# Patient Record
Sex: FEMALE | Race: WHITE | Employment: OTHER | URBAN - METROPOLITAN AREA
[De-identification: names, ages, dates, MRNs, and addresses within clinical notes are randomized per-mention and may not be internally consistent; named-entity substitution may affect disease eponyms.]

---

## 2017-04-04 ENCOUNTER — HOSPITAL ENCOUNTER (OUTPATIENT)
Dept: PULMONOLOGY | Age: 72
Discharge: HOME | End: 2017-04-04
Payer: MEDICARE

## 2017-04-04 PROCEDURE — 94060 EVALUATION OF WHEEZING: CPT | Performed by: INTERNAL MEDICINE

## 2017-04-04 PROCEDURE — 94729 DIFFUSING CAPACITY: CPT | Performed by: INTERNAL MEDICINE

## 2017-04-04 PROCEDURE — 94060 EVALUATION OF WHEEZING: CPT

## 2017-04-04 PROCEDURE — 94726 PLETHYSMOGRAPHY LUNG VOLUMES: CPT

## 2017-04-04 PROCEDURE — 94729 DIFFUSING CAPACITY: CPT

## 2017-04-04 PROCEDURE — 6360000002 HC RX W HCPCS: Performed by: INTERNAL MEDICINE

## 2017-04-04 PROCEDURE — 94726 PLETHYSMOGRAPHY LUNG VOLUMES: CPT | Performed by: INTERNAL MEDICINE

## 2017-04-04 RX ORDER — ALBUTEROL SULFATE 2.5 MG/3ML
2.5 SOLUTION RESPIRATORY (INHALATION) ONCE
Status: COMPLETED | OUTPATIENT
Start: 2017-04-04 | End: 2017-04-04

## 2017-04-04 RX ADMIN — ALBUTEROL SULFATE 2.5 MG: 2.5 SOLUTION RESPIRATORY (INHALATION) at 09:25

## 2017-06-08 ENCOUNTER — OFFICE VISIT (OUTPATIENT)
Dept: FAMILY MEDICINE CLINIC | Age: 72
End: 2017-06-08

## 2017-06-08 VITALS
BODY MASS INDEX: 30.32 KG/M2 | TEMPERATURE: 97.8 F | HEIGHT: 55 IN | OXYGEN SATURATION: 96 % | WEIGHT: 131 LBS | SYSTOLIC BLOOD PRESSURE: 128 MMHG | DIASTOLIC BLOOD PRESSURE: 68 MMHG | HEART RATE: 63 BPM

## 2017-06-08 DIAGNOSIS — I10 ESSENTIAL HYPERTENSION: ICD-10-CM

## 2017-06-08 DIAGNOSIS — S00.33XA NASAL CONTUSION: ICD-10-CM

## 2017-06-08 DIAGNOSIS — S80.01XA CONTUSION OF RIGHT KNEE, INITIAL ENCOUNTER: ICD-10-CM

## 2017-06-08 DIAGNOSIS — E78.5 HYPERLIPIDEMIA, UNSPECIFIED HYPERLIPIDEMIA TYPE: ICD-10-CM

## 2017-06-08 DIAGNOSIS — R63.5 WEIGHT GAIN: ICD-10-CM

## 2017-06-08 DIAGNOSIS — Z12.11 COLON CANCER SCREENING: ICD-10-CM
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
## 2017-06-08 DIAGNOSIS — S80.02XA CONTUSION OF LEFT KNEE, INITIAL ENCOUNTER: ICD-10-CM

## 2017-06-08 DIAGNOSIS — Z91.81 AT HIGH RISK FOR FALLS: ICD-10-CM

## 2017-06-08 PROCEDURE — 99203 OFFICE O/P NEW LOW 30 MIN: CPT | Performed by: NURSE PRACTITIONER

## 2017-06-08 RX ORDER — OXYBUTYNIN CHLORIDE 10 MG/1
TABLET, EXTENDED RELEASE ORAL
Refills: 4 | COMMUNITY
Start: 2017-06-02 | End: 2017-07-25 | Stop reason: SDUPTHER

## 2017-06-08 RX ORDER — GABAPENTIN 600 MG/1
TABLET ORAL
Refills: 5 | COMMUNITY
Start: 2017-06-02 | End: 2017-10-10 | Stop reason: SDUPTHER

## 2017-06-08 RX ORDER — PRAVASTATIN SODIUM 40 MG
TABLET ORAL
Refills: 3 | COMMUNITY
Start: 2017-04-03 | End: 2017-07-25 | Stop reason: SDUPTHER

## 2017-06-08 RX ORDER — TIZANIDINE 4 MG/1
TABLET ORAL
Refills: 0 | COMMUNITY
Start: 2017-06-02 | End: 2017-07-25 | Stop reason: SDUPTHER

## 2017-06-08 RX ORDER — LISINOPRIL AND HYDROCHLOROTHIAZIDE 12.5; 1 MG/1; MG/1
TABLET ORAL
Refills: 3 | COMMUNITY
Start: 2017-06-02 | End: 2018-03-12 | Stop reason: SDUPTHER

## 2017-06-08 RX ORDER — COLCHICINE 0.6 MG/1
TABLET ORAL
COMMUNITY
Start: 2009-03-24 | End: 2017-06-20 | Stop reason: ALTCHOICE

## 2017-06-08 ASSESSMENT — ENCOUNTER SYMPTOMS
SHORTNESS OF BREATH: 0
COUGH: 0

## 2017-06-14 DIAGNOSIS — S02.2XXA CLOSED FRACTURE OF NASAL BONE, INITIAL ENCOUNTER: Primary | ICD-10-CM

## 2017-06-20 ENCOUNTER — OFFICE VISIT (OUTPATIENT)
Dept: GASTROENTEROLOGY | Age: 72
End: 2017-06-20

## 2017-06-20 VITALS
BODY MASS INDEX: 30.14 KG/M2 | DIASTOLIC BLOOD PRESSURE: 68 MMHG | WEIGHT: 125 LBS | SYSTOLIC BLOOD PRESSURE: 109 MMHG | HEART RATE: 54 BPM

## 2017-06-20 DIAGNOSIS — R13.19 OTHER DYSPHAGIA: Primary | ICD-10-CM

## 2017-06-20 DIAGNOSIS — Z12.11 SPECIAL SCREENING FOR MALIGNANT NEOPLASMS, COLON: ICD-10-CM

## 2017-06-20 PROCEDURE — 99203 OFFICE O/P NEW LOW 30 MIN: CPT | Performed by: INTERNAL MEDICINE

## 2017-06-22 ENCOUNTER — ANESTHESIA (OUTPATIENT)
Dept: ENDOSCOPY | Age: 72
End: 2017-06-22
Payer: MEDICARE

## 2017-06-22 ENCOUNTER — ANESTHESIA EVENT (OUTPATIENT)
Dept: ENDOSCOPY | Age: 72
End: 2017-06-22
Payer: MEDICARE

## 2017-06-22 ENCOUNTER — HOSPITAL ENCOUNTER (OUTPATIENT)
Age: 72
Setting detail: OUTPATIENT SURGERY
Discharge: HOME | End: 2017-06-22
Attending: INTERNAL MEDICINE | Admitting: INTERNAL MEDICINE
Payer: MEDICARE

## 2017-06-22 VITALS
DIASTOLIC BLOOD PRESSURE: 67 MMHG | RESPIRATION RATE: 16 BRPM | OXYGEN SATURATION: 99 % | HEIGHT: 55 IN | SYSTOLIC BLOOD PRESSURE: 119 MMHG | WEIGHT: 125 LBS | BODY MASS INDEX: 28.93 KG/M2 | HEART RATE: 64 BPM | TEMPERATURE: 99.1 F

## 2017-06-22 VITALS
DIASTOLIC BLOOD PRESSURE: 52 MMHG | SYSTOLIC BLOOD PRESSURE: 102 MMHG | RESPIRATION RATE: 18 BRPM | OXYGEN SATURATION: 100 %

## 2017-06-22 PROCEDURE — 3700000000 HC ANESTHESIA ATTENDED CARE: Performed by: INTERNAL MEDICINE

## 2017-06-22 PROCEDURE — 3609017100 HC EGD: Performed by: INTERNAL MEDICINE

## 2017-06-22 PROCEDURE — 88342 IMHCHEM/IMCYTCHM 1ST ANTB: CPT

## 2017-06-22 PROCEDURE — 2500000003 HC RX 250 WO HCPCS: Performed by: NURSE ANESTHETIST, CERTIFIED REGISTERED

## 2017-06-22 PROCEDURE — 2580000003 HC RX 258: Performed by: INTERNAL MEDICINE

## 2017-06-22 PROCEDURE — 3700000001 HC ADD 15 MINUTES (ANESTHESIA): Performed by: INTERNAL MEDICINE

## 2017-06-22 PROCEDURE — 6360000002 HC RX W HCPCS: Performed by: NURSE ANESTHETIST, CERTIFIED REGISTERED

## 2017-06-22 PROCEDURE — 7100000010 HC PHASE II RECOVERY - FIRST 15 MIN: Performed by: INTERNAL MEDICINE

## 2017-06-22 PROCEDURE — 88305 TISSUE EXAM BY PATHOLOGIST: CPT

## 2017-06-22 PROCEDURE — 3609027000 HC COLONOSCOPY: Performed by: INTERNAL MEDICINE

## 2017-06-22 RX ORDER — LIDOCAINE HYDROCHLORIDE 10 MG/ML
1 INJECTION, SOLUTION EPIDURAL; INFILTRATION; INTRACAUDAL; PERINEURAL
Status: DISCONTINUED | OUTPATIENT
Start: 2017-06-22 | End: 2017-06-22 | Stop reason: HOSPADM

## 2017-06-22 RX ORDER — SODIUM CHLORIDE 0.9 % (FLUSH) 0.9 %
10 SYRINGE (ML) INJECTION PRN
Status: DISCONTINUED | OUTPATIENT
Start: 2017-06-22 | End: 2017-06-22 | Stop reason: HOSPADM

## 2017-06-22 RX ORDER — SODIUM CHLORIDE 9 MG/ML
INJECTION, SOLUTION INTRAVENOUS CONTINUOUS
Status: DISCONTINUED | OUTPATIENT
Start: 2017-06-22 | End: 2017-06-22 | Stop reason: SDUPTHER

## 2017-06-22 RX ORDER — PROPOFOL 10 MG/ML
INJECTION, EMULSION INTRAVENOUS CONTINUOUS PRN
Status: DISCONTINUED | OUTPATIENT
Start: 2017-06-22 | End: 2017-06-22 | Stop reason: SDUPTHER

## 2017-06-22 RX ORDER — SODIUM CHLORIDE 0.9 % (FLUSH) 0.9 %
10 SYRINGE (ML) INJECTION PRN
Status: DISCONTINUED | OUTPATIENT
Start: 2017-06-22 | End: 2017-06-22 | Stop reason: SDUPTHER

## 2017-06-22 RX ORDER — SODIUM CHLORIDE 0.9 % (FLUSH) 0.9 %
10 SYRINGE (ML) INJECTION EVERY 12 HOURS SCHEDULED
Status: DISCONTINUED | OUTPATIENT
Start: 2017-06-22 | End: 2017-06-22 | Stop reason: SDUPTHER

## 2017-06-22 RX ORDER — LIDOCAINE HYDROCHLORIDE 10 MG/ML
1 INJECTION, SOLUTION EPIDURAL; INFILTRATION; INTRACAUDAL; PERINEURAL
Status: DISCONTINUED | OUTPATIENT
Start: 2017-06-22 | End: 2017-06-22 | Stop reason: SDUPTHER

## 2017-06-22 RX ORDER — LIDOCAINE HYDROCHLORIDE 20 MG/ML
INJECTION, SOLUTION INFILTRATION; PERINEURAL PRN
Status: DISCONTINUED | OUTPATIENT
Start: 2017-06-22 | End: 2017-06-22 | Stop reason: SDUPTHER

## 2017-06-22 RX ORDER — ONDANSETRON 2 MG/ML
4 INJECTION INTRAMUSCULAR; INTRAVENOUS
Status: DISCONTINUED | OUTPATIENT
Start: 2017-06-22 | End: 2017-06-22 | Stop reason: HOSPADM

## 2017-06-22 RX ORDER — SODIUM CHLORIDE 0.9 % (FLUSH) 0.9 %
10 SYRINGE (ML) INJECTION EVERY 12 HOURS SCHEDULED
Status: DISCONTINUED | OUTPATIENT
Start: 2017-06-22 | End: 2017-06-22 | Stop reason: HOSPADM

## 2017-06-22 RX ORDER — GLYCOPYRROLATE 0.2 MG/ML
INJECTION INTRAMUSCULAR; INTRAVENOUS PRN
Status: DISCONTINUED | OUTPATIENT
Start: 2017-06-22 | End: 2017-06-22 | Stop reason: SDUPTHER

## 2017-06-22 RX ORDER — SODIUM CHLORIDE 9 MG/ML
INJECTION, SOLUTION INTRAVENOUS CONTINUOUS
Status: DISCONTINUED | OUTPATIENT
Start: 2017-06-22 | End: 2017-06-22 | Stop reason: HOSPADM

## 2017-06-22 RX ORDER — PROPOFOL 10 MG/ML
INJECTION, EMULSION INTRAVENOUS PRN
Status: DISCONTINUED | OUTPATIENT
Start: 2017-06-22 | End: 2017-06-22 | Stop reason: SDUPTHER

## 2017-06-22 RX ADMIN — PROPOFOL 30 MG: 10 INJECTION, EMULSION INTRAVENOUS at 12:54

## 2017-06-22 RX ADMIN — GLYCOPYRROLATE 0.2 MG: 0.2 INJECTION INTRAMUSCULAR; INTRAVENOUS at 12:49

## 2017-06-22 RX ADMIN — PROPOFOL 40 MG: 10 INJECTION, EMULSION INTRAVENOUS at 12:53

## 2017-06-22 RX ADMIN — SODIUM CHLORIDE: 9 INJECTION, SOLUTION INTRAVENOUS at 11:53

## 2017-06-22 RX ADMIN — LIDOCAINE HYDROCHLORIDE 60 MG: 20 INJECTION, SOLUTION INFILTRATION; PERINEURAL at 12:53

## 2017-06-22 RX ADMIN — PROPOFOL 120 MCG/KG/MIN: 10 INJECTION, EMULSION INTRAVENOUS at 12:53

## 2017-07-10 ENCOUNTER — OFFICE VISIT (OUTPATIENT)
Dept: FAMILY MEDICINE CLINIC | Age: 72
End: 2017-07-10

## 2017-07-10 VITALS
DIASTOLIC BLOOD PRESSURE: 66 MMHG | HEART RATE: 55 BPM | BODY MASS INDEX: 30.55 KG/M2 | HEIGHT: 55 IN | TEMPERATURE: 96.8 F | WEIGHT: 132 LBS | OXYGEN SATURATION: 97 % | SYSTOLIC BLOOD PRESSURE: 118 MMHG

## 2017-07-10 DIAGNOSIS — F32.A DEPRESSION, UNSPECIFIED DEPRESSION TYPE: Primary | ICD-10-CM

## 2017-07-10 DIAGNOSIS — Z78.0 POST-MENOPAUSAL: ICD-10-CM

## 2017-07-10 DIAGNOSIS — E78.5 HYPERLIPIDEMIA, UNSPECIFIED HYPERLIPIDEMIA TYPE: ICD-10-CM

## 2017-07-10 DIAGNOSIS — S02.2XXD CLOSED FRACTURE OF NASAL BONE WITH ROUTINE HEALING, SUBSEQUENT ENCOUNTER: ICD-10-CM

## 2017-07-10 DIAGNOSIS — I10 ESSENTIAL HYPERTENSION: ICD-10-CM

## 2017-07-10 DIAGNOSIS — R63.5 WEIGHT GAIN: ICD-10-CM

## 2017-07-10 LAB
ALT SERPL-CCNC: 19 U/L (ref 0–33)
ANION GAP SERPL CALCULATED.3IONS-SCNC: 13 MEQ/L (ref 7–13)
AST SERPL-CCNC: 25 U/L (ref 0–35)
BUN BLDV-MCNC: 25 MG/DL (ref 8–23)
CALCIUM SERPL-MCNC: 9.9 MG/DL (ref 8.6–10.2)
CHLORIDE BLD-SCNC: 101 MEQ/L (ref 98–107)
CHOLESTEROL, TOTAL: 205 MG/DL (ref 0–199)
CO2: 24 MEQ/L (ref 22–29)
CREAT SERPL-MCNC: 0.86 MG/DL (ref 0.5–0.9)
GFR AFRICAN AMERICAN: >60
GFR NON-AFRICAN AMERICAN: >60
GLUCOSE BLD-MCNC: 80 MG/DL (ref 74–109)
HCT VFR BLD CALC: 40.9 % (ref 37–47)
HDLC SERPL-MCNC: 73 MG/DL (ref 40–59)
HEMOGLOBIN: 13.5 G/DL (ref 12–16)
LDL CHOLESTEROL CALCULATED: 114 MG/DL (ref 0–129)
MCH RBC QN AUTO: 32.7 PG (ref 27–31.3)
MCHC RBC AUTO-ENTMCNC: 32.9 % (ref 33–37)
MCV RBC AUTO: 99.3 FL (ref 82–100)
PDW BLD-RTO: 12.8 % (ref 11.5–14.5)
PLATELET # BLD: 243 K/UL (ref 130–400)
POTASSIUM SERPL-SCNC: 5 MEQ/L (ref 3.5–5.1)
RBC # BLD: 4.12 M/UL (ref 4.2–5.4)
SODIUM BLD-SCNC: 138 MEQ/L (ref 132–144)
TRIGL SERPL-MCNC: 90 MG/DL (ref 0–200)
TSH SERPL DL<=0.05 MIU/L-ACNC: 1.89 UIU/ML (ref 0.27–4.2)
WBC # BLD: 8.6 K/UL (ref 4.8–10.8)

## 2017-07-10 PROCEDURE — 99213 OFFICE O/P EST LOW 20 MIN: CPT | Performed by: NURSE PRACTITIONER

## 2017-07-10 RX ORDER — OMEPRAZOLE 20 MG/1
CAPSULE, DELAYED RELEASE ORAL
Refills: 2 | COMMUNITY
Start: 2017-06-22 | End: 2019-10-01 | Stop reason: ALTCHOICE

## 2017-07-10 RX ORDER — SERTRALINE HYDROCHLORIDE 100 MG/1
100 TABLET, FILM COATED ORAL DAILY
Qty: 30 TABLET | Refills: 3 | Status: SHIPPED | OUTPATIENT
Start: 2017-07-10 | End: 2018-05-06 | Stop reason: SDUPTHER

## 2017-07-10 ASSESSMENT — ENCOUNTER SYMPTOMS
SHORTNESS OF BREATH: 0
COUGH: 0

## 2017-07-17 ENCOUNTER — HOSPITAL ENCOUNTER (OUTPATIENT)
Dept: WOMENS IMAGING | Age: 72
Discharge: HOME | End: 2017-07-17
Payer: MEDICARE

## 2017-07-17 DIAGNOSIS — Z78.0 POST-MENOPAUSAL: ICD-10-CM

## 2017-07-17 PROCEDURE — 77080 DXA BONE DENSITY AXIAL: CPT

## 2017-07-26 RX ORDER — TIZANIDINE 4 MG/1
TABLET ORAL
Qty: 90 TABLET | Refills: 0 | Status: SHIPPED | OUTPATIENT
Start: 2017-07-26 | End: 2017-10-23 | Stop reason: SDUPTHER

## 2017-07-26 RX ORDER — PRAVASTATIN SODIUM 40 MG
40 TABLET ORAL DAILY
Qty: 30 TABLET | Refills: 3 | Status: SHIPPED | OUTPATIENT
Start: 2017-07-26 | End: 2017-10-23 | Stop reason: SDUPTHER

## 2017-07-26 RX ORDER — OXYBUTYNIN CHLORIDE 10 MG/1
10 TABLET, EXTENDED RELEASE ORAL DAILY
Qty: 30 TABLET | Refills: 4 | Status: SHIPPED | OUTPATIENT
Start: 2017-07-26 | End: 2018-01-08 | Stop reason: SDUPTHER

## 2017-08-08 ENCOUNTER — OFFICE VISIT (OUTPATIENT)
Dept: FAMILY MEDICINE CLINIC | Age: 72
End: 2017-08-08

## 2017-08-08 VITALS
BODY MASS INDEX: 29.85 KG/M2 | WEIGHT: 129 LBS | HEART RATE: 58 BPM | OXYGEN SATURATION: 97 % | SYSTOLIC BLOOD PRESSURE: 90 MMHG | TEMPERATURE: 97.3 F | HEIGHT: 55 IN | DIASTOLIC BLOOD PRESSURE: 60 MMHG

## 2017-08-08 DIAGNOSIS — F32.A DEPRESSION, UNSPECIFIED DEPRESSION TYPE: Primary | ICD-10-CM

## 2017-08-08 PROCEDURE — 99213 OFFICE O/P EST LOW 20 MIN: CPT | Performed by: NURSE PRACTITIONER

## 2017-08-08 ASSESSMENT — ENCOUNTER SYMPTOMS
COUGH: 0
SHORTNESS OF BREATH: 0

## 2017-10-10 RX ORDER — GABAPENTIN 600 MG/1
TABLET ORAL
Qty: 90 TABLET | Refills: 5 | Status: SHIPPED | OUTPATIENT
Start: 2017-10-10 | End: 2018-04-06 | Stop reason: SDUPTHER

## 2017-10-23 RX ORDER — PRAVASTATIN SODIUM 40 MG
40 TABLET ORAL DAILY
Qty: 30 TABLET | Refills: 3 | Status: SHIPPED | OUTPATIENT
Start: 2017-10-23 | End: 2018-02-05 | Stop reason: SDUPTHER

## 2017-10-23 RX ORDER — TIZANIDINE 4 MG/1
TABLET ORAL
Qty: 90 TABLET | Refills: 0 | Status: SHIPPED | OUTPATIENT
Start: 2017-10-23 | End: 2017-11-27 | Stop reason: SDUPTHER

## 2017-11-20 ENCOUNTER — OFFICE VISIT (OUTPATIENT)
Dept: PULMONOLOGY | Age: 72
End: 2017-11-20

## 2017-11-20 VITALS
WEIGHT: 131 LBS | DIASTOLIC BLOOD PRESSURE: 76 MMHG | SYSTOLIC BLOOD PRESSURE: 108 MMHG | HEART RATE: 52 BPM | HEIGHT: 55 IN | TEMPERATURE: 96.7 F | OXYGEN SATURATION: 96 % | BODY MASS INDEX: 30.32 KG/M2

## 2017-11-20 DIAGNOSIS — E66.9 OBESITY (BMI 30-39.9): ICD-10-CM

## 2017-11-20 DIAGNOSIS — J44.9 CHRONIC OBSTRUCTIVE PULMONARY DISEASE, UNSPECIFIED COPD TYPE (HCC): ICD-10-CM

## 2017-11-20 DIAGNOSIS — R09.02 HYPOXEMIA: Primary | ICD-10-CM

## 2017-11-20 PROCEDURE — 99213 OFFICE O/P EST LOW 20 MIN: CPT | Performed by: INTERNAL MEDICINE

## 2017-11-20 ASSESSMENT — ENCOUNTER SYMPTOMS
ABDOMINAL PAIN: 0
WHEEZING: 0
SINUS PRESSURE: 0
SORE THROAT: 0
DIARRHEA: 0
TROUBLE SWALLOWING: 0
EYE DISCHARGE: 0
RHINORRHEA: 0
VOICE CHANGE: 0
EYE ITCHING: 0
NAUSEA: 0
SHORTNESS OF BREATH: 0
COUGH: 0
VOMITING: 0
CHEST TIGHTNESS: 0

## 2017-11-20 NOTE — PROGRESS NOTES
Social History Narrative    No narrative on file         Review of Systems   Constitutional: Negative for chills, diaphoresis, fatigue and fever. HENT: Negative for congestion, mouth sores, nosebleeds, postnasal drip, rhinorrhea, sinus pressure, sneezing, sore throat, trouble swallowing and voice change. Eyes: Negative for discharge, itching and visual disturbance. Respiratory: Negative for cough, chest tightness, shortness of breath and wheezing. Cardiovascular: Negative for chest pain, palpitations and leg swelling. Gastrointestinal: Negative for abdominal pain, diarrhea, nausea and vomiting. Genitourinary: Negative for difficulty urinating and hematuria. Musculoskeletal: Negative for arthralgias, joint swelling and myalgias. Skin: Negative for rash. Allergic/Immunologic: Negative for environmental allergies. Neurological: Negative for dizziness, tremors, weakness and headaches. Psychiatric/Behavioral: Negative for behavioral problems and sleep disturbance. Objective:     Vitals:    11/20/17 1109   BP: 108/76   Site: Left Arm   Position: Sitting   Cuff Size: Medium Adult   Pulse: 52   Temp: 96.7 °F (35.9 °C)   TempSrc: Tympanic   SpO2: 96%   Weight: 131 lb (59.4 kg)   Height: 4' 6\" (1.372 m)         Physical Exam   Constitutional: She is oriented to person, place, and time. She appears well-developed and well-nourished. No distress. obese   HENT:   Head: Normocephalic and atraumatic. Nose: Nose normal.   Mouth/Throat: Oropharynx is clear and moist.   Eyes: EOM are normal. Pupils are equal, round, and reactive to light. Neck: No JVD present. No tracheal deviation present. No thyromegaly present. Cardiovascular: Normal rate and regular rhythm. Exam reveals no gallop and no friction rub. No murmur heard. Pulmonary/Chest: No respiratory distress. She has no wheezes. She has no rales. She exhibits no tenderness.    Diminished BS at base   Abdominal: She exhibits no distension. There is no tenderness. There is no rebound. Musculoskeletal: Normal range of motion. She exhibits no edema. Lymphadenopathy:     She has no cervical adenopathy. Neurological: She is alert and oriented to person, place, and time. Coordination normal.   Skin: Skin is warm and dry. She is not diaphoretic. Psychiatric: She has a normal mood and affect. Current Outpatient Prescriptions   Medication Sig Dispense Refill    Oxygen Concentrator       pravastatin (PRAVACHOL) 40 MG tablet Take 1 tablet by mouth daily 30 tablet 3    tiZANidine (ZANAFLEX) 4 MG tablet TAKE ONE TABLET BY MOUTH THREE TIMES A DAY 90 tablet 0    gabapentin (NEURONTIN) 600 MG tablet TAKE ONE TABLET BY MOUTH THREE TIMES A DAY 90 tablet 5    oxybutynin (DITROPAN-XL) 10 MG extended release tablet Take 1 tablet by mouth daily 30 tablet 4    omeprazole (PRILOSEC) 20 MG delayed release capsule TAKE ONE CAPSULE BY MOUTH DAILY  2    sertraline (ZOLOFT) 100 MG tablet Take 1 tablet by mouth daily 30 tablet 3    lisinopril-hydrochlorothiazide (PRINZIDE;ZESTORETIC) 10-12.5 MG per tablet TAKE ONE TABLET BY MOUTH EVERY DAY  3     No current facility-administered medications for this visit. Results for orders placed during the hospital encounter of 06/10/16   XR Chest Standard TWO VW    Narrative X-RAY A CHEST, 2 VIEWS       CLINICAL HISTORY Pain left lower ribs after trauma. COMPARISONS 1/2/15     FINDINGS  Normal cardiac silhouette. There are left lateral rib  fractures involving the sixth through eighth ribs. There is mild  adjacent pleural thickening. Can't exclude small underlying infiltrate  in this region. Right lung is clear. No pleural effusion or  pneumothorax. Bones are demineralized. There is a dextroscoliosis of  the dorsal spine and levoscoliosis of the thoracic/lumbar spine. Postoperative changes of both shoulders. IMPRESSION LEFT LATERAL RIB FRACTURES.  SMALL ADJACENT DENSITY WHICH  MAY BE SECONDARY

## 2017-11-27 RX ORDER — TIZANIDINE 4 MG/1
TABLET ORAL
Qty: 90 TABLET | Refills: 0 | Status: SHIPPED | OUTPATIENT
Start: 2017-11-27 | End: 2019-09-07 | Stop reason: SDUPTHER

## 2018-01-09 RX ORDER — OXYBUTYNIN CHLORIDE 10 MG/1
TABLET, EXTENDED RELEASE ORAL
Qty: 30 TABLET | Refills: 5 | Status: SHIPPED | OUTPATIENT
Start: 2018-01-09 | End: 2018-06-10 | Stop reason: SDUPTHER

## 2018-01-30 ENCOUNTER — OFFICE VISIT (OUTPATIENT)
Dept: FAMILY MEDICINE CLINIC | Age: 73
End: 2018-01-30
Payer: MEDICARE

## 2018-01-30 VITALS
TEMPERATURE: 96.2 F | BODY MASS INDEX: 31.7 KG/M2 | DIASTOLIC BLOOD PRESSURE: 68 MMHG | HEART RATE: 93 BPM | HEIGHT: 55 IN | WEIGHT: 137 LBS | OXYGEN SATURATION: 97 % | SYSTOLIC BLOOD PRESSURE: 118 MMHG

## 2018-01-30 DIAGNOSIS — E78.5 HYPERLIPIDEMIA, UNSPECIFIED HYPERLIPIDEMIA TYPE: ICD-10-CM

## 2018-01-30 DIAGNOSIS — I10 ESSENTIAL HYPERTENSION: Primary | ICD-10-CM

## 2018-01-30 DIAGNOSIS — I10 ESSENTIAL HYPERTENSION: ICD-10-CM

## 2018-01-30 DIAGNOSIS — J44.9 CHRONIC OBSTRUCTIVE PULMONARY DISEASE, UNSPECIFIED COPD TYPE (HCC): ICD-10-CM

## 2018-01-30 DIAGNOSIS — F32.1 MAJOR DEPRESSIVE DISORDER, SINGLE EPISODE, MODERATE (HCC): ICD-10-CM

## 2018-01-30 LAB
ALT SERPL-CCNC: 19 U/L (ref 0–33)
ANION GAP SERPL CALCULATED.3IONS-SCNC: 15 MEQ/L (ref 7–13)
AST SERPL-CCNC: 26 U/L (ref 0–35)
BUN BLDV-MCNC: 27 MG/DL (ref 8–23)
CALCIUM SERPL-MCNC: 9.6 MG/DL (ref 8.6–10.2)
CHLORIDE BLD-SCNC: 101 MEQ/L (ref 98–107)
CO2: 25 MEQ/L (ref 22–29)
CREAT SERPL-MCNC: 0.8 MG/DL (ref 0.5–0.9)
GFR AFRICAN AMERICAN: >60
GFR NON-AFRICAN AMERICAN: >60
GLUCOSE BLD-MCNC: 92 MG/DL (ref 74–109)
POTASSIUM SERPL-SCNC: 4.5 MEQ/L (ref 3.5–5.1)
SODIUM BLD-SCNC: 141 MEQ/L (ref 132–144)

## 2018-01-30 PROCEDURE — 99214 OFFICE O/P EST MOD 30 MIN: CPT | Performed by: NURSE PRACTITIONER

## 2018-01-30 ASSESSMENT — PATIENT HEALTH QUESTIONNAIRE - PHQ9
SUM OF ALL RESPONSES TO PHQ9 QUESTIONS 1 & 2: 0
SUM OF ALL RESPONSES TO PHQ QUESTIONS 1-9: 0
2. FEELING DOWN, DEPRESSED OR HOPELESS: 0
1. LITTLE INTEREST OR PLEASURE IN DOING THINGS: 0

## 2018-01-30 ASSESSMENT — ENCOUNTER SYMPTOMS
COUGH: 0
SHORTNESS OF BREATH: 0

## 2018-01-30 NOTE — PROGRESS NOTES
Codeine      Nausea, lightheadedness, dizzy       Review of Systems   Constitutional: Negative for chills, diaphoresis, fatigue, fever and malaise/fatigue. Respiratory: Negative for cough and shortness of breath. Cardiovascular: Negative for chest pain, palpitations and leg swelling. Objective  Vitals:    01/30/18 1123   BP: 118/68   Site: Left Arm   Position: Sitting   Cuff Size: Medium Adult   Pulse: 93   Temp: 96.2 °F (35.7 °C)   TempSrc: Tympanic   SpO2: 97%   Weight: 137 lb (62.1 kg)   Height: 4' 3\" (1.295 m)     Physical Exam   Constitutional: She is oriented to person, place, and time. She appears well-developed and well-nourished. No distress. Cardiovascular: Normal rate, regular rhythm and normal heart sounds. Pulmonary/Chest: Effort normal and breath sounds normal. No respiratory distress. Neurological: She is alert and oriented to person, place, and time. Skin: Skin is warm and dry. No rash noted. She is not diaphoretic. No erythema. No pallor. Psychiatric: She has a normal mood and affect. Her behavior is normal. Judgment and thought content normal.     Lab Results   Component Value Date    WBC 8.6 07/10/2017    HGB 13.5 07/10/2017    HCT 40.9 07/10/2017     07/10/2017    CHOL 205 (H) 07/10/2017    TRIG 90 07/10/2017    HDL 73 (H) 07/10/2017    ALT 19 07/10/2017    AST 25 07/10/2017     07/10/2017    K 5.0 07/10/2017     07/10/2017    CREATININE 0.86 07/10/2017    BUN 25 (H) 07/10/2017    CO2 24 07/10/2017    TSH 1.890 07/10/2017    INR 1.0 04/23/2012         Assessment & Plan    1. Essential hypertension  Basic Metabolic Panel    AST    ALT   2. Major depressive disorder, single episode, moderate ([address]     3. Hyperlipidemia, unspecified hyperlipidemia type     4. Chronic obstructive pulmonary disease, unspecified COPD type ([address]       Continue current regimen. Check labs as ordered today. F/u in 4 months or sooner PRN.   Side effects, adverse effects of the medication prescribed today, as well as treatment plan/ rationale and result expectations have been discussed with the patient who expresses understanding and desires to proceed. Close follow up to evaluate treatment results and for coordination of care. I have reviewed the patient's medical history in detail and updated the computerized patient record. As always, patient is advised that if symptoms worsen in any way they will proceed to the nearest emergency room. Orders Placed This Encounter   Procedures    Basic Metabolic Panel     Standing Status:   Future     Number of Occurrences:   1     Standing Expiration Date:   1/30/2019    AST     Standing Status:   Future     Number of Occurrences:   1     Standing Expiration Date:   1/30/2019    ALT     Standing Status:   Future     Number of Occurrences:   1     Standing Expiration Date:   1/30/2019       No orders of the defined types were placed in this encounter. Return in about 4 months (around 5/30/2018) for htn, lipid, depression.     Ling Rivera, CNP

## 2018-02-05 RX ORDER — PRAVASTATIN SODIUM 40 MG
TABLET ORAL
Qty: 30 TABLET | Refills: 2 | Status: SHIPPED | OUTPATIENT
Start: 2018-02-05 | End: 2018-05-06 | Stop reason: SDUPTHER

## 2018-03-12 RX ORDER — LISINOPRIL AND HYDROCHLOROTHIAZIDE 12.5; 1 MG/1; MG/1
TABLET ORAL
Qty: 30 TABLET | Refills: 3 | Status: SHIPPED | OUTPATIENT
Start: 2018-03-12 | End: 2018-06-10 | Stop reason: SDUPTHER

## 2018-04-06 RX ORDER — GABAPENTIN 600 MG/1
TABLET ORAL
Qty: 90 TABLET | Refills: 5 | Status: SHIPPED | OUTPATIENT
Start: 2018-04-06 | End: 2018-10-30 | Stop reason: SDUPTHER

## 2018-05-06 DIAGNOSIS — F32.A DEPRESSION, UNSPECIFIED DEPRESSION TYPE: ICD-10-CM

## 2018-05-07 RX ORDER — PRAVASTATIN SODIUM 40 MG
TABLET ORAL
Qty: 30 TABLET | Refills: 1 | Status: SHIPPED | OUTPATIENT
Start: 2018-05-07 | End: 2018-06-10 | Stop reason: SDUPTHER

## 2018-05-07 RX ORDER — SERTRALINE HYDROCHLORIDE 100 MG/1
TABLET, FILM COATED ORAL
Qty: 30 TABLET | Refills: 4 | Status: SHIPPED | OUTPATIENT
Start: 2018-05-07 | End: 2018-10-02 | Stop reason: SDUPTHER

## 2018-05-31 ENCOUNTER — OFFICE VISIT (OUTPATIENT)
Dept: FAMILY MEDICINE CLINIC | Age: 73
End: 2018-05-31
Payer: MEDICARE

## 2018-05-31 VITALS
TEMPERATURE: 98.1 F | BODY MASS INDEX: 32.26 KG/M2 | HEART RATE: 66 BPM | WEIGHT: 139.4 LBS | OXYGEN SATURATION: 97 % | SYSTOLIC BLOOD PRESSURE: 108 MMHG | HEIGHT: 55 IN | DIASTOLIC BLOOD PRESSURE: 68 MMHG

## 2018-05-31 DIAGNOSIS — F32.1 MAJOR DEPRESSIVE DISORDER, SINGLE EPISODE, MODERATE (HCC): ICD-10-CM

## 2018-05-31 DIAGNOSIS — E78.5 HYPERLIPIDEMIA, UNSPECIFIED HYPERLIPIDEMIA TYPE: ICD-10-CM

## 2018-05-31 DIAGNOSIS — J44.9 CHRONIC OBSTRUCTIVE PULMONARY DISEASE, UNSPECIFIED COPD TYPE (HCC): ICD-10-CM

## 2018-05-31 DIAGNOSIS — I10 ESSENTIAL HYPERTENSION: ICD-10-CM

## 2018-05-31 DIAGNOSIS — R07.9 CHEST PAIN, UNSPECIFIED TYPE: ICD-10-CM

## 2018-05-31 DIAGNOSIS — R07.9 CHEST PAIN, UNSPECIFIED TYPE: Primary | ICD-10-CM

## 2018-05-31 PROCEDURE — 99214 OFFICE O/P EST MOD 30 MIN: CPT | Performed by: NURSE PRACTITIONER

## 2018-05-31 PROCEDURE — 93000 ELECTROCARDIOGRAM COMPLETE: CPT | Performed by: NURSE PRACTITIONER

## 2018-05-31 ASSESSMENT — ENCOUNTER SYMPTOMS
SHORTNESS OF BREATH: 1
COUGH: 0
ABDOMINAL PAIN: 0

## 2018-06-11 RX ORDER — OXYBUTYNIN CHLORIDE 10 MG/1
TABLET, EXTENDED RELEASE ORAL
Qty: 30 TABLET | Refills: 4 | Status: SHIPPED | OUTPATIENT
Start: 2018-06-11 | End: 2018-11-27 | Stop reason: SDUPTHER

## 2018-06-11 RX ORDER — LISINOPRIL AND HYDROCHLOROTHIAZIDE 12.5; 1 MG/1; MG/1
TABLET ORAL
Qty: 30 TABLET | Refills: 2 | Status: SHIPPED | OUTPATIENT
Start: 2018-06-11 | End: 2018-10-02 | Stop reason: SDUPTHER

## 2018-06-11 RX ORDER — PRAVASTATIN SODIUM 40 MG
TABLET ORAL
Qty: 30 TABLET | Refills: 0 | Status: SHIPPED | OUTPATIENT
Start: 2018-06-11 | End: 2018-08-03 | Stop reason: SDUPTHER

## 2018-06-27 ENCOUNTER — OFFICE VISIT (OUTPATIENT)
Dept: FAMILY MEDICINE CLINIC | Age: 73
End: 2018-06-27
Payer: MEDICARE

## 2018-06-27 VITALS
BODY MASS INDEX: 31.75 KG/M2 | TEMPERATURE: 97.8 F | HEIGHT: 55 IN | WEIGHT: 137.2 LBS | OXYGEN SATURATION: 99 % | HEART RATE: 68 BPM | SYSTOLIC BLOOD PRESSURE: 98 MMHG | DIASTOLIC BLOOD PRESSURE: 62 MMHG

## 2018-06-27 DIAGNOSIS — J20.9 ACUTE BRONCHITIS, UNSPECIFIED ORGANISM: Primary | ICD-10-CM

## 2018-06-27 PROCEDURE — 99214 OFFICE O/P EST MOD 30 MIN: CPT | Performed by: FAMILY MEDICINE

## 2018-06-27 RX ORDER — AZITHROMYCIN 250 MG/1
TABLET, FILM COATED ORAL
Qty: 1 PACKET | Refills: 0 | Status: SHIPPED | OUTPATIENT
Start: 2018-06-27 | End: 2019-04-08 | Stop reason: ALTCHOICE

## 2018-06-27 RX ORDER — BENZONATATE 100 MG/1
100 CAPSULE ORAL 3 TIMES DAILY PRN
Qty: 20 CAPSULE | Refills: 0 | Status: SHIPPED | OUTPATIENT
Start: 2018-06-27 | End: 2018-07-04

## 2018-06-27 ASSESSMENT — ENCOUNTER SYMPTOMS
RHINORRHEA: 0
SORE THROAT: 0
COUGH: 1
HEARTBURN: 0

## 2018-10-02 DIAGNOSIS — F32.A DEPRESSION, UNSPECIFIED DEPRESSION TYPE: ICD-10-CM

## 2018-10-03 RX ORDER — LISINOPRIL AND HYDROCHLOROTHIAZIDE 12.5; 1 MG/1; MG/1
TABLET ORAL
Qty: 30 TABLET | Refills: 1 | Status: SHIPPED | OUTPATIENT
Start: 2018-10-03 | End: 2018-11-27 | Stop reason: SDUPTHER

## 2018-10-03 RX ORDER — SERTRALINE HYDROCHLORIDE 100 MG/1
TABLET, FILM COATED ORAL
Qty: 30 TABLET | Refills: 3 | Status: SHIPPED | OUTPATIENT
Start: 2018-10-03 | End: 2019-01-21 | Stop reason: SDUPTHER

## 2018-11-01 RX ORDER — GABAPENTIN 600 MG/1
TABLET ORAL
Qty: 90 TABLET | Refills: 2 | Status: SHIPPED | OUTPATIENT
Start: 2018-11-01 | End: 2019-01-21 | Stop reason: SDUPTHER

## 2018-11-28 RX ORDER — LISINOPRIL AND HYDROCHLOROTHIAZIDE 12.5; 1 MG/1; MG/1
TABLET ORAL
Qty: 30 TABLET | Refills: 0 | Status: SHIPPED | OUTPATIENT
Start: 2018-11-28 | End: 2019-01-21 | Stop reason: SDUPTHER

## 2018-11-28 RX ORDER — OXYBUTYNIN CHLORIDE 10 MG/1
TABLET, EXTENDED RELEASE ORAL
Qty: 30 TABLET | Refills: 3 | Status: SHIPPED | OUTPATIENT
Start: 2018-11-28 | End: 2019-03-28 | Stop reason: SDUPTHER

## 2019-01-21 DIAGNOSIS — F32.A DEPRESSION, UNSPECIFIED DEPRESSION TYPE: ICD-10-CM

## 2019-01-21 RX ORDER — SERTRALINE HYDROCHLORIDE 100 MG/1
TABLET, FILM COATED ORAL
Qty: 30 TABLET | Refills: 2 | Status: SHIPPED | OUTPATIENT
Start: 2019-01-21 | End: 2019-04-08 | Stop reason: ALTCHOICE

## 2019-01-21 RX ORDER — PRAVASTATIN SODIUM 40 MG
TABLET ORAL
Qty: 30 TABLET | Refills: 4 | Status: SHIPPED | OUTPATIENT
Start: 2019-01-21 | End: 2019-06-28 | Stop reason: SDUPTHER

## 2019-01-21 RX ORDER — GABAPENTIN 600 MG/1
TABLET ORAL
Qty: 90 TABLET | Refills: 1 | Status: SHIPPED | OUTPATIENT
Start: 2019-01-21 | End: 2019-03-28 | Stop reason: SDUPTHER

## 2019-01-21 RX ORDER — LISINOPRIL AND HYDROCHLOROTHIAZIDE 12.5; 1 MG/1; MG/1
TABLET ORAL
Qty: 30 TABLET | Refills: 0 | Status: SHIPPED | OUTPATIENT
Start: 2019-01-21 | End: 2019-05-13 | Stop reason: SDUPTHER

## 2019-03-29 RX ORDER — GABAPENTIN 600 MG/1
TABLET ORAL
Qty: 90 TABLET | Refills: 0 | Status: SHIPPED | OUTPATIENT
Start: 2019-03-29 | End: 2019-04-23 | Stop reason: SDUPTHER

## 2019-03-29 RX ORDER — OXYBUTYNIN CHLORIDE 10 MG/1
TABLET, EXTENDED RELEASE ORAL
Qty: 30 TABLET | Refills: 2 | Status: SHIPPED | OUTPATIENT
Start: 2019-03-29 | End: 2019-06-28 | Stop reason: SDUPTHER

## 2019-04-08 ENCOUNTER — OFFICE VISIT (OUTPATIENT)
Dept: FAMILY MEDICINE CLINIC | Age: 74
End: 2019-04-08
Payer: MEDICARE

## 2019-04-08 VITALS
HEIGHT: 55 IN | DIASTOLIC BLOOD PRESSURE: 64 MMHG | HEART RATE: 60 BPM | TEMPERATURE: 97.6 F | BODY MASS INDEX: 34.71 KG/M2 | WEIGHT: 150 LBS | SYSTOLIC BLOOD PRESSURE: 104 MMHG | OXYGEN SATURATION: 98 %

## 2019-04-08 DIAGNOSIS — F32.A DEPRESSION, UNSPECIFIED DEPRESSION TYPE: Primary | ICD-10-CM

## 2019-04-08 DIAGNOSIS — J44.9 CHRONIC OBSTRUCTIVE PULMONARY DISEASE, UNSPECIFIED COPD TYPE (HCC): ICD-10-CM

## 2019-04-08 DIAGNOSIS — E78.5 HYPERLIPIDEMIA, UNSPECIFIED HYPERLIPIDEMIA TYPE: ICD-10-CM

## 2019-04-08 DIAGNOSIS — I10 ESSENTIAL HYPERTENSION: ICD-10-CM

## 2019-04-08 PROCEDURE — 99213 OFFICE O/P EST LOW 20 MIN: CPT | Performed by: NURSE PRACTITIONER

## 2019-04-08 ASSESSMENT — ENCOUNTER SYMPTOMS
SHORTNESS OF BREATH: 0
COUGH: 0

## 2019-04-08 NOTE — PROGRESS NOTES
Subjective  Chief Complaint   Patient presents with    Check-Up     check up for med refills, states she already got her refills. Hypertension   This is a chronic problem. The current episode started more than 1 year ago. The problem is unchanged. The problem is controlled. Pertinent negatives include no chest pain, headaches, malaise/fatigue, palpitations, peripheral edema or shortness of breath. There are no associated agents to hypertension. Risk factors for coronary artery disease include obesity and post-menopausal state. Past treatments include ACE inhibitors and diuretics. The current treatment provides significant improvement. There are no compliance problems. There is no history of angina. Gets sore throat in the evening when she swallows. Denies any heartburn or reflux symptoms. Denies any cough or cold symptoms. Just feels like she wants to swallow all the time. Taking zoloft daily. Does still get some depression in the evening. Usually around 7 pm when it hits when she is sitting on the couch. Thinks she may have had this for a long time. Denies any confusion. Has not seen Dr. Rosaura Govea in over a year. Breathing has been well controlled. Has oxygen at home, but just uses it at night.     Past Medical History:   Diagnosis Date    Anxiety     Arthritis     COPD (chronic obstructive pulmonary disease) (HCC)     Generalized osteoarthrosis, unspecified site 12/27/2002    Hyperlipidemia     Hypertension     Major depressive disorder, single episode, moderate ([address] 12/27/2002    Morbid obesity ([address] 12/27/2002    OAB (overactive bladder)     Osteoporosis      Patient Active Problem List    Diagnosis Date Noted    Hypertension     Hyperlipidemia     COPD (chronic obstructive pulmonary disease) ([address]     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 ([address] 08/27/2010    Generalized osteoarthrosis, unspecified site 12/27/2002    Major depressive disorder, single episode, moderate ([address] 12/27/2002    Morbid obesity ([facility] 2002     Past Surgical History:   Procedure Laterality Date    CARDIAC CATHETERIZATION      CARPAL TUNNEL RELEASE      COLONOSCOPY      HYSTERECTOMY, TOTAL ABDOMINAL      KNEE ARTHROPLASTY Bilateral     RI COLON CA SCRN NOT  [address] IND N/A 2017    COLONOSCOPY performed by Arden Sinha MD at [address] ESOPHAGOGASTRODUODENOSCOPY TRANSORAL DIAGNOSTIC N/A 2017    EGD ESOPHAGOGASTRODUODENOSCOPY performed by Arden Sinha MD at [address] Bilateral      Family History   Problem Relation Age of Onset    Arthritis Father     Other Father         gout    Heart Failure Father     Cancer Sister      Social History     Socioeconomic History    Marital status:      Spouse name: Not on file    Number of children: Not on file    Years of education: Not on file    Highest education level: Not on file   Occupational History    Not on file   Social Needs    Financial resource strain: Not on file    Food insecurity:     Worry: Not on file     Inability: Not on file    Transportation needs:     Medical: Not on file     Non-medical: Not on file   Tobacco Use    Smoking status: Former Smoker     Packs/day: 1.00     Years: 20.00     Pack years: 20.00     Last attempt to quit: 1980     Years since quittin.2    Smokeless tobacco: Never Used   Substance and Sexual Activity    Alcohol use: No    Drug use: Not on file    Sexual activity: Not on file   Lifestyle    Physical activity:     Days per week: Not on file     Minutes per session: Not on file    Stress: Not on file   Relationships    Social connections:     Talks on phone: Not on file     Gets together: Not on file     Attends [employer] service: Not on file     Active member of club or organization: Not on file     Attends meetings of clubs or organizations: Not on file     Relationship status: Not on file    Intimate partner violence:     Fear of current or ex partner: Not on file     Emotionally abused: Not on file     Physically abused: Not on file     Forced sexual activity: Not on file   Other Topics Concern    Not on file   Social History Narrative    Not on file     Current Outpatient Medications on File Prior to Visit   Medication Sig Dispense Refill    oxybutynin (DITROPAN-XL) 10 MG extended release tablet TAKE ONE TABLET BY MOUTH EVERY DAY 30 tablet 2    gabapentin (NEURONTIN) 600 MG tablet TAKE ONE TABLET BY MOUTH THREE TIMES A DAY 90 tablet 0    pravastatin (PRAVACHOL) 40 MG tablet TAKE ONE TABLET BY MOUTH EVERY DAY 30 tablet 4    lisinopril-hydrochlorothiazide (PRINZIDE;ZESTORETIC) 10-12.5 MG per tablet TAKE ONE TABLET BY MOUTH EVERY DAY 30 tablet 0    Oxygen Concentrator       omeprazole (PRILOSEC) 20 MG delayed release capsule TAKE ONE CAPSULE BY MOUTH DAILY  2    tiZANidine (ZANAFLEX) 4 MG tablet TAKE ONE TABLET BY MOUTH THREE TIMES A DAY 90 tablet 0     No current facility-administered medications on file prior to visit. Allergies   Allergen Reactions    Codeine      Nausea, lightheadedness, dizzy       Review of Systems   Constitutional: Negative for chills, diaphoresis, fatigue, fever and malaise/fatigue. HENT: Negative for congestion. Respiratory: Negative for cough and shortness of breath. Cardiovascular: Negative for chest pain, palpitations and leg swelling. Neurological: Negative for dizziness and headaches. Psychiatric/Behavioral: Positive for dysphoric mood. Negative for decreased concentration, sleep disturbance and suicidal ideas. The patient is not nervous/anxious. Objective  Vitals:    04/08/19 1139   BP: 104/64   Site: Left Upper Arm   Position: Sitting   Cuff Size: Medium Adult   Pulse: 60   Temp: 97.6 °F (36.4 °C)   TempSrc: Tympanic   SpO2: 98%   Weight: 150 lb (68 kg)   Height: 4' 3\" (1.295 m)     Physical Exam   Constitutional: She appears well-developed and well-nourished. No distress. HENT:   Head: Normocephalic and atraumatic. Right Ear: Hearing, tympanic membrane, external ear and ear canal normal.   Left Ear: Hearing, tympanic membrane, external ear and ear canal normal.   Nose: Nose normal.   Mouth/Throat: Oropharynx is clear and moist. No oropharyngeal exudate or posterior oropharyngeal erythema. Eyes: Pupils are equal, round, and reactive to light. Conjunctivae and EOM are normal.   Neck: Normal range of motion. Neck supple. Cardiovascular: Normal rate, regular rhythm and normal heart sounds. Pulmonary/Chest: Effort normal and breath sounds normal. No respiratory distress. Musculoskeletal: Normal range of motion. Lymphadenopathy:     She has no cervical adenopathy. Neurological: She is alert. No cranial nerve deficit. Skin: Skin is warm and dry. Capillary refill takes less than 2 seconds. No rash noted. She is not diaphoretic. No erythema. No pallor. Psychiatric: She has a normal mood and affect. Her behavior is normal. Judgment and thought content normal.       Assessment& Plan    1. Depression, unspecified depression type  Discussed starting low dose anti-depressant for current symptoms. Discussed that the first 2 weeks are to monitor for side effects. At the 2 week follow up, the dose will be increased if no significant side effects are reported. Discussed possible side effects with most common side effects being GI related; also discussed BBW of suicidiality. The patient verbalizes understanding. Discussed that it can take up to 6 weeks on a therapeutic dose of medication to reach peak effect.       - VORTIoxetine (TRINTELLIX) 5 MG tablet; Take 1 tablet by mouth daily  Dispense: 30 tablet; Refill: 1    2. Chronic obstructive pulmonary disease, unspecified COPD type ([facility]  Pt will schedule f/u with Dr. Alana Rivas. 3. Essential hypertension  Patient advised to occasionally monitor blood pressure at home and call office if blood pressure consistently elevated >140/85. Continue with medications as ordered. Watch excess salt intake as it can contribute to elevations in blood pressure. Patient verbalized understanding.     - CBC With Auto Differential; Future  - Comprehensive Metabolic Panel; Future    4. Hyperlipidemia, unspecified hyperlipidemia type  Check labs as ordered. - Lipid Panel; Future    Side effects, adverse effects of the medication prescribed today, as well as treatment plan/ rationale and result expectations have been discussed with the patient who expresses understanding and desires to proceed. Close follow up to evaluate treatment results and for coordination of care. I have reviewed the patient's medical history in detail and updated the computerized patient record. As always, patient is advised that if symptoms worsen in any way they will proceed to the nearest emergency room. Orders Placed This Encounter   Procedures    CBC With Auto Differential     Standing Status:   Future     Standing Expiration Date:   4/8/2020    Comprehensive Metabolic Panel     Standing Status:   Future     Standing Expiration Date:   4/8/2020    Lipid Panel     Standing Status:   Future     Standing Expiration Date:   4/8/2020     Order Specific Question:   Is Patient Fasting?/# of Hours     Answer:   y/12       Orders Placed This Encounter   Medications    VORTIoxetine (TRINTELLIX) 5 MG tablet     Sig: Take 1 tablet by mouth daily     Dispense:  30 tablet     Refill:  1       Return in about 3 weeks (around 4/29/2019) for depression.     Arjun Phan, YULY - CNP

## 2019-04-10 ENCOUNTER — TELEPHONE (OUTPATIENT)
Dept: FAMILY MEDICINE CLINIC | Age: 74
End: 2019-04-10

## 2019-04-11 DIAGNOSIS — E78.5 HYPERLIPIDEMIA, UNSPECIFIED HYPERLIPIDEMIA TYPE: ICD-10-CM

## 2019-04-11 DIAGNOSIS — I10 ESSENTIAL HYPERTENSION: ICD-10-CM

## 2019-04-11 LAB
ALBUMIN SERPL-MCNC: 4.5 G/DL (ref 3.5–4.6)
ALP BLD-CCNC: 52 U/L (ref 40–130)
ALT SERPL-CCNC: 18 U/L (ref 0–33)
ANION GAP SERPL CALCULATED.3IONS-SCNC: 14 MEQ/L (ref 9–15)
AST SERPL-CCNC: 26 U/L (ref 0–35)
BASOPHILS ABSOLUTE: 0.1 K/UL (ref 0–0.2)
BASOPHILS RELATIVE PERCENT: 1.2 %
BILIRUB SERPL-MCNC: 0.5 MG/DL (ref 0.2–0.7)
BUN BLDV-MCNC: 21 MG/DL (ref 8–23)
CALCIUM SERPL-MCNC: 9.8 MG/DL (ref 8.5–9.9)
CHLORIDE BLD-SCNC: 105 MEQ/L (ref 95–107)
CHOLESTEROL, TOTAL: 193 MG/DL (ref 0–199)
CO2: 25 MEQ/L (ref 20–31)
CREAT SERPL-MCNC: 0.86 MG/DL (ref 0.5–0.9)
EOSINOPHILS ABSOLUTE: 0.4 K/UL (ref 0–0.7)
EOSINOPHILS RELATIVE PERCENT: 7.9 %
GFR AFRICAN AMERICAN: >60
GFR NON-AFRICAN AMERICAN: >60
GLOBULIN: 2.2 G/DL (ref 2.3–3.5)
GLUCOSE BLD-MCNC: 65 MG/DL (ref 70–99)
HCT VFR BLD CALC: 39.4 % (ref 37–47)
HDLC SERPL-MCNC: 79 MG/DL (ref 40–59)
HEMOGLOBIN: 13 G/DL (ref 12–16)
LDL CHOLESTEROL CALCULATED: 98 MG/DL (ref 0–129)
LYMPHOCYTES ABSOLUTE: 1.4 K/UL (ref 1–4.8)
LYMPHOCYTES RELATIVE PERCENT: 26.2 %
MCH RBC QN AUTO: 32.8 PG (ref 27–31.3)
MCHC RBC AUTO-ENTMCNC: 32.9 % (ref 33–37)
MCV RBC AUTO: 99.8 FL (ref 82–100)
MONOCYTES ABSOLUTE: 0.5 K/UL (ref 0.2–0.8)
MONOCYTES RELATIVE PERCENT: 9.6 %
NEUTROPHILS ABSOLUTE: 3 K/UL (ref 1.4–6.5)
NEUTROPHILS RELATIVE PERCENT: 55.1 %
PDW BLD-RTO: 13.7 % (ref 11.5–14.5)
PLATELET # BLD: 242 K/UL (ref 130–400)
POTASSIUM SERPL-SCNC: 4.8 MEQ/L (ref 3.4–4.9)
RBC # BLD: 3.95 M/UL (ref 4.2–5.4)
SODIUM BLD-SCNC: 144 MEQ/L (ref 135–144)
TOTAL PROTEIN: 6.7 G/DL (ref 6.3–8)
TRIGL SERPL-MCNC: 82 MG/DL (ref 0–150)
WBC # BLD: 5.5 K/UL (ref 4.8–10.8)

## 2019-04-15 ENCOUNTER — TELEPHONE (OUTPATIENT)
Dept: ADMINISTRATIVE | Age: 74
End: 2019-04-15

## 2019-04-15 ENCOUNTER — APPOINTMENT (OUTPATIENT)
Dept: CT IMAGING | Age: 74
End: 2019-04-15
Payer: MEDICARE

## 2019-04-15 ENCOUNTER — HOSPITAL ENCOUNTER (EMERGENCY)
Age: 74
Discharge: HOME | End: 2019-04-15
Payer: MEDICARE

## 2019-04-15 VITALS
HEART RATE: 55 BPM | HEIGHT: 55 IN | TEMPERATURE: 98 F | OXYGEN SATURATION: 100 % | DIASTOLIC BLOOD PRESSURE: 52 MMHG | SYSTOLIC BLOOD PRESSURE: 121 MMHG | RESPIRATION RATE: 16 BRPM | BODY MASS INDEX: 33.56 KG/M2 | WEIGHT: 145 LBS

## 2019-04-15 DIAGNOSIS — R10.84 GENERALIZED ABDOMINAL PAIN: ICD-10-CM

## 2019-04-15 DIAGNOSIS — N39.0 URINARY TRACT INFECTION WITHOUT HEMATURIA, SITE UNSPECIFIED: Primary | ICD-10-CM

## 2019-04-15 DIAGNOSIS — R11.0 NAUSEA: ICD-10-CM

## 2019-04-15 LAB
ALBUMIN SERPL-MCNC: 4.2 G/DL (ref 3.5–4.6)
ALP BLD-CCNC: 58 U/L (ref 40–130)
ALT SERPL-CCNC: 15 U/L (ref 0–33)
ANION GAP SERPL CALCULATED.3IONS-SCNC: 11 MEQ/L (ref 9–15)
AST SERPL-CCNC: 24 U/L (ref 0–35)
BACTERIA: NEGATIVE /HPF
BASOPHILS ABSOLUTE: 0.1 K/UL (ref 0–0.2)
BASOPHILS RELATIVE PERCENT: 1.5 %
BILIRUB SERPL-MCNC: 0.3 MG/DL (ref 0.2–0.7)
BILIRUBIN URINE: NEGATIVE
BLOOD, URINE: NEGATIVE
BUN BLDV-MCNC: 20 MG/DL (ref 8–23)
CALCIUM SERPL-MCNC: 9.2 MG/DL (ref 8.5–9.9)
CHLORIDE BLD-SCNC: 108 MEQ/L (ref 95–107)
CLARITY: CLEAR
CO2: 25 MEQ/L (ref 20–31)
COLOR: YELLOW
CREAT SERPL-MCNC: 0.81 MG/DL (ref 0.5–0.9)
EOSINOPHILS ABSOLUTE: 0.4 K/UL (ref 0–0.7)
EOSINOPHILS RELATIVE PERCENT: 6.2 %
EPITHELIAL CELLS, UA: ABNORMAL /HPF (ref 0–5)
GFR AFRICAN AMERICAN: >60
GFR NON-AFRICAN AMERICAN: >60
GLOBULIN: 2.6 G/DL (ref 2.3–3.5)
GLUCOSE BLD-MCNC: 84 MG/DL (ref 70–99)
GLUCOSE URINE: NEGATIVE MG/DL
HCT VFR BLD CALC: 38.8 % (ref 37–47)
HEMOGLOBIN: 12.9 G/DL (ref 12–16)
HYALINE CASTS: ABNORMAL /HPF (ref 0–5)
KETONES, URINE: NEGATIVE MG/DL
LACTIC ACID: 1.2 MMOL/L (ref 0.5–2.2)
LEUKOCYTE ESTERASE, URINE: ABNORMAL
LIPASE: 104 U/L (ref 12–95)
LYMPHOCYTES ABSOLUTE: 1.6 K/UL (ref 1–4.8)
LYMPHOCYTES RELATIVE PERCENT: 27.6 %
MCH RBC QN AUTO: 32.7 PG (ref 27–31.3)
MCHC RBC AUTO-ENTMCNC: 33.3 % (ref 33–37)
MCV RBC AUTO: 98.3 FL (ref 82–100)
MONOCYTES ABSOLUTE: 0.5 K/UL (ref 0.2–0.8)
MONOCYTES RELATIVE PERCENT: 8.4 %
NEUTROPHILS ABSOLUTE: 3.3 K/UL (ref 1.4–6.5)
NEUTROPHILS RELATIVE PERCENT: 56.3 %
NITRITE, URINE: NEGATIVE
PDW BLD-RTO: 13.8 % (ref 11.5–14.5)
PH UA: 6 (ref 5–9)
PLATELET # BLD: 224 K/UL (ref 130–400)
POC CREATININE WHOLE BLOOD: 0.9
POTASSIUM SERPL-SCNC: 4.4 MEQ/L (ref 3.4–4.9)
PROTEIN UA: NEGATIVE MG/DL
RBC # BLD: 3.95 M/UL (ref 4.2–5.4)
RBC UA: ABNORMAL /HPF (ref 0–5)
SODIUM BLD-SCNC: 144 MEQ/L (ref 135–144)
SPECIFIC GRAVITY UA: 1.03 (ref 1–1.03)
TOTAL PROTEIN: 6.8 G/DL (ref 6.3–8)
URINE REFLEX TO CULTURE: YES
UROBILINOGEN, URINE: 0.2 E.U./DL
WBC # BLD: 5.9 K/UL (ref 4.8–10.8)
WBC UA: ABNORMAL /HPF (ref 0–5)

## 2019-04-15 PROCEDURE — 80053 COMPREHEN METABOLIC PANEL: CPT

## 2019-04-15 PROCEDURE — 81001 URINALYSIS AUTO W/SCOPE: CPT

## 2019-04-15 PROCEDURE — 6370000000 HC RX 637 (ALT 250 FOR IP): Performed by: PHYSICIAN ASSISTANT

## 2019-04-15 PROCEDURE — 96374 THER/PROPH/DIAG INJ IV PUSH: CPT

## 2019-04-15 PROCEDURE — 87086 URINE CULTURE/COLONY COUNT: CPT

## 2019-04-15 PROCEDURE — 99284 EMERGENCY DEPT VISIT MOD MDM: CPT

## 2019-04-15 PROCEDURE — 83690 ASSAY OF LIPASE: CPT

## 2019-04-15 PROCEDURE — 74177 CT ABD & PELVIS W/CONTRAST: CPT

## 2019-04-15 PROCEDURE — 36415 COLL VENOUS BLD VENIPUNCTURE: CPT

## 2019-04-15 PROCEDURE — 2580000003 HC RX 258: Performed by: PHYSICIAN ASSISTANT

## 2019-04-15 PROCEDURE — 6360000004 HC RX CONTRAST MEDICATION: Performed by: PHYSICIAN ASSISTANT

## 2019-04-15 PROCEDURE — 85025 COMPLETE CBC W/AUTO DIFF WBC: CPT

## 2019-04-15 PROCEDURE — 6360000002 HC RX W HCPCS: Performed by: PHYSICIAN ASSISTANT

## 2019-04-15 PROCEDURE — 83605 ASSAY OF LACTIC ACID: CPT

## 2019-04-15 RX ORDER — PHENAZOPYRIDINE HYDROCHLORIDE 100 MG/1
100 TABLET, FILM COATED ORAL 3 TIMES DAILY PRN
Qty: 6 TABLET | Refills: 0 | Status: SHIPPED | OUTPATIENT
Start: 2019-04-15 | End: 2019-04-18

## 2019-04-15 RX ORDER — ONDANSETRON 2 MG/ML
4 INJECTION INTRAMUSCULAR; INTRAVENOUS ONCE
Status: COMPLETED | OUTPATIENT
Start: 2019-04-15 | End: 2019-04-15

## 2019-04-15 RX ORDER — ONDANSETRON 4 MG/1
4 TABLET, FILM COATED ORAL EVERY 8 HOURS PRN
Qty: 20 TABLET | Refills: 0 | Status: SHIPPED | OUTPATIENT
Start: 2019-04-15 | End: 2019-09-07 | Stop reason: SDUPTHER

## 2019-04-15 RX ORDER — NITROFURANTOIN 25; 75 MG/1; MG/1
100 CAPSULE ORAL 2 TIMES DAILY
Qty: 10 CAPSULE | Refills: 0 | Status: SHIPPED | OUTPATIENT
Start: 2019-04-15 | End: 2019-04-20

## 2019-04-15 RX ORDER — SODIUM CHLORIDE 9 MG/ML
INJECTION, SOLUTION INTRAVENOUS CONTINUOUS
Status: DISCONTINUED | OUTPATIENT
Start: 2019-04-15 | End: 2019-04-15 | Stop reason: HOSPADM

## 2019-04-15 RX ORDER — NITROFURANTOIN 25; 75 MG/1; MG/1
100 CAPSULE ORAL ONCE
Status: COMPLETED | OUTPATIENT
Start: 2019-04-15 | End: 2019-04-15

## 2019-04-15 RX ADMIN — NITROFURANTOIN MONOHYDRATE/MACROCRYSTALLINE 100 MG: 25; 75 CAPSULE ORAL at 17:32

## 2019-04-15 RX ADMIN — ONDANSETRON 4 MG: 2 INJECTION INTRAMUSCULAR; INTRAVENOUS at 15:37

## 2019-04-15 RX ADMIN — IOPAMIDOL 100 ML: 612 INJECTION, SOLUTION INTRAVENOUS at 16:19

## 2019-04-15 RX ADMIN — SODIUM CHLORIDE: 9 INJECTION, SOLUTION INTRAVENOUS at 15:36

## 2019-04-15 ASSESSMENT — ENCOUNTER SYMPTOMS
SHORTNESS OF BREATH: 0
BACK PAIN: 0
ABDOMINAL DISTENTION: 0
RHINORRHEA: 0
EYE DISCHARGE: 0
VOMITING: 0
ABDOMINAL PAIN: 1
SORE THROAT: 0
CONSTIPATION: 0
NAUSEA: 1
COLOR CHANGE: 0

## 2019-04-15 ASSESSMENT — PAIN DESCRIPTION - DESCRIPTORS: DESCRIPTORS: ACHING

## 2019-04-15 ASSESSMENT — PAIN DESCRIPTION - FREQUENCY: FREQUENCY: INTERMITTENT

## 2019-04-15 ASSESSMENT — PAIN DESCRIPTION - ORIENTATION: ORIENTATION: RIGHT;UPPER

## 2019-04-15 ASSESSMENT — PAIN SCALES - GENERAL: PAINLEVEL_OUTOF10: 2

## 2019-04-15 ASSESSMENT — PAIN DESCRIPTION - PAIN TYPE: TYPE: ACUTE PAIN

## 2019-04-15 ASSESSMENT — PAIN DESCRIPTION - LOCATION: LOCATION: ABDOMEN

## 2019-04-15 NOTE — ED NOTES
Pt states 0 nausea or pain at this time. Resting on cart watching tv. Given a second warm blanket.       Dee Chandler RN  04/15/19 3594

## 2019-04-15 NOTE — ED PROVIDER NOTES
[address] [facility] ED  eMERGENCY dEPARTMENT eNCOUnter      Pt Name: Faith Ramos  MRN: [id number]  Rukhsanagflisa 1945  Date of evaluation: 4/15/2019  Provider: Jeison Leary PA-C    CHIEF COMPLAINT       Chief Complaint   Patient presents with    Abdominal Pain     right upper abd pain denies any fevers or chills denies n/v/d woke up with today         HISTORY OF PRESENT ILLNESS   (Location/Symptom, Timing/Onset,Context/Setting, Quality, Duration, Modifying Factors, Severity)  Note limiting factors. Faith Ramos is a 74 y.o. female who presents to the emergency department with a complaint of abdominal pain and nausea which patient states started early this morning. She denies any fevers no urinary complaints no constipation or diarrhea she denies any chest pains, she states she's been able to eat and drink prior to this morning without any difficulty. She states she's been nauseated but no episodes of vomiting. She rates her current pain as a 2 out of 10. She states she went to see her regular family physician and when she went to check in, she was advised to come to the emergency department for further evaluation for abdominal pain. She denies any acute injury. HPI    NursingNotes were reviewed. REVIEW OF SYSTEMS    (2-9 systems for level 4, 10 or more for level 5)     Review of Systems   Constitutional: Negative for activity change and appetite change. HENT: Negative for congestion, ear discharge, ear pain, nosebleeds, rhinorrhea and sore throat. Eyes: Negative for discharge. Respiratory: Negative for shortness of breath. Cardiovascular: Negative for chest pain, palpitations and leg swelling. Gastrointestinal: Positive for abdominal pain and nausea. Negative for abdominal distention, constipation and vomiting. Genitourinary: Negative for difficulty urinating, dysuria, flank pain and urgency. Musculoskeletal: Negative for arthralgias and back pain.    Skin: Negative for ALLERGIES     Codeine    FAMILY HISTORY       Family History   Problem Relation Age of Onset    Arthritis Father     Other Father         gout    Heart Failure Father     Cancer Sister           SOCIAL HISTORY       Social History     Socioeconomic History    Marital status:      Spouse name: None    Number of children: None    Years of education: None    Highest education level: None   Occupational History    None   Social Needs    Financial resource strain: None    Food insecurity:     Worry: None     Inability: None    Transportation needs:     Medical: None     Non-medical: None   Tobacco Use    Smoking status: Former Smoker     Packs/day: 1.00     Years: 20.00     Pack years: 20.00     Last attempt to quit:      Years since quittin.3    Smokeless tobacco: Never Used   Substance and Sexual Activity    Alcohol use: No    Drug use: None    Sexual activity: None   Lifestyle    Physical activity:     Days per week: None     Minutes per session: None    Stress: None   Relationships    Social connections:     Talks on phone: None     Gets together: None     Attends Alev[employer] service: None     Active member of club or organization: None     Attends meetings of clubs or organizations: None     Relationship status: None    Intimate partner violence:     Fear of current or ex partner: None     Emotionally abused: None     Physically abused: None     Forced sexual activity: None   Other Topics Concern    None   Social History Narrative    None       SCREENINGS      @FLOW([id number])@      PHYSICAL EXAM    (up to 7 for level 4, 8 or more for level 5)     ED Triage Vitals [04/15/19 1438]   BP Temp Temp Source Pulse Resp SpO2 Height Weight   (!) 149/63 98 °F (36.7 °C) Oral 53 18 98 % 4' 6\" (1.372 m) 145 lb (65.8 kg)       Physical Exam   Constitutional: She is oriented to person, place, and time. She appears well-developed and well-nourished. HENT:   Head: Normocephalic.    Eyes: - Abnormal; Notable for the following components:    RBC 3.95 (*)     MCH 32.7 (*)     All other components within normal limits   LIPASE - Abnormal; Notable for the following components:    Lipase 104 (*)     All other components within normal limits   URINE RT REFLEX TO CULTURE - Abnormal; Notable for the following components:    Leukocyte Esterase, Urine MODERATE (*)     All other components within normal limits   MICROSCOPIC URINALYSIS - Abnormal; Notable for the following components:    WBC, UA 20-50 (*)     RBC, UA 3-5 (*)     All other components within normal limits   POCT CREATININE - Normal   URINE CULTURE   LACTIC ACID, PLASMA       All other labs were within normal range or not returned as of this dictation. EMERGENCY DEPARTMENT COURSE and DIFFERENTIAL DIAGNOSIS/MDM:   Vitals:    Vitals:    04/15/19 1438 04/15/19 1656   BP: (!) 149/63 (!) 119/56   Pulse: 53 58   Resp: 18 16   Temp: 98 °F (36.7 °C)    TempSrc: Oral    SpO2: 98% 98%   Weight: 145 lb (65.8 kg)    Height: 4' 6\" (1.372 m)             MDM    CRITICAL CARE TIME   Total Critical Care time was 0 minutes, excluding separately reportableprocedures. There was a high probability of clinicallysignificant/life threatening deterioration in the patient's condition which required my urgent intervention. CONSULTS:  None    PROCEDURES:  Unless otherwise noted below, none     Procedures    FINAL IMPRESSION      1. Urinary tract infection without hematuria, site unspecified    2. Generalized abdominal pain    3.  Nausea          DISPOSITION/PLAN   DISPOSITION Decision To Discharge 04/15/2019 05:16:36 PM      PATIENT REFERRED TO:  Kareem Fontenot, APRN - CNP  [address] [facility] [address]  [address]  [place] [id number] [phone]    In 2 days        DISCHARGE MEDICATIONS:  New Prescriptions    NITROFURANTOIN, MACROCRYSTAL-MONOHYDRATE, (MACROBID) 100 MG CAPSULE    Take 1 capsule by mouth 2 times daily for 5 days    ONDANSETRON (ZOFRAN) 4 MG TABLET    Take 1 tablet

## 2019-04-17 LAB
GFR AFRICAN AMERICAN: >60
GFR NON-AFRICAN AMERICAN: >60
PERFORMED ON: NORMAL
POC CREATININE: 0.9 MG/DL (ref 0.6–1.2)
POC SAMPLE TYPE: NORMAL
URINE CULTURE, ROUTINE: NORMAL

## 2019-04-19 ENCOUNTER — TELEPHONE (OUTPATIENT)
Dept: FAMILY MEDICINE CLINIC | Age: 74
End: 2019-04-19

## 2019-04-29 ENCOUNTER — OFFICE VISIT (OUTPATIENT)
Dept: FAMILY MEDICINE CLINIC | Age: 74
End: 2019-04-29
Payer: MEDICARE

## 2019-04-29 VITALS
DIASTOLIC BLOOD PRESSURE: 76 MMHG | OXYGEN SATURATION: 98 % | HEART RATE: 54 BPM | HEIGHT: 55 IN | SYSTOLIC BLOOD PRESSURE: 130 MMHG | BODY MASS INDEX: 34.71 KG/M2 | WEIGHT: 150 LBS

## 2019-04-29 DIAGNOSIS — F51.01 PRIMARY INSOMNIA: ICD-10-CM

## 2019-04-29 DIAGNOSIS — M51.36 DEGENERATIVE DISC DISEASE, LUMBAR: ICD-10-CM

## 2019-04-29 DIAGNOSIS — F32.A DEPRESSION, UNSPECIFIED DEPRESSION TYPE: Primary | ICD-10-CM

## 2019-04-29 DIAGNOSIS — M19.90 ARTHRITIS: ICD-10-CM

## 2019-04-29 DIAGNOSIS — M25.50 ARTHRALGIA, UNSPECIFIED JOINT: ICD-10-CM

## 2019-04-29 DIAGNOSIS — S32.010A CLOSED COMPRESSION FRACTURE OF FIRST LUMBAR VERTEBRA, INITIAL ENCOUNTER: ICD-10-CM

## 2019-04-29 DIAGNOSIS — G89.29 CHRONIC MIDLINE LOW BACK PAIN WITHOUT SCIATICA: ICD-10-CM

## 2019-04-29 DIAGNOSIS — S22.000A COMPRESSION FRACTURE OF BODY OF THORACIC VERTEBRA (HCC): Primary | ICD-10-CM

## 2019-04-29 DIAGNOSIS — M54.50 CHRONIC MIDLINE LOW BACK PAIN WITHOUT SCIATICA: ICD-10-CM

## 2019-04-29 DIAGNOSIS — S76.211A GROIN STRAIN, RIGHT, INITIAL ENCOUNTER: ICD-10-CM

## 2019-04-29 DIAGNOSIS — R10.31 RIGHT LOWER QUADRANT ABDOMINAL PAIN: ICD-10-CM

## 2019-04-29 LAB
BILIRUBIN, POC: NORMAL
BLOOD URINE, POC: NORMAL
CLARITY, POC: CLEAR
COLOR, POC: NORMAL
GLUCOSE URINE, POC: NORMAL
KETONES, POC: NORMAL
LEUKOCYTE EST, POC: NORMAL
NITRITE, POC: NORMAL
PH, POC: 6.5
PROTEIN, POC: NORMAL
SPECIFIC GRAVITY, POC: 1.02
UROBILINOGEN, POC: 0.2

## 2019-04-29 PROCEDURE — 81002 URINALYSIS NONAUTO W/O SCOPE: CPT | Performed by: NURSE PRACTITIONER

## 2019-04-29 PROCEDURE — 99214 OFFICE O/P EST MOD 30 MIN: CPT | Performed by: NURSE PRACTITIONER

## 2019-04-29 ASSESSMENT — ENCOUNTER SYMPTOMS
COUGH: 0
SHORTNESS OF BREATH: 0
ABDOMINAL PAIN: 0

## 2019-04-29 NOTE — PATIENT INSTRUCTIONS
If groin pain is still present on Friday, call office to notify me. We will need to pursue further work up at that time.

## 2019-04-29 NOTE — PROGRESS NOTES
Subjective  Chief Complaint   Patient presents with    Follow-Up from Hospital     ER follow up states that she is still having pain in her RT side. states that when she sits there is no pain but when she is up walking she is in pain.  Depression     check up for depression, states trintellix is helping but still gets depressed a little. HPI    Pt here to follow up on depression. Says it is doing \"okay\". Always hits at night in the evening when it starts to get dark. Trintellix is helping, but she is still having some issues. She is having a hard time sleeping as well. Has tried to take some type of PM pain OTC sleep aide, but doesn't think it made a big difference. Believes she tried melatonin before, but can't remember if it made a big difference for her. Has something else at home, can't remember the name of it, but she has been trying those as well. Was at ER for pain in her right side. Had CT abd/pelvis at ER which was negative. Was diagnosed with a UTI and discharged home. That pain improved; however now she is having pain in her right groin area. Pt is in low back as well and she does feel that it radiates around to her right groin from the back. Also having arthritis pains in her knees and hands.          Past Medical History:   Diagnosis Date    Anxiety     Arthritis     COPD (chronic obstructive pulmonary disease) (HCC)     Generalized osteoarthrosis, unspecified site 12/27/2002    Hyperlipidemia     Hypertension     Major depressive disorder, single episode, moderate ([address] 12/27/2002    Morbid obesity ([address] 12/27/2002    OAB (overactive bladder)     Osteoporosis      Patient Active Problem List    Diagnosis Date Noted    Hypertension     Hyperlipidemia     COPD (chronic obstructive pulmonary disease) ([address]     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 ([address] 08/27/2010    Generalized osteoarthrosis, unspecified site 12/27/2002    Major depressive disorder, single episode, moderate ([address] Left Ear: External ear normal.   Cardiovascular: Normal rate, regular rhythm and normal heart sounds. Pulmonary/Chest: Effort normal and breath sounds normal. No respiratory distress. Musculoskeletal:        Cervical back: She exhibits deformity. Thoracic back: She exhibits tenderness and deformity. Lumbar back: She exhibits decreased range of motion, tenderness, bony tenderness and pain. She exhibits no swelling, no edema, no deformity, no laceration, no spasm and normal pulse. Swelling and nodules on hands and fingers    Pain and tenderness to right groin that wraps around from the back   Neurological: She is alert and oriented to person, place, and time. No cranial nerve deficit. Skin: Skin is warm and dry. Capillary refill takes less than 2 seconds. No rash noted. She is not diaphoretic. No erythema. No pallor. Psychiatric: She has a normal mood and affect. Her behavior is normal. Judgment and thought content normal.       Assessment& Plan    1. Depression, unspecified depression type  Increase trintellix to 10 mg daily. Referral to counseling as ordered. - Ambulatory referral to Psychology  - VORTIoxetine (TRINTELLIX) 10 MG TABS tablet; Take 10 mg by mouth daily  Dispense: 30 tablet; Refill: 1    2. Primary insomnia  Referral to Dr. Shannon Romberg for assistance with insomnia. - Ranjana Velasco, PhD, Insomnia Counseling, [place]    3. Groin strain, right, initial encounter  Xray as ordered. Referral to pain management. - XR LUMBAR SPINE (MIN 4 VIEWS); Future    4. Right lower quadrant abdominal pain  Urine clear. Suspect groin strain vs. Lumbago with radiation.  - POCT Urinalysis no Micro    5. Arthritis  Referrals as ordered. - Amb External Referral To Rheumatology  - Ambulatory referral to Pain Clinic    6. Arthralgia, unspecified joint  Referrals as ordered. - Amb External Referral To Rheumatology  - Ambulatory referral to Pain Clinic    7.  Chronic midline low back pain Referred to Provider:   Avery Deal DO     Requested Specialty:   Physical Medicine and Rehab     Number of Visits Requested:   1    POCT Urinalysis no Micro       Orders Placed This Encounter   Medications    VORTIoxetine (TRINTELLIX) 10 MG TABS tablet     Sig: Take 10 mg by mouth daily     Dispense:  30 tablet     Refill:  1       Return in about 2 weeks (around 5/13/2019) for check up, 30 minutes please.     Fany Forrester, APRN - CNP

## 2019-05-03 ENCOUNTER — TELEPHONE (OUTPATIENT)
Dept: FAMILY MEDICINE CLINIC | Age: 74
End: 2019-05-03

## 2019-05-03 NOTE — TELEPHONE ENCOUNTER
Pt states that it is the same pain that she was having when she saw Blu Rios on 4/29/19 but has worsened. Is refusing to go to ER and states that they pain is better when she lays down and rests. I told her that she really should go to er as Blu Rios advised but if she was not going to that she should rest and go to er if she worsens.

## 2019-05-03 NOTE — TELEPHONE ENCOUNTER
I'm not sure what's going on with this, but if she is having severe pain that bad she should go to ER to be evaluated.

## 2019-05-06 ENCOUNTER — OFFICE VISIT (OUTPATIENT)
Dept: BEHAVIORAL/MENTAL HEALTH CLINIC | Age: 74
End: 2019-05-06
Payer: MEDICARE

## 2019-05-06 DIAGNOSIS — F32.89 OTHER DEPRESSION: Primary | ICD-10-CM

## 2019-05-06 PROCEDURE — 90791 PSYCH DIAGNOSTIC EVALUATION: CPT | Performed by: PSYCHOLOGIST

## 2019-05-06 ASSESSMENT — PATIENT HEALTH QUESTIONNAIRE - PHQ9
9. THOUGHTS THAT YOU WOULD BE BETTER OFF DEAD, OR OF HURTING YOURSELF: 1
8. MOVING OR SPEAKING SO SLOWLY THAT OTHER PEOPLE COULD HAVE NOTICED. OR THE OPPOSITE, BEING SO FIGETY OR RESTLESS THAT YOU HAVE BEEN MOVING AROUND A LOT MORE THAN USUAL: 3
SUM OF ALL RESPONSES TO PHQ QUESTIONS 1-9: 12
SUM OF ALL RESPONSES TO PHQ QUESTIONS 1-9: 12
4. FEELING TIRED OR HAVING LITTLE ENERGY: 1
6. FEELING BAD ABOUT YOURSELF - OR THAT YOU ARE A FAILURE OR HAVE LET YOURSELF OR YOUR FAMILY DOWN: 1
1. LITTLE INTEREST OR PLEASURE IN DOING THINGS: 1
SUM OF ALL RESPONSES TO PHQ9 QUESTIONS 1 & 2: 3
2. FEELING DOWN, DEPRESSED OR HOPELESS: 2
3. TROUBLE FALLING OR STAYING ASLEEP: 1
5. POOR APPETITE OR OVEREATING: 2
7. TROUBLE CONCENTRATING ON THINGS, SUCH AS READING THE NEWSPAPER OR WATCHING TELEVISION: 0
10. IF YOU CHECKED OFF ANY PROBLEMS, HOW DIFFICULT HAVE THESE PROBLEMS MADE IT FOR YOU TO DO YOUR WORK, TAKE CARE OF THINGS AT HOME, OR GET ALONG WITH OTHER PEOPLE: 1

## 2019-05-06 NOTE — LETTER
[address] [facility]  [address] [place] [address] [id number]  Dept: [phone]  Dept Fax: [phone]: [phone]      Steven Carbajal PSYD      5/6/2019    Dear Deidre Higgins ,    We are sorry that you missed your appointment with Dr. Steven Carbajal on 5/6/2019. Your health and follow-up medical care are important to us. Please call our office as soon as possible so that we may reschedule your appointment. If you have already rescheduled your appointment, please disregard this letter. Please note that if there are three missed appointments with this provider, the patient will not be able to be rescheduled.     Sincerely,    Steven Carbajal PSYD  Clinical Psychologist/Behavior Health Consultant  [facility]) Physicians

## 2019-05-06 NOTE — PROGRESS NOTES
Behavioral Health Consultation  Alonzo Lozano, [address] [place] [address]. Psychologist  5/6/19  11:48 AM      Time spent with Patient: 30 minutes  This [facility]. Reason for Consult:  depression  Referring Provider: Cristal Short, APRN - CNP  [address] [place]  [address], [address] [address]    Pt provided informed consent for the behavioral health program. Discussed with patient model of service to include the limits of confidentiality (i.e. abuse reporting, suicide intervention, etc.) and short-term intervention focused approach. Pt indicated understanding. Feedback given to PCP. S:  Presenting Concerns:  Pt reports that she has been depressed \"as long as I can remember. \"  Pt had significant difficulty describing symptoms. She does note feeling down or depressed and having limited interest in activities. Pt made comments indicative of morbid ideation (thoughts of death), but no suicidal plan or intent. Pt described lack of motivation and possible sleep disturbance. She noted that she often goes to sleep late and sleeps in late. Pt reports fluctuations in her appetite. She reports moving very slowly, but notes that this is related to her back pain. Pt described some conflict with her  noting that he is an early riser and can be critical of her sleep patterns. She noted some general conflict with him as well. However, pt also reported he \"has been the best \" for her despite having to \"put up with\" her mental health concerns. She has taken medication and been in counseling and has been hospitalized. She reports that she was hospitalized due to suicidal statements. She is unable to recall where or when she was last in counseling. Pt reports that she has been  for \"a long time. \"  She reports that she has 3 daughters, 2 of them live in [facility]. She has grandchildren as well. She reports that she has a good relationship with her grandchildren.     History: Diagnosis Date    Anxiety     Arthritis     [facility]ch[facility] obstructive pulmonary disease) ([facility])     Generalized osteoarthrosis, unspecified site 12/27/2002    Hyperlipidemia     Hypertension     Major depressive disorder, single episode, moderate ([facility] 12/27/2002    Morbid obesity ([facility] 12/27/2002    OAB (overactive bladder)     Osteoporosis            Medications:   Current Outpatient Medications   Medication Sig Dispense Refill    VORTIoxetine (TRINTELLIX) 10 MG TABS tablet Take 10 mg by mouth daily 30 tablet 1    gabapentin (NEURONTIN) 600 MG tablet TAKE ONE TABLET BY MOUTH THREE TIMES A DAY 90 tablet 0    ondansetron (ZOFRAN) 4 MG tablet Take 1 tablet by mouth every 8 hours as needed for Nausea 20 tablet 0    oxybutynin (DITROPAN-XL) 10 MG extended release tablet TAKE ONE TABLET BY MOUTH EVERY DAY 30 tablet 2    pravastatin (PRAVACHOL) 40 MG tablet TAKE ONE TABLET BY MOUTH EVERY DAY 30 tablet 4    lisinopril-hydrochlorothiazide (PRINZIDE;ZESTORETIC) 10-12.5 MG per tablet TAKE ONE TABLET BY MOUTH EVERY DAY 30 tablet 0    tiZANidine (ZANAFLEX) 4 MG tablet TAKE ONE TABLET BY MOUTH THREE TIMES A DAY 90 tablet 0    Oxygen Concentrator       omeprazole (PRILOSEC) 20 MG delayed release capsule TAKE ONE CAPSULE BY MOUTH DAILY  2     No current facility-administered medications for this visit.         Social History:   Social History     Socioeconomic History    Marital status:      Spouse name: Not on file    Number of children: Not on file    Years of education: Not on file    Highest education level: Not on file   Occupational History    Not on file   Social Needs    Financial resource strain: Not on file    Food insecurity:     Worry: Not on file     Inability: Not on file    Transportation needs:     Medical: Not on file     Non-medical: Not on file   Tobacco Use    Smoking status: Former Smoker     Packs/day: 1.00     Years: 20.00     Pack years: 20.00     Last attempt to quit: 1980     Years since quittin.3    Smokeless tobacco: Never Used   Substance and Sexual Activity    Alcohol use: No    Drug use: Not on file    Sexual activity: Not on file   Lifestyle    Physical activity:     Days per week: Not on file     Minutes per session: Not on file    Stress: Not on file   Relationships    Social connections:     Talks on phone: Not on file     Gets together: Not on file     Attends Denominational service: Not on file     Active member of club or organization: Not on file     Attends meetings of clubs or organizations: Not on file     Relationship status: Not on file    Intimate partner violence:     Fear of current or ex partner: Not on file     Emotionally abused: Not on file     Physically abused: Not on file     Forced sexual activity: Not on file   Other Topics Concern    Not on file   Social History Narrative    Not on file         Family History:   Family History   Problem Relation Age of Onset    Arthritis Father     Other Father         gout    Heart Failure Father     Cancer Sister        TOBACCO:   reports that she quit smoking about 39 years ago. She has a 20.00 pack-year smoking history. She has never used smokeless tobacco.  ETOH:   reports that she does not drink alcohol.        O:  MSE:    Appearance    cooperative   Personal Hygiene : appropriately dressed, appropriately groomed and healthy looking  Appetite abnormal: fluctuates  Sleep disturbance Yes, including: non-restful sleep and trouble falling asleep  Fatigue Yes  Loss of pleasure occasional  Impulsive behavior No  Speech    spontaneous, normal rate and normal volume  Mood   depressed   Affect    depressed affect  Thought Content    helplessness  Thought Process    linear, coherent and tangential  Associations    logical connections, tangential connections  Insight    poor  Judgment    fair  Orientation    oriented to person, place, time, and general circumstances  Memory    Possible impairment Attention/Concentration    impaired  Morbid ideation Yes  Suicide Assessment    no suicidal ideation        A:  Symptoms of depression include: depressed mood, insomnia, fatigue, feelings of worthlessness/guilt, difficulty concentrating, appetite disturbance, and recurrent thoughts of death    Symptoms of marlin include: none    Symptoms of generalized anxiety include: none reported     Symptoms of panic include: none      PHQ Scores 5/6/2019 1/30/2018   PHQ2 Score 3 0   PHQ9 Score 12 0     Interpretation of Total Score Depression Severity: 1-4 = Minimal depression, 5-9 = Mild depression, 10-14 = Moderate depression, 15-19 = Moderately severe depression, 20-27 = Severe depression    Administered the PHQ9 which indicates a self report of moderate symptom distress. 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 Pt reports frequent low mood, some anhedonia, appetite fluctuation, sleep disturbance and thoughts of death. Pt had difficulty responding to questions. Her responses were often vague and at times contradictory. She had significant difficulty recalling information. Further assessment is needed regarding her depressive symptoms and possible memory impairment. Pt would benefit from [facility] services to increase coping skills to provide symptom management/control/relief.        Diagnosis:    Other Specified Depressive Disorder, Depressive episode with insufficient symptoms        Plan:  Pt interventions:  Provided handout on  depression, Discussed various factors related to the development and maintenance of  depression (including biological, cognitive, behavioral, and environmental factors), Trained in strategies for increasing balanced thinking, Established rapport, Conducted functional assessment, Provided Psychoeducation re: depression and Reviewed options for identifying appropriate providers        Pt Behavioral Change Plan:  Review

## 2019-05-06 NOTE — PATIENT INSTRUCTIONS
Review the following information on depression  Return in 2 weeks      The Depression Spiral    The figure below depicts one helpful way to think about and understand depression. Our life experience (including depression) is influenced by a number of interrelated factors: our environment, biological factors, our thoughts and beliefs, our behaviors, and our emotions. Each factor can affect the others. For example, Dilma Landers recently began working in a fast-paced, high-pressure job (environmental factor). She began to have thoughts such as Theres no way I can get all this work done. Its impossible. If I dont get it done, I may lose my job.   As a result, she began to work longer hours, cut out all fun activities, and withdraw from family and friends (behaviors). With this decrease in many of the positive, rewarding aspects of her life, she began to feel down, depressed, and more irritable (emotions). As the depression cycle started to take hold, she had more difficulty sleeping and concentrating (biology), which led to even more irritability and depression (emotions) and further withdrawal from activities and people (behaviors). At some point in the cycle, the balance of chemicals in her brain also began to alter (biology), which further deepened the spiral of depression. BREAKING THE DEPRESSION SPIRAL      As you can see, a variety of factors, including thoughts, behaviors, emotions, and environmental and biological factors can cause, maintain, and worsen depression. Fortunately, there are effective ways of breaking the spiral of depression. Since all the factors are interrelated (with one aspect affecting the others), even making small changes in just one or two areas can gradually lead to significant improvements in other areas.     For example, Dilma Landers noticed her worsening moods and decided to take action to break the depression spiral.  She focused first on one area that she felt would be easy to change:  her behaviors. Specifically, she wanted to make changes in how she spent her time in the evenings after work. She made a goal of spending 30 minutes each evening relaxing and doing fun activities with her family (behaviors). After several weeks, she noticed that she was beginning to feel lower levels of stress and her sleep began to improve (biological factor). Feeling more rested and better able to concentrate (biological factors) increased her belief that she could effectively manage the demands of her new job (thoughts). She noticed that she had fewer days of feeling down and depressed (emotions). Other people working to improve their depression may choose to focus initially on other areas. Some people benefit from starting an antidepressant medication (biological factor) to begin to break the depression cycle. Others focus first on increasing regular physical exercise (a behavioral and biological factor that may help decrease depression), recognizing and changing thought patterns that contribute to depression or worry (thoughts), or learning and trying out new behaviors to improve work or family situations (behaviors, e.g., problem-solving, effective communication, time management, etc.). As you can see, there are a variety of coping methods and behavioral strategies that may be helpful in decreasing depression. It is probably not in your best interests to try all or too many strategies at any one time. Rather, keep it simple and do not overwhelm yourself. It is usually best to pick one or two strategies that sound most relevant to you, try those coping strategies for a few weeks or longer, and then move on to other coping strategies that you think may be important later on. And remember -- even if you are just working directly on one or two coping strategies, you will probably be having an indirect positive effect on other areas.       Your Behavioral Health Consultant [facility]) can work with you to develop skills in some of the most effective strategies for breaking the depression spiral and decreasing depression. Four effective strategies include:    _____  a. Increasing rewarding activities    _____  b. Taking antidepressant medication as directed by PCP     _____  c. Increasing physical exercise     _____  d. Increasing balanced thinking     Talk with your [facility] about which of the above you are interested in working on to better manage your depression.

## 2019-05-13 NOTE — TELEPHONE ENCOUNTER
Pt's spouse calling in for pt. Insurance company's doctor says that she needs to start taking this again.  lov 4/29/19

## 2019-05-16 RX ORDER — LISINOPRIL AND HYDROCHLOROTHIAZIDE 12.5; 1 MG/1; MG/1
1 TABLET ORAL DAILY
Qty: 30 TABLET | Refills: 3 | Status: SHIPPED | OUTPATIENT
Start: 2019-05-16 | End: 2019-09-07 | Stop reason: SDUPTHER

## 2019-05-20 ENCOUNTER — OFFICE VISIT (OUTPATIENT)
Dept: BEHAVIORAL/MENTAL HEALTH CLINIC | Age: 74
End: 2019-05-20
Payer: MEDICARE

## 2019-05-20 ENCOUNTER — TELEPHONE (OUTPATIENT)
Dept: FAMILY MEDICINE CLINIC | Age: 74
End: 2019-05-20

## 2019-05-20 DIAGNOSIS — F32.89 OTHER DEPRESSION: Primary | ICD-10-CM

## 2019-05-20 PROCEDURE — 90832 PSYTX W PT 30 MINUTES: CPT | Performed by: PSYCHOLOGIST

## 2019-05-20 ASSESSMENT — PATIENT HEALTH QUESTIONNAIRE - PHQ9
9. THOUGHTS THAT YOU WOULD BE BETTER OFF DEAD, OR OF HURTING YOURSELF: 1
3. TROUBLE FALLING OR STAYING ASLEEP: 3
SUM OF ALL RESPONSES TO PHQ9 QUESTIONS 1 & 2: 4
SUM OF ALL RESPONSES TO PHQ QUESTIONS 1-9: 12
5. POOR APPETITE OR OVEREATING: 2
1. LITTLE INTEREST OR PLEASURE IN DOING THINGS: 3
10. IF YOU CHECKED OFF ANY PROBLEMS, HOW DIFFICULT HAVE THESE PROBLEMS MADE IT FOR YOU TO DO YOUR WORK, TAKE CARE OF THINGS AT HOME, OR GET ALONG WITH OTHER PEOPLE: 1
2. FEELING DOWN, DEPRESSED OR HOPELESS: 1
SUM OF ALL RESPONSES TO PHQ QUESTIONS 1-9: 12
7. TROUBLE CONCENTRATING ON THINGS, SUCH AS READING THE NEWSPAPER OR WATCHING TELEVISION: 0
8. MOVING OR SPEAKING SO SLOWLY THAT OTHER PEOPLE COULD HAVE NOTICED. OR THE OPPOSITE, BEING SO FIGETY OR RESTLESS THAT YOU HAVE BEEN MOVING AROUND A LOT MORE THAN USUAL: 0
4. FEELING TIRED OR HAVING LITTLE ENERGY: 1
6. FEELING BAD ABOUT YOURSELF - OR THAT YOU ARE A FAILURE OR HAVE LET YOURSELF OR YOUR FAMILY DOWN: 1

## 2019-05-20 NOTE — PROGRESS NOTES
Behavioral Health Consultation  Ally Murphy Psy.D. Psychologist  5/20/19  11:07 AM      Time spent with Patient: 30 minutes  This is patient's second  [place] [facility] appointment. Reason for Consult:  depression  Referring Provider: YULY Pierre - CNP      Feedback given to PCP. S:  Pt reports that she is often lacking motivation. She feels that she seems to have very little interest in doing much. Pt reports that she does not get to see her daughters or grandchildren very often because they are very busy. However, she does get to see her two daughters who live locally usually once per week; she states she would like to see them more often, \"sometimes. \"  She sees her grandchildren about once every 1-2 weeks. Pt reports that she continues to have significant difficulty with sleep. She reports she will try to go to sleep at 11, but cannot usually fall asleep because she is too restless. Pt states she will read or watch television or work on her [employer] hook. Pt reports that sometimes, although it is rare, she will remain awake all night. Typically she will fall asleep around 1 or 2. Pt reports that she will sleep in until late in the morning or early afternoon (though this is rare). Discussed some strategies for improving sleep quality and provided pt with handout. Pt reports that she does not drive due to her arthritis in her shoulder and leg. When asked, pt does acknowledge some difficulty with her memory. Pt endorsed some morbid ideation (thoughts about death) but specifically denied SI.   Pt denied HI.    O:  MSE:    Appearance    alert, cooperative  Appetite abnormal: increased (especially at night)  Sleep disturbance Yes  Fatigue Yes  Loss of pleasure Yes  Impulsive behavior No  Speech    spontaneous, normal rate and normal volume  Mood    Depressed  Affect    depressed affect  Thought Content    helplessness  Thought Process    linear, goal directed and coherent  Associations logical connections  Insight    Fair  Judgment    Intact  Orientation    oriented to person, place, time, and general circumstances  Memory    global memory impairment noted  Attention/Concentration    impaired  Morbid ideation Yes  Suicide Assessment    no suicidal ideation      History:    Medications:   Current Outpatient Medications   Medication Sig Dispense Refill    lisinopril-hydrochlorothiazide (PRINZIDE;ZESTORETIC) 10-12.5 MG per tablet Take 1 tablet by mouth daily 30 tablet 3    VORTIoxetine (TRINTELLIX) 10 MG TABS tablet Take 10 mg by mouth daily 30 tablet 1    gabapentin (NEURONTIN) 600 MG tablet TAKE ONE TABLET BY MOUTH THREE TIMES A DAY 90 tablet 0    ondansetron (ZOFRAN) 4 MG tablet Take 1 tablet by mouth every 8 hours as needed for Nausea 20 tablet 0    oxybutynin (DITROPAN-XL) 10 MG extended release tablet TAKE ONE TABLET BY MOUTH EVERY DAY 30 tablet 2    pravastatin (PRAVACHOL) 40 MG tablet TAKE ONE TABLET BY MOUTH EVERY DAY 30 tablet 4    tiZANidine (ZANAFLEX) 4 MG tablet TAKE ONE TABLET BY MOUTH THREE TIMES A DAY 90 tablet 0    Oxygen Concentrator       omeprazole (PRILOSEC) 20 MG delayed release capsule TAKE ONE CAPSULE BY MOUTH DAILY  2     No current facility-administered medications for this visit. A:  Administered the PHQ9 which indicates a self report of moderate symptom distress. Pt would benefit from [facility] services to increase coping skills to provide symptom management/control/relief.        PHQ Scores 5/20/2019 5/6/2019 1/30/2018   PHQ2 Score 4 3 0   PHQ9 Score 12 12 0     Interpretation of Total Score Depression Severity: 1-4 = Minimal depression, 5-9 = Mild depression, 10-14 = Moderate depression, 15-19 = Moderately severe depression, 20-27 = Severe depression      Diagnosis:  Other Specified Depressive Disorder, Depressive episode with insufficient symptoms    Plan:  Pt interventions:  Provided handout on  insomnia, Discussed self-care (sleep, nutrition, rewarding activities, social support, exercise), Conducted functional assessment, [place]-setting to identify pt's primary goals for [facility] visit / overall health, Supportive techniques, Emphasized self-care as important for managing overall health and Reviewed Sleep Hygiene tips       Pt Behavioral Change Plan:    Review the information below on sleep  Follow up with Roma Benítez  Return in 2 weeks    Please note this report has been partially produced using speech recognition software  And may cause contain errors related to that system including grammar, punctuation and spelling as well as words and phrases that may seem inappropriate. If there are questions or concerns please feel free to contact me to clarify.

## 2019-05-20 NOTE — PATIENT INSTRUCTIONS
Review the information below on sleep  Follow up with Prasad Ni  Return in 2 weeks    Sleep Hygiene Guidelines    Good dental hygiene is important in determining the health of your teeth and gums. We all know we are supposed to brush and floss regularly. Those who do so are more likely to have strong, healthy gums and less cavities. Similarly good sleep hygiene is important in determining the quality and quantity of your sleep. Below are guidelines for good sleep hygiene practices. Review these guidelines and evaluate how well you practice good sleep hygiene. Caffeine:  Avoid Caffeine 6-8 Hours Before Bedtime  Caffeine disturbs sleep, even in people who do not think they experience a stimulation effect. Individuals with insomnia are often more sensitive to mild stimulants than are normal sleepers. Caffeine is found in items such as coffee, tea, soda, chocolate, and many over-the-counter medications (e.g., Excedrin). Thus, drinking caffeinated beverages should be avoided near bedtime and during the night. You might consider a trial period of no caffeine if you tend to be sensitive to its effects. Nicotine:  Avoid Nicotine Before Bedtime  Although some smokers claim that smoking helps them relax, but nicotine is a stimulant. The initial relaxing effects occur with the initial entry of the nicotine, but as the nicotine builds in the system it produces an effect similar to caffeine. Thus, smoking, dipping, or chewing tobacco should be avoided near bedtime and during the night. Dont smoke to get yourself back to sleep. Alcohol:  Avoid Alcohol After Dinner  Alcohol often promotes the onset of sleep, but as alcohol is metabolized sleep becomes disturbed and fragmented. Thus, a large amount of alcohol is a poor sleep aid and should not be used as such. Limit alcohol use to small quantities to moderate quantities.     Sleeping Pills:  Sleep Medications are Effective Only Temporarily  Scientists have shown that sleep medications lose their effectiveness in about 2 - 4 weeks when taken regularly. Despite advertisements to the contrary, over-the-counter sleeping aids have little impact on sleep beyond the placebo effect. Over time, sleeping pills actually can make sleep problems worse. When sleeping pills have been used for a long period, withdrawal from the medication can lead to an insomnia rebound. Thus, after long-term use, many individuals incorrectly conclude that they need sleeping pills in order to sleep normally. Keep use of sleep pills infrequent, but dont worry if you need t use one on an occasional basis. Regular Exercise  Get regular exercise, preferably 40 minutes each day of an activity that causes sweating. .  Exercise in the late afternoon or early evening seems to aid sleep, although the positive effect often takes several weeks to become noticeable. Exercising sporadically is not likely to improve sleep, and exercise within 2 hours of bedtime may elevate nervous system activity and interfere with sleep onset. Hot Baths  Spending 20 minutes in a tub of hot water an hour or two prior to bedtime may promote sleep and is strongly recommended. Bedroom Environment: Moderate Temperature, Quiet, and Dark  Extremes of heat or cold can disrupt sleep. A quiet environment is more sleep promoting than a noisy one. Noises can be masked with background white noise (such as the noise of a fan) or with earplugs. Bedrooms may be darkened with black-out shades or sleep masks can be worn. Position clocks out-of-sight since clock-watching can increase worry about the effects of lack of sleep. Be sure your mattress is not too soft or too firm and that your pillow is the right height and firmness. Eating  A light bedtime snack, such a glass of warm milk, cheese, or a bowl of cereal can promote sleep.   You should avoid the following foods at bedtime:  any caffeinated foods (e.g., chocolate), peanuts, beans, most raw fruits and vegetables (since they may cause gas), and high-fat foods such as potato chips or corn chips. Avoid snacks in the middle of the nights since awakening may become associated with hunger. If you have trouble with regurgitation, be especially careful to avid heavy meals and spices in the evening. Do not go to bed too hungry or too full. It may help to elevate you head with some pillows. Avoid Long Naps  Avoid naps, the sleep you obtain during the day takes away from you sleep need that night resulting in lighter, more restless sleep, difficulty falling asleep or early morning awakening. If you must nap, keep it brief, and take the nap about 8 hours after arising. It is best to set an alarm to ensure you dont sleep more than 10-15 minutes. Limit Your Time in Bed  Restrict your sleep period to the average number of hours you have actually slept per night during the preceding week. Quality of sleep is important. Too much time in bed can decrease the quality on subsequent night and contribute to the maintenance of existing sleep problems. Dont lay in bed for extended times not sleep. If you arent asleep in about 15-20 minutes go ahead and get up. Do something outside the bedroom that is relaxing. When you feel sleepy (i.e., yawning, head bobbing, eyes closing, concentration decreasing, then return to bed. Dont confuse tiredness with sleepiness, they are different. Tiredness doesnt lead to sleep, only sleepiness does. Regular Sleep Schedule  Keep a regular time each day, 7 days a week, to get out of bed. Keeping a regular awaking time helps set your circadian rhythm set so that your body learns to sleep at the desired time. Use the below form to develop a plan for improving you sleep hygiene. It will take time for you sleep to get back in line so once you begin your sleep hygiene plan, stick with if for at least 6-8 weeks.       Planned Improvements of My Sleep Hygiene    Check Those That Apply:    _____ Avoid Caffeine 6-8 Hours Before Bedtime. I will not have caffeine after   ________ PM.    _____ Avoid Nicotine Before Bedtime. I will not have a cigarette after _________ PM.    _____ Limit Alcohol Use. I will not have more than _______ drink(s) in the evening.    _____ Avoid Use of Sleeping Pills. (If you are currently using them regularly, all  changes should be medical supervised by your medical provider). _____ Do Exercise Regularly, But Not Within 2 Hours of Bedtime. I will   ________________ for ____  minutes, on the following days:   ____________________________________________________.    _____ Ensure your Bedroom is a Comfortable Temperature, Quiet, and Dark and   Your Mattress and Pillow are good. I will make the  following changes to my   bedroom:_______________________________________________________   _______________________________________________________________  _______________________________________________________________  _______________________________________________________________    _____ Fregeetha Cannonard Take a Hot Bath 1-2 Hours Prior to Bedtime. I will take a hot bath about   ______ PM.    _____ Eat a Light Snack at Bedtime but Avoid Large or Problematic Foods. I will   eat  __________________  or __________________ or __________ before bed.    _____ Avoid Naps. I try not to nap, if I must, I will limit it to _______ minutes, about 8   hours after I awoke and will use alarm to limit my nap time. _____ Limit Time In Bed. I have been sleeping on average ______ hours per night,   therefore I will limit my time in bed to _____ hours (the same number). If Im not   asleep in about 15 to 20 minutes I will get up and not return to bed until Im   sleepy.    _____ Stay on a Regular Sleep Schedule  I will get up at _______ AM, 7 days a  week, no matter how poorly I slept that night.

## 2019-05-23 PROBLEM — M41.46 NEUROMUSCULAR SCOLIOSIS OF LUMBAR REGION: Status: ACTIVE | Noted: 2019-05-23

## 2019-06-03 ENCOUNTER — OFFICE VISIT (OUTPATIENT)
Dept: BEHAVIORAL/MENTAL HEALTH CLINIC | Age: 74
End: 2019-06-03
Payer: MEDICARE

## 2019-06-03 DIAGNOSIS — F32.89 OTHER DEPRESSION: Primary | ICD-10-CM

## 2019-06-03 PROCEDURE — 90832 PSYTX W PT 30 MINUTES: CPT | Performed by: PSYCHOLOGIST

## 2019-06-03 ASSESSMENT — PATIENT HEALTH QUESTIONNAIRE - PHQ9
5. POOR APPETITE OR OVEREATING: 2
7. TROUBLE CONCENTRATING ON THINGS, SUCH AS READING THE NEWSPAPER OR WATCHING TELEVISION: 1
SUM OF ALL RESPONSES TO PHQ QUESTIONS 1-9: 14
10. IF YOU CHECKED OFF ANY PROBLEMS, HOW DIFFICULT HAVE THESE PROBLEMS MADE IT FOR YOU TO DO YOUR WORK, TAKE CARE OF THINGS AT HOME, OR GET ALONG WITH OTHER PEOPLE: 1
1. LITTLE INTEREST OR PLEASURE IN DOING THINGS: 2
6. FEELING BAD ABOUT YOURSELF - OR THAT YOU ARE A FAILURE OR HAVE LET YOURSELF OR YOUR FAMILY DOWN: 1
SUM OF ALL RESPONSES TO PHQ9 QUESTIONS 1 & 2: 5
SUM OF ALL RESPONSES TO PHQ QUESTIONS 1-9: 14
9. THOUGHTS THAT YOU WOULD BE BETTER OFF DEAD, OR OF HURTING YOURSELF: 1
2. FEELING DOWN, DEPRESSED OR HOPELESS: 3
3. TROUBLE FALLING OR STAYING ASLEEP: 3
4. FEELING TIRED OR HAVING LITTLE ENERGY: 1
8. MOVING OR SPEAKING SO SLOWLY THAT OTHER PEOPLE COULD HAVE NOTICED. OR THE OPPOSITE, BEING SO FIGETY OR RESTLESS THAT YOU HAVE BEEN MOVING AROUND A LOT MORE THAN USUAL: 0

## 2019-06-03 NOTE — PROGRESS NOTES
Behavioral Health Consultation  Ally Murphy Psy.D. Psychologist  6/3/19  11:01 AM      Time spent with Patient: 30 minutes  This is patient's third  [facility] appointment. Reason for Consult:  depression and anxiety  Referring Provider: YULY Pierre - CNP      Feedback given to PCP. S:  Pt reports feeling lonely frequently. She notes that she often feels that she is not \"needed\" by her daughters or granddaughters, etc.  She describes the past incident of suicidal ideation for which she was evaluated. Pt had difficulty recalling events and seemed to be combining two separate incidents. Pt did note that she is having trouble with her memory frequently, but states that she thinks she is able to concentrate without difficulty. Discussed role transitions throughout the lifespan. Discussed ways in which her role in the lives of her children and grandchildren has changed. Pt reports that she has noticed the evening is the more difficult time for her. She reports feeling more sad and depressed in the evening. She reports ongoing sleep disturbance as well. Pt reported some morbid ideation, with thoughts of death, but specifically denied plan or intent to self-harm stating she would never do that to her children.     O:  MSE:    Appearance    alert, cooperative  Appetite abnormal: decreased  Sleep disturbance Yes  Fatigue Yes  Loss of pleasure Yes  Impulsive behavior No  Speech    spontaneous, normal rate and normal volume  Mood    Depressed  Affect    depressed affect  Thought Content    helplessness  Thought Process    goal directed and tangential  Associations    logical connections, tangential connections  Insight    Fair  Judgment    fair  Orientation    oriented to person, place, time, and general circumstances  Memory    global memory impairment noted  Attention/Concentration    impaired  Morbid ideation Yes  Suicide Assessment    no suicidal ideation      History:    Medications:   Current Outpatient Medications   Medication Sig Dispense Refill    lisinopril-hydrochlorothiazide (PRINZIDE;ZESTORETIC) 10-12.5 MG per tablet Take 1 tablet by mouth daily 30 tablet 3    VORTIoxetine (TRINTELLIX) 10 MG TABS tablet Take 10 mg by mouth daily 30 tablet 1    gabapentin (NEURONTIN) 600 MG tablet TAKE ONE TABLET BY MOUTH THREE TIMES A DAY 90 tablet 0    ondansetron (ZOFRAN) 4 MG tablet Take 1 tablet by mouth every 8 hours as needed for Nausea 20 tablet 0    oxybutynin (DITROPAN-XL) 10 MG extended release tablet TAKE ONE TABLET BY MOUTH EVERY DAY 30 tablet 2    pravastatin (PRAVACHOL) 40 MG tablet TAKE ONE TABLET BY MOUTH EVERY DAY 30 tablet 4    tiZANidine (ZANAFLEX) 4 MG tablet TAKE ONE TABLET BY MOUTH THREE TIMES A DAY 90 tablet 0    Oxygen Concentrator       omeprazole (PRILOSEC) 20 MG delayed release capsule TAKE ONE CAPSULE BY MOUTH DAILY  2     No current facility-administered medications for this visit. A:  Administered the PHQ9 which indicates a self report of moderate symptom distress. [facility] to increase coping skills to provide symptom management/control/relief. Pt continuing to show signs of memory impairment, with some tangential/circumstantial speech likely related to memory impairment and difficulty attending to conversation. Pt continues to report emotional distress particularly feeling bad about self and loneliness but specifically denies intent or plan to self-harm.      PHQ Scores 6/3/2019 5/20/2019 5/6/2019 1/30/2018   PHQ2 Score 5 4 3 0   PHQ9 Score 14 12 12 0     Interpretation of Total Score Depression Severity: 1-4 = Minimal depression, 5-9 = Mild depression, 10-14 = Moderate depression, 15-19 = Moderately severe depression, 20-27 = Severe depression      Diagnosis:  Other Spec Depressive Disorder            Plan:  Pt interventions:  Discussed various factors related to the development and maintenance of  depression (including biological, cognitive, behavioral, and environmental factors), Discussed and set plan for behavioral activation, Discussed self-care (sleep, nutrition, rewarding activities, social support, exercise), [place]-setting to identify pt's primary goals for [facility] visit / overall health, Supportive techniques, Emphasized self-care as important for managing overall health, Identified maladaptive thoughts and Problem-solving re: increasing social support and engagement      Pt Behavioral Change Plan:  Pt to follow up with PCP this week to further evaluate memory/cognitive concerns    Call one of your daughters or grandchildren to schedule a get together with them 1-2 times each week  Return in 2-3  weeks    Please note this report has been partially produced using speech recognition software  And may cause contain errors related to that system including grammar, punctuation and spelling as well as words and phrases that may seem inappropriate. If there are questions or concerns please feel free to contact me to clarify.

## 2019-06-03 NOTE — PATIENT INSTRUCTIONS
Call one of your daughters or grandchildren to schedule a get together with them 1-2 times each week  Return in 2-3  weeks

## 2019-06-06 ENCOUNTER — OFFICE VISIT (OUTPATIENT)
Dept: FAMILY MEDICINE CLINIC | Age: 74
End: 2019-06-06
Payer: MEDICARE

## 2019-06-06 VITALS
SYSTOLIC BLOOD PRESSURE: 120 MMHG | OXYGEN SATURATION: 98 % | BODY MASS INDEX: 33.09 KG/M2 | TEMPERATURE: 97.7 F | WEIGHT: 143 LBS | HEART RATE: 61 BPM | DIASTOLIC BLOOD PRESSURE: 64 MMHG | HEIGHT: 55 IN

## 2019-06-06 DIAGNOSIS — G47.00 INSOMNIA, UNSPECIFIED TYPE: Primary | ICD-10-CM

## 2019-06-06 DIAGNOSIS — F32.A DEPRESSION, UNSPECIFIED DEPRESSION TYPE: ICD-10-CM

## 2019-06-06 DIAGNOSIS — G89.29 CHRONIC MIDLINE LOW BACK PAIN WITHOUT SCIATICA: ICD-10-CM

## 2019-06-06 DIAGNOSIS — M54.50 CHRONIC MIDLINE LOW BACK PAIN WITHOUT SCIATICA: ICD-10-CM

## 2019-06-06 DIAGNOSIS — G31.84 MILD COGNITIVE IMPAIRMENT: ICD-10-CM

## 2019-06-06 PROCEDURE — 99213 OFFICE O/P EST LOW 20 MIN: CPT | Performed by: NURSE PRACTITIONER

## 2019-06-06 RX ORDER — UREA 10 %
1 LOTION (ML) TOPICAL NIGHTLY PRN
Qty: 30 TABLET | Refills: 2 | Status: SHIPPED | OUTPATIENT
Start: 2019-06-06 | End: 2019-09-07 | Stop reason: SDUPTHER

## 2019-06-06 ASSESSMENT — ENCOUNTER SYMPTOMS
COUGH: 0
SHORTNESS OF BREATH: 0

## 2019-06-06 NOTE — PROGRESS NOTES
Subjective  Chief Complaint   Patient presents with    Check-Up     follow up for depression       HPI    Pt here to follow up on depression and back pain. She did see Dr. Richmond Barragan who recommended she do PT. She has not scheduled that yet. Isn't sure if she wants to do that or not. Says the back pain really does not bother her that much. She is following with Dr. Angelica Bueno for depression. Seems to be going okay. Has noted to have some ongoing issues with memory loss; however she denies any issues with this. Actually says she \"cannot remember if she has any issues with her memory\". Still with issues sleeping. Cannot fall asleep. Struggling with this. Has always had issues sleeping. Is not sure if she has tried anything for sleep or not.     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    Hypertension     Hyperlipidemia     COPD (chronic obstructive pulmonary disease) ([address]     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 ([address] 08/27/2010    Generalized osteoarthrosis, unspecified site 12/27/2002    Major depressive disorder, single episode, moderate ([address] 12/27/2002    Morbid obesity ([address] 12/27/2002     Past Surgical History:   Procedure Laterality Date    CARDIAC CATHETERIZATION      CARPAL TUNNEL RELEASE      COLONOSCOPY      HYSTERECTOMY, TOTAL ABDOMINAL      KNEE ARTHROPLASTY Bilateral     IL COLON CA SCRN NOT  [address] IND N/A 6/22/2017    COLONOSCOPY performed by Kennedy Stevens MD at . [address] ESOPHAGOGASTRODUODENOSCOPY TRANSORAL DIAGNOSTIC N/A 2017    EGD ESOPHAGOGASTRODUODENOSCOPY performed by Walt Harvey MD at [address] Bilateral      Family History   Problem Relation Age of Onset    Arthritis Father     Other Father         gout    Heart Failure Father     Cancer Sister      Social History     Socioeconomic History    Marital status:      Spouse name: None    Number of children: None    Years of education: None    Highest education level: None   Occupational History    None   Social Needs    Financial resource strain: None    Food insecurity:     Worry: None     Inability: None    Transportation needs:     Medical: None     Non-medical: None   Tobacco Use    Smoking status: Former Smoker     Packs/day: 1.00     Years: 20.00     Pack years: 20.00     Last attempt to quit:      Years since quittin.4    Smokeless tobacco: Never Used   Substance and Sexual Activity    Alcohol use: No    Drug use: None    Sexual activity: None   Lifestyle    Physical activity:     Days per week: None     Minutes per session: None    Stress: None   Relationships    Social connections:     Talks on phone: None     Gets together: None     Attends Ca[employer] service: None     Active member of club or organization: None     Attends meetings of clubs or organizations: None     Relationship status: None    Intimate partner violence:     Fear of current or ex partner: None     Emotionally abused: None     Physically abused: None     Forced sexual activity: None   Other Topics Concern    None   Social History Narrative    None     Current Outpatient Medications on File Prior to Visit   Medication Sig Dispense Refill    lisinopril-hydrochlorothiazide (PRINZIDE;ZESTORETIC) 10-12.5 MG per tablet Take 1 tablet by mouth daily 30 tablet 3    VORTIoxetine (TRINTELLIX) 10 MG TABS tablet Take 10 mg by mouth daily 30 tablet 1    gabapentin (NEURONTIN) 600 MG tablet TAKE ONE TABLET BY MOUTH THREE TIMES A DAY 90 tablet 0    oxybutynin (DITROPAN-XL) 10 MG extended release tablet TAKE ONE TABLET BY MOUTH EVERY DAY 30 tablet 2    pravastatin (PRAVACHOL) 40 MG tablet TAKE ONE TABLET BY MOUTH EVERY DAY 30 tablet 4    tiZANidine (ZANAFLEX) 4 MG tablet TAKE ONE TABLET BY MOUTH THREE TIMES A DAY 90 tablet 0    Oxygen Concentrator       omeprazole (PRILOSEC) 20 MG delayed release capsule TAKE ONE CAPSULE BY MOUTH DAILY  2    ondansetron (ZOFRAN) 4 MG tablet Take 1 tablet by mouth every 8 hours as needed for Nausea 20 tablet 0     No current facility-administered medications on file prior to visit. Allergies   Allergen Reactions    Codeine      Nausea, lightheadedness, dizzy       Review of Systems   Constitutional: Negative for chills, diaphoresis, fatigue and fever. Respiratory: Negative for cough and shortness of breath. Cardiovascular: Negative for chest pain, palpitations and leg swelling. Psychiatric/Behavioral: Positive for sleep disturbance. Negative for dysphoric mood. The patient is not nervous/anxious. Objective  Vitals:    06/06/19 1356   BP: 120/64   Site: Left Upper Arm   Position: Sitting   Cuff Size: Medium Adult   Pulse: 61   Temp: 97.7 °F (36.5 °C)   TempSrc: Tympanic   SpO2: 98%   Weight: 143 lb (64.9 kg)   Height: 4' 6\" (1.372 m)     Physical Exam   Constitutional: She is oriented to person, place, and time. She appears well-developed and well-nourished. No distress. HENT:   Head: Normocephalic and atraumatic. Right Ear: External ear normal.   Left Ear: External ear normal.   Cardiovascular: Normal rate, regular rhythm and normal heart sounds. Pulmonary/Chest: Effort normal and breath sounds normal. No respiratory distress. Musculoskeletal: Normal range of motion. She exhibits no edema. Neurological: She is alert and oriented to person, place, and time. No cranial nerve deficit. Skin: Skin is warm and dry. No rash noted. She is not diaphoretic. No erythema. No pallor. Psychiatric: She has a normal mood and affect. Her speech is normal and behavior is normal. Judgment normal. She exhibits abnormal recent memory and abnormal remote memory. She is attentive. MMSE 20.5    Assessment& Plan     Diagnosis Orders   1. Insomnia, unspecified type  melatonin 1 MG tablet   2. Mild cognitive impairment  ALMA Corrigan MD, Neurology, [place]   3. Chronic midline low back pain without sciatica     4. Depression, unspecified depression type       Trial of melatonin for sleep. Continue trintelix. Referral to neurology as ordered. Pt declined involvement of  in today's visit. Will do PT as previously recommended by Dr. Grace Peres. Side effects, adverse effects of the medication prescribed today, as well as treatment plan/ rationale and result expectations have been discussed with the patient who expresses understanding and desires to proceed. Close follow up to evaluate treatment results and for coordination of care. I have reviewed the patient's medical history in detail and updated the computerized patient record. As always, patient is advised that if symptoms worsen in any way they will proceed to the nearest emergency room. Orders Placed This Encounter   Procedures   Kenn Floyd MD, Neurology, [facility]     Referral Priority:   Routine     Referral Type:   Eval and Treat     Referral Reason:   Specialty Services Required     Referred to Provider:   kO David MD     Requested Specialty:   Neurology     Number of Visits Requested:   1       Orders Placed This Encounter   Medications    melatonin 1 MG tablet     Sig: Take 1 tablet by mouth nightly as needed for Sleep     Dispense:  30 tablet     Refill:  2       Return in about 3 months (around 9/6/2019) for check up.     Lynne Enriquezelor, APRN - CNP

## 2019-06-24 ENCOUNTER — OFFICE VISIT (OUTPATIENT)
Dept: BEHAVIORAL/MENTAL HEALTH CLINIC | Age: 74
End: 2019-06-24
Payer: MEDICARE

## 2019-06-24 DIAGNOSIS — F32.89 OTHER DEPRESSION: Primary | ICD-10-CM

## 2019-06-24 PROCEDURE — 90832 PSYTX W PT 30 MINUTES: CPT | Performed by: PSYCHOLOGIST

## 2019-06-24 ASSESSMENT — PATIENT HEALTH QUESTIONNAIRE - PHQ9
9. THOUGHTS THAT YOU WOULD BE BETTER OFF DEAD, OR OF HURTING YOURSELF: 1
5. POOR APPETITE OR OVEREATING: 0
SUM OF ALL RESPONSES TO PHQ QUESTIONS 1-9: 9
SUM OF ALL RESPONSES TO PHQ QUESTIONS 1-9: 9
7. TROUBLE CONCENTRATING ON THINGS, SUCH AS READING THE NEWSPAPER OR WATCHING TELEVISION: 0
4. FEELING TIRED OR HAVING LITTLE ENERGY: 1
8. MOVING OR SPEAKING SO SLOWLY THAT OTHER PEOPLE COULD HAVE NOTICED. OR THE OPPOSITE, BEING SO FIGETY OR RESTLESS THAT YOU HAVE BEEN MOVING AROUND A LOT MORE THAN USUAL: 1
3. TROUBLE FALLING OR STAYING ASLEEP: 3
2. FEELING DOWN, DEPRESSED OR HOPELESS: 1
6. FEELING BAD ABOUT YOURSELF - OR THAT YOU ARE A FAILURE OR HAVE LET YOURSELF OR YOUR FAMILY DOWN: 1
SUM OF ALL RESPONSES TO PHQ9 QUESTIONS 1 & 2: 2
1. LITTLE INTEREST OR PLEASURE IN DOING THINGS: 1
10. IF YOU CHECKED OFF ANY PROBLEMS, HOW DIFFICULT HAVE THESE PROBLEMS MADE IT FOR YOU TO DO YOUR WORK, TAKE CARE OF THINGS AT HOME, OR GET ALONG WITH OTHER PEOPLE: 1

## 2019-06-24 NOTE — PATIENT INSTRUCTIONS
Follow up with Dr. Claudette Cabrera (neurologist) as recommended by Prasad Salmeron. Their phone number: [phone] with making plans with daughter and granddaughter 1-2 times per week    Try to set and stick to a consistent wake time. This will help with regulating your sleep schedule.      Return in 3 weeks

## 2019-06-24 NOTE — PROGRESS NOTES
lisinopril-hydrochlorothiazide (PRINZIDE;ZESTORETIC) 10-12.5 MG per tablet Take 1 tablet by mouth daily 30 tablet 3    VORTIoxetine (TRINTELLIX) 10 MG TABS tablet Take 10 mg by mouth daily 30 tablet 1    gabapentin (NEURONTIN) 600 MG tablet TAKE ONE TABLET BY MOUTH THREE TIMES A DAY 90 tablet 0    ondansetron (ZOFRAN) 4 MG tablet Take 1 tablet by mouth every 8 hours as needed for Nausea 20 tablet 0    oxybutynin (DITROPAN-XL) 10 MG extended release tablet TAKE ONE TABLET BY MOUTH EVERY DAY 30 tablet 2    pravastatin (PRAVACHOL) 40 MG tablet TAKE ONE TABLET BY MOUTH EVERY DAY 30 tablet 4    tiZANidine (ZANAFLEX) 4 MG tablet TAKE ONE TABLET BY MOUTH THREE TIMES A DAY 90 tablet 0    Oxygen Concentrator       omeprazole (PRILOSEC) 20 MG delayed release capsule TAKE ONE CAPSULE BY MOUTH DAILY  2     No current facility-administered medications for this visit.      :       A:  Administered the PHQ9 which indicates a self report of mild symptom distress. Pt would benefit from [facility] services to increase coping skills to provide symptom management/control/relief.        PHQ Scores 6/24/2019 6/3/2019 5/20/2019 5/6/2019 1/30/2018   PHQ2 Score 2 5 4 3 0   PHQ9 Score 9 14 12 12 0     Interpretation of Total Score Depression Severity: 1-4 = Minimal depression, 5-9 = Mild depression, 10-14 = Moderate depression, 15-19 = Moderately severe depression, 20-27 = Severe depression      Diagnosis:  Other Spec Depressive Disorder       Plan:  Pt interventions:  Discussed potential treatments for  depression, Discussed self-care (sleep, nutrition, rewarding activities, social support, exercise), Discussed benefits of referral for specialty care, [place]-setting to identify pt's primary goals for [facility] visit / overall health, Supportive techniques, Emphasized self-care as important for managing overall health, Provided Psychoeducation re: neurology and Reviewed options for identifying appropriate providers      Pt Behavioral Change Plan:  Follow up with Dr. Alesha Fernandez (neurologist) as recommended by Lul Valencia. Their phone number: [phone] with making plans with daughter and granddaughter 1-2 times per week    Try to set and stick to a consistent wake time. This will help with regulating your sleep schedule. Return in 3 weeks      Please note this report has been partially produced using speech recognition software  And may cause contain errors related to that system including grammar, punctuation and spelling as well as words and phrases that may seem inappropriate. If there are questions or concerns please feel free to contact me to clarify.

## 2019-06-28 RX ORDER — OXYBUTYNIN CHLORIDE 10 MG/1
TABLET, EXTENDED RELEASE ORAL
Qty: 30 TABLET | Refills: 1 | Status: SHIPPED | OUTPATIENT
Start: 2019-06-28 | End: 2019-08-30 | Stop reason: SDUPTHER

## 2019-06-28 RX ORDER — PRAVASTATIN SODIUM 40 MG
TABLET ORAL
Qty: 30 TABLET | Refills: 3 | Status: SHIPPED | OUTPATIENT
Start: 2019-06-28 | End: 2019-09-07 | Stop reason: SDUPTHER

## 2019-07-08 DIAGNOSIS — F32.A DEPRESSION, UNSPECIFIED DEPRESSION TYPE: ICD-10-CM

## 2019-07-09 RX ORDER — VORTIOXETINE 10 MG/1
TABLET, FILM COATED ORAL
Qty: 30 TABLET | Refills: 5 | Status: SHIPPED | OUTPATIENT
Start: 2019-07-09 | End: 2019-09-07 | Stop reason: SDUPTHER

## 2019-08-24 ENCOUNTER — TELEPHONE (OUTPATIENT)
Dept: FAMILY MEDICINE CLINIC | Age: 74
End: 2019-08-24

## 2019-08-24 NOTE — TELEPHONE ENCOUNTER
Pt's  came in asking why Rx for gabapentin has not been filled. He said the pharmacy has been trying to reach the office and hasn't heard anything back.

## 2019-08-26 RX ORDER — GABAPENTIN 600 MG/1
TABLET ORAL
Qty: 90 TABLET | Refills: 0 | Status: SHIPPED | OUTPATIENT
Start: 2019-09-25 | End: 2019-09-07 | Stop reason: SDUPTHER

## 2019-08-27 NOTE — TELEPHONE ENCOUNTER
I spoke to Jonh Jimenez today and he stated that they picked up medication today. I let him know that we never received any RX requests for the gabapentin before he stopped in and that Jack De Luna and I are out of the office on Mondays.

## 2019-08-28 ENCOUNTER — HOSPITAL ENCOUNTER (EMERGENCY)
Age: 74
Discharge: HOME | End: 2019-08-28
Attending: EMERGENCY MEDICINE
Payer: MEDICARE

## 2019-08-28 ENCOUNTER — APPOINTMENT (OUTPATIENT)
Dept: CT IMAGING | Age: 74
End: 2019-08-28
Payer: MEDICARE

## 2019-08-28 VITALS
OXYGEN SATURATION: 100 % | BODY MASS INDEX: 25.44 KG/M2 | WEIGHT: 149 LBS | SYSTOLIC BLOOD PRESSURE: 103 MMHG | HEART RATE: 62 BPM | DIASTOLIC BLOOD PRESSURE: 59 MMHG | RESPIRATION RATE: 18 BRPM | TEMPERATURE: 98.3 F | HEIGHT: 64 IN

## 2019-08-28 DIAGNOSIS — N39.0 URINARY TRACT INFECTION WITHOUT HEMATURIA, SITE UNSPECIFIED: ICD-10-CM

## 2019-08-28 DIAGNOSIS — E86.0 DEHYDRATION: ICD-10-CM

## 2019-08-28 DIAGNOSIS — A08.4 VIRAL GASTROENTERITIS: Primary | ICD-10-CM

## 2019-08-28 LAB
ALBUMIN SERPL-MCNC: 3.5 G/DL (ref 3.5–4.6)
ALP BLD-CCNC: 52 U/L (ref 40–130)
ALT SERPL-CCNC: 13 U/L (ref 0–33)
ANION GAP SERPL CALCULATED.3IONS-SCNC: 20 MEQ/L (ref 9–15)
ANISOCYTOSIS: 0
AST SERPL-CCNC: 26 U/L (ref 0–35)
BACTERIA: NEGATIVE /HPF
BANDED NEUTROPHILS RELATIVE PERCENT: 1 % (ref 5–11)
BASOPHILS ABSOLUTE: 0.2 K/UL (ref 0–0.2)
BASOPHILS RELATIVE PERCENT: 1 %
BILIRUB SERPL-MCNC: 0.5 MG/DL (ref 0.2–0.7)
BILIRUBIN URINE: NEGATIVE
BLOOD, URINE: NEGATIVE
BUN BLDV-MCNC: 59 MG/DL (ref 8–23)
CALCIUM SERPL-MCNC: 8.6 MG/DL (ref 8.5–9.9)
CHLORIDE BLD-SCNC: 97 MEQ/L (ref 95–107)
CLARITY: ABNORMAL
CO2: 16 MEQ/L (ref 20–31)
COLOR: YELLOW
CREAT SERPL-MCNC: 2.51 MG/DL (ref 0.5–0.9)
EKG ATRIAL RATE: 63 BPM
EKG P AXIS: 40 DEGREES
EKG P-R INTERVAL: 108 MS
EKG Q-T INTERVAL: 402 MS
EKG QRS DURATION: 100 MS
EKG QTC CALCULATION (BAZETT): 411 MS
EKG R AXIS: 34 DEGREES
EKG T AXIS: 47 DEGREES
EKG VENTRICULAR RATE: 63 BPM
EOSINOPHILS ABSOLUTE: 0.2 K/UL (ref 0–0.7)
EOSINOPHILS RELATIVE PERCENT: 1 %
EPITHELIAL CELLS, UA: ABNORMAL /HPF (ref 0–5)
GFR AFRICAN AMERICAN: 22.5
GFR NON-AFRICAN AMERICAN: 18.6
GLOBULIN: 3.2 G/DL (ref 2.3–3.5)
GLUCOSE BLD-MCNC: 97 MG/DL (ref 70–99)
GLUCOSE URINE: NEGATIVE MG/DL
HCT VFR BLD CALC: 46.2 % (ref 37–47)
HEMOGLOBIN: 15.1 G/DL (ref 12–16)
HYPOCHROMIA: 0
KETONES, URINE: ABNORMAL MG/DL
LEUKOCYTE ESTERASE, URINE: ABNORMAL
LIPASE: 101 U/L (ref 12–95)
LYMPHOCYTES ABSOLUTE: 0.9 K/UL (ref 1–4.8)
LYMPHOCYTES RELATIVE PERCENT: 6 %
MACROCYTES: 0
MCH RBC QN AUTO: 31.2 PG (ref 27–31.3)
MCHC RBC AUTO-ENTMCNC: 32.8 % (ref 33–37)
MCV RBC AUTO: 95.2 FL (ref 82–100)
MICROCYTES: 0
MONOCYTES ABSOLUTE: 1.3 K/UL (ref 0.2–0.8)
MONOCYTES RELATIVE PERCENT: 7.6 %
NEUTROPHILS ABSOLUTE: 13.3 K/UL (ref 1.4–6.5)
NEUTROPHILS RELATIVE PERCENT: 84 %
NITRITE, URINE: NEGATIVE
PDW BLD-RTO: 15.5 % (ref 11.5–14.5)
PH UA: 5 (ref 5–9)
PLATELET # BLD: 224 K/UL (ref 130–400)
PLATELET SLIDE REVIEW: NORMAL
POIKILOCYTES: 0
POLYCHROMASIA: 0
POTASSIUM SERPL-SCNC: 4.9 MEQ/L (ref 3.4–4.9)
PROTEIN UA: ABNORMAL MG/DL
RBC # BLD: 4.85 M/UL (ref 4.2–5.4)
RBC UA: ABNORMAL /HPF (ref 0–5)
REASON FOR REJECTION: NORMAL
REJECTED TEST: NORMAL
SODIUM BLD-SCNC: 133 MEQ/L (ref 135–144)
SPECIFIC GRAVITY UA: 1.02 (ref 1–1.03)
TOTAL PROTEIN: 6.7 G/DL (ref 6.3–8)
URINE REFLEX TO CULTURE: YES
UROBILINOGEN, URINE: 1 E.U./DL
WBC # BLD: 15.7 K/UL (ref 4.8–10.8)
WBC UA: ABNORMAL /HPF (ref 0–5)

## 2019-08-28 PROCEDURE — 85025 COMPLETE CBC W/AUTO DIFF WBC: CPT

## 2019-08-28 PROCEDURE — 99284 EMERGENCY DEPT VISIT MOD MDM: CPT

## 2019-08-28 PROCEDURE — 74176 CT ABD & PELVIS W/O CONTRAST: CPT

## 2019-08-28 PROCEDURE — 87086 URINE CULTURE/COLONY COUNT: CPT

## 2019-08-28 PROCEDURE — 81001 URINALYSIS AUTO W/SCOPE: CPT

## 2019-08-28 PROCEDURE — 96374 THER/PROPH/DIAG INJ IV PUSH: CPT

## 2019-08-28 PROCEDURE — 2500000003 HC RX 250 WO HCPCS: Performed by: EMERGENCY MEDICINE

## 2019-08-28 PROCEDURE — 83690 ASSAY OF LIPASE: CPT

## 2019-08-28 PROCEDURE — 6370000000 HC RX 637 (ALT 250 FOR IP): Performed by: EMERGENCY MEDICINE

## 2019-08-28 PROCEDURE — 93005 ELECTROCARDIOGRAM TRACING: CPT

## 2019-08-28 PROCEDURE — 96361 HYDRATE IV INFUSION ADD-ON: CPT

## 2019-08-28 PROCEDURE — 36415 COLL VENOUS BLD VENIPUNCTURE: CPT

## 2019-08-28 PROCEDURE — 96375 TX/PRO/DX INJ NEW DRUG ADDON: CPT

## 2019-08-28 PROCEDURE — 2580000003 HC RX 258: Performed by: EMERGENCY MEDICINE

## 2019-08-28 PROCEDURE — 80053 COMPREHEN METABOLIC PANEL: CPT

## 2019-08-28 PROCEDURE — 6360000002 HC RX W HCPCS: Performed by: EMERGENCY MEDICINE

## 2019-08-28 RX ORDER — ONDANSETRON 4 MG/1
4 TABLET, ORALLY DISINTEGRATING ORAL EVERY 8 HOURS PRN
Qty: 9 TABLET | Refills: 0 | Status: SHIPPED | OUTPATIENT
Start: 2019-08-28 | End: 2019-08-31

## 2019-08-28 RX ORDER — CEPHALEXIN 500 MG/1
500 CAPSULE ORAL 3 TIMES DAILY
Qty: 30 CAPSULE | Refills: 0 | Status: SHIPPED | OUTPATIENT
Start: 2019-08-28 | End: 2019-09-07 | Stop reason: ALTCHOICE

## 2019-08-28 RX ORDER — ONDANSETRON 2 MG/ML
4 INJECTION INTRAMUSCULAR; INTRAVENOUS ONCE
Status: COMPLETED | OUTPATIENT
Start: 2019-08-28 | End: 2019-08-28

## 2019-08-28 RX ORDER — DICYCLOMINE HYDROCHLORIDE 10 MG/1
10 CAPSULE ORAL 3 TIMES DAILY PRN
Qty: 15 CAPSULE | Refills: 0 | Status: SHIPPED | OUTPATIENT
Start: 2019-08-28 | End: 2019-09-07 | Stop reason: SDUPTHER

## 2019-08-28 RX ORDER — 0.9 % SODIUM CHLORIDE 0.9 %
1000 INTRAVENOUS SOLUTION INTRAVENOUS ONCE
Status: COMPLETED | OUTPATIENT
Start: 2019-08-28 | End: 2019-08-28

## 2019-08-28 RX ORDER — CEPHALEXIN 500 MG/1
500 CAPSULE ORAL ONCE
Status: COMPLETED | OUTPATIENT
Start: 2019-08-28 | End: 2019-08-28

## 2019-08-28 RX ADMIN — CEPHALEXIN 500 MG: 500 CAPSULE ORAL at 21:16

## 2019-08-28 RX ADMIN — ONDANSETRON 4 MG: 2 INJECTION INTRAMUSCULAR; INTRAVENOUS at 17:57

## 2019-08-28 RX ADMIN — FAMOTIDINE 20 MG: 10 INJECTION, SOLUTION INTRAVENOUS at 17:57

## 2019-08-28 RX ADMIN — SODIUM CHLORIDE 1000 ML: 9 INJECTION, SOLUTION INTRAVENOUS at 17:57

## 2019-08-28 ASSESSMENT — ENCOUNTER SYMPTOMS
SHORTNESS OF BREATH: 0
ABDOMINAL PAIN: 1
DIARRHEA: 1
VOMITING: 1
BLOOD IN STOOL: 0
NAUSEA: 1

## 2019-08-28 NOTE — ED PROVIDER NOTES
Major depressive disorder, single episode, moderate ([facility] [address] 12/27/2002    Morbid obesity ([facility] [address] 12/27/2002    OAB (overactive bladder)     Osteoporosis          SURGICAL HISTORY       Past Surgical History:   Procedure Laterality Date    CARDIAC CATHETERIZATION      CARPAL TUNNEL RELEASE      COLONOSCOPY      HYSTERECTOMY, TOTAL ABDOMINAL      KNEE ARTHROPLASTY Bilateral     [place] COLON CA SCRN NOT  [address] IND N/A 6/22/2017    COLONOSCOPY performed by Danisha Anderson MD at . [address] ESOPHAGOGASTRODUODENOSCOPY TRANSORAL DIAGNOSTIC N/A 6/22/2017    EGD ESOPHAGOGASTRODUODENOSCOPY performed by Danisha Anderson MD at [address] Bilateral          CURRENT MEDICATIONS     Previous Medications    GABAPENTIN (NEURONTIN) 600 MG TABLET    TAKE ONE TABLET BY MOUTH THREE TIMES A DAY    LISINOPRIL-HYDROCHLOROTHIAZIDE (PRINZIDE;ZESTORETIC) 10-12.5 MG PER TABLET    Take 1 tablet by mouth daily    MELATONIN 1 MG TABLET    Take 1 tablet by mouth nightly as needed for Sleep    OMEPRAZOLE (PRILOSEC) 20 MG DELAYED RELEASE CAPSULE    TAKE ONE CAPSULE BY MOUTH DAILY    ONDANSETRON (ZOFRAN) 4 MG TABLET    Take 1 tablet by mouth every 8 hours as needed for Nausea    OXYBUTYNIN (DITROPAN-XL) 10 MG EXTENDED RELEASE TABLET    TAKE ONE TABLET BY MOUTH EVERY DAY    OXYGEN CONCENTRATOR        PRAVASTATIN (PRAVACHOL) 40 MG TABLET    TAKE ONE TABLET BY MOUTH EVERY DAY    TIZANIDINE (ZANAFLEX) 4 MG TABLET    TAKE ONE TABLET BY MOUTH THREE TIMES A DAY    TRINTELLIX 10 MG TABS TABLET    TAKE ONE TABLET BY MOUTH DAILY       ALLERGIES     Codeine    FAMILY HISTORY       Family History   Problem Relation Age of Onset    Arthritis Father     Other Father         gout    Heart Failure Father     Cancer Sister           SOCIAL HISTORY       Social History     Socioeconomic History    Marital status:      Spouse name: None    Number of children: None    Years of education: None    Highest education level: None   Occupational History    None   Social Needs    Financial resource strain: None    Food insecurity:     Worry: None     Inability: None    Transportation needs:     Medical: None     Non-medical: None   Tobacco Use    Smoking status: Former Smoker     Packs/day: 1.00     Years: 20.00     Pack years: 20.00     Last attempt to quit: 1980     Years since quittin.6    Smokeless tobacco: Never Used   Substance and Sexual Activity    Alcohol use: No    Drug use: None    Sexual activity: None   Lifestyle    Physical activity:     Days per week: None     Minutes per session: None    Stress: None   Relationships    Social connections:     Talks on phone: None     Gets together: None     Attends Latter-[employer] service: None     Active member of club or organization: None     Attends meetings of clubs or organizations: None     Relationship status: None    Intimate partner violence:     Fear of current or ex partner: None     Emotionally abused: None     Physically abused: None     Forced sexual activity: None   Other Topics Concern    None   Social History Narrative    None       SCREENINGS             PHYSICAL EXAM    (up to 7 for level 4, 8 or more for level 5)     ED Triage Vitals   BP Temp Temp Source Pulse Resp SpO2 Height Weight   19 1720 19 1720 19 1720 19 1720 19 1720 19 1720 19 1726 19 1726   101/64 98.3 °F (36.8 °C) Oral 66 16 94 % 5' 4\" (1.626 m) 149 lb (67.6 kg)       Physical Exam   Constitutional: She appears well-developed and well-nourished. No distress. HENT:   Head: Normocephalic and atraumatic. Dry mucous membranes   Eyes: Conjunctivae are normal.   Pupils are equally round and reacting normally. Extraoccular muscles are grossly intact. Neck: Normal range of motion. No tracheal deviation present. Cardiovascular: Normal rate, regular rhythm, normal heart sounds and intact distal pulses.  Exam reveals no Clarity, UA CLOUDY (*)     Ketones, Urine TRACE (*)     Protein, UA TRACE (*)     Leukocyte Esterase, Urine MODERATE (*)     All other components within normal limits   COMPREHENSIVE METABOLIC PANEL - Abnormal; Notable for the following components:    Sodium 133 (*)     CO2 16 (*)     Anion Gap 20 (*)     BUN 59 (*)     CREATININE 2.51 (*)     GFR Non- 18.6 (*)     GFR  22.5 (*)     All other components within normal limits   LIPASE - Abnormal; Notable for the following components:    Lipase 101 (*)     All other components within normal limits   MICROSCOPIC URINALYSIS - Abnormal; Notable for the following components:    WBC, UA  (*)     RBC, UA 3-5 (*)     All other components within normal limits   URINE CULTURE   SPECIMEN REJECTION       All other labs were within normal range or not returned as of this dictation. RADIOLOGY:  CT ABDOMEN PELVIS WO CONTRAST Additional Contrast? None   Final Result   NO ACUTE PROCESS. All CT scans at this facility use dose modulation, iterative reconstruction, and/or weight based dosing when appropriate to reduce radiation dose to as low as reasonably achievable. ED Course as of Aug 28 2107   Wed Aug 28, 2019   1845 Twelve lead EKG interpreted by myself:  A 12 lead EKG done at 1734, interpreted by myself, showed a regular rhythm at a rate of 63bpm.  The MN interval was normal.  The QRS complex was normal.  There was no ST segment elevation or depression, T wave inversion not present QRS progression through precordial leads was grossly normal.  Interpretation: Normal sinus rhythm, no ST segment changes and no pattern consistent with acute ischemia or infarct    [TS]   2059 Patient's laboratory results revealed dehydration with elevated BUN and creatinine but she received IV fluids in the ER. Her vomiting and diarrhea did resolve. She does also have a urinary tract infection which will be treated with antibiotics. mis-transcribed.)    Soham Dodge DO (electronically signed)  Board Certified Emergency Physician          Soham Dodge DO  08/28/19 8273

## 2019-08-28 NOTE — ED NOTES
Assumed care of pt. Pt currently in CT abd.  Report on pt given per Rn Candi Collado RN  08/28/19 1921

## 2019-08-29 NOTE — ED NOTES
Pt self amb to BR. Steady, no distress noted. Pt denies dizziness, denies lightheadedness upon ambulation.       Krystle Riddle RN  08/28/19 2010

## 2019-08-30 LAB — URINE CULTURE, ROUTINE: NORMAL

## 2019-09-07 ENCOUNTER — OFFICE VISIT (OUTPATIENT)
Dept: FAMILY MEDICINE CLINIC | Age: 74
End: 2019-09-07
Payer: MEDICARE

## 2019-09-07 VITALS
OXYGEN SATURATION: 94 % | BODY MASS INDEX: 32.63 KG/M2 | WEIGHT: 141 LBS | TEMPERATURE: 97.7 F | HEART RATE: 67 BPM | SYSTOLIC BLOOD PRESSURE: 100 MMHG | HEIGHT: 55 IN | DIASTOLIC BLOOD PRESSURE: 60 MMHG

## 2019-09-07 DIAGNOSIS — N32.81 OAB (OVERACTIVE BLADDER): ICD-10-CM

## 2019-09-07 DIAGNOSIS — A08.4 VIRAL GASTROENTERITIS: ICD-10-CM

## 2019-09-07 DIAGNOSIS — I10 ESSENTIAL HYPERTENSION: ICD-10-CM

## 2019-09-07 DIAGNOSIS — N30.00 ACUTE CYSTITIS WITHOUT HEMATURIA: ICD-10-CM

## 2019-09-07 DIAGNOSIS — E78.5 HYPERLIPIDEMIA, UNSPECIFIED HYPERLIPIDEMIA TYPE: ICD-10-CM

## 2019-09-07 DIAGNOSIS — E86.0 DEHYDRATION: ICD-10-CM

## 2019-09-07 DIAGNOSIS — D72.829 LEUKOCYTOSIS, UNSPECIFIED TYPE: ICD-10-CM

## 2019-09-07 DIAGNOSIS — G31.84 MILD COGNITIVE IMPAIRMENT: Primary | ICD-10-CM

## 2019-09-07 DIAGNOSIS — F32.A DEPRESSION, UNSPECIFIED DEPRESSION TYPE: ICD-10-CM

## 2019-09-07 DIAGNOSIS — M51.36 DEGENERATIVE DISC DISEASE, LUMBAR: ICD-10-CM

## 2019-09-07 DIAGNOSIS — R11.0 NAUSEA: ICD-10-CM

## 2019-09-07 DIAGNOSIS — S32.010A CLOSED COMPRESSION FRACTURE OF FIRST LUMBAR VERTEBRA, INITIAL ENCOUNTER: ICD-10-CM

## 2019-09-07 DIAGNOSIS — G47.00 INSOMNIA, UNSPECIFIED TYPE: ICD-10-CM

## 2019-09-07 PROCEDURE — 99214 OFFICE O/P EST MOD 30 MIN: CPT | Performed by: NURSE PRACTITIONER

## 2019-09-07 RX ORDER — LISINOPRIL AND HYDROCHLOROTHIAZIDE 12.5; 1 MG/1; MG/1
1 TABLET ORAL DAILY
Qty: 30 TABLET | Refills: 3 | Status: ON HOLD | OUTPATIENT
Start: 2019-09-07 | End: 2019-10-13 | Stop reason: HOSPADM

## 2019-09-07 RX ORDER — GABAPENTIN 600 MG/1
600 TABLET ORAL 3 TIMES DAILY
Qty: 90 TABLET | Refills: 2 | Status: SHIPPED | OUTPATIENT
Start: 2019-09-07 | End: 2019-10-01 | Stop reason: DRUGHIGH

## 2019-09-07 RX ORDER — ONDANSETRON 4 MG/1
4 TABLET, FILM COATED ORAL EVERY 8 HOURS PRN
Qty: 20 TABLET | Refills: 0 | Status: SHIPPED | OUTPATIENT
Start: 2019-09-07 | End: 2019-10-29 | Stop reason: ALTCHOICE

## 2019-09-07 RX ORDER — PRAVASTATIN SODIUM 40 MG
TABLET ORAL
Qty: 30 TABLET | Refills: 3 | Status: SHIPPED | OUTPATIENT
Start: 2019-09-07 | End: 2020-02-25

## 2019-09-07 RX ORDER — UREA 10 %
1 LOTION (ML) TOPICAL NIGHTLY PRN
Qty: 30 TABLET | Refills: 2 | Status: SHIPPED | OUTPATIENT
Start: 2019-09-07

## 2019-09-07 RX ORDER — DICYCLOMINE HYDROCHLORIDE 10 MG/1
10 CAPSULE ORAL 3 TIMES DAILY PRN
Qty: 15 CAPSULE | Refills: 0 | Status: SHIPPED | OUTPATIENT
Start: 2019-09-07 | End: 2019-10-01 | Stop reason: ALTCHOICE

## 2019-09-07 RX ORDER — TIZANIDINE 4 MG/1
TABLET ORAL
Qty: 90 TABLET | Refills: 0 | Status: SHIPPED | OUTPATIENT
Start: 2019-09-07 | End: 2019-10-29

## 2019-09-07 RX ORDER — OXYBUTYNIN CHLORIDE 10 MG/1
TABLET, EXTENDED RELEASE ORAL
Qty: 30 TABLET | Refills: 0 | Status: ON HOLD | OUTPATIENT
Start: 2019-09-07 | End: 2019-10-07 | Stop reason: HOSPADM

## 2019-09-07 ASSESSMENT — PATIENT HEALTH QUESTIONNAIRE - PHQ9
2. FEELING DOWN, DEPRESSED OR HOPELESS: 1
1. LITTLE INTEREST OR PLEASURE IN DOING THINGS: 1
SUM OF ALL RESPONSES TO PHQ QUESTIONS 1-9: 2
SUM OF ALL RESPONSES TO PHQ9 QUESTIONS 1 & 2: 2
SUM OF ALL RESPONSES TO PHQ QUESTIONS 1-9: 2

## 2019-09-07 ASSESSMENT — ENCOUNTER SYMPTOMS
SHORTNESS OF BREATH: 0
COUGH: 0

## 2019-09-07 NOTE — PROGRESS NOTES
tiZANidine (ZANAFLEX) 4 MG tablet     Sig: TAKE ONE TABLET BY MOUTH THREE TIMES A DAY     Dispense:  90 tablet     Refill:  0    pravastatin (PRAVACHOL) 40 MG tablet     Sig: TAKE ONE TABLET BY MOUTH EVERY DAY     Dispense:  30 tablet     Refill:  3    oxybutynin (DITROPAN-XL) 10 MG extended release tablet     Sig: TAKE ONE TABLET BY MOUTH EVERY DAY     Dispense:  30 tablet     Refill:  0    ondansetron (ZOFRAN) 4 MG tablet     Sig: Take 1 tablet by mouth every 8 hours as needed for Nausea     Dispense:  20 tablet     Refill:  0    melatonin 1 MG tablet     Sig: Take 1 tablet by mouth nightly as needed for Sleep     Dispense:  30 tablet     Refill:  2    lisinopril-hydrochlorothiazide (PRINZIDE;ZESTORETIC) 10-12.5 MG per tablet     Sig: Take 1 tablet by mouth daily     Dispense:  30 tablet     Refill:  3    gabapentin (NEURONTIN) 600 MG tablet     Sig: Take 1 tablet by mouth 3 times daily for 90 days. Dispense:  90 tablet     Refill:  2    dicyclomine (BENTYL) 10 MG capsule     Sig: Take 1 capsule by mouth 3 times daily as needed (stomach cramps)     Dispense:  15 capsule     Refill:  0       Return in about 3 months (around 12/7/2019) for check up.     YULY Perdomo - CNP

## 2019-09-09 LAB — URINE CULTURE, ROUTINE: NORMAL

## 2019-09-10 ENCOUNTER — APPOINTMENT (OUTPATIENT)
Dept: ULTRASOUND IMAGING | Age: 74
End: 2019-09-10
Payer: MEDICARE

## 2019-09-10 ENCOUNTER — APPOINTMENT (OUTPATIENT)
Dept: GENERAL RADIOLOGY | Age: 74
End: 2019-09-10
Payer: MEDICARE

## 2019-09-10 ENCOUNTER — HOSPITAL ENCOUNTER (EMERGENCY)
Age: 74
Discharge: HOME | End: 2019-09-10
Attending: EMERGENCY MEDICINE
Payer: MEDICARE

## 2019-09-10 VITALS
SYSTOLIC BLOOD PRESSURE: 113 MMHG | OXYGEN SATURATION: 96 % | DIASTOLIC BLOOD PRESSURE: 82 MMHG | HEIGHT: 59 IN | TEMPERATURE: 98.9 F | WEIGHT: 140 LBS | HEART RATE: 51 BPM | BODY MASS INDEX: 28.22 KG/M2 | RESPIRATION RATE: 20 BRPM

## 2019-09-10 DIAGNOSIS — E86.0 DEHYDRATION: ICD-10-CM

## 2019-09-10 DIAGNOSIS — D72.829 LEUKOCYTOSIS, UNSPECIFIED TYPE: ICD-10-CM

## 2019-09-10 DIAGNOSIS — S82.891A CLOSED FRACTURE OF RIGHT ANKLE, INITIAL ENCOUNTER: Primary | ICD-10-CM

## 2019-09-10 LAB
ANION GAP SERPL CALCULATED.3IONS-SCNC: 14 MEQ/L (ref 9–15)
BASOPHILS ABSOLUTE: 0.1 K/UL (ref 0–0.2)
BASOPHILS RELATIVE PERCENT: 0.9 %
BUN BLDV-MCNC: 44 MG/DL (ref 8–23)
CALCIUM SERPL-MCNC: 9.6 MG/DL (ref 8.5–9.9)
CHLORIDE BLD-SCNC: 104 MEQ/L (ref 95–107)
CO2: 23 MEQ/L (ref 20–31)
CREAT SERPL-MCNC: 2.02 MG/DL (ref 0.5–0.9)
EOSINOPHILS ABSOLUTE: 0.4 K/UL (ref 0–0.7)
EOSINOPHILS RELATIVE PERCENT: 5.2 %
GFR AFRICAN AMERICAN: 29
GFR NON-AFRICAN AMERICAN: 23.9
GLUCOSE BLD-MCNC: 96 MG/DL (ref 70–99)
HCT VFR BLD CALC: 37.9 % (ref 37–47)
HEMOGLOBIN: 12.3 G/DL (ref 12–16)
LYMPHOCYTES ABSOLUTE: 1.7 K/UL (ref 1–4.8)
LYMPHOCYTES RELATIVE PERCENT: 21 %
MCH RBC QN AUTO: 30.9 PG (ref 27–31.3)
MCHC RBC AUTO-ENTMCNC: 32.4 % (ref 33–37)
MCV RBC AUTO: 95.6 FL (ref 82–100)
MONOCYTES ABSOLUTE: 0.6 K/UL (ref 0.2–0.8)
MONOCYTES RELATIVE PERCENT: 6.7 %
NEUTROPHILS ABSOLUTE: 5.5 K/UL (ref 1.4–6.5)
NEUTROPHILS RELATIVE PERCENT: 66.2 %
PDW BLD-RTO: 14.7 % (ref 11.5–14.5)
PLATELET # BLD: 231 K/UL (ref 130–400)
POTASSIUM SERPL-SCNC: 5 MEQ/L (ref 3.4–4.9)
RBC # BLD: 3.97 M/UL (ref 4.2–5.4)
SODIUM BLD-SCNC: 141 MEQ/L (ref 135–144)
WBC # BLD: 8.3 K/UL (ref 4.8–10.8)

## 2019-09-10 PROCEDURE — 6360000002 HC RX W HCPCS: Performed by: EMERGENCY MEDICINE

## 2019-09-10 PROCEDURE — 6370000000 HC RX 637 (ALT 250 FOR IP): Performed by: EMERGENCY MEDICINE

## 2019-09-10 PROCEDURE — 96372 THER/PROPH/DIAG INJ SC/IM: CPT

## 2019-09-10 PROCEDURE — 73610 X-RAY EXAM OF ANKLE: CPT

## 2019-09-10 PROCEDURE — 99283 EMERGENCY DEPT VISIT LOW MDM: CPT

## 2019-09-10 PROCEDURE — 93971 EXTREMITY STUDY: CPT

## 2019-09-10 RX ORDER — OXYCODONE HYDROCHLORIDE AND ACETAMINOPHEN 5; 325 MG/1; MG/1
1 TABLET ORAL ONCE
Status: COMPLETED | OUTPATIENT
Start: 2019-09-10 | End: 2019-09-10

## 2019-09-10 RX ORDER — TRAMADOL HYDROCHLORIDE 50 MG/1
50 TABLET ORAL EVERY 4 HOURS PRN
Qty: 18 TABLET | Refills: 0 | Status: SHIPPED | OUTPATIENT
Start: 2019-09-10 | End: 2019-09-13

## 2019-09-10 RX ORDER — KETOROLAC TROMETHAMINE 30 MG/ML
30 INJECTION, SOLUTION INTRAMUSCULAR; INTRAVENOUS ONCE
Status: COMPLETED | OUTPATIENT
Start: 2019-09-10 | End: 2019-09-10

## 2019-09-10 RX ORDER — PREDNISONE 20 MG/1
40 TABLET ORAL DAILY
Qty: 10 TABLET | Refills: 0 | Status: SHIPPED | OUTPATIENT
Start: 2019-09-10 | End: 2019-09-15

## 2019-09-10 RX ADMIN — OXYCODONE HYDROCHLORIDE AND ACETAMINOPHEN 1 TABLET: 5; 325 TABLET ORAL at 17:50

## 2019-09-10 RX ADMIN — KETOROLAC TROMETHAMINE 30 MG: 30 INJECTION, SOLUTION INTRAMUSCULAR at 17:50

## 2019-09-10 ASSESSMENT — ENCOUNTER SYMPTOMS
COUGH: 0
ABDOMINAL PAIN: 0
NAUSEA: 0
BACK PAIN: 0
DIARRHEA: 0
SHORTNESS OF BREATH: 0
VOMITING: 0
SORE THROAT: 0

## 2019-09-10 ASSESSMENT — PAIN SCALES - GENERAL
PAINLEVEL_OUTOF10: 1
PAINLEVEL_OUTOF10: 0

## 2019-09-10 NOTE — ED PROVIDER NOTES
[address] [facility] ED  eMERGENCYdEPARTMENT eNCOUnter      Pt Name: Trav Silvestre  MRN: [id number]  [place] 1945  Date of evaluation: 9/10/2019  Maxi Cervantes MD    CHIEF COMPLAINT           HPI  Trav Silvestre is a 66 y.o. female per chart review has a h/o COPD, OA, HTN, Hpl, depression/anxiety presents to the ED with R ankle pain, swelling. Pt notes gradual onset, moderate, constant, aching, R ankle pain and swelling x 2 days. Pt denies fall, trauma, cp, sob, dysuria, diarrhea. ROS  Review of Systems   Constitutional: Negative for activity change, chills and fever. HENT: Negative for ear pain and sore throat. Eyes: Negative for visual disturbance. Respiratory: Negative for cough and shortness of breath. Cardiovascular: Negative for chest pain, palpitations and leg swelling. Gastrointestinal: Negative for abdominal pain, diarrhea, nausea and vomiting. Genitourinary: Negative for dysuria. Musculoskeletal: Negative for back pain. R ankle pain, swelling   Skin: Negative for rash. Neurological: Negative for dizziness and weakness. Except as noted above the remainder of the review of systems was reviewed and negative.        PAST MEDICAL HISTORY     Past Medical History:   Diagnosis Date    Anxiety     Arthritis     COPD (chronic obstructive pulmonary disease) ([address]     Generalized osteoarthrosis, unspecified site 12/27/2002    Hyperlipidemia     Hypertension     Major depressive disorder, single episode, moderate ([address] 12/27/2002    Morbid obesity ([address] 12/27/2002    OAB (overactive bladder)     Osteoporosis          SURGICAL HISTORY       Past Surgical History:   Procedure Laterality Date    CARDIAC CATHETERIZATION      CARPAL TUNNEL RELEASE      COLONOSCOPY      HYSTERECTOMY, TOTAL ABDOMINAL      KNEE ARTHROPLASTY Bilateral     [place] COLON CA SCRN NOT  [address]  IND N/A 6/22/2017    COLONOSCOPY performed by Caro Cardoza MD at [facility] warm and dry. Psychiatric: She has a normal mood and affect. Nursing note and vitals reviewed. MDM  65 yo female presents to the ED with R ankle pain, swelling. Pt is afebrile, hemodynamically stable. Pt given PO percocet, IM toradol with moderate relief. XR shows mild DJD. US negative. Suspect gout vs pseudogout. Pt educated about the results, given prescription for prednisone, tramadol. Pt given ankle pain/swelling warning signs and will f/u with pcp. FINAL IMPRESSION      1.  Closed fracture of right ankle, initial encounter          DISPOSITION/PLAN   DISPOSITION Decision To Discharge 09/10/2019 06:58:52 PM        DISCHARGE MEDICATIONS:  [unfilled]         Joon Watters MD(electronically signed)  Attending Emergency Physician            Joon Watters MD  09/10/19 7813

## 2019-09-12 ENCOUNTER — TELEPHONE (OUTPATIENT)
Dept: FAMILY MEDICINE CLINIC | Age: 74
End: 2019-09-12

## 2019-09-12 DIAGNOSIS — N17.9 AKI (ACUTE KIDNEY INJURY) (HCC): Primary | ICD-10-CM

## 2019-09-25 DIAGNOSIS — N17.9 AKI (ACUTE KIDNEY INJURY) (HCC): ICD-10-CM

## 2019-09-25 LAB
ANION GAP SERPL CALCULATED.3IONS-SCNC: 14 MEQ/L (ref 9–15)
BUN BLDV-MCNC: 38 MG/DL (ref 8–23)
CALCIUM SERPL-MCNC: 9.8 MG/DL (ref 8.5–9.9)
CHLORIDE BLD-SCNC: 103 MEQ/L (ref 95–107)
CO2: 23 MEQ/L (ref 20–31)
CREAT SERPL-MCNC: 2.1 MG/DL (ref 0.5–0.9)
GFR AFRICAN AMERICAN: 27.7
GFR NON-AFRICAN AMERICAN: 22.9
GLUCOSE BLD-MCNC: 80 MG/DL (ref 70–99)
POTASSIUM SERPL-SCNC: 5.3 MEQ/L (ref 3.4–4.9)
SODIUM BLD-SCNC: 140 MEQ/L (ref 135–144)

## 2019-09-26 DIAGNOSIS — N17.9 AKI (ACUTE KIDNEY INJURY) (HCC): Primary | ICD-10-CM

## 2019-10-01 ENCOUNTER — APPOINTMENT (OUTPATIENT)
Dept: CT IMAGING | Age: 74
DRG: 682 | End: 2019-10-01
Payer: MEDICARE

## 2019-10-01 ENCOUNTER — HOSPITAL ENCOUNTER (INPATIENT)
Age: 74
LOS: 7 days | Discharge: HOME HEALTH | DRG: 682 | End: 2019-10-08
Attending: STUDENT IN AN ORGANIZED HEALTH CARE EDUCATION/TRAINING PROGRAM | Admitting: INTERNAL MEDICINE
Payer: MEDICARE

## 2019-10-01 ENCOUNTER — APPOINTMENT (OUTPATIENT)
Dept: GENERAL RADIOLOGY | Age: 74
DRG: 682 | End: 2019-10-01
Payer: MEDICARE

## 2019-10-01 ENCOUNTER — OFFICE VISIT (OUTPATIENT)
Dept: FAMILY MEDICINE CLINIC | Age: 74
End: 2019-10-01
Payer: MEDICARE

## 2019-10-01 VITALS
SYSTOLIC BLOOD PRESSURE: 71 MMHG | HEART RATE: 59 BPM | WEIGHT: 138 LBS | DIASTOLIC BLOOD PRESSURE: 40 MMHG | BODY MASS INDEX: 31.94 KG/M2 | HEIGHT: 55 IN

## 2019-10-01 DIAGNOSIS — R42 DIZZINESS: ICD-10-CM

## 2019-10-01 DIAGNOSIS — N17.9 AKI (ACUTE KIDNEY INJURY) (HCC): ICD-10-CM

## 2019-10-01 DIAGNOSIS — I95.9 HYPOTENSION, UNSPECIFIED HYPOTENSION TYPE: ICD-10-CM

## 2019-10-01 DIAGNOSIS — R40.4 TRANSIENT ALTERATION OF AWARENESS: ICD-10-CM

## 2019-10-01 DIAGNOSIS — N17.9 ACUTE RENAL FAILURE SUPERIMPOSED ON CHRONIC KIDNEY DISEASE, UNSPECIFIED CKD STAGE, UNSPECIFIED ACUTE RENAL FAILURE TYPE (HCC): ICD-10-CM

## 2019-10-01 DIAGNOSIS — I95.9 HYPOTENSION, UNSPECIFIED HYPOTENSION TYPE: Primary | ICD-10-CM

## 2019-10-01 DIAGNOSIS — A41.9 SEPTICEMIA (HCC): Primary | ICD-10-CM

## 2019-10-01 DIAGNOSIS — R41.0 CONFUSION: ICD-10-CM

## 2019-10-01 DIAGNOSIS — N18.9 ACUTE RENAL FAILURE SUPERIMPOSED ON CHRONIC KIDNEY DISEASE, UNSPECIFIED CKD STAGE, UNSPECIFIED ACUTE RENAL FAILURE TYPE (HCC): ICD-10-CM

## 2019-10-01 DIAGNOSIS — R09.02 HYPOXIA: ICD-10-CM

## 2019-10-01 LAB
ALBUMIN SERPL-MCNC: 3.7 G/DL (ref 3.5–4.6)
ALP BLD-CCNC: 70 U/L (ref 40–130)
ALT SERPL-CCNC: 11 U/L (ref 0–33)
ANION GAP SERPL CALCULATED.3IONS-SCNC: 13 MEQ/L (ref 9–15)
APTT: 30.6 SEC (ref 24.4–36.8)
AST SERPL-CCNC: 20 U/L (ref 0–35)
BACTERIA: NEGATIVE /HPF
BASE EXCESS ARTERIAL: -11 (ref -3–3)
BASE EXCESS ARTERIAL: -12 (ref -3–3)
BASOPHILS ABSOLUTE: 0.1 K/UL (ref 0–0.2)
BASOPHILS RELATIVE PERCENT: 0.7 %
BILIRUB SERPL-MCNC: 0.4 MG/DL (ref 0.2–0.7)
BILIRUBIN URINE: NEGATIVE
BLOOD, URINE: NEGATIVE
BUN BLDV-MCNC: 60 MG/DL (ref 8–23)
C-REACTIVE PROTEIN, HIGH SENSITIVITY: 80.8 MG/L (ref 0–5)
CALCIUM IONIZED: 1.04 MMOL/L (ref 1.12–1.32)
CALCIUM IONIZED: 1.12 MMOL/L (ref 1.12–1.32)
CALCIUM SERPL-MCNC: 9.8 MG/DL (ref 8.5–9.9)
CHLORIDE BLD-SCNC: 99 MEQ/L (ref 95–107)
CLARITY: CLEAR
CO2: 26 MEQ/L (ref 20–31)
COLOR: YELLOW
CREAT SERPL-MCNC: 3.57 MG/DL (ref 0.5–0.9)
EOSINOPHILS ABSOLUTE: 0.6 K/UL (ref 0–0.7)
EOSINOPHILS RELATIVE PERCENT: 5.7 %
EPITHELIAL CELLS, UA: NORMAL /HPF (ref 0–5)
GFR AFRICAN AMERICAN: 15
GFR AFRICAN AMERICAN: 26
GFR AFRICAN AMERICAN: 28
GFR NON-AFRICAN AMERICAN: 12.4
GFR NON-AFRICAN AMERICAN: 22
GFR NON-AFRICAN AMERICAN: 23
GLOBULIN: 3 G/DL (ref 2.3–3.5)
GLUCOSE BLD-MCNC: 72 MG/DL (ref 60–115)
GLUCOSE BLD-MCNC: 86 MG/DL (ref 70–99)
GLUCOSE BLD-MCNC: 90 MG/DL (ref 60–115)
GLUCOSE URINE: NEGATIVE MG/DL
HCO3 ARTERIAL: 13.4 MMOL/L (ref 21–29)
HCO3 ARTERIAL: 15.7 MMOL/L (ref 21–29)
HCT VFR BLD CALC: 35.9 % (ref 37–47)
HEMOGLOBIN: 10.5 GM/DL (ref 12–16)
HEMOGLOBIN: 11.7 G/DL (ref 12–16)
HEMOGLOBIN: 8.8 GM/DL (ref 12–16)
HYALINE CASTS: NORMAL /HPF (ref 0–5)
INR BLD: 1
KETONES, URINE: NEGATIVE MG/DL
LACTATE: 0.39 MMOL/L (ref 0.4–2)
LACTATE: 0.72 MMOL/L (ref 0.4–2)
LACTIC ACID: 1.3 MMOL/L (ref 0.5–2.2)
LEUKOCYTE ESTERASE, URINE: NEGATIVE
LYMPHOCYTES ABSOLUTE: 1.1 K/UL (ref 1–4.8)
LYMPHOCYTES RELATIVE PERCENT: 11.2 %
MAGNESIUM: 1.9 MG/DL (ref 1.7–2.4)
MCH RBC QN AUTO: 32.3 PG (ref 27–31.3)
MCHC RBC AUTO-ENTMCNC: 32.7 % (ref 33–37)
MCV RBC AUTO: 98.9 FL (ref 82–100)
MONOCYTES ABSOLUTE: 0.6 K/UL (ref 0.2–0.8)
MONOCYTES RELATIVE PERCENT: 5.9 %
NEUTROPHILS ABSOLUTE: 7.6 K/UL (ref 1.4–6.5)
NEUTROPHILS RELATIVE PERCENT: 76.5 %
NITRITE, URINE: NEGATIVE
O2 SAT, ARTERIAL: 96 % (ref 93–100)
O2 SAT, ARTERIAL: 96 % (ref 93–100)
PCO2 ARTERIAL: 25 MM HG (ref 35–45)
PCO2 ARTERIAL: 31 MM HG (ref 35–45)
PDW BLD-RTO: 14 % (ref 11.5–14.5)
PERFORMED ON: ABNORMAL
PERFORMED ON: ABNORMAL
PH ARTERIAL: 7.32 (ref 7.35–7.45)
PH ARTERIAL: 7.34 (ref 7.35–7.45)
PH UA: 6 (ref 5–9)
PLATELET # BLD: 196 K/UL (ref 130–400)
PO2 ARTERIAL: 81 MM HG (ref 75–108)
PO2 ARTERIAL: 86 MM HG (ref 75–108)
POC CHLORIDE: 117 MEQ/L (ref 99–110)
POC CHLORIDE: 119 MEQ/L (ref 99–110)
POC CREATININE WHOLE BLOOD: 3.8
POC CREATININE: 2.1 MG/DL (ref 0.6–1.2)
POC CREATININE: 2.2 MG/DL (ref 0.6–1.2)
POC FIO2: 3
POC HEMATOCRIT: 26 % (ref 36–48)
POC HEMATOCRIT: 31 % (ref 36–48)
POC POTASSIUM: 4.3 MEQ/L (ref 3.5–5.1)
POC POTASSIUM: 4.5 MEQ/L (ref 3.5–5.1)
POC SAMPLE TYPE: ABNORMAL
POC SAMPLE TYPE: ABNORMAL
POC SODIUM: 144 MEQ/L (ref 136–145)
POC SODIUM: 148 MEQ/L (ref 136–145)
POTASSIUM SERPL-SCNC: 5.8 MEQ/L (ref 3.4–4.9)
PRO-BNP: 650 PG/ML
PROTEIN UA: 30 MG/DL
PROTHROMBIN TIME: 13.9 SEC (ref 12.3–14.9)
RBC # BLD: 3.63 M/UL (ref 4.2–5.4)
RBC UA: NORMAL /HPF (ref 0–5)
SODIUM BLD-SCNC: 138 MEQ/L (ref 135–144)
SPECIFIC GRAVITY UA: 1.02 (ref 1–1.03)
TCO2 ARTERIAL: 14 (ref 22–29)
TCO2 ARTERIAL: 17 (ref 22–29)
TOTAL CK: 40 U/L (ref 0–170)
TOTAL PROTEIN: 6.7 G/DL (ref 6.3–8)
TROPONIN: <0.01 NG/ML (ref 0–0.01)
TSH SERPL DL<=0.05 MIU/L-ACNC: 1.11 UIU/ML (ref 0.44–3.86)
URINE REFLEX TO CULTURE: YES
UROBILINOGEN, URINE: 1 E.U./DL
WBC # BLD: 9.9 K/UL (ref 4.8–10.8)
WBC UA: NORMAL /HPF (ref 0–5)

## 2019-10-01 PROCEDURE — 2500000003 HC RX 250 WO HCPCS: Performed by: STUDENT IN AN ORGANIZED HEALTH CARE EDUCATION/TRAINING PROGRAM

## 2019-10-01 PROCEDURE — 70450 CT HEAD/BRAIN W/O DYE: CPT

## 2019-10-01 PROCEDURE — 02HV33Z INSERTION OF INFUSION DEVICE INTO SUPERIOR VENA CAVA, PERCUTANEOUS APPROACH: ICD-10-PCS | Performed by: INTERNAL MEDICINE

## 2019-10-01 PROCEDURE — 6360000002 HC RX W HCPCS: Performed by: STUDENT IN AN ORGANIZED HEALTH CARE EDUCATION/TRAINING PROGRAM

## 2019-10-01 PROCEDURE — 2580000003 HC RX 258: Performed by: STUDENT IN AN ORGANIZED HEALTH CARE EDUCATION/TRAINING PROGRAM

## 2019-10-01 PROCEDURE — 87086 URINE CULTURE/COLONY COUNT: CPT

## 2019-10-01 PROCEDURE — 84132 ASSAY OF SERUM POTASSIUM: CPT

## 2019-10-01 PROCEDURE — 84484 ASSAY OF TROPONIN QUANT: CPT

## 2019-10-01 PROCEDURE — 83880 ASSAY OF NATRIURETIC PEPTIDE: CPT

## 2019-10-01 PROCEDURE — 6370000000 HC RX 637 (ALT 250 FOR IP): Performed by: INTERNAL MEDICINE

## 2019-10-01 PROCEDURE — 82435 ASSAY OF BLOOD CHLORIDE: CPT

## 2019-10-01 PROCEDURE — 82803 BLOOD GASES ANY COMBINATION: CPT

## 2019-10-01 PROCEDURE — 99285 EMERGENCY DEPT VISIT HI MDM: CPT

## 2019-10-01 PROCEDURE — 36415 COLL VENOUS BLD VENIPUNCTURE: CPT

## 2019-10-01 PROCEDURE — 82550 ASSAY OF CK (CPK): CPT

## 2019-10-01 PROCEDURE — 87040 BLOOD CULTURE FOR BACTERIA: CPT

## 2019-10-01 PROCEDURE — 86141 C-REACTIVE PROTEIN HS: CPT

## 2019-10-01 PROCEDURE — 84295 ASSAY OF SERUM SODIUM: CPT

## 2019-10-01 PROCEDURE — 82330 ASSAY OF CALCIUM: CPT

## 2019-10-01 PROCEDURE — 71045 X-RAY EXAM CHEST 1 VIEW: CPT

## 2019-10-01 PROCEDURE — 85730 THROMBOPLASTIN TIME PARTIAL: CPT

## 2019-10-01 PROCEDURE — 74150 CT ABDOMEN W/O CONTRAST: CPT

## 2019-10-01 PROCEDURE — 83735 ASSAY OF MAGNESIUM: CPT

## 2019-10-01 PROCEDURE — 36600 WITHDRAWAL OF ARTERIAL BLOOD: CPT

## 2019-10-01 PROCEDURE — 80053 COMPREHEN METABOLIC PANEL: CPT

## 2019-10-01 PROCEDURE — 36556 INSERT NON-TUNNEL CV CATH: CPT

## 2019-10-01 PROCEDURE — 81003 URINALYSIS AUTO W/O SCOPE: CPT

## 2019-10-01 PROCEDURE — 85610 PROTHROMBIN TIME: CPT

## 2019-10-01 PROCEDURE — 2000000000 HC ICU R&B

## 2019-10-01 PROCEDURE — 99213 OFFICE O/P EST LOW 20 MIN: CPT | Performed by: NURSE PRACTITIONER

## 2019-10-01 PROCEDURE — 85025 COMPLETE CBC W/AUTO DIFF WBC: CPT

## 2019-10-01 PROCEDURE — 96365 THER/PROPH/DIAG IV INF INIT: CPT

## 2019-10-01 PROCEDURE — 83605 ASSAY OF LACTIC ACID: CPT

## 2019-10-01 PROCEDURE — 51701 INSERT BLADDER CATHETER: CPT

## 2019-10-01 PROCEDURE — 2580000003 HC RX 258: Performed by: INTERNAL MEDICINE

## 2019-10-01 PROCEDURE — 84443 ASSAY THYROID STIM HORMONE: CPT

## 2019-10-01 PROCEDURE — 85014 HEMATOCRIT: CPT

## 2019-10-01 PROCEDURE — 82565 ASSAY OF CREATININE: CPT

## 2019-10-01 RX ORDER — 0.9 % SODIUM CHLORIDE 0.9 %
1000 INTRAVENOUS SOLUTION INTRAVENOUS ONCE
Status: COMPLETED | OUTPATIENT
Start: 2019-10-01 | End: 2019-10-01

## 2019-10-01 RX ORDER — SODIUM CHLORIDE 0.9 % (FLUSH) 0.9 %
10 SYRINGE (ML) INJECTION PRN
Status: DISCONTINUED | OUTPATIENT
Start: 2019-10-01 | End: 2019-10-08 | Stop reason: HOSPADM

## 2019-10-01 RX ORDER — SODIUM CHLORIDE 0.9 % (FLUSH) 0.9 %
10 SYRINGE (ML) INJECTION EVERY 12 HOURS SCHEDULED
Status: DISCONTINUED | OUTPATIENT
Start: 2019-10-01 | End: 2019-10-08 | Stop reason: HOSPADM

## 2019-10-01 RX ORDER — SERTRALINE HYDROCHLORIDE 100 MG/1
100 TABLET, FILM COATED ORAL DAILY
COMMUNITY
End: 2019-10-29 | Stop reason: ALTCHOICE

## 2019-10-01 RX ORDER — SODIUM CHLORIDE 9 MG/ML
INJECTION, SOLUTION INTRAVENOUS CONTINUOUS
Status: DISCONTINUED | OUTPATIENT
Start: 2019-10-01 | End: 2019-10-03

## 2019-10-01 RX ORDER — GABAPENTIN 400 MG/1
CAPSULE ORAL
Refills: 3 | COMMUNITY
Start: 2019-09-10 | End: 2020-04-21 | Stop reason: SDUPTHER

## 2019-10-01 RX ORDER — IPRATROPIUM BROMIDE AND ALBUTEROL SULFATE 2.5; .5 MG/3ML; MG/3ML
1 SOLUTION RESPIRATORY (INHALATION) EVERY 4 HOURS PRN
Status: DISCONTINUED | OUTPATIENT
Start: 2019-10-01 | End: 2019-10-08 | Stop reason: HOSPADM

## 2019-10-01 RX ORDER — SODIUM POLYSTYRENE SULFONATE 15 G/60ML
15 SUSPENSION ORAL; RECTAL ONCE
Status: COMPLETED | OUTPATIENT
Start: 2019-10-01 | End: 2019-10-01

## 2019-10-01 RX ORDER — OMEPRAZOLE 20 MG/1
20 CAPSULE, DELAYED RELEASE ORAL DAILY
COMMUNITY
End: 2019-10-29 | Stop reason: ALTCHOICE

## 2019-10-01 RX ORDER — SODIUM CHLORIDE 9 MG/ML
INJECTION, SOLUTION INTRAVENOUS
Status: DISPENSED
Start: 2019-10-01 | End: 2019-10-02

## 2019-10-01 RX ORDER — ACETAMINOPHEN 325 MG/1
650 TABLET ORAL EVERY 4 HOURS PRN
Status: DISCONTINUED | OUTPATIENT
Start: 2019-10-01 | End: 2019-10-08 | Stop reason: HOSPADM

## 2019-10-01 RX ORDER — ONDANSETRON 2 MG/ML
4 INJECTION INTRAMUSCULAR; INTRAVENOUS EVERY 6 HOURS PRN
Status: DISCONTINUED | OUTPATIENT
Start: 2019-10-01 | End: 2019-10-08 | Stop reason: HOSPADM

## 2019-10-01 RX ADMIN — CEFTRIAXONE SODIUM 1 G: 1 INJECTION, POWDER, FOR SOLUTION INTRAMUSCULAR; INTRAVENOUS at 18:47

## 2019-10-01 RX ADMIN — SODIUM CHLORIDE 1000 ML: 9 INJECTION, SOLUTION INTRAVENOUS at 18:52

## 2019-10-01 RX ADMIN — SODIUM CHLORIDE 1000 ML: 9 INJECTION, SOLUTION INTRAVENOUS at 18:35

## 2019-10-01 RX ADMIN — NOREPINEPHRINE BITARTRATE 2 MCG/MIN: 1 INJECTION INTRAVENOUS at 18:56

## 2019-10-01 RX ADMIN — Medication 10 ML: at 22:40

## 2019-10-01 RX ADMIN — SODIUM POLYSTYRENE SULFONATE 15 G: 15 SUSPENSION ORAL; RECTAL at 22:31

## 2019-10-01 RX ADMIN — SODIUM CHLORIDE 1000 ML: 9 INJECTION, SOLUTION INTRAVENOUS at 18:36

## 2019-10-01 RX ADMIN — SODIUM CHLORIDE 1000 ML: 9 INJECTION, SOLUTION INTRAVENOUS at 18:37

## 2019-10-01 RX ADMIN — SODIUM CHLORIDE 1000 ML: 9 INJECTION, SOLUTION INTRAVENOUS at 16:54

## 2019-10-01 RX ADMIN — SODIUM CHLORIDE: 9 INJECTION, SOLUTION INTRAVENOUS at 22:32

## 2019-10-01 RX ADMIN — SODIUM CHLORIDE 1000 ML: 9 INJECTION, SOLUTION INTRAVENOUS at 18:47

## 2019-10-01 ASSESSMENT — PAIN SCALES - GENERAL
PAINLEVEL_OUTOF10: 0

## 2019-10-01 ASSESSMENT — ENCOUNTER SYMPTOMS
DIARRHEA: 0
COUGH: 0
VOMITING: 1
NAUSEA: 1
TROUBLE SWALLOWING: 0
CHEST TIGHTNESS: 0
ABDOMINAL PAIN: 1
SHORTNESS OF BREATH: 0
VOMITING: 1
SHORTNESS OF BREATH: 0
COUGH: 0
SINUS PRESSURE: 0
BACK PAIN: 0

## 2019-10-02 ENCOUNTER — APPOINTMENT (OUTPATIENT)
Dept: ULTRASOUND IMAGING | Age: 74
DRG: 682 | End: 2019-10-02
Payer: MEDICARE

## 2019-10-02 LAB
ALBUMIN SERPL-MCNC: 2.8 G/DL (ref 3.5–4.6)
ALP BLD-CCNC: 99 U/L (ref 40–130)
ALT SERPL-CCNC: 24 U/L (ref 0–33)
ANION GAP SERPL CALCULATED.3IONS-SCNC: 13 MEQ/L (ref 9–15)
ANION GAP SERPL CALCULATED.3IONS-SCNC: 15 MEQ/L (ref 9–15)
AST SERPL-CCNC: 35 U/L (ref 0–35)
BILIRUB SERPL-MCNC: <0.2 MG/DL (ref 0.2–0.7)
BUN BLDV-MCNC: 30 MG/DL (ref 8–23)
BUN BLDV-MCNC: 36 MG/DL (ref 8–23)
C-REACTIVE PROTEIN, HIGH SENSITIVITY: 68.4 MG/L (ref 0–5)
CALCIUM SERPL-MCNC: 7.8 MG/DL (ref 8.5–9.9)
CALCIUM SERPL-MCNC: 8.4 MG/DL (ref 8.5–9.9)
CHLORIDE BLD-SCNC: 112 MEQ/L (ref 95–107)
CHLORIDE BLD-SCNC: 114 MEQ/L (ref 95–107)
CO2: 15 MEQ/L (ref 20–31)
CO2: 18 MEQ/L (ref 20–31)
CREAT SERPL-MCNC: 1.55 MG/DL (ref 0.5–0.9)
CREAT SERPL-MCNC: 2.01 MG/DL (ref 0.5–0.9)
FOLATE: 7.3 NG/ML (ref 7.3–26.1)
GFR AFRICAN AMERICAN: 29.1
GFR AFRICAN AMERICAN: 39.3
GFR NON-AFRICAN AMERICAN: 24.1
GFR NON-AFRICAN AMERICAN: 32.5
GLOBULIN: 2.6 G/DL (ref 2.3–3.5)
GLUCOSE BLD-MCNC: 62 MG/DL (ref 70–99)
GLUCOSE BLD-MCNC: 83 MG/DL (ref 70–99)
HCT VFR BLD CALC: 34.1 % (ref 37–47)
HEMOGLOBIN: 11 G/DL (ref 12–16)
MAGNESIUM: 1.6 MG/DL (ref 1.7–2.4)
MCH RBC QN AUTO: 32.3 PG (ref 27–31.3)
MCHC RBC AUTO-ENTMCNC: 32.3 % (ref 33–37)
MCV RBC AUTO: 100 FL (ref 82–100)
PDW BLD-RTO: 14 % (ref 11.5–14.5)
PHOSPHORUS: 3.1 MG/DL (ref 2.3–4.8)
PLATELET # BLD: 187 K/UL (ref 130–400)
POTASSIUM REFLEX MAGNESIUM: 4.7 MEQ/L (ref 3.4–4.9)
POTASSIUM SERPL-SCNC: 4.6 MEQ/L (ref 3.4–4.9)
PROCALCITONIN: 0.08 NG/ML (ref 0–0.15)
RBC # BLD: 3.41 M/UL (ref 4.2–5.4)
SODIUM BLD-SCNC: 143 MEQ/L (ref 135–144)
SODIUM BLD-SCNC: 144 MEQ/L (ref 135–144)
TOTAL PROTEIN: 5.4 G/DL (ref 6.3–8)
VITAMIN B-12: 1874 PG/ML (ref 232–1245)
VITAMIN D 25-HYDROXY: 15.1 NG/ML (ref 30–100)
WBC # BLD: 9.5 K/UL (ref 4.8–10.8)

## 2019-10-02 PROCEDURE — 84100 ASSAY OF PHOSPHORUS: CPT

## 2019-10-02 PROCEDURE — 76775 US EXAM ABDO BACK WALL LIM: CPT

## 2019-10-02 PROCEDURE — 82306 VITAMIN D 25 HYDROXY: CPT

## 2019-10-02 PROCEDURE — 2580000003 HC RX 258: Performed by: INTERNAL MEDICINE

## 2019-10-02 PROCEDURE — 6370000000 HC RX 637 (ALT 250 FOR IP): Performed by: INTERNAL MEDICINE

## 2019-10-02 PROCEDURE — 6360000002 HC RX W HCPCS: Performed by: INTERNAL MEDICINE

## 2019-10-02 PROCEDURE — 97162 PT EVAL MOD COMPLEX 30 MIN: CPT

## 2019-10-02 PROCEDURE — 1210000000 HC MED SURG R&B

## 2019-10-02 PROCEDURE — 6370000000 HC RX 637 (ALT 250 FOR IP): Performed by: SPECIALIST

## 2019-10-02 PROCEDURE — 6370000000 HC RX 637 (ALT 250 FOR IP): Performed by: NURSE PRACTITIONER

## 2019-10-02 PROCEDURE — 80053 COMPREHEN METABOLIC PANEL: CPT

## 2019-10-02 PROCEDURE — 97166 OT EVAL MOD COMPLEX 45 MIN: CPT

## 2019-10-02 PROCEDURE — 99223 1ST HOSP IP/OBS HIGH 75: CPT | Performed by: INTERNAL MEDICINE

## 2019-10-02 PROCEDURE — 36415 COLL VENOUS BLD VENIPUNCTURE: CPT

## 2019-10-02 PROCEDURE — 83735 ASSAY OF MAGNESIUM: CPT

## 2019-10-02 PROCEDURE — 2700000000 HC OXYGEN THERAPY PER DAY

## 2019-10-02 PROCEDURE — 86141 C-REACTIVE PROTEIN HS: CPT

## 2019-10-02 PROCEDURE — 94664 DEMO&/EVAL PT USE INHALER: CPT

## 2019-10-02 PROCEDURE — 94640 AIRWAY INHALATION TREATMENT: CPT

## 2019-10-02 PROCEDURE — 85027 COMPLETE CBC AUTOMATED: CPT

## 2019-10-02 PROCEDURE — 82746 ASSAY OF FOLIC ACID SERUM: CPT

## 2019-10-02 PROCEDURE — 82607 VITAMIN B-12: CPT

## 2019-10-02 PROCEDURE — 84145 PROCALCITONIN (PCT): CPT

## 2019-10-02 RX ORDER — SERTRALINE HYDROCHLORIDE 100 MG/1
100 TABLET, FILM COATED ORAL DAILY
Status: DISCONTINUED | OUTPATIENT
Start: 2019-10-02 | End: 2019-10-08 | Stop reason: HOSPADM

## 2019-10-02 RX ORDER — LEVOFLOXACIN 5 MG/ML
250 INJECTION, SOLUTION INTRAVENOUS EVERY 24 HOURS
Status: DISCONTINUED | OUTPATIENT
Start: 2019-10-04 | End: 2019-10-03

## 2019-10-02 RX ORDER — DONEPEZIL HYDROCHLORIDE 5 MG/1
5 TABLET, FILM COATED ORAL NIGHTLY
Status: DISCONTINUED | OUTPATIENT
Start: 2019-10-02 | End: 2019-10-08 | Stop reason: HOSPADM

## 2019-10-02 RX ORDER — MAGNESIUM SULFATE IN WATER 40 MG/ML
2 INJECTION, SOLUTION INTRAVENOUS ONCE
Status: COMPLETED | OUTPATIENT
Start: 2019-10-02 | End: 2019-10-02

## 2019-10-02 RX ORDER — BISACODYL 10 MG
10 SUPPOSITORY, RECTAL RECTAL DAILY PRN
Status: DISCONTINUED | OUTPATIENT
Start: 2019-10-02 | End: 2019-10-08 | Stop reason: HOSPADM

## 2019-10-02 RX ORDER — LEVOFLOXACIN 5 MG/ML
500 INJECTION, SOLUTION INTRAVENOUS ONCE
Status: COMPLETED | OUTPATIENT
Start: 2019-10-03 | End: 2019-10-03

## 2019-10-02 RX ORDER — PANTOPRAZOLE SODIUM 40 MG/1
40 TABLET, DELAYED RELEASE ORAL
Status: DISCONTINUED | OUTPATIENT
Start: 2019-10-03 | End: 2019-10-03

## 2019-10-02 RX ORDER — UREA 10 %
1 LOTION (ML) TOPICAL NIGHTLY PRN
Status: DISCONTINUED | OUTPATIENT
Start: 2019-10-02 | End: 2019-10-08 | Stop reason: HOSPADM

## 2019-10-02 RX ORDER — PRAVASTATIN SODIUM 40 MG
40 TABLET ORAL DAILY
Status: DISCONTINUED | OUTPATIENT
Start: 2019-10-02 | End: 2019-10-08 | Stop reason: HOSPADM

## 2019-10-02 RX ORDER — ALPRAZOLAM 0.5 MG/1
0.5 TABLET ORAL ONCE
Status: COMPLETED | OUTPATIENT
Start: 2019-10-02 | End: 2019-10-02

## 2019-10-02 RX ORDER — LEVOFLOXACIN 5 MG/ML
500 INJECTION, SOLUTION INTRAVENOUS EVERY 24 HOURS
Status: DISCONTINUED | OUTPATIENT
Start: 2019-10-03 | End: 2019-10-02

## 2019-10-02 RX ORDER — OXYBUTYNIN CHLORIDE 5 MG/1
10 TABLET, EXTENDED RELEASE ORAL DAILY
Status: DISCONTINUED | OUTPATIENT
Start: 2019-10-02 | End: 2019-10-07

## 2019-10-02 RX ADMIN — BISACODYL 10 MG: 10 SUPPOSITORY RECTAL at 10:12

## 2019-10-02 RX ADMIN — ENOXAPARIN SODIUM 40 MG: 40 INJECTION SUBCUTANEOUS at 10:12

## 2019-10-02 RX ADMIN — ACETAMINOPHEN 650 MG: 325 TABLET ORAL at 22:51

## 2019-10-02 RX ADMIN — Medication 10 ML: at 10:13

## 2019-10-02 RX ADMIN — SODIUM CHLORIDE: 9 INJECTION, SOLUTION INTRAVENOUS at 19:34

## 2019-10-02 RX ADMIN — DONEPEZIL HYDROCHLORIDE 5 MG: 5 TABLET, FILM COATED ORAL at 19:46

## 2019-10-02 RX ADMIN — ONDANSETRON 4 MG: 2 INJECTION INTRAMUSCULAR; INTRAVENOUS at 22:43

## 2019-10-02 RX ADMIN — IPRATROPIUM BROMIDE AND ALBUTEROL SULFATE 1 AMPULE: .5; 3 SOLUTION RESPIRATORY (INHALATION) at 22:44

## 2019-10-02 RX ADMIN — SODIUM CHLORIDE: 9 INJECTION, SOLUTION INTRAVENOUS at 06:42

## 2019-10-02 RX ADMIN — MAGNESIUM SULFATE HEPTAHYDRATE 2 G: 40 INJECTION, SOLUTION INTRAVENOUS at 10:12

## 2019-10-02 RX ADMIN — ALPRAZOLAM 0.5 MG: 0.5 TABLET ORAL at 19:46

## 2019-10-02 ASSESSMENT — PAIN SCALES - GENERAL
PAINLEVEL_OUTOF10: 8
PAINLEVEL_OUTOF10: 0

## 2019-10-03 LAB
ALBUMIN SERPL-MCNC: 2.8 G/DL (ref 3.5–4.6)
ALP BLD-CCNC: 81 U/L (ref 40–130)
ALT SERPL-CCNC: 18 U/L (ref 0–33)
ANION GAP SERPL CALCULATED.3IONS-SCNC: 11 MEQ/L (ref 9–15)
AST SERPL-CCNC: 26 U/L (ref 0–35)
BILIRUB SERPL-MCNC: 0.3 MG/DL (ref 0.2–0.7)
BUN BLDV-MCNC: 17 MG/DL (ref 8–23)
CALCIUM SERPL-MCNC: 8.6 MG/DL (ref 8.5–9.9)
CHLORIDE BLD-SCNC: 113 MEQ/L (ref 95–107)
CO2: 19 MEQ/L (ref 20–31)
CREAT SERPL-MCNC: 1.12 MG/DL (ref 0.5–0.9)
GFR AFRICAN AMERICAN: 14
GFR AFRICAN AMERICAN: 57.2
GFR NON-AFRICAN AMERICAN: 12
GFR NON-AFRICAN AMERICAN: 47.3
GLOBULIN: 2.4 G/DL (ref 2.3–3.5)
GLUCOSE BLD-MCNC: 78 MG/DL (ref 70–99)
HCT VFR BLD CALC: 30.7 % (ref 37–47)
HEMOGLOBIN: 10.2 G/DL (ref 12–16)
MAGNESIUM: 1.7 MG/DL (ref 1.7–2.4)
MCH RBC QN AUTO: 32.7 PG (ref 27–31.3)
MCHC RBC AUTO-ENTMCNC: 33.1 % (ref 33–37)
MCV RBC AUTO: 98.5 FL (ref 82–100)
PDW BLD-RTO: 13.7 % (ref 11.5–14.5)
PERFORMED ON: ABNORMAL
PHOSPHORUS: 2.1 MG/DL (ref 2.3–4.8)
PLATELET # BLD: 182 K/UL (ref 130–400)
POC CREATININE: 3.8 MG/DL (ref 0.6–1.2)
POC SAMPLE TYPE: ABNORMAL
POTASSIUM REFLEX MAGNESIUM: 4.8 MEQ/L (ref 3.4–4.9)
RBC # BLD: 3.12 M/UL (ref 4.2–5.4)
SODIUM BLD-SCNC: 143 MEQ/L (ref 135–144)
TOTAL PROTEIN: 5.2 G/DL (ref 6.3–8)
URINE CULTURE, ROUTINE: NORMAL
WBC # BLD: 8.2 K/UL (ref 4.8–10.8)

## 2019-10-03 PROCEDURE — 1210000000 HC MED SURG R&B

## 2019-10-03 PROCEDURE — 95816 EEG AWAKE AND DROWSY: CPT

## 2019-10-03 PROCEDURE — 85027 COMPLETE CBC AUTOMATED: CPT

## 2019-10-03 PROCEDURE — 6370000000 HC RX 637 (ALT 250 FOR IP): Performed by: INTERNAL MEDICINE

## 2019-10-03 PROCEDURE — 2580000003 HC RX 258: Performed by: INTERNAL MEDICINE

## 2019-10-03 PROCEDURE — 36415 COLL VENOUS BLD VENIPUNCTURE: CPT

## 2019-10-03 PROCEDURE — 6360000002 HC RX W HCPCS: Performed by: HOSPITALIST

## 2019-10-03 PROCEDURE — 2580000003 HC RX 258: Performed by: HOSPITALIST

## 2019-10-03 PROCEDURE — 6360000002 HC RX W HCPCS: Performed by: INTERNAL MEDICINE

## 2019-10-03 PROCEDURE — 83735 ASSAY OF MAGNESIUM: CPT

## 2019-10-03 PROCEDURE — 97535 SELF CARE MNGMENT TRAINING: CPT

## 2019-10-03 PROCEDURE — 6370000000 HC RX 637 (ALT 250 FOR IP): Performed by: SPECIALIST

## 2019-10-03 PROCEDURE — 84100 ASSAY OF PHOSPHORUS: CPT

## 2019-10-03 PROCEDURE — 97116 GAIT TRAINING THERAPY: CPT

## 2019-10-03 PROCEDURE — 80053 COMPREHEN METABOLIC PANEL: CPT

## 2019-10-03 PROCEDURE — 94640 AIRWAY INHALATION TREATMENT: CPT

## 2019-10-03 PROCEDURE — 2700000000 HC OXYGEN THERAPY PER DAY

## 2019-10-03 PROCEDURE — C9113 INJ PANTOPRAZOLE SODIUM, VIA: HCPCS | Performed by: INTERNAL MEDICINE

## 2019-10-03 RX ORDER — SODIUM CHLORIDE, SODIUM LACTATE, POTASSIUM CHLORIDE, CALCIUM CHLORIDE 600; 310; 30; 20 MG/100ML; MG/100ML; MG/100ML; MG/100ML
INJECTION, SOLUTION INTRAVENOUS CONTINUOUS
Status: DISCONTINUED | OUTPATIENT
Start: 2019-10-03 | End: 2019-10-04

## 2019-10-03 RX ORDER — 0.9 % SODIUM CHLORIDE 0.9 %
10 VIAL (ML) INJECTION DAILY
Status: DISCONTINUED | OUTPATIENT
Start: 2019-10-03 | End: 2019-10-08 | Stop reason: HOSPADM

## 2019-10-03 RX ORDER — ERGOCALCIFEROL 1.25 MG/1
50000 CAPSULE ORAL WEEKLY
Status: DISCONTINUED | OUTPATIENT
Start: 2019-10-03 | End: 2019-10-08 | Stop reason: HOSPADM

## 2019-10-03 RX ORDER — PANTOPRAZOLE SODIUM 40 MG/10ML
40 INJECTION, POWDER, LYOPHILIZED, FOR SOLUTION INTRAVENOUS DAILY
Status: DISCONTINUED | OUTPATIENT
Start: 2019-10-03 | End: 2019-10-08

## 2019-10-03 RX ADMIN — DONEPEZIL HYDROCHLORIDE 5 MG: 5 TABLET, FILM COATED ORAL at 20:58

## 2019-10-03 RX ADMIN — SODIUM CHLORIDE, POTASSIUM CHLORIDE, SODIUM LACTATE AND CALCIUM CHLORIDE: 600; 310; 30; 20 INJECTION, SOLUTION INTRAVENOUS at 23:29

## 2019-10-03 RX ADMIN — Medication 10 ML: at 10:08

## 2019-10-03 RX ADMIN — IPRATROPIUM BROMIDE AND ALBUTEROL SULFATE 1 AMPULE: .5; 3 SOLUTION RESPIRATORY (INHALATION) at 16:49

## 2019-10-03 RX ADMIN — Medication 10 ML: at 20:59

## 2019-10-03 RX ADMIN — SERTRALINE 100 MG: 100 TABLET, FILM COATED ORAL at 10:16

## 2019-10-03 RX ADMIN — PANTOPRAZOLE SODIUM 40 MG: 40 INJECTION, POWDER, LYOPHILIZED, FOR SOLUTION INTRAVENOUS at 10:08

## 2019-10-03 RX ADMIN — OXYBUTYNIN CHLORIDE 10 MG: 5 TABLET, EXTENDED RELEASE ORAL at 10:16

## 2019-10-03 RX ADMIN — Medication 1 MG: at 20:58

## 2019-10-03 RX ADMIN — SODIUM CHLORIDE: 9 INJECTION, SOLUTION INTRAVENOUS at 10:07

## 2019-10-03 RX ADMIN — LEVOFLOXACIN 500 MG: 5 INJECTION, SOLUTION INTRAVENOUS at 00:19

## 2019-10-03 RX ADMIN — ENOXAPARIN SODIUM 30 MG: 30 INJECTION SUBCUTANEOUS at 10:08

## 2019-10-03 RX ADMIN — ONDANSETRON 4 MG: 2 INJECTION INTRAMUSCULAR; INTRAVENOUS at 10:53

## 2019-10-03 RX ADMIN — PRAVASTATIN SODIUM 40 MG: 40 TABLET ORAL at 20:58

## 2019-10-03 ASSESSMENT — PAIN SCALES - WONG BAKER: WONGBAKER_NUMERICALRESPONSE: 2

## 2019-10-03 ASSESSMENT — PAIN SCALES - GENERAL
PAINLEVEL_OUTOF10: 8
PAINLEVEL_OUTOF10: 0

## 2019-10-04 ENCOUNTER — APPOINTMENT (OUTPATIENT)
Dept: GENERAL RADIOLOGY | Age: 74
DRG: 682 | End: 2019-10-04
Payer: MEDICARE

## 2019-10-04 LAB
ALBUMIN SERPL-MCNC: 3.1 G/DL (ref 3.5–4.6)
ALP BLD-CCNC: 76 U/L (ref 40–130)
ALT SERPL-CCNC: 19 U/L (ref 0–33)
ANION GAP SERPL CALCULATED.3IONS-SCNC: 14 MEQ/L (ref 9–15)
AST SERPL-CCNC: 38 U/L (ref 0–35)
BILIRUB SERPL-MCNC: 0.3 MG/DL (ref 0.2–0.7)
BUN BLDV-MCNC: 11 MG/DL (ref 8–23)
C-REACTIVE PROTEIN, HIGH SENSITIVITY: 127.7 MG/L (ref 0–5)
CALCIUM SERPL-MCNC: 9.1 MG/DL (ref 8.5–9.9)
CHLORIDE BLD-SCNC: 111 MEQ/L (ref 95–107)
CO2: 18 MEQ/L (ref 20–31)
CREAT SERPL-MCNC: 0.93 MG/DL (ref 0.5–0.9)
GFR AFRICAN AMERICAN: >60
GFR NON-AFRICAN AMERICAN: 58.6
GLOBULIN: 2.6 G/DL (ref 2.3–3.5)
GLUCOSE BLD-MCNC: 100 MG/DL (ref 70–99)
GLUCOSE BLD-MCNC: 96 MG/DL (ref 60–115)
HCT VFR BLD CALC: 33.3 % (ref 37–47)
HEMOGLOBIN: 10.8 G/DL (ref 12–16)
LV EF: 50 %
LVEF MODALITY: NORMAL
MAGNESIUM: 1.4 MG/DL (ref 1.7–2.4)
MCH RBC QN AUTO: 31.8 PG (ref 27–31.3)
MCHC RBC AUTO-ENTMCNC: 32.6 % (ref 33–37)
MCV RBC AUTO: 97.8 FL (ref 82–100)
PDW BLD-RTO: 13.6 % (ref 11.5–14.5)
PERFORMED ON: NORMAL
PHOSPHORUS: 2.6 MG/DL (ref 2.3–4.8)
PLATELET # BLD: 214 K/UL (ref 130–400)
POTASSIUM REFLEX MAGNESIUM: 4.5 MEQ/L (ref 3.4–4.9)
PRO-BNP: NORMAL PG/ML
PROCALCITONIN: 0.1 NG/ML (ref 0–0.15)
RBC # BLD: 3.41 M/UL (ref 4.2–5.4)
SODIUM BLD-SCNC: 143 MEQ/L (ref 135–144)
TOTAL PROTEIN: 5.7 G/DL (ref 6.3–8)
TROPONIN: 0.49 NG/ML (ref 0–0.01)
TROPONIN: 0.64 NG/ML (ref 0–0.01)
TROPONIN: 0.67 NG/ML (ref 0–0.01)
WBC # BLD: 9.5 K/UL (ref 4.8–10.8)

## 2019-10-04 PROCEDURE — 80053 COMPREHEN METABOLIC PANEL: CPT

## 2019-10-04 PROCEDURE — 93010 ELECTROCARDIOGRAM REPORT: CPT | Performed by: INTERNAL MEDICINE

## 2019-10-04 PROCEDURE — 71045 X-RAY EXAM CHEST 1 VIEW: CPT

## 2019-10-04 PROCEDURE — 6370000000 HC RX 637 (ALT 250 FOR IP): Performed by: INTERNAL MEDICINE

## 2019-10-04 PROCEDURE — 83880 ASSAY OF NATRIURETIC PEPTIDE: CPT

## 2019-10-04 PROCEDURE — 86141 C-REACTIVE PROTEIN HS: CPT

## 2019-10-04 PROCEDURE — 6360000002 HC RX W HCPCS: Performed by: INTERNAL MEDICINE

## 2019-10-04 PROCEDURE — 2580000003 HC RX 258: Performed by: INTERNAL MEDICINE

## 2019-10-04 PROCEDURE — 84484 ASSAY OF TROPONIN QUANT: CPT

## 2019-10-04 PROCEDURE — 83735 ASSAY OF MAGNESIUM: CPT

## 2019-10-04 PROCEDURE — 85027 COMPLETE CBC AUTOMATED: CPT

## 2019-10-04 PROCEDURE — C9113 INJ PANTOPRAZOLE SODIUM, VIA: HCPCS | Performed by: INTERNAL MEDICINE

## 2019-10-04 PROCEDURE — 94640 AIRWAY INHALATION TREATMENT: CPT

## 2019-10-04 PROCEDURE — 99222 1ST HOSP IP/OBS MODERATE 55: CPT | Performed by: INTERNAL MEDICINE

## 2019-10-04 PROCEDURE — 99222 1ST HOSP IP/OBS MODERATE 55: CPT | Performed by: SPECIALIST

## 2019-10-04 PROCEDURE — 84145 PROCALCITONIN (PCT): CPT

## 2019-10-04 PROCEDURE — 93005 ELECTROCARDIOGRAM TRACING: CPT | Performed by: INTERNAL MEDICINE

## 2019-10-04 PROCEDURE — 36415 COLL VENOUS BLD VENIPUNCTURE: CPT

## 2019-10-04 PROCEDURE — 2700000000 HC OXYGEN THERAPY PER DAY

## 2019-10-04 PROCEDURE — 2060000000 HC ICU INTERMEDIATE R&B

## 2019-10-04 PROCEDURE — 93306 TTE W/DOPPLER COMPLETE: CPT

## 2019-10-04 PROCEDURE — 6370000000 HC RX 637 (ALT 250 FOR IP): Performed by: SPECIALIST

## 2019-10-04 PROCEDURE — 84100 ASSAY OF PHOSPHORUS: CPT

## 2019-10-04 RX ORDER — MAGNESIUM SULFATE IN WATER 40 MG/ML
4 INJECTION, SOLUTION INTRAVENOUS ONCE
Status: COMPLETED | OUTPATIENT
Start: 2019-10-04 | End: 2019-10-04

## 2019-10-04 RX ORDER — FUROSEMIDE 10 MG/ML
40 INJECTION INTRAMUSCULAR; INTRAVENOUS 2 TIMES DAILY
Status: DISCONTINUED | OUTPATIENT
Start: 2019-10-04 | End: 2019-10-06

## 2019-10-04 RX ORDER — CLOPIDOGREL BISULFATE 75 MG/1
75 TABLET ORAL DAILY
Status: DISCONTINUED | OUTPATIENT
Start: 2019-10-05 | End: 2019-10-08 | Stop reason: HOSPADM

## 2019-10-04 RX ORDER — FUROSEMIDE 10 MG/ML
40 INJECTION INTRAMUSCULAR; INTRAVENOUS ONCE
Status: COMPLETED | OUTPATIENT
Start: 2019-10-04 | End: 2019-10-04

## 2019-10-04 RX ORDER — ISOSORBIDE MONONITRATE 30 MG/1
30 TABLET, EXTENDED RELEASE ORAL DAILY
Status: DISCONTINUED | OUTPATIENT
Start: 2019-10-05 | End: 2019-10-08 | Stop reason: HOSPADM

## 2019-10-04 RX ORDER — ASPIRIN 81 MG/1
324 TABLET, CHEWABLE ORAL ONCE
Status: COMPLETED | OUTPATIENT
Start: 2019-10-04 | End: 2019-10-04

## 2019-10-04 RX ORDER — ALPRAZOLAM 0.25 MG/1
0.25 TABLET ORAL ONCE
Status: COMPLETED | OUTPATIENT
Start: 2019-10-04 | End: 2019-10-04

## 2019-10-04 RX ORDER — METOPROLOL SUCCINATE 25 MG/1
25 TABLET, EXTENDED RELEASE ORAL DAILY
Status: DISCONTINUED | OUTPATIENT
Start: 2019-10-04 | End: 2019-10-08 | Stop reason: HOSPADM

## 2019-10-04 RX ORDER — KETOROLAC TROMETHAMINE 15 MG/ML
15 INJECTION, SOLUTION INTRAMUSCULAR; INTRAVENOUS ONCE
Status: COMPLETED | OUTPATIENT
Start: 2019-10-04 | End: 2019-10-04

## 2019-10-04 RX ADMIN — PRAVASTATIN SODIUM 40 MG: 40 TABLET ORAL at 20:04

## 2019-10-04 RX ADMIN — Medication 10 ML: at 08:01

## 2019-10-04 RX ADMIN — ALPRAZOLAM 0.25 MG: 0.25 TABLET ORAL at 08:00

## 2019-10-04 RX ADMIN — IPRATROPIUM BROMIDE AND ALBUTEROL SULFATE 1 AMPULE: .5; 3 SOLUTION RESPIRATORY (INHALATION) at 01:19

## 2019-10-04 RX ADMIN — ONDANSETRON 4 MG: 2 INJECTION INTRAMUSCULAR; INTRAVENOUS at 18:27

## 2019-10-04 RX ADMIN — ASPIRIN 81 MG 324 MG: 81 TABLET ORAL at 20:03

## 2019-10-04 RX ADMIN — SERTRALINE 100 MG: 100 TABLET, FILM COATED ORAL at 08:00

## 2019-10-04 RX ADMIN — METOPROLOL SUCCINATE 25 MG: 25 TABLET, EXTENDED RELEASE ORAL at 09:52

## 2019-10-04 RX ADMIN — Medication 10 ML: at 20:05

## 2019-10-04 RX ADMIN — Medication 10 ML: at 08:02

## 2019-10-04 RX ADMIN — DONEPEZIL HYDROCHLORIDE 5 MG: 5 TABLET, FILM COATED ORAL at 20:04

## 2019-10-04 RX ADMIN — ONDANSETRON 4 MG: 2 INJECTION INTRAMUSCULAR; INTRAVENOUS at 08:00

## 2019-10-04 RX ADMIN — OXYBUTYNIN CHLORIDE 10 MG: 5 TABLET, EXTENDED RELEASE ORAL at 08:16

## 2019-10-04 RX ADMIN — ENOXAPARIN SODIUM 30 MG: 30 INJECTION SUBCUTANEOUS at 08:00

## 2019-10-04 RX ADMIN — FUROSEMIDE 40 MG: 10 INJECTION, SOLUTION INTRAMUSCULAR; INTRAVENOUS at 20:02

## 2019-10-04 RX ADMIN — KETOROLAC TROMETHAMINE 15 MG: 15 INJECTION, SOLUTION INTRAMUSCULAR; INTRAVENOUS at 20:03

## 2019-10-04 RX ADMIN — VITAMIN D, TAB 1000IU (100/BT) 2000 UNITS: 25 TAB at 23:06

## 2019-10-04 RX ADMIN — FUROSEMIDE 40 MG: 10 INJECTION, SOLUTION INTRAMUSCULAR; INTRAVENOUS at 11:32

## 2019-10-04 RX ADMIN — MAGNESIUM SULFATE HEPTAHYDRATE 4 G: 40 INJECTION, SOLUTION INTRAVENOUS at 13:14

## 2019-10-04 RX ADMIN — PANTOPRAZOLE SODIUM 40 MG: 40 INJECTION, POWDER, LYOPHILIZED, FOR SOLUTION INTRAVENOUS at 07:59

## 2019-10-04 ASSESSMENT — PAIN SCALES - GENERAL
PAINLEVEL_OUTOF10: 0
PAINLEVEL_OUTOF10: 0

## 2019-10-05 PROBLEM — I50.31 ACUTE DIASTOLIC HEART FAILURE (HCC): Status: ACTIVE | Noted: 2019-10-05

## 2019-10-05 PROBLEM — I21.4 NSTEMI (NON-ST ELEVATED MYOCARDIAL INFARCTION) (HCC): Status: ACTIVE | Noted: 2019-10-05

## 2019-10-05 LAB
ALBUMIN SERPL-MCNC: 2.9 G/DL (ref 3.5–4.6)
ALP BLD-CCNC: 69 U/L (ref 40–130)
ALT SERPL-CCNC: 16 U/L (ref 0–33)
ANION GAP SERPL CALCULATED.3IONS-SCNC: 16 MEQ/L (ref 9–15)
AST SERPL-CCNC: 31 U/L (ref 0–35)
BILIRUB SERPL-MCNC: 0.4 MG/DL (ref 0.2–0.7)
BUN BLDV-MCNC: 14 MG/DL (ref 8–23)
CALCIUM SERPL-MCNC: 9.1 MG/DL (ref 8.5–9.9)
CHLORIDE BLD-SCNC: 102 MEQ/L (ref 95–107)
CO2: 26 MEQ/L (ref 20–31)
CREAT SERPL-MCNC: 1.05 MG/DL (ref 0.5–0.9)
EKG ATRIAL RATE: 107 BPM
EKG ATRIAL RATE: 89 BPM
EKG P AXIS: 50 DEGREES
EKG P AXIS: 51 DEGREES
EKG P-R INTERVAL: 100 MS
EKG P-R INTERVAL: 126 MS
EKG Q-T INTERVAL: 334 MS
EKG Q-T INTERVAL: 398 MS
EKG QRS DURATION: 84 MS
EKG QRS DURATION: 94 MS
EKG QTC CALCULATION (BAZETT): 445 MS
EKG QTC CALCULATION (BAZETT): 484 MS
EKG R AXIS: 40 DEGREES
EKG R AXIS: 43 DEGREES
EKG T AXIS: 29 DEGREES
EKG T AXIS: 76 DEGREES
EKG VENTRICULAR RATE: 107 BPM
EKG VENTRICULAR RATE: 89 BPM
GFR AFRICAN AMERICAN: >60
GFR NON-AFRICAN AMERICAN: 50.9
GLOBULIN: 2.7 G/DL (ref 2.3–3.5)
GLUCOSE BLD-MCNC: 82 MG/DL (ref 70–99)
HCT VFR BLD CALC: 33 % (ref 37–47)
HEMOGLOBIN: 11.1 G/DL (ref 12–16)
MAGNESIUM: 2 MG/DL (ref 1.7–2.4)
MCH RBC QN AUTO: 32.4 PG (ref 27–31.3)
MCHC RBC AUTO-ENTMCNC: 33.7 % (ref 33–37)
MCV RBC AUTO: 96.1 FL (ref 82–100)
PDW BLD-RTO: 13.6 % (ref 11.5–14.5)
PHOSPHORUS: 2.6 MG/DL (ref 2.3–4.8)
PLATELET # BLD: 229 K/UL (ref 130–400)
POTASSIUM REFLEX MAGNESIUM: 3.3 MEQ/L (ref 3.4–4.9)
RBC # BLD: 3.43 M/UL (ref 4.2–5.4)
SODIUM BLD-SCNC: 144 MEQ/L (ref 135–144)
TOTAL PROTEIN: 5.6 G/DL (ref 6.3–8)
WBC # BLD: 9 K/UL (ref 4.8–10.8)

## 2019-10-05 PROCEDURE — 2580000003 HC RX 258: Performed by: INTERNAL MEDICINE

## 2019-10-05 PROCEDURE — 85027 COMPLETE CBC AUTOMATED: CPT

## 2019-10-05 PROCEDURE — 51702 INSERT TEMP BLADDER CATH: CPT

## 2019-10-05 PROCEDURE — 36415 COLL VENOUS BLD VENIPUNCTURE: CPT

## 2019-10-05 PROCEDURE — 6360000002 HC RX W HCPCS: Performed by: INTERNAL MEDICINE

## 2019-10-05 PROCEDURE — 6370000000 HC RX 637 (ALT 250 FOR IP): Performed by: SPECIALIST

## 2019-10-05 PROCEDURE — 2060000000 HC ICU INTERMEDIATE R&B

## 2019-10-05 PROCEDURE — C9113 INJ PANTOPRAZOLE SODIUM, VIA: HCPCS | Performed by: INTERNAL MEDICINE

## 2019-10-05 PROCEDURE — 6370000000 HC RX 637 (ALT 250 FOR IP): Performed by: NURSE PRACTITIONER

## 2019-10-05 PROCEDURE — 6370000000 HC RX 637 (ALT 250 FOR IP): Performed by: INTERNAL MEDICINE

## 2019-10-05 PROCEDURE — 80053 COMPREHEN METABOLIC PANEL: CPT

## 2019-10-05 PROCEDURE — 97535 SELF CARE MNGMENT TRAINING: CPT

## 2019-10-05 PROCEDURE — 93005 ELECTROCARDIOGRAM TRACING: CPT | Performed by: NURSE PRACTITIONER

## 2019-10-05 PROCEDURE — 83735 ASSAY OF MAGNESIUM: CPT

## 2019-10-05 PROCEDURE — 2700000000 HC OXYGEN THERAPY PER DAY

## 2019-10-05 PROCEDURE — 84100 ASSAY OF PHOSPHORUS: CPT

## 2019-10-05 RX ORDER — POTASSIUM CHLORIDE 7.45 MG/ML
10 INJECTION INTRAVENOUS PRN
Status: DISCONTINUED | OUTPATIENT
Start: 2019-10-05 | End: 2019-10-08 | Stop reason: HOSPADM

## 2019-10-05 RX ORDER — POTASSIUM CHLORIDE 20 MEQ/1
40 TABLET, EXTENDED RELEASE ORAL PRN
Status: DISCONTINUED | OUTPATIENT
Start: 2019-10-05 | End: 2019-10-08 | Stop reason: HOSPADM

## 2019-10-05 RX ADMIN — DONEPEZIL HYDROCHLORIDE 5 MG: 5 TABLET, FILM COATED ORAL at 20:33

## 2019-10-05 RX ADMIN — Medication 10 ML: at 11:00

## 2019-10-05 RX ADMIN — Medication 10 ML: at 20:34

## 2019-10-05 RX ADMIN — Medication 10 ML: at 10:58

## 2019-10-05 RX ADMIN — Medication 10 ML: at 17:54

## 2019-10-05 RX ADMIN — POTASSIUM CHLORIDE 40 MEQ: 20 TABLET, EXTENDED RELEASE ORAL at 12:28

## 2019-10-05 RX ADMIN — FUROSEMIDE 40 MG: 10 INJECTION, SOLUTION INTRAMUSCULAR; INTRAVENOUS at 17:54

## 2019-10-05 RX ADMIN — ENOXAPARIN SODIUM 30 MG: 30 INJECTION SUBCUTANEOUS at 10:59

## 2019-10-05 RX ADMIN — PRAVASTATIN SODIUM 40 MG: 40 TABLET ORAL at 20:33

## 2019-10-05 RX ADMIN — CLOPIDOGREL BISULFATE 75 MG: 75 TABLET ORAL at 10:58

## 2019-10-05 RX ADMIN — FUROSEMIDE 40 MG: 10 INJECTION, SOLUTION INTRAMUSCULAR; INTRAVENOUS at 10:57

## 2019-10-05 RX ADMIN — VITAMIN D, TAB 1000IU (100/BT) 2000 UNITS: 25 TAB at 12:29

## 2019-10-05 RX ADMIN — SERTRALINE 100 MG: 100 TABLET, FILM COATED ORAL at 10:58

## 2019-10-05 RX ADMIN — PANTOPRAZOLE SODIUM 40 MG: 40 INJECTION, POWDER, LYOPHILIZED, FOR SOLUTION INTRAVENOUS at 10:57

## 2019-10-05 RX ADMIN — OXYBUTYNIN CHLORIDE 10 MG: 5 TABLET, EXTENDED RELEASE ORAL at 12:29

## 2019-10-05 RX ADMIN — ISOSORBIDE MONONITRATE 30 MG: 30 TABLET, EXTENDED RELEASE ORAL at 10:58

## 2019-10-05 RX ADMIN — METOPROLOL SUCCINATE 25 MG: 25 TABLET, EXTENDED RELEASE ORAL at 10:58

## 2019-10-05 ASSESSMENT — PAIN SCALES - GENERAL: PAINLEVEL_OUTOF10: 0

## 2019-10-06 LAB
ALBUMIN SERPL-MCNC: 3 G/DL (ref 3.5–4.6)
ALP BLD-CCNC: 71 U/L (ref 40–130)
ALT SERPL-CCNC: 16 U/L (ref 0–33)
ANION GAP SERPL CALCULATED.3IONS-SCNC: 11 MEQ/L (ref 9–15)
AST SERPL-CCNC: 25 U/L (ref 0–35)
BILIRUB SERPL-MCNC: 0.4 MG/DL (ref 0.2–0.7)
BLOOD CULTURE, ROUTINE: NORMAL
BUN BLDV-MCNC: 23 MG/DL (ref 8–23)
CALCIUM SERPL-MCNC: 9.3 MG/DL (ref 8.5–9.9)
CHLORIDE BLD-SCNC: 99 MEQ/L (ref 95–107)
CO2: 34 MEQ/L (ref 20–31)
CREAT SERPL-MCNC: 1.32 MG/DL (ref 0.5–0.9)
CULTURE, BLOOD 2: NORMAL
GFR AFRICAN AMERICAN: 47.3
GFR NON-AFRICAN AMERICAN: 39.1
GLOBULIN: 2.9 G/DL (ref 2.3–3.5)
GLUCOSE BLD-MCNC: 110 MG/DL (ref 70–99)
HCT VFR BLD CALC: 36.2 % (ref 37–47)
HEMOGLOBIN: 11.9 G/DL (ref 12–16)
MAGNESIUM: 1.8 MG/DL (ref 1.7–2.4)
MCH RBC QN AUTO: 31.6 PG (ref 27–31.3)
MCHC RBC AUTO-ENTMCNC: 32.7 % (ref 33–37)
MCV RBC AUTO: 96.6 FL (ref 82–100)
PDW BLD-RTO: 13.3 % (ref 11.5–14.5)
PHOSPHORUS: 2.3 MG/DL (ref 2.3–4.8)
PLATELET # BLD: 285 K/UL (ref 130–400)
POTASSIUM REFLEX MAGNESIUM: 3.7 MEQ/L (ref 3.4–4.9)
PRO-BNP: NORMAL PG/ML
RBC # BLD: 3.75 M/UL (ref 4.2–5.4)
SODIUM BLD-SCNC: 144 MEQ/L (ref 135–144)
TOTAL PROTEIN: 5.9 G/DL (ref 6.3–8)
WBC # BLD: 7.9 K/UL (ref 4.8–10.8)

## 2019-10-06 PROCEDURE — 2580000003 HC RX 258: Performed by: INTERNAL MEDICINE

## 2019-10-06 PROCEDURE — 6370000000 HC RX 637 (ALT 250 FOR IP): Performed by: SPECIALIST

## 2019-10-06 PROCEDURE — 99232 SBSQ HOSP IP/OBS MODERATE 35: CPT | Performed by: INTERNAL MEDICINE

## 2019-10-06 PROCEDURE — 2060000000 HC ICU INTERMEDIATE R&B

## 2019-10-06 PROCEDURE — 80053 COMPREHEN METABOLIC PANEL: CPT

## 2019-10-06 PROCEDURE — 83735 ASSAY OF MAGNESIUM: CPT

## 2019-10-06 PROCEDURE — 36415 COLL VENOUS BLD VENIPUNCTURE: CPT

## 2019-10-06 PROCEDURE — 51702 INSERT TEMP BLADDER CATH: CPT

## 2019-10-06 PROCEDURE — 85027 COMPLETE CBC AUTOMATED: CPT

## 2019-10-06 PROCEDURE — 6360000002 HC RX W HCPCS: Performed by: INTERNAL MEDICINE

## 2019-10-06 PROCEDURE — 2700000000 HC OXYGEN THERAPY PER DAY

## 2019-10-06 PROCEDURE — C9113 INJ PANTOPRAZOLE SODIUM, VIA: HCPCS | Performed by: INTERNAL MEDICINE

## 2019-10-06 PROCEDURE — 84100 ASSAY OF PHOSPHORUS: CPT

## 2019-10-06 PROCEDURE — 6370000000 HC RX 637 (ALT 250 FOR IP): Performed by: INTERNAL MEDICINE

## 2019-10-06 PROCEDURE — 83880 ASSAY OF NATRIURETIC PEPTIDE: CPT

## 2019-10-06 RX ORDER — FUROSEMIDE 10 MG/ML
20 INJECTION INTRAMUSCULAR; INTRAVENOUS 2 TIMES DAILY
Status: DISCONTINUED | OUTPATIENT
Start: 2019-10-06 | End: 2019-10-08 | Stop reason: HOSPADM

## 2019-10-06 RX ORDER — ASPIRIN 81 MG/1
81 TABLET ORAL DAILY
Status: DISCONTINUED | OUTPATIENT
Start: 2019-10-06 | End: 2019-10-08 | Stop reason: HOSPADM

## 2019-10-06 RX ADMIN — ASPIRIN 81 MG: 81 TABLET, COATED ORAL at 17:11

## 2019-10-06 RX ADMIN — METOPROLOL SUCCINATE 25 MG: 25 TABLET, EXTENDED RELEASE ORAL at 08:56

## 2019-10-06 RX ADMIN — VITAMIN D, TAB 1000IU (100/BT) 2000 UNITS: 25 TAB at 09:14

## 2019-10-06 RX ADMIN — ISOSORBIDE MONONITRATE 30 MG: 30 TABLET, EXTENDED RELEASE ORAL at 08:56

## 2019-10-06 RX ADMIN — OXYBUTYNIN CHLORIDE 10 MG: 5 TABLET, EXTENDED RELEASE ORAL at 08:56

## 2019-10-06 RX ADMIN — PANTOPRAZOLE SODIUM 40 MG: 40 INJECTION, POWDER, LYOPHILIZED, FOR SOLUTION INTRAVENOUS at 08:55

## 2019-10-06 RX ADMIN — PRAVASTATIN SODIUM 40 MG: 40 TABLET ORAL at 20:53

## 2019-10-06 RX ADMIN — ENOXAPARIN SODIUM 30 MG: 30 INJECTION SUBCUTANEOUS at 08:57

## 2019-10-06 RX ADMIN — FUROSEMIDE 20 MG: 10 INJECTION, SOLUTION INTRAVENOUS at 17:11

## 2019-10-06 RX ADMIN — DONEPEZIL HYDROCHLORIDE 5 MG: 5 TABLET, FILM COATED ORAL at 20:54

## 2019-10-06 RX ADMIN — CLOPIDOGREL BISULFATE 75 MG: 75 TABLET ORAL at 08:56

## 2019-10-06 RX ADMIN — FUROSEMIDE 40 MG: 10 INJECTION, SOLUTION INTRAMUSCULAR; INTRAVENOUS at 08:55

## 2019-10-06 RX ADMIN — SERTRALINE 100 MG: 100 TABLET, FILM COATED ORAL at 08:56

## 2019-10-06 RX ADMIN — Medication 10 ML: at 08:56

## 2019-10-06 RX ADMIN — Medication 10 ML: at 20:54

## 2019-10-06 ASSESSMENT — PAIN SCALES - GENERAL: PAINLEVEL_OUTOF10: 0

## 2019-10-07 PROBLEM — R57.1 HYPOVOLEMIC SHOCK (HCC): Status: ACTIVE | Noted: 2019-10-07

## 2019-10-07 LAB
ALBUMIN SERPL-MCNC: 3.3 G/DL (ref 3.5–4.6)
ALP BLD-CCNC: 70 U/L (ref 40–130)
ALT SERPL-CCNC: 15 U/L (ref 0–33)
ANION GAP SERPL CALCULATED.3IONS-SCNC: 13 MEQ/L (ref 9–15)
AST SERPL-CCNC: 21 U/L (ref 0–35)
BILIRUB SERPL-MCNC: 0.3 MG/DL (ref 0.2–0.7)
BUN BLDV-MCNC: 28 MG/DL (ref 8–23)
CALCIUM SERPL-MCNC: 9.4 MG/DL (ref 8.5–9.9)
CHLORIDE BLD-SCNC: 99 MEQ/L (ref 95–107)
CO2: 32 MEQ/L (ref 20–31)
CREAT SERPL-MCNC: 0.99 MG/DL (ref 0.5–0.9)
GFR AFRICAN AMERICAN: >60
GFR NON-AFRICAN AMERICAN: 54.5
GLOBULIN: 2.8 G/DL (ref 2.3–3.5)
GLUCOSE BLD-MCNC: 104 MG/DL (ref 70–99)
HCT VFR BLD CALC: 35.5 % (ref 37–47)
HEMOGLOBIN: 12.1 G/DL (ref 12–16)
MAGNESIUM: 1.8 MG/DL (ref 1.7–2.4)
MCH RBC QN AUTO: 32.7 PG (ref 27–31.3)
MCHC RBC AUTO-ENTMCNC: 33.9 % (ref 33–37)
MCV RBC AUTO: 96.4 FL (ref 82–100)
PDW BLD-RTO: 13.3 % (ref 11.5–14.5)
PHOSPHORUS: 2.9 MG/DL (ref 2.3–4.8)
PLATELET # BLD: 327 K/UL (ref 130–400)
POTASSIUM REFLEX MAGNESIUM: 3.3 MEQ/L (ref 3.4–4.9)
PRO-BNP: 4695 PG/ML
RBC # BLD: 3.68 M/UL (ref 4.2–5.4)
SODIUM BLD-SCNC: 144 MEQ/L (ref 135–144)
TOTAL PROTEIN: 6.1 G/DL (ref 6.3–8)
WBC # BLD: 8.3 K/UL (ref 4.8–10.8)

## 2019-10-07 PROCEDURE — 80053 COMPREHEN METABOLIC PANEL: CPT

## 2019-10-07 PROCEDURE — 6360000002 HC RX W HCPCS: Performed by: INTERNAL MEDICINE

## 2019-10-07 PROCEDURE — 36415 COLL VENOUS BLD VENIPUNCTURE: CPT

## 2019-10-07 PROCEDURE — 2700000000 HC OXYGEN THERAPY PER DAY

## 2019-10-07 PROCEDURE — 6370000000 HC RX 637 (ALT 250 FOR IP): Performed by: INTERNAL MEDICINE

## 2019-10-07 PROCEDURE — 97112 NEUROMUSCULAR REEDUCATION: CPT

## 2019-10-07 PROCEDURE — 2060000000 HC ICU INTERMEDIATE R&B

## 2019-10-07 PROCEDURE — 83735 ASSAY OF MAGNESIUM: CPT

## 2019-10-07 PROCEDURE — 99232 SBSQ HOSP IP/OBS MODERATE 35: CPT | Performed by: INTERNAL MEDICINE

## 2019-10-07 PROCEDURE — 84100 ASSAY OF PHOSPHORUS: CPT

## 2019-10-07 PROCEDURE — 93010 ELECTROCARDIOGRAM REPORT: CPT | Performed by: INTERNAL MEDICINE

## 2019-10-07 PROCEDURE — C9113 INJ PANTOPRAZOLE SODIUM, VIA: HCPCS | Performed by: INTERNAL MEDICINE

## 2019-10-07 PROCEDURE — 85027 COMPLETE CBC AUTOMATED: CPT

## 2019-10-07 PROCEDURE — 2580000003 HC RX 258: Performed by: INTERNAL MEDICINE

## 2019-10-07 PROCEDURE — 97535 SELF CARE MNGMENT TRAINING: CPT

## 2019-10-07 PROCEDURE — 83880 ASSAY OF NATRIURETIC PEPTIDE: CPT

## 2019-10-07 PROCEDURE — 6370000000 HC RX 637 (ALT 250 FOR IP): Performed by: SPECIALIST

## 2019-10-07 RX ORDER — POTASSIUM CHLORIDE 20 MEQ/1
40 TABLET, EXTENDED RELEASE ORAL EVERY 4 HOURS
Status: COMPLETED | OUTPATIENT
Start: 2019-10-07 | End: 2019-10-07

## 2019-10-07 RX ORDER — MAGNESIUM SULFATE IN WATER 40 MG/ML
2 INJECTION, SOLUTION INTRAVENOUS ONCE
Status: COMPLETED | OUTPATIENT
Start: 2019-10-07 | End: 2019-10-07

## 2019-10-07 RX ADMIN — PRAVASTATIN SODIUM 40 MG: 40 TABLET ORAL at 08:31

## 2019-10-07 RX ADMIN — VITAMIN D, TAB 1000IU (100/BT) 2000 UNITS: 25 TAB at 08:30

## 2019-10-07 RX ADMIN — ISOSORBIDE MONONITRATE 30 MG: 30 TABLET, EXTENDED RELEASE ORAL at 08:31

## 2019-10-07 RX ADMIN — METOPROLOL SUCCINATE 25 MG: 25 TABLET, EXTENDED RELEASE ORAL at 08:30

## 2019-10-07 RX ADMIN — OXYBUTYNIN CHLORIDE 10 MG: 5 TABLET, EXTENDED RELEASE ORAL at 08:30

## 2019-10-07 RX ADMIN — Medication 10 ML: at 21:29

## 2019-10-07 RX ADMIN — CLOPIDOGREL BISULFATE 75 MG: 75 TABLET ORAL at 08:31

## 2019-10-07 RX ADMIN — SERTRALINE 100 MG: 100 TABLET, FILM COATED ORAL at 08:31

## 2019-10-07 RX ADMIN — PANTOPRAZOLE SODIUM 40 MG: 40 INJECTION, POWDER, LYOPHILIZED, FOR SOLUTION INTRAVENOUS at 08:29

## 2019-10-07 RX ADMIN — ENOXAPARIN SODIUM 30 MG: 30 INJECTION SUBCUTANEOUS at 08:31

## 2019-10-07 RX ADMIN — DONEPEZIL HYDROCHLORIDE 5 MG: 5 TABLET, FILM COATED ORAL at 21:29

## 2019-10-07 RX ADMIN — Medication 10 ML: at 08:32

## 2019-10-07 RX ADMIN — POTASSIUM CHLORIDE 40 MEQ: 20 TABLET, EXTENDED RELEASE ORAL at 12:21

## 2019-10-07 RX ADMIN — FUROSEMIDE 20 MG: 10 INJECTION, SOLUTION INTRAVENOUS at 17:07

## 2019-10-07 RX ADMIN — POTASSIUM CHLORIDE 40 MEQ: 20 TABLET, EXTENDED RELEASE ORAL at 08:30

## 2019-10-07 RX ADMIN — Medication 10 ML: at 08:29

## 2019-10-07 RX ADMIN — MAGNESIUM SULFATE HEPTAHYDRATE 2 G: 40 INJECTION, SOLUTION INTRAVENOUS at 10:36

## 2019-10-07 RX ADMIN — FUROSEMIDE 20 MG: 10 INJECTION, SOLUTION INTRAVENOUS at 08:31

## 2019-10-07 RX ADMIN — ASPIRIN 81 MG: 81 TABLET, COATED ORAL at 08:31

## 2019-10-08 VITALS
HEART RATE: 72 BPM | BODY MASS INDEX: 28.54 KG/M2 | HEIGHT: 55 IN | OXYGEN SATURATION: 97 % | RESPIRATION RATE: 18 BRPM | DIASTOLIC BLOOD PRESSURE: 64 MMHG | WEIGHT: 123.3 LBS | TEMPERATURE: 97.9 F | SYSTOLIC BLOOD PRESSURE: 114 MMHG

## 2019-10-08 LAB
ALBUMIN SERPL-MCNC: 3.5 G/DL (ref 3.5–4.6)
ALP BLD-CCNC: 75 U/L (ref 40–130)
ALT SERPL-CCNC: 11 U/L (ref 0–33)
ANION GAP SERPL CALCULATED.3IONS-SCNC: 15 MEQ/L (ref 9–15)
AST SERPL-CCNC: 22 U/L (ref 0–35)
BILIRUB SERPL-MCNC: 0.4 MG/DL (ref 0.2–0.7)
BUN BLDV-MCNC: 30 MG/DL (ref 8–23)
CALCIUM SERPL-MCNC: 10 MG/DL (ref 8.5–9.9)
CHLORIDE BLD-SCNC: 99 MEQ/L (ref 95–107)
CO2: 29 MEQ/L (ref 20–31)
CREAT SERPL-MCNC: 0.96 MG/DL (ref 0.5–0.9)
GFR AFRICAN AMERICAN: >60
GFR NON-AFRICAN AMERICAN: 56.5
GLOBULIN: 3.4 G/DL (ref 2.3–3.5)
GLUCOSE BLD-MCNC: 94 MG/DL (ref 70–99)
HCT VFR BLD CALC: 38.9 % (ref 37–47)
HEMOGLOBIN: 13 G/DL (ref 12–16)
MAGNESIUM: 2.4 MG/DL (ref 1.7–2.4)
MCH RBC QN AUTO: 32.5 PG (ref 27–31.3)
MCHC RBC AUTO-ENTMCNC: 33.5 % (ref 33–37)
MCV RBC AUTO: 96.9 FL (ref 82–100)
PDW BLD-RTO: 13.3 % (ref 11.5–14.5)
PHOSPHORUS: 2.9 MG/DL (ref 2.3–4.8)
PLATELET # BLD: 360 K/UL (ref 130–400)
POTASSIUM REFLEX MAGNESIUM: 4.1 MEQ/L (ref 3.4–4.9)
RBC # BLD: 4.02 M/UL (ref 4.2–5.4)
SODIUM BLD-SCNC: 143 MEQ/L (ref 135–144)
TOTAL PROTEIN: 6.9 G/DL (ref 6.3–8)
WBC # BLD: 8.3 K/UL (ref 4.8–10.8)

## 2019-10-08 PROCEDURE — 6370000000 HC RX 637 (ALT 250 FOR IP): Performed by: INTERNAL MEDICINE

## 2019-10-08 PROCEDURE — 36415 COLL VENOUS BLD VENIPUNCTURE: CPT

## 2019-10-08 PROCEDURE — 2580000003 HC RX 258: Performed by: INTERNAL MEDICINE

## 2019-10-08 PROCEDURE — 97535 SELF CARE MNGMENT TRAINING: CPT

## 2019-10-08 PROCEDURE — 80053 COMPREHEN METABOLIC PANEL: CPT

## 2019-10-08 PROCEDURE — 97116 GAIT TRAINING THERAPY: CPT

## 2019-10-08 PROCEDURE — 6370000000 HC RX 637 (ALT 250 FOR IP): Performed by: SPECIALIST

## 2019-10-08 PROCEDURE — 83735 ASSAY OF MAGNESIUM: CPT

## 2019-10-08 PROCEDURE — 2700000000 HC OXYGEN THERAPY PER DAY

## 2019-10-08 PROCEDURE — 6360000002 HC RX W HCPCS: Performed by: INTERNAL MEDICINE

## 2019-10-08 PROCEDURE — 84100 ASSAY OF PHOSPHORUS: CPT

## 2019-10-08 PROCEDURE — 99232 SBSQ HOSP IP/OBS MODERATE 35: CPT | Performed by: INTERNAL MEDICINE

## 2019-10-08 PROCEDURE — 85027 COMPLETE CBC AUTOMATED: CPT

## 2019-10-08 RX ORDER — DONEPEZIL HYDROCHLORIDE 5 MG/1
5 TABLET, FILM COATED ORAL NIGHTLY
Qty: 30 TABLET | Refills: 3 | Status: SHIPPED | OUTPATIENT
Start: 2019-10-08 | End: 2019-11-14 | Stop reason: DRUGHIGH

## 2019-10-08 RX ORDER — ASPIRIN 81 MG/1
81 TABLET ORAL DAILY
Qty: 30 TABLET | Refills: 3 | Status: ON HOLD | OUTPATIENT
Start: 2019-10-09 | End: 2019-10-10

## 2019-10-08 RX ORDER — FUROSEMIDE 20 MG/1
20 TABLET ORAL DAILY
Qty: 30 TABLET | Refills: 3 | Status: ON HOLD | OUTPATIENT
Start: 2019-10-08 | End: 2019-10-23 | Stop reason: HOSPADM

## 2019-10-08 RX ORDER — METOPROLOL SUCCINATE 25 MG/1
25 TABLET, EXTENDED RELEASE ORAL DAILY
Qty: 30 TABLET | Refills: 3 | Status: SHIPPED | OUTPATIENT
Start: 2019-10-09 | End: 2020-02-14 | Stop reason: ALTCHOICE

## 2019-10-08 RX ORDER — CLOPIDOGREL BISULFATE 75 MG/1
75 TABLET ORAL DAILY
Qty: 30 TABLET | Refills: 3 | Status: ON HOLD | OUTPATIENT
Start: 2019-10-09 | End: 2019-10-13 | Stop reason: HOSPADM

## 2019-10-08 RX ORDER — PANTOPRAZOLE SODIUM 20 MG/1
20 TABLET, DELAYED RELEASE ORAL
Status: DISCONTINUED | OUTPATIENT
Start: 2019-10-08 | End: 2019-10-08 | Stop reason: HOSPADM

## 2019-10-08 RX ORDER — ISOSORBIDE MONONITRATE 30 MG/1
30 TABLET, EXTENDED RELEASE ORAL DAILY
Qty: 30 TABLET | Refills: 3 | Status: ON HOLD | OUTPATIENT
Start: 2019-10-09 | End: 2019-10-13 | Stop reason: HOSPADM

## 2019-10-08 RX ADMIN — PANTOPRAZOLE SODIUM 20 MG: 20 TABLET, DELAYED RELEASE ORAL at 10:27

## 2019-10-08 RX ADMIN — ASPIRIN 81 MG: 81 TABLET, COATED ORAL at 10:27

## 2019-10-08 RX ADMIN — CLOPIDOGREL BISULFATE 75 MG: 75 TABLET ORAL at 10:27

## 2019-10-08 RX ADMIN — VITAMIN D, TAB 1000IU (100/BT) 2000 UNITS: 25 TAB at 10:27

## 2019-10-08 RX ADMIN — METOPROLOL SUCCINATE 25 MG: 25 TABLET, EXTENDED RELEASE ORAL at 10:27

## 2019-10-08 RX ADMIN — FUROSEMIDE 20 MG: 10 INJECTION, SOLUTION INTRAVENOUS at 10:27

## 2019-10-08 RX ADMIN — ENOXAPARIN SODIUM 30 MG: 30 INJECTION SUBCUTANEOUS at 10:27

## 2019-10-08 RX ADMIN — SERTRALINE 100 MG: 100 TABLET, FILM COATED ORAL at 10:27

## 2019-10-08 RX ADMIN — PRAVASTATIN SODIUM 40 MG: 40 TABLET ORAL at 10:27

## 2019-10-08 RX ADMIN — Medication 10 ML: at 10:28

## 2019-10-08 RX ADMIN — ISOSORBIDE MONONITRATE 30 MG: 30 TABLET, EXTENDED RELEASE ORAL at 10:28

## 2019-10-09 ENCOUNTER — TELEPHONE (OUTPATIENT)
Dept: FAMILY MEDICINE CLINIC | Age: 74
End: 2019-10-09

## 2019-10-09 ENCOUNTER — CARE COORDINATION (OUTPATIENT)
Dept: CASE MANAGEMENT | Age: 74
End: 2019-10-09

## 2019-10-10 ENCOUNTER — APPOINTMENT (OUTPATIENT)
Dept: GENERAL RADIOLOGY | Age: 74
DRG: 392 | End: 2019-10-10
Payer: MEDICARE

## 2019-10-10 ENCOUNTER — CARE COORDINATION (OUTPATIENT)
Dept: CASE MANAGEMENT | Age: 74
End: 2019-10-10

## 2019-10-10 ENCOUNTER — APPOINTMENT (OUTPATIENT)
Dept: CT IMAGING | Age: 74
DRG: 392 | End: 2019-10-10
Payer: MEDICARE

## 2019-10-10 ENCOUNTER — HOSPITAL ENCOUNTER (INPATIENT)
Age: 74
LOS: 3 days | Discharge: HOME | DRG: 392 | End: 2019-10-13
Attending: STUDENT IN AN ORGANIZED HEALTH CARE EDUCATION/TRAINING PROGRAM | Admitting: INTERNAL MEDICINE
Payer: MEDICARE

## 2019-10-10 DIAGNOSIS — I95.89 HYPOTENSION DUE TO HYPOVOLEMIA: ICD-10-CM

## 2019-10-10 DIAGNOSIS — R63.8 POOR FLUID INTAKE: ICD-10-CM

## 2019-10-10 DIAGNOSIS — K56.41 FECAL IMPACTION OF COLON (HCC): ICD-10-CM

## 2019-10-10 DIAGNOSIS — N17.9 AKI (ACUTE KIDNEY INJURY) (HCC): Primary | ICD-10-CM

## 2019-10-10 DIAGNOSIS — E86.1 HYPOTENSION DUE TO HYPOVOLEMIA: ICD-10-CM

## 2019-10-10 DIAGNOSIS — K52.9 COLITIS: ICD-10-CM

## 2019-10-10 DIAGNOSIS — E86.0 DEHYDRATION: ICD-10-CM

## 2019-10-10 LAB
ALBUMIN SERPL-MCNC: 3.1 G/DL (ref 3.5–4.6)
ALP BLD-CCNC: 77 U/L (ref 40–130)
ALT SERPL-CCNC: 26 U/L (ref 0–33)
ANION GAP SERPL CALCULATED.3IONS-SCNC: 20 MEQ/L (ref 9–15)
APTT: 20 SEC (ref 24.4–36.8)
AST SERPL-CCNC: 57 U/L (ref 0–35)
BACTERIA: NEGATIVE /HPF
BILIRUB SERPL-MCNC: 0.5 MG/DL (ref 0.2–0.7)
BILIRUBIN URINE: NEGATIVE
BLOOD, URINE: ABNORMAL
BUN BLDV-MCNC: 36 MG/DL (ref 8–23)
C-REACTIVE PROTEIN: 8.5 MG/L (ref 0–5)
CALCIUM SERPL-MCNC: 8.9 MG/DL (ref 8.5–9.9)
CHLORIDE BLD-SCNC: 101 MEQ/L (ref 95–107)
CHOLESTEROL, TOTAL: 145 MG/DL (ref 0–199)
CLARITY: CLEAR
CO2: 18 MEQ/L (ref 20–31)
COLOR: YELLOW
CREAT SERPL-MCNC: 1.47 MG/DL (ref 0.5–0.9)
EKG ATRIAL RATE: 64 BPM
EKG P-R INTERVAL: 156 MS
EKG Q-T INTERVAL: 518 MS
EKG QRS DURATION: 132 MS
EKG QTC CALCULATION (BAZETT): 534 MS
EKG R AXIS: 60 DEGREES
EKG T AXIS: 218 DEGREES
EKG VENTRICULAR RATE: 64 BPM
EPITHELIAL CELLS, UA: ABNORMAL /HPF (ref 0–5)
GFR AFRICAN AMERICAN: 41.8
GFR NON-AFRICAN AMERICAN: 34.5
GLOBULIN: 3.1 G/DL (ref 2.3–3.5)
GLUCOSE BLD-MCNC: 103 MG/DL (ref 70–99)
GLUCOSE URINE: NEGATIVE MG/DL
HDLC SERPL-MCNC: 41 MG/DL (ref 40–59)
HYALINE CASTS: ABNORMAL /HPF (ref 0–5)
INR BLD: 1.1
KETONES, URINE: NEGATIVE MG/DL
LACTIC ACID: 2.4 MMOL/L (ref 0.5–2.2)
LDL CHOLESTEROL CALCULATED: 78 MG/DL (ref 0–129)
LEUKOCYTE ESTERASE, URINE: ABNORMAL
MAGNESIUM: 2.2 MG/DL (ref 1.7–2.4)
NITRITE, URINE: NEGATIVE
PH UA: 7 (ref 5–9)
POC CREATININE WHOLE BLOOD: 1.5
POTASSIUM SERPL-SCNC: 4.5 MEQ/L (ref 3.4–4.9)
PRO-BNP: 2663 PG/ML
PROCALCITONIN: 0.1 NG/ML (ref 0–0.15)
PROTEIN UA: NEGATIVE MG/DL
PROTHROMBIN TIME: 14.4 SEC (ref 12.3–14.9)
RBC UA: ABNORMAL /HPF (ref 0–5)
REASON FOR REJECTION: NORMAL
REJECTED TEST: NORMAL
SODIUM BLD-SCNC: 139 MEQ/L (ref 135–144)
SPECIFIC GRAVITY UA: 1.01 (ref 1–1.03)
TOTAL CK: 64 U/L (ref 0–170)
TOTAL PROTEIN: 6.2 G/DL (ref 6.3–8)
TRIGL SERPL-MCNC: 131 MG/DL (ref 0–150)
TROPONIN: 0.07 NG/ML (ref 0–0.01)
TROPONIN: 0.08 NG/ML (ref 0–0.01)
TSH SERPL DL<=0.05 MIU/L-ACNC: 4.23 UIU/ML (ref 0.44–3.86)
URINE REFLEX TO CULTURE: YES
UROBILINOGEN, URINE: 0.2 E.U./DL
WBC UA: ABNORMAL /HPF (ref 0–5)

## 2019-10-10 PROCEDURE — 85025 COMPLETE CBC W/AUTO DIFF WBC: CPT

## 2019-10-10 PROCEDURE — 86140 C-REACTIVE PROTEIN: CPT

## 2019-10-10 PROCEDURE — 93005 ELECTROCARDIOGRAM TRACING: CPT | Performed by: STUDENT IN AN ORGANIZED HEALTH CARE EDUCATION/TRAINING PROGRAM

## 2019-10-10 PROCEDURE — 83605 ASSAY OF LACTIC ACID: CPT

## 2019-10-10 PROCEDURE — 83735 ASSAY OF MAGNESIUM: CPT

## 2019-10-10 PROCEDURE — 99285 EMERGENCY DEPT VISIT HI MDM: CPT

## 2019-10-10 PROCEDURE — 82550 ASSAY OF CK (CPK): CPT

## 2019-10-10 PROCEDURE — 80061 LIPID PANEL: CPT

## 2019-10-10 PROCEDURE — 2580000003 HC RX 258: Performed by: STUDENT IN AN ORGANIZED HEALTH CARE EDUCATION/TRAINING PROGRAM

## 2019-10-10 PROCEDURE — 80053 COMPREHEN METABOLIC PANEL: CPT

## 2019-10-10 PROCEDURE — 87086 URINE CULTURE/COLONY COUNT: CPT

## 2019-10-10 PROCEDURE — 74177 CT ABD & PELVIS W/CONTRAST: CPT

## 2019-10-10 PROCEDURE — 71275 CT ANGIOGRAPHY CHEST: CPT

## 2019-10-10 PROCEDURE — 2580000003 HC RX 258: Performed by: PHYSICIAN ASSISTANT

## 2019-10-10 PROCEDURE — 2500000003 HC RX 250 WO HCPCS: Performed by: PHYSICIAN ASSISTANT

## 2019-10-10 PROCEDURE — 85730 THROMBOPLASTIN TIME PARTIAL: CPT

## 2019-10-10 PROCEDURE — 84484 ASSAY OF TROPONIN QUANT: CPT

## 2019-10-10 PROCEDURE — 83880 ASSAY OF NATRIURETIC PEPTIDE: CPT

## 2019-10-10 PROCEDURE — 1210000000 HC MED SURG R&B

## 2019-10-10 PROCEDURE — 6360000002 HC RX W HCPCS: Performed by: STUDENT IN AN ORGANIZED HEALTH CARE EDUCATION/TRAINING PROGRAM

## 2019-10-10 PROCEDURE — 71045 X-RAY EXAM CHEST 1 VIEW: CPT

## 2019-10-10 PROCEDURE — 85610 PROTHROMBIN TIME: CPT

## 2019-10-10 PROCEDURE — 87040 BLOOD CULTURE FOR BACTERIA: CPT

## 2019-10-10 PROCEDURE — 6360000004 HC RX CONTRAST MEDICATION: Performed by: STUDENT IN AN ORGANIZED HEALTH CARE EDUCATION/TRAINING PROGRAM

## 2019-10-10 PROCEDURE — 84145 PROCALCITONIN (PCT): CPT

## 2019-10-10 PROCEDURE — 84443 ASSAY THYROID STIM HORMONE: CPT

## 2019-10-10 PROCEDURE — 81001 URINALYSIS AUTO W/SCOPE: CPT

## 2019-10-10 PROCEDURE — P9612 CATHETERIZE FOR URINE SPEC: HCPCS

## 2019-10-10 PROCEDURE — 96365 THER/PROPH/DIAG IV INF INIT: CPT

## 2019-10-10 PROCEDURE — 36415 COLL VENOUS BLD VENIPUNCTURE: CPT

## 2019-10-10 RX ORDER — FAMOTIDINE 20 MG/1
20 TABLET, FILM COATED ORAL DAILY
Status: DISCONTINUED | OUTPATIENT
Start: 2019-10-11 | End: 2019-10-13 | Stop reason: HOSPADM

## 2019-10-10 RX ORDER — 0.9 % SODIUM CHLORIDE 0.9 %
1000 INTRAVENOUS SOLUTION INTRAVENOUS ONCE
Status: DISCONTINUED | OUTPATIENT
Start: 2019-10-10 | End: 2019-10-13 | Stop reason: HOSPADM

## 2019-10-10 RX ORDER — SODIUM CHLORIDE 0.9 % (FLUSH) 0.9 %
10 SYRINGE (ML) INJECTION EVERY 12 HOURS SCHEDULED
Status: DISCONTINUED | OUTPATIENT
Start: 2019-10-10 | End: 2019-10-13 | Stop reason: HOSPADM

## 2019-10-10 RX ORDER — 0.9 % SODIUM CHLORIDE 0.9 %
1000 INTRAVENOUS SOLUTION INTRAVENOUS ONCE
Status: COMPLETED | OUTPATIENT
Start: 2019-10-10 | End: 2019-10-10

## 2019-10-10 RX ORDER — ONDANSETRON 2 MG/ML
4 INJECTION INTRAMUSCULAR; INTRAVENOUS EVERY 6 HOURS PRN
Status: DISCONTINUED | OUTPATIENT
Start: 2019-10-10 | End: 2019-10-13 | Stop reason: HOSPADM

## 2019-10-10 RX ORDER — SODIUM CHLORIDE 0.9 % (FLUSH) 0.9 %
10 SYRINGE (ML) INJECTION PRN
Status: DISCONTINUED | OUTPATIENT
Start: 2019-10-10 | End: 2019-10-13 | Stop reason: HOSPADM

## 2019-10-10 RX ORDER — 0.9 % SODIUM CHLORIDE 0.9 %
500 INTRAVENOUS SOLUTION INTRAVENOUS ONCE
Status: COMPLETED | OUTPATIENT
Start: 2019-10-10 | End: 2019-10-10

## 2019-10-10 RX ORDER — SODIUM CHLORIDE 9 MG/ML
INJECTION, SOLUTION INTRAVENOUS CONTINUOUS
Status: DISCONTINUED | OUTPATIENT
Start: 2019-10-10 | End: 2019-10-11

## 2019-10-10 RX ORDER — CIPROFLOXACIN 2 MG/ML
400 INJECTION, SOLUTION INTRAVENOUS EVERY 24 HOURS
Status: DISCONTINUED | OUTPATIENT
Start: 2019-10-11 | End: 2019-10-13

## 2019-10-10 RX ADMIN — SODIUM CHLORIDE 1000 ML: 9 INJECTION, SOLUTION INTRAVENOUS at 16:55

## 2019-10-10 RX ADMIN — METRONIDAZOLE 500 MG: 500 INJECTION, SOLUTION INTRAVENOUS at 22:57

## 2019-10-10 RX ADMIN — Medication 10 ML: at 22:57

## 2019-10-10 RX ADMIN — SODIUM CHLORIDE 500 ML: 9 INJECTION, SOLUTION INTRAVENOUS at 16:04

## 2019-10-10 RX ADMIN — SODIUM CHLORIDE 1000 ML: 9 INJECTION, SOLUTION INTRAVENOUS at 16:04

## 2019-10-10 RX ADMIN — PIPERACILLIN AND TAZOBACTAM 3.38 G: 3; .375 INJECTION, POWDER, LYOPHILIZED, FOR SOLUTION INTRAVENOUS at 16:48

## 2019-10-10 RX ADMIN — IOPAMIDOL 100 ML: 612 INJECTION, SOLUTION INTRAVENOUS at 18:46

## 2019-10-10 ASSESSMENT — PAIN SCALES - GENERAL: PAINLEVEL_OUTOF10: 0

## 2019-10-10 ASSESSMENT — ENCOUNTER SYMPTOMS
BACK PAIN: 0
VOMITING: 0
ABDOMINAL PAIN: 0
DIARRHEA: 0
CHEST TIGHTNESS: 0
CONSTIPATION: 1
TROUBLE SWALLOWING: 0
SINUS PRESSURE: 0
COUGH: 0
SHORTNESS OF BREATH: 0

## 2019-10-11 LAB
ANION GAP SERPL CALCULATED.3IONS-SCNC: 13 MEQ/L (ref 9–15)
BANDED NEUTROPHILS RELATIVE PERCENT: 6 % (ref 5–11)
BASOPHILS ABSOLUTE: 0 K/UL (ref 0–0.2)
BASOPHILS RELATIVE PERCENT: 0.2 %
BUN BLDV-MCNC: 35 MG/DL (ref 8–23)
CALCIUM SERPL-MCNC: 8.5 MG/DL (ref 8.5–9.9)
CHLORIDE BLD-SCNC: 108 MEQ/L (ref 95–107)
CO2: 23 MEQ/L (ref 20–31)
CREAT SERPL-MCNC: 1.06 MG/DL (ref 0.5–0.9)
EOSINOPHILS ABSOLUTE: 0 K/UL (ref 0–0.7)
EOSINOPHILS RELATIVE PERCENT: 0.3 %
GFR AFRICAN AMERICAN: 41
GFR AFRICAN AMERICAN: >60
GFR NON-AFRICAN AMERICAN: 34
GFR NON-AFRICAN AMERICAN: 50.4
GLUCOSE BLD-MCNC: 84 MG/DL (ref 70–99)
HCT VFR BLD CALC: 36 % (ref 37–47)
HEMOGLOBIN: 11.5 G/DL (ref 12–16)
LACTIC ACID: 1.1 MMOL/L (ref 0.5–2.2)
LYMPHOCYTES ABSOLUTE: 0.7 K/UL (ref 1–4.8)
LYMPHOCYTES RELATIVE PERCENT: 4 %
MACROCYTES: ABNORMAL
MAGNESIUM: 1.7 MG/DL (ref 1.7–2.4)
MCH RBC QN AUTO: 31 PG (ref 27–31.3)
MCHC RBC AUTO-ENTMCNC: 32 % (ref 33–37)
MCV RBC AUTO: 96.9 FL (ref 82–100)
MONOCYTES ABSOLUTE: 0.3 K/UL (ref 0.2–0.8)
MONOCYTES RELATIVE PERCENT: 1.9 %
NEUTROPHILS ABSOLUTE: 16.4 K/UL (ref 1.4–6.5)
NEUTROPHILS RELATIVE PERCENT: 89 %
OVALOCYTES: ABNORMAL
PDW BLD-RTO: 13.3 % (ref 11.5–14.5)
PERFORMED ON: ABNORMAL
PLATELET # BLD: 333 K/UL (ref 130–400)
PLATELET SLIDE REVIEW: NORMAL
POC CREATININE: 1.5 MG/DL (ref 0.6–1.2)
POC SAMPLE TYPE: ABNORMAL
POIKILOCYTES: ABNORMAL
POTASSIUM REFLEX MAGNESIUM: 3.3 MEQ/L (ref 3.4–4.9)
RBC # BLD: 3.71 M/UL (ref 4.2–5.4)
SODIUM BLD-SCNC: 144 MEQ/L (ref 135–144)
WBC # BLD: 17.3 K/UL (ref 4.8–10.8)

## 2019-10-11 PROCEDURE — 85025 COMPLETE CBC W/AUTO DIFF WBC: CPT

## 2019-10-11 PROCEDURE — 80048 BASIC METABOLIC PNL TOTAL CA: CPT

## 2019-10-11 PROCEDURE — 6370000000 HC RX 637 (ALT 250 FOR IP): Performed by: PHYSICIAN ASSISTANT

## 2019-10-11 PROCEDURE — 83735 ASSAY OF MAGNESIUM: CPT

## 2019-10-11 PROCEDURE — 6360000002 HC RX W HCPCS: Performed by: PHYSICIAN ASSISTANT

## 2019-10-11 PROCEDURE — 6370000000 HC RX 637 (ALT 250 FOR IP): Performed by: INTERNAL MEDICINE

## 2019-10-11 PROCEDURE — 97166 OT EVAL MOD COMPLEX 45 MIN: CPT

## 2019-10-11 PROCEDURE — 36415 COLL VENOUS BLD VENIPUNCTURE: CPT

## 2019-10-11 PROCEDURE — 1210000000 HC MED SURG R&B

## 2019-10-11 PROCEDURE — 2580000003 HC RX 258: Performed by: INTERNAL MEDICINE

## 2019-10-11 PROCEDURE — 93010 ELECTROCARDIOGRAM REPORT: CPT | Performed by: INTERNAL MEDICINE

## 2019-10-11 PROCEDURE — 2500000003 HC RX 250 WO HCPCS: Performed by: PHYSICIAN ASSISTANT

## 2019-10-11 PROCEDURE — 99232 SBSQ HOSP IP/OBS MODERATE 35: CPT | Performed by: INTERNAL MEDICINE

## 2019-10-11 PROCEDURE — 2700000000 HC OXYGEN THERAPY PER DAY

## 2019-10-11 PROCEDURE — 2580000003 HC RX 258: Performed by: PHYSICIAN ASSISTANT

## 2019-10-11 PROCEDURE — 97162 PT EVAL MOD COMPLEX 30 MIN: CPT

## 2019-10-11 PROCEDURE — 83605 ASSAY OF LACTIC ACID: CPT

## 2019-10-11 RX ORDER — DOCUSATE SODIUM 100 MG/1
100 CAPSULE, LIQUID FILLED ORAL NIGHTLY
Status: DISCONTINUED | OUTPATIENT
Start: 2019-10-11 | End: 2019-10-13 | Stop reason: HOSPADM

## 2019-10-11 RX ORDER — SODIUM CHLORIDE 9 MG/ML
INJECTION, SOLUTION INTRAVENOUS CONTINUOUS
Status: ACTIVE | OUTPATIENT
Start: 2019-10-11 | End: 2019-10-11

## 2019-10-11 RX ORDER — POTASSIUM CHLORIDE 20 MEQ/1
40 TABLET, EXTENDED RELEASE ORAL ONCE
Status: COMPLETED | OUTPATIENT
Start: 2019-10-11 | End: 2019-10-11

## 2019-10-11 RX ORDER — POLYETHYLENE GLYCOL 3350 17 G/17G
17 POWDER, FOR SOLUTION ORAL DAILY
Status: DISCONTINUED | OUTPATIENT
Start: 2019-10-11 | End: 2019-10-13 | Stop reason: HOSPADM

## 2019-10-11 RX ADMIN — FAMOTIDINE 20 MG: 20 TABLET ORAL at 09:57

## 2019-10-11 RX ADMIN — Medication 10 ML: at 21:56

## 2019-10-11 RX ADMIN — POTASSIUM CHLORIDE 40 MEQ: 20 TABLET, EXTENDED RELEASE ORAL at 09:57

## 2019-10-11 RX ADMIN — METRONIDAZOLE 500 MG: 500 INJECTION, SOLUTION INTRAVENOUS at 07:07

## 2019-10-11 RX ADMIN — METRONIDAZOLE 500 MG: 500 INJECTION, SOLUTION INTRAVENOUS at 15:41

## 2019-10-11 RX ADMIN — DOCUSATE SODIUM 100 MG: 100 CAPSULE, LIQUID FILLED ORAL at 21:54

## 2019-10-11 RX ADMIN — METRONIDAZOLE 500 MG: 500 INJECTION, SOLUTION INTRAVENOUS at 21:58

## 2019-10-11 RX ADMIN — CIPROFLOXACIN 400 MG: 2 INJECTION, SOLUTION INTRAVENOUS at 00:03

## 2019-10-11 RX ADMIN — SODIUM CHLORIDE: 9 INJECTION, SOLUTION INTRAVENOUS at 09:59

## 2019-10-11 RX ADMIN — SODIUM CHLORIDE: 9 INJECTION, SOLUTION INTRAVENOUS at 00:03

## 2019-10-11 ASSESSMENT — PAIN SCALES - WONG BAKER: WONGBAKER_NUMERICALRESPONSE: 0

## 2019-10-11 ASSESSMENT — PAIN SCALES - GENERAL
PAINLEVEL_OUTOF10: 0

## 2019-10-12 LAB
ANION GAP SERPL CALCULATED.3IONS-SCNC: 9 MEQ/L (ref 9–15)
BASOPHILS ABSOLUTE: 0.1 K/UL (ref 0–0.2)
BASOPHILS RELATIVE PERCENT: 0.5 %
BUN BLDV-MCNC: 16 MG/DL (ref 8–23)
CALCIUM SERPL-MCNC: 8.5 MG/DL (ref 8.5–9.9)
CHLORIDE BLD-SCNC: 108 MEQ/L (ref 95–107)
CO2: 22 MEQ/L (ref 20–31)
CREAT SERPL-MCNC: 0.74 MG/DL (ref 0.5–0.9)
EOSINOPHILS ABSOLUTE: 0.2 K/UL (ref 0–0.7)
EOSINOPHILS RELATIVE PERCENT: 1.2 %
GFR AFRICAN AMERICAN: >60
GFR NON-AFRICAN AMERICAN: >60
GLUCOSE BLD-MCNC: 77 MG/DL (ref 70–99)
HCT VFR BLD CALC: 29.4 % (ref 37–47)
HEMOGLOBIN: 9.7 G/DL (ref 12–16)
LACTIC ACID: 0.7 MMOL/L (ref 0.5–2.2)
LYMPHOCYTES ABSOLUTE: 1.4 K/UL (ref 1–4.8)
LYMPHOCYTES RELATIVE PERCENT: 10 %
MAGNESIUM: 1.6 MG/DL (ref 1.7–2.4)
MCH RBC QN AUTO: 32.1 PG (ref 27–31.3)
MCHC RBC AUTO-ENTMCNC: 33.2 % (ref 33–37)
MCV RBC AUTO: 96.7 FL (ref 82–100)
MONOCYTES ABSOLUTE: 1.1 K/UL (ref 0.2–0.8)
MONOCYTES RELATIVE PERCENT: 7.4 %
NEUTROPHILS ABSOLUTE: 11.5 K/UL (ref 1.4–6.5)
NEUTROPHILS RELATIVE PERCENT: 80.9 %
PDW BLD-RTO: 13.6 % (ref 11.5–14.5)
PLATELET # BLD: 265 K/UL (ref 130–400)
POTASSIUM REFLEX MAGNESIUM: 3.4 MEQ/L (ref 3.4–4.9)
RBC # BLD: 3.04 M/UL (ref 4.2–5.4)
SODIUM BLD-SCNC: 139 MEQ/L (ref 135–144)
WBC # BLD: 14.2 K/UL (ref 4.8–10.8)

## 2019-10-12 PROCEDURE — 36415 COLL VENOUS BLD VENIPUNCTURE: CPT

## 2019-10-12 PROCEDURE — 2500000003 HC RX 250 WO HCPCS: Performed by: PHYSICIAN ASSISTANT

## 2019-10-12 PROCEDURE — 83735 ASSAY OF MAGNESIUM: CPT

## 2019-10-12 PROCEDURE — 85025 COMPLETE CBC W/AUTO DIFF WBC: CPT

## 2019-10-12 PROCEDURE — 2700000000 HC OXYGEN THERAPY PER DAY

## 2019-10-12 PROCEDURE — 80048 BASIC METABOLIC PNL TOTAL CA: CPT

## 2019-10-12 PROCEDURE — 6370000000 HC RX 637 (ALT 250 FOR IP): Performed by: INTERNAL MEDICINE

## 2019-10-12 PROCEDURE — 2580000003 HC RX 258: Performed by: PHYSICIAN ASSISTANT

## 2019-10-12 PROCEDURE — 1210000000 HC MED SURG R&B

## 2019-10-12 PROCEDURE — 6360000002 HC RX W HCPCS: Performed by: INTERNAL MEDICINE

## 2019-10-12 PROCEDURE — 6360000002 HC RX W HCPCS: Performed by: PHYSICIAN ASSISTANT

## 2019-10-12 PROCEDURE — 83605 ASSAY OF LACTIC ACID: CPT

## 2019-10-12 PROCEDURE — 6370000000 HC RX 637 (ALT 250 FOR IP): Performed by: PHYSICIAN ASSISTANT

## 2019-10-12 RX ORDER — MAGNESIUM SULFATE IN WATER 40 MG/ML
2 INJECTION, SOLUTION INTRAVENOUS ONCE
Status: COMPLETED | OUTPATIENT
Start: 2019-10-12 | End: 2019-10-12

## 2019-10-12 RX ORDER — POTASSIUM CHLORIDE 20 MEQ/1
20 TABLET, EXTENDED RELEASE ORAL 2 TIMES DAILY WITH MEALS
Status: COMPLETED | OUTPATIENT
Start: 2019-10-12 | End: 2019-10-12

## 2019-10-12 RX ADMIN — METRONIDAZOLE 500 MG: 500 INJECTION, SOLUTION INTRAVENOUS at 06:12

## 2019-10-12 RX ADMIN — METRONIDAZOLE 500 MG: 500 INJECTION, SOLUTION INTRAVENOUS at 23:32

## 2019-10-12 RX ADMIN — CIPROFLOXACIN 400 MG: 2 INJECTION, SOLUTION INTRAVENOUS at 00:41

## 2019-10-12 RX ADMIN — METRONIDAZOLE 500 MG: 500 INJECTION, SOLUTION INTRAVENOUS at 15:01

## 2019-10-12 RX ADMIN — FAMOTIDINE 20 MG: 20 TABLET ORAL at 09:04

## 2019-10-12 RX ADMIN — POTASSIUM CHLORIDE 20 MEQ: 20 TABLET, EXTENDED RELEASE ORAL at 17:10

## 2019-10-12 RX ADMIN — DOCUSATE SODIUM 100 MG: 100 CAPSULE, LIQUID FILLED ORAL at 21:14

## 2019-10-12 RX ADMIN — Medication 10 ML: at 21:15

## 2019-10-12 RX ADMIN — Medication 10 ML: at 10:35

## 2019-10-12 RX ADMIN — MAGNESIUM SULFATE HEPTAHYDRATE 2 G: 40 INJECTION, SOLUTION INTRAVENOUS at 10:36

## 2019-10-12 RX ADMIN — POTASSIUM CHLORIDE 20 MEQ: 20 TABLET, EXTENDED RELEASE ORAL at 09:00

## 2019-10-12 ASSESSMENT — PAIN SCALES - GENERAL
PAINLEVEL_OUTOF10: 0

## 2019-10-13 VITALS
HEIGHT: 55 IN | TEMPERATURE: 98.6 F | BODY MASS INDEX: 27.77 KG/M2 | WEIGHT: 120 LBS | HEART RATE: 71 BPM | OXYGEN SATURATION: 100 % | RESPIRATION RATE: 18 BRPM | SYSTOLIC BLOOD PRESSURE: 129 MMHG | DIASTOLIC BLOOD PRESSURE: 65 MMHG

## 2019-10-13 PROBLEM — K52.9 COLITIS: Status: ACTIVE | Noted: 2019-10-13

## 2019-10-13 PROBLEM — K59.01 SLOW TRANSIT CONSTIPATION: Status: ACTIVE | Noted: 2019-10-13

## 2019-10-13 PROBLEM — K64.4 EXTERNAL HEMORRHOID, BLEEDING: Status: ACTIVE | Noted: 2019-10-13

## 2019-10-13 LAB
ANION GAP SERPL CALCULATED.3IONS-SCNC: 9 MEQ/L (ref 9–15)
BASOPHILS ABSOLUTE: 0.1 K/UL (ref 0–0.2)
BASOPHILS RELATIVE PERCENT: 0.8 %
BUN BLDV-MCNC: 10 MG/DL (ref 8–23)
CALCIUM SERPL-MCNC: 8.5 MG/DL (ref 8.5–9.9)
CHLORIDE BLD-SCNC: 110 MEQ/L (ref 95–107)
CO2: 22 MEQ/L (ref 20–31)
CREAT SERPL-MCNC: 0.58 MG/DL (ref 0.5–0.9)
EOSINOPHILS ABSOLUTE: 0.3 K/UL (ref 0–0.7)
EOSINOPHILS RELATIVE PERCENT: 3 %
GFR AFRICAN AMERICAN: >60
GFR NON-AFRICAN AMERICAN: >60
GLUCOSE BLD-MCNC: 82 MG/DL (ref 70–99)
HCT VFR BLD CALC: 30.4 % (ref 37–47)
HEMOGLOBIN: 9.8 G/DL (ref 12–16)
LYMPHOCYTES ABSOLUTE: 1.2 K/UL (ref 1–4.8)
LYMPHOCYTES RELATIVE PERCENT: 12.7 %
MCH RBC QN AUTO: 31.1 PG (ref 27–31.3)
MCHC RBC AUTO-ENTMCNC: 32 % (ref 33–37)
MCV RBC AUTO: 97 FL (ref 82–100)
MONOCYTES ABSOLUTE: 0.8 K/UL (ref 0.2–0.8)
MONOCYTES RELATIVE PERCENT: 8.5 %
NEUTROPHILS ABSOLUTE: 6.8 K/UL (ref 1.4–6.5)
NEUTROPHILS RELATIVE PERCENT: 75 %
PDW BLD-RTO: 13.5 % (ref 11.5–14.5)
PLATELET # BLD: 280 K/UL (ref 130–400)
POTASSIUM REFLEX MAGNESIUM: 3.7 MEQ/L (ref 3.4–4.9)
RBC # BLD: 3.14 M/UL (ref 4.2–5.4)
SODIUM BLD-SCNC: 141 MEQ/L (ref 135–144)
URINE CULTURE, ROUTINE: NORMAL
WBC # BLD: 9.1 K/UL (ref 4.8–10.8)

## 2019-10-13 PROCEDURE — 6360000002 HC RX W HCPCS: Performed by: PHYSICIAN ASSISTANT

## 2019-10-13 PROCEDURE — 2500000003 HC RX 250 WO HCPCS: Performed by: PHYSICIAN ASSISTANT

## 2019-10-13 PROCEDURE — 6370000000 HC RX 637 (ALT 250 FOR IP): Performed by: PHYSICIAN ASSISTANT

## 2019-10-13 PROCEDURE — 2700000000 HC OXYGEN THERAPY PER DAY

## 2019-10-13 PROCEDURE — 85025 COMPLETE CBC W/AUTO DIFF WBC: CPT

## 2019-10-13 PROCEDURE — 36415 COLL VENOUS BLD VENIPUNCTURE: CPT

## 2019-10-13 PROCEDURE — 2580000003 HC RX 258: Performed by: PHYSICIAN ASSISTANT

## 2019-10-13 PROCEDURE — 80048 BASIC METABOLIC PNL TOTAL CA: CPT

## 2019-10-13 RX ORDER — CIPROFLOXACIN 500 MG/1
500 TABLET, FILM COATED ORAL DAILY
Qty: 14 TABLET | Refills: 0 | Status: SHIPPED | OUTPATIENT
Start: 2019-10-13 | End: 2019-10-17

## 2019-10-13 RX ORDER — PSEUDOEPHEDRINE HCL 30 MG
100 TABLET ORAL NIGHTLY
Qty: 30 CAPSULE | Refills: 3 | Status: SHIPPED | OUTPATIENT
Start: 2019-10-13 | End: 2019-10-29 | Stop reason: ALTCHOICE

## 2019-10-13 RX ORDER — LISINOPRIL 10 MG/1
10 TABLET ORAL DAILY
Qty: 30 TABLET | Refills: 3 | Status: ON HOLD | OUTPATIENT
Start: 2019-10-13 | End: 2019-10-23 | Stop reason: HOSPADM

## 2019-10-13 RX ORDER — CIPROFLOXACIN 2 MG/ML
400 INJECTION, SOLUTION INTRAVENOUS EVERY 12 HOURS
Status: DISCONTINUED | OUTPATIENT
Start: 2019-10-13 | End: 2019-10-13 | Stop reason: HOSPADM

## 2019-10-13 RX ORDER — ASPIRIN 81 MG/1
81 TABLET ORAL DAILY
Qty: 30 TABLET | Refills: 3 | Status: SHIPPED | OUTPATIENT
Start: 2019-10-13

## 2019-10-13 RX ORDER — POLYETHYLENE GLYCOL 3350 17 G/17G
17 POWDER, FOR SOLUTION ORAL DAILY PRN
Qty: 527 G | Refills: 1 | Status: SHIPPED | OUTPATIENT
Start: 2019-10-13 | End: 2019-11-12

## 2019-10-13 RX ORDER — METRONIDAZOLE 500 MG/1
500 TABLET ORAL 2 TIMES DAILY
Qty: 8 TABLET | Refills: 0 | Status: SHIPPED | OUTPATIENT
Start: 2019-10-13 | End: 2019-10-17

## 2019-10-13 RX ADMIN — Medication 10 ML: at 10:50

## 2019-10-13 RX ADMIN — METRONIDAZOLE 500 MG: 500 INJECTION, SOLUTION INTRAVENOUS at 06:47

## 2019-10-13 RX ADMIN — FAMOTIDINE 20 MG: 20 TABLET ORAL at 09:59

## 2019-10-13 RX ADMIN — CIPROFLOXACIN 400 MG: 2 INJECTION, SOLUTION INTRAVENOUS at 01:33

## 2019-10-14 ENCOUNTER — CARE COORDINATION (OUTPATIENT)
Dept: CASE MANAGEMENT | Age: 74
End: 2019-10-14

## 2019-10-14 DIAGNOSIS — K52.9 COLITIS: Primary | ICD-10-CM

## 2019-10-14 PROCEDURE — 1111F DSCHRG MED/CURRENT MED MERGE: CPT | Performed by: NURSE PRACTITIONER

## 2019-10-15 ENCOUNTER — OFFICE VISIT (OUTPATIENT)
Dept: FAMILY MEDICINE CLINIC | Age: 74
End: 2019-10-15
Payer: MEDICARE

## 2019-10-15 VITALS
OXYGEN SATURATION: 97 % | SYSTOLIC BLOOD PRESSURE: 86 MMHG | TEMPERATURE: 97.7 F | DIASTOLIC BLOOD PRESSURE: 60 MMHG | HEART RATE: 70 BPM | HEIGHT: 55 IN | WEIGHT: 136 LBS | BODY MASS INDEX: 31.47 KG/M2

## 2019-10-15 DIAGNOSIS — I50.31 ACUTE DIASTOLIC HEART FAILURE (HCC): ICD-10-CM

## 2019-10-15 DIAGNOSIS — I95.9 HYPOTENSION, UNSPECIFIED HYPOTENSION TYPE: ICD-10-CM

## 2019-10-15 DIAGNOSIS — R57.1 HYPOVOLEMIC SHOCK (HCC): ICD-10-CM

## 2019-10-15 DIAGNOSIS — J44.9 CHRONIC OBSTRUCTIVE PULMONARY DISEASE, UNSPECIFIED COPD TYPE (HCC): ICD-10-CM

## 2019-10-15 DIAGNOSIS — K59.01 SLOW TRANSIT CONSTIPATION: ICD-10-CM

## 2019-10-15 DIAGNOSIS — R41.0 CONFUSION: ICD-10-CM

## 2019-10-15 DIAGNOSIS — N17.9 AKI (ACUTE KIDNEY INJURY) (HCC): ICD-10-CM

## 2019-10-15 DIAGNOSIS — I95.9 HYPOTENSION, UNSPECIFIED HYPOTENSION TYPE: Primary | ICD-10-CM

## 2019-10-15 DIAGNOSIS — Z09 HOSPITAL DISCHARGE FOLLOW-UP: ICD-10-CM

## 2019-10-15 DIAGNOSIS — I21.4 NSTEMI (NON-ST ELEVATED MYOCARDIAL INFARCTION) (HCC): ICD-10-CM

## 2019-10-15 LAB
ALBUMIN SERPL-MCNC: 3.5 G/DL (ref 3.5–4.6)
ALP BLD-CCNC: 62 U/L (ref 40–130)
ALT SERPL-CCNC: 13 U/L (ref 0–33)
ANION GAP SERPL CALCULATED.3IONS-SCNC: 13 MEQ/L (ref 9–15)
AST SERPL-CCNC: 26 U/L (ref 0–35)
BANDED NEUTROPHILS RELATIVE PERCENT: 13 % (ref 5–11)
BASOPHILS ABSOLUTE: 0.1 K/UL (ref 0–0.2)
BASOPHILS RELATIVE PERCENT: 1 %
BILIRUB SERPL-MCNC: <0.2 MG/DL (ref 0.2–0.7)
BLOOD CULTURE, ROUTINE: NORMAL
BUN BLDV-MCNC: 9 MG/DL (ref 8–23)
CALCIUM SERPL-MCNC: 9.3 MG/DL (ref 8.5–9.9)
CHLORIDE BLD-SCNC: 105 MEQ/L (ref 95–107)
CO2: 24 MEQ/L (ref 20–31)
CREAT SERPL-MCNC: 0.88 MG/DL (ref 0.5–0.9)
CULTURE, BLOOD 2: NORMAL
EOSINOPHILS ABSOLUTE: 0.5 K/UL (ref 0–0.7)
EOSINOPHILS RELATIVE PERCENT: 4 %
GFR AFRICAN AMERICAN: >60
GFR NON-AFRICAN AMERICAN: >60
GLOBULIN: 2.8 G/DL (ref 2.3–3.5)
GLUCOSE BLD-MCNC: 96 MG/DL (ref 70–99)
HCT VFR BLD CALC: 36.1 % (ref 37–47)
HEMOGLOBIN: 11.6 G/DL (ref 12–16)
LYMPHOCYTES ABSOLUTE: 2 K/UL (ref 1–4.8)
LYMPHOCYTES RELATIVE PERCENT: 17 %
MCH RBC QN AUTO: 31.4 PG (ref 27–31.3)
MCHC RBC AUTO-ENTMCNC: 32.1 % (ref 33–37)
MCV RBC AUTO: 97.7 FL (ref 82–100)
METAMYELOCYTES RELATIVE PERCENT: 1 %
MONOCYTES ABSOLUTE: 0.7 K/UL (ref 0.2–0.8)
MONOCYTES RELATIVE PERCENT: 6.2 %
MYELOCYTE PERCENT: 1 %
NEUTROPHILS ABSOLUTE: 8.5 K/UL (ref 1.4–6.5)
NEUTROPHILS RELATIVE PERCENT: 58 %
NUCLEATED RED BLOOD CELLS: 1 /100 WBC
PDW BLD-RTO: 13.8 % (ref 11.5–14.5)
PLATELET # BLD: 339 K/UL (ref 130–400)
PLATELET SLIDE REVIEW: NORMAL
POIKILOCYTES: ABNORMAL
POTASSIUM SERPL-SCNC: 3.9 MEQ/L (ref 3.4–4.9)
RBC # BLD: 3.7 M/UL (ref 4.2–5.4)
SODIUM BLD-SCNC: 142 MEQ/L (ref 135–144)
TOTAL PROTEIN: 6.3 G/DL (ref 6.3–8)
WBC # BLD: 11.7 K/UL (ref 4.8–10.8)

## 2019-10-15 PROCEDURE — 1111F DSCHRG MED/CURRENT MED MERGE: CPT | Performed by: NURSE PRACTITIONER

## 2019-10-15 PROCEDURE — 99495 TRANSJ CARE MGMT MOD F2F 14D: CPT | Performed by: NURSE PRACTITIONER

## 2019-10-15 RX ORDER — FAMOTIDINE 20 MG/1
20 TABLET, FILM COATED ORAL
Status: ON HOLD | COMMUNITY
Start: 2019-10-11 | End: 2019-10-23 | Stop reason: HOSPADM

## 2019-10-15 ASSESSMENT — ENCOUNTER SYMPTOMS
SHORTNESS OF BREATH: 0
COUGH: 0

## 2019-10-21 ENCOUNTER — APPOINTMENT (OUTPATIENT)
Dept: GENERAL RADIOLOGY | Age: 74
DRG: 312 | End: 2019-10-21
Payer: MEDICARE

## 2019-10-21 ENCOUNTER — TELEPHONE (OUTPATIENT)
Dept: FAMILY MEDICINE CLINIC | Age: 74
End: 2019-10-21

## 2019-10-21 ENCOUNTER — TELEPHONE (OUTPATIENT)
Dept: OTHER | Facility: CLINIC | Age: 74
End: 2019-10-21

## 2019-10-21 ENCOUNTER — HOSPITAL ENCOUNTER (INPATIENT)
Age: 74
LOS: 2 days | Discharge: HOME HEALTH | DRG: 312 | End: 2019-10-23
Attending: INTERNAL MEDICINE | Admitting: INTERNAL MEDICINE
Payer: MEDICARE

## 2019-10-21 DIAGNOSIS — E87.5 HYPERKALEMIA: ICD-10-CM

## 2019-10-21 DIAGNOSIS — R77.8 ELEVATED TROPONIN: ICD-10-CM

## 2019-10-21 DIAGNOSIS — I95.9 HYPOTENSION, UNSPECIFIED HYPOTENSION TYPE: Primary | ICD-10-CM

## 2019-10-21 DIAGNOSIS — E83.42 HYPOMAGNESEMIA: ICD-10-CM

## 2019-10-21 DIAGNOSIS — R00.1 BRADYCARDIA: ICD-10-CM

## 2019-10-21 DIAGNOSIS — N17.9 AKI (ACUTE KIDNEY INJURY) (HCC): ICD-10-CM

## 2019-10-21 LAB
ALBUMIN SERPL-MCNC: 3.3 G/DL (ref 3.5–4.6)
ALP BLD-CCNC: 56 U/L (ref 40–130)
ALT SERPL-CCNC: 13 U/L (ref 0–33)
ANION GAP SERPL CALCULATED.3IONS-SCNC: 13 MEQ/L (ref 9–15)
APTT: 26.3 SEC (ref 24.4–36.8)
AST SERPL-CCNC: 26 U/L (ref 0–35)
BACTERIA: NEGATIVE /HPF
BASOPHILS ABSOLUTE: 0.1 K/UL (ref 0–0.2)
BASOPHILS RELATIVE PERCENT: 1 %
BILIRUB SERPL-MCNC: <0.2 MG/DL (ref 0.2–0.7)
BILIRUBIN URINE: NEGATIVE
BLOOD, URINE: NEGATIVE
BUN BLDV-MCNC: 17 MG/DL (ref 8–23)
CALCIUM SERPL-MCNC: 8.6 MG/DL (ref 8.5–9.9)
CHLORIDE BLD-SCNC: 99 MEQ/L (ref 95–107)
CLARITY: CLEAR
CO2: 24 MEQ/L (ref 20–31)
COLOR: YELLOW
CREAT SERPL-MCNC: 1.27 MG/DL (ref 0.5–0.9)
EKG ATRIAL RATE: 51 BPM
EKG P AXIS: 58 DEGREES
EKG P-R INTERVAL: 100 MS
EKG Q-T INTERVAL: 530 MS
EKG QRS DURATION: 90 MS
EKG QTC CALCULATION (BAZETT): 488 MS
EKG R AXIS: 51 DEGREES
EKG T AXIS: 67 DEGREES
EKG VENTRICULAR RATE: 51 BPM
EOSINOPHILS ABSOLUTE: 0.3 K/UL (ref 0–0.7)
EOSINOPHILS RELATIVE PERCENT: 2.8 %
EPITHELIAL CELLS, UA: NORMAL /HPF (ref 0–5)
GFR AFRICAN AMERICAN: 49.5
GFR NON-AFRICAN AMERICAN: 40.9
GLOBULIN: 2.8 G/DL (ref 2.3–3.5)
GLUCOSE BLD-MCNC: 96 MG/DL (ref 70–99)
GLUCOSE URINE: NEGATIVE MG/DL
HCT VFR BLD CALC: 31.2 % (ref 37–47)
HEMOGLOBIN: 10.3 G/DL (ref 12–16)
HYALINE CASTS: NORMAL /HPF (ref 0–5)
INR BLD: 1
KETONES, URINE: NEGATIVE MG/DL
LACTIC ACID: 1.9 MMOL/L (ref 0.5–2.2)
LEUKOCYTE ESTERASE, URINE: ABNORMAL
LYMPHOCYTES ABSOLUTE: 2.4 K/UL (ref 1–4.8)
LYMPHOCYTES RELATIVE PERCENT: 20.4 %
MAGNESIUM: 1.6 MG/DL (ref 1.7–2.4)
MCH RBC QN AUTO: 31.7 PG (ref 27–31.3)
MCHC RBC AUTO-ENTMCNC: 33 % (ref 33–37)
MCV RBC AUTO: 95.8 FL (ref 82–100)
MONOCYTES ABSOLUTE: 0.7 K/UL (ref 0.2–0.8)
MONOCYTES RELATIVE PERCENT: 6.3 %
NEUTROPHILS ABSOLUTE: 8.2 K/UL (ref 1.4–6.5)
NEUTROPHILS RELATIVE PERCENT: 69.5 %
NITRITE, URINE: NEGATIVE
PDW BLD-RTO: 14.4 % (ref 11.5–14.5)
PH UA: 6 (ref 5–9)
PLATELET # BLD: 275 K/UL (ref 130–400)
POTASSIUM SERPL-SCNC: 5.2 MEQ/L (ref 3.4–4.9)
PROTEIN UA: NEGATIVE MG/DL
PROTHROMBIN TIME: 13.1 SEC (ref 12.3–14.9)
RBC # BLD: 3.25 M/UL (ref 4.2–5.4)
RBC UA: NORMAL /HPF (ref 0–5)
SODIUM BLD-SCNC: 136 MEQ/L (ref 135–144)
SPECIFIC GRAVITY UA: 1 (ref 1–1.03)
TOTAL CK: 48 U/L (ref 0–170)
TOTAL PROTEIN: 6.1 G/DL (ref 6.3–8)
TROPONIN: 0.03 NG/ML (ref 0–0.01)
TSH REFLEX: 2.33 UIU/ML (ref 0.44–3.86)
URINE REFLEX TO CULTURE: YES
UROBILINOGEN, URINE: 0.2 E.U./DL
WBC # BLD: 11.8 K/UL (ref 4.8–10.8)
WBC UA: NORMAL /HPF (ref 0–5)

## 2019-10-21 PROCEDURE — 36415 COLL VENOUS BLD VENIPUNCTURE: CPT

## 2019-10-21 PROCEDURE — 2060000000 HC ICU INTERMEDIATE R&B

## 2019-10-21 PROCEDURE — 96360 HYDRATION IV INFUSION INIT: CPT

## 2019-10-21 PROCEDURE — 85730 THROMBOPLASTIN TIME PARTIAL: CPT

## 2019-10-21 PROCEDURE — 82550 ASSAY OF CK (CPK): CPT

## 2019-10-21 PROCEDURE — 2580000003 HC RX 258: Performed by: NURSE PRACTITIONER

## 2019-10-21 PROCEDURE — 80053 COMPREHEN METABOLIC PANEL: CPT

## 2019-10-21 PROCEDURE — 2580000003 HC RX 258: Performed by: INTERNAL MEDICINE

## 2019-10-21 PROCEDURE — 83605 ASSAY OF LACTIC ACID: CPT

## 2019-10-21 PROCEDURE — 81003 URINALYSIS AUTO W/O SCOPE: CPT

## 2019-10-21 PROCEDURE — 93005 ELECTROCARDIOGRAM TRACING: CPT | Performed by: NURSE PRACTITIONER

## 2019-10-21 PROCEDURE — 85610 PROTHROMBIN TIME: CPT

## 2019-10-21 PROCEDURE — 84484 ASSAY OF TROPONIN QUANT: CPT

## 2019-10-21 PROCEDURE — 85025 COMPLETE CBC W/AUTO DIFF WBC: CPT

## 2019-10-21 PROCEDURE — 71045 X-RAY EXAM CHEST 1 VIEW: CPT

## 2019-10-21 PROCEDURE — 84443 ASSAY THYROID STIM HORMONE: CPT

## 2019-10-21 PROCEDURE — 83735 ASSAY OF MAGNESIUM: CPT

## 2019-10-21 PROCEDURE — 6370000000 HC RX 637 (ALT 250 FOR IP): Performed by: INTERNAL MEDICINE

## 2019-10-21 PROCEDURE — 99285 EMERGENCY DEPT VISIT HI MDM: CPT

## 2019-10-21 PROCEDURE — 2580000003 HC RX 258: Performed by: PERSONAL EMERGENCY RESPONSE ATTENDANT

## 2019-10-21 PROCEDURE — 87086 URINE CULTURE/COLONY COUNT: CPT

## 2019-10-21 RX ORDER — SODIUM CHLORIDE 9 MG/ML
INJECTION, SOLUTION INTRAVENOUS CONTINUOUS
Status: DISCONTINUED | OUTPATIENT
Start: 2019-10-21 | End: 2019-10-22

## 2019-10-21 RX ORDER — 0.9 % SODIUM CHLORIDE 0.9 %
30 INTRAVENOUS SOLUTION INTRAVENOUS ONCE
Status: COMPLETED | OUTPATIENT
Start: 2019-10-21 | End: 2019-10-21

## 2019-10-21 RX ORDER — POLYETHYLENE GLYCOL 3350 17 G/17G
17 POWDER, FOR SOLUTION ORAL DAILY PRN
Status: DISCONTINUED | OUTPATIENT
Start: 2019-10-21 | End: 2019-10-23 | Stop reason: HOSPADM

## 2019-10-21 RX ORDER — DONEPEZIL HYDROCHLORIDE 5 MG/1
5 TABLET, FILM COATED ORAL NIGHTLY
Status: DISCONTINUED | OUTPATIENT
Start: 2019-10-21 | End: 2019-10-23 | Stop reason: HOSPADM

## 2019-10-21 RX ORDER — POTASSIUM CHLORIDE 20 MEQ/1
40 TABLET, EXTENDED RELEASE ORAL PRN
Status: DISCONTINUED | OUTPATIENT
Start: 2019-10-21 | End: 2019-10-23 | Stop reason: HOSPADM

## 2019-10-21 RX ORDER — ACETAMINOPHEN 325 MG/1
650 TABLET ORAL EVERY 4 HOURS PRN
Status: DISCONTINUED | OUTPATIENT
Start: 2019-10-21 | End: 2019-10-23 | Stop reason: HOSPADM

## 2019-10-21 RX ORDER — MAGNESIUM SULFATE 1 G/100ML
1 INJECTION INTRAVENOUS PRN
Status: DISCONTINUED | OUTPATIENT
Start: 2019-10-21 | End: 2019-10-23 | Stop reason: HOSPADM

## 2019-10-21 RX ORDER — ONDANSETRON 2 MG/ML
4 INJECTION INTRAMUSCULAR; INTRAVENOUS EVERY 6 HOURS PRN
Status: DISCONTINUED | OUTPATIENT
Start: 2019-10-21 | End: 2019-10-23 | Stop reason: HOSPADM

## 2019-10-21 RX ORDER — PANTOPRAZOLE SODIUM 40 MG/1
40 TABLET, DELAYED RELEASE ORAL
Status: DISCONTINUED | OUTPATIENT
Start: 2019-10-22 | End: 2019-10-23 | Stop reason: HOSPADM

## 2019-10-21 RX ORDER — POTASSIUM CHLORIDE 7.45 MG/ML
10 INJECTION INTRAVENOUS PRN
Status: DISCONTINUED | OUTPATIENT
Start: 2019-10-21 | End: 2019-10-23 | Stop reason: HOSPADM

## 2019-10-21 RX ORDER — DOCUSATE SODIUM 100 MG/1
100 CAPSULE, LIQUID FILLED ORAL 2 TIMES DAILY
Status: DISCONTINUED | OUTPATIENT
Start: 2019-10-21 | End: 2019-10-23 | Stop reason: HOSPADM

## 2019-10-21 RX ORDER — SERTRALINE HYDROCHLORIDE 100 MG/1
100 TABLET, FILM COATED ORAL DAILY
Status: DISCONTINUED | OUTPATIENT
Start: 2019-10-22 | End: 2019-10-23 | Stop reason: HOSPADM

## 2019-10-21 RX ORDER — GABAPENTIN 400 MG/1
400 CAPSULE ORAL 3 TIMES DAILY
Status: DISCONTINUED | OUTPATIENT
Start: 2019-10-21 | End: 2019-10-23 | Stop reason: HOSPADM

## 2019-10-21 RX ORDER — SODIUM CHLORIDE 0.9 % (FLUSH) 0.9 %
10 SYRINGE (ML) INJECTION PRN
Status: DISCONTINUED | OUTPATIENT
Start: 2019-10-21 | End: 2019-10-23 | Stop reason: HOSPADM

## 2019-10-21 RX ORDER — SODIUM CHLORIDE 0.9 % (FLUSH) 0.9 %
10 SYRINGE (ML) INJECTION EVERY 12 HOURS SCHEDULED
Status: DISCONTINUED | OUTPATIENT
Start: 2019-10-21 | End: 2019-10-23 | Stop reason: HOSPADM

## 2019-10-21 RX ADMIN — DOCUSATE SODIUM 100 MG: 100 CAPSULE, LIQUID FILLED ORAL at 22:39

## 2019-10-21 RX ADMIN — Medication 10 ML: at 22:43

## 2019-10-21 RX ADMIN — SODIUM CHLORIDE: 9 INJECTION, SOLUTION INTRAVENOUS at 20:01

## 2019-10-21 RX ADMIN — GABAPENTIN 400 MG: 400 CAPSULE ORAL at 22:39

## 2019-10-21 RX ADMIN — DONEPEZIL HYDROCHLORIDE 5 MG: 5 TABLET, FILM COATED ORAL at 22:40

## 2019-10-21 RX ADMIN — SODIUM CHLORIDE 1000 ML: 9 INJECTION, SOLUTION INTRAVENOUS at 18:09

## 2019-10-21 ASSESSMENT — PAIN SCALES - GENERAL: PAINLEVEL_OUTOF10: 0

## 2019-10-21 ASSESSMENT — ENCOUNTER SYMPTOMS
BACK PAIN: 0
COUGH: 0
NAUSEA: 0
SORE THROAT: 0
SHORTNESS OF BREATH: 0
ABDOMINAL PAIN: 0
DIARRHEA: 0
PHOTOPHOBIA: 0
EYE PAIN: 0
VOMITING: 0
RHINORRHEA: 0

## 2019-10-21 NOTE — ED PROVIDER NOTES
[address] [facility] ED  EMERGENCY DEPARTMENT ENCOUNTER      Pt Name: Merlin Ates  MRN: [id number]  [place] 1945  Date of evaluation: 10/21/2019  Provider: [address]       Chief Complaint   Patient presents with    Hypotension     home health nurse had pt come to ED d/t low BP         HISTORY OF PRESENT ILLNESS   (Location/Symptom, Timing/Onset,Context/Setting, Quality, Duration, Modifying Factors, Severity)  Note limiting factors. Merlin Ates is a 66 y.o. female who presents to the emergency department for evaluation of hypotension. Patient had home health nurse come to the home today and noted that the patient had low blood pressure. Patient reports that she seems to be in her normal health and is in fact improved from multiple recent hospital evaluations. She has been up and ambulatory throughout the course of the day. She denies any periods of dizziness or lightheadedness. She has been tolerating a normal diet. She denies associated headache dizziness lightheadedness fever sweats chills chest pain shortness of breath palpitations dyspnea dyspnea on exertion orthopnea PND nausea vomiting diarrhea constipation or abdominal pain. She denies any dysuria. Location/Symptom -hypotension  Onset -today, history of the same that needed admission to the hospital  Context/Setting - as above  Quality -hypotensive asymptomatic  Duration -today with history of same  Modifying Factors -none obvious  Severity -moderate to severe        Nursing Notes were reviewed. REVIEW OF SYSTEMS    (2-9 systems for level 4, 10 or more for level 5)     Review of Systems   Constitutional: Negative for chills, diaphoresis, fatigue and fever. HENT: Negative for congestion, rhinorrhea and sore throat. Eyes: Negative for photophobia and pain. Respiratory: Negative for cough and shortness of breath. Cardiovascular: Negative for chest pain and palpitations. Gastrointestinal: Negative for abdominal pain, diarrhea, nausea and vomiting. Genitourinary: Negative for dysuria and flank pain. Musculoskeletal: Negative for back pain. Skin: Negative for rash. Neurological: Negative for dizziness, light-headedness and headaches. Psychiatric/Behavioral: Negative. All other systems reviewed and are negative. Except as noted above the remainder of the review of systems was reviewed and negative.        PAST MEDICAL HISTORY     Past Medical History:   Diagnosis Date    Anxiety     Arthritis     COPD (chronic obstructive pulmonary disease) ([facility] [address]     Generalized osteoarthrosis, unspecified site 12/27/2002    Hyperlipidemia     Hypertension     Major depressive disorder, single episode, moderate ([address] 12/27/2002    Morbid obesity ([facility] [address] 12/27/2002    OAB (overactive bladder)     Osteoporosis      Past Surgical History:   Procedure Laterality Date    CARDIAC CATHETERIZATION      CARPAL TUNNEL RELEASE      COLONOSCOPY      HYSTERECTOMY, TOTAL ABDOMINAL      KNEE ARTHROPLASTY Bilateral     [place] COLON CA SCRN NOT  [address] IND N/A 6/22/2017    COLONOSCOPY performed by Nilay Hayes MD at . [facility] ESOPHAGOGASTRODUODENOSCOPY TRANSORAL DIAGNOSTIC N/A 6/22/2017    EGD ESOPHAGOGASTRODUODENOSCOPY performed by Nilay Hayes MD at [address] Bilateral      Social History     Socioeconomic History    Marital status:      Spouse name: Not on file    Number of children: Not on file    Years of education: Not on file    Highest education level: Not on file   Occupational History    Not on file   Social Needs    Financial resource strain: Not on file    Food insecurity:     Worry: Not on file     Inability: Not on file    Transportation needs:     Medical: Not on file     Non-medical: Not on file   Tobacco Use    Smoking status: Former Smoker     Packs/day: 1.00     Years: 20.00     Pack years: 20.00     Last attempt to quit: 1980     Years since quittin.8    Smokeless tobacco: Never Used   Substance and Sexual Activity    Alcohol use: No    Drug use: Not on file    Sexual activity: Not on file   Lifestyle    Physical activity:     Days per week: Not on file     Minutes per session: Not on file    Stress: Not on file   Relationships    Social connections:     Talks on phone: Not on file     Gets together: Not on file     Attends Mu-ism service: Not on file     Active member of club or organization: Not on file     Attends meetings of clubs or organizations: Not on file     Relationship status: Not on file    Intimate partner violence:     Fear of current or ex partner: Not on file     Emotionally abused: Not on file     Physically abused: Not on file     Forced sexual activity: Not on file   Other Topics Concern    Not on file   Social History Narrative    Not on file       SCREENINGS             PHYSICAL EXAM    (up to 7 for level 4, 8 or more for level 5)     ED Triage Vitals [10/21/19 1631]   BP Temp Temp Source Pulse Resp SpO2 Height Weight   (!) 83/32 97.5 °F (36.4 °C) Tympanic (!) 45 20 -- 4' 7\" (1.397 m) 127 lb (57.6 kg)       Physical Exam   Constitutional: She is oriented to person, place, and time. She appears well-developed and well-nourished. She is active. No distress. HENT:   Head: Normocephalic and atraumatic. Mouth/Throat: Mucous membranes are normal.   Eyes: Conjunctivae and lids are normal.   Neck: Normal range of motion. Neck supple. Cardiovascular: Regular rhythm, normal heart sounds, intact distal pulses and normal pulses. Bradycardia present. Pulmonary/Chest: Effort normal and breath sounds normal.   Abdominal: Soft. Normal appearance and bowel sounds are normal. There is no tenderness. Lymphadenopathy:     She has no cervical adenopathy. Neurological: She is alert and oriented to person, place, and time. She has normal strength.    Skin: Skin is warm, dry and intact. Capillary refill takes less than 2 seconds. No rash noted. She is not diaphoretic. Psychiatric: Judgment and thought content normal.   Nursing note and vitals reviewed.       RESULTS     EKG: All EKG's are interpreted by the Emergency Department Physician who either signs or Co-signsthis chart in the absence of a cardiologist.    Sinus bradycardia PACs 51 bpm.  No acute ST segment changes    RADIOLOGY:   Non-plain filmimages such as CT, Ultrasound and MRI are read by the radiologist. Plain radiographic images are visualized and preliminarily interpreted by the emergency physician with the below findings:    Chest x-ray shows no acute infiltrate effusion or pneumothorax    Interpretation per the Radiologist below, if available at the time ofthis note:    XR CHEST PORTABLE    (Results Pending)         ED BEDSIDE ULTRASOUND:   Performed by ED Physician - none    LABS:  Labs Reviewed   CBC WITH AUTO DIFFERENTIAL - Abnormal; Notable for the following components:       Result Value    WBC 11.8 (*)     RBC 3.25 (*)     Hemoglobin 10.3 (*)     Hematocrit 31.2 (*)     MCH 31.7 (*)     Neutrophils Absolute 8.2 (*)     All other components within normal limits   COMPREHENSIVE METABOLIC PANEL - Abnormal; Notable for the following components:    Potassium 5.2 (*)     CREATININE 1.27 (*)     GFR Non- 40.9 (*)     GFR  49.5 (*)     Total Protein 6.1 (*)     Alb 3.3 (*)     All other components within normal limits   URINE RT REFLEX TO CULTURE - Abnormal; Notable for the following components:    Leukocyte Esterase, Urine SMALL (*)     All other components within normal limits   TROPONIN - Abnormal; Notable for the following components:    Troponin 0.026 (*)     All other components within normal limits    Narrative:     CALL  [facility]  [facility] tel. [phone],  Troponin called to Hiral Tesfaye in E.R., 10/21/2019 17:57, by [address] [facility] - Abnormal; Notable for the following components: Magnesium 1.6 (*)     All other components within normal limits   URINE CULTURE   LACTIC ACID, PLASMA   CK   APTT   PROTIME-INR   TSH WITH REFLEX    Narrative:     CALL  [facility]  [facility] tel. [phone],  Troponin called to Jos Nguyen in E.R., 10/21/2019 17:57, by [address] [facility] [facility] [place] ACID, PLASMA       All other labs were within normal range or not returned as of this dictation. EMERGENCY DEPARTMENT COURSE and DIFFERENTIAL DIAGNOSIS/MDM:   Vitals:    Vitals:    10/21/19 1631 10/21/19 1745 10/21/19 1914   BP: (!) 83/32 (!) 75/42 (!) 79/48   Pulse: (!) 45 (!) 49 52   Resp: 20 16 16   Temp: 97.5 °F (36.4 °C)     TempSrc: Tympanic     SpO2:  97% 100%   Weight: 127 lb (57.6 kg)     Height: 4' 7\" (1.397 m)              MDM patient is hypotensive and bradycardic but asymptomatic. She will be provided IV fluids. Laboratory and radiology studies were ordered for evaluation of acute pathology. Her medication list is reviewed. She is noted to be on Lopressor as well as Lasix however the medication profile is complicated as the patient's  to manage her medication is unsure which medications she is taking in which she is not. He does report at the time of last discharge from the hospital they did tell him several medications that she should not be on any longer. It does not seem likely that the patient would have accidentally overdosed as her medications are managed by her . She will require reevaluation. Although she is asymptomatic should she continue to be hypotensive despite IV fluid therapy she would obviously need to be admitted for further evaluation and management. She will be reevaluated. Is reevaluated. She continues to be without any acute complaints. Her blood pressure does continue to be low however IV fluids are still infusing. She remains asymptomatic. Laboratory and radiology studies are reviewed.   She does have renal function that is trending upward with a creatinine of now 1.27. She does not have an elevated BUN. Her troponin is noted to be elevated however much less than her recent admission. This will need to be repeated. Patient does continue to express wish for home-going. Reasoning for admission to the hospital was reviewed and discussed with patient as well as family at the bedside, they are agreeable but still hoping for discharge home. Pt was given 30cc/kg IVF and is currently on her second bolus with BP 78/48 with MAP 55. She is asymptomatic.  at bedside states she has been walking in the house without any complaints without difficulty.  states on discharge previously for hypotension, he was told to not give patient 2 medications but is unsure what they were and is unsure if he set aside the correct medications. Patient does feel she has decreased urination.  states decreased appetite as well. Patient be started on pressors if 2nd bolus IV fluids are ineffective. Potassium 5.2. I feel hyperkalemia and bradycardia may be d/t dehydration and possible metoprolol. She denies bleeding hemorrhoids. CRITICAL CARE TIME       CONSULTS:  None    PROCEDURES:  Unless otherwise noted below, none     Procedures    FINAL IMPRESSION      1. Hypotension, unspecified hypotension type    2. Bradycardia    3. CARLOS (acute kidney injury) ([facility] [address]    4. Hyperkalemia    5. Elevated troponin    6. Hypomagnesemia          DISPOSITION/PLAN   DISPOSITION Decision To Admit 10/21/2019 07:11:54 PM      PATIENT REFERRED TO:  No follow-up provider specified. DISCHARGE MEDICATIONS:  New Prescriptions    No medications on file          (Please notethat portions of this note were completed with a voice recognition program.  Efforts were made to edit the dictations but occasionally words are mis-transcribed. )    KRISTEL Nam (electronically signed)  Attending Emergency Physician         KRISTEL Curtis  10/21/19 4212

## 2019-10-22 LAB
ANION GAP SERPL CALCULATED.3IONS-SCNC: 12 MEQ/L (ref 9–15)
BUN BLDV-MCNC: 12 MG/DL (ref 8–23)
CALCIUM SERPL-MCNC: 7.9 MG/DL (ref 8.5–9.9)
CHLORIDE BLD-SCNC: 109 MEQ/L (ref 95–107)
CO2: 21 MEQ/L (ref 20–31)
CREAT SERPL-MCNC: 0.96 MG/DL (ref 0.5–0.9)
GFR AFRICAN AMERICAN: >60
GFR NON-AFRICAN AMERICAN: 56.5
GLUCOSE BLD-MCNC: 78 MG/DL (ref 70–99)
HCT VFR BLD CALC: 29.8 % (ref 37–47)
HEMOGLOBIN: 9.6 G/DL (ref 12–16)
LACTIC ACID: 1.6 MMOL/L (ref 0.5–2.2)
MCH RBC QN AUTO: 31.5 PG (ref 27–31.3)
MCHC RBC AUTO-ENTMCNC: 32.1 % (ref 33–37)
MCV RBC AUTO: 98.2 FL (ref 82–100)
PDW BLD-RTO: 14.1 % (ref 11.5–14.5)
PLATELET # BLD: 247 K/UL (ref 130–400)
POTASSIUM REFLEX MAGNESIUM: 5.1 MEQ/L (ref 3.4–4.9)
RBC # BLD: 3.03 M/UL (ref 4.2–5.4)
SODIUM BLD-SCNC: 142 MEQ/L (ref 135–144)
TROPONIN: 0.02 NG/ML (ref 0–0.01)
WBC # BLD: 9.4 K/UL (ref 4.8–10.8)

## 2019-10-22 PROCEDURE — 80048 BASIC METABOLIC PNL TOTAL CA: CPT

## 2019-10-22 PROCEDURE — 2700000000 HC OXYGEN THERAPY PER DAY

## 2019-10-22 PROCEDURE — 2060000000 HC ICU INTERMEDIATE R&B

## 2019-10-22 PROCEDURE — 85027 COMPLETE CBC AUTOMATED: CPT

## 2019-10-22 PROCEDURE — 6360000002 HC RX W HCPCS: Performed by: INTERNAL MEDICINE

## 2019-10-22 PROCEDURE — 83605 ASSAY OF LACTIC ACID: CPT

## 2019-10-22 PROCEDURE — 2580000003 HC RX 258: Performed by: HOSPITALIST

## 2019-10-22 PROCEDURE — 36415 COLL VENOUS BLD VENIPUNCTURE: CPT

## 2019-10-22 PROCEDURE — 97162 PT EVAL MOD COMPLEX 30 MIN: CPT

## 2019-10-22 PROCEDURE — 93010 ELECTROCARDIOGRAM REPORT: CPT | Performed by: INTERNAL MEDICINE

## 2019-10-22 PROCEDURE — 84484 ASSAY OF TROPONIN QUANT: CPT

## 2019-10-22 PROCEDURE — 2580000003 HC RX 258: Performed by: INTERNAL MEDICINE

## 2019-10-22 PROCEDURE — 6370000000 HC RX 637 (ALT 250 FOR IP): Performed by: INTERNAL MEDICINE

## 2019-10-22 PROCEDURE — 97165 OT EVAL LOW COMPLEX 30 MIN: CPT

## 2019-10-22 RX ADMIN — Medication 10 ML: at 08:33

## 2019-10-22 RX ADMIN — ACETAMINOPHEN 650 MG: 325 TABLET ORAL at 11:18

## 2019-10-22 RX ADMIN — ENOXAPARIN SODIUM 30 MG: 30 INJECTION SUBCUTANEOUS at 14:22

## 2019-10-22 RX ADMIN — GABAPENTIN 400 MG: 400 CAPSULE ORAL at 08:32

## 2019-10-22 RX ADMIN — SERTRALINE 100 MG: 100 TABLET, FILM COATED ORAL at 08:32

## 2019-10-22 RX ADMIN — SODIUM CHLORIDE: 9 INJECTION, SOLUTION INTRAVENOUS at 08:38

## 2019-10-22 RX ADMIN — GABAPENTIN 400 MG: 400 CAPSULE ORAL at 20:09

## 2019-10-22 RX ADMIN — PANTOPRAZOLE SODIUM 40 MG: 40 TABLET, DELAYED RELEASE ORAL at 06:21

## 2019-10-22 RX ADMIN — DOCUSATE SODIUM 100 MG: 100 CAPSULE, LIQUID FILLED ORAL at 08:32

## 2019-10-22 RX ADMIN — Medication 10 ML: at 20:09

## 2019-10-22 RX ADMIN — GABAPENTIN 400 MG: 400 CAPSULE ORAL at 14:23

## 2019-10-22 RX ADMIN — DONEPEZIL HYDROCHLORIDE 5 MG: 5 TABLET, FILM COATED ORAL at 20:09

## 2019-10-22 RX ADMIN — ACETAMINOPHEN 650 MG: 325 TABLET ORAL at 18:19

## 2019-10-22 ASSESSMENT — PAIN SCALES - GENERAL
PAINLEVEL_OUTOF10: 2
PAINLEVEL_OUTOF10: 0
PAINLEVEL_OUTOF10: 0

## 2019-10-22 ASSESSMENT — PAIN DESCRIPTION - ORIENTATION: ORIENTATION: RIGHT;LEFT

## 2019-10-22 ASSESSMENT — PAIN DESCRIPTION - LOCATION: LOCATION: LEG

## 2019-10-22 NOTE — PROGRESS NOTES
[facility] OCCUPATIONAL THERAPY EVALUATION - ACUTE     NAME: Merlin Ates  : 1945 (66 y.o.)  MRN: [id number]  CODE STATUS: DNR-CCA  Room: [place] [address]    Date of Service: 10/22/2019    Patient Diagnosis(es): Hypotension [I95.9]   Chief Complaint   Patient presents with    Hypotension     home health nurse had pt come to ED d/t low BP     Patient Active Problem List    Diagnosis Date Noted    Slow transit constipation 10/13/2019    External hemorrhoid, bleeding 10/13/2019    Colitis 10/13/2019    Acute colitis 10/10/2019    Hypovolemic shock ([address] 10/07/2019    NSTEMI (non-ST elevated myocardial infarction) ([address] 10/05/2019    Acute diastolic heart failure ([address] 10/05/2019    Hypotension 10/01/2019    Neuromuscular scoliosis of lumbar region 2019    Hypertension     Hyperlipidemia     COPD (chronic obstructive pulmonary disease) ([address]     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 ([address] 2010    Generalized osteoarthrosis, unspecified site 2002    Major depressive disorder, single episode, moderate ([address] 2002    Morbid obesity ([address] 2002        Past Medical History:   Diagnosis Date    Anxiety     Arthritis     COPD (chronic obstructive pulmonary disease) ([address]     Generalized osteoarthrosis, unspecified site 2002    Hyperlipidemia     Hypertension     Major depressive disorder, single episode, moderate ([address] 2002    Morbid obesity ([address] 2002    OAB (overactive bladder)     Osteoporosis      Past Surgical History:   Procedure Laterality Date    CARDIAC CATHETERIZATION      CARPAL TUNNEL RELEASE      COLONOSCOPY      HYSTERECTOMY, TOTAL ABDOMINAL      KNEE ARTHROPLASTY Bilateral     FL COLON CA SCRN NOT  [address] IND N/A 2017    COLONOSCOPY performed by Neda Araiza MD at [facility] [address] ESOPHAGOGASTRODUODENOSCOPY TRANSORAL DIAGNOSTIC N/A 2017    EGD ESOPHAGOGASTRODUODENOSCOPY performed by Neda Araiza MD at [facility] [address] [facility] [place]    SHOULDER ARTHROPLASTY Bilateral         Restrictions  Restrictions/Precautions: Fall Risk     Safety Devices: Safety Devices  Safety Devices in place: Yes  Type of devices: All fall risk precautions in place    Subjective  Pre Treatment Pain Screening  Pain at present: 0  Scale Used: Numeric Score  Intervention List: Patient able to continue with treatment    Pain Reassessment:   Pain Assessment  Patient Currently in Pain: Denies  Pain Assessment: 0-10  Pain Level: 0       Prior Level of Function:  Social/Functional History  Lives With: Spouse  Type of Home: House  Home Layout: Bed/Bath upstairs, Multi-level  Entrance Stairs - Number of Steps: 3-4 to enter, then a flight up or down, bathroom on both level, approximately 10 steps between floors  Bathroom Shower/Tub: Tub/Shower unit, Walk-in shower  Home Equipment: Rolling walker, Cane  ADL Assistance: Independent  Homemaking Assistance: Independent  Ambulation Assistance: Independent(No AD)  Transfer Assistance: Independent  Active : No  Additional Comments: Spouse and family assist, has HHA who has been helping, pt. unsure 'what she does\"    OBJECTIVE:     Orientation Status:  Orientation  Overall Orientation Status: Within Functional Limits    Observation:  Observation/Palpation  Posture: Fair  Observation: Pt. alert and attentive    Cognition Status:  Cognition  Overall Cognitive Status: Exceptions  Arousal/Alertness: Appropriate responses to stimuli  Following Commands:  Follows all commands without difficulty  Attention Span: Appears intact  Memory: Decreased long term memory, Decreased short term memory  Safety Judgement: Decreased awareness of need for assistance, Decreased awareness of need for safety  Problem Solving: Assistance required to identify errors made, Assistance required to implement solutions, Assistance required to generate solutions, Assistance required to correct errors made  Insights: Decreased awareness of deficits  Initiation: Does not require cues  Sequencing: Does not require cues  Cognition Comment: Per chart review and pt this is pt's cognitive baseline    Perception Status:  Perception  Overall Perceptual Status: WFL    Sensation Status:  Sensation  Overall Sensation Status: WFL    Vision and Hearing Status:  Vision  Vision: Impaired  Vision Exceptions: Wears glasses at all times  Hearing  Hearing: Exceptions to [facility]  Hearing Exceptions: Hard of hearing/hearing concerns     ROM:   LUE AROM (degrees)  LUE AROM : WFL  Left Hand AROM (degrees)  Left Hand AROM: WFL  RUE AROM (degrees)  RUE AROM : WFL  Right Hand AROM (degrees)  Right Hand AROM: WFL    Strength:  LUE Strength  Gross LUE Strength: Exceptions to [facility]  L Hand General: 3+/5  LUE Strength Comment: 3+/5 all planes  RUE Strength  Gross RUE Strength: Exceptions to [facility]  R Hand General: 3+/5  RUE Strength Comment: 3+/5 all planes    Coordination, Tone, Quality of Movement: Tone RUE  RUE Tone: Normotonic  Tone LUE  LUE Tone: Normotonic  Coordination  Movements Are Fluid And Coordinated: Yes    Hand Dominance:  Hand Dominance  Hand Dominance: Right    ADL Status:  ADL  Feeding: Independent  Grooming: Independent  UE Bathing: Independent  LE Bathing: Independent  UE Dressing: Independent  LE Dressing: Independent  Toileting: Independent  Toilet Transfers  Toilet - Technique: Ambulating  Equipment Used: Grab bars  Toilet Transfer: Modified independent       Functional Mobility:  Functional Mobility  Functional - Mobility Device: No device  Activity: To/from bathroom  Assist Level: Independent  Functional Mobility Comments: One mild LOB which pt. self corrected  Transfers  Sit to stand: Independent  Stand to sit:  Independent    Bed Mobility  Bed mobility  Supine to Sit: Independent  Sit to Supine: Independent    Seated and Standing Balance:  Balance  Sitting Balance: Independent  Standing Balance: Independent    Functional Endurance:  Activity Tolerance  Activity Tolerance: Patient Tolerated treatment well    D/C Recommendations:  OT D/C RECOMMENDATIONS  REQUIRES OT FOLLOW UP: No    Equipment Recommendations:  OT Equipment Recommendations  Equipment Needed: No    OT Education:   OT Education  OT Education: OT Role, Plan of Care  Patient Education: Educated pt. on role of acute care OT  Barriers to Learning: Decreased memory    OT Follow Up:  OT D/C RECOMMENDATIONS  REQUIRES OT FOLLOW UP: No       Assessment/Discharge Disposition:  Assessment: Pt. is a 66year old woman from home with family who presents to [facility] with hypotension. Pt's mobility deficits have resolved and pt. was able to perform ADL tasks safely. No skilled OT indicated and pt's decreased cognition is at her baseline. No skilled OT indicated at this time  Performance deficits / Impairments: Decreased safe awareness, Decreased cognition  Prognosis: Good  No Skilled OT: At baseline function  Decision Making: Low Complexity  History: Pt's medical history is moderately complex  Exam: Pt. has 2 performance deficits  Assistance / Modification: Pt. requires no assist    Six Click Score   How much help for putting on and taking off regular lower body clothing?: None  How much help for Bathing?: None  How much help for Toileting?: None  How much help for putting on and taking off regular upper body clothing?: None  How much help for taking care of personal grooming?: None  How much help for eating meals?: None  AM-PAC Inpatient Daily Activity Raw Score: 24  AM-PAC Inpatient ADL T-Scale Score : 57.54  ADL Inpatient CMS 0-100% Score: 0    Plan:  Plan  Times per week: No skilled OT indicated    Goals:   Patient Goal: Patient goals : \"I want to go home\"        Discussed and agreed upon: Yes Comments:     Therapy Time:   OT Individual Minutes  Time In: 1411  Time Out: 1421  Minutes: 10    Electronically signed by:     CHRISTOPHER Hernandez  10/22/2019, 2:47 PM

## 2019-10-22 NOTE — PROGRESS NOTES
Physical Therapy Med Surg Initial Assessment  Facility/Department: [facility]  Room: 70/W170-       NAME: Cruz Moffett  : 1945 (66 y.o.)  MRN: [id number]  CODE STATUS: DNR-CCA    Date of Service: 10/22/2019    Patient Diagnosis(es): Hypotension [I95.9]   Chief Complaint   Patient presents with    Hypotension     home health nurse had pt come to ED d/t low BP     Patient Active Problem List    Diagnosis Date Noted    Slow transit constipation 10/13/2019    External hemorrhoid, bleeding 10/13/2019    Colitis 10/13/2019    Acute colitis 10/10/2019    Hypovolemic shock ([address] 10/07/2019    NSTEMI (non-ST elevated myocardial infarction) ([address] 10/05/2019    Acute diastolic heart failure ([address] 10/05/2019    Hypotension 10/01/2019    Neuromuscular scoliosis of lumbar region 2019    Hypertension     Hyperlipidemia     COPD (chronic obstructive pulmonary disease) ([address]     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 ([address] 2010    Generalized osteoarthrosis, unspecified site 2002    Major depressive disorder, single episode, moderate ([address] 2002    Morbid obesity ([address] 2002        Past Medical History:   Diagnosis Date    Anxiety     Arthritis     COPD (chronic obstructive pulmonary disease) ([address]     Generalized osteoarthrosis, unspecified site 2002    Hyperlipidemia     Hypertension     Major depressive disorder, single episode, moderate ([address] 2002    Morbid obesity ([address] 2002    OAB (overactive bladder)     Osteoporosis      Past Surgical History:   Procedure Laterality Date    CARDIAC CATHETERIZATION      CARPAL TUNNEL RELEASE      COLONOSCOPY      HYSTERECTOMY, TOTAL ABDOMINAL      KNEE ARTHROPLASTY Bilateral     MI COLON CA SCRN NOT  [address] IND N/A 2017    COLONOSCOPY performed by Willi Casillas MD at . [address] ESOPHAGOGASTRODUODENOSCOPY TRANSORAL DIAGNOSTIC N/A 2017    EGD ESOPHAGOGASTRODUODENOSCOPY performed [facility] [address] [facility] Bilateral        Chart Reviewed: Yes  Patient assessed for rehabilitation services?: Yes  Family / Caregiver Present: Yes(spouse and brothers present for partial session)  General Comment  Comments: Pt resting in bed - agreeable to PT evaluation    Restrictions:  Restrictions/Precautions: Fall Risk     SUBJECTIVE: Subjective: \"I don't wear my oxygen all the time at home. \"  Pre Treatment Pain Screening  Comments / Details: denies    Post Treatment Pain Screening:   Pain Assessment  Pain Assessment: (denies)    Prior Level of Function:  Social/Functional History  Lives With: Spouse  Type of Home: House  Home Layout: Bed/Bath upstairs, Multi-level  Entrance Stairs - Number of Steps: 3-4 to enter, then a flight up or down, bathroom on both level, approximately 10 steps between floors  Bathroom Shower/Tub: Tub/Shower unit, Walk-in shower  Home Equipment: Rolling walker, Cane  ADL Assistance: Independent  Homemaking Assistance: Independent  Ambulation Assistance: Independent(No AD)  Transfer Assistance: Independent  Active : No  Additional Comments: Spouse and family assist, has HHA who has been helping, pt. unsure 'what she does\"    OBJECTIVE:   Vision/Hearing:  Vision Exceptions: Wears glasses at all times  Hearing Exceptions: Hard of hearing/hearing concerns    Cognition:  Overall Orientation Status: Within Functional Limits  Follows Commands: Within Functional Limits    Observation/Palpation  Observation: No acute distress noted.  2L O2 via NC.     ROM:  RLE PROM: WFL  LLE PROM: WFL    Strength:  Strength RLE  Strength RLE: WFL  Strength LLE  Strength LLE: WFL    Neuro:  Balance  Sitting - Static: Good  Sitting - Dynamic: Good  Standing - Static: Good  Standing - Dynamic: Good     Motor Control  Gross Motor?: WNL  Sensation  Overall Sensation Status: WFL    Bed mobility  Supine to Sit: Independent  Sit to Supine: Independent    Transfers  Sit to Stand: Supervision  Stand to sit: Supervision  Bed to Chair: Supervision    Ambulation 1  Device: No Device  Assistance: Supervision;Stand by assistance  Quality of Gait: Decreased safety awareness noted with O2 line management. Increased SOB with desat to 86% following short ambulatory activities. Distance: 15ft X 2  Comments: standing X 2min  Stairs/Curb  Stairs?: No    Activity Tolerance  Activity Tolerance: Patient limited by endurance  Activity Tolerance: Coached in deep breathing to recover O2 sats following walking          ASSESSMENT:   Body structures, Functions, Activity limitations: Decreased functional mobility ; Decreased safe awareness;Decreased endurance  Decision Making: Medium Complexity  History: Med  Exam: Med  Clinical Presentation: Med    Prognosis: Good  PT Education: Goals;PT Role;Plan of Care  Barriers to Learning: dementia baseline    DISCHARGE RECOMMENDATIONS:  Discharge Recommendations: Continue to assess pending progress, Patient would benefit from continued therapy after discharge    Assessment: Continued PT indicated to instruct in safe mobility with O2 lines and improve activity tolerance   REQUIRES PT FOLLOW UP: Yes      PLAN OF CARE:  Plan  Times per week: 2-3 visits  Current Treatment Recommendations: Strengthening, ROM, Balance Training, Transfer Training, Functional Mobility Training, Endurance Training, Safety Education & Training, Patient/Caregiver Education & Training, Equipment Evaluation, Education, & procurement, Neuromuscular Re-education, Gait Training, Stair training  Safety Devices  Type of devices: All fall risk precautions in place    Goals:  Short term goals  Short term goal 1: Pt to complete following activities maintaining SaO2 >90%  Long term goals  Long term goal 1: Pt to ambulate 50ft with verbal cues for O2 line managment safety.    Long term goal 2: Pt to manage flight of steps with HR and supervision    [facility] (6 CLICK) BASIC MOBILITY  AM-PAC Inpatient Mobility Raw Score : 22     Therapy Time:   Individual   Time In 1411   Time Out 1421   Minutes Vel Velazquez , [place], 10/22/19 at 4:08 PM

## 2019-10-22 NOTE — ED NOTES
Report called to 1W. Tele pack applied. Transportation notified. Patient updated.  Family went home, will return in AM.     Ajith Pastor RN  10/21/19 2024

## 2019-10-22 NOTE — CONSULTS
Consult received. Will contact patient/family post discharge to discuss Palliative Care and schedule an initial visit as appropriate.

## 2019-10-22 NOTE — CARE COORDINATION
[facility] Case Management Initial Discharge Assessment    LSW met with the patient and her  to discuss the discharge plan. PCP: YULY Herrera Arm - [facility]                                Date of Last Visit:     If no PCP, list provided? N/A    Discharge Planning    Living Arrangements: Patient states she lives at home with her . Who do you live with?     Who helps you with your care:  self    If lives at home:     Do you have any barriers navigating in your home? no    Patient can perform ADL? Yes    Current Services (outpatient and in home) :  Patient states she is current with [facility] - RN. Dialysis: No    Is transportation available to get to your appointments? Yes - Pt's     DME Equipment:  yes - Patient states she has a walker, but doesn't use it. Respiratory equipment: Yes - O2 at night and PRN    Respiratory provider:  yes - Patient's  is not sure. He will check the machine at home and let the LSW or RN know. Pharmacy:  yes - [phone] with Medication Assistance Program?  No      Patient agreeable to [facility]? Yes - patient and her  would like [facility] to continue (RN). ?Add PT/OT? ? Patient agreeable to SNF/Rehab? No    Other discharge needs identified? Palliative Care    Was [place] of Choice Provided? NA          Does Patient Have a High-Risk for Readmission Diagnosis (CHF, PN, MI, COPD)? None per Raisa Gusman RN, C3. Initial Discharge Plan:  Patient and her  would like the patient to return home with the same services. [facility])  Patient had [facility], but had not seen the pt yet per the C3.     Electronically signed by ANTHONY Perdomo on 10/22/2019 at 2:36 PM

## 2019-10-22 NOTE — PROGRESS NOTES
Physician Progress Note    10/22/2019   12:43 PM    Name:  Riana Ordoñez  MRN:    [id number]      Day: 1     Admit Date: 10/21/2019  4:33 PM  PCP: YULY Degroot CNP    Code Status:  DNR-CCA      Subjective:     She tells me she feels fine. Denies lightheadedness, blurry vision, nausea, chest pain, dyspnea, abdominal pain, change in bowels or urination.     Current Facility-Administered Medications   Medication Dose Route Frequency Provider Last Rate Last Dose    pantoprazole (PROTONIX) tablet 40 mg  40 mg Oral QAM AC Denise Rubén, DO   40 mg at 10/22/19 4817    donepezil (ARICEPT) tablet 5 mg  5 mg Oral Nightly Denise Rubén, DO   5 mg at 10/21/19 2240    gabapentin (NEURONTIN) capsule 400 mg  400 mg Oral TID Denise Rubén, DO   400 mg at 10/22/19 1615    sertraline (ZOLOFT) tablet 100 mg  100 mg Oral Daily Denise Rubén, DO   100 mg at 10/22/19 6005    sodium chloride flush 0.9 % injection 10 mL  10 mL Intravenous 2 times per day Denise Rubén, DO   10 mL at 10/22/19 5789    sodium chloride flush 0.9 % injection 10 mL  10 mL Intravenous PRN Denise Rubén, DO        [facility]ron [facility]) injection 4 mg  4 mg Intravenous Q6H PRN Denise Rubén, DO        potassium chloride (KLOR-CON M) extended release tablet 40 mEq  40 mEq Oral PRN Denise Rubén, DO        Or    potassium bicarb-citric acid (EFFER-K) effervescent tablet 40 mEq  40 mEq Oral PRN Denise Rubén, DO        Or    potassium chloride 10 mEq/100 mL IVPB (Peripheral Line)  10 mEq Intravenous PRN Denise Rubén, DO        magnesium sulfate 1 g in dextrose 5% 100 mL IVPB  1 g Intravenous PRN Denise Rubén, DO        docusate sodium (COLACE) capsule 100 mg  100 mg Oral BID Denise Rubén, DO   100 mg at 10/22/19 2782    polyethylene glycol (GLYCOLAX) packet 17 g  17 g Oral Daily PRN Denise Rubén, DO        acetaminophen (TYLENOL) tablet 650 mg  650 mg Oral Q4H PRN Denise Rubén, DO   650 mg at 10/22/19 1118    0.9 % sodium chloride infusion   Intravenous Continuous Cris Montiel, APRN -  mL/hr at 10/22/19 5720         Physical Examination:      Vitals:  BP (!) 117/35   Pulse 69   Temp 97.6 °F (36.4 °C) (Oral)   Resp 18   Ht 4' 7\" (1.397 m)   Wt 127 lb (57.6 kg)   LMP  (LMP Unknown)   SpO2 98%   BMI 29.52 kg/m²   Temp (24hrs), Av.6 °F (36.4 °C), Min:97.5 °F (36.4 °C), Max:97.6 °F (36.4 °C)      General appearance: alert, cooperative and no distress. Chronically ill-appearing. Thin, fatigued  Mental Status: oriented to person, place and time and normal affect  Lungs: clear to auscultation bilaterally, normal effort  Heart: regular rate and rhythm, no murmur  Abdomen: soft, nontender, nondistended, bowel sounds present, no masses  Extremities: no edema, redness, tenderness in the calves  Skin: no gross lesions, rashes    Data:     Labs:  Recent Labs     10/21/19  1700 10/22/19  0631   WBC 11.8* 9.4   HGB 10.3* 9.6*    247     Recent Labs     10/21/19  1700 10/22/19  0631    142   K 5.2* 5.1*   CL 99 109*   CO2 24 21   BUN 17 12   CREATININE 1.27* 0.96*   GLUCOSE 96 78     Recent Labs     10/21/19  170   AST 26   ALT 13   BILITOT <0.2   ALKPHOS 56       Assessment and Plan:        53-year-old female with history of hypertension, hyperlipidemia, depression, COPD with chronic hypoxic respiratory failure (5L home O2), diastolic heart failure, recent admission for hypovolemic shock complicated by NSTEMI and diastolic heart failure following resuscitation presented from home to the ED after home nurse reported hypotension as low as 66/48. Patient was asymptomatic. 1.  Hypotension suspect secondary to overtreatment with antihypertensives in the setting of some decreased p.o.  Intake  -Discontinue lisinopril and Lasix at discharge  -Continue Toprol 25 mg    CKD III: monitor  Psych: zoloft/donepezil    Diet: DIET GENERAL;  Prn tylenol  Ppx: lovenox  DNR-CCA    Dispo: home tomorrow    Electronically signed by Enrique Viera DO on 10/22/2019 at 12:43 PM

## 2019-10-22 NOTE — PLAN OF CARE
See OT evaluation for all goals and OT POC.  Electronically signed by PAPI Peralta/L on 10/22/2019 at 2:48 PM

## 2019-10-22 NOTE — ED NOTES
Patient placed on bed pan. Call light is within reach of patient. Fallon RN was ntfd.       Mahesh Salvage  10/21/19 2010

## 2019-10-22 NOTE — ED NOTES
Tele monitor applied to patient. Monitor room was notified and verified proper placement.      Terri Rubin  10/21/19 2006

## 2019-10-22 NOTE — H&P
Department of Internal Medicine  General Internal Medicine  Attending History and Physical      CHIEF COMPLAINT:  Hypotension    Reason for Admission:  Hypotension     History Obtained From:  patient    HISTORY OF PRESENT ILLNESS:      The patient is a 66 y.o. female with significant past medical history of HTN, HLD, HFpEF, depression who presents after home health aid noticed her BP to be low. Patient states she has not had any symptoms. Does admit to poor appetite and poor PO intake and decreased urination. States that her health aid and  give her medications and she is unsure of which ones. Had BP 70s/40s in ED which was responsive to IVF. BP upon examination was 110/60 after 30cc/kg bolus. Upon examination, patient confused but denies fevers, chills, cough, congestion, sob, cp, palpitations, abd pain, n/v/d or changes in urination. Past Medical History:        Diagnosis Date    Anxiety     Arthritis     COPD (chronic obstructive pulmonary disease) (HCC)     Generalized osteoarthrosis, unspecified site 12/27/2002    Hyperlipidemia     Hypertension     Major depressive disorder, single episode, moderate ([address] 12/27/2002    Morbid obesity ([address] 12/27/2002    OAB (overactive bladder)     Osteoporosis      Past Surgical History:        Procedure Laterality Date    CARDIAC CATHETERIZATION      CARPAL TUNNEL RELEASE      COLONOSCOPY      HYSTERECTOMY, TOTAL ABDOMINAL      KNEE ARTHROPLASTY Bilateral     [place] COLON CA SCRN NOT  [address] IND N/A 6/22/2017    COLONOSCOPY performed by Faizan Murphy MD at . [facility] ESOPHAGOGASTRODUODENOSCOPY TRANSORAL DIAGNOSTIC N/A 6/22/2017    EGD ESOPHAGOGASTRODUODENOSCOPY performed by Faizan Murphy MD at [facility] Bilateral      Medications Prior to Admission:    Not in a hospital admission. Allergies:  Codeine    Social History:    Former smoker, no EtOH, no drugs    Family History:       Problem Relation Age of Onset    Arthritis Father     Other Father         gout    Heart Failure Father     Cancer Sister      REVIEW OF SYSTEMS:  12 point ROS was negative unless otherwise noted in the HPI   PHYSICAL EXAM:    Vitals:  BP 92/62   Pulse 65   Temp 97.5 °F (36.4 °C) (Tympanic)   Resp 16   Ht 4' 7\" (1.397 m)   Wt 127 lb (57.6 kg)   LMP  (LMP Unknown)   SpO2 100%   BMI 29.52 kg/m²     Constitutional: Awake and alert in no acute distress.  Lying in bed comfortably  Head: Normocephalic, atraumatic  Eyes: EOMI, PERRLA  ENT: moist mucous membranes  Neck: neck supple, trachea midline  Lungs: Good inspiratory effort, CTABL, no wheeze, no rhonchi, no rales  Heart: RRR, normal S1 and S2, no murmurs  GI: Soft, non-distended, non tender, no guarding, no rebound, +BS  MSK: Full ROM bilaterally, 5/5 strength bilaterally, no edema noted  Pulses: 2+ pulses bilaterally  Skin: warm, dry, good turgor, without lesions or rashes  Neuro: Intact motor and sensory, no focal deficits  Psych: appropriate affect       DATA:  CBC:   Lab Results   Component Value Date    WBC 11.8 10/21/2019    RBC 3.25 10/21/2019    RBC 4.22 04/23/2012    HGB 10.3 10/21/2019    HCT 31.2 10/21/2019    MCV 95.8 10/21/2019    MCH 31.7 10/21/2019    MCHC 33.0 10/21/2019    RDW 14.4 10/21/2019     10/21/2019    MPV 9.9 10/12/2012     CMP:    Lab Results   Component Value Date     10/21/2019    K 5.2 10/21/2019    K 3.7 10/13/2019    CL 99 10/21/2019    CO2 24 10/21/2019    BUN 17 10/21/2019    CREATININE 1.27 10/21/2019    GFRAA 49.5 10/21/2019    LABGLOM 40.9 10/21/2019    GLUCOSE 96 10/21/2019    GLUCOSE 75 04/23/2012    PROT 6.1 10/21/2019    LABALBU 3.3 10/21/2019    LABALBU 4.6 04/23/2012    CALCIUM 8.6 10/21/2019    BILITOT <0.2 10/21/2019    ALKPHOS 56 10/21/2019    AST 26 10/21/2019    ALT 13 10/21/2019     Last 3 Troponin:    Lab Results   Component Value Date    TROPONINI 0.026 10/21/2019    TROPONINI 0.068 10/10/2019 TROPONINI 0.077 10/10/2019     ASSESSMENT AND PLAN:      # Hypotension in the setting of poor PO intake  - afebrile, no signs of infection. CXR and UA negative  - likely 2/2 medications and dehydration  - given 30cc/kg bolus with appropriate response - BP 70s/40s -> 110/60 after bolus  - continuing IVF  - holding home BP medications - will need to be reviewed when BP stable    # CARLOS  - baseline normal Cr. Cr today 1.27  - likely 2/2 dehydration in the setting of poor PO intake  - continuing IVF  - renally dose medications    Disposition: Patient admitted for asymptomatic hypotension. Poor po intake.  IVF and will recheck labs in the [address] [place] [address]  Internal Medicine

## 2019-10-22 NOTE — PROGRESS NOTES
1120- Admission assessment complete. Home medication reconciled per pts , Marylin Leahy via phone. Pt is A&OX3 but forgetful. Pts  states that the forgetfulness is pts baseline. Pt resting quietly with no complaints at this time. Will continue to monitor.   Electronically signed by Delona Brittle, RN on 10/22/2019 at 1:28 AM

## 2019-10-23 VITALS
OXYGEN SATURATION: 100 % | DIASTOLIC BLOOD PRESSURE: 61 MMHG | HEART RATE: 68 BPM | HEIGHT: 55 IN | WEIGHT: 127 LBS | BODY MASS INDEX: 29.39 KG/M2 | TEMPERATURE: 97.9 F | SYSTOLIC BLOOD PRESSURE: 155 MMHG | RESPIRATION RATE: 18 BRPM

## 2019-10-23 LAB
ANION GAP SERPL CALCULATED.3IONS-SCNC: 13 MEQ/L (ref 9–15)
BUN BLDV-MCNC: 7 MG/DL (ref 8–23)
CALCIUM SERPL-MCNC: 8 MG/DL (ref 8.5–9.9)
CHLORIDE BLD-SCNC: 109 MEQ/L (ref 95–107)
CO2: 22 MEQ/L (ref 20–31)
CREAT SERPL-MCNC: 0.79 MG/DL (ref 0.5–0.9)
GFR AFRICAN AMERICAN: >60
GFR NON-AFRICAN AMERICAN: >60
GLUCOSE BLD-MCNC: 73 MG/DL (ref 70–99)
POTASSIUM REFLEX MAGNESIUM: 4 MEQ/L (ref 3.4–4.9)
SODIUM BLD-SCNC: 144 MEQ/L (ref 135–144)
URINE CULTURE, ROUTINE: NORMAL

## 2019-10-23 PROCEDURE — 36415 COLL VENOUS BLD VENIPUNCTURE: CPT

## 2019-10-23 PROCEDURE — 2700000000 HC OXYGEN THERAPY PER DAY

## 2019-10-23 PROCEDURE — 6370000000 HC RX 637 (ALT 250 FOR IP): Performed by: INTERNAL MEDICINE

## 2019-10-23 PROCEDURE — 6360000002 HC RX W HCPCS: Performed by: INTERNAL MEDICINE

## 2019-10-23 PROCEDURE — 80048 BASIC METABOLIC PNL TOTAL CA: CPT

## 2019-10-23 RX ADMIN — SERTRALINE 100 MG: 100 TABLET, FILM COATED ORAL at 09:20

## 2019-10-23 RX ADMIN — ENOXAPARIN SODIUM 30 MG: 30 INJECTION SUBCUTANEOUS at 09:20

## 2019-10-23 RX ADMIN — PANTOPRAZOLE SODIUM 40 MG: 40 TABLET, DELAYED RELEASE ORAL at 05:48

## 2019-10-23 RX ADMIN — DOCUSATE SODIUM 100 MG: 100 CAPSULE, LIQUID FILLED ORAL at 09:20

## 2019-10-23 RX ADMIN — GABAPENTIN 400 MG: 400 CAPSULE ORAL at 09:20

## 2019-10-23 NOTE — FLOWSHEET NOTE
0920  Morning medications given. Patients family to bedside.  asking when patient will be released. RN explained that it will be this morning after she completes medication pass. Patient is awake, alert and oriented to self and place. Patient denies any chest pain, shortness of breath, dizziness, abdominal pain, nausea, or vomiting at this time. Patient is comfortable and lying in the bed. Denies further needs. Will discharge this morning. 1100  IV removed and instructions went over with the . He verbalized understanding.

## 2019-10-23 NOTE — DISCHARGE SUMMARY
[facility] Medicine Discharge Summary    Jose Fragoso  :  1945  MRN:  [id number]    Admit date:  10/21/2019  Discharge date:  10/23/2019    Admitting Physician:  Richmond Morelos DO  Primary Care Physician:  YULY Rios CNP    Discharge Diagnoses:    Principal Problem:    Hypotension  Resolved Problems:    * No resolved hospital problems. *    Chief Complaint   Patient presents with    Hypotension     home health nurse had pt come to ED d/t low BP       Condition: unchanged  Activity: no strenuous activity   Diet: regular  Disposition: home with home care  Functional Status: ambulatory with assistance    Significant Findings:     Vitals:    10/22/19 0231 10/22/19 1428 10/22/19 1910 10/23/19 0741   BP: (!) 117/35 (!) 144/60 134/61 (!) 155/61   Pulse: 69 73 70 68   Resp:   18 18   Temp:  97.9 °F (36.6 °C) 98.4 °F (36.9 °C) 97.9 °F (36.6 °C)   TempSrc:   Oral Oral   SpO2:  99% 95% 100%   Weight:       Height:           Hospital Course:   68-year-old female with history of hypertension, hyperlipidemia, depression, COPD with chronic hypoxic respiratory failure (5L home O2), diastolic heart failure, recent admission for hypovolemic shock complicated by NSTEMI and diastolic heart failure following resuscitation presented from home to the ED after home nurse reported hypotension as low as 66/48. Patient was asymptomatic for the entire hospitalization. Her systolic blood pressure was mostly between 020 and 657 systolic without any antihypertensive medications. Because of her age and recent events demonstrating she is prone to hypotension, she should have goal blood pressure of less than 150/90. Lisinopril and Lasix were discontinued.       To Do:  [ ] record blood pressure over next 1-2 weeks and discuss with PCP if blood pressure medication is necessary    Exam on Discharge:   BP (!) 155/61   Pulse 68   Temp 97.9 °F (36.6 °C) (Oral)   Resp 18   Ht 4' 7\" (1.397 m) Wt 127 lb (57.6 kg)   LMP  (LMP Unknown)   SpO2 100%   BMI 29.52 kg/m²   General appearance: alert, cooperative and no distress. Soft spoken.  Chronically ill appearing  Mental Status: oriented to person, place and time and normal affect  Lungs: clear to auscultation bilaterally, normal effort  Heart: regular rate and rhythm, no murmur  Abdomen: soft, nontender, nondistended, bowel sounds present, no masses  Extremities: no edema, redness, tenderness in the calves  Skin: no gross lesions, rashes    Discharge Medication List:   Stephan Goldman   [place] Medication Instructions QAP:[id number]    Printed on:10/23/19 3286   Medication Information                      aspirin 81 MG EC tablet  Take 1 tablet by mouth daily             docusate sodium (COLACE, DULCOLAX) 100 MG CAPS  Take 100 mg by mouth nightly             donepezil (ARICEPT) 5 MG tablet  Take 1 tablet by mouth nightly             gabapentin (NEURONTIN) 400 MG capsule  TAKE ONE CAPSULE BY MOUTH THREE TIMES A DAY             melatonin 1 MG tablet  Take 1 tablet by mouth nightly as needed for Sleep             metoprolol succinate (TOPROL XL) 25 MG extended release tablet  Take 1 tablet by mouth daily             omeprazole (PRILOSEC) 20 MG delayed release capsule  Take 20 mg by mouth daily             ondansetron (ZOFRAN) 4 MG tablet  Take 1 tablet by mouth every 8 hours as needed for Nausea             Oxygen Concentrator               polyethylene glycol (GLYCOLAX) packet  Take 17 g by mouth daily as needed for Constipation             pravastatin (PRAVACHOL) 40 MG tablet  TAKE ONE TABLET BY MOUTH EVERY DAY             sertraline (ZOLOFT) 100 MG tablet  Take 100 mg by mouth daily             tiZANidine (ZANAFLEX) 4 MG tablet  TAKE ONE TABLET BY MOUTH THREE TIMES A DAY             vitamin D (CHOLECALCIFEROL) 1000 UNIT TABS tablet  Take 2 tablets by mouth daily                 DC time 37 minutes    Signed:  Indiana Burgess  10/23/2019, 9:17 AM

## 2019-10-24 ENCOUNTER — CARE COORDINATION (OUTPATIENT)
Dept: CASE MANAGEMENT | Age: 74
End: 2019-10-24

## 2019-10-24 NOTE — PROGRESS NOTES
Physical Therapy  Facility/Department: [facility] MED SURG E615/L635-12  Physical Therapy Discharge      NAME: Juanita Cardenas    : 1945 (66 y.o.)  MRN: [id number]    Account: [de-identified]  Gender: female      Patient has been discharged from acute care hospital. DC patient from current PT program.      Electronically signed by Josey Harmon PT on 10/24/19 at 3:37 PM

## 2019-10-27 ENCOUNTER — CARE COORDINATION (OUTPATIENT)
Dept: CASE MANAGEMENT | Age: 74
End: 2019-10-27

## 2019-10-28 ENCOUNTER — CARE COORDINATION (OUTPATIENT)
Dept: CASE MANAGEMENT | Age: 74
End: 2019-10-28

## 2019-10-28 ENCOUNTER — CARE COORDINATION (OUTPATIENT)
Dept: CARE COORDINATION | Age: 74
End: 2019-10-28

## 2019-10-28 DIAGNOSIS — I10 ESSENTIAL HYPERTENSION: ICD-10-CM

## 2019-10-28 DIAGNOSIS — I50.31 ACUTE DIASTOLIC HEART FAILURE (HCC): Primary | ICD-10-CM

## 2019-10-28 DIAGNOSIS — I21.4 NSTEMI (NON-ST ELEVATED MYOCARDIAL INFARCTION) (HCC): ICD-10-CM

## 2019-10-28 DIAGNOSIS — I95.89 OTHER SPECIFIED HYPOTENSION: ICD-10-CM

## 2019-10-29 ENCOUNTER — CARE COORDINATION (OUTPATIENT)
Dept: CARE COORDINATION | Age: 74
End: 2019-10-29

## 2019-10-29 ENCOUNTER — OFFICE VISIT (OUTPATIENT)
Dept: FAMILY MEDICINE CLINIC | Age: 74
End: 2019-10-29
Payer: MEDICARE

## 2019-10-29 ENCOUNTER — TELEPHONE (OUTPATIENT)
Dept: PHARMACY | Facility: CLINIC | Age: 74
End: 2019-10-29

## 2019-10-29 VITALS
DIASTOLIC BLOOD PRESSURE: 72 MMHG | HEIGHT: 55 IN | BODY MASS INDEX: 31.61 KG/M2 | TEMPERATURE: 98.4 F | OXYGEN SATURATION: 95 % | SYSTOLIC BLOOD PRESSURE: 144 MMHG | HEART RATE: 65 BPM | WEIGHT: 136.6 LBS

## 2019-10-29 DIAGNOSIS — R40.0 DROWSINESS: ICD-10-CM

## 2019-10-29 DIAGNOSIS — Z79.899 HIGH RISK MEDICATION USE: ICD-10-CM

## 2019-10-29 DIAGNOSIS — I50.31 ACUTE DIASTOLIC HEART FAILURE (HCC): ICD-10-CM

## 2019-10-29 DIAGNOSIS — M51.36 DEGENERATIVE DISC DISEASE, LUMBAR: ICD-10-CM

## 2019-10-29 DIAGNOSIS — S32.010A CLOSED COMPRESSION FRACTURE OF FIRST LUMBAR VERTEBRA, INITIAL ENCOUNTER: ICD-10-CM

## 2019-10-29 DIAGNOSIS — R57.1 HYPOVOLEMIC SHOCK (HCC): ICD-10-CM

## 2019-10-29 DIAGNOSIS — N17.9 AKI (ACUTE KIDNEY INJURY) (HCC): ICD-10-CM

## 2019-10-29 DIAGNOSIS — R41.0 CONFUSION: ICD-10-CM

## 2019-10-29 DIAGNOSIS — Z09 HOSPITAL DISCHARGE FOLLOW-UP: Primary | ICD-10-CM

## 2019-10-29 DIAGNOSIS — J44.9 CHRONIC OBSTRUCTIVE PULMONARY DISEASE, UNSPECIFIED COPD TYPE (HCC): ICD-10-CM

## 2019-10-29 DIAGNOSIS — I21.4 NSTEMI (NON-ST ELEVATED MYOCARDIAL INFARCTION) (HCC): ICD-10-CM

## 2019-10-29 PROCEDURE — 99215 OFFICE O/P EST HI 40 MIN: CPT | Performed by: NURSE PRACTITIONER

## 2019-10-29 PROCEDURE — 1111F DSCHRG MED/CURRENT MED MERGE: CPT | Performed by: NURSE PRACTITIONER

## 2019-10-29 RX ORDER — ALBUTEROL SULFATE 90 UG/1
2 AEROSOL, METERED RESPIRATORY (INHALATION) 4 TIMES DAILY PRN
Qty: 3 INHALER | Refills: 1 | Status: SHIPPED | OUTPATIENT
Start: 2019-10-29 | End: 2022-09-23 | Stop reason: SDUPTHER

## 2019-10-29 RX ORDER — TIZANIDINE 4 MG/1
2 TABLET ORAL 2 TIMES DAILY PRN
Qty: 90 TABLET | Refills: 0
Start: 2019-10-29 | End: 2019-12-06 | Stop reason: SDUPTHER

## 2019-10-29 RX ORDER — VORTIOXETINE 10 MG/1
10 TABLET, FILM COATED ORAL DAILY
COMMUNITY
End: 2020-03-26 | Stop reason: SDUPTHER

## 2019-10-29 ASSESSMENT — ENCOUNTER SYMPTOMS
COUGH: 0
SHORTNESS OF BREATH: 1

## 2019-10-30 ENCOUNTER — TELEPHONE (OUTPATIENT)
Dept: FAMILY MEDICINE CLINIC | Age: 74
End: 2019-10-30

## 2019-10-30 ENCOUNTER — CARE COORDINATION (OUTPATIENT)
Dept: CARE COORDINATION | Age: 74
End: 2019-10-30

## 2019-11-04 ENCOUNTER — CARE COORDINATION (OUTPATIENT)
Dept: CASE MANAGEMENT | Age: 74
End: 2019-11-04

## 2019-11-06 ENCOUNTER — CARE COORDINATION (OUTPATIENT)
Dept: CARE COORDINATION | Age: 74
End: 2019-11-06

## 2019-11-06 SDOH — SOCIAL STABILITY: SOCIAL NETWORK: HOW OFTEN DO YOU ATTEND CHURCH OR RELIGIOUS SERVICES?: MORE THAN 4 TIMES PER YEAR

## 2019-11-06 SDOH — ECONOMIC STABILITY: INCOME INSECURITY: HOW HARD IS IT FOR YOU TO PAY FOR THE VERY BASICS LIKE FOOD, HOUSING, MEDICAL CARE, AND HEATING?: SOMEWHAT HARD

## 2019-11-06 SDOH — ECONOMIC STABILITY: TRANSPORTATION INSECURITY
IN THE PAST 12 MONTHS, HAS THE LACK OF TRANSPORTATION KEPT YOU FROM MEDICAL APPOINTMENTS OR FROM GETTING MEDICATIONS?: NO

## 2019-11-06 SDOH — HEALTH STABILITY: MENTAL HEALTH
STRESS IS WHEN SOMEONE FEELS TENSE, NERVOUS, ANXIOUS, OR CAN'T SLEEP AT NIGHT BECAUSE THEIR MIND IS TROUBLED. HOW STRESSED ARE YOU?: TO SOME EXTENT

## 2019-11-06 SDOH — SOCIAL STABILITY: SOCIAL NETWORK: ARE YOU MARRIED, WIDOWED, DIVORCED, SEPARATED, NEVER MARRIED, OR LIVING WITH A PARTNER?: MARRIED

## 2019-11-06 SDOH — ECONOMIC STABILITY: FOOD INSECURITY: WITHIN THE PAST 12 MONTHS, THE FOOD YOU BOUGHT JUST DIDN'T LAST AND YOU DIDN'T HAVE MONEY TO GET MORE.: NEVER TRUE

## 2019-11-06 SDOH — HEALTH STABILITY: PHYSICAL HEALTH: ON AVERAGE, HOW MANY MINUTES DO YOU ENGAGE IN EXERCISE AT THIS LEVEL?: 0 MIN

## 2019-11-06 SDOH — SOCIAL STABILITY: SOCIAL NETWORK
DO YOU BELONG TO ANY CLUBS OR ORGANIZATIONS SUCH AS CHURCH GROUPS UNIONS, FRATERNAL OR ATHLETIC GROUPS, OR SCHOOL GROUPS?: NO

## 2019-11-06 SDOH — ECONOMIC STABILITY: FOOD INSECURITY: WITHIN THE PAST 12 MONTHS, YOU WORRIED THAT YOUR FOOD WOULD RUN OUT BEFORE YOU GOT MONEY TO BUY MORE.: NEVER TRUE

## 2019-11-06 SDOH — SOCIAL STABILITY: SOCIAL NETWORK: IN A TYPICAL WEEK, HOW MANY TIMES DO YOU TALK ON THE PHONE WITH FAMILY, FRIENDS, OR NEIGHBORS?: TWICE A WEEK

## 2019-11-06 SDOH — SOCIAL STABILITY: SOCIAL NETWORK: HOW OFTEN DO YOU GET TOGETHER WITH FRIENDS OR RELATIVES?: TWICE A WEEK

## 2019-11-06 SDOH — HEALTH STABILITY: PHYSICAL HEALTH: ON AVERAGE, HOW MANY DAYS PER WEEK DO YOU ENGAGE IN MODERATE TO STRENUOUS EXERCISE (LIKE A BRISK WALK)?: 0 DAYS

## 2019-11-06 SDOH — SOCIAL STABILITY: SOCIAL NETWORK: HOW OFTEN DO YOU ATTENT MEETINGS OF THE CLUB OR ORGANIZATION YOU BELONG TO?: NEVER

## 2019-11-06 SDOH — ECONOMIC STABILITY: TRANSPORTATION INSECURITY
IN THE PAST 12 MONTHS, HAS LACK OF TRANSPORTATION KEPT YOU FROM MEETINGS, WORK, OR FROM GETTING THINGS NEEDED FOR DAILY LIVING?: NO

## 2019-11-06 ASSESSMENT — ENCOUNTER SYMPTOMS: DYSPNEA ASSOCIATED WITH: EXERTION

## 2019-11-06 ASSESSMENT — PATIENT HEALTH QUESTIONNAIRE - PHQ9: SUM OF ALL RESPONSES TO PHQ QUESTIONS 1-9: 11

## 2019-11-08 ENCOUNTER — CARE COORDINATION (OUTPATIENT)
Dept: CASE MANAGEMENT | Age: 74
End: 2019-11-08

## 2019-11-13 ENCOUNTER — CARE COORDINATION (OUTPATIENT)
Dept: CARE COORDINATION | Age: 74
End: 2019-11-13

## 2019-11-14 ENCOUNTER — OFFICE VISIT (OUTPATIENT)
Dept: FAMILY MEDICINE CLINIC | Age: 74
End: 2019-11-14
Payer: MEDICARE

## 2019-11-14 VITALS
DIASTOLIC BLOOD PRESSURE: 74 MMHG | TEMPERATURE: 97.7 F | HEART RATE: 67 BPM | SYSTOLIC BLOOD PRESSURE: 112 MMHG | HEIGHT: 55 IN | BODY MASS INDEX: 30.04 KG/M2 | OXYGEN SATURATION: 95 % | WEIGHT: 129.8 LBS

## 2019-11-14 DIAGNOSIS — I10 ESSENTIAL HYPERTENSION: Primary | ICD-10-CM

## 2019-11-14 DIAGNOSIS — G31.84 MILD COGNITIVE IMPAIRMENT: ICD-10-CM

## 2019-11-14 DIAGNOSIS — J44.9 CHRONIC OBSTRUCTIVE PULMONARY DISEASE, UNSPECIFIED COPD TYPE (HCC): ICD-10-CM

## 2019-11-14 DIAGNOSIS — I21.4 NSTEMI (NON-ST ELEVATED MYOCARDIAL INFARCTION) (HCC): ICD-10-CM

## 2019-11-14 PROCEDURE — 99213 OFFICE O/P EST LOW 20 MIN: CPT | Performed by: NURSE PRACTITIONER

## 2019-11-14 RX ORDER — AMLODIPINE BESYLATE 5 MG/1
TABLET ORAL
Refills: 3 | COMMUNITY
Start: 2019-11-06

## 2019-11-14 RX ORDER — DONEPEZIL HYDROCHLORIDE 10 MG/1
TABLET, FILM COATED ORAL
Refills: 3 | COMMUNITY
Start: 2019-11-05

## 2019-11-14 ASSESSMENT — ENCOUNTER SYMPTOMS
COUGH: 0
SHORTNESS OF BREATH: 0

## 2019-12-02 ENCOUNTER — CARE COORDINATION (OUTPATIENT)
Dept: CARE COORDINATION | Age: 74
End: 2019-12-02

## 2019-12-02 ASSESSMENT — ENCOUNTER SYMPTOMS: DYSPNEA ASSOCIATED WITH: EXERTION

## 2019-12-06 DIAGNOSIS — M51.36 DEGENERATIVE DISC DISEASE, LUMBAR: ICD-10-CM

## 2019-12-06 DIAGNOSIS — S32.010A CLOSED COMPRESSION FRACTURE OF FIRST LUMBAR VERTEBRA, INITIAL ENCOUNTER: ICD-10-CM

## 2019-12-06 RX ORDER — TIZANIDINE 4 MG/1
2 TABLET ORAL 2 TIMES DAILY PRN
Qty: 90 TABLET | Refills: 0 | Status: SHIPPED | OUTPATIENT
Start: 2019-12-06 | End: 2019-12-09

## 2019-12-09 DIAGNOSIS — S32.010A CLOSED COMPRESSION FRACTURE OF FIRST LUMBAR VERTEBRA, INITIAL ENCOUNTER: ICD-10-CM

## 2019-12-09 DIAGNOSIS — M51.36 DEGENERATIVE DISC DISEASE, LUMBAR: ICD-10-CM

## 2019-12-09 RX ORDER — TIZANIDINE 4 MG/1
TABLET ORAL
Qty: 90 TABLET | Refills: 0
Start: 2019-12-09 | End: 2020-03-02 | Stop reason: SDUPTHER

## 2019-12-16 ENCOUNTER — TELEPHONE (OUTPATIENT)
Dept: PHARMACY | Facility: CLINIC | Age: 74
End: 2019-12-16

## 2019-12-16 DIAGNOSIS — M85.80 OSTEOPENIA, UNSPECIFIED LOCATION: Primary | ICD-10-CM

## 2019-12-16 DIAGNOSIS — Z13.820 OSTEOPOROSIS SCREENING: ICD-10-CM

## 2019-12-16 DIAGNOSIS — Z78.0 POST-MENOPAUSAL: ICD-10-CM

## 2020-01-03 ENCOUNTER — CARE COORDINATION (OUTPATIENT)
Dept: CARE COORDINATION | Age: 75
End: 2020-01-03

## 2020-01-03 ASSESSMENT — ENCOUNTER SYMPTOMS: DYSPNEA ASSOCIATED WITH: EXERTION

## 2020-01-03 NOTE — CARE COORDINATION
Ambulatory Care Coordination Note  1/3/2020  CM Risk Score: 10  Charlson 10 Year Mortality Risk Score: 98%     ACC: Nazario Minor, RN    Summary Note:     I spoke with Sarita Aguilera regarding her health. She is not sure that she remembers me  She tells me that her breathing is good unless she goes up and down the steps more than a couple of times  She denies any fever, chills, change in mucus  She does have oxygen but usually wears this only at night at 5 LPM/NC as she states she is not SOB and she is afraid of tripping on the lines  She says that she does have a SaO2 Monitor but she does not use this device either stating she is not sure where it is at the moment. She tells me that she does have scales at the house but she does not weigh herself on a regular basis and prefers to be weighed in the office  She denies any issues with edema, chest pain, or dizziness  She tells me that she is taking her medicines as her  makes sure that she is taking them    PLAN:  Gigi Crooks and Sarita Aguilera will continue to monitor Fide's symptoms for any changes or movement into the yellow zone. If Sarita Aguilera drops into the yellow zone they will call me or the office for direction. Sarita Aguilera will weigh herself at least every other day. She will check her SaO2 everyday and if it is below 92% she will call me or the office for direction  Sarita Aguilera will wear her oxygen at night and prn with SOB. Care Coordination Interventions    Program Enrollment:  Rising Risk  Referral from Primary Care Provider:  No  Suggested Interventions and Community Resources  Disease Association: In Process  Pharmacist:  Completed  Zone Management Tools: In Process  Other Services or Interventions:  Pharmacy referral completed  CHF and COPD education and zone tools initiated. Advance Directive packet sent to spouse.           Goals Addressed                 This Visit's Progress     Conditions and Symptoms   No change     I will schedule office visits, as directed

## 2020-02-03 ENCOUNTER — CARE COORDINATION (OUTPATIENT)
Dept: CARE COORDINATION | Age: 75
End: 2020-02-03

## 2020-02-03 NOTE — CARE COORDINATION
Ambulatory Care Coordination Note  2/3/2020  CM Risk Score: 10  Charlson 10 Year Mortality Risk Score: 98%     ACC: Robert Flores, RN    Summary Note:     I called to discuss CHF and COPD with Prosper Hylton. She tells me that she is doing okay except that she has a \"thickness around her midriff area. \"  I tried to get some detail around her concern but she was not able to explain other than she has swelling around her waistline. She denies any redness, rash, pain, or difficulty in wearing her pants at the waistline area. She states that she feels that she \" may be getting fat. \"    She says that she is not sleeping very well at times and this is causing her to \"rummage through the cupboards to look for something to eat. \"  She is not able to tell me the frequency of insomnia. She states that she will always go to be at 11:00 to midnight but on sleepless nights will wake at 3-4:00 am.   She denies any issues with edema in her feet, ankles, or hands. She denies any issues with chest pain, dizziness, or lightheadedness. She states that her breathing is good. She has not used the oxygen in months. I asked about this as she had been on 5 LPM/NC continuously. She states that she can go up and down the steps several times per day without having any SOB. She does not check her SaO2 because she cannot find the oximeter,  I also spoke with her about missing her initial appointment with Dr Brian Lorenz. She again feels that this is not necessary. She states that she is taking her medications everyday and Evan Abel gives them to her each day. PLAN:  I have asked Prosper Hylton to weigh herself at least once per week to monitor for weight gain or loss. Prosper Hylton will take all medications as prescribed. Prosper Hylton will follow up with all scheduled appointments.        Care Coordination Interventions    Program Enrollment:  Rising Risk  Referral from Primary Care Provider:  No  Suggested Interventions and Community Resources  Disease Association: In Process  Pharmacist:  Completed  Zone Management Tools: In Process  Other Services or Interventions:  Pharmacy referral completed  CHF and COPD education and zone tools initiated. Advance Directive packet sent to spouse. Goals Addressed                 This Visit's Progress     Conditions and Symptoms   No change     I will schedule office visits, as directed by my provider. I will keep my appointment or reschedule if I have to cancel. I will notify my provider of any barriers to my plan of care. I will follow my Zone Management tool to seek urgent or emergent care. I will notify my provider of any symptoms that indicate a worsening of my condition. Barriers: impairment:  cognitive, lack of support, overwhelmed by complexity of regimen and lack of education  Plan for overcoming my barriers: I will work with the ACM and my providers to help with improved overall self care management of my health  Confidence: 8/10  Anticipated Goal Completion Date: 3/15/19              Prior to Admission medications    Medication Sig Start Date End Date Taking?  Authorizing Provider   tiZANidine (ZANAFLEX) 4 MG tablet TAKE ONE TABLET BY MOUTH THREE TIMES A DAY AS NEEDED FOR MUSCLE SPASMS/PAIN 12/9/19   YULY Nichols CNP   amLODIPine (NORVASC) 5 MG tablet TAKE ONE TABLET BY MOUTH DAILY AT BEDTIME 11/6/19   Historical Provider, MD   donepezil (ARICEPT) 10 MG tablet take one-half tablet by mouth at bedtime for 2 weeks then increase to 1 tablet at bedtime 11/5/19   Historical Provider, MD   VORTIoxetine (TRINTELLIX) 10 MG TABS tablet Take 10 mg by mouth daily     Historical Provider, MD   albuterol sulfate  (90 Base) MCG/ACT inhaler Inhale 2 puffs into the lungs 4 times daily as needed for Wheezing 10/29/19   YULY Nichols CNP   aspirin 81 MG EC tablet Take 1 tablet by mouth daily 10/13/19   Chata Jaeger DO   metoprolol succinate (TOPROL XL) 25 MG extended release

## 2020-02-14 ENCOUNTER — OFFICE VISIT (OUTPATIENT)
Dept: FAMILY MEDICINE CLINIC | Age: 75
End: 2020-02-14
Payer: MEDICARE

## 2020-02-14 ENCOUNTER — CARE COORDINATION (OUTPATIENT)
Dept: CARE COORDINATION | Age: 75
End: 2020-02-14

## 2020-02-14 VITALS
OXYGEN SATURATION: 98 % | HEIGHT: 55 IN | BODY MASS INDEX: 30.09 KG/M2 | HEART RATE: 80 BPM | WEIGHT: 130 LBS | DIASTOLIC BLOOD PRESSURE: 86 MMHG | TEMPERATURE: 96.4 F | SYSTOLIC BLOOD PRESSURE: 130 MMHG

## 2020-02-14 DIAGNOSIS — I10 ESSENTIAL HYPERTENSION: ICD-10-CM

## 2020-02-14 LAB
ALBUMIN SERPL-MCNC: 4.5 G/DL (ref 3.5–4.6)
ALP BLD-CCNC: 63 U/L (ref 40–130)
ALT SERPL-CCNC: 12 U/L (ref 0–33)
ANION GAP SERPL CALCULATED.3IONS-SCNC: 12 MEQ/L (ref 9–15)
AST SERPL-CCNC: 23 U/L (ref 0–35)
BILIRUB SERPL-MCNC: 0.4 MG/DL (ref 0.2–0.7)
BUN BLDV-MCNC: 22 MG/DL (ref 8–23)
CALCIUM SERPL-MCNC: 10.5 MG/DL (ref 8.5–9.9)
CHLORIDE BLD-SCNC: 102 MEQ/L (ref 95–107)
CO2: 28 MEQ/L (ref 20–31)
CREAT SERPL-MCNC: 0.81 MG/DL (ref 0.5–0.9)
GFR AFRICAN AMERICAN: >60
GFR NON-AFRICAN AMERICAN: >60
GLOBULIN: 2.7 G/DL (ref 2.3–3.5)
GLUCOSE BLD-MCNC: 67 MG/DL (ref 70–99)
POTASSIUM SERPL-SCNC: 5.1 MEQ/L (ref 3.4–4.9)
SODIUM BLD-SCNC: 142 MEQ/L (ref 135–144)
TOTAL PROTEIN: 7.2 G/DL (ref 6.3–8)

## 2020-02-14 PROCEDURE — 99214 OFFICE O/P EST MOD 30 MIN: CPT | Performed by: NURSE PRACTITIONER

## 2020-02-14 ASSESSMENT — PATIENT HEALTH QUESTIONNAIRE - PHQ9
SUM OF ALL RESPONSES TO PHQ QUESTIONS 1-9: 1
SUM OF ALL RESPONSES TO PHQ QUESTIONS 1-9: 1

## 2020-02-14 ASSESSMENT — ENCOUNTER SYMPTOMS: SHORTNESS OF BREATH: 0

## 2020-02-14 NOTE — PROGRESS NOTES
Subjective  Chief Complaint   Patient presents with    3 Month Follow-Up     HTN       Hypertension   This is a chronic problem. The current episode started more than 1 year ago. The problem is unchanged. The problem is controlled. Pertinent negatives include no chest pain, headaches, malaise/fatigue, palpitations, peripheral edema or shortness of breath. There are no associated agents to hypertension. Risk factors for coronary artery disease include obesity, sedentary lifestyle and post-menopausal state. Past treatments include beta blockers and calcium channel blockers. The current treatment provides significant improvement. There are no compliance problems. Hypertensive end-organ damage includes heart failure. There is no history of CAD/MI or PVD. Mentions she has lumps in her abdomen. Very vague description of this. Not really bothersome, not sure of size, not sure if they have been changing. She is following with cardiology (Dr. Matthew Mcwilliams) for HTN and CHF. Has been doing well overall with that. Following with neurology (Dr. Katalina Bansal) for memory loss issues. Seems to be stable in that regard. Was previously referred to pulmonology for her COPD; however she no showed her appt in December. Reports her breathing has been doing good. She does use oxygen at home PRN.     Past Medical History:   Diagnosis Date    Anxiety     Arthritis     COPD (chronic obstructive pulmonary disease) (HCC)     Generalized osteoarthrosis, unspecified site 12/27/2002    Hyperlipidemia     Hypertension     Major depressive disorder, single episode, moderate ([address] 12/27/2002    Morbid obesity ([address] 12/27/2002    OAB (overactive bladder)     Osteoporosis      Patient Active Problem List    Diagnosis Date Noted    Slow transit constipation 10/13/2019    External hemorrhoid, bleeding 10/13/2019    Colitis 10/13/2019    Acute colitis 10/10/2019    Hypovolemic shock ([address] 10/07/2019    NSTEMI (non-ST elevated myocardial infarction) ([facility] 10/05/2019    [facility] failure [facility] 10/05/2019    Hypotension 10/01/2019    Neuromuscular scoliosis of lumbar region 2019    Hypertension     Hyperlipidemia     COPD (chronic obstructive pulmonary disease) (HCC)     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 ([facility] 2010    Generalized osteoarthrosis, unspecified site 2002    Major depressive disorder, single episode, moderate ([facility] 2002    Morbid obesity ([facility] 2002     Past Surgical History:   Procedure Laterality Date    CARDIAC CATHETERIZATION      CARPAL TUNNEL RELEASE      COLONOSCOPY      HYSTERECTOMY, TOTAL ABDOMINAL      KNEE ARTHROPLASTY Bilateral     [place] COLON [place] SCRN NOT  [address] IND N/A 2017    COLONOSCOPY performed by Silvia Galvan MD at . [facility] 61 ESOPHAGOGASTRODUODENOSCOPY TRANSORAL DIAGNOSTIC N/A 2017    EGD ESOPHAGOGASTRODUODENOSCOPY performed by Silvia Galvan MD at [address] Bilateral      Family History   Problem Relation Age of Onset    Arthritis Father     Other Father         gout    Heart Failure Father     Cancer Sister      Social History     Socioeconomic History    Marital status:      Spouse name: Devonte Burkett Number of children: 3    Years of education: 15    Highest education level: 12th grade   Occupational History    Occupation: [employer] store    Social Needs    Financial resource strain: Somewhat hard    Food insecurity:     Worry: Never true     Inability: Never true    Transportation needs:     Medical: No     Non-medical: No   Tobacco Use    Smoking status: Former Smoker     Packs/day: 1.00     Years: 20.00     Pack years: 20.00     Last attempt to quit: 1980     Years since quittin.1    Smokeless tobacco: Never Used   Substance and Sexual Activity    Alcohol use: No    Drug use: Never    Sexual activity: Not Currently     Partners: Male   Lifestyle    Physical Procedures    Comprehensive Metabolic Panel     Standing Status:   Future     Number of Occurrences:   1     Standing Expiration Date:   2/14/2021   Luisa Carrion MD, Pulmonology, [place]     Referral Priority:   Routine     Referral Type:   Eval and Treat     Referral Reason:   Specialty Services Required     Referred to Provider:   Mario Luis MD     Requested Specialty:   Pulmonology     Number of Visits Requested:   1       No orders of the defined types were placed in this encounter.       Return in about 3 months (around 5/14/2020) for check up, htn.    Jarrett Arriola, YULY - CNP

## 2020-02-14 NOTE — CARE COORDINATION
Ambulatory Care Coordination Note  2/14/2020  CM Risk Score: 10  Charlson 10 Year Mortality Risk Score: 98%     ACC: Juan Carlos Spann, RN    Summary Note:     I met with Renetta Jung following her AWV with Norberto Sosaramona   She is pleasant, color pale, skin warm and dry to touch. She tells me that she is feeling very well. She denies any pain, SOB, lightheadedness, or dizziness. She is not weighing herself at home stating \"you just weighed me here. \"  I explained that it is important to keep an eye the weight as it can be indicator of decreasing function of her heart. I then asked her about her medications and she tells me that her  Laxmi Mitchell sets everything up in her pill box. She does have an empty bottle of metoprolol that is filled with water. When I asked her about the bottle she states \"I don't know why I did that. \"  She did ask about a refill. I explained that cardiology filled this. In reviewing the chart she did not have a follow up S/P hospitalization with cardiology. I explained that she needs to follow up with her specialist in order to have optimal health. The problem for Renetta Jung is that she is very forgetful and really cannot remember what was said to her in the previous sentence. Her  is not in the exam room and she does not want him to be with her during her office visits. Care Coordination Interventions    Program Enrollment:  Rising Risk  Referral from Primary Care Provider:  No  Suggested Interventions and Community Resources  Disease Association: In Process  Pharmacist:  Completed  Zone Management Tools: In Process  Other Services or Interventions:  Pharmacy referral completed  CHF and COPD education and zone tools initiated. Advance Directive packet sent to spouse. Goals Addressed                 This Visit's Progress     Conditions and Symptoms   No change     I will schedule office visits, as directed by my provider.   I will keep my appointment or reschedule if I have to cancel. I will notify my provider of any barriers to my plan of care. I will follow my Zone Management tool to seek urgent or emergent care. I will notify my provider of any symptoms that indicate a worsening of my condition. Barriers: impairment:  cognitive, lack of support, overwhelmed by complexity of regimen and lack of education  Plan for overcoming my barriers: I will work with the [facility] and my providers to help with improved overall self care management of my health  Confidence: 8/10  Anticipated Goal Completion Date: 3/15/19              Prior to Admission medications    Medication Sig Start Date End Date Taking?  Authorizing Provider   tiZANidine (ZANAFLEX) 4 MG tablet TAKE ONE TABLET BY MOUTH THREE TIMES A DAY AS NEEDED FOR MUSCLE SPASMS/PAIN 12/9/19   YULY Rey CNP   amLODIPine (NORVASC) 5 MG tablet TAKE ONE TABLET BY MOUTH DAILY AT BEDTIME 11/6/19   Historical Provider, MD   donepezil (ARICEPT) 10 MG tablet take one-half tablet by mouth at bedtime for 2 weeks then increase to 1 tablet at bedtime 11/5/19   Historical Provider, MD   VORTIoxetine (TRINTELLIX) 10 MG TABS tablet Take 10 mg by mouth daily     Historical Provider, MD   albuterol sulfate  (90 Base) MCG/ACT inhaler Inhale 2 puffs into the lungs 4 times daily as needed for Wheezing 10/29/19   YULY Rey CNP   aspirin 81 MG EC tablet Take 1 tablet by mouth daily 10/13/19   Katey Cooney,    vitamin D (CHOLECALCIFEROL) 1000 UNIT TABS tablet Take 2 tablets by mouth daily 10/9/19   Katey Cooney DO   gabapentin (NEURONTIN) 400 MG capsule TAKE ONE CAPSULE BY MOUTH THREE TIMES A DAY 9/10/19   Historical Provider, MD   pravastatin (PRAVACHOL) 40 MG tablet TAKE ONE TABLET BY MOUTH EVERY DAY 9/7/19   YULY Rey CNP   melatonin 1 MG tablet Take 1 tablet by mouth nightly as needed for Sleep 9/7/19   YULY Rey 31     Historical Provider, MD       Future

## 2020-02-15 ASSESSMENT — ENCOUNTER SYMPTOMS
COUGH: 0
ABDOMINAL PAIN: 0
BACK PAIN: 0

## 2020-03-02 RX ORDER — TIZANIDINE 4 MG/1
TABLET ORAL
Qty: 90 TABLET | Refills: 0 | Status: SHIPPED | OUTPATIENT
Start: 2020-03-02 | End: 2020-04-23

## 2020-03-13 ENCOUNTER — CARE COORDINATION (OUTPATIENT)
Dept: CARE COORDINATION | Age: 75
End: 2020-03-13

## 2020-03-13 ASSESSMENT — ENCOUNTER SYMPTOMS: DYSPNEA ASSOCIATED WITH: EXERTION

## 2020-03-18 RX ORDER — METOPROLOL SUCCINATE 25 MG/1
TABLET, EXTENDED RELEASE ORAL
Qty: 30 TABLET | Refills: 0 | OUTPATIENT
Start: 2020-03-18

## 2020-03-24 ENCOUNTER — VIRTUAL VISIT (OUTPATIENT)
Dept: PULMONOLOGY | Age: 75
End: 2020-03-24
Payer: MEDICARE

## 2020-03-24 PROCEDURE — 99442 PR PHYS/QHP TELEPHONE EVALUATION 11-20 MIN: CPT | Performed by: INTERNAL MEDICINE

## 2020-03-24 ASSESSMENT — ENCOUNTER SYMPTOMS
RHINORRHEA: 0
ABDOMINAL PAIN: 0
DIARRHEA: 0
VOMITING: 0
CHEST TIGHTNESS: 0
SORE THROAT: 0
NAUSEA: 0
COUGH: 0
SINUS PRESSURE: 0
WHEEZING: 0
SHORTNESS OF BREATH: 0

## 2020-03-24 NOTE — PROGRESS NOTES
Subjective:     Artem Castaneda is a 66 y.o. female who complains today of:     Chief Complaint   Patient presents with    New Patient     COPD per Bella Donahue CNP       HPI  This is a telehealth phone, patient understands the nature of this visit and wishes to proceed. Patient was referred to me for \"COPD\" she has a remote smoking history she quit in 1980, apparently she was started on nocturnal oxygen therapy with suspected diagnosis of COPD, she does not remember ever having PFTs, could not find one, she had an echo done last year in October which showed moderate mitral regurgitation with moderate pulmonary hypertension but adequate LV systolic function. She reports no significant cough, wheezing, shortness of breath on exertion. States that she was not sure why she was referred here.     Allergies:  Codeine  Past Medical History:   Diagnosis Date    Anxiety     Arthritis     COPD (chronic obstructive pulmonary disease) (HCC)     Generalized osteoarthrosis, unspecified site 12/27/2002    Hyperlipidemia     Hypertension     Major depressive disorder, single episode, moderate ([address] 12/27/2002    Morbid obesity ([address] 12/27/2002    OAB (overactive bladder)     Osteoporosis      Past Surgical History:   Procedure Laterality Date    CARDIAC CATHETERIZATION      CARPAL TUNNEL RELEASE      COLONOSCOPY      HYSTERECTOMY, TOTAL ABDOMINAL      KNEE ARTHROPLASTY Bilateral     NM COLON CA SCRN NOT  [address] IND N/A 6/22/2017    COLONOSCOPY performed by Kash Kaba MD at 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    Social Needs    Financial resource strain: Somewhat hard    Food insecurity     Worry: Never true     Inability: Never true    Transportation needs     Medical: No     Non-medical: No   Tobacco Use    Smoking status: Former Smoker     Packs/day: 1.00     Years: 20.00     Pack years: 20.00     Last attempt to quit: 1980     Years since quittin.2    Smokeless tobacco: Never Used   Substance and Sexual Activity    Alcohol use: No    Drug use: Never    Sexual activity: Not Currently     Partners: Male   Lifestyle    Physical activity     Days per week: 0 days     Minutes per session: 0 min    Stress: To some extent   Relationships    Social connections     Talks on phone: Twice a week     Gets together: Twice a week     Attends Buddhist service: More than 4 times per year     Active member of club or organization: No     Attends meetings of clubs or organizations: Never     Relationship status:     Intimate partner violence     Fear of current or ex partner: Not on file     Emotionally abused: Not on file     Physically abused: Not on file     Forced sexual activity: Not on file   Other Topics Concern    Not on file   Social History Narrative    Not on file         Review of Systems   Constitutional: Negative for appetite change, chills, diaphoresis, fatigue and fever. HENT: Negative for congestion, nosebleeds, postnasal drip, rhinorrhea, sinus pressure, sneezing and sore throat. Eyes: Negative for visual disturbance. Respiratory: Negative for cough, chest tightness, shortness of breath and wheezing. Cardiovascular: Negative for chest pain, palpitations and leg swelling. Gastrointestinal: Negative for abdominal pain, diarrhea, nausea and vomiting. Genitourinary: Negative for dysuria and urgency.

## 2020-04-08 ENCOUNTER — CARE COORDINATION (OUTPATIENT)
Dept: CARE COORDINATION | Age: 75
End: 2020-04-08

## 2020-04-10 ENCOUNTER — HOSPITAL ENCOUNTER (EMERGENCY)
Age: 75
Discharge: HOME | End: 2020-04-10
Attending: EMERGENCY MEDICINE
Payer: MEDICARE

## 2020-04-10 VITALS
WEIGHT: 135 LBS | HEART RATE: 59 BPM | SYSTOLIC BLOOD PRESSURE: 139 MMHG | BODY MASS INDEX: 31.24 KG/M2 | DIASTOLIC BLOOD PRESSURE: 78 MMHG | OXYGEN SATURATION: 94 % | HEIGHT: 55 IN | RESPIRATION RATE: 16 BRPM | TEMPERATURE: 97.5 F

## 2020-04-10 LAB
ALBUMIN SERPL-MCNC: 4.3 G/DL (ref 3.5–4.6)
ALP BLD-CCNC: 59 U/L (ref 40–130)
ALT SERPL-CCNC: 16 U/L (ref 0–33)
ANION GAP SERPL CALCULATED.3IONS-SCNC: 13 MEQ/L (ref 9–15)
AST SERPL-CCNC: 24 U/L (ref 0–35)
BASOPHILS ABSOLUTE: 0.1 K/UL (ref 0–0.2)
BASOPHILS RELATIVE PERCENT: 0.9 %
BILIRUB SERPL-MCNC: 0.6 MG/DL (ref 0.2–0.7)
BUN BLDV-MCNC: 21 MG/DL (ref 8–23)
CALCIUM SERPL-MCNC: 10 MG/DL (ref 8.5–9.9)
CHLORIDE BLD-SCNC: 101 MEQ/L (ref 95–107)
CO2: 25 MEQ/L (ref 20–31)
CREAT SERPL-MCNC: 0.76 MG/DL (ref 0.5–0.9)
EOSINOPHILS ABSOLUTE: 0.3 K/UL (ref 0–0.7)
EOSINOPHILS RELATIVE PERCENT: 4.2 %
GFR AFRICAN AMERICAN: >60
GFR NON-AFRICAN AMERICAN: >60
GLOBULIN: 2.9 G/DL (ref 2.3–3.5)
GLUCOSE BLD-MCNC: 84 MG/DL (ref 70–99)
HCT VFR BLD CALC: 43.4 % (ref 37–47)
HEMOGLOBIN: 13.9 G/DL (ref 12–16)
LYMPHOCYTES ABSOLUTE: 2 K/UL (ref 1–4.8)
LYMPHOCYTES RELATIVE PERCENT: 25.4 %
MAGNESIUM: 1.9 MG/DL (ref 1.7–2.4)
MCH RBC QN AUTO: 29.5 PG (ref 27–31.3)
MCHC RBC AUTO-ENTMCNC: 32 % (ref 33–37)
MCV RBC AUTO: 92.3 FL (ref 82–100)
MONOCYTES ABSOLUTE: 0.6 K/UL (ref 0.2–0.8)
MONOCYTES RELATIVE PERCENT: 7.8 %
NEUTROPHILS ABSOLUTE: 4.9 K/UL (ref 1.4–6.5)
NEUTROPHILS RELATIVE PERCENT: 61.7 %
PDW BLD-RTO: 15.6 % (ref 11.5–14.5)
PLATELET # BLD: 242 K/UL (ref 130–400)
POTASSIUM SERPL-SCNC: 4.2 MEQ/L (ref 3.4–4.9)
PRO-BNP: 158 PG/ML
RBC # BLD: 4.7 M/UL (ref 4.2–5.4)
SODIUM BLD-SCNC: 139 MEQ/L (ref 135–144)
TOTAL PROTEIN: 7.2 G/DL (ref 6.3–8)
TROPONIN: <0.01 NG/ML (ref 0–0.01)
WBC # BLD: 8 K/UL (ref 4.8–10.8)

## 2020-04-10 PROCEDURE — 84484 ASSAY OF TROPONIN QUANT: CPT

## 2020-04-10 PROCEDURE — 96374 THER/PROPH/DIAG INJ IV PUSH: CPT

## 2020-04-10 PROCEDURE — 80053 COMPREHEN METABOLIC PANEL: CPT

## 2020-04-10 PROCEDURE — 85025 COMPLETE CBC W/AUTO DIFF WBC: CPT

## 2020-04-10 PROCEDURE — 99283 EMERGENCY DEPT VISIT LOW MDM: CPT

## 2020-04-10 PROCEDURE — 6360000002 HC RX W HCPCS: Performed by: EMERGENCY MEDICINE

## 2020-04-10 PROCEDURE — 36415 COLL VENOUS BLD VENIPUNCTURE: CPT

## 2020-04-10 PROCEDURE — 83735 ASSAY OF MAGNESIUM: CPT

## 2020-04-10 PROCEDURE — 83880 ASSAY OF NATRIURETIC PEPTIDE: CPT

## 2020-04-10 RX ORDER — FUROSEMIDE 20 MG/1
20 TABLET ORAL DAILY
Qty: 5 TABLET | Refills: 0 | Status: SHIPPED | OUTPATIENT
Start: 2020-04-10 | End: 2020-04-14 | Stop reason: ALTCHOICE

## 2020-04-10 RX ORDER — FUROSEMIDE 10 MG/ML
20 INJECTION INTRAMUSCULAR; INTRAVENOUS ONCE
Status: COMPLETED | OUTPATIENT
Start: 2020-04-10 | End: 2020-04-10

## 2020-04-10 RX ADMIN — FUROSEMIDE 20 MG: 10 INJECTION, SOLUTION INTRAMUSCULAR; INTRAVENOUS at 11:55

## 2020-04-10 ASSESSMENT — ENCOUNTER SYMPTOMS
BACK PAIN: 0
NAUSEA: 0
DIARRHEA: 0
SHORTNESS OF BREATH: 0
COUGH: 0
SORE THROAT: 0
ABDOMINAL PAIN: 0
VOMITING: 0

## 2020-04-10 ASSESSMENT — PAIN DESCRIPTION - PAIN TYPE: TYPE: ACUTE PAIN

## 2020-04-10 ASSESSMENT — PAIN DESCRIPTION - ORIENTATION: ORIENTATION: RIGHT;LOWER

## 2020-04-10 ASSESSMENT — PAIN SCALES - GENERAL: PAINLEVEL_OUTOF10: 3

## 2020-04-10 ASSESSMENT — PAIN DESCRIPTION - LOCATION: LOCATION: LEG

## 2020-04-13 ENCOUNTER — TELEPHONE (OUTPATIENT)
Dept: FAMILY MEDICINE CLINIC | Age: 75
End: 2020-04-13

## 2020-04-14 ENCOUNTER — OFFICE VISIT (OUTPATIENT)
Dept: FAMILY MEDICINE CLINIC | Age: 75
End: 2020-04-14
Payer: MEDICARE

## 2020-04-14 VITALS
WEIGHT: 139 LBS | SYSTOLIC BLOOD PRESSURE: 124 MMHG | OXYGEN SATURATION: 97 % | BODY MASS INDEX: 32.17 KG/M2 | HEIGHT: 55 IN | HEART RATE: 64 BPM | DIASTOLIC BLOOD PRESSURE: 80 MMHG | TEMPERATURE: 97.6 F

## 2020-04-14 PROCEDURE — 1111F DSCHRG MED/CURRENT MED MERGE: CPT | Performed by: FAMILY MEDICINE

## 2020-04-14 PROCEDURE — 99214 OFFICE O/P EST MOD 30 MIN: CPT | Performed by: FAMILY MEDICINE

## 2020-04-14 ASSESSMENT — ENCOUNTER SYMPTOMS
CHEST TIGHTNESS: 0
ABDOMINAL PAIN: 0
COLOR CHANGE: 1
SHORTNESS OF BREATH: 0
ABDOMINAL DISTENTION: 0

## 2020-04-14 NOTE — TELEPHONE ENCOUNTER
Yes, video visit please. Unfortunately telephone visit will not work. If she can't do video visit, maybe she can see someone in the office for this today if possible. Thanks!

## 2020-04-14 NOTE — PROGRESS NOTES
Height: 4' 3\" (1.295 m)     Body mass index is 37.57 kg/m². Wt Readings from Last 3 Encounters:   04/14/20 139 lb (63 kg)   04/10/20 135 lb (61.2 kg)   02/14/20 130 lb (59 kg)     BP Readings from Last 3 Encounters:   04/14/20 124/80   04/10/20 139/78   02/14/20 130/86       Physical Exam  Constitutional:       General: She is not in acute distress. Appearance: She is well-developed. She is not diaphoretic. HENT:      Head: Normocephalic and atraumatic. Right Ear: External ear normal.      Left Ear: External ear normal.      Nose: Nose normal.   Eyes:      General:         Right eye: No discharge. Left eye: No discharge. Conjunctiva/sclera: Conjunctivae normal.      Pupils: Pupils are equal, round, and reactive to light. Neck:      Musculoskeletal: Neck supple. Thyroid: No thyromegaly. Trachea: No tracheal deviation. Cardiovascular:      Rate and Rhythm: Normal rate and regular rhythm. Pulmonary:      Effort: Pulmonary effort is normal. No respiratory distress. Abdominal:      General: There is no distension. Musculoskeletal:         General: No swelling. Right lower leg: No edema. Left lower leg: No edema. Skin:     General: Skin is warm and dry. Findings: No bruising or rash. Comments: Erythematous somewhat firm area on the medial aspect just above the medial malleolus of the right leg    See picture below   Neurological:      Mental Status: She is alert. Coordination: Coordination normal.   Psychiatric:         Thought Content: Thought content normal.         Judgment: Judgment normal.             Em Ballesteros was seen today for follow-up from hospital.    Diagnoses and all orders for this visit:    1. Thrombophlebitis of superficial veins of right lower extremity new onset. See recommendations and discharge patient instructions    2. Lymphedema resolved. No further treatment needed. No labs due at this time.     3. Essential hypertension

## 2020-04-21 ENCOUNTER — OFFICE VISIT (OUTPATIENT)
Dept: FAMILY MEDICINE CLINIC | Age: 75
End: 2020-04-21
Payer: MEDICARE

## 2020-04-21 ENCOUNTER — HOSPITAL ENCOUNTER (OUTPATIENT)
Dept: ULTRASOUND IMAGING | Age: 75
Discharge: HOME | End: 2020-04-23
Payer: MEDICARE

## 2020-04-21 VITALS
OXYGEN SATURATION: 98 % | WEIGHT: 132 LBS | HEIGHT: 55 IN | TEMPERATURE: 97.3 F | DIASTOLIC BLOOD PRESSURE: 78 MMHG | HEART RATE: 62 BPM | SYSTOLIC BLOOD PRESSURE: 124 MMHG | BODY MASS INDEX: 30.55 KG/M2

## 2020-04-21 PROCEDURE — 99213 OFFICE O/P EST LOW 20 MIN: CPT | Performed by: NURSE PRACTITIONER

## 2020-04-21 PROCEDURE — 93971 EXTREMITY STUDY: CPT

## 2020-04-21 RX ORDER — GABAPENTIN 400 MG/1
CAPSULE ORAL
Qty: 90 CAPSULE | Refills: 3 | Status: SHIPPED | OUTPATIENT
Start: 2020-04-21 | End: 2020-08-18

## 2020-04-21 ASSESSMENT — ENCOUNTER SYMPTOMS
SHORTNESS OF BREATH: 0
COUGH: 0
BACK PAIN: 0
COLOR CHANGE: 1
CHEST TIGHTNESS: 0

## 2020-04-21 NOTE — PATIENT INSTRUCTIONS
Patient Education        Deep Vein Thrombosis: Care Instructions  Your Care Instructions    A deep vein thrombosis (DVT) is a blood clot in certain veins of the legs, pelvis, or arms. Blood clots in these veins need to be treated because they can get bigger, break loose, and travel through the bloodstream to the lungs. A blood clot in a lung can be life-threatening. The doctor may have given you a blood thinner (anticoagulant). A blood thinner can stop the blood clot from growing larger and prevent new clots from forming. You will need to take a blood thinner for 3 to 6 months or longer. The doctor has checked you carefully, but problems can develop later. If you notice any problems or new symptoms, get medical treatment right away. Follow-up care is a key part of your treatment and safety. Be sure to make and go to all appointments, and call your doctor if you are having problems. It's also a good idea to know your test results and keep a list of the medicines you take. How can you care for yourself at home? · Take your medicines exactly as prescribed. Call your doctor if you think you are having a problem with your medicine. · If you are taking a blood thinner, be sure you get instructions about how to take your medicine safely. Blood thinners can cause serious bleeding problems. · Wear compression stockings if your doctor recommends them. These stockings are tighter at the feet than on the legs. They may reduce pain and swelling in your legs. But there are different types of stockings, and they need to fit right. So your doctor will recommend what you need. · When you sit, use a pillow to raise the arm or leg that has the blood clot. Try to keep it above the level of your heart. When should you call for help? Call 911 anytime you think you may need emergency care.  For example, call if:    · You passed out (lost consciousness).     · You have symptoms of a blood clot in your lung (called a pulmonary embolism). These include:  ? Sudden chest pain. ? Trouble breathing. ? Coughing up blood.    Call your doctor now or seek immediate medical care if:    · You have new or worse trouble breathing.     · You are dizzy or lightheaded, or you feel like you may faint.     · You have symptoms of a blood clot in your arm or leg. These may include:  ? Pain in the arm, calf, back of the knee, thigh, or groin. ? Redness and swelling in the arm, leg, or groin.    Watch closely for changes in your health, and be sure to contact your doctor if:    · You do not get better as expected. Where can you learn more? Go to [email or web address]://[email or web address] org and sign in to your [employer] account. Enter A623 in the [employer] box to learn more about \"Deep Vein Thrombosis: Care Instructions. \"     If you do not have an account, please click on the \"Sign Up Now\" link. Current as of: July 14, 2019Content Version: 12.4  © 6533-5371 Healthwise, Incorporated. Care instructions adapted under license by [facility] [employer] [facility] ([facility]). If you have questions about a medical condition or this instruction, always ask your healthcare professional. [place] [address] any warranty or liability for your use of this information.

## 2020-04-21 NOTE — PROGRESS NOTES
normal.   Neck:      Musculoskeletal: Normal range of motion and neck supple. Pulmonary:      Effort: Pulmonary effort is normal. No respiratory distress. Skin:     General: Skin is warm and dry. Comments: Multiple dilated superficial veins present. Unilateral edema or swelling with a difference in calf or thigh diameters with right>left, as well as unilateral warmth, tenderness, erythema   Pain and tenderness with palpation present along the course of the involved major veins   Skin intact. Neurological:      Mental Status: She is alert. Psychiatric:         Behavior: Behavior normal.         Cognition and Memory: Memory is impaired. She exhibits impaired recent memory. POC Testing Today:No results found for this visit on 04/21/20. Assessment & Plan    Diagnosis Orders   1. Suspected deep vein thrombosis (DVT)  rivaroxaban 15 & 20 MG Starter Pack    US DUP LOWER EXTREMITY RIGHT RODOLFO   2. Right leg swelling  US DUP LOWER EXTREMITY RIGHT RODOLFO   3. Acute leg pain, right  US DUP LOWER EXTREMITY RIGHT RODOLFO       Orders Placed This Encounter   Procedures    US DUP LOWER EXTREMITY RIGHT RODOLFO     Standing Status:   Future     Standing Expiration Date:   4/21/2021     Order Specific Question:   Reason for exam:     Answer:   acute right ankle swelling, pain and \"knot\"     1. STAT ultrasound ordered to r/o DVT. Based on previous image in 4/14 chart, symptoms appear to be worsening. 2. Patient to start Xeralto starter pack ONLY if ultrasound positive for dvt. 3. If negative, will order referral to Interventional Radiology (Dr Ahmet Rivero) for further evaluation, as this is an ongoing issue. Return in about 1 week (around 4/28/2020), or if symptoms worsen or fail to improve, for follow up with your PCP. Side effects and adverse effects of any medication prescribed today, as well as treatment plan/rationale, follow-up care, and result expectations have been discussed with the patient. Expresses understanding and desires to proceed with treatment plan. Discussed signs and symptoms which require immediate follow-up in ED/call to 911. Understanding verbalized. I have reviewed and updated the electronic medical record.     Chantel Zambrano, APRN - CNP

## 2020-04-23 ENCOUNTER — OFFICE VISIT (OUTPATIENT)
Dept: FAMILY MEDICINE CLINIC | Age: 75
End: 2020-04-23
Payer: MEDICARE

## 2020-04-23 VITALS
TEMPERATURE: 97.6 F | WEIGHT: 133.6 LBS | HEIGHT: 55 IN | HEART RATE: 67 BPM | SYSTOLIC BLOOD PRESSURE: 158 MMHG | DIASTOLIC BLOOD PRESSURE: 82 MMHG | BODY MASS INDEX: 30.92 KG/M2 | OXYGEN SATURATION: 95 %

## 2020-04-23 PROCEDURE — 99213 OFFICE O/P EST LOW 20 MIN: CPT | Performed by: NURSE PRACTITIONER

## 2020-04-23 RX ORDER — TIZANIDINE 4 MG/1
TABLET ORAL
Qty: 90 TABLET | Refills: 0 | Status: SHIPPED | OUTPATIENT
Start: 2020-04-23 | End: 2020-05-19

## 2020-04-23 ASSESSMENT — ENCOUNTER SYMPTOMS
BACK PAIN: 0
SHORTNESS OF BREATH: 0
COUGH: 0

## 2020-04-23 NOTE — PROGRESS NOTES
Subjective  Chief Complaint   Patient presents with    Check-Up     rt leg        HPI    Pt here for follow up on right leg edema and swelling. Was seen at ER, dx with lymphedema and treated with lasix. Had not improved so had f/u with Dr. Georgiana Narayanan at which time she was dx with superficial thrombophlebitis. Advised to take asa 325 mg daily for 1 week and warm compresses to area. F/u with Chantel on Tuesday d/t worsening swelling and redness, US was done which was negative for DVT so Chantel ordered a referral to interventional radiology at that time. She reports the area is showing some improvement at this time, but is still very tender and red. Has not yet received a call to schedule for IR.     Past Medical History:   Diagnosis Date    Anxiety     Arthritis     [facility]ch[facility] disease) ([facility])     Generalized osteoarthrosis, unspecified site 12/27/2002    Hyperlipidemia     Hypertension     Major depressive disorder, single episode, moderate ([address] 12/27/2002    Morbid obesity ([address] 12/27/2002    OAB (overactive bladder)     Osteoporosis      Patient Active Problem List    Diagnosis Date Noted    Slow transit constipation 10/13/2019    External hemorrhoid, bleeding 10/13/2019    Colitis 10/13/2019    Acute colitis 10/10/2019    Hypovolemic shock ([address] 10/07/2019    NSTEMI (non-ST elevated myocardial infarction) ([address] 10/05/2019    Acute diastolic heart failure ([address] 10/05/2019    Hypotension 10/01/2019    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    Hypertension     Hyperlipidemia     COPD (chronic obstructive pulmonary disease) ([address]     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 ([address] 08/27/2010    Generalized osteoarthrosis, unspecified site 12/27/2002    Major depressive disorder, single episode, moderate ([address] 12/27/2002    Morbid obesity ([address] 12/27/2002     Past Surgical History:   Procedure Laterality Date    CARDIAC CATHETERIZATION      CARPAL TUNNEL RELEASE      COLONOSCOPY      HYSTERECTOMY, TOTAL ABDOMINAL      KNEE ARTHROPLASTY Bilateral     [place] COLON CA SCRN NOT HI [facility] IND N/A 2017    COLONOSCOPY performed by Marcel Dent MD at 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    Social Needs    Financial resource strain: Somewhat hard    Food insecurity     Worry: Never true     Inability: Never true    Transportation needs     Medical: No     Non-medical: No   Tobacco Use    Smoking status: Former Smoker     Packs/day: 1.00     Years: 20.00     Pack years: 20.00     Last attempt to quit: 1980     Years since quittin.3    Smokeless tobacco: Never Used   Substance and Sexual Activity    Alcohol use: No    Drug use: Never    Sexual activity: Not Currently     Partners: Male   Lifestyle    Physical activity     Days per week: 0 days     Minutes per session: 0 min    Stress:  To some extent   Relationships    Social connections     Talks on phone: Twice a week     Gets together: Twice a week     Attends Yazdanism service: More than 4 times per year     Active member of club or organization: No     Attends meetings of clubs or organizations: Never     Relationship status:     Intimate partner violence     Fear of current or ex partner: None     Emotionally abused: None     Physically abused: None     Forced sexual activity: None   Other Topics Concern    None   Social History Narrative  None     Current Outpatient Medications on File Prior to Visit   Medication Sig Dispense Refill    tiZANidine (ZANAFLEX) 4 MG tablet TAKE ONE TABLET BY MOUTH THREE TIMES A DAY AS NEEDED for muscle spasms/ pain 90 tablet 0    gabapentin (NEURONTIN) 400 MG capsule TAKE ONE CAPSULE BY MOUTH THREE TIMES A DAY 90 capsule 3    rivaroxaban 15 & 20 MG Starter Pack Take as directed on package. 1 Package 0    VORTIoxetine (TRINTELLIX) 10 MG TABS tablet Take 1 tablet by mouth daily 30 tablet 3    pravastatin (PRAVACHOL) 40 MG tablet TAKE ONE TABLET BY MOUTH EVERY DAY 30 tablet 5    amLODIPine (NORVASC) 5 MG tablet TAKE ONE TABLET BY MOUTH DAILY AT BEDTIME  3    donepezil (ARICEPT) 10 MG tablet take one-half tablet by mouth at bedtime for 2 weeks then increase to 1 tablet at bedtime  3    albuterol sulfate  (90 Base) MCG/ACT inhaler Inhale 2 puffs into the lungs 4 times daily as needed for Wheezing 3 Inhaler 1    aspirin 81 MG EC tablet Take 1 tablet by mouth daily 30 tablet 3    vitamin D (CHOLECALCIFEROL) 1000 UNIT TABS tablet Take 2 tablets by mouth daily 60 tablet 3    melatonin 1 MG tablet Take 1 tablet by mouth nightly as needed for Sleep 30 tablet 2    Oxygen Concentrator        No current facility-administered medications on file prior to visit. Allergies   Allergen Reactions    Codeine      Nausea, lightheadedness, dizzy       Review of Systems   Constitutional: Negative for chills, diaphoresis, fatigue and fever. HENT: Negative for congestion and ear pain. Respiratory: Negative for cough and shortness of breath. Cardiovascular: Positive for leg swelling. Negative for chest pain and palpitations. Musculoskeletal: Negative for arthralgias and back pain. Neurological: Negative for dizziness and headaches. Psychiatric/Behavioral: Negative for dysphoric mood. The patient is not nervous/anxious.         Objective  Vitals:    04/23/20 1454 04/23/20 1500   BP: (!) 154/80 (!) 158/82

## 2020-04-27 ENCOUNTER — VIRTUAL VISIT (OUTPATIENT)
Dept: INTERVENTIONAL RADIOLOGY/VASCULAR | Age: 75
End: 2020-04-27
Payer: MEDICARE

## 2020-04-27 PROBLEM — I83.813 PAIN DUE TO VARICOSE VEINS OF BOTH LOWER EXTREMITIES: Status: ACTIVE | Noted: 2020-04-27

## 2020-04-27 PROCEDURE — 99443 PR PHYS/QHP TELEPHONE EVALUATION 21-30 MIN: CPT | Performed by: NURSE PRACTITIONER

## 2020-04-27 ASSESSMENT — ENCOUNTER SYMPTOMS
COUGH: 0
NAUSEA: 0
SORE THROAT: 0
WHEEZING: 0
ABDOMINAL DISTENTION: 0
SHORTNESS OF BREATH: 0
ABDOMINAL PAIN: 0
TROUBLE SWALLOWING: 0
VOMITING: 0
DIARRHEA: 0
EYES NEGATIVE: 1

## 2020-04-27 NOTE — PROGRESS NOTES
2020    TELEHEALTH EVALUATION -- Audio/Visual (During VQYCX-92 public health emergency)    Due to [place] 19 outbreak, patient's office visit was converted to a virtual visit. Patient was contacted and agreed to proceed with a virtual visit via Telephone Visit. Unable to connect for video visit. The risks and benefits of converting to a virtual visit were discussed in light of the current infectious disease epidemic. Patient also understood that insurance coverage and co-pays are up to their individual insurance plans. HPI:    Nicolas Parsons (:  1945) has requested an audio/video evaluation for the following concern(s):    Referred by her PCP for:  acute right ankle pain, swelling, mild erythema. Symptoms started x3 weeks ago. She presented to [facility] ED for evaluation on 4/10/2020 for bilateral LE edema. Given IV Lasix with positive results, diagnosed with LE Lymphedema and sent home. Follow up appt with PCP on 20 reports edema had gone down but had new symptoms of localized pain and erythema to medial right ankle and diagnosed with superficial thrombophlebitis and instructed to elevate LE,  Warm compresses, and ASA 325mg PO QD. States the erythema and pain became worse and swelling returned to right lower leg and felt it was getting worse prompting her to have another OV follow with her PCP 20 and 20 in which RLE US duplex was negative for DVT and noted to have multiple LE varicosities and was therefore referred to Vascular Clinic for venous evaluation. US did not report superficial thrombophlebitis in area of pain. States pain is off and on throughout day and is like a throbbing pain. Being up on her feet more exacerbates symptoms. States the pain and swelling remain same but feels the erythema to right medial ankle is slightly worse today but still localized. States she has multiple VV's to right leg. Some removed by Dr. Martha Gibson about three years ago.    Has varicose veins Vitals/Constitutional/EENT/Resp/CV/GI//MS/Neuro/Skin/Heme-Lymph-Imm. ASSESSMENT:   1. Right medial ankle area symptoms consistent with superficial thrombophlebitis. Symptoms also consistent with LE CVI. Has worn compression stockings in past without relief. Had had some type of venous procedures done to right leg by Dr. Nia Siegel however without report, unable to verify what exactly was done. 2. Bilateral LE varicose veins with pain, swelling, and bleeding     PLAN:  1. US Venous of LE's to evaluate for CVI if approved this week to be able to resume non-emergent scans. Even if US scan is not approved by middle of this week, would still like to see patient in office to LE evaluation. 2. Continue to Elevate when able. 3. LOUISE diet. 4. If she still has at home compression stockings for LE's, wear during day and off evenings. Do not wear if they cause increased discomfort. 5. If symptoms become worse from now until she comes in for OV consult, report to PCP or Vascular Clinic. An  electronic signature was used to authenticate this note. --YULY Camilo - CNP on 4/27/2020 at 10:48 AM        Pursuant to the emergency declaration under the [address] [place] [address], [facility] waiver authority and the [email or web address] and [employer] Act, this Virtual  Visit was conducted, with patient's consent, to reduce the patient's risk of exposure to COVID-19 and provide continuity of care for an established patient. Services were provided through a video synchronous discussion virtually to substitute for in-person clinic visit.     Pursuant to the emergency declaration under the [facility], [facility] waiver authority and the [email or web address] and [employer] Act, this Virtual Visit was conducted, with patient's consent, to reduce the patient's risk of exposure to COVID-19 and provide continuity of

## 2020-04-29 ENCOUNTER — OFFICE VISIT (OUTPATIENT)
Dept: INTERVENTIONAL RADIOLOGY/VASCULAR | Age: 75
End: 2020-04-29
Payer: MEDICARE

## 2020-04-29 VITALS
BODY MASS INDEX: 36.38 KG/M2 | SYSTOLIC BLOOD PRESSURE: 137 MMHG | OXYGEN SATURATION: 94 % | WEIGHT: 134.6 LBS | RESPIRATION RATE: 16 BRPM | DIASTOLIC BLOOD PRESSURE: 82 MMHG | HEART RATE: 71 BPM

## 2020-04-29 PROCEDURE — 99215 OFFICE O/P EST HI 40 MIN: CPT | Performed by: NURSE PRACTITIONER

## 2020-04-29 RX ORDER — CEPHALEXIN 500 MG/1
500 CAPSULE ORAL 4 TIMES DAILY
Qty: 28 CAPSULE | Refills: 0 | Status: SHIPPED | OUTPATIENT
Start: 2020-04-29 | End: 2020-07-24 | Stop reason: ALTCHOICE

## 2020-04-29 NOTE — PROGRESS NOTES
with throbbing pain. ). Neurological: Negative. Psychiatric/Behavioral:        Poor short term memory       OBJECTIVE:  /82 (Site: Right Upper Arm, Position: Sitting, Cuff Size: Small Adult)   Pulse 71   Resp 16   Wt 134 lb 9.6 oz (61.1 kg)   LMP  (LMP Unknown)   SpO2 94%   BMI 36.38 kg/m²     Physical Exam  Constitutional:       General: She is not in acute distress. Appearance: Normal appearance. HENT:      Head: Normocephalic and atraumatic. Nose: Nose normal.      Mouth/Throat:      Mouth: Mucous membranes are moist.      Pharynx: Oropharynx is clear. Neck:      Musculoskeletal: Normal range of motion and neck supple. Cardiovascular:      Rate and Rhythm: Normal rate and regular rhythm. Pulses: Normal pulses. Popliteal pulses are 2+ on the right side. Dorsalis pedis pulses are 2+ on the right side. Posterior tibial pulses are 2+ on the right side. Heart sounds: Normal heart sounds. Comments: Edema distal legs. Pulmonary:      Effort: Pulmonary effort is normal.      Breath sounds: Normal breath sounds. Abdominal:      General: Bowel sounds are normal. There is no distension. Tenderness: There is no abdominal tenderness. Musculoskeletal:         General: Tenderness (worse with palpation to right medial distal calf just above medial malleolus ) present. No deformity or signs of injury. Right lower le+ Pitting Edema present. Left lower le+ Edema present. Skin:     Capillary Refill: Capillary refill takes 2 to 3 seconds. Findings: Erythema (moderate to right distal medial calf at area of localized pain) present. Neurological:      Mental Status: She is alert and oriented to person, place, and time. Psychiatric:         Mood and Affect: Mood normal.         Behavior: Behavior normal.         Cognition and Memory: Memory is impaired. ASSESSMENT AND PLAN:    Assessment:   1.  Cellulitis of right lower medial leg: Localized area with edema, pain, erythema with worsening symptoms in past two weeks. Cannot have anything touch area as this causes increased pain. US duplex 4/14/2020 negative for RLE DVT and no noted thrombophlebitis to area of pain. 2. Multiple scattered varicosities to both legs. LLE asymptomatic. H/O previous RLE venous procedures done at another outside facility. Unsure what was done. 3. Possible bilateral LE CVI consistent with patient symptoms. Plan:   1. Keflex 500 mg PO Q 6 hours x 7 days. 2. Follow up in office 2 weeks. 3. Will evaluate at next follow up for possible bilateral CVI with plan for LE US venous workup if elective procedures are approved to be done. 4. Warm compresses to area of pain. 5. OTC NSAID 600 mg PO Q 8 hours PRN pain. 6. If symptoms become worse, notify Vascular Clinic. Thank You YULY Higgins-CNP for referral of your patient to our practice for care.      YULY Michael - CNP

## 2020-04-30 ASSESSMENT — ENCOUNTER SYMPTOMS
COLOR CHANGE: 1
EYES NEGATIVE: 1
RESPIRATORY NEGATIVE: 1
GASTROINTESTINAL NEGATIVE: 1

## 2020-05-04 ENCOUNTER — TELEPHONE (OUTPATIENT)
Dept: FAMILY MEDICINE CLINIC | Age: 75
End: 2020-05-04

## 2020-05-04 NOTE — TELEPHONE ENCOUNTER
Pt came in wanting to be seen for the walk in for leg. States her leg hurts and is red. Wants something for the pain. Pt was seen 4/21/2020 at the walk in for her leg and a referall was given for Interventional Radiology. Pt has seen Talib Sheridan and IR recently. After looking at IR notes, they advised pt to call vascular clinic if any more issues arise, before her 5/14/2020 appt. Advised pt of the notes, and IR number was given. Pt has appt scheduled with Talib Sheridan as well on the 14th.      Margot Kenny, talked to Ty Alcazar from IR and she will be contacting pt

## 2020-05-07 ENCOUNTER — TELEPHONE (OUTPATIENT)
Dept: FAMILY MEDICINE CLINIC | Age: 75
End: 2020-05-07

## 2020-05-08 ENCOUNTER — HOSPITAL ENCOUNTER (OUTPATIENT)
Dept: ULTRASOUND IMAGING | Age: 75
Discharge: HOME | End: 2020-05-10
Payer: MEDICARE

## 2020-05-08 ENCOUNTER — OFFICE VISIT (OUTPATIENT)
Dept: INTERVENTIONAL RADIOLOGY/VASCULAR | Age: 75
End: 2020-05-08
Payer: MEDICARE

## 2020-05-08 VITALS
BODY MASS INDEX: 35.95 KG/M2 | HEART RATE: 70 BPM | RESPIRATION RATE: 16 BRPM | OXYGEN SATURATION: 98 % | DIASTOLIC BLOOD PRESSURE: 75 MMHG | WEIGHT: 133 LBS | SYSTOLIC BLOOD PRESSURE: 154 MMHG

## 2020-05-08 PROBLEM — I87.2 VENOUS INSUFFICIENCY OF LEFT LOWER EXTREMITY: Status: ACTIVE | Noted: 2020-05-08

## 2020-05-08 PROBLEM — I83.93 ASYMPTOMATIC VARICOSE VEINS OF BILATERAL LOWER EXTREMITIES: Status: ACTIVE | Noted: 2020-05-08

## 2020-05-08 PROCEDURE — 93923 UPR/LXTR ART STDY 3+ LVLS: CPT | Performed by: RADIOLOGY

## 2020-05-08 PROCEDURE — 99215 OFFICE O/P EST HI 40 MIN: CPT | Performed by: NURSE PRACTITIONER

## 2020-05-08 PROCEDURE — 93971 EXTREMITY STUDY: CPT

## 2020-05-08 PROCEDURE — 93971 EXTREMITY STUDY: CPT | Performed by: RADIOLOGY

## 2020-05-08 PROCEDURE — 93923 UPR/LXTR ART STDY 3+ LVLS: CPT

## 2020-05-08 RX ORDER — CEPHALEXIN 500 MG/1
500 CAPSULE ORAL 2 TIMES DAILY
Qty: 14 CAPSULE | Refills: 0 | Status: SHIPPED | OUTPATIENT
Start: 2020-05-08 | End: 2020-05-15

## 2020-05-08 ASSESSMENT — ENCOUNTER SYMPTOMS
RESPIRATORY NEGATIVE: 1
COLOR CHANGE: 1
GASTROINTESTINAL NEGATIVE: 1
EYES NEGATIVE: 1

## 2020-05-08 NOTE — PROGRESS NOTES
memory.        Review of Systems   Constitutional: Negative. HENT: Negative. Eyes: Negative. Respiratory: Negative. Cardiovascular: Positive for leg swelling (mild to right medial lower leg). Gastrointestinal: Negative. Endocrine: Negative. Genitourinary: Negative. Musculoskeletal:        Pain to right medial distal calf just above ankle area. Skin: Positive for color change (states right medial lower leg still red). Neurological: Negative. Psychiatric/Behavioral:        Poor short term memory       OBJECTIVE:  BP (!) 154/75 (Site: Right Upper Arm, Position: Sitting, Cuff Size: Small Adult)   Pulse 70   Resp 16   Wt 133 lb (60.3 kg)   LMP  (LMP Unknown)   SpO2 98%   BMI 35.95 kg/m²     Physical Exam  Constitutional:       General: She is not in acute distress. Appearance: Normal appearance. HENT:      Head: Normocephalic and atraumatic. Nose: Nose normal.      Mouth/Throat:      Mouth: Mucous membranes are moist.      Pharynx: Oropharynx is clear. Neck:      Musculoskeletal: Normal range of motion and neck supple. Cardiovascular:      Rate and Rhythm: Normal rate and regular rhythm. Pulses: Normal pulses. Popliteal pulses are 2+ on the right side. Dorsalis pedis pulses are 2+ on the right side. Posterior tibial pulses are 2+ on the right side. Heart sounds: Normal heart sounds. Comments: focal area or swelling to medial distal calf improved since last OV. Pulmonary:      Effort: Pulmonary effort is normal.      Breath sounds: Normal breath sounds. Abdominal:      General: Bowel sounds are normal. There is no distension. Tenderness: There is no abdominal tenderness. Musculoskeletal:         General: Tenderness with palpation to distal medial calf present improved since last OV. No deformity or signs of injury. Skin:     Capillary Refill: Capillary refill takes 2 to 3 seconds.       Findings: Mild Erythema to distal medial calf improved since last OV present. Neurological:      Mental Status: She is alert and oriented to person, place, and time. Psychiatric:         Mood and Affect: Mood normal.         Behavior: Behavior normal.         Cognition and Memory: Memory is impaired. ASSESSMENT AND PLAN:    Assessment:   1. Cellulitis of right lower medial leg: PO ATB completed today. Localized area to distal medial calf has improved since last OV. There is minimal edema, erythema, and tenderness is not as severe as last OV. Patient states she thinks it has gotten worse but cannot remember what symptoms were like at last OV. I am able to palpate area today whereas last OV I was unable to barely touch without patient being in much discomfort. US duplex 4/14/2020 ordered by PCP negative for RLE DVT and no noted thrombophlebitis to area of pain. Mild       edema noted. 2. Multiple scattered varicosities to both legs. Bilateral LE varicosities asymptomatic. H/O previous RLE venous procedures done at another outside facility. Unsure what was done. 3. 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 Patient sent for RLE duplex and bilateral LE CVI workup and then returned to office for results. Negative for RLE DVT. Right GSV is only noted in proximal thigh close to SFJ and not visible from there and distally consistent with previous GSV ablation/sclerotherapy treated for H/O CVI. There is no noted thrombophlebitis to area. There are varicosities to area however they are patent and without reflex. Left GSV CVI to mid calf of 5.1 seconds however LLE asymptomatic. There is no evidence for concern of PAD as there are +2 bilateral DP and PT pulses, denies claudication, LE's warm to touch, denies any pain other than area of focal pain, denies paresthesia, no cyanosis, no CAD. Plan:   1.  Keflex

## 2020-05-14 ENCOUNTER — VIRTUAL VISIT (OUTPATIENT)
Dept: FAMILY MEDICINE CLINIC | Age: 75
End: 2020-05-14
Payer: MEDICARE

## 2020-05-14 VITALS — WEIGHT: 133 LBS | HEIGHT: 55 IN | BODY MASS INDEX: 30.78 KG/M2

## 2020-05-14 PROCEDURE — 99441 PR PHYS/QHP TELEPHONE EVALUATION 5-10 MIN: CPT | Performed by: NURSE PRACTITIONER

## 2020-05-14 RX ORDER — SULFAMETHOXAZOLE AND TRIMETHOPRIM 800; 160 MG/1; MG/1
1 TABLET ORAL 2 TIMES DAILY
Qty: 10 TABLET | Refills: 0 | Status: SHIPPED | OUTPATIENT
Start: 2020-05-14 | End: 2020-05-19

## 2020-05-14 ASSESSMENT — ENCOUNTER SYMPTOMS
SHORTNESS OF BREATH: 0
COUGH: 0
CHEST TIGHTNESS: 0
COLOR CHANGE: 1

## 2020-05-14 NOTE — PROGRESS NOTES
2020    TELEHEALTH EVALUATION -- Audio/Visual (During PROEU-68 public health emergency)    Due to [place] 19 outbreak, patient's office visit was converted to a virtual visit. Patient was contacted and agreed to proceed with a virtual visit via telephone  The risks and benefits of converting to a virtual visit were discussed in light of the current infectious disease epidemic. Patient also understood that insurance coverage and co-pays are up to their individual insurance plans. HPI:    Damián Liz (:  1945) has requested an audio/video evaluation for the following concern(s):    Chief Complaint   Patient presents with    3 Month Follow-Up       HPI    F/u    Still having leg pain, Saw Jorge Hall who diagnosed this as cellulitis, upon f/u leg had improved; however yesterday she says the redness seems to spreading and worsening again. Feels it is going further up her leg, pain is worsening. Has tried to keep it iced and propped up. She finished a 14 day course of keflex.  is not with her and she is having a hard time remembering everything. Review of Systems   Constitutional: Negative for chills, diaphoresis, fatigue and fever. HENT: Negative for congestion and ear pain. Respiratory: Negative for cough, chest tightness and shortness of breath. Cardiovascular: Positive for leg swelling. Negative for chest pain and palpitations. Musculoskeletal: Positive for arthralgias and myalgias. Skin: Positive for color change. Negative for rash and wound. Neurological: Negative for dizziness and headaches. Psychiatric/Behavioral: Negative for dysphoric mood. The patient is not nervous/anxious. Prior to Visit Medications    Medication Sig Taking?  Authorizing Provider   sulfamethoxazole-trimethoprim (BACTRIM DS;SEPTRA DS) 800-160 MG per tablet Take 1 tablet by mouth 2 times daily for 5 days Yes Chiki Gill, APRN - CNP   cephALEXin (KEFLEX) 500 MG capsule Take 1

## 2020-05-19 RX ORDER — TIZANIDINE 4 MG/1
TABLET ORAL
Qty: 90 TABLET | Refills: 0 | Status: SHIPPED | OUTPATIENT
Start: 2020-05-19 | End: 2020-09-25 | Stop reason: SDUPTHER

## 2020-05-21 ENCOUNTER — OFFICE VISIT (OUTPATIENT)
Dept: FAMILY MEDICINE CLINIC | Age: 75
End: 2020-05-21
Payer: MEDICARE

## 2020-05-21 VITALS
BODY MASS INDEX: 31.24 KG/M2 | DIASTOLIC BLOOD PRESSURE: 78 MMHG | WEIGHT: 135 LBS | SYSTOLIC BLOOD PRESSURE: 132 MMHG | OXYGEN SATURATION: 98 % | HEART RATE: 68 BPM | HEIGHT: 55 IN | TEMPERATURE: 97.4 F

## 2020-05-21 PROCEDURE — 99214 OFFICE O/P EST MOD 30 MIN: CPT | Performed by: NURSE PRACTITIONER

## 2020-05-21 RX ORDER — SULFAMETHOXAZOLE AND TRIMETHOPRIM 800; 160 MG/1; MG/1
1 TABLET ORAL 2 TIMES DAILY
Qty: 14 TABLET | Refills: 0 | Status: SHIPPED | OUTPATIENT
Start: 2020-05-21 | End: 2020-05-28

## 2020-05-21 ASSESSMENT — ENCOUNTER SYMPTOMS
COUGH: 0
PHOTOPHOBIA: 0
SHORTNESS OF BREATH: 0
COLOR CHANGE: 1

## 2020-05-21 NOTE — PROGRESS NOTES
Subjective  Chief Complaint   Patient presents with    Check-Up     5 day follow up - Leg swelling        HPI    Pt here for f/u on leg swelling. Has had ongoing issues with leg pain since April 2020. Has been seen at ER for this and was diagnosed with lymphedema. Then had f/u with Dr. Abel Carney and was diagnosed with superficial thrombophlebitis and was treated conservatively for that; however when symptoms did not improve, she had US to r/o DVT which was negative and then was referred to IR. There was had vascular workup which was essentially negative other than some vascular insufficiency and was diagnosed with cellulitis and treated with keflex. Symptoms per patient have not improved since that time. Had virtual telephone visit last week where she c/o worsening symptoms so bactrim was ordered in addition to keflex; however she did not ever fill prescription. Now for the last week she has also developed erythematous macular rash about bilateral eyes without pain, vision loss or swelling. Leg is no better and no worse.     Past Medical History:   Diagnosis Date    Anxiety     Arthritis     [facility]ch[facility] disease[facility]     Generalized osteoarthrosis, unspecified site 12/27/2002    Hyperlipidemia     Hypertension     Major depressive disorder, single episode, moderate ([address] 12/27/2002    Morbid obesity ([address] 12/27/2002    OAB (overactive bladder)     Osteoporosis      Patient Active Problem List    Diagnosis Date Noted    Venous insufficiency of left lower extremity 05/08/2020    Asymptomatic varicose veins of bilateral lower extremities 05/08/2020    Pain and swelling due to varicose veins of both lower extremities 04/27/2020    Slow transit constipation 10/13/2019    External hemorrhoid, bleeding 10/13/2019    Colitis 10/13/2019    Acute colitis 10/10/2019    Hypovolemic shock ([address] 10/07/2019    NSTEMI (non-ST elevated myocardial infarction) ([address] 10/05/2019    Acute of Systems   Constitutional: Negative for chills, diaphoresis, fatigue and fever. HENT: Negative for congestion. Eyes: Negative for photophobia and visual disturbance. Respiratory: Negative for cough and shortness of breath. Cardiovascular: Positive for leg swelling. Negative for chest pain and palpitations. Genitourinary: Negative for difficulty urinating and dysuria. Musculoskeletal: Positive for arthralgias and myalgias. Skin: Positive for color change and rash. Negative for pallor and wound. Neurological: Negative for dizziness and headaches. Psychiatric/Behavioral: Negative for dysphoric mood. The patient is not nervous/anxious. Objective  Vitals:    05/21/20 1110   BP: 132/78   Pulse: 68   Temp: 97.4 °F (36.3 °C)   SpO2: 98%   Weight: 135 lb (61.2 kg)   Height: 4' 3\" (1.295 m)     Physical Exam  Constitutional:       General: She is not in acute distress. Appearance: Normal appearance. She is normal weight. She is not ill-appearing, toxic-appearing or diaphoretic. HENT:      Head: Normocephalic. Right Ear: External ear normal.      Left Ear: External ear normal.   Cardiovascular:      Rate and Rhythm: Normal rate and regular rhythm. Pulses: Normal pulses. Heart sounds: Normal heart sounds. No murmur. Pulmonary:      Effort: Pulmonary effort is normal. No respiratory distress. Breath sounds: Normal breath sounds. No stridor. No wheezing, rhonchi or rales. Chest:      Chest wall: No tenderness. Musculoskeletal: Normal range of motion. General: Swelling and tenderness present. Right lower leg: Edema present. Left lower leg: No edema. Skin:     General: Skin is warm and dry. Capillary Refill: Capillary refill takes less than 2 seconds. Coloration: Skin is not jaundiced or pale. Findings: Erythema and rash present. No bruising or lesion.              Comments: Erythematous macular rash located around bilateral eyes Neurological:      General: No focal deficit present. Mental Status: She is alert and oriented to person, place, and time. Mental status is at baseline. Psychiatric:         Mood and Affect: Mood normal.         Behavior: Behavior normal.         Thought Content: Thought content normal.         Judgment: Judgment normal.         Assessment& Plan    1. Localized swelling, mass and lump, right lower limb  Discussed extensively with Dr. Jose De Jesus Argueta who recommends with pursuing MRI for further work up given failure to respond to conservative therapy along with already having ruled out alternative diagnoses. Pt will take bactrim as prescribed. F/u in 2 weeks. - MRI TIBIA FIBULA RIGHT WO CONTRAST; Future  - sulfamethoxazole-trimethoprim (BACTRIM DS;SEPTRA DS) 800-160 MG per tablet; Take 1 tablet by mouth 2 times daily for 7 days  Dispense: 14 tablet; Refill: 0    2. Dermatitis  Suspect allergic reaction. Will monitor at this time and let me know if symptoms worsen in any way or do not improve. Side effects, adverse effects of the medication prescribed today, as well as treatment plan/ rationale and result expectations have been discussed with the patient who expresses understanding and desires to proceed. Close follow up to evaluate treatment results and for coordination of care. I have reviewed the patient's medical history in detail and updated the computerized patient record. As always, patient is advised that if symptoms worsen in any way they will proceed to the nearest emergency room.          Orders Placed This Encounter   Procedures    MRI TIBIA FIBULA RIGHT WO CONTRAST     Standing Status:   Future     Standing Expiration Date:   5/21/2021     Order Specific Question:   Reason for exam:     Answer:   soft tissue mass right lower ext       Orders Placed This Encounter   Medications    sulfamethoxazole-trimethoprim (BACTRIM DS;SEPTRA DS) 800-160 MG per tablet     Sig: Take 1 tablet by mouth 2 times daily for 7 days     Dispense:  14 tablet     Refill:  0       Return in about 2 weeks (around 6/4/2020), or if symptoms worsen or fail to improve, for recheck leg swelling.     YULY Tee - CNP

## 2020-06-01 ENCOUNTER — HOSPITAL ENCOUNTER (OUTPATIENT)
Dept: MRI IMAGING | Age: 75
Discharge: HOME | End: 2020-06-03
Payer: MEDICARE

## 2020-06-01 PROCEDURE — 73718 MRI LOWER EXTREMITY W/O DYE: CPT

## 2020-06-04 ENCOUNTER — OFFICE VISIT (OUTPATIENT)
Dept: FAMILY MEDICINE CLINIC | Age: 75
End: 2020-06-04
Payer: MEDICARE

## 2020-06-04 VITALS
HEIGHT: 55 IN | BODY MASS INDEX: 30.78 KG/M2 | DIASTOLIC BLOOD PRESSURE: 80 MMHG | SYSTOLIC BLOOD PRESSURE: 138 MMHG | TEMPERATURE: 98.2 F | WEIGHT: 133 LBS | HEART RATE: 69 BPM | OXYGEN SATURATION: 96 %

## 2020-06-04 PROCEDURE — 99213 OFFICE O/P EST LOW 20 MIN: CPT | Performed by: NURSE PRACTITIONER

## 2020-06-04 RX ORDER — LORATADINE 10 MG/1
10 TABLET ORAL DAILY
Qty: 30 TABLET | Refills: 0 | Status: SHIPPED | OUTPATIENT
Start: 2020-06-04

## 2020-06-04 ASSESSMENT — ENCOUNTER SYMPTOMS
CHEST TIGHTNESS: 0
COUGH: 0
COLOR CHANGE: 1
BACK PAIN: 0
SHORTNESS OF BREATH: 0

## 2020-06-04 NOTE — PROGRESS NOTES
at baseline. Cranial Nerves: No cranial nerve deficit. Coordination: Coordination normal.      Gait: Gait normal.   Psychiatric:         Mood and Affect: Mood normal.         Behavior: Behavior normal.         Thought Content: Thought content normal.         Judgment: Judgment normal.         Assessment& Plan     Diagnosis Orders   1. Localized swelling, mass and lump, right lower limb  Loretta Bosch MD, General Surgery, [place]   2. Allergic rhinitis, unspecified seasonality, unspecified trigger  loratadine (CLARITIN) 10 MG tablet     Referral to gen surg regarding persistent leg mass. Claritin daily. F/u in 3 months or sooner PRN. Side effects, adverse effects of the medication prescribed today, as well as treatment plan/ rationale and result expectations have been discussed with the patient who expresses understanding and desires to proceed. Close follow up to evaluate treatment results and for coordination of care. I have reviewed the patient's medical history in detail and updated the computerized patient record. As always, patient is advised that if symptoms worsen in any way they will proceed to the nearest emergency room. Orders Placed This Encounter   Procedures   Willi Dubon MD, General Surgery, [facility]     Referral Priority:   Routine     Referral Type:   Eval and Treat     Referral Reason:   Specialty Services Required     Referred to Provider:   Lexii Randall MD     Requested Specialty:   General Surgery     Number of Visits Requested:   1       Orders Placed This Encounter   Medications    loratadine (CLARITIN) 10 MG tablet     Sig: Take 1 tablet by mouth daily     Dispense:  30 tablet     Refill:  0       Return in about 3 months (around 9/4/2020) for check up.     YULY Casillas - CNP

## 2020-07-10 ENCOUNTER — OFFICE VISIT (OUTPATIENT)
Dept: SURGERY | Age: 75
End: 2020-07-10
Payer: MEDICARE

## 2020-07-10 VITALS
WEIGHT: 136 LBS | TEMPERATURE: 97.8 F | HEIGHT: 55 IN | HEART RATE: 72 BPM | OXYGEN SATURATION: 96 % | BODY MASS INDEX: 31.47 KG/M2

## 2020-07-10 DIAGNOSIS — R22.41 MASS OF LEG, RIGHT: ICD-10-CM

## 2020-07-10 PROCEDURE — 99203 OFFICE O/P NEW LOW 30 MIN: CPT | Performed by: COLON & RECTAL SURGERY

## 2020-07-10 PROCEDURE — 11402 EXC TR-EXT B9+MARG 1.1-2 CM: CPT | Performed by: COLON & RECTAL SURGERY

## 2020-07-10 ASSESSMENT — ENCOUNTER SYMPTOMS
SHORTNESS OF BREATH: 0
ABDOMINAL PAIN: 0
DIARRHEA: 0
CHEST TIGHTNESS: 0
CONSTIPATION: 0
COLOR CHANGE: 0

## 2020-07-10 NOTE — PROGRESS NOTES
Subjective:      Patient ID: Jake Reddy is a 66 y.o. female who presents for:  Chief Complaint   Patient presents with    New Patient       This is a 13-year-old female whose had an issue with a mass on her right lower leg. She had an MRI which showed a subcutaneous mass which in the differential was a lymph node or a phlegmon. I reviewed these MRI findings. Patient does not take any blood thinning medication. She has tenderness over this area. Denies any recent trauma. Denies fever.       Past Medical History:   Diagnosis Date    Anxiety     Arthritis     COPD (chronic obstructive pulmonary disease) (HCC)     Generalized osteoarthrosis, unspecified site 12/27/2002    Hyperlipidemia     Hypertension     Major depressive disorder, single episode, moderate ([address] 12/27/2002    Morbid obesity ([facility] [address] 12/27/2002    OAB (overactive bladder)     Osteoporosis      Past Surgical History:   Procedure Laterality Date    CARDIAC CATHETERIZATION      CARPAL TUNNEL RELEASE      COLONOSCOPY      HYSTERECTOMY, TOTAL ABDOMINAL      KNEE ARTHROPLASTY Bilateral     [place] COLON CA SCRN NOT  [address] IND N/A 6/22/2017    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    Social Needs    Financial resource strain: Somewhat hard    Food insecurity     Worry: Never true     Inability: Never true    Transportation needs     Medical: No     Non-medical: No   Tobacco Use    Smoking status: Former Smoker     Packs/day: 1.00     Years: 20.00     Pack years: 20.00     Last attempt to quit: 1980     Years since quittin.5    Smokeless tobacco: Never Used   Substance and Sexual Activity    Alcohol use: No    Drug use: Never    Sexual activity: Not Currently     Partners: Male   Lifestyle    Physical activity     Days per week: 0 days     Minutes per session: 0 min    Stress: To some extent   Relationships    Social connections     Talks on phone: Twice a week     Gets together: Twice a week     Attends Yarsanism service: More than 4 times per year     Active member of club or organization: No     Attends meetings of clubs or organizations: Never     Relationship status:     Intimate partner violence     Fear of current or ex partner: Not on file     Emotionally abused: Not on file     Physically abused: Not on file     Forced sexual activity: Not on file   Other Topics Concern    Not on file   Social History Narrative    Not on file     Family History   Problem Relation Age of Onset    Arthritis Father     Other Father         gout    Heart Failure Father     Cancer Sister      Allergies:  Codeine    Review of Systems   Constitutional: Negative for activity change and appetite change. HENT: Negative for congestion. Respiratory: Negative for chest tightness and shortness of breath. Cardiovascular: Negative for chest pain. Gastrointestinal: Negative for abdominal pain, constipation and diarrhea. Genitourinary: Negative for difficulty urinating and flank pain. Musculoskeletal: Negative for arthralgias. Skin: Negative for color change. Neurological: Negative for dizziness and headaches. Hematological: Does not bruise/bleed easily. Psychiatric/Behavioral: Negative for agitation. Objective:    Pulse 72   Temp 97.8 °F (36.6 °C) (Temporal)   Ht 4' 6\" (1.372 m)   Wt 136 lb (61.7 kg)   LMP  (LMP Unknown)   SpO2 96%   BMI 32.79 kg/m²     Physical Exam  Constitutional:       Appearance: She is well-developed.    HENT: Head: Normocephalic and atraumatic. Eyes:      Pupils: Pupils are equal, round, and reactive to light. Neck:      Musculoskeletal: Normal range of motion and neck supple. Cardiovascular:      Rate and Rhythm: Normal rate and regular rhythm. Heart sounds: Normal heart sounds. Pulmonary:      Effort: Pulmonary effort is normal. No respiratory distress. Breath sounds: Normal breath sounds. No wheezing. Abdominal:      General: There is no distension. Palpations: There is no mass. Tenderness: There is no abdominal tenderness. There is no guarding or rebound. Musculoskeletal: Normal range of motion. Comments: On the right lower extremity in the medial position distal leg is a 4 x 4 centimeter subcutaneous mass. It is mobile but large given its location. No skin changes present. Skin:     General: Skin is warm and dry. Coloration: Skin is not pale. Findings: No erythema or rash. Neurological:      Mental Status: She is alert and oriented to person, place, and time. Psychiatric:         Behavior: Behavior normal.         Judgment: Judgment normal.       Procedure note: I discussed the risks and benefits of incisional versus excisional biopsy for histologic evaluation. Risks of the procedure including infection, bleeding, and postoperative pain addressed along with wound complications. Despite these risks, she wished to proceed. Consent obtained. The area was prepped and draped with a Betadine containing solution. 1% lidocaine was injected for local analgesia. An incision was made down to the subcutaneous mass. An incisional biopsy approximately 2 x 1 cm was performed sharply. Hyfrecator was used for hemostasis. 4 4-0 nylon sutures in a vertical mattress fashion were used to close the skin. Excellent hemostasis was achieved and assured. Estimated blood loss 20 cc. Assessment/Plan:          Diagnosis Orders   1.  Mass of leg, right          She will

## 2020-07-21 ENCOUNTER — OFFICE VISIT (OUTPATIENT)
Dept: SURGERY | Age: 75
End: 2020-07-21

## 2020-07-21 VITALS
OXYGEN SATURATION: 95 % | HEIGHT: 55 IN | WEIGHT: 139 LBS | HEART RATE: 59 BPM | BODY MASS INDEX: 32.17 KG/M2 | TEMPERATURE: 98.1 F

## 2020-07-21 PROCEDURE — 99024 POSTOP FOLLOW-UP VISIT: CPT | Performed by: COLON & RECTAL SURGERY

## 2020-07-21 NOTE — PROGRESS NOTES
Subjective:      Patient ID: Calin Yee is a 66 y.o. female who presents for:  Chief Complaint   Patient presents with    Post-Op Check       She returns to the office from an incisional biopsy on her right leg mass. Final histology showed dystrophic calcifications of the subcutaneous fat. She complains of soreness around the incision. Histology reviewed.           Past Medical History:   Diagnosis Date    Anxiety     Arthritis     COPD (chronic obstructive pulmonary disease) (HCC)     Generalized osteoarthrosis, unspecified site 2002    Hyperlipidemia     Hypertension     Major depressive disorder, single episode, moderate ([address] 2002    Morbid obesity ([address] 2002    OAB (overactive bladder)     Osteoporosis      Past Surgical History:   Procedure Laterality Date    CARDIAC CATHETERIZATION      CARPAL TUNNEL RELEASE      COLONOSCOPY      HYSTERECTOMY, TOTAL ABDOMINAL      KNEE ARTHROPLASTY Bilateral     [place] COLON [place] SCRN NOT  [address] IND N/A 2017    COLONOSCOPY performed by Zurdo Miller MD at . 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    Social Needs    Financial resource strain: Somewhat hard    Food insecurity     Worry: Never true     Inability: Never true    Transportation needs     Medical: No     Non-medical: No   Tobacco Use    Smoking status: Former Smoker     Packs/day: 1.00     Years: 20.00     Pack years: 20.00     Last attempt to quit: 1980     Years since quittin.5    Smokeless tobacco: Never Used   Substance and Sexual Activity  Alcohol use: No    Drug use: Never    Sexual activity: Not Currently     Partners: Male   Lifestyle    Physical activity     Days per week: 0 days     Minutes per session: 0 min    Stress: To some extent   Relationships    Social connections     Talks on phone: Twice a week     Gets together: Twice a week     Attends Hinduism service: More than 4 times per year     Active member of club or organization: No     Attends meetings of clubs or organizations: Never     Relationship status:     Intimate partner violence     Fear of current or ex partner: Not on file     Emotionally abused: Not on file     Physically abused: Not on file     Forced sexual activity: Not on file   Other Topics Concern    Not on file   Social History Narrative    Not on file     Family History   Problem Relation Age of Onset    Arthritis Father     Other Father         gout    Heart Failure Father     Cancer Sister      Allergies:  Codeine    Review of Systems    Objective:    Pulse 59   Temp 98.1 °F (36.7 °C) (Temporal)   Ht 4' 6\" (1.372 m)   Wt 139 lb (63 kg)   LMP  (LMP Unknown)   SpO2 95%   BMI 33.51 kg/m²     Physical Exam  On exam the incisions healed without any skin changes or signs of infection. The 4 sutures were removed. Steri-Strips were placed for epithelial support. No evidence of hematoma or infection. Assessment/Plan:          Diagnosis Orders   1. Mass of leg, right       I would not consider excising the remainder of the lesion secondary to its size. I believe there would be significant wound healing problems if performed. Given the fact that the incisional biopsy has shown this to be a benign lesion, I would proceed with no further operative intervention. She will return back to see me in the office in the future if any problems arise or any questions develop.             Please note this report has beenpartially produced using speech recognition software and may cause contain errors related to that system including grammar, punctuation and spelling as well as words and phrases that may seem inappropriate.  If there arequestions or concerns please feel free to contact me to clarify

## 2020-07-24 ENCOUNTER — TELEPHONE (OUTPATIENT)
Dept: FAMILY MEDICINE CLINIC | Age: 75
End: 2020-07-24

## 2020-07-24 ENCOUNTER — TELEPHONE (OUTPATIENT)
Dept: ADMINISTRATIVE | Age: 75
End: 2020-07-24

## 2020-07-24 ENCOUNTER — NURSE TRIAGE (OUTPATIENT)
Dept: OTHER | Facility: CLINIC | Age: 75
End: 2020-07-24

## 2020-07-24 ENCOUNTER — OFFICE VISIT (OUTPATIENT)
Dept: FAMILY MEDICINE CLINIC | Age: 75
End: 2020-07-24
Payer: MEDICARE

## 2020-07-24 VITALS
DIASTOLIC BLOOD PRESSURE: 92 MMHG | TEMPERATURE: 98 F | WEIGHT: 139 LBS | HEIGHT: 55 IN | OXYGEN SATURATION: 96 % | HEART RATE: 83 BPM | BODY MASS INDEX: 32.17 KG/M2 | SYSTOLIC BLOOD PRESSURE: 142 MMHG

## 2020-07-24 PROCEDURE — 99213 OFFICE O/P EST LOW 20 MIN: CPT | Performed by: NURSE PRACTITIONER

## 2020-07-24 RX ORDER — CEPHALEXIN 500 MG/1
500 CAPSULE ORAL 4 TIMES DAILY
Qty: 28 CAPSULE | Refills: 0 | Status: SHIPPED | OUTPATIENT
Start: 2020-07-24 | End: 2020-07-31 | Stop reason: ALTCHOICE

## 2020-07-24 NOTE — TELEPHONE ENCOUNTER
Dr. Arthur Gifford put in stitches on her leg on 7/ 21    Patient states she does not know when her stitches come out, and would like to know. Asked if Dr. Arthur Gifford gave a date. Patient states she does not remember. Wanted us to ask gerson when stitches should come out. Informed patient I will ask, but she might have to reach back out to Dr. Eugenia Johnson office.

## 2020-07-24 NOTE — TELEPHONE ENCOUNTER
LMTCB according to the chart per Dr. Lenny Guevara note on 7/21/20 the stitches were removed that day. She only has steri strips on the wound. Sabine Dee states that she will need to contact his office to speak with them.

## 2020-07-24 NOTE — PROGRESS NOTES
Subjective:      Patient ID: Bruce Cespedes is a 66 y.o. female who presents today for:  Chief Complaint   Patient presents with    Leg Swelling     mass removed 7/10, worried her leg is now infected     Patient presents to the office with her . History obtained by the patient and her . Wound Check   She was originally treated 10 to 14 days ago (mass of lower right leg removed). Prior ED Treatment: suture removal 3 days ago, symptoms began 2 days ago. The maximum temperature noted was less than 100.4 F. There has been no drainage from the wound. The redness has worsened. The swelling has worsened. The pain has worsened. She has no difficulty moving the affected extremity or digit. Past Medical History:   Diagnosis Date    Anxiety     Arthritis     [facility]ch[facility]) ([facility])     Generalized osteoarthrosis, unspecified site 12/27/2002    Hyperlipidemia     Hypertension     Major depressive disorder, single episode, moderate ([address] 12/27/2002    Morbid obesity ([facility] [address] 12/27/2002    OAB (overactive bladder)     Osteoporosis      Past Surgical History:   Procedure Laterality Date    CARDIAC CATHETERIZATION      CARPAL TUNNEL RELEASE      COLONOSCOPY      HYSTERECTOMY, TOTAL ABDOMINAL      KNEE ARTHROPLASTY Bilateral     [place] COLON CA SCRN NOT  [address] IND N/A 6/22/2017    COLONOSCOPY performed by Danelle Deluca MD at . [address] ESOPHAGOGASTRODUODENOSCOPY TRANSORAL DIAGNOSTIC N/A 6/22/2017    EGD ESOPHAGOGASTRODUODENOSCOPY performed by Danelle Deluca MD at [address] Bilateral      Family History   Problem Relation Age of Onset    Arthritis Father     Other Father         gout    Heart Failure Father     Cancer Sister      Allergies   Allergen Reactions    Codeine      Nausea, lightheadedness, dizzy         Review of Systems   Constitutional: Negative for chills, fatigue and fever.    Respiratory: Negative for cough, shortness of breath and wheezing. Cardiovascular: Negative for chest pain. Musculoskeletal: Negative for arthralgias and myalgias. Skin: Positive for color change. Negative for rash. Objective:   BP (!) 142/92 (Site: Right Upper Arm, Position: Sitting, Cuff Size: Medium Adult)   Pulse 83   Temp 98 °F (36.7 °C)   Ht 4' 6\" (1.372 m)   Wt 139 lb (63 kg)   LMP  (LMP Unknown)   SpO2 96%   BMI 33.51 kg/m²     Physical Exam  Vitals signs reviewed. Constitutional:       General: She is not in acute distress. Appearance: She is well-developed. She is not ill-appearing. HENT:      Head: Normocephalic. Neck:      Musculoskeletal: Normal range of motion. Cardiovascular:      Rate and Rhythm: Normal rate and regular rhythm. Heart sounds: Normal heart sounds. Pulmonary:      Effort: Pulmonary effort is normal.      Breath sounds: Normal breath sounds. Musculoskeletal: Normal range of motion. Right lower leg: She exhibits tenderness and swelling. Legs:    Skin:     General: Skin is warm and dry. Capillary Refill: Capillary refill takes less than 2 seconds. Findings: Erythema and wound present. Neurological:      Mental Status: She is alert and oriented to person, place, and time. Assessment:       Diagnosis Orders   1. Cellulitis of right lower extremity  cephALEXin (KEFLEX) 500 MG capsule         Plan:      No orders of the defined types were placed in this encounter. Orders Placed This Encounter   Medications    cephALEXin (KEFLEX) 500 MG capsule     Sig: Take 1 capsule by mouth 4 times daily for 7 days     Dispense:  28 capsule     Refill:  0     Reviewed usual course of illness and supportive measures for symptom management. Administration, side effects/adverse effects of all medications prescribed today, as well as treatment plan/rationale, follow-up care, and result expectations have been discussed with the patient.   Expresses understanding and desires to proceed with the treatment plan. Discussed signs and symptoms which require immediate follow-up in ED/call to 911. Understanding verbalized. I have reviewed and updated the electronic medical record. Return in about 5 days (around 7/29/2020), or if symptoms worsen or fail to improve, for follow up with PCP.       Belgica Dubon, YULY - NP

## 2020-07-24 NOTE — TELEPHONE ENCOUNTER
Nurse triage referred pt to be seen today for infected biopsy site on leg. Unfortunately it is 4:10pm and there is nothing available in the office except the [facility]. She says she will come in this evening to [facility].

## 2020-07-24 NOTE — PATIENT INSTRUCTIONS
Patient Education        Cellulitis: Care Instructions  Your Care Instructions     Cellulitis is a skin infection caused by bacteria, most often strep or staph. It often occurs after a break in the skin from a scrape, cut, bite, or puncture, or after a rash. Cellulitis may be treated without doing tests to find out what caused it. But your doctor may do tests, if needed, to look for a specific bacteria, like methicillin-resistant Staphylococcus aureus (MRSA). The doctor has checked you carefully, but problems can develop later. If you notice any problems or new symptoms, get medical treatment right away. Follow-up care is a key part of your treatment and safety. Be sure to make and go to all appointments, and call your doctor if you are having problems. It's also a good idea to know your test results and keep a list of the medicines you take. How can you care for yourself at home? · Take your antibiotics as directed. Do not stop taking them just because you feel better. You need to take the full course of antibiotics. · Prop up the infected area on pillows to reduce pain and swelling. Try to keep the area above the level of your heart as often as you can. · If your doctor told you how to care for your wound, follow your doctor's instructions. If you did not get instructions, follow this general advice:  ? Wash the wound with clean water 2 times a day. Don't use hydrogen peroxide or alcohol, which can slow healing. ? You may cover the wound with a thin layer of petroleum jelly, such as Vaseline, and a nonstick bandage. ? Apply more petroleum jelly and replace the bandage as needed. · Be safe with medicines. Take pain medicines exactly as directed. ? If the doctor gave you a prescription medicine for pain, take it as prescribed. ? If you are not taking a prescription pain medicine, ask your doctor if you can take an over-the-counter medicine.   To prevent cellulitis in the future  · Try to prevent cuts,

## 2020-07-24 NOTE — TELEPHONE ENCOUNTER
Reason for Disposition   Wound looks infected   Skin redness around the wound larger than 2 inches (5 cm)    Answer Assessment - Initial Assessment Questions  1. MECHANISM: \"How did the injury happen? \" (e.g., twisting injury, direct blow)       Biopsy on right ankle  2. ONSET: \"When did the injury happen? \" (Minutes or hours ago)       At hospital 7/10/2020  3. LOCATION: \"Where is the injury located? \"       Right ankle   4. APPEARANCE of INJURY: \"What does the injury look like? \"       Red, swollen and infected looking  5. WEIGHT-BEARING: \"Can you put weight on that foot? \" \"Can you walk (four steps or more)? \"        yes  6. SIZE: For cuts, bruises, or swelling, ask: \"How large is it? \" (e.g., inches or centimeters;  entire joint)       3\" square  7. PAIN: \"Is there pain? \" If so, ask: \"How bad is the pain? \"    (e.g., Scale 1-10; or mild, moderate, severe)      5/10  8. TETANUS: For any breaks in the skin, ask: \"When was the last tetanus booster? \"      unsure  9. OTHER SYMPTOMS: \"Do you have any other symptoms? \"       no  10. PREGNANCY: \"Is there any chance you are pregnant? \" \"When was your last menstrual period? \"        n/a    Protocols used: ANKLE AND FOOT INJURY-ADULT-[place], WOUND INFECTION-ADULT-[place]    Caller states she had a biopsy on her right ankle on 7/10/2020. Caller is having pain of 5/10. Caller states the area is swollen, red and looks infected. The area is about 3\" squared. No fever, no SOB and caller can walk on it. Recommendation See in office today. Transferred to [facility] for an appointment.

## 2020-07-26 ASSESSMENT — ENCOUNTER SYMPTOMS
COLOR CHANGE: 1
SHORTNESS OF BREATH: 0
WHEEZING: 0
COUGH: 0

## 2020-07-29 ENCOUNTER — NURSE TRIAGE (OUTPATIENT)
Dept: OTHER | Facility: CLINIC | Age: 75
End: 2020-07-29

## 2020-07-29 ENCOUNTER — TELEPHONE (OUTPATIENT)
Dept: ADMINISTRATIVE | Age: 75
End: 2020-07-29

## 2020-07-29 ENCOUNTER — OFFICE VISIT (OUTPATIENT)
Dept: FAMILY MEDICINE CLINIC | Age: 75
End: 2020-07-29
Payer: MEDICARE

## 2020-07-29 VITALS
DIASTOLIC BLOOD PRESSURE: 78 MMHG | BODY MASS INDEX: 32.17 KG/M2 | HEART RATE: 78 BPM | HEIGHT: 55 IN | OXYGEN SATURATION: 95 % | SYSTOLIC BLOOD PRESSURE: 122 MMHG | TEMPERATURE: 97.2 F | WEIGHT: 139 LBS

## 2020-07-29 PROCEDURE — 99213 OFFICE O/P EST LOW 20 MIN: CPT | Performed by: NURSE PRACTITIONER

## 2020-07-29 RX ORDER — CLINDAMYCIN HYDROCHLORIDE 300 MG/1
300 CAPSULE ORAL 2 TIMES DAILY
Qty: 14 CAPSULE | Refills: 0 | Status: SHIPPED | OUTPATIENT
Start: 2020-07-29 | End: 2020-08-05

## 2020-07-29 ASSESSMENT — ENCOUNTER SYMPTOMS
COLOR CHANGE: 0
TROUBLE SWALLOWING: 0
ABDOMINAL PAIN: 0
WHEEZING: 0
VOMITING: 0
CHEST TIGHTNESS: 0
SORE THROAT: 0
SHORTNESS OF BREATH: 0
COUGH: 0
RHINORRHEA: 0
DIARRHEA: 0
NAUSEA: 0

## 2020-07-29 NOTE — PROGRESS NOTES
 OAB (overactive bladder)     Osteoporosis      Past Surgical History:   Procedure Laterality Date    CARDIAC CATHETERIZATION      CARPAL TUNNEL RELEASE      COLONOSCOPY      HYSTERECTOMY, TOTAL ABDOMINAL      KNEE ARTHROPLASTY Bilateral     [place] COLON CA SCRN NOT  [address] IND N/A 2017    COLONOSCOPY performed by Yris Garnett MD at 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    Social Needs    Financial resource strain: Somewhat hard    Food insecurity     Worry: Never true     Inability: Never true    Transportation needs     Medical: No     Non-medical: No   Tobacco Use    Smoking status: Former Smoker     Packs/day: 1.00     Years: 20.00     Pack years: 20.00     Last attempt to quit: 1980     Years since quittin.6    Smokeless tobacco: Never Used   Substance and Sexual Activity    Alcohol use: No    Drug use: Never    Sexual activity: Not Currently     Partners: Male   Lifestyle    Physical activity     Days per week: 0 days     Minutes per session: 0 min    Stress:  To some extent   Relationships    Social connections     Talks on phone: Twice a week     Gets together: Twice a week     Attends Hinduism service: More than 4 times per year     Active member of club or organization: No     Attends meetings of clubs or organizations: Never     Relationship status:     Intimate partner violence     Fear of current or ex partner: Not on file     Emotionally abused: Not on file     Physically abused: Not on file     Forced sexual activity: Not on file   Other Topics Concern    Not on file   Social History Narrative    Not on file     Family History   Problem Relation Age of Onset    Arthritis Father     Other Father         gout    Heart Failure Father     Cancer Sister      Allergies   Allergen Reactions    Codeine      Nausea, lightheadedness, dizzy     Current Outpatient Medications   Medication Sig Dispense Refill    clindamycin (CLEOCIN) 300 MG capsule Take 1 capsule by mouth 2 times daily for 7 days 14 capsule 0    mupirocin (BACTROBAN) 2 % ointment Apply 3 times daily. 15 g 0    cephALEXin (KEFLEX) 500 MG capsule Take 1 capsule by mouth 4 times daily for 7 days 28 capsule 0    TRINTELLIX 10 MG TABS tablet TAKE ONE TABLET BY MOUTH EVERY DAY 30 tablet 5    loratadine (CLARITIN) 10 MG tablet Take 1 tablet by mouth daily 30 tablet 0    tiZANidine (ZANAFLEX) 4 MG tablet TAKE ONE TABLET BY MOUTH THREE TIMES A DAY AS NEEDED FOR MUSCLE SPASMS / PAIN 90 tablet 0    gabapentin (NEURONTIN) 400 MG capsule TAKE ONE CAPSULE BY MOUTH THREE TIMES A DAY 90 capsule 3    pravastatin (PRAVACHOL) 40 MG tablet TAKE ONE TABLET BY MOUTH EVERY DAY 30 tablet 5    amLODIPine (NORVASC) 5 MG tablet TAKE ONE TABLET BY MOUTH DAILY AT BEDTIME  3    donepezil (ARICEPT) 10 MG tablet take one-half tablet by mouth at bedtime for 2 weeks then increase to 1 tablet at bedtime  3    albuterol sulfate  (90 Base) MCG/ACT inhaler Inhale 2 puffs into the lungs 4 times daily as needed for Wheezing 3 Inhaler 1    aspirin 81 MG EC tablet Take 1 tablet by mouth daily 30 tablet 3    vitamin D (CHOLECALCIFEROL) 1000 UNIT TABS tablet Take 2 tablets by mouth daily 60 tablet 3    melatonin 1 MG tablet Take 1 tablet by mouth nightly as needed for Sleep 30 tablet 2    Oxygen Concentrator        No current facility-administered medications for this visit. PMH, Surgical Hx, Family Hx, and Social Hx reviewed and updated. Health Maintenance reviewed.     Objective    Vitals:    07/29/20 1612 BP: 122/78   Pulse: 78   Temp: 97.2 °F (36.2 °C)   SpO2: 95%   Weight: 139 lb (63 kg)   Height: 4' 6\" (1.372 m)       Physical Exam  Constitutional:       General: She is not in acute distress. Appearance: Normal appearance. She is normal weight. She is not ill-appearing, toxic-appearing or diaphoretic. HENT:      Head: Normocephalic and atraumatic. Right Ear: External ear normal.      Left Ear: External ear normal.      Nose: Nose normal.   Eyes:      General:         Right eye: No discharge. Left eye: No discharge. Conjunctiva/sclera: Conjunctivae normal.      Pupils: Pupils are equal, round, and reactive to light. Neck:      Musculoskeletal: Normal range of motion and neck supple. Cardiovascular:      Rate and Rhythm: Normal rate and regular rhythm. Pulses: Normal pulses. Pulmonary:      Effort: Pulmonary effort is normal.      Breath sounds: Normal breath sounds. Abdominal:      General: There is no distension. Palpations: Abdomen is soft. Tenderness: There is no abdominal tenderness. Musculoskeletal: Normal range of motion. General: Swelling, tenderness and signs of injury present. No deformity. Right lower leg: She exhibits tenderness, swelling and laceration. She exhibits no bony tenderness and no deformity. Edema present. Legs:    Skin:     General: Skin is warm and dry. Capillary Refill: Capillary refill takes less than 2 seconds. Findings: Erythema present. Neurological:      General: No focal deficit present. Mental Status: She is alert and oriented to person, place, and time. Mental status is at baseline. Cranial Nerves: No cranial nerve deficit. Sensory: No sensory deficit. Motor: No weakness. Coordination: Coordination normal.         Assessment & Plan    Diagnosis Orders   1.  Cellulitis of left lower leg  XR TIBIA FIBULA RIGHT (2 VIEWS)    CBC Auto Differential    C-Reactive Protein effects, adverse effects of themedication prescribed today, as well as treatment plan/ rationale and result expectations have been discussed with the patient who expresses understanding and desires to proceed. Close follow up to evaluate treatment results and for coordination of care. I have reviewed the patient's medical history in detail and updated the computerized patient record.      Hollis Lynn, APRN - CNP

## 2020-07-29 NOTE — TELEPHONE ENCOUNTER
PER SARAH - NURSE TRIAGE  Pt needs seen now for red and painful biopsy site. Send to [facility]. Nurse did not suggest ER because pt has no fever. I spoke to Asaelia in [place] and she states pt can just walk in to [facility] now. Pt is on her way.

## 2020-07-29 NOTE — PATIENT INSTRUCTIONS
Patient Education        Cellulitis: Care Instructions  Your Care Instructions     Cellulitis is a skin infection caused by bacteria, most often strep or staph. It often occurs after a break in the skin from a scrape, cut, bite, or puncture, or after a rash. Cellulitis may be treated without doing tests to find out what caused it. But your doctor may do tests, if needed, to look for a specific bacteria, like methicillin-resistant Staphylococcus aureus (MRSA). The doctor has checked you carefully, but problems can develop later. If you notice any problems or new symptoms, get medical treatment right away. Follow-up care is a key part of your treatment and safety. Be sure to make and go to all appointments, and call your doctor if you are having problems. It's also a good idea to know your test results and keep a list of the medicines you take. How can you care for yourself at home? · Take your antibiotics as directed. Do not stop taking them just because you feel better. You need to take the full course of antibiotics. · Prop up the infected area on pillows to reduce pain and swelling. Try to keep the area above the level of your heart as often as you can. · If your doctor told you how to care for your wound, follow your doctor's instructions. If you did not get instructions, follow this general advice:  ? Wash the wound with clean water 2 times a day. Don't use hydrogen peroxide or alcohol, which can slow healing. ? You may cover the wound with a thin layer of petroleum jelly, such as Vaseline, and a nonstick bandage. ? Apply more petroleum jelly and replace the bandage as needed. · Be safe with medicines. Take pain medicines exactly as directed. ? If the doctor gave you a prescription medicine for pain, take it as prescribed. ? If you are not taking a prescription pain medicine, ask your doctor if you can take an over-the-counter medicine.   To prevent cellulitis in the future  · Try to prevent cuts, blood vessels, or nerves. A cut may be left open instead of being closed with stitches, staples, or adhesive. A cut may be left open when it is likely to become infected, because closing it can make infection even more likely. You will probably have a bandage. The doctor may want the cut to stay open the whole time it heals. This happens with some cuts when too much time has gone by since the cut happened. Or the doctor may tell you to come back to have the cut closed in 4 to 5 days, when there is less chance of infection. If the cut stays open while healing, your scar may be larger than if the cut was closed. But you can get treatment later to make the scar smaller. The doctor has checked you carefully, but problems can develop later. If you notice any problems or new symptoms, get medical treatment right away. Follow-up care is a key part of your treatment and safety. Be sure to make and go to all appointments, and call your doctor if you are having problems. It's also a good idea to know your test results and keep a list of the medicines you take. How can you care for yourself at home? · Keep the cut dry for the first 24 to 48 hours. After this, you can shower if your doctor okays it. Pat the cut dry. · Don't soak the cut, such as in a bathtub. Your doctor will tell you when it's safe to get the cut wet. · If your doctor told you how to care for your cut, follow your doctor's instructions. If you did not get instructions, follow this general advice:  ? After the first 24 to 48 hours, wash the cut with clean water 2 times a day. Don't use hydrogen peroxide or alcohol, which can slow healing. ? You may cover the cut with a thin layer of petroleum jelly, such as Vaseline, and a nonstick bandage. ? Apply more petroleum jelly and replace the bandage as needed. · Prop up the injured area on a pillow anytime you sit or lie down during the next 3 days. Try to keep it above the level of your heart.  This will help reduce swelling. · Avoid any activity that could cause your cut to get worse. · Take pain medicines exactly as directed. ? If the doctor gave you a prescription medicine for pain, take it as prescribed. ? If you are not taking a prescription pain medicine, ask your doctor if you can take an over-the-counter medicine. When should you call for help? Call your doctor now or seek immediate medical care if:  · You have new pain, or your pain gets worse. · The cut starts to bleed, and blood soaks through the bandage. Oozing small amounts of blood is normal.  · The skin near the cut is cold or pale or changes color. · You have tingling, weakness, or numbness near the cut. · You have trouble moving the area near the cut. · You have symptoms of infection, such as:  ? Increased pain, swelling, warmth, or redness around the cut.  ? Red streaks leading from the cut.  ? Pus draining from the cut.  ? A fever. Watch closely for changes in your health, and be sure to contact your doctor if:  · The cut is not closing (getting smaller). · You do not get better as expected. Where can you learn more? Go to [email or web address].PlayPhone. org and sign in to your [employer] account. Enter [id number] in the [facility] box to learn more about \"Cuts Left Open: Care Instructions. \"     If you do not have an account, please click on the \"Sign Up Now\" link. Current as of: June 26, 2019               Content Version: 12.5  © 7708-5897 Healthwise, Incorporated. Care instructions adapted under license by [facility] ([facility]). If you have questions about a medical condition or this instruction, always ask your healthcare professional. [place] [address] any warranty or liability for your use of this information.

## 2020-07-30 DIAGNOSIS — L03.116 CELLULITIS OF LEFT LOWER LEG: ICD-10-CM

## 2020-07-30 DIAGNOSIS — S81.802D LEG WOUND, LEFT, SUBSEQUENT ENCOUNTER: ICD-10-CM

## 2020-07-30 LAB
ALBUMIN SERPL-MCNC: 4.1 G/DL (ref 3.5–4.6)
ALP BLD-CCNC: 60 U/L (ref 40–130)
ALT SERPL-CCNC: 18 U/L (ref 0–33)
ANION GAP SERPL CALCULATED.3IONS-SCNC: 15 MEQ/L (ref 9–15)
AST SERPL-CCNC: 24 U/L (ref 0–35)
BASOPHILS ABSOLUTE: 0.1 K/UL (ref 0–0.2)
BASOPHILS RELATIVE PERCENT: 1.4 %
BILIRUB SERPL-MCNC: 0.3 MG/DL (ref 0.2–0.7)
BUN BLDV-MCNC: 20 MG/DL (ref 8–23)
C-REACTIVE PROTEIN: 4.2 MG/L (ref 0–5)
CALCIUM SERPL-MCNC: 9.8 MG/DL (ref 8.5–9.9)
CHLORIDE BLD-SCNC: 104 MEQ/L (ref 95–107)
CO2: 23 MEQ/L (ref 20–31)
CREAT SERPL-MCNC: 0.85 MG/DL (ref 0.5–0.9)
EOSINOPHILS ABSOLUTE: 0.4 K/UL (ref 0–0.7)
EOSINOPHILS RELATIVE PERCENT: 5.8 %
GFR AFRICAN AMERICAN: >60
GFR NON-AFRICAN AMERICAN: >60
GLOBULIN: 2.9 G/DL (ref 2.3–3.5)
GLUCOSE BLD-MCNC: 77 MG/DL (ref 70–99)
HCT VFR BLD CALC: 40.9 % (ref 37–47)
HEMOGLOBIN: 13.2 G/DL (ref 12–16)
LYMPHOCYTES ABSOLUTE: 1.6 K/UL (ref 1–4.8)
LYMPHOCYTES RELATIVE PERCENT: 26.3 %
MCH RBC QN AUTO: 30.3 PG (ref 27–31.3)
MCHC RBC AUTO-ENTMCNC: 32.2 % (ref 33–37)
MCV RBC AUTO: 94.1 FL (ref 82–100)
MONOCYTES ABSOLUTE: 0.6 K/UL (ref 0.2–0.8)
MONOCYTES RELATIVE PERCENT: 9.2 %
NEUTROPHILS ABSOLUTE: 3.5 K/UL (ref 1.4–6.5)
NEUTROPHILS RELATIVE PERCENT: 57.3 %
PDW BLD-RTO: 14.9 % (ref 11.5–14.5)
PLATELET # BLD: 280 K/UL (ref 130–400)
POTASSIUM SERPL-SCNC: 4.4 MEQ/L (ref 3.4–4.9)
RBC # BLD: 4.35 M/UL (ref 4.2–5.4)
SEDIMENTATION RATE, ERYTHROCYTE: 10 MM (ref 0–30)
SODIUM BLD-SCNC: 142 MEQ/L (ref 135–144)
TOTAL PROTEIN: 7 G/DL (ref 6.3–8)
WBC # BLD: 6.1 K/UL (ref 4.8–10.8)

## 2020-07-31 ENCOUNTER — OFFICE VISIT (OUTPATIENT)
Dept: FAMILY MEDICINE CLINIC | Age: 75
End: 2020-07-31
Payer: MEDICARE

## 2020-07-31 VITALS
TEMPERATURE: 97.4 F | HEIGHT: 55 IN | WEIGHT: 140 LBS | SYSTOLIC BLOOD PRESSURE: 142 MMHG | HEART RATE: 73 BPM | OXYGEN SATURATION: 98 % | BODY MASS INDEX: 32.4 KG/M2 | DIASTOLIC BLOOD PRESSURE: 80 MMHG

## 2020-07-31 PROCEDURE — 99214 OFFICE O/P EST MOD 30 MIN: CPT | Performed by: NURSE PRACTITIONER

## 2020-07-31 RX ORDER — HYDROCODONE BITARTRATE AND ACETAMINOPHEN 5; 325 MG/1; MG/1
1 TABLET ORAL EVERY 6 HOURS PRN
Qty: 12 TABLET | Refills: 0 | Status: SHIPPED | OUTPATIENT
Start: 2020-07-31 | End: 2020-08-03

## 2020-07-31 ASSESSMENT — ENCOUNTER SYMPTOMS
CHEST TIGHTNESS: 0
COLOR CHANGE: 1
COUGH: 0
SHORTNESS OF BREATH: 0
PHOTOPHOBIA: 0

## 2020-07-31 NOTE — PROGRESS NOTES
Subjective  Chief Complaint   Patient presents with    Check-Up     [facility]. states that it is still painful. getting shooting pains. HPI    Pt here for follow up on right lower leg infection. Had soft tissue biopsy done on 7/10/20 by Dr. Ada Higuera, then came to [facility] for concerns regarding possible infection on 7/24/20 and was started on keflex. Then f/u in [facility] on 7/29/20 where she was switched to clindamycin and mupirocin and had xray and labs. Xray and labs all came back okay. Pt here in f/u today. Per  and patient, area on leg is not any better, but also not any worse. She is having worsening pain in the leg now.      Past Medical History:   Diagnosis Date    Anxiety     Arthritis     COPD (chronic obstructive pulmonary disease) (HCC)     Generalized osteoarthrosis, unspecified site 12/27/2002    Hyperlipidemia     Hypertension     Major depressive disorder, single episode, moderate ([address] 12/27/2002    Morbid obesity ([address] 12/27/2002    OAB (overactive bladder)     Osteoporosis      Patient Active Problem List    Diagnosis Date Noted    Venous insufficiency of left lower extremity 05/08/2020    Asymptomatic varicose veins of bilateral lower extremities 05/08/2020    Pain and swelling due to varicose veins of both lower extremities 04/27/2020    Slow transit constipation 10/13/2019    External hemorrhoid, bleeding 10/13/2019    Colitis 10/13/2019    Acute colitis 10/10/2019    Hypovolemic shock ([address] 10/07/2019    NSTEMI (non-ST elevated myocardial infarction) ([address] 10/05/2019    Acute diastolic heart failure ([address] 10/05/2019    Hypotension 10/01/2019    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 2002    Morbid obesity ([facility] 2002     Past Surgical History:   Procedure Laterality Date    CARDIAC CATHETERIZATION      CARPAL TUNNEL RELEASE      COLONOSCOPY      HYSTERECTOMY, TOTAL ABDOMINAL      KNEE ARTHROPLASTY Bilateral     IN COLON CA SCRN NOT  [address] IND N/A 2017    COLONOSCOPY performed by Wali Johnson MD at 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    Social Needs    Financial resource strain: Somewhat hard    Food insecurity     Worry: Never true     Inability: Never true    Transportation needs     Medical: No     Non-medical: No   Tobacco Use    Smoking status: Former Smoker     Packs/day: 1.00     Years: 20.00     Pack years: 20.00     Last attempt to quit: 1980     Years since quittin.6    Smokeless tobacco: Never Used   Substance and Sexual Activity    Alcohol use: No    Drug use: Never    Sexual activity: Not Currently     Partners: Male   Lifestyle    Physical activity     Days per week: 0 days     Minutes per session: 0 min    Stress:  To some extent   Relationships    Social connections     Talks on phone: Twice a week     Gets together: Twice a week     Attends Lutheran service: More than 4 times per year     Active member of club or organization: No     Attends meetings of clubs or organizations: Never     Relationship status:     Intimate partner violence Fear of current or ex partner: None     Emotionally abused: None     Physically abused: None     Forced sexual activity: None   Other Topics Concern    None   Social History Narrative    None     Current Outpatient Medications on File Prior to Visit   Medication Sig Dispense Refill    clindamycin (CLEOCIN) 300 MG capsule Take 1 capsule by mouth 2 times daily for 7 days 14 capsule 0    mupirocin (BACTROBAN) 2 % ointment Apply 3 times daily. 15 g 0    TRINTELLIX 10 MG TABS tablet TAKE ONE TABLET BY MOUTH EVERY DAY 30 tablet 5    loratadine (CLARITIN) 10 MG tablet Take 1 tablet by mouth daily 30 tablet 0    tiZANidine (ZANAFLEX) 4 MG tablet TAKE ONE TABLET BY MOUTH THREE TIMES A DAY AS NEEDED FOR MUSCLE SPASMS / PAIN 90 tablet 0    gabapentin (NEURONTIN) 400 MG capsule TAKE ONE CAPSULE BY MOUTH THREE TIMES A DAY 90 capsule 3    pravastatin (PRAVACHOL) 40 MG tablet TAKE ONE TABLET BY MOUTH EVERY DAY 30 tablet 5    amLODIPine (NORVASC) 5 MG tablet TAKE ONE TABLET BY MOUTH DAILY AT BEDTIME  3    donepezil (ARICEPT) 10 MG tablet take one-half tablet by mouth at bedtime for 2 weeks then increase to 1 tablet at bedtime  3    albuterol sulfate  (90 Base) MCG/ACT inhaler Inhale 2 puffs into the lungs 4 times daily as needed for Wheezing 3 Inhaler 1    aspirin 81 MG EC tablet Take 1 tablet by mouth daily 30 tablet 3    vitamin D (CHOLECALCIFEROL) 1000 UNIT TABS tablet Take 2 tablets by mouth daily 60 tablet 3    melatonin 1 MG tablet Take 1 tablet by mouth nightly as needed for Sleep 30 tablet 2    Oxygen Concentrator        No current facility-administered medications on file prior to visit. Allergies   Allergen Reactions    Codeine      Nausea, lightheadedness, dizzy       Review of Systems   Constitutional: Negative for chills, diaphoresis, fatigue and fever. HENT: Negative for congestion and ear pain. Eyes: Negative for photophobia and visual disturbance.    Respiratory: Negative for cough, chest tightness and shortness of breath. Cardiovascular: Positive for leg swelling. Negative for chest pain and palpitations. Musculoskeletal: Positive for arthralgias. Skin: Positive for color change and wound. Neurological: Negative for dizziness and headaches. Psychiatric/Behavioral: Negative for dysphoric mood. The patient is not nervous/anxious. Objective  Vitals:    07/31/20 1523 07/31/20 1527   BP: (!) 142/80 (!) 142/80   Site: Left Upper Arm    Position: Sitting    Cuff Size: Medium Adult    Pulse: 73    Temp: 97.4 °F (36.3 °C)    TempSrc: Infrared    SpO2: 98%    Weight: 140 lb (63.5 kg)    Height: 4' 6\" (1.372 m)      Physical Exam  Constitutional:       General: She is not in acute distress. Appearance: Normal appearance. She is obese. She is not ill-appearing, toxic-appearing or diaphoretic. HENT:      Head: Normocephalic and atraumatic. Right Ear: External ear normal.      Left Ear: External ear normal.   Neck:      Musculoskeletal: Normal range of motion and neck supple. No muscular tenderness. Cardiovascular:      Rate and Rhythm: Normal rate and regular rhythm. Pulses: Normal pulses. Heart sounds: Normal heart sounds. No murmur. Pulmonary:      Effort: Pulmonary effort is normal. No respiratory distress. Breath sounds: Normal breath sounds. No stridor. No wheezing, rhonchi or rales. Chest:      Chest wall: No tenderness. Musculoskeletal: Normal range of motion. Right lower leg: Edema present. Left lower leg: No edema. Lymphadenopathy:      Cervical: No cervical adenopathy. Skin:     General: Skin is warm and dry. Capillary Refill: Capillary refill takes less than 2 seconds. Coloration: Skin is not jaundiced or pale. Findings: Erythema present. No bruising, lesion or rash. Neurological:      General: No focal deficit present. Mental Status: She is alert and oriented to person, place, and time.  Mental status is at baseline. Cranial Nerves: No cranial nerve deficit. Coordination: Coordination normal.      Gait: Gait normal.   Psychiatric:         Mood and Affect: Mood normal.         Behavior: Behavior normal.         Thought Content: Thought content normal.         Judgment: Judgment normal.             Assessment& Plan     Diagnosis Orders   1. Right leg pain  HYDROcodone-acetaminophen (NORCO) 5-325 MG per tablet   2. Cellulitis of right lower extremity       Attempted to call Dr. Serenity Nicole to discuss patient; however he is out of the office today. Discussed with patient my recommendation to go to ER given the fact that this could be worsening infection. Both pt and  were reluctant to go to ER. Agreed to provide patient with pain medication with the agreement that if symptoms worsen at all they will go to ER. Both agree with plan. Pt scheduled for f/u on Tuesday with Dr. Serenity Nicole. Side effects, adverse effects of the medication prescribed today, as well as treatment plan/ rationale and result expectations have been discussed with the patient who expresses understanding and desires to proceed. Close follow up to evaluate treatment results and for coordination of care. I have reviewed the patient's medical history in detail and updated the computerized patient record. As always, patient is advised that if symptoms worsen in any way they will proceed to the nearest emergency room. No orders of the defined types were placed in this encounter. Orders Placed This Encounter   Medications    HYDROcodone-acetaminophen (NORCO) 5-325 MG per tablet     Sig: Take 1 tablet by mouth every 6 hours as needed for Pain for up to 3 days. Intended supply: 3 days. Take lowest dose possible to manage pain     Dispense:  12 tablet     Refill:  0     Reduce doses taken as pain becomes manageable       Return if symptoms worsen or fail to improve.     Adrian Baxter, APRN - CNP

## 2020-08-04 ENCOUNTER — OFFICE VISIT (OUTPATIENT)
Dept: SURGERY | Age: 75
End: 2020-08-04

## 2020-08-04 VITALS
HEIGHT: 55 IN | HEART RATE: 63 BPM | WEIGHT: 140 LBS | OXYGEN SATURATION: 92 % | BODY MASS INDEX: 32.4 KG/M2 | TEMPERATURE: 96.9 F

## 2020-08-04 PROCEDURE — 99024 POSTOP FOLLOW-UP VISIT: CPT | Performed by: COLON & RECTAL SURGERY

## 2020-08-04 RX ORDER — TRAMADOL HYDROCHLORIDE 50 MG/1
50 TABLET ORAL EVERY 8 HOURS PRN
Qty: 18 TABLET | Refills: 0 | Status: SHIPPED | OUTPATIENT
Start: 2020-08-04 | End: 2020-08-07 | Stop reason: ALTCHOICE

## 2020-08-04 NOTE — PROGRESS NOTES
Subjective:      Patient ID: Rogelio Fernandez is a 66 y.o. female who presents for:  Chief Complaint   Patient presents with    Post-Op Check       She returns to the office in follow-up again. She had an incisional biopsy of a mass on her right leg which turned out to be calcified tissue. Because of the size as well as concern regarding vascular insufficiency, I did not believe it was prudent to proceed with complete excision. I told her how high believe that would lead to a large open wound that would be trouble healing standpoint. Since that time she has gone to a few med [facility] and has been given multiple prescriptions for some erythema around her incision. She is here for evaluation. She complains of some dependent edema in her lower extremity. She has no claudication symptoms on ambulation.       Past Medical History:   Diagnosis Date    Anxiety     Arthritis     COPD (chronic obstructive pulmonary disease) (HCC)     Generalized osteoarthrosis, unspecified site 12/27/2002    Hyperlipidemia     Hypertension     Major depressive disorder, single episode, moderate ([address] 12/27/2002    Morbid obesity ([address] 12/27/2002    OAB (overactive bladder)     Osteoporosis      Past Surgical History:   Procedure Laterality Date    CARDIAC CATHETERIZATION      CARPAL TUNNEL RELEASE      COLONOSCOPY      HYSTERECTOMY, TOTAL ABDOMINAL      KNEE ARTHROPLASTY Bilateral     [place] COLON CA SCRN NOT  [address] IND N/A 6/22/2017    COLONOSCOPY performed by Laila Stephens MD at . [address] ESOPHAGOGASTRODUODENOSCOPY TRANSORAL DIAGNOSTIC N/A 6/22/2017    EGD ESOPHAGOGASTRODUODENOSCOPY performed by Laila Stephens MD at [address] Bilateral      Social History     Socioeconomic History    Marital status:      Spouse name: Lawanda Manley Number of children: 3    Years of education: 12    Highest education level: 12th grade   Occupational History    varicosities on her lower extremities. She will speak with her primary care doctor regarding treatment of venous insufficiency. I do not believe she requires any additional antibiotics. I stand by my decision to not proceed with excision of this benign calcified mass for fears of nonhealing wound. If there are continued concerns, I would be happy with a second opinion. I have given a prescription for Ultram but will not give any additional narcotic prescriptions for this biopsy site. Please note this report has beenpartially produced using speech recognition software and may cause contain errors related to that system including grammar, punctuation and spelling as well as words and phrases that may seem inappropriate.  If there arequestions or concerns please feel free to contact me to clarify

## 2020-08-07 ENCOUNTER — HOSPITAL ENCOUNTER (OUTPATIENT)
Dept: WOUND CARE | Age: 75
Discharge: HOME | End: 2020-08-07
Payer: MEDICARE

## 2020-08-07 VITALS
RESPIRATION RATE: 20 BRPM | HEIGHT: 55 IN | DIASTOLIC BLOOD PRESSURE: 81 MMHG | TEMPERATURE: 98.6 F | WEIGHT: 139 LBS | SYSTOLIC BLOOD PRESSURE: 192 MMHG | HEART RATE: 68 BPM | BODY MASS INDEX: 32.17 KG/M2

## 2020-08-07 PROBLEM — T81.89XA NON-HEALING SURGICAL WOUND: Status: ACTIVE | Noted: 2020-08-07

## 2020-08-07 PROCEDURE — 99213 OFFICE O/P EST LOW 20 MIN: CPT

## 2020-08-07 PROCEDURE — 11042 DBRDMT SUBQ TIS 1ST 20SQCM/<: CPT

## 2020-08-07 ASSESSMENT — PAIN DESCRIPTION - LOCATION: LOCATION: LEG

## 2020-08-07 ASSESSMENT — PAIN DESCRIPTION - PAIN TYPE: TYPE: ACUTE PAIN

## 2020-08-07 ASSESSMENT — PAIN - FUNCTIONAL ASSESSMENT: PAIN_FUNCTIONAL_ASSESSMENT: ACTIVITIES ARE NOT PREVENTED

## 2020-08-07 ASSESSMENT — PAIN DESCRIPTION - DESCRIPTORS: DESCRIPTORS: SHARP

## 2020-08-07 ASSESSMENT — PAIN SCALES - GENERAL: PAINLEVEL_OUTOF10: 5

## 2020-08-07 ASSESSMENT — PAIN DESCRIPTION - FREQUENCY: FREQUENCY: INTERMITTENT

## 2020-08-07 ASSESSMENT — PAIN DESCRIPTION - PROGRESSION: CLINICAL_PROGRESSION: NOT CHANGED

## 2020-08-07 ASSESSMENT — PAIN DESCRIPTION - ORIENTATION: ORIENTATION: RIGHT;LOWER;INNER

## 2020-08-07 NOTE — CODE DOCUMENTATION
[id number] Physician Billing Sheet. Roshni Mock  AGE: 66 y.o.    GENDER: female  : 1945  TODAY'S DATE:  2020    ICD-10 CODES  Active Hospital Problems    Diagnosis Date Noted    Non-healing surgical wound [T81.89XA] 2020    Venous insufficiency of left lower extremity [I87.2] 2020    Pain and swelling due to varicose veins of both lower extremities [I83.813] 2020       PHYSICIAN PROCEDURES    CPT CODE  44501-39, 69749      Electronically signed by Marycarmen Carreon DPM on 2020 at 10:45 AM

## 2020-08-07 NOTE — PROGRESS NOTES
[facility]                                                   Progress Note and Procedure Note      Conchis Ndiaye  MEDICAL RECORD NUMBER:  [id number]  AGE: 66 y.o. GENDER: female  : 1945  EPISODE DATE:  2020    Subjective:     Chief Complaint   Patient presents with    Wound Check     New- right medal lower leg wound         HISTORY of PRESENT ILLNESS HPI     Conchis Ndiaye is a 66 y.o. female who presents today for wound/ulcer evaluation. History of Wound Context: Patient has a non healing incisional biopsy of, which was calcified tissue after pathology report. The original biopsy occoured on 7/10/2020. Patient has had trouble with healing the incision since then. Patient did not have entire calcified section of tissue removed.  Patient denies N/V/F/C  Wound/Ulcer Pain Timing/Severity: intermittent  Quality of pain: throbbing, tender  Severity:  5 / 10   Modifying Factors: None  Associated Signs/Symptoms: erythema and pain    Ulcer Identification:  Ulcer Type: non-healing surgical  Contributing Factors: edema, venous stasis and arterial insufficiency    Wound: Surgical incision        PAST MEDICAL HISTORY        Diagnosis Date    Anxiety     Arthritis     COPD (chronic obstructive pulmonary disease) (HCC)     Generalized osteoarthrosis, unspecified site 2002    Hyperlipidemia     Hypertension     Major depressive disorder, single episode, moderate ([address] 2002    Morbid obesity ([address] 2002    OAB (overactive bladder)     Osteoporosis        PAST SURGICAL HISTORY    Past Surgical History:   Procedure Laterality Date    CARDIAC CATHETERIZATION      CARPAL TUNNEL RELEASE      COLONOSCOPY      HYSTERECTOMY, TOTAL ABDOMINAL      KNEE ARTHROPLASTY Bilateral     [place] COLON CA SCRN NOT  [address] IND N/A 2017    [facility] ESOPHAGOGASTRODUODENOSCOPY TRANSORAL DIAGNOSTIC N/A 2017    EGD medications on file prior to encounter. REVIEW OF SYSTEMS    Pertinent items are noted in HPI. Objective:      BP (!) 192/81   Pulse 68   Temp 98.6 °F (37 °C) (Temporal)   Resp 20   Ht 4' 6\" (1.372 m)   Wt 139 lb (63 kg)   LMP  (LMP Unknown)   BMI 33.51 kg/m²     Wt Readings from Last 3 Encounters:   08/07/20 139 lb (63 kg)   08/04/20 140 lb (63.5 kg)   07/31/20 140 lb (63.5 kg)       PHYSICAL EXAM    Constitutional:   Well nourished and well developed. Appears neat and clean. Patient is alert, oriented x3, and in no apparent distress. Respiratory:  Respiratory effort is easy and symmetric bilaterally. Rate is normal at rest and on room air. Vascular:  Pedal Pulses is  Faintly palpable and audible with doppler. Capillary refill is <3 sec to digits bilateral.  Extremities positive for Non pitting edema R>L. Neurological:   Epicritic sensation is intact to lower extremities. MSK: Patient is able to move lower extremities on command. Dermatological:  Wound description noted in wound assessment. The wound(s) are right medial leg surgical incision dehiscence. The wound had non viable eschar base and hyperkeratotic tissue on the rim. There is minimal treva-wound erythema without signs of ascending infection. The soft tissue treva-wound is firm, hard, and slightly mobile. Psychiatric:  Judgement and insight intact. Short and long term memory intact. No evidence of depression, anxiety, or agitation. Patient is calm, cooperative, and communicative. Appropriate interactions and affect. Assessment:      Problem List Items Addressed This Visit     * (Principal) Non-healing surgical wound           Procedure Note  Indications:  Based on my examination of this patient's wound(s)/ulcer(s) today, debridement is required to promote healing and evaluate the wound base.     Performed by: Arian Arechiga DPM    Consent obtained:  Yes    Time out taken:  Yes    Pain Control: Debridement:Excisional Debridement    Using curette the wound(s)/ulcer(s) was/were sharply debrided down through and including the removal of subcutaneous tissue. Devitalized Tissue Debrided:  necrotic/eschar and callus    Pre Debridement Measurements:  Are located in the [place]  Documentation Flow Sheet    Wound/Ulcer #: 1    Post Debridement Measurements:  Wound/Ulcer Descriptions are Pre Debridement except measurements:    Wound 08/07/20 Leg Right;Medial #1 (Active)   Wound Image   08/07/20 1001   Wound Non-Healing Surgical 08/07/20 1001   Dressing/Treatment Antibacterial ointment;Dry dressing 08/07/20 1029   Wound Length (cm) 1.5 cm 08/07/20 1001   Wound Width (cm) 0.5 cm 08/07/20 1001   Wound Depth (cm) 0.1 cm 08/07/20 1001   Wound Surface Area (cm^2) 0.75 cm^2 08/07/20 1001   Wound Volume (cm^3) 0.08 cm^3 08/07/20 1001   Post-Procedure Length (cm) 1.5 cm 08/07/20 1022   Post-Procedure Width (cm) 0.5 cm 08/07/20 1022   Post-Procedure Depth (cm) 0.1 cm 08/07/20 1022   Post-Procedure Surface Area (cm^2) 0.75 cm^2 08/07/20 1022   Post-Procedure Volume (cm^3) 0.08 cm^3 08/07/20 1022   Drainage Amount None 08/07/20 1001   Odor None 08/07/20 1001   Tabby-wound Assessment Induration; Red 08/07/20 1001   Black%Wound Bed 100 08/07/20 1001   Number of days: 0            Percent of Wound/Ulcer Debrided: 100%    Total Surface Area Debrided:  0.75 sq cm     Diabetic/Pressure/Non Pressure Ulcers:  Ulcer: N/A      Bleeding:  None    Hemostasis Achieved:  not needed    Procedural Pain:  3  / 10     Post Procedural Pain:  3 / 10     Response to treatment:  Well tolerated by patient. Surgical Pathology   7/10/2020  FINAL DIAGNOSIS:   RIGHT LEG SUBCUTANEOUS MASS-   FAT NECROSIS WITH DYSTROPHIC CALCIFICATIONS, MILD CHRONIC INFLAMMATION   WITH GRANULATION TISSUE AND FIBROSIS. Plan:     Wound care done today Apply Mupirocin to wound. Cover with dry dressing.   Art duplex ordered for healing purposes  Patient to maintain DSD at home  Patient to monitor for signs of infection, warmth, erythema, purulent drainage. Patient to return to clinic in 3 weeks    Treatment Note please see attached Discharge Instructions    Written patient dismissal instructions given to patient and signed by patient or POA. Discharge [address] and [facility]   Physician Orders and Discharge Instructions  [address]  Telephone: [phone]      NAME:  Calin Yee  YOB: 1945  MEDICAL RECORD NUMBER:  [id number]    Home Care/Facility:  NONE    Wound Location:   RIGHT LOWER LEG  Dressing orders:  1. Cleanse with soap and water. 2. Apply Mupirocin to wound. 3. Cover with dry dressing. 4. Change dressing daily. Compression:   none    Offloading Device:   none    Other Instructions: Dr. Abdiel Major to order an arterial ultrasound of  RIGHT leg - please schedule. Keep all dressings clean, dry and intact. Keep pressure off the wound(s) at all times. Follow up visit   3 Weeks  AUGUST 28, 2020 AT     Please give 24 hour notice if unable to keep appointment. [phone]    If you experience any of the following, please call the Wound Care Service at  [phone] or go to the nearest emergency room. *Increase in pain *Temperature over 101 *Increase in drainage from your wound or a foul odor  *Uncontrolled swelling *Need for compression bandage changes due to slippage, breakthrough drainage       PLEASE NOTE: IF YOU ARE UNABLE TO OBTAIN WOUND SUPPLIES, CONTINUE TO USE THE SUPPLIES YOU HAVE AVAILABLE UNTIL YOU ARE ABLE TO REACH US.  IT IS MOST IMPORTANT TO KEEP THE WOUND COVERED AT ALL TIMES    Electronically signed by Jane Degroot DPM on 8/7/2020 at 10:28 AM                Electronically signed by Jane Degroot DPM on 8/7/2020 at 10:31 AM

## 2020-08-11 PROBLEM — I73.9 CLAUDICATION IN PERIPHERAL VASCULAR DISEASE (HCC): Chronic | Status: ACTIVE | Noted: 2020-08-11

## 2020-08-13 ENCOUNTER — OFFICE VISIT (OUTPATIENT)
Dept: FAMILY MEDICINE CLINIC | Age: 75
End: 2020-08-13
Payer: MEDICARE

## 2020-08-13 VITALS
HEART RATE: 68 BPM | TEMPERATURE: 97.4 F | HEIGHT: 55 IN | WEIGHT: 140 LBS | SYSTOLIC BLOOD PRESSURE: 130 MMHG | DIASTOLIC BLOOD PRESSURE: 80 MMHG | OXYGEN SATURATION: 98 % | BODY MASS INDEX: 32.4 KG/M2

## 2020-08-13 PROCEDURE — 99213 OFFICE O/P EST LOW 20 MIN: CPT | Performed by: NURSE PRACTITIONER

## 2020-08-13 RX ORDER — HYDROCODONE BITARTRATE AND ACETAMINOPHEN 5; 325 MG/1; MG/1
1 TABLET ORAL EVERY 6 HOURS PRN
Qty: 12 TABLET | Refills: 0 | Status: SHIPPED | OUTPATIENT
Start: 2020-08-13 | End: 2020-08-16

## 2020-08-13 ASSESSMENT — ENCOUNTER SYMPTOMS
SHORTNESS OF BREATH: 0
COLOR CHANGE: 0
SORE THROAT: 0
CHEST TIGHTNESS: 0
VOMITING: 0
ABDOMINAL DISTENTION: 0
COUGH: 0
WHEEZING: 0
ABDOMINAL PAIN: 0
TROUBLE SWALLOWING: 0
RHINORRHEA: 0
DIARRHEA: 0
NAUSEA: 0
CONSTIPATION: 0
BACK PAIN: 0

## 2020-08-13 NOTE — PROGRESS NOTES
Subjective  Rohan Peters, 66 y.o. female presents today with:  Chief Complaint   Patient presents with    Ankle Pain     X 2 days, on 8/4/20 procedure done, on 8/7/20 wound center and a follow up is nel on 8/28/20. they did not call them reg pain? Wound Check   She was originally treated 10 to 14 days ago. Previous treatment included wound cleansing or irrigation. Her temperature was unmeasured prior to arrival. The temperature was taken using an oral thermometer. There has been no drainage from the wound. The redness has improved. The swelling has improved. The pain has not changed. She has no difficulty moving the affected extremity or digit. Review of Systems   Constitutional: Negative for activity change, appetite change, chills, diaphoresis, fatigue and fever. HENT: Negative for congestion, ear pain, rhinorrhea, sore throat and trouble swallowing. Eyes: Negative for visual disturbance. Respiratory: Negative for cough, chest tightness, shortness of breath and wheezing. Cardiovascular: Negative for chest pain and palpitations. Gastrointestinal: Negative for abdominal distention, abdominal pain, constipation, diarrhea, nausea and vomiting. Genitourinary: Negative for difficulty urinating, flank pain, frequency and urgency. Musculoskeletal: Positive for myalgias. Negative for arthralgias, back pain, neck pain and neck stiffness. Skin: Positive for wound. Negative for color change and rash. Neurological: Negative for dizziness, tremors, seizures, syncope, speech difficulty, weakness, light-headedness, numbness and headaches.        Past Medical History:   Diagnosis Date    Anxiety     Arthritis     COPD (chronic obstructive pulmonary disease) (HCC)     Generalized osteoarthrosis, unspecified site 12/27/2002    Hyperlipidemia     Hypertension     Major depressive disorder, single episode, moderate ([address] 12/27/2002    Morbid obesity ([address] 12/27/2002    OAB (overactive file   Social History Narrative    Not on file     Family History   Problem Relation Age of Onset    Arthritis Father     Other Father         gout    Heart Failure Father     Cancer Sister      Allergies   Allergen Reactions    Codeine      Nausea, lightheadedness, dizzy     Current Outpatient Medications   Medication Sig Dispense Refill    HYDROcodone-acetaminophen (NORCO) 5-325 MG per tablet Take 1 tablet by mouth every 6 hours as needed for Pain for up to 3 days. Intended supply: 3 days. Take lowest dose possible to manage pain 12 tablet 0    TRINTELLIX 10 MG TABS tablet TAKE ONE TABLET BY MOUTH EVERY DAY 30 tablet 5    loratadine (CLARITIN) 10 MG tablet Take 1 tablet by mouth daily 30 tablet 0    tiZANidine (ZANAFLEX) 4 MG tablet TAKE ONE TABLET BY MOUTH THREE TIMES A DAY AS NEEDED FOR MUSCLE SPASMS / PAIN 90 tablet 0    gabapentin (NEURONTIN) 400 MG capsule TAKE ONE CAPSULE BY MOUTH THREE TIMES A DAY 90 capsule 3    pravastatin (PRAVACHOL) 40 MG tablet TAKE ONE TABLET BY MOUTH EVERY DAY 30 tablet 5    amLODIPine (NORVASC) 5 MG tablet TAKE ONE TABLET BY MOUTH DAILY AT BEDTIME  3    donepezil (ARICEPT) 10 MG tablet take one-half tablet by mouth at bedtime for 2 weeks then increase to 1 tablet at bedtime  3    albuterol sulfate  (90 Base) MCG/ACT inhaler Inhale 2 puffs into the lungs 4 times daily as needed for Wheezing 3 Inhaler 1    aspirin 81 MG EC tablet Take 1 tablet by mouth daily 30 tablet 3    vitamin D (CHOLECALCIFEROL) 1000 UNIT TABS tablet Take 2 tablets by mouth daily 60 tablet 3    melatonin 1 MG tablet Take 1 tablet by mouth nightly as needed for Sleep 30 tablet 2    Oxygen Concentrator        No current facility-administered medications for this visit. PMH, Surgical Hx, Family Hx, and Social Hx reviewed and updated. Health Maintenance reviewed.     Objective    Vitals:    08/13/20 1700   BP: 130/80   Pulse: 68   Temp: 97.4 °F (36.3 °C)   SpO2: 98%   Weight: 140 lb (63.5 kg)   Height: 4' 6\" (1.372 m)       Physical Exam  Constitutional:       General: She is not in acute distress. Appearance: Normal appearance. She is obese. She is not ill-appearing, toxic-appearing or diaphoretic. HENT:      Head: Normocephalic and atraumatic. Right Ear: External ear normal.      Left Ear: External ear normal.   Eyes:      General:         Right eye: No discharge. Left eye: No discharge. Conjunctiva/sclera: Conjunctivae normal.   Neck:      Musculoskeletal: Normal range of motion and neck supple. No neck rigidity or muscular tenderness. Cardiovascular:      Rate and Rhythm: Normal rate and regular rhythm. Pulses: Normal pulses. Pulmonary:      Effort: Pulmonary effort is normal.      Breath sounds: Normal breath sounds. Abdominal:      General: There is no distension. Tenderness: There is no abdominal tenderness. Musculoskeletal: Normal range of motion. General: Tenderness present. No swelling, deformity or signs of injury. Right lower leg: She exhibits tenderness. She exhibits no bony tenderness, no swelling, no deformity and no laceration. No edema. Legs:    Skin:     General: Skin is warm and dry. Capillary Refill: Capillary refill takes less than 2 seconds. Neurological:      General: No focal deficit present. Mental Status: She is alert and oriented to person, place, and time. Mental status is at baseline. Cranial Nerves: No cranial nerve deficit. Sensory: No sensory deficit. Motor: No weakness. Coordination: Coordination normal.         Assessment & Plan    Diagnosis Orders   1. Right leg pain  HYDROcodone-acetaminophen (NORCO) 5-325 MG per tablet     No orders of the defined types were placed in this encounter.     Orders Placed This Encounter   Medications    HYDROcodone-acetaminophen (NORCO) 5-325 MG per tablet     Sig: Take 1 tablet by mouth every 6 hours as needed for Pain for up to 3 days. Intended supply: 3 days. Take lowest dose possible to manage pain     Dispense:  12 tablet     Refill:  0     Reduce doses taken as pain becomes manageable     There are no discontinued medications. Return in about 1 week (around 8/20/2020), or if symptoms worsen or fail to improve, for follow up with PCP. Reviewed with the patient: current clinical status,medications, activities and diet. Side effects, adverse effects of themedication prescribed today, as well as treatment plan/ rationale and result expectations have been discussed with the patient who expresses understanding and desires to proceed. Close follow up to evaluate treatment results and for coordination of care. I have reviewed the patient's medical history in detail and updated the computerized patient record.      Thea Mcneill, YULY - CNP

## 2020-08-18 RX ORDER — PRAVASTATIN SODIUM 40 MG
TABLET ORAL
Qty: 30 TABLET | Refills: 0 | Status: SHIPPED | OUTPATIENT
Start: 2020-08-18 | End: 2020-10-06

## 2020-08-18 RX ORDER — GABAPENTIN 400 MG/1
CAPSULE ORAL
Qty: 90 CAPSULE | Refills: 0 | Status: SHIPPED | OUTPATIENT
Start: 2020-08-18 | End: 2020-11-02

## 2020-08-25 ENCOUNTER — HOSPITAL ENCOUNTER (OUTPATIENT)
Dept: ULTRASOUND IMAGING | Age: 75
Discharge: HOME | End: 2020-08-27
Payer: MEDICARE

## 2020-08-25 PROCEDURE — 93926 LOWER EXTREMITY STUDY: CPT

## 2020-08-28 ENCOUNTER — HOSPITAL ENCOUNTER (OUTPATIENT)
Dept: WOUND CARE | Age: 75
Discharge: HOME | End: 2020-08-28
Payer: MEDICARE

## 2020-08-28 VITALS
HEART RATE: 50 BPM | TEMPERATURE: 98.2 F | SYSTOLIC BLOOD PRESSURE: 146 MMHG | DIASTOLIC BLOOD PRESSURE: 72 MMHG | RESPIRATION RATE: 20 BRPM

## 2020-08-28 PROCEDURE — 11042 DBRDMT SUBQ TIS 1ST 20SQCM/<: CPT

## 2020-08-28 NOTE — PROGRESS NOTES
[address]                                                   Progress Note and Procedure Note      Selena Atkins  MEDICAL RECORD NUMBER:  [id number]  AGE: 66 y.o. GENDER: female  : 1945  EPISODE DATE:  2020    Subjective:     Chief Complaint   Patient presents with    Wound Check     right leg wound recheck         HISTORY of PRESENT ILLNESS HPI     Selena Atkins is a 66 y.o. female who presents today for wound/ulcer evaluation. History of Wound Context: Patient present for her second visit s/p surgical wound from an I7D by Dr. Roni Torres. She states that it is still draining and is not closing. She did have her Arterial US which shows no stenosis. However a Dyrrhythmia was noted during the test. Clinical follow up advised.   Wound/Ulcer Pain Timing/Severity: intermittent  Quality of pain: aching  Severity:  3 / 10   Modifying Factors: None  Associated Signs/Symptoms: edema    Ulcer Identification:  Ulcer Type: venous  Contributing Factors: edema and venous stasis    Wound: N/A        PAST MEDICAL HISTORY        Diagnosis Date    Anxiety     Arthritis     COPD (chronic obstructive pulmonary disease) (HCC)     Generalized osteoarthrosis, unspecified site 2002    Hyperlipidemia     Hypertension     Major depressive disorder, single episode, moderate ([address] 2002    Morbid obesity ([address] 2002    OAB (overactive bladder)     Osteoporosis        PAST SURGICAL HISTORY    Past Surgical History:   Procedure Laterality Date    CARDIAC CATHETERIZATION      CARPAL TUNNEL RELEASE      COLONOSCOPY      HYSTERECTOMY, TOTAL ABDOMINAL      KNEE ARTHROPLASTY Bilateral     [place] COLON CA SCRN NOT  [address]  IND N/A 2017    COLONOSCOPY performed by Avinash Huang MD at . [address] ESOPHAGOGASTRODUODENOSCOPY TRANSORAL DIAGNOSTIC N/A 2017    EGD ESOPHAGOGASTRODUODENOSCOPY performed [facility]ash [facility] MD at [facility] ARTHROPLASTY Bilateral        FAMILY HISTORY    Family History   Problem Relation Age of Onset    Arthritis Father     Other Father         gout    Heart Failure Father     Cancer Sister        SOCIAL HISTORY    Social History     Tobacco Use    Smoking status: Former Smoker     Packs/day: 1.00     Years: 20.00     Pack years: 20.00     Last attempt to quit:      Years since quittin.6    Smokeless tobacco: Never Used   Substance Use Topics    Alcohol use: No    Drug use: Never       ALLERGIES    Allergies   Allergen Reactions    Codeine      Nausea, lightheadedness, dizzy       MEDICATIONS    Current Outpatient Medications on File Prior to Encounter   Medication Sig Dispense Refill    gabapentin (NEURONTIN) 400 MG capsule TAKE ONE CAPSULE BY MOUTH THREE TIMES A DAY 90 capsule 0    pravastatin (PRAVACHOL) 40 MG tablet TAKE ONE TABLET BY MOUTH EVERY DAY 30 tablet 0    TRINTELLIX 10 MG TABS tablet TAKE ONE TABLET BY MOUTH EVERY DAY 30 tablet 5    loratadine (CLARITIN) 10 MG tablet Take 1 tablet by mouth daily 30 tablet 0    tiZANidine (ZANAFLEX) 4 MG tablet TAKE ONE TABLET BY MOUTH THREE TIMES A DAY AS NEEDED FOR MUSCLE SPASMS / PAIN 90 tablet 0    amLODIPine (NORVASC) 5 MG tablet TAKE ONE TABLET BY MOUTH DAILY AT BEDTIME  3    donepezil (ARICEPT) 10 MG tablet take one-half tablet by mouth at bedtime for 2 weeks then increase to 1 tablet at bedtime  3    albuterol sulfate  (90 Base) MCG/ACT inhaler Inhale 2 puffs into the lungs 4 times daily as needed for Wheezing 3 Inhaler 1    aspirin 81 MG EC tablet Take 1 tablet by mouth daily 30 tablet 3    vitamin D (CHOLECALCIFEROL) 1000 UNIT TABS tablet Take 2 tablets by mouth daily 60 tablet 3    melatonin 1 MG tablet Take 1 tablet by mouth nightly as needed for Sleep 30 tablet 2    Oxygen Concentrator        No current facility-administered medications on file prior to encounter.         REVIEW OF SYSTEMS    Pertinent items are noted in HPI.    Objective:      BP (!) 146/72   Pulse 50   Temp 98.2 °F (36.8 °C) (Temporal)   Resp 20   LMP  (LMP Unknown)     Wt Readings from Last 3 Encounters:   08/13/20 140 lb (63.5 kg)   08/07/20 139 lb (63 kg)   08/04/20 140 lb (63.5 kg)       PHYSICAL EXAM    Constitutional:   Well nourished and well developed. Appears neat and clean. Patient is alert, oriented x3, and in no apparent distress. Respiratory:  Respiratory effort is easy and symmetric bilaterally. Rate is normal at rest and on room air. Vascular:  Pedal Pulses is not palpable and audible with doppler. Capillary refill is <3 sec to digits bilateral.  Extremities negative for pitting edema. Neurological:   Sensation is intact to lower extremities. Dermatological:  Wound description noted in wound assessment. The wound today has a small amount of slough noted and upon removed a deep deficit was noted with a hard yellow orange sclerotic type tissue noted. Possible scarification. There is hard tissue noted all around area beneath good tissue. Psychiatric:  Judgement and insight intact. Short and long term memory intact. No evidence of depression, anxiety, or agitation. Patient is calm, cooperative, and communicative. Appropriate interactions and affect. Assessment:      Problem List Items Addressed This Visit     Claudication in peripheral vascular disease ([address] (Chronic)    Relevant Orders    External Referral to Cardiology    Hypertension - Primary    Relevant Orders    External Referral to Cardiology           Procedure Note  Indications:  Based on my examination of this patient's wound(s)/ulcer(s) today, debridement is required to promote healing and evaluate the wound base.     Performed by: Mayito Mchugh DPM    Consent obtained:  Yes    Time out taken:  Yes    Pain Control:         Debridement:Excisional Debridement    Using curette the wound(s)/ulcer(s) was/were sharply debrided down through and including the removal of epidermis, dermis and subcutaneous tissue. Devitalized Tissue Debrided:  slough    Pre Debridement Measurements:  Are located in the [place]  Documentation Flow Sheet    Wound/Ulcer #: 1    Post Debridement Measurements:  Wound/Ulcer Descriptions are Pre Debridement except measurements:    Wound 08/07/20 Leg Right;Medial #1 (Active)   Wound Image   08/28/20 1122   Wound Non-Healing Surgical 08/28/20 1122   Dressing/Treatment Antibacterial ointment;Dry dressing 08/07/20 1029   Wound Cleansed Rinsed/Irrigated with saline 08/28/20 1122   Wound Length (cm) 1.2 cm 08/28/20 1122   Wound Width (cm) 0.4 cm 08/28/20 1122   Wound Depth (cm) 0.1 cm 08/28/20 1122   Wound Surface Area (cm^2) 0.48 cm^2 08/28/20 1122   Change in Wound Size % (l*w) 36 08/28/20 1122   Wound Volume (cm^3) 0.05 cm^3 08/28/20 1122   Wound Healing % 38 08/28/20 1122   Post-Procedure Length (cm) 1.2 cm 08/28/20 1139   Post-Procedure Width (cm) 0.4 cm 08/28/20 1139   Post-Procedure Depth (cm) 0.4 cm 08/28/20 1139   Post-Procedure Surface Area (cm^2) 0.48 cm^2 08/28/20 1139   Post-Procedure Volume (cm^3) 0.19 cm^3 08/28/20 1139   Drainage Amount Moderate 08/28/20 1122   Drainage Description Serous 08/28/20 1122   Odor None 08/28/20 1122   Margins Defined edges 08/28/20 1122   Tabby-wound Assessment Other (Comment) 08/28/20 1122   Non-staged Wound Description Full thickness 08/28/20 1122   Yellow%Wound Bed 100 08/28/20 1122   Black%Wound Bed 100 08/07/20 1001   Number of days: 21            Percent of Wound/Ulcer Debrided: 100%    Total Surface Area Debrided:  0.48 sq cm     Diabetic/Pressure/Non Pressure Ulcers:  Ulcer: Non-Pressure ulcer, fat layer exposed      Bleeding:  Minimal    Hemostasis Achieved:  by pressure    Procedural Pain:  2  / 10     Post Procedural Pain:  1 / 10     Response to treatment:  Well tolerated by patient.        Plan:   Patient examined and Debrided  Perfect Serve with photo sent to Dr. Gissell Jones to Dr. Lopes Mom per patient request for heart arrhthmia  Bactroban and alginate    Follow up in 2 weeks      Treatment Note please see attached Discharge Instructions    Written patient dismissal instructions given to patient and signed by patient or POA. Discharge [address] and Hyperbaric Medicine   Physician Orders and Discharge Instructions  [address]  Telephone: [phone]      [phone]        NAME:  Crissy Pope  YOB: 1945  MEDICAL RECORD NUMBER:  [id number]     Home Care/Facility:  NONE     Wound Location:   RIGHT LOWER LEG  Dressing orders:  1. Cleanse with soap and water. 2. Apply Mupirocin to wound. 3. Cover with calcium alginate and dry dressing. 4. Change dressing daily.     Compression:   none     Offloading Device:   none     Other Instructions:  Dr. Zoanne Councilman to refer to Dr. Abdirashid Joyce. Please schedule as soon as possible.      Keep all dressings clean, dry and intact. Keep pressure off the wound(s) at all times.      Follow up visit  2 weeks September        Please give 24 hour notice if unable to keep appointment. [phone]     If you experience any of the following, please call the Wound Care Service at  [phone] or go to the nearest emergency room. *Increase in pain         *Temperature over 101           *Increase in drainage from your wound or a foul odor  *Uncontrolled swelling            *Need for compression bandage changes due to slippage, breakthrough drainage       PLEASE NOTE: IF YOU ARE UNABLE TO OBTAIN WOUND SUPPLIES, CONTINUE TO USE THE SUPPLIES YOU HAVE AVAILABLE UNTIL YOU ARE ABLE TO REACH US.  IT IS MOST IMPORTANT TO KEEP THE WOUND COVERED AT ALL TIMES  Electronically signed by Mirela Beltran DPM on 8/28/2020 at 11:53 AM             Electronically signed by Mirela Beltran DPM on 8/28/2020 at 11:57 AM

## 2020-08-28 NOTE — CODE DOCUMENTATION
[address] Phys[facility] Billing Sheet. Luis Mock  AGE: 66 y.o.    GENDER: female  : 1945  TODAY'S DATE:  2020    ICD-10 CODES  Active Hospital Problems    Diagnosis Date Noted    Non-healing surgical wound [T81.89XA] 2020    Venous insufficiency of left lower extremity [I87.2] 2020    Pain and swelling due to varicose veins of both lower extremities [I83.813] 2020       PHYSICIAN PROCEDURES    CPT CODE  98481 LT      Electronically signed by Katerin Vences DPM on 2020 at 11:56 AM

## 2020-09-04 ENCOUNTER — OFFICE VISIT (OUTPATIENT)
Dept: FAMILY MEDICINE CLINIC | Age: 75
End: 2020-09-04
Payer: MEDICARE

## 2020-09-04 VITALS
HEART RATE: 68 BPM | SYSTOLIC BLOOD PRESSURE: 132 MMHG | HEIGHT: 58 IN | WEIGHT: 142 LBS | OXYGEN SATURATION: 98 % | BODY MASS INDEX: 29.81 KG/M2 | DIASTOLIC BLOOD PRESSURE: 80 MMHG | TEMPERATURE: 98.9 F

## 2020-09-04 PROCEDURE — 99214 OFFICE O/P EST MOD 30 MIN: CPT | Performed by: NURSE PRACTITIONER

## 2020-09-04 ASSESSMENT — ENCOUNTER SYMPTOMS
COUGH: 0
BACK PAIN: 0
SHORTNESS OF BREATH: 0
COLOR CHANGE: 0
CHEST TIGHTNESS: 0

## 2020-09-04 NOTE — PROGRESS NOTES
mouth daily 30 tablet 3    vitamin D (CHOLECALCIFEROL) 1000 UNIT TABS tablet Take 2 tablets by mouth daily 60 tablet 3    melatonin 1 MG tablet Take 1 tablet by mouth nightly as needed for Sleep 30 tablet 2    Oxygen Concentrator       loratadine (CLARITIN) 10 MG tablet Take 1 tablet by mouth daily (Patient not taking: Reported on 9/4/2020) 30 tablet 0     No current facility-administered medications on file prior to visit. Allergies   Allergen Reactions    Codeine      Nausea, lightheadedness, dizzy       Review of Systems   Constitutional: Negative for chills, diaphoresis, fatigue, fever and malaise/fatigue. HENT: Negative for congestion and ear pain. Respiratory: Negative for cough, chest tightness and shortness of breath. Cardiovascular: Negative for chest pain, palpitations and leg swelling. Endocrine: Negative for polydipsia, polyphagia and polyuria. Genitourinary: Negative for difficulty urinating and dysuria. Musculoskeletal: Negative for arthralgias and back pain. Skin: Positive for wound. Negative for color change and rash. Neurological: Negative for dizziness and headaches. Psychiatric/Behavioral: Positive for sleep disturbance (insomnia). Negative for dysphoric mood. The patient is not nervous/anxious. Objective  Vitals:    09/04/20 1349   BP: 132/80   Site: Left Upper Arm   Position: Sitting   Cuff Size: Medium Adult   Pulse: 68   Temp: 98.9 °F (37.2 °C)   TempSrc: Tympanic   SpO2: 98%   Weight: 142 lb (64.4 kg)   Height: 4' 10\" (1.473 m)     Physical Exam  Constitutional:       General: She is not in acute distress. Appearance: Normal appearance. She is normal weight. She is not ill-appearing, toxic-appearing or diaphoretic. HENT:      Head: Normocephalic and atraumatic. Right Ear: External ear normal.      Left Ear: External ear normal.   Neck:      Musculoskeletal: Normal range of motion and neck supple. No muscular tenderness.    Cardiovascular: neurology. F/u with cardiology as scheduled. F/u in 3 months or sooner PRN. Side effects, adverse effects of the medication prescribed today, as well as treatment plan/ rationale and result expectations have been discussed with the patient who expresses understanding and desires to proceed. Close follow up to evaluate treatment results and for coordination of care. I have reviewed the patient's medical history in detail and updated the computerized patient record. As always, patient is advised that if symptoms worsen in any way they will proceed to the nearest emergency room. No orders of the defined types were placed in this encounter. No orders of the defined types were placed in this encounter. Return in about 3 months (around 12/4/2020) for check up.     Jeannine Chapa, APRN - CNP

## 2020-09-11 ENCOUNTER — HOSPITAL ENCOUNTER (OUTPATIENT)
Dept: WOUND CARE | Age: 75
Discharge: HOME | End: 2020-09-11
Payer: MEDICARE

## 2020-09-11 VITALS — SYSTOLIC BLOOD PRESSURE: 146 MMHG | DIASTOLIC BLOOD PRESSURE: 72 MMHG | HEART RATE: 61 BPM | TEMPERATURE: 97.9 F

## 2020-09-11 PROCEDURE — 99213 OFFICE O/P EST LOW 20 MIN: CPT

## 2020-09-11 NOTE — PROGRESS NOTES
[address]                                                   Progress Note and Procedure Note      Cara Paul  MEDICAL RECORD NUMBER:  [id number]  AGE: 66 y.o. GENDER: female  : 1945  EPISODE DATE:  2020    Subjective:     Chief Complaint   Patient presents with    Wound Check     right medial leg         HISTORY of PRESENT ILLNESS HPI     Cara Paul is a 66 y.o. female who presents today for wound/ulcer evaluation. History of Wound Context: Patient present for her second visit s/p surgical wound from an I7D by Dr. Fady Lemon. She states that it is still draining and is not closing. She did have her Arterial US which shows no stenosis. However a Dyrrhythmia was noted during the test. Clinical follow up advised. Her daughter spoke to Dr. Kevin Citizen and said that she should get a second opinion.   Wound/Ulcer Pain Timing/Severity: intermittent  Quality of pain: aching  Severity:  3 / 10   Modifying Factors: None  Associated Signs/Symptoms: edema    Ulcer Identification:  Ulcer Type: venous  Contributing Factors: edema and venous stasis    Wound: N/A        PAST MEDICAL HISTORY        Diagnosis Date    Anxiety     Arthritis     COPD (chronic obstructive pulmonary disease) (HCC)     Generalized osteoarthrosis, unspecified site 2002    Hyperlipidemia     Hypertension     Major depressive disorder, single episode, moderate ([address] 2002    Morbid obesity ([address] 2002    OAB (overactive bladder)     Osteoporosis        PAST SURGICAL HISTORY    Past Surgical History:   Procedure Laterality Date    CARDIAC CATHETERIZATION      CARPAL TUNNEL RELEASE      COLONOSCOPY      HYSTERECTOMY, TOTAL ABDOMINAL      KNEE ARTHROPLASTY Bilateral     [place] COLON CA SCRN NOT  [address] IND N/A 2017    COLONOSCOPY performed by Priyanka Morelos MD at . [address] ESOPHAGOGASTRODUODENOSCOPY TRANSORAL DIAGNOSTIC N/A 2017    EGD ESOPHAGOGASTRODUODENOSCOPY facility-administered medications on file prior to encounter. REVIEW OF SYSTEMS    Pertinent items are noted in HPI. Objective:      BP (!) 146/72   Pulse 61   Temp 97.9 °F (36.6 °C) (Temporal)   LMP  (LMP Unknown)     Wt Readings from Last 3 Encounters:   09/04/20 142 lb (64.4 kg)   08/13/20 140 lb (63.5 kg)   08/07/20 139 lb (63 kg)       PHYSICAL EXAM    Constitutional:   Well nourished and well developed. Appears neat and clean. Patient is alert, oriented x3, and in no apparent distress. Respiratory:  Respiratory effort is easy and symmetric bilaterally. Rate is normal at rest and on room air. Vascular:  Pedal Pulses is not palpable and audible with doppler. Capillary refill is <3 sec to digits bilateral.  Extremities negative for pitting edema. Neurological:   Sensation is intact to lower extremities. Dermatological:  Wound description noted in wound assessment. The wound today has a small amount of slough noted and still  a deep deficit was noted with a hard yellow orange sclerotic type tissue noted. Possible scarification/calcification. There is hard tissue noted all around area beneath good tissue. Psychiatric:  Judgement and insight intact. Short and long term memory intact. No evidence of depression, anxiety, or agitation. Patient is calm, cooperative, and communicative. Appropriate interactions and affect. Assessment:      Problem List Items Addressed This Visit     None           Procedure Note  Indications:  Based on my examination of this patient's wound(s)/ulcer(s) today, debridement is not required to promote healing and evaluate the wound base.         Plan:   Patient examined and Debrided  Recommend a second opinion to a general surgeon at [address] [place] [address] or [facility]  Bactroban and alginate    Follow up in 2 weeks or call to cancel if follow up with another physician      Treatment Note please see attached Discharge Instructions    Written patient dismissal instructions given to patient and signed by patient or POA. Discharge [address] and Hyperbaric Medicine   Physician Orders and Discharge Instructions  [facility]  [address] [facility], [address] [place] [address]  Telephone: [phone]      [phone]        NAME: Hayden Mock  DATE OF BIRTH:  1945  MEDICAL RECORD NUMBER:  [id number]     Home Care/Facility:  NONE     Wound Location:   RIGHT LOWER LEG  Dressing orders:  1. Cleanse with soap and water. 2. Apply Mupirocin to wound. 3. Cover with calcium alginate and dry dressing. 4. Change dressing daily.     Compression:   none     Offloading Device:   none     Other Instructions:  CALL DR. ROBBINS REGARDING OPINION FOR SURGERY [phone] OR YOU MAY CALL DR. Josselin Farris OR THE [facility] OR [facility] FOR SECOND OPINION.     Keep all dressings clean, dry and intact.  Keep pressure off the wound(s) at all times.      Follow up visit  2 weeks    September 25, 2020 AT      Please give 24 hour notice if unable to keep appointment. [phone]     If you experience any of the following, please call the Wound Care Service at  [phone] or go to the nearest emergency room.        *Increase in pain         *Temperature over 101           *Increase in drainage from your wound or a foul odor  *Uncontrolled swelling            *Need for compression bandage changes due to slippage, breakthrough drainage       PLEASE NOTE: IF YOU ARE UNABLE TO OBTAIN WOUND SUPPLIES, CONTINUE TO USE THE SUPPLIES YOU HAVE AVAILABLE UNTIL YOU ARE ABLE TO [facility].  IT IS MOST IMPORTANT TO KEEP THE WOUND COVERED AT ALL TIMES  Electronically signed by Katerin Vences DPM on 9/11/2020 at 11:53 AM          Electronically signed by Katerin Vences DPM on 9/11/2020 at 11:54 AM

## 2020-09-11 NOTE — CODE DOCUMENTATION
[address] [facility] Billing Sheet. Mame Mock  AGE: 66 y.o.    GENDER: female  : 1945  TODAY'S DATE:  2020    ICD-10 CODES  Active Hospital Problems    Diagnosis Date Noted    Claudication in peripheral vascular disease ([facility] [I73.9] 2020    Non-healing surgical wound [T81.89XA] 2020    Venous insufficiency of left lower extremity [I87.2] 2020    Pain and swelling due to varicose veins of both lower extremities [I83.813] 2020       PHYSICIAN PROCEDURES    CPT CODE  85362      Electronically signed by Mony Nair DPM on 2020 at 12:20 PM

## 2020-09-17 ENCOUNTER — ANESTHESIA (OUTPATIENT)
Dept: OPERATING ROOM | Age: 75
End: 2020-09-17
Payer: MEDICARE

## 2020-09-17 ENCOUNTER — HOSPITAL ENCOUNTER (OUTPATIENT)
Age: 75
Setting detail: OUTPATIENT SURGERY
Discharge: HOME | End: 2020-09-17
Attending: COLON & RECTAL SURGERY | Admitting: COLON & RECTAL SURGERY
Payer: MEDICARE

## 2020-09-17 ENCOUNTER — ANESTHESIA EVENT (OUTPATIENT)
Dept: OPERATING ROOM | Age: 75
End: 2020-09-17
Payer: MEDICARE

## 2020-09-17 VITALS
BODY MASS INDEX: 31.49 KG/M2 | SYSTOLIC BLOOD PRESSURE: 184 MMHG | OXYGEN SATURATION: 97 % | WEIGHT: 140 LBS | TEMPERATURE: 97.5 F | HEART RATE: 69 BPM | HEIGHT: 56 IN | DIASTOLIC BLOOD PRESSURE: 81 MMHG | RESPIRATION RATE: 14 BRPM

## 2020-09-17 VITALS — DIASTOLIC BLOOD PRESSURE: 58 MMHG | OXYGEN SATURATION: 100 % | SYSTOLIC BLOOD PRESSURE: 101 MMHG

## 2020-09-17 LAB
ANION GAP SERPL CALCULATED.3IONS-SCNC: 14 MEQ/L (ref 9–15)
BASOPHILS ABSOLUTE: 0.1 K/UL (ref 0–0.2)
BASOPHILS RELATIVE PERCENT: 1.5 %
BUN BLDV-MCNC: 25 MG/DL (ref 8–23)
CALCIUM SERPL-MCNC: 9.6 MG/DL (ref 8.5–9.9)
CHLORIDE BLD-SCNC: 104 MEQ/L (ref 95–107)
CO2: 23 MEQ/L (ref 20–31)
CREAT SERPL-MCNC: 0.92 MG/DL (ref 0.5–0.9)
EKG ATRIAL RATE: 71 BPM
EKG P-R INTERVAL: 110 MS
EKG Q-T INTERVAL: 404 MS
EKG QRS DURATION: 84 MS
EKG QTC CALCULATION (BAZETT): 439 MS
EKG R AXIS: 29 DEGREES
EKG T AXIS: 40 DEGREES
EKG VENTRICULAR RATE: 71 BPM
EOSINOPHILS ABSOLUTE: 0.3 K/UL (ref 0–0.7)
EOSINOPHILS RELATIVE PERCENT: 4.3 %
GFR AFRICAN AMERICAN: >60
GFR NON-AFRICAN AMERICAN: 59.2
GLUCOSE BLD-MCNC: 114 MG/DL (ref 70–99)
HCT VFR BLD CALC: 42.8 % (ref 37–47)
HEMOGLOBIN: 14 G/DL (ref 12–16)
LYMPHOCYTES ABSOLUTE: 2.5 K/UL (ref 1–4.8)
LYMPHOCYTES RELATIVE PERCENT: 34.2 %
MCH RBC QN AUTO: 30.7 PG (ref 27–31.3)
MCHC RBC AUTO-ENTMCNC: 32.8 % (ref 33–37)
MCV RBC AUTO: 93.7 FL (ref 82–100)
MONOCYTES ABSOLUTE: 0.7 K/UL (ref 0.2–0.8)
MONOCYTES RELATIVE PERCENT: 9.7 %
NEUTROPHILS ABSOLUTE: 3.7 K/UL (ref 1.4–6.5)
NEUTROPHILS RELATIVE PERCENT: 50.3 %
PDW BLD-RTO: 14.4 % (ref 11.5–14.5)
PLATELET # BLD: 257 K/UL (ref 130–400)
POTASSIUM SERPL-SCNC: 4 MEQ/L (ref 3.4–4.9)
RBC # BLD: 4.57 M/UL (ref 4.2–5.4)
SARS-COV-2, NAAT: NOT DETECTED
SODIUM BLD-SCNC: 141 MEQ/L (ref 135–144)
WBC # BLD: 7.4 K/UL (ref 4.8–10.8)

## 2020-09-17 PROCEDURE — 7100000011 HC PHASE II RECOVERY - ADDTL 15 MIN: Performed by: COLON & RECTAL SURGERY

## 2020-09-17 PROCEDURE — 11403 EXC TR-EXT B9+MARG 2.1-3CM: CPT | Performed by: COLON & RECTAL SURGERY

## 2020-09-17 PROCEDURE — 7100000000 HC PACU RECOVERY - FIRST 15 MIN: Performed by: COLON & RECTAL SURGERY

## 2020-09-17 PROCEDURE — 3700000001 HC ADD 15 MINUTES (ANESTHESIA): Performed by: COLON & RECTAL SURGERY

## 2020-09-17 PROCEDURE — 6360000002 HC RX W HCPCS: Performed by: COLON & RECTAL SURGERY

## 2020-09-17 PROCEDURE — 2709999900 HC NON-CHARGEABLE SUPPLY: Performed by: COLON & RECTAL SURGERY

## 2020-09-17 PROCEDURE — 6360000002 HC RX W HCPCS: Performed by: NURSE ANESTHETIST, CERTIFIED REGISTERED

## 2020-09-17 PROCEDURE — 80048 BASIC METABOLIC PNL TOTAL CA: CPT

## 2020-09-17 PROCEDURE — 7100000010 HC PHASE II RECOVERY - FIRST 15 MIN: Performed by: COLON & RECTAL SURGERY

## 2020-09-17 PROCEDURE — U0002 COVID-19 LAB TEST NON-CDC: HCPCS

## 2020-09-17 PROCEDURE — 3700000000 HC ANESTHESIA ATTENDED CARE: Performed by: COLON & RECTAL SURGERY

## 2020-09-17 PROCEDURE — 88305 TISSUE EXAM BY PATHOLOGIST: CPT

## 2020-09-17 PROCEDURE — 3600000014 HC SURGERY LEVEL 4 ADDTL 15MIN: Performed by: COLON & RECTAL SURGERY

## 2020-09-17 PROCEDURE — 2580000003 HC RX 258: Performed by: COLON & RECTAL SURGERY

## 2020-09-17 PROCEDURE — 7100000001 HC PACU RECOVERY - ADDTL 15 MIN: Performed by: COLON & RECTAL SURGERY

## 2020-09-17 PROCEDURE — 85025 COMPLETE CBC W/AUTO DIFF WBC: CPT

## 2020-09-17 PROCEDURE — 3600000004 HC SURGERY LEVEL 4 BASE: Performed by: COLON & RECTAL SURGERY

## 2020-09-17 PROCEDURE — 2580000003 HC RX 258: Performed by: NURSE ANESTHETIST, CERTIFIED REGISTERED

## 2020-09-17 PROCEDURE — 93010 ELECTROCARDIOGRAM REPORT: CPT | Performed by: INTERNAL MEDICINE

## 2020-09-17 PROCEDURE — 2500000003 HC RX 250 WO HCPCS: Performed by: COLON & RECTAL SURGERY

## 2020-09-17 PROCEDURE — 93005 ELECTROCARDIOGRAM TRACING: CPT | Performed by: COLON & RECTAL SURGERY

## 2020-09-17 RX ORDER — SUCCINYLCHOLINE/SOD CL,ISO/PF 100 MG/5ML
SYRINGE (ML) INTRAVENOUS PRN
Status: DISCONTINUED | OUTPATIENT
Start: 2020-09-17 | End: 2020-09-17 | Stop reason: SDUPTHER

## 2020-09-17 RX ORDER — SODIUM CHLORIDE, SODIUM LACTATE, POTASSIUM CHLORIDE, CALCIUM CHLORIDE 600; 310; 30; 20 MG/100ML; MG/100ML; MG/100ML; MG/100ML
INJECTION, SOLUTION INTRAVENOUS CONTINUOUS PRN
Status: DISCONTINUED | OUTPATIENT
Start: 2020-09-17 | End: 2020-09-17 | Stop reason: SDUPTHER

## 2020-09-17 RX ORDER — FENTANYL CITRATE 50 UG/ML
INJECTION, SOLUTION INTRAMUSCULAR; INTRAVENOUS PRN
Status: DISCONTINUED | OUTPATIENT
Start: 2020-09-17 | End: 2020-09-17 | Stop reason: SDUPTHER

## 2020-09-17 RX ORDER — SODIUM CHLORIDE, SODIUM LACTATE, POTASSIUM CHLORIDE, CALCIUM CHLORIDE 600; 310; 30; 20 MG/100ML; MG/100ML; MG/100ML; MG/100ML
INJECTION, SOLUTION INTRAVENOUS CONTINUOUS
Status: DISCONTINUED | OUTPATIENT
Start: 2020-09-17 | End: 2020-09-17 | Stop reason: HOSPADM

## 2020-09-17 RX ORDER — DIPHENHYDRAMINE HYDROCHLORIDE 50 MG/ML
12.5 INJECTION INTRAMUSCULAR; INTRAVENOUS
Status: DISCONTINUED | OUTPATIENT
Start: 2020-09-17 | End: 2020-09-17 | Stop reason: HOSPADM

## 2020-09-17 RX ORDER — PROPOFOL 10 MG/ML
INJECTION, EMULSION INTRAVENOUS PRN
Status: DISCONTINUED | OUTPATIENT
Start: 2020-09-17 | End: 2020-09-17 | Stop reason: SDUPTHER

## 2020-09-17 RX ORDER — HYDROCODONE BITARTRATE AND ACETAMINOPHEN 5; 325 MG/1; MG/1
1 TABLET ORAL PRN
Status: DISCONTINUED | OUTPATIENT
Start: 2020-09-17 | End: 2020-09-17 | Stop reason: HOSPADM

## 2020-09-17 RX ORDER — CEFAZOLIN SODIUM 2 G/50ML
2 SOLUTION INTRAVENOUS
Status: COMPLETED | OUTPATIENT
Start: 2020-09-17 | End: 2020-09-17

## 2020-09-17 RX ORDER — LIDOCAINE HYDROCHLORIDE 20 MG/ML
INJECTION, SOLUTION INTRAVENOUS PRN
Status: DISCONTINUED | OUTPATIENT
Start: 2020-09-17 | End: 2020-09-17 | Stop reason: SDUPTHER

## 2020-09-17 RX ORDER — SODIUM CHLORIDE 0.9 % (FLUSH) 0.9 %
10 SYRINGE (ML) INJECTION EVERY 12 HOURS SCHEDULED
Status: DISCONTINUED | OUTPATIENT
Start: 2020-09-17 | End: 2020-09-17 | Stop reason: HOSPADM

## 2020-09-17 RX ORDER — FENTANYL CITRATE 50 UG/ML
50 INJECTION, SOLUTION INTRAMUSCULAR; INTRAVENOUS EVERY 10 MIN PRN
Status: DISCONTINUED | OUTPATIENT
Start: 2020-09-17 | End: 2020-09-17 | Stop reason: HOSPADM

## 2020-09-17 RX ORDER — SODIUM CHLORIDE 0.9 % (FLUSH) 0.9 %
10 SYRINGE (ML) INJECTION PRN
Status: DISCONTINUED | OUTPATIENT
Start: 2020-09-17 | End: 2020-09-17 | Stop reason: HOSPADM

## 2020-09-17 RX ORDER — ONDANSETRON 2 MG/ML
4 INJECTION INTRAMUSCULAR; INTRAVENOUS
Status: DISCONTINUED | OUTPATIENT
Start: 2020-09-17 | End: 2020-09-17 | Stop reason: HOSPADM

## 2020-09-17 RX ORDER — LIDOCAINE HYDROCHLORIDE 10 MG/ML
1 INJECTION, SOLUTION EPIDURAL; INFILTRATION; INTRACAUDAL; PERINEURAL
Status: DISCONTINUED | OUTPATIENT
Start: 2020-09-17 | End: 2020-09-17 | Stop reason: HOSPADM

## 2020-09-17 RX ORDER — HYDROCODONE BITARTRATE AND ACETAMINOPHEN 5; 325 MG/1; MG/1
1 TABLET ORAL EVERY 6 HOURS PRN
Qty: 12 TABLET | Refills: 0 | Status: SHIPPED | OUTPATIENT
Start: 2020-09-17 | End: 2020-09-25 | Stop reason: SDUPTHER

## 2020-09-17 RX ORDER — MAGNESIUM HYDROXIDE 1200 MG/15ML
LIQUID ORAL CONTINUOUS PRN
Status: COMPLETED | OUTPATIENT
Start: 2020-09-17 | End: 2020-09-17

## 2020-09-17 RX ORDER — ONDANSETRON 2 MG/ML
INJECTION INTRAMUSCULAR; INTRAVENOUS PRN
Status: DISCONTINUED | OUTPATIENT
Start: 2020-09-17 | End: 2020-09-17 | Stop reason: SDUPTHER

## 2020-09-17 RX ORDER — METOCLOPRAMIDE HYDROCHLORIDE 5 MG/ML
10 INJECTION INTRAMUSCULAR; INTRAVENOUS
Status: DISCONTINUED | OUTPATIENT
Start: 2020-09-17 | End: 2020-09-17 | Stop reason: HOSPADM

## 2020-09-17 RX ORDER — BUPIVACAINE HYDROCHLORIDE AND EPINEPHRINE 2.5; 5 MG/ML; UG/ML
INJECTION, SOLUTION EPIDURAL; INFILTRATION; INTRACAUDAL; PERINEURAL PRN
Status: DISCONTINUED | OUTPATIENT
Start: 2020-09-17 | End: 2020-09-17 | Stop reason: ALTCHOICE

## 2020-09-17 RX ORDER — HYDROCODONE BITARTRATE AND ACETAMINOPHEN 5; 325 MG/1; MG/1
2 TABLET ORAL PRN
Status: DISCONTINUED | OUTPATIENT
Start: 2020-09-17 | End: 2020-09-17 | Stop reason: HOSPADM

## 2020-09-17 RX ORDER — MEPERIDINE HYDROCHLORIDE 25 MG/ML
12.5 INJECTION INTRAMUSCULAR; INTRAVENOUS; SUBCUTANEOUS EVERY 5 MIN PRN
Status: DISCONTINUED | OUTPATIENT
Start: 2020-09-17 | End: 2020-09-17 | Stop reason: HOSPADM

## 2020-09-17 RX ADMIN — CEFAZOLIN SODIUM 2 G: 2 SOLUTION INTRAVENOUS at 07:29

## 2020-09-17 RX ADMIN — PROPOFOL 120 MG: 10 INJECTION, EMULSION INTRAVENOUS at 07:34

## 2020-09-17 RX ADMIN — FENTANYL CITRATE 25 MCG: 50 INJECTION, SOLUTION INTRAMUSCULAR; INTRAVENOUS at 07:59

## 2020-09-17 RX ADMIN — FENTANYL CITRATE 50 MCG: 50 INJECTION, SOLUTION INTRAMUSCULAR; INTRAVENOUS at 07:53

## 2020-09-17 RX ADMIN — FENTANYL CITRATE 25 MCG: 50 INJECTION, SOLUTION INTRAMUSCULAR; INTRAVENOUS at 07:34

## 2020-09-17 RX ADMIN — LIDOCAINE HYDROCHLORIDE 50 MG: 20 INJECTION, SOLUTION INTRAVENOUS at 07:34

## 2020-09-17 RX ADMIN — SODIUM CHLORIDE, POTASSIUM CHLORIDE, SODIUM LACTATE AND CALCIUM CHLORIDE: 600; 310; 30; 20 INJECTION, SOLUTION INTRAVENOUS at 07:13

## 2020-09-17 RX ADMIN — Medication 60 MG: at 07:34

## 2020-09-17 RX ADMIN — ONDANSETRON 4 MG: 2 INJECTION INTRAMUSCULAR; INTRAVENOUS at 07:53

## 2020-09-17 ASSESSMENT — PULMONARY FUNCTION TESTS
PIF_VALUE: 17
PIF_VALUE: 0
PIF_VALUE: 2
PIF_VALUE: 22
PIF_VALUE: 17
PIF_VALUE: 16
PIF_VALUE: 17
PIF_VALUE: 0
PIF_VALUE: 23
PIF_VALUE: 17
PIF_VALUE: 1
PIF_VALUE: 19
PIF_VALUE: 0
PIF_VALUE: 22
PIF_VALUE: 3
PIF_VALUE: 16
PIF_VALUE: 21
PIF_VALUE: 17
PIF_VALUE: 17
PIF_VALUE: 18
PIF_VALUE: 17
PIF_VALUE: 17
PIF_VALUE: 16
PIF_VALUE: 17
PIF_VALUE: 7
PIF_VALUE: 17
PIF_VALUE: 17
PIF_VALUE: 19
PIF_VALUE: 2
PIF_VALUE: 17
PIF_VALUE: 18
PIF_VALUE: 30
PIF_VALUE: 2
PIF_VALUE: 17
PIF_VALUE: 20
PIF_VALUE: 17
PIF_VALUE: 17
PIF_VALUE: 18
PIF_VALUE: 18
PIF_VALUE: 0
PIF_VALUE: 17

## 2020-09-17 ASSESSMENT — PAIN - FUNCTIONAL ASSESSMENT: PAIN_FUNCTIONAL_ASSESSMENT: 0-10

## 2020-09-17 ASSESSMENT — PAIN DESCRIPTION - DESCRIPTORS: DESCRIPTORS: ACHING

## 2020-09-17 ASSESSMENT — LIFESTYLE VARIABLES: SMOKING_STATUS: 1

## 2020-09-17 NOTE — BRIEF OP NOTE
Department of General Surgery - Adult  Surgical Service general surgery  Brief Operative Report      Pre-operative Diagnosis: Right leg calcified mass    Post-operative Diagnosis: Same    Procedure: Excision right leg calcified mass    Surgeon: Ada Higuera     Assistant(s):  Irineo    Anesthesia:  General endotrachial anesthesia and Local anesthesia    Estimated blood loss: 50    Total Urine Output: Not recorded     Drains: None    Specimens: Calcified right leg mass    Implants: None    Findings: Calcified right leg mass    Complications: None    Condition:  stable    See dictated operative report for full details.

## 2020-09-17 NOTE — PROGRESS NOTES
0630   Jennie Vergara called and verified patients NPO status this morning. Patient seems confused about some questions this morning expecially taking her medications and she states she had cake this AM. Jennie Ursula states she did not eat this morning. Nothing to eat or drink since midnight. And she did not take her pills this AM. Prakash informed.

## 2020-09-17 NOTE — OP NOTE
[address]                      [address], [address] [facility]                                OPERATIVE REPORT    PATIENT NAME: Shan Gates                   :        1945  MED REC NO:   [id number]                            ROOM:  ACCOUNT NO:   [de-identified]                           ADMIT DATE: 2020  PROVIDER:     Geni Chang MD    DATE OF PROCEDURE:  2020    PREOPERATIVE DIAGNOSIS:  Right lower leg calcified nonhealing mass. POSTOPERATIVE DIAGNOSIS:  Right lower leg calcified nonhealing mass. PROCEDURE:  Excision of nonhealing calcified mass right lower leg. SURGEON:  Geni Chang MD    ASSISTANT:  Ms. Zari Foss. ANESTHESIA:  1. General endotracheal anesthesia. 2.  Local.    ESTIMATED BLOOD LOSS:  50 mL. SPECIMEN:  Right lower leg calcified mass. COMPLICATIONS:  None. INDICATIONS:  A 52-year-old female who has had a calcified mass which  was biopsied in the office and benign. She has had persistent issues  with pain and nonhealing wound. I elected to proceed with conservative  management for this benign lesion. She has seen Dr. Elvin Win in the office  and she believes that she can get the wound to heal with a combination  of wound VAC as well as possible skin graft. I have plan to proceed  with excision and referral for wound care services. Risks and benefits  of the procedure including infection, bleeding, and nonhealing outlined. Despite these risks, she wished to proceed. Consent obtained. OPERATIVE PROCEDURE:  She was taken to the operating room, placed in the  supine position. General endotracheal anesthesia was administered. Her  right leg was prepped and draped with a Betadine containing solution. She received Ancef preoperatively. A time-out was taken for appropriate  verification and verification of laterality. The calcified area was mapped with a marking pen to assure complete  excision. Using a combination of sharp and cautery dissection, the  entire calcified mass was removed. It was superficial to the fascia. There was one bridging vein which was ligated using 3-0 Vicryl suture. The muscle belly was exposed in a small area of the mass after complete  excision. Excellent hemostasis was achieved and assured. There was no injury to  any venous or nervous structures. A piece of Vaseline gauze was placed in the wound and it was covered in  fluff dressing and an Ace wrap. At the end of procedure, all laps, needles, instrument counts were  correct. The patient was extubated and taken to the recovery room for postop  monitoring.         Chanell Prince MD    D: 09/17/2020 8:31:57       T: 09/17/2020 8:40:51     [email or web address]  Job#: [id number]     Doc#: [id number]    CC:

## 2020-09-17 NOTE — PROGRESS NOTES
Post-Op Check        She returns to the office in follow-up again.     She had an incisional biopsy of a mass on her right leg which turned out to be calcified tissue. Because of the size as well as concern regarding vascular insufficiency, I did not believe it was prudent to proceed with complete excision. I told her how high believe that would lead to a large open wound that would be trouble healing standpoint. She has seen  in the office at wound clinic who believes that she can proceed with a wound VAC and possible skin graft for wound healing. I am willing to proceed with excision of this lesion if she believes she can get the wound healed.       She complains of some dependent edema in her lower extremity.   She has no claudication symptoms on ambulation.        Past Medical History        Past Medical History:   Diagnosis Date    Anxiety      Arthritis      COPD (chronic obstructive pulmonary disease) (HCC)      Generalized osteoarthrosis, unspecified site 12/27/2002    Hyperlipidemia      Hypertension      Major depressive disorder, single episode, moderate ([address] 12/27/2002    Morbid obesity ([address] 12/27/2002    OAB (overactive bladder)      Osteoporosis          Past Surgical History         Past Surgical History:   Procedure Laterality Date    CARDIAC CATHETERIZATION        CARPAL TUNNEL RELEASE        COLONOSCOPY        HYSTERECTOMY, TOTAL ABDOMINAL        KNEE ARTHROPLASTY Bilateral      [place] COLON [place] SCRN NOT  [address] IND N/A 6/22/2017     COLONOSCOPY performed by Autumn Woodall MD at . [address] ESOPHAGOGASTRODUODENOSCOPY TRANSORAL DIAGNOSTIC N/A 6/22/2017     EGD ESOPHAGOGASTRODUODENOSCOPY performed by Autumn Woodall MD at [address] Bilateral          Social History               Socioeconomic History    Marital status:        Spouse name: Flavia Watkins Number of children: 3    Years of education: 15    Highest education level: 12th grade   Occupational History    Occupation: [employer] store    Social Needs    Financial resource strain: Somewhat hard    Food insecurity       Worry: Never true       Inability: Never true    Transportation needs       Medical: No       Non-medical: No   Tobacco Use    Smoking status: Former Smoker       Packs/day: 1.00       Years: 20.00       Pack years: 20.00       Last attempt to quit: 1980       Years since quittin.6    Smokeless tobacco: Never Used   Substance and Sexual Activity    Alcohol use: No    Drug use: Never    Sexual activity: Not Currently       Partners: Male   Lifestyle    Physical activity       Days per week: 0 days       Minutes per session: 0 min    Stress: To some extent   Relationships    Social connections       Talks on phone: Twice a week       Gets together: Twice a week       Attends [employer] service: More than 4 times per year       Active member of club or organization: No       Attends meetings of clubs or organizations: Never       Relationship status:     Intimate partner violence       Fear of current or ex partner: Not on file       Emotionally abused: Not on file       Physically abused: Not on file       Forced sexual activity: Not on file   Other Topics Concern    Not on file   Social History Narrative    Not on file        Family History         Family History   Problem Relation Age of Onset    Arthritis Father      Other Father           gout    Heart Failure Father      Cancer Sister          Allergies:  Codeine     Review of Systems: She denies shortness of breath. She denies chest pain. She denies claudication on walking. She denies any recent issues with neurologic problems such as weakness bilaterally.   She denies any cough or recent fever.         Objective:    Pulse 63   Temp 96.9 °F (36.1 °C) (Temporal)   Ht 4' 6\" (1.372 m)   Wt 140 lb (63.5 kg)   LMP  (LMP Unknown)   SpO2 92%   BMI 33.76 kg/m²    Physical Exam  On exam her abdomen is soft nontender. She is awake alert and understands the procedure. Her heart regular rate and rhythm. Lungs are clear bilaterally. Neurologically symmetric bilaterally. She has a lesion on her right anterior leg which is nonhealing. She has some dependent edema and some erythema bilaterally. The remainder of her physical exam is unremarkable        Assessment/Plan:          Diagnosis Orders   1. Mass of leg, right        I discussed the benefits of excising this lesion not only for histologic diagnosis but also symptom relief. She understands from all my current and past discussions that this could lead to a nonhealing wound but will take time with the addition of conservative management including wound VAC and possible skin graft by . She wishes to proceed. She understands she will return to see Dr. Shankar Regan in the office tomorrow for wound care. The patient was counseled at length about the risks of barrett Covid-19 during their perioperative period and any recovery window from their procedure. The patient was made aware that barrett Covid-19  may worsen their prognosis for recovering from their procedure  and lend to a higher morbidity and/or mortality risk. All material risks, benefits, and reasonable alternatives including postponing the procedure were discussed. The patient does wish to proceed with the procedure at this time.         Please note this report has beenpartially produced using speech recognition software and may cause contain errors related to that system including grammar, punctuation and spelling as well as words and phrases that may seem inappropriate.  If there arequestions or concerns please feel free to contact me to clarify

## 2020-09-17 NOTE — ANESTHESIA POSTPROCEDURE EVALUATION
Department of Anesthesiology  Postprocedure Note    Patient: Francisco Javier Guaman  MRN: [id number]  YOB: 1945  Date of evaluation: 9/17/2020  Time:  8:28 AM     Procedure Summary     Date:  09/17/20 Room / Location:  [address]    Anesthesia Start:  3589 Anesthesia Stop:      Procedure:  EXCISION OF LEG MASS RIGHT (PAT ON ADMIT) (Right ) Diagnosis:  (LEG MASS)    Surgeon:  Dianna Florentino MD Responsible Provider:  YULY Ferguson CRNA    Anesthesia Type:  general ASA Status:  3          Anesthesia Type: general    Stanley Phase I:      Stanley Phase II:      Last vitals: Reviewed and per EMR flowsheets.        Anesthesia Post Evaluation    Patient location during evaluation: PACU  Patient participation: complete - patient participated  Level of consciousness: awake and alert  Pain score: 1  Airway patency: patent  Nausea & Vomiting: no nausea and no vomiting  Complications: no  Cardiovascular status: hemodynamically stable  Respiratory status: acceptable and face mask  Hydration status: euvolemic  Comments: Report to RN, normal sinus rhythm

## 2020-09-17 NOTE — ANESTHESIA PRE PROCEDURE
Department of Anesthesiology  Preprocedure Note       Name:  Charla Gilbert   Age:  66 y.o.  :  1945                                          MRN:  [id number]         Date:  2020      Surgeon: Ricardo Lawrence):  Tari Rivero MD    Procedure: Procedure(s):  EXCISION OF LEG MASS RIGHT (PAT ON ADMIT)    Medications prior to admission:   Prior to Admission medications    Medication Sig Start Date End Date Taking?  Authorizing Provider   gabapentin (NEURONTIN) 400 MG capsule TAKE ONE CAPSULE BY MOUTH THREE TIMES A DAY 20  YULY Thomas CNP   pravastatin (PRAVACHOL) 40 MG tablet TAKE ONE TABLET BY MOUTH EVERY DAY 20   YULY Thomas CNP   TRINTELLIX 10 MG TABS tablet TAKE ONE TABLET BY MOUTH EVERY DAY 20   YULY Thomas CNP   loratadine (CLARITIN) 10 MG tablet Take 1 tablet by mouth daily  Patient not taking: Reported on 2020   YULY Thomas CNP   tiZANidine (ZANAFLEX) 4 MG tablet TAKE ONE TABLET BY MOUTH THREE TIMES A DAY AS NEEDED FOR MUSCLE SPASMS / PAIN 20   YULY Thomas CNP   amLODIPine (NORVASC) 5 MG tablet TAKE ONE TABLET BY MOUTH DAILY AT BEDTIME 19   Historical Provider, MD   donepezil (ARICEPT) 10 MG tablet take one-half tablet by mouth at bedtime for 2 weeks then increase to 1 tablet at bedtime 19   Historical Provider, MD   albuterol sulfate  (90 Base) MCG/ACT inhaler Inhale 2 puffs into the lungs 4 times daily as needed for Wheezing 10/29/19   YULY Thomas CNP   aspirin 81 MG EC tablet Take 1 tablet by mouth daily 10/13/19   Tavojuan Whittington DO   vitamin D (CHOLECALCIFEROL) 1000 UNIT TABS tablet Take 2 tablets by mouth daily 10/9/19   Bodejuan Whittington DO   melatonin 1 MG tablet Take 1 tablet by mouth nightly as needed for Sleep 19   YULY Thomas 31     Historical Provider, MD       Current medications:    Current Facility-Administered Medications   Medication Dose Route Frequency Provider Last Rate Last Dose    ceFAZolin (ANCEF) 2 g in dextrose 3 % 50 mL IVPB (duplex)  2 g Intravenous On Call to Angela Frausto MD           Allergies:     Allergies   Allergen Reactions    Codeine      Nausea, lightheadedness, dizzy       Problem List:    Patient Active Problem List   Diagnosis Code    Hypertension I10    Hyperlipidemia E78.5    COPD (chronic obstructive pulmonary disease) ([address] J44.9    Generalized osteoarthrosis, unspecified site M15.9    Major depressive disorder, single episode, moderate ([address] F32.1    Morbid obesity ([address] E66.01    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 ([address] M87.00    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    Hypotension I95.9    NSTEMI (non-ST elevated myocardial infarction) ([facility]) I21.4    Acute diastolic heart failure ([facility]) I50.31    Hypovolemic shock ([facility]) R57.1    Acute colitis K52.9    Slow transit constipation K59.01    External hemorrhoid, bleeding K64.4    Colitis K52.9    Pain and swelling due to varicose veins of both lower extremities I83.813    Venous insufficiency of left lower extremity I87.2    Asymptomatic varicose veins of bilateral lower extremities I83.93    Non-healing surgical wound T81.89XA    Claudication in peripheral vascular disease ([facility]) I73.9       Past Medical History:        Diagnosis Date    Anxiety     Arthritis     COPD (chronic obstructive pulmonary disease) ([facility])     Generalized osteoarthrosis, unspecified site 12/27/2002    Hyperlipidemia     Hypertension     Major depressive disorder, single episode, moderate ([address] 12/27/2002    Morbid obesity ([address] 12/27/2002    OAB (overactive bladder)     Osteoporosis        Past Surgical History:        Procedure Laterality Date    CARDIAC CATHETERIZATION      CARPAL TUNNEL RELEASE      COLONOSCOPY      HYSTERECTOMY, TOTAL ABDOMINAL      KNEE ARTHROPLASTY Bilateral     [place] COLON CA SCRN NOT HI [facility] IND N/A 2017    COLONOSCOPY performed by Britney Solitario MD at . [address] ESOPHAGOGASTRODUODENOSCOPY TRANSORAL DIAGNOSTIC N/A 2017    EGD ESOPHAGOGASTRODUODENOSCOPY performed by Britney Solitario MD at [address] Bilateral        Social History:    Social History     Tobacco Use    Smoking status: Former Smoker     Packs/day: 1.00     Years: 20.00     Pack years: 20.00     Last attempt to quit: 1980     Years since quittin.7    Smokeless tobacco: Never Used   Substance Use Topics    Alcohol use: No                                Counseling given: Not Answered      Vital Signs (Current): There were no vitals filed for this visit.                                            BP Readings from Last 3 Encounters:   20 (!) 146/72   20 132/80   20 (!) 146/72       NPO Status:  after MN                                                                               BMI:   Wt Readings from Last 3 Encounters:   20 142 lb (64.4 kg)   20 140 lb (63.5 kg)   20 139 lb (63 kg)     There is no height or weight on file to calculate BMI.    CBC:   Lab Results   Component Value Date    WBC 6.1 2020    RBC 4.35 2020    RBC 4.22 2012    HGB 13.2 2020    HCT 40.9 2020    MCV 94.1 2020    RDW 14.9 2020     2020       CMP:   Lab Results   Component Value Date     2020    K 4.4 2020    K 4.0 10/23/2019     2020    CO2 23 2020    BUN 20 2020    CREATININE 0.85 2020    GFRAA >60.0 2020    LABGLOM >60.0 2020    GLUCOSE 77 2020    GLUCOSE 75 2012    PROT 7.0 2020    CALCIUM 9.8 2020    BILITOT 0.3 2020    ALKPHOS 60 2020    AST 24 2020    ALT 18 2020       POC Tests: No results for input(s): POCGLU, POCNA, POCK, POCCL, POCBUN, POCHEMO, POCHCT in the last 72 hours.    Coags:   Lab Results   Component Value Date    PROTIME 13.1 10/21/2019    PROTIME 10.7 04/23/2012    INR 1.0 10/21/2019    APTT 26.3 10/21/2019       HCG (If Applicable): No results found for: PREGTESTUR, PREGSERUM, HCG, HCGQUANT     ABGs:   Lab Results   Component Value Date    PHART 7.318 10/01/2019    PO2ART 86 10/01/2019    FEC1ZKL 31 10/01/2019    FHB4ECU 15.7 10/01/2019    BEART -11 10/01/2019    G0QCFJED 96 10/01/2019        Type & Screen (If Applicable):  No results found for: LABABO, LABRH    Drug/Infectious Status (If Applicable):  No results found for: HIV, HEPCAB    COVID-19 Screening (If Applicable): No results found for: COVID19      Anesthesia Evaluation  Patient summary reviewed and Nursing notes reviewed no history of anesthetic complications:   Airway: Mallampati: II  TM distance: >3 FB   Neck ROM: full  Mouth opening: > = 3 FB Dental: normal exam         Pulmonary:   (+) COPD:  current smoker (reformed 1980, 20 pack yr hx)                           Cardiovascular:    (+) hypertension:, past MI:, hyperlipidemia         Beta Blocker:  Not on Beta Blocker         Neuro/Psych:   (+) psychiatric history:             ROS comment: Dementia. Pt confused GI/Hepatic/Renal:             Endo/Other: Negative Endo/Other ROS                    Abdominal:           Vascular: negative vascular ROS. Anesthesia Plan      general     ASA 3       Induction: intravenous. MIPS: Postoperative opioids intended and Prophylactic antiemetics administered. Anesthetic plan and risks discussed with patient. Plan discussed with CRNA.     Attending anesthesiologist reviewed and agrees with Norbert Nomran MD   9/17/2020

## 2020-09-18 ENCOUNTER — HOSPITAL ENCOUNTER (OUTPATIENT)
Dept: WOUND CARE | Age: 75
Discharge: HOME | End: 2020-09-18
Payer: MEDICARE

## 2020-09-18 VITALS
DIASTOLIC BLOOD PRESSURE: 70 MMHG | RESPIRATION RATE: 16 BRPM | TEMPERATURE: 98.2 F | HEART RATE: 69 BPM | SYSTOLIC BLOOD PRESSURE: 173 MMHG

## 2020-09-18 PROCEDURE — 99213 OFFICE O/P EST LOW 20 MIN: CPT

## 2020-09-18 PROCEDURE — 87075 CULTR BACTERIA EXCEPT BLOOD: CPT

## 2020-09-18 PROCEDURE — 87205 SMEAR GRAM STAIN: CPT

## 2020-09-18 PROCEDURE — 87070 CULTURE OTHR SPECIMN AEROBIC: CPT

## 2020-09-18 ASSESSMENT — PAIN SCALES - GENERAL: PAINLEVEL_OUTOF10: 6

## 2020-09-18 ASSESSMENT — PAIN DESCRIPTION - DESCRIPTORS: DESCRIPTORS: THROBBING

## 2020-09-18 ASSESSMENT — PAIN DESCRIPTION - ORIENTATION: ORIENTATION: RIGHT

## 2020-09-18 ASSESSMENT — PAIN DESCRIPTION - LOCATION: LOCATION: LEG

## 2020-09-18 ASSESSMENT — PAIN DESCRIPTION - PAIN TYPE: TYPE: ACUTE PAIN

## 2020-09-18 NOTE — CODE DOCUMENTATION
[address] [facility] Billing Sheet. Johnson Metcalfpayam Mock  AGE: 66 y.o. GENDER: female  : 1945  TODAY'S DATE:  2020    ICD-10 CODES  Active Hospital Problems    Diagnosis Date Noted    Non-healing surgical wound [T81.89XA] 2020    Asymptomatic varicose veins of bilateral lower extremities [I83.93] 2020    Venous insufficiency of left lower extremity [I87.2] 2020       PHYSICIAN PROCEDURES    CPT CODE  71570 [address].  54      Electronically signed by Giovanni Chester DPM on 2020 at 12:43 PM

## 2020-09-18 NOTE — PROGRESS NOTES
[facility]                                                   Progress Note and Procedure Note      Maria L Conway  MEDICAL RECORD NUMBER:  [id number]  AGE: 66 y.o. GENDER: female  : 1945  EPISODE DATE:  2020    Subjective:     Chief Complaint   Patient presents with    Wound Check         HISTORY of PRESENT ILLNESS HPI     Maria L Conway is a 66 y.o. female who presents today for wound/ulcer evaluation. History of Wound Context: Patient present for POD 1 s/p surgical wound from by Dr. Hu Dears yesterday for wide excision of mass. She states that it is still draining and is not closing.   Wound/Ulcer Pain Timing/Severity: intermittent  Quality of pain: aching  Severity:  3 / 10   Modifying Factors: None  Associated Signs/Symptoms: edema    Ulcer Identification:  Ulcer Type: venous  Contributing Factors: edema and venous stasis    Wound: N/A        PAST MEDICAL HISTORY        Diagnosis Date    Anxiety     Arthritis     COPD (chronic obstructive pulmonary disease) (HCC)     Generalized osteoarthrosis, unspecified site 2002    Hyperlipidemia     Hypertension     Major depressive disorder, single episode, moderate ([address] 2002    Morbid obesity ([address] 2002    OAB (overactive bladder)     Osteoporosis        PAST SURGICAL HISTORY    Past Surgical History:   Procedure Laterality Date    CARDIAC CATHETERIZATION      CARPAL TUNNEL RELEASE      COLONOSCOPY      HYSTERECTOMY, TOTAL ABDOMINAL      KNEE ARTHROPLASTY Bilateral     [place] COLON CA SCRN NOT  [address] IND N/A 2017    COLONOSCOPY performed by Philipp Lundborg, MD at . [facility] ESOPHAGOGASTRODUODENOSCOPY TRANSORAL DIAGNOSTIC N/A 2017    EGD ESOPHAGOGASTRODUODENOSCOPY performed by Philipp Lundborg, MD at [address] Bilateral    [address] Right 2020    EXCISION OF LEG MASS RIGHT (PAT ON ADMIT) performed by Paris Girard MD at [place] [facility]    Family History   Problem Relation Age of Onset    Arthritis Father     Other Father         gout    Heart Failure Father     Cancer Sister        SOCIAL HISTORY    Social History     Tobacco Use    Smoking status: Former Smoker     Packs/day: 1.00     Years: 20.00     Pack years: 20.00     Last attempt to quit:      Years since quittin.7    Smokeless tobacco: Never Used   Substance Use Topics    Alcohol use: No    Drug use: Never       ALLERGIES    Allergies   Allergen Reactions    Codeine      Nausea, lightheadedness, dizzy       MEDICATIONS    Current Outpatient Medications on File Prior to Encounter   Medication Sig Dispense Refill    HYDROcodone-acetaminophen (NORCO) 5-325 MG per tablet Take 1 tablet by mouth every 6 hours as needed for Pain for up to 3 days. Intended supply: 3 days.  Take lowest dose possible to manage pain 12 tablet 0    gabapentin (NEURONTIN) 400 MG capsule TAKE ONE CAPSULE BY MOUTH THREE TIMES A DAY 90 capsule 0    pravastatin (PRAVACHOL) 40 MG tablet TAKE ONE TABLET BY MOUTH EVERY DAY 30 tablet 0    TRINTELLIX 10 MG TABS tablet TAKE ONE TABLET BY MOUTH EVERY DAY 30 tablet 5    loratadine (CLARITIN) 10 MG tablet Take 1 tablet by mouth daily 30 tablet 0    tiZANidine (ZANAFLEX) 4 MG tablet TAKE ONE TABLET BY MOUTH THREE TIMES A DAY AS NEEDED FOR MUSCLE SPASMS / PAIN 90 tablet 0    amLODIPine (NORVASC) 5 MG tablet TAKE ONE TABLET BY MOUTH DAILY AT BEDTIME  3    donepezil (ARICEPT) 10 MG tablet take one-half tablet by mouth at bedtime for 2 weeks then increase to 1 tablet at bedtime  3    albuterol sulfate  (90 Base) MCG/ACT inhaler Inhale 2 puffs into the lungs 4 times daily as needed for Wheezing 3 Inhaler 1    aspirin 81 MG EC tablet Take 1 tablet by mouth daily 30 tablet 3    vitamin D (CHOLECALCIFEROL) 1000 UNIT TABS tablet Take 2 tablets by mouth daily 60 tablet 3    melatonin 1 MG tablet Take 1 tablet by mouth nightly as needed for Sleep 30 tablet 2    Oxygen Concentrator        No current facility-administered medications on file prior to encounter. REVIEW OF SYSTEMS    Pertinent items are noted in HPI. Objective:      BP (!) 173/70   Pulse 69   Temp 98.2 °F (36.8 °C) (Temporal)   Resp 16   LMP  (LMP Unknown)     Wt Readings from Last 3 Encounters:   09/17/20 140 lb (63.5 kg)   09/04/20 142 lb (64.4 kg)   08/13/20 140 lb (63.5 kg)       PHYSICAL EXAM    Constitutional:   Well nourished and well developed. Appears neat and clean. Patient is alert, oriented x3, and in no apparent distress. Respiratory:  Respiratory effort is easy and symmetric bilaterally. Rate is normal at rest and on room air. Vascular:  Pedal Pulses is not palpable and audible with doppler. Capillary refill is <3 sec to digits bilateral.  Extremities negative for pitting edema. Neurological:   Sensation is intact to lower extremities. Dermatological:  Wound description noted in wound assessment. The wound today now has a soft pink  base with some bleeding and dark areas from surgical cautery. No pain is noted. Psychiatric:  Judgement and insight intact. Short and long term memory intact. No evidence of depression, anxiety, or agitation. Patient is calm, cooperative, and communicative. Appropriate interactions and affect. Assessment:      Problem List Items Addressed This Visit     Venous insufficiency of left lower extremity    Asymptomatic varicose veins of bilateral lower extremities - Primary    Non-healing surgical wound           Procedure Note  Indications:  Based on my examination of this patient's wound(s)/ulcer(s) today, debridement is not required to promote healing and evaluate the wound base. Plan:   Patient examined   Satinder Wray ordered and Wound Vac  Culture taken for possible Theraskin  Dr. Candida Newman in Path called for specimen to be sent for Dermatopathology eval  Follow up in 2 weeks .     Treatment Note please see attached Discharge Instructions    Written patient dismissal instructions given to patient and signed by patient or POA. Discharge [address] and Hyperbaric Medicine   Physician Orders and Discharge [address], [address] [place] [address]  Telephone: [phone]      [phone]        NAME: Graciela Mock  DATE OF BIRTH:  1945  MEDICAL RECORD NUMBER:  [id number]     Home Care/Facility:  NEW REFERRAL TO [facility]       Wound Location:   RIGHT LOWER LEG  Dressing orders: WHEN WOUND VAC IS AVAILABLE APPLY AS BELOW:  1. Cleanse wound(s) with Normal Saline. 2. Picture frame wound with vac drape  3. Apply mepitel or adaptic to base of wound. 4. Apply Black sponge per wound vac technique. 5. Bridge to a non-pressure area if necessary. 6. Settings: 125 mm Hg CONTINUOUS,  If patient cannot tolerate this pressure may decrease to 100mmHg continuous pressure. 7. Change Wound Vac 3 times a week.     TODAY ONLY IN WOUND CENTER APPLY SALINE MOIST ARTURO, OPTILOCK AND DRY DRESSING. Compression:  Apply 5-10mmHg LOW compression hose to RIGHT lower leg(s), may remove at bedtime, reapply first thing in the morning, avoid prolonged standing, elevate legs when sitting.     Offloading Device:   none     Other Instructions:  Keep all dressings clean, dry and intact.  Keep pressure off the wound(s) at all times.      Follow up visit  2 weeks   October 2, 2020 AT      Please give 24 hour notice if unable to keep appointment.  [phone]     If you experience any of the following, please call the Wound Care Service at  [phone] or go to the nearest emergency room.        *Increase in pain         *Temperature over 101           *Increase in drainage from your wound or a foul odor  *Uncontrolled swelling            *Need for compression bandage changes due to slippage, breakthrough drainage       PLEASE NOTE: IF YOU ARE UNABLE TO OBTAIN WOUND SUPPLIES, CONTINUE TO USE THE SUPPLIES YOU HAVE AVAILABLE UNTIL YOU ARE ABLE TO REACH US.  IT IS MOST IMPORTANT TO KEEP THE WOUND COVERED AT ALL TIMES  Electronically signed by eB Horton DPM on 9/18/2020 at 12:23 PM          Electronically signed by Be Horton DPM on 9/18/2020 at 12:40 PM

## 2020-09-21 LAB
ANAEROBIC CULTURE: NORMAL
GRAM STAIN RESULT: NORMAL
WOUND/ABSCESS: NORMAL

## 2020-09-25 RX ORDER — HYDROCODONE BITARTRATE AND ACETAMINOPHEN 5; 325 MG/1; MG/1
1 TABLET ORAL EVERY 6 HOURS PRN
Qty: 12 TABLET | Refills: 0 | Status: SHIPPED | OUTPATIENT
Start: 2020-09-25 | End: 2020-10-02 | Stop reason: SDUPTHER

## 2020-09-25 RX ORDER — TIZANIDINE 4 MG/1
TABLET ORAL
Qty: 90 TABLET | Refills: 0 | Status: SHIPPED | OUTPATIENT
Start: 2020-09-25 | End: 2021-04-29 | Stop reason: SDUPTHER

## 2020-10-01 NOTE — TELEPHONE ENCOUNTER
This really should be coming from the surgeon if the pain is related to the surgical pain. Have they called him regarding the pain?

## 2020-10-02 ENCOUNTER — HOSPITAL ENCOUNTER (OUTPATIENT)
Dept: WOUND CARE | Age: 75
Discharge: HOME | End: 2020-10-02
Payer: MEDICARE

## 2020-10-02 VITALS
TEMPERATURE: 98.1 F | SYSTOLIC BLOOD PRESSURE: 125 MMHG | RESPIRATION RATE: 16 BRPM | HEART RATE: 70 BPM | DIASTOLIC BLOOD PRESSURE: 66 MMHG

## 2020-10-02 PROBLEM — L97.212 NON-PRESSURE CHRONIC ULCER OF CALF, RIGHT, WITH FAT LAYER EXPOSED (HCC): Status: ACTIVE | Noted: 2020-10-02

## 2020-10-02 PROCEDURE — 11042 DBRDMT SUBQ TIS 1ST 20SQCM/<: CPT

## 2020-10-02 PROCEDURE — 97606 NEG PRS WND THER DME>50 SQCM: CPT

## 2020-10-02 PROCEDURE — 6370000000 HC RX 637 (ALT 250 FOR IP): Performed by: PODIATRIST

## 2020-10-02 RX ORDER — HYDROCODONE BITARTRATE AND ACETAMINOPHEN 5; 325 MG/1; MG/1
1 TABLET ORAL EVERY 6 HOURS PRN
Qty: 12 TABLET | Refills: 0 | Status: SHIPPED | OUTPATIENT
Start: 2020-10-02 | End: 2020-10-05

## 2020-10-02 RX ORDER — LIDOCAINE 50 MG/G
OINTMENT TOPICAL PRN
Status: DISCONTINUED | OUTPATIENT
Start: 2020-10-02 | End: 2020-10-03 | Stop reason: HOSPADM

## 2020-10-02 RX ADMIN — LIDOCAINE: 50 OINTMENT TOPICAL at 11:20

## 2020-10-02 ASSESSMENT — PAIN DESCRIPTION - ORIENTATION: ORIENTATION: RIGHT

## 2020-10-02 ASSESSMENT — PAIN DESCRIPTION - PAIN TYPE: TYPE: ACUTE PAIN

## 2020-10-02 ASSESSMENT — PAIN SCALES - GENERAL: PAINLEVEL_OUTOF10: 5

## 2020-10-02 ASSESSMENT — PAIN DESCRIPTION - LOCATION: LOCATION: LEG

## 2020-10-02 ASSESSMENT — PAIN DESCRIPTION - DESCRIPTORS: DESCRIPTORS: ACHING;THROBBING

## 2020-10-02 NOTE — TELEPHONE ENCOUNTER
It looks like eFrn Chamberlain DPM prescribed the pt ointments for her wound.  What would you like to do about this current refill request?

## 2020-10-02 NOTE — PROGRESS NOTES
Spoke with patient's  about co pay for Theraskin graft. I was told by Lynn Young from [employer] that the patient's out of pocket amount wound be from $300 to $350. The  was agreeable.

## 2020-10-02 NOTE — PROGRESS NOTES
 Heart Failure Father     Cancer Sister        SOCIAL HISTORY    Social History     Tobacco Use    Smoking status: Former Smoker     Packs/day: 1.00     Years: 20.00     Pack years: 20.00     Last attempt to quit: 1980     Years since quittin.7    Smokeless tobacco: Never Used   Substance Use Topics    Alcohol use: No    Drug use: Never       ALLERGIES    Allergies   Allergen Reactions    Codeine      Nausea, lightheadedness, dizzy       MEDICATIONS    Current Outpatient Medications on File Prior to Encounter   Medication Sig Dispense Refill    tiZANidine (ZANAFLEX) 4 MG tablet TAKE ONE TABLET BY MOUTH THREE TIMES A DAY AS NEEDED FOR MUSCLE SPASMS / PAIN 90 tablet 0    gabapentin (NEURONTIN) 400 MG capsule TAKE ONE CAPSULE BY MOUTH THREE TIMES A DAY 90 capsule 0    pravastatin (PRAVACHOL) 40 MG tablet TAKE ONE TABLET BY MOUTH EVERY DAY 30 tablet 0    TRINTELLIX 10 MG TABS tablet TAKE ONE TABLET BY MOUTH EVERY DAY 30 tablet 5    loratadine (CLARITIN) 10 MG tablet Take 1 tablet by mouth daily 30 tablet 0    amLODIPine (NORVASC) 5 MG tablet TAKE ONE TABLET BY MOUTH DAILY AT BEDTIME  3    donepezil (ARICEPT) 10 MG tablet take one-half tablet by mouth at bedtime for 2 weeks then increase to 1 tablet at bedtime  3    albuterol sulfate  (90 Base) MCG/ACT inhaler Inhale 2 puffs into the lungs 4 times daily as needed for Wheezing 3 Inhaler 1    aspirin 81 MG EC tablet Take 1 tablet by mouth daily 30 tablet 3    vitamin D (CHOLECALCIFEROL) 1000 UNIT TABS tablet Take 2 tablets by mouth daily 60 tablet 3    melatonin 1 MG tablet Take 1 tablet by mouth nightly as needed for Sleep 30 tablet 2    Oxygen Concentrator        No current facility-administered medications on file prior to encounter. REVIEW OF SYSTEMS    Pertinent items are noted in HPI.     Objective:      /66   Pulse 70   Temp 98.1 °F (36.7 °C) (Temporal)   Resp 16   LMP  (LMP Unknown)     Wt Readings from Last 3 sq cm     Diabetic/Pressure/Non Pressure Ulcers:  Ulcer: Non-Pressure ulcer, fat layer exposed      Bleeding:  Minimal    Hemostasis Achieved:  by pressure    Procedural Pain:  5  / 10     Post Procedural Pain:  1 / 10     Response to treatment:  Well tolerated by patient. Plan:   Patient examined and Debrided  Santyl and vac    Follow up in 1 week for theraskin appliction      Treatment Note please see attached Discharge Instructions    Written patient dismissal instructions given to patient and signed by patient or POA. Discharge Instructions         Discharge Instructions        [facility]   Physician Orders and Discharge [address], [address] [place] [address]  Telephone: [phone]      [phone]        NAME: Patrick Ramey  DATE OF BIRTH:  1945  MEDICAL RECORD NUMBER:  [id number]     Home Care/Facility:   [facility]       Wound Location:   RIGHT LOWER LEG  Dressing orders:    1. Cleanse wound(s) with Normal Saline. Nickel depth of santyl. Let sit in wound for 30 minutes to an hour  before applying the vac. Today in [address] [place] [address] , use plurogel  2. Picture frame wound with vac drape  3. Apply mepitel or adaptic to base of wound. 4. Apply Black sponge per wound vac technique. 5. Bridge to a non-pressure area if necessary. 6. Settings: 125 mm Hg CONTINUOUS,  If patient cannot tolerate this pressure may decrease to 100mmHg continuous pressure.   7. Change Wound Vac 3 times a week.      Compression:  Apply 5-10mmHg LOW compression hose to RIGHT lower leg(s), may remove at bedtime, reapply first thing in the morning, avoid prolonged standing, elevate legs when sitting.     Offloading Device:   none     Other Instructions:  Keep all dressings clean, dry and intact.  Keep pressure off the wound(s) at all times.      Follow up visit  1 weeks   October 9, 2020 AT      Please give 24 hour notice if unable to keep appointment. [phone]     If you experience any of the following, please call the Wound Care Service at  [phone] or go to the nearest emergency room.        *Increase in pain         *Temperature over 101           *Increase in drainage from your wound or a foul odor  *Uncontrolled swelling            *Need for compression bandage changes due to slippage, breakthrough drainage       PLEASE NOTE: IF YOU ARE UNABLE TO OBTAIN WOUND SUPPLIES, CONTINUE TO USE THE SUPPLIES YOU HAVE AVAILABLE UNTIL YOU ARE ABLE TO [address]  IT IS MOST IMPORTANT TO KEEP THE WOUND COVERED AT ALL TIMES  Electronically signed by Master Dawson DPM on 10/2/2020 at 11:30 AM            Electronically signed by Master Dawson DPM on 10/2/2020 at 12:59 PM

## 2020-10-06 RX ORDER — PRAVASTATIN SODIUM 40 MG
TABLET ORAL
Qty: 30 TABLET | Refills: 0 | Status: SHIPPED | OUTPATIENT
Start: 2020-10-06 | End: 2020-11-21

## 2020-10-09 ENCOUNTER — HOSPITAL ENCOUNTER (OUTPATIENT)
Dept: WOUND CARE | Age: 75
Discharge: HOME | End: 2020-10-09
Payer: MEDICARE

## 2020-10-09 VITALS
SYSTOLIC BLOOD PRESSURE: 156 MMHG | DIASTOLIC BLOOD PRESSURE: 69 MMHG | HEART RATE: 68 BPM | TEMPERATURE: 98.8 F | RESPIRATION RATE: 18 BRPM

## 2020-10-09 PROBLEM — L97.202 NON-PRESSURE CHRONIC ULCER OF CALF WITH FAT LAYER EXPOSED (HCC): Chronic | Status: ACTIVE | Noted: 2020-10-09

## 2020-10-09 PROCEDURE — 97605 NEG PRS WND THER DME<=50SQCM: CPT

## 2020-10-09 PROCEDURE — 15271 SKIN SUB GRAFT TRNK/ARM/LEG: CPT

## 2020-10-09 ASSESSMENT — PAIN SCALES - GENERAL: PAINLEVEL_OUTOF10: 5

## 2020-10-09 ASSESSMENT — PAIN DESCRIPTION - LOCATION: LOCATION: LEG

## 2020-10-09 ASSESSMENT — PAIN DESCRIPTION - DESCRIPTORS: DESCRIPTORS: ACHING;BURNING;CONSTANT

## 2020-10-09 ASSESSMENT — PAIN DESCRIPTION - FREQUENCY: FREQUENCY: CONTINUOUS

## 2020-10-09 ASSESSMENT — PAIN DESCRIPTION - ORIENTATION: ORIENTATION: RIGHT

## 2020-10-09 ASSESSMENT — PAIN DESCRIPTION - PAIN TYPE: TYPE: ACUTE PAIN

## 2020-10-09 NOTE — CODE DOCUMENTATION
[address] [facility] Billing Sheet. Ginny Mock  AGE: 66 y.o.    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

## 2020-10-09 NOTE — PROGRESS NOTES
[facility] [address]                                                   Progress Note and Procedure Note      Betsey Gilman  MEDICAL RECORD NUMBER:  [id number]  AGE: 66 y.o. GENDER: female  : 1945  EPISODE DATE:  10/9/2020    Subjective:     Chief Complaint   Patient presents with    Wound Check     right leg         HISTORY of PRESENT ILLNESS HPI     Betsey Gilman is a 66 y.o. female who presents today for wound/ulcer evaluation. History of Wound Context: Patient presents for non healing surgical wound of the right leg.  She is here today for application of Theraskin #1  Wound/Ulcer Pain Timing/Severity: intermittent  Quality of pain: aching, burning  Severity:  4 / 10   Modifying Factors: Pain worsens with walking  Associated Signs/Symptoms: edema, drainage and pain    Ulcer Identification:  Ulcer Type: venous and non-healing surgical  Contributing Factors: edema and venous stasis    Wound: External surgical dehiscence        PAST MEDICAL HISTORY        Diagnosis Date    Anxiety     Arthritis     COPD (chronic obstructive pulmonary disease) (HCC)     Generalized osteoarthrosis, unspecified site 2002    Hyperlipidemia     Hypertension     Major depressive disorder, single episode, moderate ([address] 2002    Morbid obesity ([address] 2002    OAB (overactive bladder)     Osteoporosis        PAST SURGICAL HISTORY    Past Surgical History:   Procedure Laterality Date    CARDIAC CATHETERIZATION      CARPAL TUNNEL RELEASE      COLONOSCOPY      HYSTERECTOMY, TOTAL ABDOMINAL      KNEE ARTHROPLASTY Bilateral     KS COLON CA SCRN NOT  [address] [facility] N/A 2017    COLONOSCOPY performed by Aysha Edmond MD at . [address] ESOPHAGOGASTRODUODENOSCOPY TRANSORAL DIAGNOSTIC N/A 2017    EGD ESOPHAGOGASTRODUODENOSCOPY performed by Aysha Edmond MD at [address] Bilateral     SKIN BIOPSY Right 2020    EXCISION OF LEG MASS RIGHT (PAT ON ADMIT) performed by Heidi Bosch MD at [address] History   Problem Relation Age of Onset    Arthritis Father     Other Father         gout    Heart Failure Father     Cancer Sister        SOCIAL HISTORY    Social History     Tobacco Use    Smoking status: Former Smoker     Packs/day: 1.00     Years: 20.00     Pack years: 20.00     Last attempt to quit:      Years since quittin.8    Smokeless tobacco: Never Used   Substance Use Topics    Alcohol use: No    Drug use: Never       ALLERGIES    Allergies   Allergen Reactions    Codeine      Nausea, lightheadedness, dizzy       MEDICATIONS    Current Outpatient Medications on File Prior to Encounter   Medication Sig Dispense Refill    pravastatin (PRAVACHOL) 40 MG tablet TAKE ONE TABLET BY MOUTH EVERY DAY 30 tablet 0    collagenase (SANTYL) 250 UNIT/GM ointment Apply one hour with moistened saline gauze and DSD ONE HOUR prior to wound vac change.  60 g 2    tiZANidine (ZANAFLEX) 4 MG tablet TAKE ONE TABLET BY MOUTH THREE TIMES A DAY AS NEEDED FOR MUSCLE SPASMS / PAIN 90 tablet 0    gabapentin (NEURONTIN) 400 MG capsule TAKE ONE CAPSULE BY MOUTH THREE TIMES A DAY 90 capsule 0    TRINTELLIX 10 MG TABS tablet TAKE ONE TABLET BY MOUTH EVERY DAY 30 tablet 5    loratadine (CLARITIN) 10 MG tablet Take 1 tablet by mouth daily 30 tablet 0    amLODIPine (NORVASC) 5 MG tablet TAKE ONE TABLET BY MOUTH DAILY AT BEDTIME  3    donepezil (ARICEPT) 10 MG tablet take one-half tablet by mouth at bedtime for 2 weeks then increase to 1 tablet at bedtime  3    albuterol sulfate  (90 Base) MCG/ACT inhaler Inhale 2 puffs into the lungs 4 times daily as needed for Wheezing 3 Inhaler 1    aspirin 81 MG EC tablet Take 1 tablet by mouth daily 30 tablet 3    vitamin D (CHOLECALCIFEROL) 1000 UNIT TABS tablet Take 2 tablets by mouth daily 60 tablet 3    melatonin 1 MG tablet Take 1 tablet by mouth nightly as needed for Sleep 30 tablet 2    Oxygen Concentrator        No current facility-administered medications on file prior to encounter. REVIEW OF SYSTEMS    Pertinent items are noted in HPI. Objective:      BP (!) 156/69   Pulse 68   Temp 98.8 °F (37.1 °C) (Temporal)   Resp 18   LMP  (LMP Unknown)     Wt Readings from Last 3 Encounters:   09/17/20 140 lb (63.5 kg)   09/04/20 142 lb (64.4 kg)   08/13/20 140 lb (63.5 kg)       PHYSICAL EXAM    Constitutional:   Well nourished and well developed. Appears neat and clean. Patient is alert, oriented x3, and in no apparent distress. Respiratory:  Respiratory effort is easy and symmetric bilaterally. Rate is normal at rest and on room air. Vascular:  Pedal Pulses is palpable and audible with doppler. Capillary refill is <3 sec to digits bilateral.  Extremities negative for pitting edema. Neurological:   Sensation is intact to lower extremities. Dermatological:  Wound description noted in wound assessment. The wound(s) are The wound is healthy and grnaular with decreased depth and minimal yellow slough. Psychiatric:  Judgement and insight intact. Short and long term memory intact. No evidence of depression, anxiety, or agitation. Patient is calm, cooperative, and communicative. Appropriate interactions and affect.         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    Hypotension I95.9    NSTEMI (non-ST elevated myocardial infarction) ([address] I21.4    Acute diastolic heart failure (HCC) I50.31    Hypovolemic shock (HCC) R57.1    Acute colitis K52.9    Slow transit constipation K59.01    External hemorrhoid, bleeding K64.4    Colitis K52.9    Pain and swelling due to varicose veins of both lower extremities I83.813    Venous insufficiency of left lower extremity I87.2    Asymptomatic varicose veins of bilateral lower extremities I83.93    Non-healing surgical wound T81.89XA    Claudication in peripheral vascular disease (HCC) I73.9    Non-pressure chronic ulcer of calf, right, with fat layer exposed ([address] G24.948        Procedure Note  Indications:  Based on my examination of this patient's wound(s)/ulcer(s) today, debridement is required to prepare the wound bed today for application of a skin substitute. Performed by: Guero Nath DPM    Consent obtained:  Yes    Time out taken:  Yes    Pain Control:         Debridement:Excisional Debridement    Using forceps the wound(s)/ulcer(s) was/were sharply debrided down through and including the removal of epidermis, dermis and subcutaneous tissue.         Devitalized Tissue Debrided:  slough    Pre Debridement Measurements:  Are located in the Means  Documentation Flow Sheet    Wound/Ulcer #: 1    Post Debridement Measurements:  Wound/Ulcer Descriptions are Pre Debridement except measurements:    Wound 08/07/20 Leg Right;Medial #1 (Active)   Wound Image   10/09/20 1054   Wound Etiology Non-Healing Surgical 10/09/20 1054   Wound Cleansed Cleansed with saline 10/09/20 1054   Dressing/Treatment Antibacterial ointment;Dry dressing 08/07/20 1029   Wound Length (cm) 5.2 cm 10/09/20 1054   Wound Width (cm) 3.5 cm 10/09/20 1054   Wound Depth (cm) 1 cm 10/09/20 1054   Wound Surface Area (cm^2) 18.2 cm^2 10/09/20 1054   Change in Wound Size % (l*w) -2326.67 10/09/20 1054   Wound Volume (cm^3) 18.2 cm^3 10/09/20 1054   Wound Healing % -76541 10/09/20 1054   Post-Procedure Length (cm) 5 cm 10/02/20 1121   Post-Procedure Width (cm) 3.7 cm 10/02/20 1121   Post-Procedure Depth (cm) 0.5 cm 10/02/20 1121   Post-Procedure Surface Area (cm^2) 18.5 cm^2 10/02/20 1121   Post-Procedure Volume (cm^3) 9.25 cm^3 10/02/20 1121   Undermining Starts ___ O'Clock 9 10/09/20 1054   Undermining Ends___ O'Clock 12 10/09/20 1054   Undermining Maxium Distance (cm) 0.5 10/09/20 1054   Wound Assessment Granulation tissue 10/09/20 1054   Drainage Amount Moderate 10/09/20 1054   Drainage Description Serosanguinous 10/09/20 1054   Odor None 10/09/20 1054   Tabby-wound Assessment Edematous; Hyperpigmented 10/09/20 1054   Margins Epibole (rolled edges) 10/09/20 1054   Wound Thickness Description not for Pressure Injury Full thickness 10/02/20 1048   Number of days: 63       Percent of Wound/Ulcer Debrided: 10%    Total Surface Area Debrided:  1.8 sq cm     Diabetic/Pressure/Non Pressure Ulcers:  Ulcer: Other: surgical    Bleeding:  Minimal    Hemostasis Achieved:  by pressure    Procedural Pain:  4 / 10     Post Procedural Pain:  1 / 10     Response to treatment:  Well tolerated by patient.      Skin Substitue Procedure Note  Procedure:  Skin Substitute Application    Performed by: Ezio Caldera DPM    Ulcer Type:non-healing surgical    Consent obtained: Yes    Time out taken: Yes    Product Utilized:   Application # 1  TheraSkin 13 sq/cm     Product Lot #:  [id number]   Product Expiration Date: 1-4-2023    0.9% Normal Saline: Lot # [id number]  0.9% Normal Saline: Expiration Date: 6/22          Fenestrated: Yes    Mesher Utilized:  No    Skin Substitute was Applied to Ulcer Number(s):    Ulcer #: 1      Wound 08/07/20 Leg Right;Medial #1 (Active)   Wound Image   10/09/20 1054   Wound Etiology Non-Healing Surgical 10/09/20 1054   Wound Cleansed Cleansed with saline 10/09/20 1054   Dressing/Treatment Antibacterial ointment;Dry dressing 08/07/20 1029   Wound Length (cm) 5.2 cm 10/09/20 1054   Wound Width (cm) 3.5 cm 10/09/20 1054   Wound Depth (cm) 1 cm 10/09/20 1054   Wound Surface Area (cm^2) 18.2 cm^2 10/09/20 1054   Change in Wound Size % (l*w) -2326.67 10/09/20 1054   Wound Volume (cm^3) 18.2 cm^3 10/09/20 1054   Wound Healing % -63294 10/09/20 1054   Post-Procedure Length (cm) 5 cm 10/02/20 1121   Post-Procedure Width (cm) 3.7 cm 10/02/20 1121   Post-Procedure Depth (cm) 0.5 cm 10/02/20 1121   Post-Procedure Surface Area (cm^2) 18.5 cm^2 10/02/20 1121   Post-Procedure Volume (cm^3) 9.25 cm^3 10/02/20 1121   Undermining Starts ___ O'Clock 9 10/09/20 1054   Undermining Ends___ O'Clock 12 10/09/20 1054   Undermining Maxium Distance (cm) 0.5 10/09/20 1054   Wound Assessment Granulation tissue 10/09/20 1054   Drainage Amount Moderate 10/09/20 1054   Drainage Description Serosanguinous 10/09/20 1054   Odor None 10/09/20 1054   Tabby-wound Assessment Edematous; Hyperpigmented 10/09/20 1054   Margins Epibole (rolled edges) 10/09/20 1054   Wound Thickness Description not for Pressure Injury Full thickness 10/02/20 1048   Number of days: 63          Total Surface Area of Ulcer(s) Covered 18.2 sq/cm    Was the Product Layered  No     Amount of Product Applied 13 sq/cm     Amount of Product Wasted 13 sq/cm     Reason for Waste N/A      Surgically Fixated: Yes    Secured With: Steri Strips and Mepitel and wound vac at 75 mmHG    Procedural Pain: 0/10     Post Procedural Pain: 0 / 10    Response to Treatment:  Well tolerated by patient. Problem List Items Addressed This Visit     None      Patient examined  Theraskin #1 applied  Wound vac 75 mg to stay in place 5 days [facility]  FOllow up in one week                  Plan:             Treatment Note please see attached Discharge Instructions    Written patient dismissal instructions given to patient and signed by patient or POA.          Discharge [address] and [facility]   Physician Orders and Discharge Instructions  [address], [address] [place] [address]  Telephone: [phone]      [phone]        NAME: Parmjit Blake

## 2020-10-09 NOTE — FLOWSHEET NOTE
TheraSkin Treatment Note    NAME:  Kamila Cabrera  YOB: 1945  MEDICAL RECORD NUMBER:  [id number]  DATE:  10/9/2020    Goal:  Patient will receive safe and proper application of skin substitute. Patient will comply with caring for dressing, offloading and reporting complications. Expiration date of TheraSkin checked immediately prior to use. Package intact prior to use and no damage noted. Transport temperature controlled and acceptable. TheraSkin was prepared for application by removing from package. TheraSkin was rinsed 2 times in room temperature normal saline for 2 minutes each time. A 2nd saline rinse was left on the TheraSkin until the physician was ready to apply it within 120 minutes of thawing. White side goes against ulcer bed. TheraSkin was applied to RIGHT MEDIAL LEG and affixed with steri-strips by the physician. NEGATIVE PRESSURE WOUND VAC USING BLACK FOAM was applied on top of non-adherent dressing. Patient/caregiver was instructed not to remove dressing and to keep it clean and dry. Pt/family/caregiver was instructed on signs and symptoms of complications to report such as draining through, falling down/slipping, getting wet, or severe pain or tingling in toes. Pt/family/caregiver was instructed on need for offloading and elevation of affected extremity (if applicable) and on use of prescribed offloading device.  Thera Skin may be applied a total of 10 times per wound over a 12 week period.  Date of first application of Thera Skin for this current wound is October 9, 2020.                   Guidelines followed      Electronically signed by Kady Roberson RN on 10/9/2020 at 11:44 AM

## 2020-10-16 ENCOUNTER — HOSPITAL ENCOUNTER (OUTPATIENT)
Dept: WOUND CARE | Age: 75
Discharge: HOME | End: 2020-10-16
Payer: MEDICARE

## 2020-10-16 VITALS
RESPIRATION RATE: 18 BRPM | TEMPERATURE: 98.3 F | SYSTOLIC BLOOD PRESSURE: 132 MMHG | HEART RATE: 57 BPM | DIASTOLIC BLOOD PRESSURE: 75 MMHG

## 2020-10-16 PROCEDURE — 97605 NEG PRS WND THER DME<=50SQCM: CPT

## 2020-10-16 ASSESSMENT — PAIN DESCRIPTION - FREQUENCY: FREQUENCY: CONTINUOUS

## 2020-10-16 ASSESSMENT — PAIN SCALES - GENERAL: PAINLEVEL_OUTOF10: 5

## 2020-10-16 ASSESSMENT — PAIN DESCRIPTION - LOCATION: LOCATION: LEG

## 2020-10-16 ASSESSMENT — PAIN DESCRIPTION - DESCRIPTORS: DESCRIPTORS: SORE

## 2020-10-16 ASSESSMENT — PAIN DESCRIPTION - PAIN TYPE: TYPE: ACUTE PAIN

## 2020-10-16 NOTE — CODE DOCUMENTATION
[address] Phys[facility] Billing Sheet. Brianne Feliciano Nish  AGE: 66 y.o.    GENDER: female  : 1945  TODAY'S DATE:  10/16/2020    ICD-10  [facility] Problems    Diagnosis Date Noted    Non-pressure chronic ulcer of calf with fat layer exposed ([address] [L97.202] 10/09/2020    Non-pressure chronic ulcer of calf, right, with fat layer exposed ([address] Jailene Chao 10/02/2020    Non-healing surgical wound [T81.89XA] 2020    Venous insufficiency of left lower extremity [I87.2] 2020       PHYSICIAN PROCEDURES    CPT CODE  65664      Electronically signed by Thea Betts DPM on 10/16/2020 at 11:52 AM

## 2020-10-16 NOTE — PROGRESS NOTES
[address]                                                   Progress Note and Procedure Note      Prosper Ballesteros  MEDICAL RECORD NUMBER:  [id number]  AGE: 66 y.o. GENDER: female  : 1945  EPISODE DATE:  10/16/2020    Subjective:     Chief Complaint   Patient presents with    Wound Check         HISTORY of PRESENT ILLNESS HPI     Prosper Ballesteros is a 66 y.o. female who presents today for wound/ulcer evaluation. History of Wound Context: Patient present for  s/p surgical wound. Patient is s/p theraskin #1.     Wound/Ulcer Pain Timing/Severity: intermittent  Quality of pain: aching  Severity:  3 / 10   Modifying Factors: None  Associated Signs/Symptoms: edema    Ulcer Identification:  Ulcer Type: venous  Contributing Factors: edema and venous stasis    Wound: N/A        PAST MEDICAL HISTORY        Diagnosis Date    Anxiety     Arthritis     COPD (chronic obstructive pulmonary disease) (HCC)     Generalized osteoarthrosis, unspecified site 2002    Hyperlipidemia     Hypertension     Major depressive disorder, single episode, moderate ([address] 2002    Morbid obesity ([facility] 2002    OAB (overactive bladder)     Osteoporosis        PAST SURGICAL HISTORY    Past Surgical History:   Procedure Laterality Date    CARDIAC CATHETERIZATION      CARPAL TUNNEL RELEASE      COLONOSCOPY      HYSTERECTOMY, TOTAL ABDOMINAL      KNEE ARTHROPLASTY Bilateral     [place] COLON CA SCRN NOT  [address] IND N/A 2017    COLONOSCOPY performed by Micha Hu MD at . [address] ESOPHAGOGASTRODUODENOSCOPY TRANSORAL DIAGNOSTIC N/A 2017    EGD ESOPHAGOGASTRODUODENOSCOPY performed by Micha Hu MD at [address] Bilateral     SKIN BIOPSY Right 2020    EXCISION OF LEG MASS RIGHT (PAT ON ADMIT) performed by Anuradha Oreilly MD at [address]    Family History   Problem Relation Age of Onset    Arthritis Father     Other Father         gout    Heart Failure Father     Cancer Sister        SOCIAL HISTORY    Social History     Tobacco Use    Smoking status: Former Smoker     Packs/day: 1.00     Years: 20.00     Pack years: 20.00     Last attempt to quit: 1980     Years since quittin.8    Smokeless tobacco: Never Used   Substance Use Topics    Alcohol use: No    Drug use: Never       ALLERGIES    Allergies   Allergen Reactions    Codeine      Nausea, lightheadedness, dizzy       MEDICATIONS    Current Outpatient Medications on File Prior to Encounter   Medication Sig Dispense Refill    pravastatin (PRAVACHOL) 40 MG tablet TAKE ONE TABLET BY MOUTH EVERY DAY 30 tablet 0    tiZANidine (ZANAFLEX) 4 MG tablet TAKE ONE TABLET BY MOUTH THREE TIMES A DAY AS NEEDED FOR MUSCLE SPASMS / PAIN 90 tablet 0    gabapentin (NEURONTIN) 400 MG capsule TAKE ONE CAPSULE BY MOUTH THREE TIMES A DAY 90 capsule 0    TRINTELLIX 10 MG TABS tablet TAKE ONE TABLET BY MOUTH EVERY DAY 30 tablet 5    loratadine (CLARITIN) 10 MG tablet Take 1 tablet by mouth daily 30 tablet 0    amLODIPine (NORVASC) 5 MG tablet TAKE ONE TABLET BY MOUTH DAILY AT BEDTIME  3    donepezil (ARICEPT) 10 MG tablet take one-half tablet by mouth at bedtime for 2 weeks then increase to 1 tablet at bedtime  3    albuterol sulfate  (90 Base) MCG/ACT inhaler Inhale 2 puffs into the lungs 4 times daily as needed for Wheezing 3 Inhaler 1    aspirin 81 MG EC tablet Take 1 tablet by mouth daily 30 tablet 3    vitamin D (CHOLECALCIFEROL) 1000 UNIT TABS tablet Take 2 tablets by mouth daily 60 tablet 3    melatonin 1 MG tablet Take 1 tablet by mouth nightly as needed for Sleep 30 tablet 2    Oxygen Concentrator        No current facility-administered medications on file prior to encounter. REVIEW OF SYSTEMS    Pertinent items are noted in HPI.     Objective:      /75   Pulse 57   Temp 98.3 °F (36.8 °C) (Temporal)   Resp 18   LMP  (LMP Wound Cleansed Cleansed with saline 10/09/20 1054   Dressing/Treatment Antibacterial ointment;Dry dressing 08/07/20 1029   Wound Length (cm) 5.2 cm 10/16/20 1120   Wound Width (cm) 3.6 cm 10/16/20 1120   Wound Depth (cm) 1 cm 10/09/20 1054   Wound Surface Area (cm^2) 18.72 cm^2 10/16/20 1120   Change in Wound Size % (l*w) -2396 10/16/20 1120   Wound Volume (cm^3) 18.2 cm^3 10/09/20 1054   Wound Healing % -12659 10/09/20 1054   Post-Procedure Length (cm) 5 cm 10/02/20 1121   Post-Procedure Width (cm) 3.7 cm 10/02/20 1121   Post-Procedure Depth (cm) 0.5 cm 10/02/20 1121   Post-Procedure Surface Area (cm^2) 18.5 cm^2 10/02/20 1121   Post-Procedure Volume (cm^3) 9.25 cm^3 10/02/20 1121   Undermining Starts ___ O'Clock 9 10/09/20 1054   Undermining Ends___ O'Clock 12 10/09/20 1054   Undermining Maxium Distance (cm) 0.5 10/09/20 1054   Wound Assessment Granulation tissue;Slough 10/16/20 1120   Drainage Amount Scant 10/16/20 1120   Drainage Description Serosanguinous 10/09/20 1054   Odor Mild 10/16/20 1120   Tabby-wound Assessment Edematous; Hyperpigmented 10/09/20 1054   Margins Epibole (rolled edges) 10/16/20 1120   Wound Thickness Description not for Pressure Injury Full thickness 10/16/20 1120   Number of days: 70       Plan:   Patient examined   continue vac   Low compression. S/P  1 week for theraskin       Treatment Note please see attached Discharge Instructions    Written patient dismissal instructions given to patient and signed by patient or POA.          Discharge [address] and Hyperbaric Medicine   Physician Orders and Discharge [address]  Telephone: [phone]      [phone]        NAME: Elisabeth Preston  DATE OF BIRTH:  1945  MEDICAL RECORD NUMBER:  [id number]     Home Care/Facility:   [facility]       Wound Location:   RIGHT LOWER LEG  Dressing orders:       MEPITEL APPLIED IN WOUND CENTER Friday 10-. ON Wednesday 10- HOME CARE TO CHANGE VAC DOWN TO [address] [facility]. LEAVE MEPITEL IN PLACE. REAPPLY WOUND VAC AS BELOW   1. Cleanse wound(s) with Normal Saline. APPLY SKIN PREP TO PERIWOUND. 2. Picture frame wound with vac drape  3. Apply Black sponge per wound vac technique. 4. Bridge to a non-pressure area if necessary. 5. Settings: 75 mm Hg CONTINUOUS. PATIENT WILL BE SEEN IN WOUND CENTER ON Friday October 23, 2020.      Compression:  Apply 5-10mmHg LOW compression hose to RIGHT lower leg(s), may remove at bedtime, reapply first thing in the morning, avoid prolonged standing, elevate legs when sitting.     Offloading Device:   none     Other Instructions:  DO NOT TURN VAC OFF. Keep all dressings clean, dry and intact.  Keep pressure off the wound(s) at all times.      Follow up visit  1 WEEK  October 23, 2020      Please give 24 hour notice if unable to keep appointment. [phone]     If you experience any of the following, please call the Wound Care Service at  [phone] or go to the nearest emergency room.        *Increase in pain         *Temperature over 101           *Increase in drainage from your wound or a foul odor  *Uncontrolled swelling            *Need for compression bandage changes due to slippage, breakthrough drainage       PLEASE NOTE: IF YOU ARE UNABLE TO OBTAIN WOUND SUPPLIES, CONTINUE TO USE THE SUPPLIES YOU HAVE AVAILABLE UNTIL YOU ARE ABLE TO [facility].  IT IS MOST IMPORTANT TO KEEP THE WOUND COVERED AT ALL TIMES  Electronically signed by Criss Escalante DPM on 10/16/2020 at 11:48 AM          Electronically signed by Criss Escalante DPM on 10/16/2020 at 11:48 AM

## 2020-10-19 ENCOUNTER — TELEPHONE (OUTPATIENT)
Dept: WOUND CARE | Age: 75
End: 2020-10-19

## 2020-10-23 ENCOUNTER — HOSPITAL ENCOUNTER (OUTPATIENT)
Dept: WOUND CARE | Age: 75
Discharge: HOME | End: 2020-10-23
Payer: MEDICARE

## 2020-10-23 VITALS
TEMPERATURE: 98.3 F | HEART RATE: 71 BPM | SYSTOLIC BLOOD PRESSURE: 119 MMHG | RESPIRATION RATE: 18 BRPM | DIASTOLIC BLOOD PRESSURE: 68 MMHG

## 2020-10-23 PROCEDURE — 97605 NEG PRS WND THER DME<=50SQCM: CPT

## 2020-10-23 NOTE — CODE DOCUMENTATION
[address] Physician Billing Sheet. Rose Mock  AGE: 66 y.o.    GENDER: female  : 1945  TODAY'S DATE:  10/23/2020    ICD-10 [facility] Problems    Diagnosis Date Noted    Non-pressure chronic ulcer of calf with fat layer exposed ([facility] [L97.202] 10/09/2020    Non-pressure chronic ulcer of calf, right, with fat layer exposed ([facility] Cathren Sor 10/02/2020    Non-healing surgical wound [T81.89XA] 2020    Venous insufficiency of left lower extremity [I87.2] 2020       PHYSICIAN PROCEDURES    CPT CODE  43778      Electronically signed by Moises Aguila DPM on 10/23/2020 at 12:10 PM

## 2020-10-23 NOTE — PROGRESS NOTES
(LMP Unknown)     Wt Readings from Last 3 Encounters:   09/17/20 140 lb (63.5 kg)   09/04/20 142 lb (64.4 kg)   08/13/20 140 lb (63.5 kg)       PHYSICAL EXAM    Constitutional:   Well nourished and well developed. Appears neat and clean. Patient is alert, oriented x3, and in no apparent distress. Respiratory:  Respiratory effort is easy and symmetric bilaterally. Rate is normal at rest and on room air. Vascular:  Pedal Pulses is not palpable and audible with doppler. Capillary refill is <3 sec to digits bilateral.  Extremities negative for pitting edema. Neurological:   Sensation is intact to lower extremities. Dermatological:  Wound description noted in wound assessment. The wound today is healthy with less pain. Theraskin is adhered and noted in base of wound with increased granulation buds and decreased measurements and rolled edges. No signs of infection noted. Psychiatric:  Judgement and insight intact. Short and long term memory intact. No evidence of depression, anxiety, or agitation. Patient is calm, cooperative, and communicative. Appropriate interactions and affect. Assessment:      Problem List Items Addressed This Visit     None           Procedure Note  Indications:  Based on my examination of this patient's wound(s)/ulcer(s) today, debridement is not required to promote healing and evaluate the wound base.       Wound/Ulcer #: 1    Post Debridement Measurements:  Wound/Ulcer Descriptions are Pre Debridement except measurements:    Wound 08/07/20 Leg Right;Medial #1 (Active)   Wound Image   10/16/20 1120   Wound Etiology Non-Healing Surgical 10/09/20 1054   Wound Cleansed Cleansed with saline 10/09/20 1054   Dressing/Treatment Antibacterial ointment;Dry dressing 08/07/20 1029   Wound Length (cm) 5.2 cm 10/16/20 1120   Wound Width (cm) 3.6 cm 10/16/20 1120   Wound Depth (cm) 1 cm 10/09/20 1054   Wound Surface Area (cm^2) 18.72 cm^2 10/16/20 1120   Change in Wound Size % (l*w) -2396 10/16/20 1120   Wound Volume (cm^3) 18.2 cm^3 10/09/20 1054   Wound Healing % -28419 10/09/20 1054   Post-Procedure Length (cm) 5 cm 10/02/20 1121   Post-Procedure Width (cm) 3.7 cm 10/02/20 1121   Post-Procedure Depth (cm) 0.5 cm 10/02/20 1121   Post-Procedure Surface Area (cm^2) 18.5 cm^2 10/02/20 1121   Post-Procedure Volume (cm^3) 9.25 cm^3 10/02/20 1121   Undermining Starts ___ O'Clock 9 10/09/20 1054   Undermining Ends___ O'Clock 12 10/09/20 1054   Undermining Maxium Distance (cm) 0.5 10/09/20 1054   Wound Assessment Granulation tissue;Slough 10/16/20 1120   Drainage Amount Scant 10/16/20 1120   Drainage Description Serosanguinous 10/09/20 1054   Odor Mild 10/16/20 1120   Tabby-wound Assessment Edematous; Hyperpigmented 10/09/20 1054   Margins Epibole (rolled edges) 10/16/20 1120   Wound Thickness Description not for Pressure Injury Full thickness 10/16/20 1120   Number of days: 70       Plan:   Patient examined   continue vac   Promogran applied under wound vac today  Low compression. S/P  1 week for theraskin       Treatment Note please see attached Discharge Instructions    Written patient dismissal instructions given to patient and signed by patient or POA. Discharge [address] and Hyperbaric Medicine   Physician Orders and Discharge [address] [place] [address]  Telephone: [phone]      [phone]        NAME: Marah Mock  DATE OF BIRTH:  1945  MEDICAL RECORD NUMBER:  [id number]     Home Care/Facility:   [facility]       Wound Location:   RIGHT LOWER LEG  Dressing orders:     1. Cleanse wound(s) with Normal Saline. APPLY SKIN PREP TO PERIWOUND. 2. Picture frame wound with vac drape  3. Apply Black sponge per wound vac technique. TRY TO GENTLY PLACE SPONGE UNDER ROLLED EDGES. 4. Bridge to a non-pressure area if necessary. 5. Settings: 100 mm Hg CONTINUOUS. MAY DECREASE TO 75 IF PATIENT CANNOT TOLERATE 100MMhG. 6. CHANGE WOUND VAC DRESSING THREE TIMES A WEEK OR Monday, Wednesday AND Friday. TODAY ONLY APPLY PROMOGRAN TO WOUND THEN APPLY WOUND VAC AS ABOVE.      Compression:  Apply 5-10mmHg LOW compression hose to RIGHT lower leg(s), may remove at bedtime, reapply first thing in the morning, avoid prolonged standing, elevate legs when sitting.     Offloading Device:   none     Other Instructions:  DO NOT TURN VAC OFF. Keep all dressings clean, dry and intact.  Keep pressure off the wound(s) at all times.      Follow up visit  2 WEEKS  November 6, 2020  AT               [address] [facility]     Please give 24 hour notice if unable to keep appointment. [phone]     If you experience any of the following, please call the Wound Care Service at  [phone] or go to the nearest emergency room.        *Increase in pain         *Temperature over 101           *Increase in drainage from your wound or a foul odor  *Uncontrolled swelling            *Need for compression bandage changes due to slippage, breakthrough drainage       PLEASE NOTE: IF YOU ARE UNABLE TO OBTAIN WOUND SUPPLIES, CONTINUE TO USE THE SUPPLIES YOU HAVE AVAILABLE UNTIL YOU ARE ABLE TO [facility].  IT IS MOST IMPORTANT TO KEEP THE WOUND COVERED AT ALL TIMES  Electronically signed by Fadumo Tam DPM on 10/23/2020 at 12:04 PM          Electronically signed by Fadumo Tam DPM on 10/23/2020 at 12:08 PM

## 2020-11-03 RX ORDER — GABAPENTIN 400 MG/1
CAPSULE ORAL
Qty: 90 CAPSULE | Refills: 0 | Status: SHIPPED | OUTPATIENT
Start: 2020-11-03 | End: 2020-12-22

## 2020-11-13 ENCOUNTER — HOSPITAL ENCOUNTER (OUTPATIENT)
Dept: WOUND CARE | Age: 75
Discharge: HOME | End: 2020-11-13
Payer: MEDICARE

## 2020-11-13 VITALS
DIASTOLIC BLOOD PRESSURE: 74 MMHG | TEMPERATURE: 98.1 F | SYSTOLIC BLOOD PRESSURE: 128 MMHG | HEART RATE: 74 BPM | RESPIRATION RATE: 18 BRPM

## 2020-11-13 PROCEDURE — 11042 DBRDMT SUBQ TIS 1ST 20SQCM/<: CPT

## 2020-11-13 NOTE — PROGRESS NOTES
[address]                                                   Progress Note and Procedure Note      Yuridia Go  MEDICAL RECORD NUMBER:  [id number]  AGE: 66 y.o. GENDER: female  : 1945  EPISODE DATE:  2020    Subjective:     Chief Complaint   Patient presents with    Wound Check     right leg wound recheck         HISTORY of PRESENT ILLNESS HPI     Yuridia Go is a 66 y.o. female who presents today for wound/ulcer evaluation. History of Wound Context: Patient present for her second visit s/p surgical wound from an I&D by Dr. Kim Lawson. She states that it is still draining and is not closing. The vac was discontinued because the patient with her dementia kept turning the vac off and leaving the drape on without suction per [facility].    Wound/Ulcer Pain Timing/Severity: intermittent  Quality of pain: aching  Severity:  3 / 10   Modifying Factors: None  Associated Signs/Symptoms: edema    Ulcer Identification:  Ulcer Type: venous  Contributing Factors: edema and venous stasis    Wound: N/A        PAST MEDICAL HISTORY        Diagnosis Date    Anxiety     Arthritis     COPD (chronic obstructive pulmonary disease) ([facility])     Generalized osteoarthrosis, unspecified site 2002    Hyperlipidemia     Hypertension     Major depressive disorder, single episode, moderate ([address] 2002    Morbid obesity ([address] 2002    OAB (overactive bladder)     Osteoporosis        PAST SURGICAL HISTORY    Past Surgical History:   Procedure Laterality Date    CARDIAC CATHETERIZATION      CARPAL TUNNEL RELEASE      COLONOSCOPY      HYSTERECTOMY, TOTAL ABDOMINAL      KNEE ARTHROPLASTY Bilateral     [place] COLON CA SCRN NOT  [address] IND N/A 2017    COLONOSCOPY performed by Thierry Ferrari MD at [facility] [address] ESOPHAGOGASTRODUODENOSCOPY TRANSORAL DIAGNOSTIC N/A 2017    EGD ESOPHAGOGASTRODUODENOSCOPY performed by Thierry Ferrari MD at [facility] ARTHROPLASTY Bilateral     SKIN BIOPSY Right 2020    EXCISION OF LEG MASS RIGHT (PAT ON ADMIT) performed by Anuradha Oreilly MD at [address] [place] [address] Rd    Family History   Problem Relation Age of Onset    Arthritis Father     Other Father         gout    Heart Failure Father     Cancer Sister        SOCIAL HISTORY    Social History     Tobacco Use    Smoking status: Former Smoker     Packs/day: 1.00     Years: 20.00     Pack years: 20.00     Last attempt to quit: 1980     Years since quittin.8    Smokeless tobacco: Never Used   Substance Use Topics    Alcohol use: No    Drug use: Never       ALLERGIES    Allergies   Allergen Reactions    Codeine      Nausea, lightheadedness, dizzy       MEDICATIONS    Current Outpatient Medications on File Prior to Encounter   Medication Sig Dispense Refill    gabapentin (NEURONTIN) 400 MG capsule TAKE ONE CAPSULE BY MOUTH THREE TIMES A DAY 90 capsule 0    pravastatin (PRAVACHOL) 40 MG tablet TAKE ONE TABLET BY MOUTH EVERY DAY 30 tablet 0    tiZANidine (ZANAFLEX) 4 MG tablet TAKE ONE TABLET BY MOUTH THREE TIMES A DAY AS NEEDED FOR MUSCLE SPASMS / PAIN 90 tablet 0    TRINTELLIX 10 MG TABS tablet TAKE ONE TABLET BY MOUTH EVERY DAY 30 tablet 5    loratadine (CLARITIN) 10 MG tablet Take 1 tablet by mouth daily 30 tablet 0    amLODIPine (NORVASC) 5 MG tablet TAKE ONE TABLET BY MOUTH DAILY AT BEDTIME  3    donepezil (ARICEPT) 10 MG tablet take one-half tablet by mouth at bedtime for 2 weeks then increase to 1 tablet at bedtime  3    albuterol sulfate  (90 Base) MCG/ACT inhaler Inhale 2 puffs into the lungs 4 times daily as needed for Wheezing 3 Inhaler 1    aspirin 81 MG EC tablet Take 1 tablet by mouth daily 30 tablet 3    vitamin D (CHOLECALCIFEROL) 1000 UNIT TABS tablet Take 2 tablets by mouth daily 60 tablet 3    melatonin 1 MG tablet Take 1 tablet by mouth nightly as needed for Sleep 30 tablet 2    Oxygen Concentrator        No in the [facility]  Documentation Flow Sheet    Wound/Ulcer #: 1    Post Debridement Measurements:  Wound/Ulcer Descriptions are Pre Debridement except measurements:    Wound 08/07/20 Leg Right;Medial #1 (Active)   Wound Image   11/13/20 1126   Wound Etiology Non-Healing Surgical 11/13/20 1126   Wound Cleansed Not Cleansed 10/23/20 1131   Dressing/Treatment Antibacterial ointment;Dry dressing 08/07/20 1029   Wound Length (cm) 4.9 cm 11/13/20 1126   Wound Width (cm) 2.8 cm 11/13/20 1126   Wound Depth (cm) 0.1 cm 11/13/20 1126   Wound Surface Area (cm^2) 13.72 cm^2 11/13/20 1126   Change in Wound Size % (l*w) -1729.33 11/13/20 1126   Wound Volume (cm^3) 1.37 cm^3 11/13/20 1126   Wound Healing % -1612 11/13/20 1126   Post-Procedure Length (cm) 4.9 cm 11/13/20 1148   Post-Procedure Width (cm) 2.8 cm 11/13/20 1148   Post-Procedure Depth (cm) 0.2 cm 11/13/20 1148   Post-Procedure Surface Area (cm^2) 13.72 cm^2 11/13/20 1148   Post-Procedure Volume (cm^3) 2.74 cm^3 11/13/20 1148   Undermining Starts ___ O'Clock 9 10/09/20 1054   Undermining Ends___ O'Clock 12 10/09/20 1054   Undermining Maxium Distance (cm) 0.5 10/09/20 1054   Wound Assessment Hyper granulation tissue 11/13/20 1126   Drainage Amount Moderate 11/13/20 1126   Drainage Description Serosanguinous 11/13/20 1126   Odor None 11/13/20 1126   Tabby-wound Assessment Warm;Other (Comment) 11/13/20 1126   Margins Defined edges 11/13/20 1126   Wound Thickness Description not for Pressure Injury Full thickness 11/13/20 1126   Number of days: 98         Percent of Wound/Ulcer Debrided: 100%    Total Surface Area Debrided:  13.72sq cm     Diabetic/Pressure/Non Pressure Ulcers:  Ulcer: Non-Pressure ulcer, fat layer exposed      Bleeding:  Minimal    Hemostasis Achieved:  by pressure and by silver nitrate stick    Procedural Pain:  2  / 10     Post Procedural Pain:  1 / 10     Response to treatment:  Well tolerated by patient.        Plan:   Patient examined and Debrided  Discontinue wound vac  Hydrofera blue    Follow up in 10 days      Treatment Note please see attached Discharge Instructions    Written patient dismissal instructions given to patient and signed by patient or POA. Discharge [address] and Hyperbaric Medicine   Physician Orders and Discharge [address]  612 [facility], [address] [place] [address]  Telephone: [phone]      [phone]        NAME: Jessica Mock  DATE OF BIRTH:  1945  MEDICAL RECORD NUMBER:  [id number]     Home Care/Facility:   [facility]       Wound Location:   RIGHT LOWER LEG  Dressing orders:     1. Cleanse wound(s) with normal saline. 2. Apply dry HYDROFERA BLUE READY FOAM or equivalent to wound bed. 3. Cover with 4x4's and wrap with gauze (hazel or kerlix)  4. Change  Every other day or Monday, Wednesday, and Friday     Compression:  Apply 5-10mmHg LOW compression hose to RIGHT lower leg(s), may remove at bedtime, reapply first thing in the morning, avoid prolonged standing, elevate legs when sitting.     Offloading Device:   none     Other Instructions:  Keep all dressings clean, dry and intact.  Keep pressure off the wound(s) at all times.      Follow up visit  2 WEEKS  November 24, 2020 AT  2:30PM     Please give 24 hour notice if unable to keep appointment. [phone]     If you experience any of the following, please call the Wound Care Service at  [phone] or go to the nearest emergency room.        *Increase in pain         *Temperature over 101           *Increase in drainage from your wound or a foul odor  *Uncontrolled swelling            *Need for compression bandage changes due to slippage, breakthrough drainage       PLEASE NOTE: IF YOU ARE UNABLE TO OBTAIN WOUND SUPPLIES, CONTINUE TO USE THE SUPPLIES YOU HAVE AVAILABLE UNTIL YOU ARE ABLE TO [facility].  IT IS MOST IMPORTANT TO KEEP THE WOUND COVERED AT ALL TIMES  Electronically signed by Master Dawson DPM on 11/13/2020 at 11:52 AM          Electronically signed by Master Dawson DPM on 11/13/2020 at 11:56 AM

## 2020-11-13 NOTE — CODE DOCUMENTATION
[address] Physician Billing Sheet. Lucas Mock  AGE: 66 y.o.    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

## 2020-11-21 RX ORDER — PRAVASTATIN SODIUM 40 MG
TABLET ORAL
Qty: 30 TABLET | Refills: 0 | Status: SHIPPED | OUTPATIENT
Start: 2020-11-21 | End: 2020-12-20

## 2020-11-24 ENCOUNTER — HOSPITAL ENCOUNTER (OUTPATIENT)
Dept: WOUND CARE | Age: 75
Discharge: HOME | End: 2020-11-24
Payer: MEDICARE

## 2020-11-24 VITALS
RESPIRATION RATE: 18 BRPM | DIASTOLIC BLOOD PRESSURE: 95 MMHG | SYSTOLIC BLOOD PRESSURE: 134 MMHG | TEMPERATURE: 98.6 F | HEART RATE: 64 BPM

## 2020-11-24 PROCEDURE — 11042 DBRDMT SUBQ TIS 1ST 20SQCM/<: CPT

## 2020-11-24 NOTE — PROGRESS NOTES
Mel Herrera [address]                                                   Progress Note and Procedure Note      Prosper Ballesteros  MEDICAL RECORD NUMBER:  [id number]  AGE: 66 y.o. GENDER: female  : 1945  EPISODE DATE:  2020    Subjective:     Chief Complaint   Patient presents with    Wound Check     right medial leg         HISTORY of PRESENT ILLNESS HPI     Prosper Ballesteros is a 66 y.o. female who presents today for wound/ulcer evaluation. History of Wound Context: Patient present for her second visit s/p surgical wound from an I&D by Dr. Mono Avelar. She states that it is still draining and is not closing. The vac was discontinued because the patient with her dementia kept turning the vac off and leaving the drape on without suction per [facility].    Wound/Ulcer Pain Timing/Severity: intermittent  Quality of pain: aching  Severity:  3 / 10   Modifying Factors: None  Associated Signs/Symptoms: edema    Ulcer Identification:  Ulcer Type: venous  Contributing Factors: edema and venous stasis    Wound: N/A        PAST MEDICAL HISTORY        Diagnosis Date    Anxiety     Arthritis     COPD (chronic obstructive pulmonary disease) (HCC)     Generalized osteoarthrosis, unspecified site 2002    Hyperlipidemia     Hypertension     Major depressive disorder, single episode, moderate ([address] 2002    Morbid obesity ([address] 2002    OAB (overactive bladder)     Osteoporosis        PAST SURGICAL HISTORY    Past Surgical History:   Procedure Laterality Date    CARDIAC CATHETERIZATION      CARPAL TUNNEL RELEASE      COLONOSCOPY      HYSTERECTOMY, TOTAL ABDOMINAL      KNEE ARTHROPLASTY Bilateral     AK COLON CA SCRN NOT  [address]  IND N/A 2017    COLONOSCOPY performed by Micha Hu MD at [facility] [address] ESOPHAGOGASTRODUODENOSCOPY TRANSORAL DIAGNOSTIC N/A 2017    EGD ESOPHAGOGASTRODUODENOSCOPY performed by [facility]a Hu MD at [facility] ARTHROPLASTY Bilateral     SKIN BIOPSY Right 2020    EXCISION OF LEG MASS RIGHT (PAT ON ADMIT) performed by Carmella Shahid MD at [address]    Family History   Problem Relation Age of Onset    Arthritis Father     Other Father         gout    Heart Failure Father     Cancer Sister        SOCIAL HISTORY    Social History     Tobacco Use    Smoking status: Former Smoker     Packs/day: 1.00     Years: 20.00     Pack years: 20.00     Last attempt to quit: 1980     Years since quittin.9    Smokeless tobacco: Never Used   Substance Use Topics    Alcohol use: No    Drug use: Never       ALLERGIES    Allergies   Allergen Reactions    Codeine      Nausea, lightheadedness, dizzy       MEDICATIONS    Current Outpatient Medications on File Prior to Encounter   Medication Sig Dispense Refill    pravastatin (PRAVACHOL) 40 MG tablet TAKE ONE TABLET BY MOUTH DAILY 30 tablet 0    gabapentin (NEURONTIN) 400 MG capsule TAKE ONE CAPSULE BY MOUTH THREE TIMES A DAY 90 capsule 0    tiZANidine (ZANAFLEX) 4 MG tablet TAKE ONE TABLET BY MOUTH THREE TIMES A DAY AS NEEDED FOR MUSCLE SPASMS / PAIN 90 tablet 0    TRINTELLIX 10 MG TABS tablet TAKE ONE TABLET BY MOUTH EVERY DAY 30 tablet 5    loratadine (CLARITIN) 10 MG tablet Take 1 tablet by mouth daily 30 tablet 0    amLODIPine (NORVASC) 5 MG tablet TAKE ONE TABLET BY MOUTH DAILY AT BEDTIME  3    donepezil (ARICEPT) 10 MG tablet take one-half tablet by mouth at bedtime for 2 weeks then increase to 1 tablet at bedtime  3    albuterol sulfate  (90 Base) MCG/ACT inhaler Inhale 2 puffs into the lungs 4 times daily as needed for Wheezing 3 Inhaler 1    aspirin 81 MG EC tablet Take 1 tablet by mouth daily 30 tablet 3    vitamin D (CHOLECALCIFEROL) 1000 UNIT TABS tablet Take 2 tablets by mouth daily 60 tablet 3    melatonin 1 MG tablet Take 1 tablet by mouth nightly as needed for Sleep 30 tablet 2    Oxygen Concentrator        No current facility-administered medications on file prior to encounter. REVIEW OF SYSTEMS    Pertinent items are noted in HPI. Objective:      BP (!) 134/95   Pulse 64   Temp 98.6 °F (37 °C) (Temporal)   Resp 18   LMP  (LMP Unknown)     Wt Readings from Last 3 Encounters:   09/17/20 140 lb (63.5 kg)   09/04/20 142 lb (64.4 kg)   08/13/20 140 lb (63.5 kg)       PHYSICAL EXAM    Constitutional:   Well nourished and well developed. Appears neat and clean. Patient is alert, oriented x3, and in no apparent distress. Respiratory:  Respiratory effort is easy and symmetric bilaterally. Rate is normal at rest and on room air. Vascular:  Pedal Pulses is not palpable and audible with doppler. Capillary refill is <3 sec to digits bilateral.  Extremities negative for pitting edema. Neurological:   Sensation is intact to lower extremities. Dermatological:  Wound description noted in wound assessment. The wound today has improved again in depth and has no erythema noted. The base of the wound is all granular with minimal  hypergranulation tissue noted. Psychiatric:  Judgement and insight intact. Short and long term memory intact. No evidence of depression, anxiety, or agitation. Patient is calm, cooperative, and communicative. Appropriate interactions and affect. Assessment:      Problem List Items Addressed This Visit     None           Procedure Note  Indications:  Based on my examination of this patient's wound(s)/ulcer(s) today, debridement is required to promote healing and evaluate the wound base. Performed by: Rafa Schuster DPM    Consent obtained:  Yes    Time out taken:  Yes    Pain Control:         Debridement:Excisional Debridement    Using curette the wound(s)/ulcer(s) was/were sharply debrided down through and including the removal of epidermis, dermis and subcutaneous tissue.         Devitalized Tissue Debrided:  slough    Pre Debridement Measurements:  Are located in the Wound/Ulcer Documentation Flow Sheet    Wound/Ulcer #: 1    Post Debridement Measurements:  Wound/Ulcer Descriptions are Pre Debridement except measurements:    Wound 08/07/20 Leg Right;Medial #1 (Active)   Wound Image   11/24/20 1426   Wound Etiology Non-Healing Surgical 11/24/20 1426   Wound Cleansed Not Cleansed 10/23/20 1131   Dressing/Treatment Hydrofera blue;Dry dressing 11/13/20 1201   Wound Length (cm) 4.9 cm 11/24/20 1426   Wound Width (cm) 2.3 cm 11/24/20 1426   Wound Depth (cm) 0.3 cm 11/24/20 1426   Wound Surface Area (cm^2) 11.27 cm^2 11/24/20 1426   Change in Wound Size % (l*w) -1402.67 11/24/20 1426   Wound Volume (cm^3) 3.38 cm^3 11/24/20 1426   Wound Healing % -4125 11/24/20 1426   Post-Procedure Length (cm) 4.9 cm 11/24/20 1437   Post-Procedure Width (cm) 2.3 cm 11/24/20 1437   Post-Procedure Depth (cm) 0.3 cm 11/24/20 1437   Post-Procedure Surface Area (cm^2) 11.27 cm^2 11/24/20 1437   Post-Procedure Volume (cm^3) 3.38 cm^3 11/24/20 1437   Undermining Starts ___ O'Clock 9 10/09/20 1054   Undermining Ends___ O'Clock 12 10/09/20 1054   Undermining Maxium Distance (cm) 0.5 10/09/20 1054   Wound Assessment Granulation tissue; Hyper granulation tissue 11/24/20 1426   Drainage Amount Small 11/24/20 1426   Drainage Description Serous 11/24/20 1426   Odor None 11/24/20 1426   Tabby-wound Assessment Warm;Hyperpigmented 11/24/20 1426   Margins Defined edges 11/24/20 1426   Wound Thickness Description not for Pressure Injury Full thickness 11/24/20 1426   Number of days: 109         Percent of Wound/Ulcer Debrided: 100%    Total Surface Area Debrided:  11.27 sq cm     Diabetic/Pressure/Non Pressure Ulcers:  Ulcer: Non-Pressure ulcer, fat layer exposed      Bleeding:  Minimal    Hemostasis Achieved:  by pressure and by silver nitrate stick    Procedural Pain:  2  / 10     Post Procedural Pain:  1 / 10     Response to treatment:  Well tolerated by patient.        Plan:   Patient examined and Debrided  Hydrofera blue  Compression low at all times  Follow up in 2 weeks      Treatment Note please see attached Discharge Instructions    Written patient dismissal instructions given to patient and signed by patient or POA. Discharge [address] and Hyperbaric Medicine   Physician Orders and Discharge [address]  [place]ana Walters, [address] [facility]  Telephone: [phone]      [phone]        NAME: Nikki Mock  DATE OF BIRTH:  1945  MEDICAL RECORD NUMBER:  [id number]     Home Care/Facility:   [facility]       Wound Location:   RIGHT LOWER LEG  Dressing orders:     1. Cleanse wound(s) with normal saline. 2. Apply dry HYDROFERA BLUE READY FOAM or equivalent to wound bed. 3. Cover with 4x4's and wrap with gauze (hazel or kerlix)  4. Change  Every other day or Monday, Wednesday, and Friday      Compression:  Apply 5-10mmHg LOW compression hose to RIGHT lower leg(s), may remove at bedtime, reapply first thing in the morning, avoid prolonged standing, elevate legs when sitting.     Offloading Device:   none     Other Instructions:  Keep all dressings clean, dry and intact.  Keep pressure off the wound(s) at all times.      Follow up visit  2 WEEKS    December 8, 2020 AT      Please give 24 hour notice if unable to keep appointment. [phone]     If you experience any of the following, please call the Wound Care Service at  [phone] or go to the nearest emergency room.        *Increase in pain         *Temperature over 101           *Increase in drainage from your wound or a foul odor  *Uncontrolled swelling            *Need for compression bandage changes due to slippage, breakthrough drainage       PLEASE NOTE: IF YOU ARE UNABLE TO OBTAIN WOUND SUPPLIES, CONTINUE TO USE THE SUPPLIES YOU HAVE AVAILABLE UNTIL YOU ARE ABLE TO [facility].  IT IS MOST IMPORTANT TO KEEP THE WOUND COVERED AT ALL TIMES  Electronically signed by Juan Tam DPM on 11/24/2020 at 2:45 PM          Electronically signed by Juan Tam DPM on 11/24/2020 at 2:46 PM

## 2020-11-24 NOTE — CODE DOCUMENTATION
[address] Physician Billing Sheet. Jaclyn Mock  AGE: 66 y.o.    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

## 2020-12-07 ENCOUNTER — HOSPITAL ENCOUNTER (OUTPATIENT)
Dept: WOUND CARE | Age: 75
Discharge: HOME | End: 2020-12-07
Payer: MEDICARE

## 2020-12-07 VITALS
SYSTOLIC BLOOD PRESSURE: 178 MMHG | DIASTOLIC BLOOD PRESSURE: 93 MMHG | RESPIRATION RATE: 18 BRPM | HEART RATE: 60 BPM | TEMPERATURE: 98.3 F

## 2020-12-07 PROCEDURE — 99213 OFFICE O/P EST LOW 20 MIN: CPT

## 2020-12-07 PROCEDURE — 99202 OFFICE O/P NEW SF 15 MIN: CPT | Performed by: NURSE PRACTITIONER

## 2020-12-07 ASSESSMENT — PAIN DESCRIPTION - ORIENTATION: ORIENTATION: RIGHT

## 2020-12-07 ASSESSMENT — PAIN SCALES - GENERAL: PAINLEVEL_OUTOF10: 7

## 2020-12-07 ASSESSMENT — PAIN DESCRIPTION - LOCATION: LOCATION: LEG

## 2020-12-07 ASSESSMENT — PAIN DESCRIPTION - PAIN TYPE: TYPE: ACUTE PAIN

## 2020-12-07 NOTE — PROGRESS NOTES
bleeding    Colitis    Pain and swelling due to varicose veins of both lower extremities    Venous insufficiency of left lower extremity    Asymptomatic varicose veins of bilateral lower extremities    Non-healing surgical wound    Claudication in peripheral vascular disease ([address]    Non-pressure chronic ulcer of calf, right, with fat layer exposed ([address]    Non-pressure chronic ulcer of calf with fat layer exposed ([address]        PAST SURGICAL HISTORY    Past Surgical History:   Procedure Laterality Date    CARDIAC CATHETERIZATION      CARPAL TUNNEL RELEASE      COLONOSCOPY      HYSTERECTOMY, TOTAL ABDOMINAL      KNEE ARTHROPLASTY Bilateral     SD COLON CA SCRN NOT  [address] IND N/A 2017    COLONOSCOPY performed by Alva Hidalgo MD at [address] ESOPHAGOGASTRODUODENOSCOPY TRANSORAL DIAGNOSTIC N/A 2017    EGD ESOPHAGOGASTRODUODENOSCOPY performed by [facility]go [facility] [address] [facility] Bilateral     SKIN BIOPSY Right 2020    EXCISION OF LEG MASS RIGHT (PAT ON ADMIT) performed by Mendel Guallpa MD at [address]    Family History   Problem Relation Age of Onset    Arthritis Father     Other Father         gout    Heart Failure Father     Cancer Sister        SOCIAL HISTORY    Social History     Tobacco Use    Smoking status: Former Smoker     Packs/day: 1.00     Years: 20.00     Pack years: 20.00     Last attempt to quit: 1980     Years since quittin.9    Smokeless tobacco: Never Used   Substance Use Topics    Alcohol use: No    Drug use: Never       ALLERGIES    Allergies   Allergen Reactions    Codeine      Nausea, lightheadedness, dizzy       MEDICATIONS    Current Outpatient Medications on File Prior to Encounter   Medication Sig Dispense Refill    pravastatin (PRAVACHOL) 40 MG tablet TAKE ONE TABLET BY MOUTH DAILY 30 tablet 0    gabapentin (NEURONTIN) 400 MG capsule TAKE ONE CAPSULE BY MOUTH THREE TIMES A DAY 90 capsule 0    tiZANidine (ZANAFLEX) 4 MG tablet TAKE ONE TABLET BY MOUTH THREE TIMES A DAY AS NEEDED FOR MUSCLE SPASMS / PAIN 90 tablet 0    TRINTELLIX 10 MG TABS tablet TAKE ONE TABLET BY MOUTH EVERY DAY 30 tablet 5    loratadine (CLARITIN) 10 MG tablet Take 1 tablet by mouth daily 30 tablet 0    amLODIPine (NORVASC) 5 MG tablet TAKE ONE TABLET BY MOUTH DAILY AT BEDTIME  3    donepezil (ARICEPT) 10 MG tablet take one-half tablet by mouth at bedtime for 2 weeks then increase to 1 tablet at bedtime  3    albuterol sulfate  (90 Base) MCG/ACT inhaler Inhale 2 puffs into the lungs 4 times daily as needed for Wheezing 3 Inhaler 1    aspirin 81 MG EC tablet Take 1 tablet by mouth daily 30 tablet 3    vitamin D (CHOLECALCIFEROL) 1000 UNIT TABS tablet Take 2 tablets by mouth daily 60 tablet 3    melatonin 1 MG tablet Take 1 tablet by mouth nightly as needed for Sleep 30 tablet 2    Oxygen Concentrator        No current facility-administered medications on file prior to encounter. REVIEW OF SYSTEMS    Pertinent items are noted in HPI. Objective:      BP (!) 178/93   Pulse 60   Temp 98.3 °F (36.8 °C) (Temporal)   Resp 18   LMP  (LMP Unknown)     Wt Readings from Last 3 Encounters:   09/17/20 140 lb (63.5 kg)   09/04/20 142 lb (64.4 kg)   08/13/20 140 lb (63.5 kg)       PHYSICAL EXAM    Constitutional:   Well nourished and well developed. Appears neat and clean. Patient is alert, oriented x3, and in no apparent distress. Respiratory:  Respiratory effort is easy and symmetric bilaterally. Rate is normal at rest and on room air. Vascular:  Pedal Pulses is not palpable and audible signal noted with doppler. Capillary refill is <5 sec to digits bilateral.  Extremities negative for pitting edema. Neurological:  Gross and Light touch intact. Protective sensation intact to bilateral lower extremities. Dermatological:  Wound description noted in wound assessment.   Wound bed is clean with red granular tissue over base with good wound contraction evident. Small amount clear drainage, no odor, no unusual trvea wound erythema. Psychiatric:  Judgement and insight intact. Short and long term memory intact. No evidence of depression, anxiety, or agitation. Patient is calm, cooperative, and communicative. Appropriate interactions and affect. Assessment:      Problem List Items Addressed This Visit     None           Procedure Note  Indications:  Based on my examination of this patient's wound(s)/ulcer(s) today, debridement is not required to promote healing and evaluate the wound base.     Wound 08/07/20 Leg Right;Medial #1 (Active)   Wound Image   12/07/20 1348   Wound Etiology Non-Healing Surgical 12/07/20 1348   Wound Cleansed Not Cleansed 10/23/20 1131   Dressing/Treatment Hydrofera blue;Dry dressing 11/24/20 1450   Wound Length (cm) 4.5 cm 12/07/20 1348   Wound Width (cm) 1.5 cm 12/07/20 1348   Wound Depth (cm) 0.1 cm 12/07/20 1348   Wound Surface Area (cm^2) 6.75 cm^2 12/07/20 1348   Change in Wound Size % (l*w) -800 12/07/20 1348   Wound Volume (cm^3) 0.68 cm^3 12/07/20 1348   Wound Healing % -750 12/07/20 1348   Post-Procedure Length (cm) 4.9 cm 11/24/20 1437   Post-Procedure Width (cm) 2.3 cm 11/24/20 1437   Post-Procedure Depth (cm) 0.3 cm 11/24/20 1437   Post-Procedure Surface Area (cm^2) 11.27 cm^2 11/24/20 1437   Post-Procedure Volume (cm^3) 3.38 cm^3 11/24/20 1437   Undermining Starts ___ O'Clock 9 10/09/20 1054   Undermining Ends___ O'Clock 12 10/09/20 1054   Undermining Maxium Distance (cm) 0.5 10/09/20 1054   Wound Assessment Granulation tissue;Slough 12/07/20 1348   Drainage Amount Moderate 12/07/20 1348   Drainage Description Serosanguinous;Brown 12/07/20 1348   Odor None 12/07/20 1348   Treva-wound Assessment Warm;Blanchable erythema;Edematous 12/07/20 1348   Margins Defined edges 12/07/20 1348   Wound Thickness Description not for Pressure Injury Full thickness 12/07/20 1348   Number of days: 122                  Plan:     Continue same plan of care (see below). Recheck in 3 weeks. Treatment Note please see attached Discharge Instructions    Written patient dismissal instructions given to patient and signed by patient or POA. Discharge Instructions         Discharge Instructions        [facility] and [facility] Medicine   Physician Orders and Discharge [address]  [address] [facility]  [place], [facility]  Telephone: [phone]      [phone]        NAME: Geraldo Contreras  DATE OF BIRTH:  1945  MEDICAL RECORD NUMBER:  [id number]     Home Care/Facility:   Counts include 234 beds at the [facility]       Wound Location:   RIGHT LOWER LEG  Dressing orders:     1. Cleanse wound(s) with normal saline. 2. Apply dry HYDROFERA BLUE READY FOAM or equivalent to wound bed. 3. Cover with 4x4's and wrap with gauze (hazel or kerlix)  4. Change  Every other day or Monday, Wednesday, and Friday      Compression:  Apply 5-10mmHg LOW compression hose to RIGHT lower leg(s), may remove at bedtime, reapply first thing in the morning, avoid prolonged standing, elevate legs when sitting.     Offloading Device:   none     Other Instructions:  Keep all dressings clean, dry and intact.  Keep pressure off the wound(s) at all times.      Follow up visit  3 WEEKS    December 28, 2020 AT      Please give 24 hour notice if unable to keep appointment.  [phone]     If you experience any of the following, please call the Wound Care Service at  [phone] or go to the nearest emergency room.        *Increase in pain         *Temperature over 101           *Increase in drainage from your wound or a foul odor  *Uncontrolled swelling            *Need for compression bandage changes due to slippage, breakthrough drainage       PLEASE NOTE: IF YOU ARE UNABLE TO OBTAIN WOUND SUPPLIES, CONTINUE TO USE THE SUPPLIES YOU HAVE AVAILABLE UNTIL YOU ARE ABLE TO REACH US. IT IS MOST IMPORTANT TO KEEP THE WOUND COVERED AT ALL TIMESDue to the COVID-19 surge and the mandate to reduce the Wound Center hours to only 2 days per week until further notice, the patient was told that if there is any worsening of the wound(s), he/she should call us immediately to prevent having to go to the Emergency Room.    Electronically signed by YULY Miguel NP on 12/7/2020 at 2:29 PM          Electronically signed by YULY Miguel NP on 12/7/2020 at 2:30 PM

## 2020-12-17 ENCOUNTER — OFFICE VISIT (OUTPATIENT)
Dept: FAMILY MEDICINE CLINIC | Age: 75
End: 2020-12-17
Payer: MEDICARE

## 2020-12-17 VITALS
BODY MASS INDEX: 30.44 KG/M2 | OXYGEN SATURATION: 98 % | WEIGHT: 145 LBS | HEIGHT: 58 IN | TEMPERATURE: 97.6 F | SYSTOLIC BLOOD PRESSURE: 142 MMHG | DIASTOLIC BLOOD PRESSURE: 90 MMHG | HEART RATE: 91 BPM

## 2020-12-17 PROCEDURE — G0438 PPPS, INITIAL VISIT: HCPCS | Performed by: NURSE PRACTITIONER

## 2020-12-17 ASSESSMENT — ENCOUNTER SYMPTOMS
COUGH: 0
PHOTOPHOBIA: 0
ABDOMINAL DISTENTION: 0
BACK PAIN: 0
ABDOMINAL PAIN: 0
CHEST TIGHTNESS: 0
COLOR CHANGE: 0
SHORTNESS OF BREATH: 0

## 2020-12-17 ASSESSMENT — PATIENT HEALTH QUESTIONNAIRE - PHQ9
2. FEELING DOWN, DEPRESSED OR HOPELESS: 1
SUM OF ALL RESPONSES TO PHQ QUESTIONS 1-9: 2
SUM OF ALL RESPONSES TO PHQ QUESTIONS 1-9: 2
SUM OF ALL RESPONSES TO PHQ9 QUESTIONS 1 & 2: 2
1. LITTLE INTEREST OR PLEASURE IN DOING THINGS: 1
SUM OF ALL RESPONSES TO PHQ QUESTIONS 1-9: 2

## 2020-12-17 ASSESSMENT — LIFESTYLE VARIABLES: HOW OFTEN DO YOU HAVE A DRINK CONTAINING ALCOHOL: 0

## 2020-12-17 NOTE — PATIENT INSTRUCTIONS
Schedule appointment for dental check up      Personalized Preventive Plan for Marilia Padilla - 12/17/2020  Medicare offers a range of preventive health benefits. Some of the tests and screenings are paid in full while other may be subject to a deductible, co-insurance, and/or copay. Some of these benefits include a comprehensive review of your medical history including lifestyle, illnesses that may run in your family, and various assessments and screenings as appropriate. After reviewing your medical record and screening and assessments performed today your provider may have ordered immunizations, labs, imaging, and/or referrals for you. A list of these orders (if applicable) as well as your Preventive Care list are included within your After Visit Summary for your review. Other Preventive Recommendations:    · A preventive eye exam performed by an eye specialist is recommended every 1-2 years to screen for glaucoma; cataracts, macular degeneration, and other eye disorders. · A preventive dental visit is recommended every 6 months. · Try to get at least 150 minutes of exercise per week or 10,000 steps per day on a pedometer . · Order or download the FREE \"Exercise & Physical Activity: Your Everyday Guide\" from [employer]. Call 2-623.507.2750 or search [employer] online. · You need 0139-3376 mg of calcium and 4107-7423 IU of vitamin D per day. It is possible to meet your calcium requirement with diet alone, but a vitamin D supplement is usually necessary to meet this goal.  · When exposed to the sun, use a sunscreen that protects against both UVA and UVB radiation with an SPF of 30 or greater. Reapply every 2 to 3 hours or after sweating, drying off with a towel, or swimming. · Always wear a seat belt when traveling in a car. Always wear a helmet when riding a bicycle or motorcycle.

## 2020-12-17 NOTE — PROGRESS NOTES
Wheezing  Patient not taking: Reported on 12/17/2020  YULY Hernandez CNP         Past Medical History:   Diagnosis Date    Anxiety     Arthritis     COPD [facility] obstructive [facility] disease) [facility]     Generalized osteoarthrosis, unspecified site 12/27/2002    Hyperlipidemia     Hypertension     Major depressive disorder, single episode, moderate ([facility] 12/27/2002    Morbid obesity ([facility] 12/27/2002    OAB (overactive bladder)     Osteoporosis        Past Surgical History:   Procedure Laterality Date    CARDIAC CATHETERIZATION      CARPAL TUNNEL RELEASE      COLONOSCOPY      HYSTERECTOMY, TOTAL ABDOMINAL      KNEE ARTHROPLASTY Bilateral     NM COLON CA SCRN NOT  [address] IND N/A 6/22/2017    COLONOSCOPY performed by Zohreh Urbina MD at . [address] ESOPHAGOGASTRODUODENOSCOPY TRANSORAL DIAGNOSTIC N/A 6/22/2017    EGD ESOPHAGOGASTRODUODENOSCOPY performed by Zohreh Urbina MD at [address] Right 9/17/2020    EXCISION OF LEG MASS RIGHT (PAT ON ADMIT) performed by Steffen Leahy MD at [facility]         Family History   Problem Relation Age of Onset    Arthritis Father     Other Father         gout    Heart Failure Father     Cancer Sister        CareTeam (Including outside providers/suppliers regularly involved in providing care):   Patient Care Team:  YULY Hernandez CNP as PCP - General (Family Medicine)  YULY Hernandez CNP as PCP - [facility] Empaneled Provider    Wt Readings from Last 3 Encounters:   12/17/20 145 lb (65.8 kg)   09/17/20 140 lb (63.5 kg)   09/04/20 142 lb (64.4 kg)     Vitals:    12/17/20 1530 12/17/20 1546   BP: (!) 142/90 (!) 142/90   Site: Left Upper Arm    Position: Sitting    Cuff Size: Medium Adult    Pulse: 91    Temp: 97.6 °F (36.4 °C)    TempSrc: Infrared    SpO2: 98%    Weight: 145 lb (65.8 kg)    Height: 4' 10\" (1.473 m)      Body mass index is 30.31 kg/m². Based upon direct observation of the patient, evaluation of cognition reveals recent and remote memory intact. Somewhat forgetful during exam though. Patient's complete Health Risk Assessment and screening values have been reviewed and are found in Flowsheets. The following problems were reviewed today and where indicated follow up appointments were made and/or referrals ordered. Positive Risk Factor Screenings with Interventions:            General Health and ACP:  General  In general, how would you say your health is?: Fair  In the past 7 days, have you experienced any of the following?  New or Increased Pain, New or Increased Fatigue, Loneliness, Social Isolation, Stress or Anger?: (!) Stress  Do you get the social and emotional support that you need?: (!) No  Do you have a Living Will?: (!) No  Advance Directives     Power of  Living Will ACP-Advance Directive ACP-Power of     Not on File Not on File Not on File Not on File      General Health Risk Interventions:  · No Living Will: provided with information on living will  · feels she gets all the support she needs    Health Habits/Nutrition:  Health Habits/Nutrition  Do you exercise for at least 20 minutes 2-3 times per week?: (!) No  Have you lost any weight without trying in the past 3 months?: No  Do you eat fewer than 2 meals per day?: No  Have you seen a dentist within the past year?: (!) No  Body mass index: (!) 30.3  Health Habits/Nutrition Interventions:  · Inadequate physical activity:  does exercise some, walking as well   · Will schedule with dentist    Hearing/Vision:  No exam data present  Hearing/Vision  Do you or your family notice any trouble with your hearing?: No  Do you have difficulty driving, watching TV, or doing any of your daily activities because of your eyesight?: No  Have you had an eye exam within the past year?: (!) No  Hearing/Vision Interventions:  · Vision concerns:  patient encouraged to make appointment with his/her eye specialist      Personalized Preventive Plan   Current Health Maintenance Status  Immunization History   Administered Date(s) Administered    Influenza, High Dose (Fluzone 65 yrs and older) 10/03/2017, 09/17/2018    Influenza, Quadv, adjuvanted, 65 yrs +, IM, PF (Fluad) 09/03/2020    Pneumococcal Conjugate 13-valent ([id number]) 10/03/2017    Pneumococcal Polysaccharide ([id number]) 10/03/2018    Zoster Recombinant (Shingrix) 06/24/2019        Health Maintenance   Topic Date Due    DTaP/Tdap/Td vaccine (1 - Tdap) 07/29/1962    Annual Wellness Visit (AWV)  05/29/2019    Lipid screen  10/10/2020    Colon cancer screen colonoscopy  06/22/2027    DEXA (modify frequency per FRAX score)  Completed    Flu vaccine  Completed    Shingles Vaccine  Completed    Pneumococcal 65+ years Vaccine  Completed    Hepatitis A vaccine  Aged Out    Hepatitis B vaccine  Aged Out    Hib vaccine  Aged Out    Meningococcal (ACWY) vaccine  Aged Out     Recommendations for [place] Due: see orders and patient instructions/AVS.  . Recommended screening schedule for the next 5-10 years is provided to the patient in written form: see Patient Instructions/AVS.    Burnadette Vikki was seen today for medicare awv. Diagnoses and all orders for this visit:    Routine general medical examination at a health care facility    Essential hypertension  -     Comprehensive Metabolic Panel; Future    Hyperlipidemia, unspecified hyperlipidemia type  -     Lipid Panel; Future    Leg wound, left, subsequent encounter    Major depressive disorder, single episode, moderate ([address]           Subjective  Chief Complaint   Patient presents with    Medicare AWV       HPI    6 month checkup. Overall doing well. Continuing to follow with wound care for wound on right lower ext. Also having Satinder Wray coming to house for dressing changes. BP is up some today. Not clear if she is taking her amlodipine at home or not. Moods well controlled on trintellix. Current with neuro and cardiology.     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 ([address] 10/09/2020    Non-pressure chronic ulcer of calf, right, with fat layer exposed ([address] 10/02/2020    Claudication in peripheral vascular disease ([address] 08/11/2020    Non-healing surgical wound 08/07/2020    Venous insufficiency of left lower extremity 05/08/2020    Asymptomatic varicose veins of bilateral lower extremities 05/08/2020    Pain and swelling due to varicose veins of both lower extremities 04/27/2020    Slow transit constipation 10/13/2019    External hemorrhoid, bleeding 10/13/2019    Colitis 10/13/2019    Acute colitis 10/10/2019    Hypovolemic shock ([address] 10/07/2019    NSTEMI (non-ST elevated myocardial infarction) ([address] 10/05/2019    Acute diastolic heart failure ([address] 10/05/2019    Hypotension 10/01/2019    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    Hypertension     Hyperlipidemia     COPD (chronic obstructive pulmonary disease) (HCC)     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 ([address] 08/27/2010    Generalized osteoarthrosis, unspecified site 12/27/2002    Major depressive disorder, single episode, moderate ([address] 12/27/2002    Morbid obesity ([address] 12/27/2002     Past Surgical History:   Procedure Laterality Date    CARDIAC CATHETERIZATION      CARPAL TUNNEL RELEASE      COLONOSCOPY      HYSTERECTOMY, TOTAL ABDOMINAL      KNEE ARTHROPLASTY Bilateral     VA COLON CA SCRN NOT  [address] IND N/A 6/22/2017 Outpatient Medications on File Prior to Visit   Medication Sig Dispense Refill    pravastatin (PRAVACHOL) 40 MG tablet TAKE ONE TABLET BY MOUTH DAILY 30 tablet 0    gabapentin (NEURONTIN) 400 MG capsule TAKE ONE CAPSULE BY MOUTH THREE TIMES A DAY 90 capsule 0    TRINTELLIX 10 MG TABS tablet TAKE ONE TABLET BY MOUTH EVERY DAY 30 tablet 5    loratadine (CLARITIN) 10 MG tablet Take 1 tablet by mouth daily 30 tablet 0    aspirin 81 MG EC tablet Take 1 tablet by mouth daily 30 tablet 3    vitamin D (CHOLECALCIFEROL) 1000 UNIT TABS tablet Take 2 tablets by mouth daily 60 tablet 3    melatonin 1 MG tablet Take 1 tablet by mouth nightly as needed for Sleep 30 tablet 2    Oxygen Concentrator       tiZANidine (ZANAFLEX) 4 MG tablet TAKE ONE TABLET BY MOUTH THREE TIMES A DAY AS NEEDED FOR MUSCLE SPASMS / PAIN 90 tablet 0    amLODIPine (NORVASC) 5 MG tablet TAKE ONE TABLET BY MOUTH DAILY AT BEDTIME  3    donepezil (ARICEPT) 10 MG tablet take one-half tablet by mouth at bedtime for 2 weeks then increase to 1 tablet at bedtime  3    albuterol sulfate  (90 Base) MCG/ACT inhaler Inhale 2 puffs into the lungs 4 times daily as needed for Wheezing (Patient not taking: Reported on 12/17/2020) 3 Inhaler 1     No current facility-administered medications on file prior to visit. Allergies   Allergen Reactions    Codeine      Nausea, lightheadedness, dizzy       Review of Systems   Constitutional: Negative for chills, diaphoresis, fatigue and fever. HENT: Negative for congestion and ear pain. Eyes: Negative for photophobia and visual disturbance. Respiratory: Negative for cough, chest tightness and shortness of breath. Cardiovascular: Negative for chest pain, palpitations and leg swelling. Gastrointestinal: Negative for abdominal distention and abdominal pain. Endocrine: Negative for polydipsia, polyphagia and polyuria. Genitourinary: Negative for difficulty urinating, dyspareunia and dysuria. Coordination: Coordination normal.      Gait: Gait normal.   Psychiatric:         Mood and Affect: Mood normal.         Behavior: Behavior normal.         Thought Content: Thought content normal.         Judgment: Judgment normal.         Assessment& Plan     Diagnosis Orders   1. Routine general medical examination at a health care facility     2. Essential hypertension  Comprehensive Metabolic Panel   3. Hyperlipidemia, unspecified hyperlipidemia type  Lipid Panel   4. Leg wound, left, subsequent encounter     5. Major depressive disorder, single episode, moderate ([facility]       Check labs as ordered. Continue to follow with all specialists. Will call from home with update on current home med list.  F/u in 6 months or sooner PRN. Side effects, adverse effects of the medication prescribed today, as well as treatment plan/ rationale and result expectations have been discussed with the patient who expresses understanding and desires to proceed. Close follow up to evaluate treatment results and for coordination of care. I have reviewed the patient's medical history in detail and updated the computerized patient record. As always, patient is advised that if symptoms worsen in any way they will proceed to the nearest emergency room. Orders Placed This Encounter   Procedures    Lipid Panel     Standing Status:   Future     Standing Expiration Date:   12/17/2021     Order Specific Question:   Is Patient Fasting?/# of Hours     Answer:   y/12    Comprehensive Metabolic Panel     Standing Status:   Future     Standing Expiration Date:   12/17/2021       No orders of the defined types were placed in this encounter. Return in 6 months (on 6/17/2021) for Medicare Annual Wellness Visit in 1 year.     Petra Schwab, APRN - CNP

## 2020-12-20 RX ORDER — PRAVASTATIN SODIUM 40 MG
TABLET ORAL
Qty: 30 TABLET | Refills: 0 | Status: SHIPPED | OUTPATIENT
Start: 2020-12-20 | End: 2021-01-25

## 2020-12-22 RX ORDER — GABAPENTIN 400 MG/1
CAPSULE ORAL
Qty: 90 CAPSULE | Refills: 0 | Status: SHIPPED | OUTPATIENT
Start: 2020-12-22 | End: 2021-02-12

## 2020-12-28 ENCOUNTER — HOSPITAL ENCOUNTER (OUTPATIENT)
Dept: WOUND CARE | Age: 75
Discharge: HOME | End: 2020-12-28
Payer: MEDICARE

## 2020-12-28 VITALS
HEART RATE: 63 BPM | TEMPERATURE: 98.3 F | RESPIRATION RATE: 18 BRPM | SYSTOLIC BLOOD PRESSURE: 137 MMHG | DIASTOLIC BLOOD PRESSURE: 62 MMHG

## 2020-12-28 PROCEDURE — 99213 OFFICE O/P EST LOW 20 MIN: CPT

## 2020-12-28 NOTE — CODE DOCUMENTATION
[address] Physician Billing Sheet. Jaianila Mock  AGE: 66 y.o.    GENDER: female  : 1945  TODAY'S DATE:  2020    ICD-10 2000 [facility] Problems    Diagnosis Date Noted    Non-pressure chronic ulcer of calf with fat layer exposed ([address] [L97.202] 10/09/2020    Non-pressure chronic ulcer of calf, right, with fat layer exposed ([address] Emmett Rosa 10/02/2020    Non-healing surgical wound [T81.89XA] 2020    Venous insufficiency of left lower extremity [I87.2] 2020       PHYSICIAN PROCEDURES    CPT CODE  15515      Electronically signed by Lata Elder DPM on 2020 at 3:28 PM

## 2020-12-28 NOTE — PROGRESS NOTES
[address]                                                   Progress Note and Procedure Note      Sandy Velasquez  MEDICAL RECORD NUMBER:  [id number]  AGE: 66 y.o. GENDER: female  : 1945  EPISODE DATE:  2020    Subjective:     Chief Complaint   Patient presents with    Wound Check     right [id number] leg         HISTORY of PRESENT ILLNESS HPI     Sandy Velasquez is a 66 y.o. female who presents today for wound/ulcer evaluation. History of Wound Context: Patient present for her second visit s/p surgical wound from an I&D by Dr. Sarah Patricio.    Quality of pain: aching  Severity:  3  10   Modifying Factors: None  Associated Signs/Symptoms: edema    Ulcer Identification:  Ulcer Type: venous  Contributing Factors: edema and venous stasis    Wound: N/A        PAST MEDICAL HISTORY        Diagnosis Date    Anxiety     Arthritis     COPD (chronic obstructive pulmonary disease) (HCC)     Generalized osteoarthrosis, unspecified site 2002    Hyperlipidemia     Hypertension     Major depressive disorder, single episode, moderate ([address] 2002    Morbid obesity ([address] 2002    OAB (overactive bladder)     Osteoporosis        PAST SURGICAL HISTORY    Past Surgical History:   Procedure Laterality Date    CARDIAC CATHETERIZATION      CARPAL TUNNEL RELEASE      COLONOSCOPY      HYSTERECTOMY, TOTAL ABDOMINAL      KNEE ARTHROPLASTY Bilateral     [place] COLON CA SCRN NOT  [address] IND N/A 2017    COLONOSCOPY performed by Mayelin Rojas MD at . [facility] ESOPHAGOGASTRODUODENOSCOPY TRANSORAL DIAGNOSTIC N/A 2017    EGD ESOPHAGOGASTRODUODENOSCOPY performed by Mayelin Rojas MD at [address] Right 2020    EXCISION OF LEG MASS RIGHT (PAT ON ADMIT) performed by Teena Mercado MD at [facility]    Family History   Problem Relation Age of Onset    Arthritis Father     Other Father         gout    Heart Failure Father     Cancer Sister        SOCIAL HISTORY    Social History     Tobacco Use    Smoking status: Former Smoker     Packs/day: 1.00     Years: 20.00     Pack years: 20.00     Quit date:      Years since quittin.0    Smokeless tobacco: Never Used   Substance Use Topics    Alcohol use: No    Drug use: Never       ALLERGIES    Allergies   Allergen Reactions    Codeine      Nausea, lightheadedness, dizzy       MEDICATIONS    Current Outpatient Medications on File Prior to Encounter   Medication Sig Dispense Refill    gabapentin (NEURONTIN) 400 MG capsule TAKE ONE CAPSULE BY MOUTH THREE TIMES A DAY 90 capsule 0    pravastatin (PRAVACHOL) 40 MG tablet TAKE ONE TABLET BY MOUTH EVERY DAY 30 tablet 0    tiZANidine (ZANAFLEX) 4 MG tablet TAKE ONE TABLET BY MOUTH THREE TIMES A DAY AS NEEDED FOR MUSCLE SPASMS / PAIN 90 tablet 0    TRINTELLIX 10 MG TABS tablet TAKE ONE TABLET BY MOUTH EVERY DAY 30 tablet 5    loratadine (CLARITIN) 10 MG tablet Take 1 tablet by mouth daily 30 tablet 0    amLODIPine (NORVASC) 5 MG tablet TAKE ONE TABLET BY MOUTH DAILY AT BEDTIME  3    donepezil (ARICEPT) 10 MG tablet take one-half tablet by mouth at bedtime for 2 weeks then increase to 1 tablet at bedtime  3    albuterol sulfate  (90 Base) MCG/ACT inhaler Inhale 2 puffs into the lungs 4 times daily as needed for Wheezing (Patient not taking: Reported on 2020) 3 Inhaler 1    aspirin 81 MG EC tablet Take 1 tablet by mouth daily 30 tablet 3    vitamin D (CHOLECALCIFEROL) 1000 UNIT TABS tablet Take 2 tablets by mouth daily 60 tablet 3    melatonin 1 MG tablet Take 1 tablet by mouth nightly as needed for Sleep 30 tablet 2    Oxygen Concentrator        No current facility-administered medications on file prior to encounter. REVIEW OF SYSTEMS    Pertinent items are noted in HPI.     Objective:      /62   Pulse 63   Temp 98.3 °F (36.8 °C) (Temporal)   Resp 18 LMP  (LMP Unknown)     Wt Readings from Last 3 Encounters:   12/17/20 145 lb (65.8 kg)   09/17/20 140 lb (63.5 kg)   09/04/20 142 lb (64.4 kg)       PHYSICAL EXAM    Constitutional:   Well nourished and well developed. Appears neat and clean. Patient is alert, oriented x3, and in no apparent distress. Respiratory:  Respiratory effort is easy and symmetric bilaterally. Rate is normal at rest and on room air. Vascular:  Pedal Pulses is not palpable and audible with doppler. Capillary refill is <3 sec to digits bilateral.  Extremities negative for pitting edema. Neurological:   Sensation is intact to lower extremities. Dermatological:  Wound description noted in wound assessment. The wound is much improved again in depth and has no erythema noted. The base of the wound is all granular with minimal  hypergranulation tissue noted. Psychiatric:  Judgement and insight intact. Short and long term memory intact. No evidence of depression, anxiety, or agitation. Patient is calm, cooperative, and communicative. Appropriate interactions and affect. Assessment:      Problem List Items Addressed This Visit     None           Procedure Note  Indications:  Based on my examination of this patient's wound(s)/ulcer(s) today, debridement is not required to promote healing and evaluate the wound base.       Wound 08/07/20 Leg Right;Medial #1 (Active)   Wound Image   12/28/20 1421   Wound Etiology Non-Healing Surgical 12/28/20 1421   Wound Cleansed Cleansed with saline 12/28/20 1421   Dressing/Treatment Hydrofera blue;Dry dressing 11/24/20 1450   Wound Length (cm) 4.3 cm 12/28/20 1421   Wound Width (cm) 1 cm 12/28/20 1421   Wound Depth (cm) 0.55 cm 12/28/20 1421   Wound Surface Area (cm^2) 4.3 cm^2 12/28/20 1421   Change in Wound Size % (l*w) -473.33 12/28/20 1421   Wound Volume (cm^3) 2.37 cm^3 12/28/20 1421   Wound Healing % -2862 12/28/20 1421   Post-Procedure Length (cm) 4.9 cm 11/24/20 1437 Post-Procedure Width (cm) 2.3 cm 11/24/20 1437   Post-Procedure Depth (cm) 0.3 cm 11/24/20 1437   Post-Procedure Surface Area (cm^2) 11.27 cm^2 11/24/20 1437   Post-Procedure Volume (cm^3) 3.38 cm^3 11/24/20 1437   Undermining Starts ___ O'Clock 5 12/28/20 1421   Undermining Ends___ O'Clock 6 12/28/20 1421   Undermining Maxium Distance (cm) 0.3 12/28/20 1421   Wound Assessment Granulation tissue 12/28/20 1421   Drainage Amount Small 12/28/20 1421   Drainage Description Serosanguinous 12/28/20 1421   Odor None 12/28/20 1421   Tabby-wound Assessment Intact 12/28/20 1421   Margins Undefined edges 12/28/20 1421   Wound Thickness Description not for Pressure Injury Full thickness 12/28/20 1421   Number of days: 143         Plan:   Patient examined. Hydrofera blue  Compression low at all times  Follow up in 3 weeks      Treatment Note please see attached Discharge Instructions    Written patient dismissal instructions given to patient and signed by patient or POA. Discharge [address] and [facility]   Physician Orders and Discharge [address]  Telephone: [phone]      [phone]        NAME: Dougie Mock  DATE OF BIRTH:  1945  MEDICAL RECORD NUMBER:  [id number]     Home Care/Facility:   [facility]       Wound Location:   RIGHT LOWER LEG  Dressing orders:     1. Cleanse wound(s) with normal saline. 2. Apply dry HYDROFERA BLUE READY FOAM or equivalent to wound bed. WORDS ON DRESSING GO UP - PLAIN SIDE GOES ON WOUND. 3. Cover with 4x4's and wrap with gauze (hazel or kerlix)  4.  Change  Every other day or Monday, Wednesday, and Friday    TODAY ONLY APPLY ARTURO AND DRY DRESSING      Compression:  Apply 5-10mmHg LOW compression hose to RIGHT lower leg(s), may remove at bedtime, reapply first thing in the morning, avoid prolonged standing, elevate legs when

## 2020-12-28 NOTE — PLAN OF CARE
Problem: Pain:  Goal: Pain level will decrease  Description: Pain level will decrease  Outcome: Ongoing     Problem: Pain:  Goal: Control of chronic pain  Description: Control of chronic pain  Outcome: Ongoing     Problem: Wound:  Goal: Will show signs of wound healing; wound closure and no evidence of infection  Description: Will show signs of wound healing; wound closure and no evidence of infection  Outcome: Ongoing

## 2021-01-15 RX ORDER — VORTIOXETINE 10 MG/1
TABLET, FILM COATED ORAL
Qty: 30 TABLET | Refills: 5 | Status: SHIPPED | OUTPATIENT
Start: 2021-01-15 | End: 2021-04-29 | Stop reason: SDUPTHER

## 2021-01-18 DIAGNOSIS — I10 ESSENTIAL HYPERTENSION: ICD-10-CM

## 2021-01-18 DIAGNOSIS — E78.5 HYPERLIPIDEMIA, UNSPECIFIED HYPERLIPIDEMIA TYPE: ICD-10-CM

## 2021-01-18 LAB
ALBUMIN SERPL-MCNC: 4.2 G/DL (ref 3.5–4.6)
ALP BLD-CCNC: 61 U/L (ref 40–130)
ALT SERPL-CCNC: 17 U/L (ref 0–33)
ANION GAP SERPL CALCULATED.3IONS-SCNC: 14 MEQ/L (ref 9–15)
AST SERPL-CCNC: 24 U/L (ref 0–35)
BILIRUB SERPL-MCNC: 0.5 MG/DL (ref 0.2–0.7)
BUN BLDV-MCNC: 21 MG/DL (ref 8–23)
CALCIUM SERPL-MCNC: 9.7 MG/DL (ref 8.5–9.9)
CHLORIDE BLD-SCNC: 105 MEQ/L (ref 95–107)
CHOLESTEROL, TOTAL: 196 MG/DL (ref 0–199)
CO2: 24 MEQ/L (ref 20–31)
CREAT SERPL-MCNC: 0.87 MG/DL (ref 0.5–0.9)
GFR AFRICAN AMERICAN: >60
GFR NON-AFRICAN AMERICAN: >60
GLOBULIN: 2.6 G/DL (ref 2.3–3.5)
GLUCOSE BLD-MCNC: 69 MG/DL (ref 70–99)
HDLC SERPL-MCNC: 96 MG/DL (ref 40–59)
LDL CHOLESTEROL CALCULATED: 87 MG/DL (ref 0–129)
POTASSIUM SERPL-SCNC: 4.4 MEQ/L (ref 3.4–4.9)
SODIUM BLD-SCNC: 143 MEQ/L (ref 135–144)
TOTAL PROTEIN: 6.8 G/DL (ref 6.3–8)
TRIGL SERPL-MCNC: 65 MG/DL (ref 0–150)

## 2021-01-25 DIAGNOSIS — E78.5 HYPERLIPIDEMIA, UNSPECIFIED HYPERLIPIDEMIA TYPE: ICD-10-CM

## 2021-01-25 RX ORDER — PRAVASTATIN SODIUM 40 MG
TABLET ORAL
Qty: 30 TABLET | Refills: 0 | Status: SHIPPED | OUTPATIENT
Start: 2021-01-25 | End: 2021-02-12

## 2021-01-26 ENCOUNTER — HOSPITAL ENCOUNTER (OUTPATIENT)
Dept: WOUND CARE | Age: 76
Discharge: HOME | End: 2021-01-26
Payer: MEDICARE

## 2021-01-26 VITALS
SYSTOLIC BLOOD PRESSURE: 145 MMHG | DIASTOLIC BLOOD PRESSURE: 97 MMHG | HEART RATE: 74 BPM | RESPIRATION RATE: 16 BRPM | TEMPERATURE: 96.6 F

## 2021-01-26 PROCEDURE — 99213 OFFICE O/P EST LOW 20 MIN: CPT

## 2021-01-26 RX ORDER — GENTAMICIN SULFATE 1 MG/G
OINTMENT TOPICAL
Qty: 15 G | Refills: 2 | Status: SHIPPED | OUTPATIENT
Start: 2021-01-26 | End: 2021-02-02

## 2021-01-26 NOTE — PROGRESS NOTES
[address]                                                   Progress Note and Procedure Note      Abril Carrasquillo  MEDICAL RECORD NUMBER:  [id number]  AGE: 66 y.o. GENDER: female  : 1945  EPISODE DATE:  2021    Subjective:     Chief Complaint   Patient presents with    Wound Check     right leg wound recheck         HISTORY of PRESENT ILLNESS HPI     Abril Carrasquillo is a 66 y.o. female who presents today for wound/ulcer evaluation. History of Wound Context: Patient present for her non healing surgical wound from an I&D by Dr. Byron Ballesteros.    Quality of pain: aching  Severity:  3 / 10   Modifying Factors: None  Associated Signs/Symptoms: edema    Ulcer Identification:  Ulcer Type: venous  Contributing Factors: edema and venous stasis    Wound: N/A        PAST MEDICAL HISTORY        Diagnosis Date    Anxiety     Arthritis     COPD [facility]ch[facility] obstructive [facility] disease) ([facility])     Generalized osteoarthrosis, unspecified site 2002    Hyperlipidemia     Hypertension     Major depressive disorder, single episode, moderate ([address] 2002    Morbid obesity ([address] 2002    OAB (overactive bladder)     Osteoporosis        PAST SURGICAL HISTORY    Past Surgical History:   Procedure Laterality Date    CARDIAC CATHETERIZATION      CARPAL TUNNEL RELEASE      COLONOSCOPY      HYSTERECTOMY, TOTAL ABDOMINAL      KNEE ARTHROPLASTY Bilateral     [place] COLON CA SCRN NOT  [address] IND N/A 2017    COLONOSCOPY performed by Rosio Aguilar MD at . [address] ESOPHAGOGASTRODUODENOSCOPY TRANSORAL DIAGNOSTIC N/A 2017    EGD ESOPHAGOGASTRODUODENOSCOPY performed by Rosio Aguilar MD at [address] Right 2020    EXCISION OF LEG MASS RIGHT (PAT ON ADMIT) performed by Larisa Umana MD at [address]    Family History   Problem Relation Age of Onset    Arthritis Father     Other Father         gout    Heart Failure Father     Cancer Sister        SOCIAL HISTORY    Social History     Tobacco Use    Smoking status: Former Smoker     Packs/day: 1.00     Years: 20.00     Pack years: 20.00     Quit date:      Years since quittin.0    Smokeless tobacco: Never Used   Substance Use Topics    Alcohol use: No    Drug use: Never       ALLERGIES    Allergies   Allergen Reactions    Codeine      Nausea, lightheadedness, dizzy       MEDICATIONS    Current Outpatient Medications on File Prior to Encounter   Medication Sig Dispense Refill    pravastatin (PRAVACHOL) 40 MG tablet TAKE ONE TABLET BY MOUTH EVERY DAY 30 tablet 0    TRINTELLIX 10 MG TABS tablet TAKE ONE TABLET BY MOUTH DAILY 30 tablet 5    tiZANidine (ZANAFLEX) 4 MG tablet TAKE ONE TABLET BY MOUTH THREE TIMES A DAY AS NEEDED FOR MUSCLE SPASMS / PAIN 90 tablet 0    loratadine (CLARITIN) 10 MG tablet Take 1 tablet by mouth daily 30 tablet 0    amLODIPine (NORVASC) 5 MG tablet TAKE ONE TABLET BY MOUTH DAILY AT BEDTIME  3    donepezil (ARICEPT) 10 MG tablet take one-half tablet by mouth at bedtime for 2 weeks then increase to 1 tablet at bedtime  3    albuterol sulfate  (90 Base) MCG/ACT inhaler Inhale 2 puffs into the lungs 4 times daily as needed for Wheezing 3 Inhaler 1    aspirin 81 MG EC tablet Take 1 tablet by mouth daily 30 tablet 3    vitamin D (CHOLECALCIFEROL) 1000 UNIT TABS tablet Take 2 tablets by mouth daily 60 tablet 3    melatonin 1 MG tablet Take 1 tablet by mouth nightly as needed for Sleep 30 tablet 2    Oxygen Concentrator       gabapentin (NEURONTIN) 400 MG capsule TAKE ONE CAPSULE BY MOUTH THREE TIMES A DAY 90 capsule 0     No current facility-administered medications on file prior to encounter. REVIEW OF SYSTEMS    Pertinent items are noted in HPI.     Objective:      BP (!) 145/97   Pulse 74   Temp 96.6 °F (35.9 °C) (Temporal)   Resp 16   LMP  (LMP Unknown)     Wt Readings from Last 3 Encounters:   12/17/20 145 lb (65.8 kg)   09/17/20 140 lb (63.5 kg)   09/04/20 142 lb (64.4 kg)       PHYSICAL EXAM    Constitutional:   Well nourished and well developed. Appears neat and clean. Patient is alert, oriented x3, and in no apparent distress. Respiratory:  Respiratory effort is easy and symmetric bilaterally. Rate is normal at rest and on room air. Vascular:  Pedal Pulses is not palpable and audible with doppler. Capillary refill is <3 sec to digits bilateral.  Extremities negative for pitting edema. Neurological:   Sensation is intact to lower extremities. Dermatological:  Wound description noted in wound assessment. The wound is much improved again in depth and has no erythema noted. The base of the wound is all granular withoutl  hypergranulation tissue noted today. Psychiatric:  Judgement and insight intact. Short and long term memory intact. No evidence of depression, anxiety, or agitation. Patient is calm, cooperative, and communicative. Appropriate interactions and affect. Assessment:      Problem List Items Addressed This Visit     None           Procedure Note  Indications:  Based on my examination of this patient's wound(s)/ulcer(s) today, debridement is not required to promote healing and evaluate the wound base.       Wound 08/07/20 Leg Right;Medial #1 (Active)   Wound Image   01/26/21 1359   Wound Etiology Non-Healing Surgical 01/26/21 1359   Wound Cleansed Cleansed with saline 01/26/21 1359   Dressing/Treatment Hydrofera blue;Dry dressing 11/24/20 1450   Wound Length (cm) 3.9 cm 01/26/21 1359   Wound Width (cm) 0.6 cm 01/26/21 1359   Wound Depth (cm) 0.2 cm 01/26/21 1359   Wound Surface Area (cm^2) 2.34 cm^2 01/26/21 1359   Change in Wound Size % (l*w) -212 01/26/21 1359   Wound Volume (cm^3) 0.47 cm^3 01/26/21 1359   Wound Healing % -488 01/26/21 1359   Post-Procedure Length (cm) 4.9 cm 11/24/20 1437   Post-Procedure Width (cm) 2.3 cm 11/24/20 1437 Post-Procedure Depth (cm) 0.3 cm 11/24/20 1437   Post-Procedure Surface Area (cm^2) 11.27 cm^2 11/24/20 1437   Post-Procedure Volume (cm^3) 3.38 cm^3 11/24/20 1437   Undermining Starts ___ O'Clock 5 12/28/20 1421   Undermining Ends___ O'Clock 6 12/28/20 1421   Undermining Maxium Distance (cm) 0.3 12/28/20 1421   Wound Assessment Granulation tissue;Slough 01/26/21 1359   Drainage Amount Small 01/26/21 1359   Drainage Description Serosanguinous 01/26/21 1359   Odor None 01/26/21 1359   Tabby-wound Assessment Intact 01/26/21 1359   Margins Defined edges 01/26/21 1359   Wound Thickness Description not for Pressure Injury Full thickness 01/26/21 1359   Number of days: 172         Plan:   Patient examined. RX for Gentamicin and dsd daily  Compression low at all times  Follow up in 3 weeks      Treatment Note please see attached Discharge Instructions    Written patient dismissal instructions given to patient and signed by patient or POA. Discharge [address] and Hyperbaric Medicine   Physician Orders and Discharge [address], [address] [place] [address]  Telephone: [phone]      [phone]        NAME: Katie Mock  DATE OF BIRTH:  1945  MEDICAL RECORD NUMBER:  [id number]     Home Care/Facility:   [facility]       Wound Location:   RIGHT LOWER LEG  Dressing orders:     1. Cleanse wound(s) with normal saline. 2. Apply A THIN LAYER OF GENTAMICIN OINTMENT TO WOUND. 3. COVER WITH A DRY DRESSING. 4. CHANGE DRESSING EVERY DAY.       Compression:  Apply 5-10mmHg LOW compression hose to RIGHT lower leg(s), may remove at bedtime, reapply first thing in the morning, avoid prolonged standing, elevate legs when sitting.     Offloading Device:   none     Other Instructions:    GENTAMICIN OINTMENT AT PHARMACY,  Keep all dressings clean, dry and intact.  Keep pressure off the wound(s) at all times.      Follow up visit  3 WEEKS    February 16, 2021 AT       Please give 24 hour notice if unable to keep appointment. [phone]     If you experience any of the following, please call the Wound Care Service at  [phone] or go to the nearest emergency room.        *Increase in pain         *Temperature over 101           *Increase in drainage from your wound or a foul odor  *Uncontrolled swelling            *Need for compression bandage changes due to slippage, breakthrough drainage       PLEASE NOTE: IF YOU ARE UNABLE TO OBTAIN WOUND SUPPLIES, CONTINUE TO USE THE SUPPLIES YOU HAVE AVAILABLE UNTIL YOU ARE ABLE TO [address]  IT IS MOST IMPORTANT TO KEEP THE WOUND COVERED AT ALL TIMES  Electronically signed by Jameson Maravilla DPM on 1/26/2021 at 2:23 PM          Electronically signed by Jameson Maravilla DPM on 1/26/2021 at 2:27 PM

## 2021-01-26 NOTE — CODE DOCUMENTATION
[address] Phys[address] Billing Sheet. Paulette Mock  AGE: 66 y.o.    GENDER: female  : 1945  TODAY'S DATE:  2021    ICD-10  [facility] Problems    Diagnosis Date Noted    Non-pressure chronic ulcer of calf, right, with fat layer exposed ([facility] [L97.212] 10/02/2020    Non-healing surgical wound [T81.89XA] 2020    Asymptomatic varicose veins of bilateral lower extremities [I83.93] 2020       PHYSICIAN PROCEDURES    CPT CODE  29401      Electronically signed by oJsh Lutz DPM on 2021 at 2:29 PM

## 2021-01-28 ENCOUNTER — NURSE ONLY (OUTPATIENT)
Dept: FAMILY MEDICINE CLINIC | Age: 76
End: 2021-01-28

## 2021-01-28 VITALS — SYSTOLIC BLOOD PRESSURE: 161 MMHG | DIASTOLIC BLOOD PRESSURE: 87 MMHG

## 2021-01-28 DIAGNOSIS — Z01.30 BP CHECK: Primary | ICD-10-CM

## 2021-01-28 NOTE — PROGRESS NOTES
Yes needs to be on amlodipine, possibly prescribed by cardiology. Would recommend calling cardiology for refill since I have never filled.

## 2021-02-12 DIAGNOSIS — E78.5 HYPERLIPIDEMIA, UNSPECIFIED HYPERLIPIDEMIA TYPE: ICD-10-CM

## 2021-02-12 RX ORDER — PRAVASTATIN SODIUM 40 MG
TABLET ORAL
Qty: 30 TABLET | Refills: 0 | Status: SHIPPED | OUTPATIENT
Start: 2021-02-12 | End: 2021-02-26 | Stop reason: SDUPTHER

## 2021-02-12 RX ORDER — GABAPENTIN 400 MG/1
CAPSULE ORAL
Qty: 90 CAPSULE | Refills: 3 | Status: SHIPPED | OUTPATIENT
Start: 2021-02-12 | End: 2021-08-31

## 2021-02-23 ENCOUNTER — HOSPITAL ENCOUNTER (OUTPATIENT)
Dept: WOUND CARE | Age: 76
Discharge: HOME | End: 2021-02-23
Payer: MEDICARE

## 2021-02-23 VITALS
SYSTOLIC BLOOD PRESSURE: 103 MMHG | TEMPERATURE: 98.4 F | DIASTOLIC BLOOD PRESSURE: 68 MMHG | RESPIRATION RATE: 18 BRPM | HEART RATE: 58 BPM

## 2021-02-23 PROCEDURE — 87070 CULTURE OTHR SPECIMN AEROBIC: CPT

## 2021-02-23 PROCEDURE — 87075 CULTR BACTERIA EXCEPT BLOOD: CPT

## 2021-02-23 PROCEDURE — 87205 SMEAR GRAM STAIN: CPT

## 2021-02-23 PROCEDURE — 11042 DBRDMT SUBQ TIS 1ST 20SQCM/<: CPT

## 2021-02-23 ASSESSMENT — PAIN DESCRIPTION - LOCATION: LOCATION: LEG

## 2021-02-23 ASSESSMENT — PAIN DESCRIPTION - FREQUENCY: FREQUENCY: INTERMITTENT

## 2021-02-23 ASSESSMENT — PAIN DESCRIPTION - DESCRIPTORS: DESCRIPTORS: ACHING

## 2021-02-23 ASSESSMENT — PAIN DESCRIPTION - ORIENTATION: ORIENTATION: RIGHT

## 2021-02-23 NOTE — PROGRESS NOTES
Mel Herrera [address]                                                   Progress Note and Procedure Note      Joya Cates  MEDICAL RECORD NUMBER:  [id number]  AGE: 66 y.o. GENDER: female  : 1945  EPISODE DATE:  2021    Subjective:     Chief Complaint   Patient presents with    Wound Check     right leg         HISTORY of PRESENT ILLNESS HPI     Joya Cates is a 66 y.o. female who presents today for wound/ulcer evaluation. History of Wound Context: Patient presents for non- healing surgical wound of the right leg.    Wound/Ulcer Pain Timing/Severity: intermittent  Quality of pain: aching, burning  Severity:  4 / 10   Modifying Factors: Pain worsens with walking  Associated Signs/Symptoms: edema, drainage and pain    Ulcer Identification:  Ulcer Type: venous and non-healing surgical  Contributing Factors: edema and venous stasis    Wound: External surgical dehiscence        PAST MEDICAL HISTORY        Diagnosis Date    Anxiety     Arthritis     COPD (chronic obstructive pulmonary disease) ([facility])     Generalized osteoarthrosis, unspecified site 2002    Hyperlipidemia     Hypertension     Major depressive disorder, single episode, moderate ([address] 2002    Morbid obesity ([address] 2002    OAB (overactive bladder)     Osteoporosis        PAST SURGICAL HISTORY    Past Surgical History:   Procedure Laterality Date    CARDIAC CATHETERIZATION      CARPAL TUNNEL RELEASE      COLONOSCOPY      HYSTERECTOMY, TOTAL ABDOMINAL      KNEE ARTHROPLASTY Bilateral     [place] COLON CA SCRN NOT  [address] IND N/A 2017    COLONOSCOPY performed by Sherry Franklin MD at . [facility] ESOPHAGOGASTRODUODENOSCOPY TRANSORAL DIAGNOSTIC N/A 2017    EGD ESOPHAGOGASTRODUODENOSCOPY performed by Sherry Franklin MD at [address] Bilateral    [address] [facility] Right 2020    EXCISION OF LEG MASS RIGHT (PAT ON ADMIT) performed by León Christian Rachel Alcazar MD at [address] [place] [address] Rd    Family History   Problem Relation Age of Onset    Arthritis Father     Other Father         gout    Heart Failure Father     Cancer Sister        SOCIAL HISTORY    Social History     Tobacco Use    Smoking status: Former Smoker     Packs/day: 1.00     Years: 20.00     Pack years: 20.00     Quit date:      Years since quittin.1    Smokeless tobacco: Never Used   Substance Use Topics    Alcohol use: No    Drug use: Never       ALLERGIES    Allergies   Allergen Reactions    Codeine      Nausea, lightheadedness, dizzy       MEDICATIONS    Current Outpatient Medications on File Prior to Encounter   Medication Sig Dispense Refill    gabapentin (NEURONTIN) 400 MG capsule TAKE ONE CAPSULE BY MOUTH THREE TIMES A DAY 90 capsule 3    pravastatin (PRAVACHOL) 40 MG tablet TAKE ONE TABLET BY MOUTH EVERY DAY 30 tablet 0    TRINTELLIX 10 MG TABS tablet TAKE ONE TABLET BY MOUTH DAILY 30 tablet 5    tiZANidine (ZANAFLEX) 4 MG tablet TAKE ONE TABLET BY MOUTH THREE TIMES A DAY AS NEEDED FOR MUSCLE SPASMS / PAIN 90 tablet 0    loratadine (CLARITIN) 10 MG tablet Take 1 tablet by mouth daily 30 tablet 0    amLODIPine (NORVASC) 5 MG tablet TAKE ONE TABLET BY MOUTH DAILY AT BEDTIME  3    donepezil (ARICEPT) 10 MG tablet take one-half tablet by mouth at bedtime for 2 weeks then increase to 1 tablet at bedtime  3    albuterol sulfate  (90 Base) MCG/ACT inhaler Inhale 2 puffs into the lungs 4 times daily as needed for Wheezing 3 Inhaler 1    aspirin 81 MG EC tablet Take 1 tablet by mouth daily 30 tablet 3    vitamin D (CHOLECALCIFEROL) 1000 UNIT TABS tablet Take 2 tablets by mouth daily 60 tablet 3    melatonin 1 MG tablet Take 1 tablet by mouth nightly as needed for Sleep 30 tablet 2    Oxygen Concentrator        No current facility-administered medications on file prior to encounter.         REVIEW OF SYSTEMS    Pertinent items are noted in (cm^3) 0.05 cm^3 02/23/21 1522   Undermining Starts ___ O'Clock 7 02/23/21 1443   Undermining Ends___ O'Clock 11 02/23/21 1443   Undermining Maxium Distance (cm) 0.3 02/23/21 1443   Wound Assessment Granulation tissue;Slough 02/23/21 1443   Drainage Amount Small 02/23/21 1443   Drainage Description Serosanguinous 02/23/21 1443   Odor None 02/23/21 1443   Tabby-wound Assessment Intact; Hyperpigmented 02/23/21 1443   Margins Defined edges 02/23/21 1443   Wound Thickness Description not for Pressure Injury Full thickness 02/23/21 1443   Number of days: 200       Wound 02/23/21 Leg Right;Medial;Distal #2 (Active)   Wound Image   02/23/21 1455   Wound Etiology Non-Healing Surgical 02/23/21 1455   Wound Cleansed Cleansed with saline 02/23/21 1455   Wound Length (cm) 0.5 cm 02/23/21 1455   Wound Width (cm) 0.5 cm 02/23/21 1455   Wound Depth (cm) 0.3 cm 02/23/21 1455   Wound Surface Area (cm^2) 0.25 cm^2 02/23/21 1455   Wound Volume (cm^3) 0.08 cm^3 02/23/21 1455   Post-Procedure Length (cm) 0.5 cm 02/23/21 1522   Post-Procedure Width (cm) 0.5 cm 02/23/21 1522   Post-Procedure Depth (cm) 1 cm 02/23/21 1522   Post-Procedure Surface Area (cm^2) 0.25 cm^2 02/23/21 1522   Post-Procedure Volume (cm^3) 0.25 cm^3 02/23/21 1522   Undermining Starts ___ O'Clock 3 02/23/21 1455   Undermining Ends___ O'Clock 9 02/23/21 1455   Undermining Maxium Distance (cm) 0.4 02/23/21 1455   Wound Assessment Granulation tissue;Slough 02/23/21 1455   Drainage Amount Small 02/23/21 1455   Drainage Description Serosanguinous 02/23/21 1455   Odor None 02/23/21 1455   Tabby-wound Assessment Intact 02/23/21 1455   Margins Defined edges 02/23/21 1455   Wound Thickness Description not for Pressure Injury Full thickness 02/23/21 1455   Number of days: 0       Percent of Wound/Ulcer Debrided: 100%    Total Surface Area Debrided:  0.5 sq cm     Diabetic/Pressure/Non Pressure Ulcers:  Ulcer:      Other: surgical    Bleeding:  Minimal    Hemostasis Achieved:  by pressure    Procedural Pain:  4  / 10     Post Procedural Pain:  1 / 10     Response to treatment:  Well tolerated by patient. Plan:     Patient examined and debrided  Culture taken  Follow up in one week    Treatment Note please see attached Discharge Instructions    Written patient dismissal instructions given to patient and signed by patient or POA. Discharge [address] and Hyperbaric Medicine   Physician Orders and Discharge [address], [address] [place] [address]  Telephone: [phone]      [phone]        NAME: Iesha Mock  DATE OF BIRTH:  1945  MEDICAL RECORD NUMBER:  [id number]     Home Care/Facility:   [facility]       Wound Location:   RIGHT LOWER LEG  Dressing orders:     1. Cleanse wound(s) with normal saline. 2. Apply A THIN LAYER OF GENTAMICIN OINTMENT TO WOUND. 3. COVER WITH HYDROFERA BLUE READY AND A DRY DRESSING. 4. CHANGE DRESSING EVERY DAY.     Compression:  Apply 5-10mmHg LOW compression hose to RIGHT lower leg(s), may remove at bedtime, reapply first thing in the morning, avoid prolonged standing, elevate legs when sitting.     Offloading Device:   none     Other Instructions:    Keep all dressings clean, dry and intact.  Keep pressure off the wound(s) at all times.      Follow up visit  1 WEEK   MARCH 2, 2021 AT          Please give 24 hour notice if unable to keep appointment.  [phone]     If you experience any of the following, please call the Wound Care Service at  [phone] or go to the nearest emergency room.        *Increase in pain         *Temperature over 101           *Increase in drainage from your wound or a foul odor  *Uncontrolled swelling            *Need for compression bandage changes due to slippage, breakthrough drainage       PLEASE NOTE: IF YOU ARE UNABLE TO OBTAIN WOUND SUPPLIES, CONTINUE TO USE THE SUPPLIES YOU HAVE AVAILABLE UNTIL YOU ARE ABLE TO REACH US.  IT IS MOST IMPORTANT TO KEEP THE WOUND COVERED AT ALL TIMES  Electronically signed by Jone Nix DPM on 2/23/2021 at 3:31 PM          Electronically signed by Jone Nix DPM on 2/23/2021 at 3:33 PM

## 2021-02-23 NOTE — CODE DOCUMENTATION
[address] Phys[address] Billing Sheet. Perla Mock  AGE: 66 y.o.    GENDER: female  : 1945  TODAY'S DATE:  2021    ICD-10  [facility] Problems    Diagnosis Date Noted    Non-pressure chronic ulcer of calf, right, with fat layer exposed ([facility] [L97.212] 10/02/2020    Non-healing surgical wound [T81.89XA] 2020    Venous insufficiency of left lower extremity [I87.2] 2020       PHYSICIAN PROCEDURES    CPT CODE  52975 RT      Electronically signed by Be De Leon DPM on 2021 at 3:37 PM

## 2021-02-25 ENCOUNTER — TELEPHONE (OUTPATIENT)
Dept: WOUND CARE | Age: 76
End: 2021-02-25

## 2021-02-25 LAB
ANAEROBIC CULTURE: ABNORMAL
GRAM STAIN RESULT: ABNORMAL
ORGANISM: ABNORMAL
WOUND/ABSCESS: ABNORMAL

## 2021-02-25 RX ORDER — AMOXICILLIN AND CLAVULANATE POTASSIUM 875; 125 MG/1; MG/1
1 TABLET, FILM COATED ORAL 2 TIMES DAILY
Qty: 20 TABLET | Refills: 0 | Status: SHIPPED | OUTPATIENT
Start: 2021-02-25 | End: 2021-03-07

## 2021-02-25 NOTE — TELEPHONE ENCOUNTER
This RN spoke with patient's  to inform him Dr. Mayur Garcia escribed a prescription for Augmentin, to please  and have patient take two times a day until finished, spouse verbalized understanding.

## 2021-02-26 DIAGNOSIS — E78.5 HYPERLIPIDEMIA, UNSPECIFIED HYPERLIPIDEMIA TYPE: ICD-10-CM

## 2021-02-26 RX ORDER — PRAVASTATIN SODIUM 40 MG
TABLET ORAL
Qty: 30 TABLET | Refills: 5 | Status: SHIPPED | OUTPATIENT
Start: 2021-02-26 | End: 2021-04-29 | Stop reason: SDUPTHER

## 2021-03-02 ENCOUNTER — HOSPITAL ENCOUNTER (OUTPATIENT)
Dept: WOUND CARE | Age: 76
Discharge: HOME | End: 2021-03-02
Payer: MEDICARE

## 2021-03-02 VITALS
TEMPERATURE: 98.4 F | RESPIRATION RATE: 20 BRPM | SYSTOLIC BLOOD PRESSURE: 159 MMHG | DIASTOLIC BLOOD PRESSURE: 74 MMHG | HEART RATE: 68 BPM

## 2021-03-02 PROCEDURE — 11042 DBRDMT SUBQ TIS 1ST 20SQCM/<: CPT

## 2021-03-02 RX ORDER — GENTAMICIN SULFATE 1 MG/G
OINTMENT TOPICAL
Qty: 15 G | Refills: 1 | Status: SHIPPED | OUTPATIENT
Start: 2021-03-02 | End: 2021-03-09

## 2021-03-02 NOTE — PROGRESS NOTES
Mel Her[address]                                                   Progress Note and Procedure Note      Georges Nation  MEDICAL RECORD NUMBER:  [id number]  AGE: 66 y.o. GENDER: female  : 1945  EPISODE DATE:  3/2/2021    Subjective:     Chief Complaint   Patient presents with    Wound Check     right medial distal and proximal leg          HISTORY of PRESENT ILLNESS HPI     Georges Nation is a 66 y.o. female who presents today for wound/ulcer evaluation. History of Wound Context: Patient presents for non- healing surgical wound of the right leg.    Wound/Ulcer Pain Timing/Severity: intermittent  Quality of pain: aching, burning  Severity:  4 / 10   Modifying Factors: Pain worsens with walking  Associated Signs/Symptoms: edema, drainage and pain    Ulcer Identification:  Ulcer Type: venous and non-healing surgical  Contributing Factors: edema and venous stasis    Wound: External surgical dehiscence        PAST MEDICAL HISTORY        Diagnosis Date    Anxiety     Arthritis     COPD (chronic obstructive pulmonary disease) (HCC)     Generalized osteoarthrosis, unspecified site 2002    Hyperlipidemia     Hypertension     Major depressive disorder, single episode, moderate ([address] 2002    Morbid obesity ([address] 2002    OAB (overactive bladder)     Osteoporosis        PAST SURGICAL HISTORY    Past Surgical History:   Procedure Laterality Date    CARDIAC CATHETERIZATION      CARPAL TUNNEL RELEASE      COLONOSCOPY      HYSTERECTOMY, TOTAL ABDOMINAL      KNEE ARTHROPLASTY Bilateral     AL COLON CA SCRN NOT  [address] IND N/A 2017    [facility]ON[facility] ESOPHAGOGASTRODUODENOSCOPY TRANSORAL DIAGNOSTIC N/A 2017    EGD ESOPHAGOGASTRODUODENOSCOPY performed by Anatoly Lowry MD at [address] [facility] Right 2020    EXCISION OF LEG MASS RIGHT (PAT ON ADMIT) performed by Lillian Head MD at [address] [place] [address]    Family History   Problem Relation Age of Onset    Arthritis Father     Other Father         gout    Heart Failure Father     Cancer Sister        SOCIAL HISTORY    Social History     Tobacco Use    Smoking status: Former Smoker     Packs/day: 1.00     Years: 20.00     Pack years: 20.00     Quit date:      Years since quittin.1    Smokeless tobacco: Never Used   Substance Use Topics    Alcohol use: No    Drug use: Never       ALLERGIES    Allergies   Allergen Reactions    Codeine      Nausea, lightheadedness, dizzy       MEDICATIONS    Current Outpatient Medications on File Prior to Encounter   Medication Sig Dispense Refill    pravastatin (PRAVACHOL) 40 MG tablet TAKE ONE TABLET BY MOUTH EVERY DAY 30 tablet 5    amoxicillin-clavulanate (AUGMENTIN) 875-125 MG per tablet Take 1 tablet by mouth 2 times daily for 10 days 20 tablet 0    gabapentin (NEURONTIN) 400 MG capsule TAKE ONE CAPSULE BY MOUTH THREE TIMES A DAY 90 capsule 3    TRINTELLIX 10 MG TABS tablet TAKE ONE TABLET BY MOUTH DAILY 30 tablet 5    tiZANidine (ZANAFLEX) 4 MG tablet TAKE ONE TABLET BY MOUTH THREE TIMES A DAY AS NEEDED FOR MUSCLE SPASMS / PAIN 90 tablet 0    loratadine (CLARITIN) 10 MG tablet Take 1 tablet by mouth daily 30 tablet 0    amLODIPine (NORVASC) 5 MG tablet TAKE ONE TABLET BY MOUTH DAILY AT BEDTIME  3    donepezil (ARICEPT) 10 MG tablet take one-half tablet by mouth at bedtime for 2 weeks then increase to 1 tablet at bedtime  3    albuterol sulfate  (90 Base) MCG/ACT inhaler Inhale 2 puffs into the lungs 4 times daily as needed for Wheezing 3 Inhaler 1    aspirin 81 MG EC tablet Take 1 tablet by mouth daily 30 tablet 3    vitamin D (CHOLECALCIFEROL) 1000 UNIT TABS tablet Take 2 tablets by mouth daily 60 tablet 3    melatonin 1 MG tablet Take 1 tablet by mouth nightly as needed for Sleep 30 tablet 2    Oxygen Concentrator No current facility-administered medications on file prior to encounter. REVIEW OF SYSTEMS    Pertinent items are noted in HPI. Objective:      BP (!) 159/74   Pulse 68   Temp 98.4 °F (36.9 °C) (Temporal)   Resp 20   LMP  (LMP Unknown)     Wt Readings from Last 3 Encounters:   12/17/20 145 lb (65.8 kg)   09/17/20 140 lb (63.5 kg)   09/04/20 142 lb (64.4 kg)       PHYSICAL EXAM    Constitutional:   Well nourished and well developed. Appears neat and clean. Patient is alert, oriented x3, and in no apparent distress. Respiratory:  Respiratory effort is easy and symmetric bilaterally. Rate is normal at rest and on room air. Vascular:  Pedal Pulses is palpable and audible with doppler. Capillary refill is <3 sec to digits bilateral.  Extremities negative for pitting edema. Neurological:   Sensation is intact to lower extremities. Dermatological:  Wound description noted in wound assessment. The wound(s) are The wound is healthy and grnaular with decreased depth and minimal yellow slough. .  Improved pain  Erythema noted. Psychiatric:  Judgement and insight intact. Short and long term memory intact. No evidence of depression, anxiety, or agitation. Patient is calm, cooperative, and communicative. Appropriate interactions and affect.         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    Hypotension I95.9    NSTEMI (non-ST elevated myocardial infarction) (HCC) I21.4    Acute diastolic heart failure (HCC) I50.31    Hypovolemic shock (HCC) R57.1    Acute colitis K52.9    Slow transit constipation K59.01    External hemorrhoid, bleeding K64.4    Colitis K52.9    Pain and swelling due to varicose veins of both lower extremities I83.813    Venous insufficiency of left lower extremity I87.2    Asymptomatic varicose veins of bilateral lower extremities I83.93    Non-healing surgical wound T81.89XA    Claudication in peripheral vascular disease (HCC) I73.9    Non-pressure chronic ulcer of calf, right, with fat layer exposed ([address] L97.212    Non-pressure chronic ulcer of calf with fat layer exposed ([facility] L97.202        Procedure Note  Indications:  Based on my examination of this patient's wound(s)/ulcer(s) today, debridement is required to prepare the wound bed today for application of a skin substitute. Performed by: Zhane Peralta DPM    Consent obtained:  Yes    Time out taken:  Yes    Pain Control:         Debridement:Excisional Debridement    Using forceps the wound(s)/ulcer(s) was/were sharply debrided down through and including the removal of epidermis, dermis and subcutaneous tissue.         Devitalized Tissue Debrided:  slough    Pre Debridement Measurements:  Are located in the [place]  Documentation Flow Sheet    Wound/Ulcer #: 2    Post Debridement Measurements:  Wound/Ulcer Descriptions are Pre Debridement except measurements:    Wound 08/07/20 Leg Right;Medial;Proximal #1 (Active)   Wound Image   03/02/21 1430   Wound Etiology Non-Healing Surgical 03/02/21 1430   Wound Cleansed Cleansed with saline 03/02/21 1430   Dressing/Treatment Hydrofera blue;Dry dressing 11/24/20 1450   Wound Length (cm) 0.3 cm 03/02/21 1430   Wound Width (cm) 0.2 cm 03/02/21 1430   Wound Depth (cm) 0.2 cm 03/02/21 1430   Wound Surface Area (cm^2) 0.06 cm^2 03/02/21 1430   Change in Wound Size % (l*w) 92 03/02/21 1430   Wound Volume (cm^3) 0.01 cm^3 03/02/21 1430   Wound Healing % 88 03/02/21 1430   Post-Procedure Length (cm) 0.5 cm 02/23/21 1522   Post-Procedure Width (cm) 0.5 cm 02/23/21 1522   Post-Procedure Depth (cm) 0.2 cm 02/23/21 1522   Post-Procedure Surface Area (cm^2) 0.25 cm^2 02/23/21 1522   Post-Procedure Volume (cm^3) 0.05 cm^3 02/23/21 1522   Undermining Starts ___ O'Clock 7 02/23/21 1443   Undermining Ends___ O'Clock 11 02/23/21 1443   Undermining Maxium Distance (cm) 0.3 02/23/21 1443   Wound Assessment Granulation tissue;Slough 03/02/21 1430   Drainage Amount Scant 03/02/21 1430   Drainage Description Serosanguinous 03/02/21 1430   Odor None 03/02/21 1430   Tabby-wound Assessment Intact 03/02/21 1430   Margins Attached edges 03/02/21 1430   Wound Thickness Description not for Pressure Injury Full thickness 03/02/21 1430   Number of days: 207       Wound 02/23/21 Leg Right;Medial;Distal #2 (Active)   Wound Image   03/02/21 1430   Wound Etiology Non-Healing Surgical 03/02/21 1430   Wound Cleansed Cleansed with saline 03/02/21 1430   Wound Length (cm) 0.4 cm 03/02/21 1430   Wound Width (cm) 0.3 cm 03/02/21 1430   Wound Depth (cm) 0.5 cm 03/02/21 1430   Wound Surface Area (cm^2) 0.12 cm^2 03/02/21 1430   Change in Wound Size % (l*w) 52 03/02/21 1430   Wound Volume (cm^3) 0.06 cm^3 03/02/21 1430   Wound Healing % 25 03/02/21 1430   Post-Procedure Length (cm) 0.5 cm 02/23/21 1522   Post-Procedure Width (cm) 0.5 cm 02/23/21 1522   Post-Procedure Depth (cm) 1 cm 02/23/21 1522   Post-Procedure Surface Area (cm^2) 0.25 cm^2 02/23/21 1522   Post-Procedure Volume (cm^3) 0.25 cm^3 02/23/21 1522   Undermining Starts ___ O'Clock 3 03/02/21 1430   Undermining Ends___ O'Clock 9 03/02/21 1430   Undermining Maxium Distance (cm) 0.6 03/02/21 1430   Wound Assessment Granulation tissue;Slough 03/02/21 1430   Drainage Amount Small 03/02/21 1430   Drainage Description Serosanguinous 03/02/21 1430   Odor None 03/02/21 1430   Tabby-wound Assessment Intact 03/02/21 1430   Margins Defined edges 03/02/21 1430   Wound Thickness Description not for Pressure Injury Full thickness 03/02/21 1430   Number of days: 7         Percent of Wound/Ulcer Debrided: 100%    Total Surface Area Debrided:  0.25 sq cm     Diabetic/Pressure/Non Pressure Ulcers:  Ulcer: Other: surgical    Bleeding:  Minimal    Hemostasis Achieved:  by pressure    Procedural Pain:  4  / 10     Post Procedural Pain:  1 / 10     Response to treatment:  Well tolerated by patient. Plan:     Patient examined and debrided  Culture reviewed  Finish antibiotics  Gentamicin and arturo to both areas  Follow up in two  weeks    Treatment Note please see attached Discharge Instructions    Written patient dismissal instructions given to patient and signed by patient or POA. Discharge [address] and Hyperbaric Medicine   Physician Orders and Discharge [address], [address] [place] [address]  Telephone: [phone]      [phone]        NAME: Castro Mock  DATE OF BIRTH:  1945  MEDICAL RECORD NUMBER:  [id number]     Home Care/Facility:   [facility]       Wound Location:   RIGHT LOWER LEG  Dressing orders:     1. Cleanse wound(s) with normal saline. 2. Apply A THIN LAYER OF GENTAMICIN OINTMENT TO WOUND LOWER WOUND. 3. APPLY SALINE MOIST ARTURO TO WOUNDS GENTLY PACK LOWER WOUND. 4. CHANGE DRESSING THREE TIMES A WEEK.     Compression:  Apply 5-10mmHg LOW compression hose to RIGHT lower leg(s), may remove at bedtime, reapply first thing in the morning, avoid prolonged standing, elevate legs when sitting.     Offloading Device:   none     Other Instructions:    Keep all dressings clean, dry and intact.  Keep pressure off the wound(s) at all times.      Follow up visit  2 WEEKS   MARCH 16, 2021 AT   2:00PM     Please give 24 hour notice if unable to keep appointment.  [phone]     If you experience any of the following, please call the Wound Care Service at  [phone] or go to the nearest emergency room.        *Increase in pain         *Temperature over 101           *Increase in drainage from your wound or a foul odor  *Uncontrolled swelling            *Need for compression bandage changes due to slippage, breakthrough drainage       PLEASE NOTE: IF YOU ARE UNABLE TO OBTAIN WOUND SUPPLIES, CONTINUE TO USE THE SUPPLIES YOU HAVE AVAILABLE UNTIL YOU ARE ABLE TO REACH US.  IT IS MOST IMPORTANT TO KEEP THE WOUND COVERED AT ALL TIMES  Electronically signed by Gerald Bean DPM on 3/2/2021 at 3:09 PM          Electronically signed by Gerald Bean DPM on 3/2/2021 at 3:12 PM

## 2021-03-16 ENCOUNTER — HOSPITAL ENCOUNTER (OUTPATIENT)
Dept: WOUND CARE | Age: 76
Discharge: HOME | End: 2021-03-16
Payer: MEDICARE

## 2021-03-16 VITALS
SYSTOLIC BLOOD PRESSURE: 172 MMHG | TEMPERATURE: 98.7 F | RESPIRATION RATE: 20 BRPM | HEART RATE: 70 BPM | DIASTOLIC BLOOD PRESSURE: 91 MMHG

## 2021-03-16 PROCEDURE — 99213 OFFICE O/P EST LOW 20 MIN: CPT

## 2021-03-16 NOTE — PROGRESS NOTES
from Last 3 Encounters:   12/17/20 145 lb (65.8 kg)   09/17/20 140 lb (63.5 kg)   09/04/20 142 lb (64.4 kg)       PHYSICAL EXAM    Constitutional:   Well nourished and well developed. Appears neat and clean. Patient is alert, oriented x3, and in no apparent distress. Respiratory:  Respiratory effort is easy and symmetric bilaterally. Rate is normal at rest and on room air. Vascular:  Pedal Pulses is not palpable and audible with doppler. Capillary refill is <3 sec to digits bilateral.  Extremities negative for pitting edema. Neurological:   Sensation is intact to lower extremities. Dermatological:  Wound description noted in wound assessment. The wound is much improved again in depth and has no erythema noted. The base of the wound is all granular with minimal  hypergranulation tissue noted. Psychiatric:  Judgement and insight intact. Short and long term memory intact. No evidence of depression, anxiety, or agitation. Patient is calm, cooperative, and communicative. Appropriate interactions and affect. Assessment:      Active Hospital Problems    Diagnosis Date Noted    Non-pressure chronic ulcer of calf, right, with fat layer exposed ([facility] [L97.212] 10/02/2020    Non-healing surgical wound [T81.89XA] 08/07/2020    Venous insufficiency of left lower extremity [I87.2] 05/08/2020    Asymptomatic varicose veins of bilateral lower extremities [I83.93] 05/08/2020     Procedure Note  Indications:  Based on my examination of this patient's wound(s)/ulcer(s) today, debridement is not required to promote healing and evaluate the wound base.     Wound 08/07/20 Leg Right;Medial;Proximal #1 (Active)   Wound Image   03/16/21 1434   Wound Etiology Non-Healing Surgical 03/16/21 1434   Wound Cleansed Cleansed with saline 03/16/21 1434   Dressing/Treatment Collagen with Ag;Dry dressing 03/02/21 1520   Wound Length (cm) 0.1 cm 03/16/21 1434   Wound Width (cm) 0.1 cm 03/16/21 1434   Wound Depth (cm) 0.1 cm 03/16/21 1434   Wound Surface Area (cm^2) 0.01 cm^2 03/16/21 1434   Change in Wound Size % (l*w) 98.67 03/16/21 1434   Wound Volume (cm^3) 0 cm^3 03/16/21 1434   Wound Healing % 100 03/16/21 1434   Post-Procedure Length (cm) 0.5 cm 02/23/21 1522   Post-Procedure Width (cm) 0.5 cm 02/23/21 1522   Post-Procedure Depth (cm) 0.2 cm 02/23/21 1522   Post-Procedure Surface Area (cm^2) 0.25 cm^2 02/23/21 1522   Post-Procedure Volume (cm^3) 0.05 cm^3 02/23/21 1522   Undermining Starts ___ O'Clock 7 02/23/21 1443   Undermining Ends___ O'Clock 11 02/23/21 1443   Undermining Maxium Distance (cm) 0.3 02/23/21 1443   Wound Assessment Epithelialization 03/16/21 1434   Drainage Amount None 03/16/21 1434   Drainage Description Serosanguinous 03/02/21 1430   Odor None 03/16/21 1434   Tabby-wound Assessment Intact 03/16/21 1434   Margins Defined edges 03/16/21 1434   Wound Thickness Description not for Pressure Injury Full thickness 03/16/21 1434   Number of days: 221       Wound 02/23/21 Leg Right;Medial;Distal #2 (Active)   Wound Image   03/16/21 1433   Wound Etiology Non-Healing Surgical 03/16/21 1433   Wound Cleansed Cleansed with saline 03/02/21 1430   Dressing/Treatment Antibacterial ointment;Collagen with Ag;Dry dressing 03/02/21 1520   Wound Length (cm) 0.2 cm 03/16/21 1433   Wound Width (cm) 0.2 cm 03/16/21 1433   Wound Depth (cm) 0.2 cm 03/16/21 1433   Wound Surface Area (cm^2) 0.04 cm^2 03/16/21 1433   Change in Wound Size % (l*w) 84 03/16/21 1433   Wound Volume (cm^3) 0.01 cm^3 03/16/21 1433   Wound Healing % 88 03/16/21 1433   Post-Procedure Length (cm) 0.4 cm 03/02/21 1510   Post-Procedure Width (cm) 0.3 cm 03/02/21 1510   Post-Procedure Depth (cm) 0.5 cm 03/02/21 1510   Post-Procedure Surface Area (cm^2) 0.12 cm^2 03/02/21 1510   Post-Procedure Volume (cm^3) 0.06 cm^3 03/02/21 1510   Undermining Starts ___ O'Clock 3 03/02/21 1430   Undermining Ends___ O'Clock 9 03/02/21 1430   Undermining Maxium Distance (cm) 0.6 03/02/21 1430   Wound Assessment Granulation tissue 03/16/21 1433   Drainage Amount Scant 03/16/21 1433   Drainage Description Yellow 03/16/21 1433   Odor None 03/16/21 1433   Tabby-wound Assessment Intact 03/16/21 1433   Margins Defined edges 03/16/21 1433   Wound Thickness Description not for Pressure Injury Full thickness 03/02/21 1430   Number of days: 20           Plan:   Patient examined. Gentamicin and Arturo to distal wound with dsd  Compression low at all times  Follow up in 3 weeks      Treatment Note please see attached Discharge Instructions    Written patient dismissal instructions given to patient and signed by patient or POA. Discharge [address] and Hyperbaric Medicine   Physician Orders and Discharge [address]  [facility], [address] [place] [address]  Telephone: [phone]      [phone]        NAME: Gopi Mock  DATE OF BIRTH:  1945  MEDICAL RECORD NUMBER:  [id number]     Home Care/Facility:   [facility]       Wound Location:   RIGHT LOWER LEG  Dressing orders:     1. Cleanse wound(s) with normal saline. 2. Apply A THIN LAYER OF GENTAMICIN OINTMENT TO WOUND LOWER WOUND. 3. APPLY SALINE MOIST ARTURO TO WOUNDS GENTLY PACK LOWER WOUND. 4. CHANGE DRESSING THREE TIMES A WEEK.     Compression:  Apply 5-10mmHg LOW compression hose to RIGHT lower leg(s), may remove at bedtime, reapply first thing in the morning, avoid prolonged standing, elevate legs when sitting.     Offloading Device:   none     Other Instructions:    Keep all dressings clean, dry and intact.  Keep pressure off the wound(s) at all times.      Follow up visit  3 WEEKS   April 6, 2021 AT       Please give 24 hour notice if unable to keep appointment.  [phone]     If you experience any of the following, please call the Wound Care Service at  [phone] or go to the nearest emergency room.        *Increase in pain         *Temperature over 101           *Increase in drainage from your wound or a foul odor  *Uncontrolled swelling            *Need for compression bandage changes due to slippage, breakthrough drainage       PLEASE NOTE: IF YOU ARE UNABLE TO OBTAIN WOUND SUPPLIES, CONTINUE TO USE THE SUPPLIES YOU HAVE AVAILABLE UNTIL YOU ARE ABLE TO REACH US.  IT IS MOST IMPORTANT TO KEEP THE WOUND COVERED AT ALL TIMES  Electronically signed by Karine Salgado DPM on 3/16/2021 at 2:46 PM          Electronically signed by Karine Salgado DPM on 3/16/2021 at 2:47 PM

## 2021-03-16 NOTE — CODE DOCUMENTATION
[address] Physician Billing Sheet. Felpie Carranza Nish  AGE: 66 y.o.    GENDER: female  : 1945  TODAY'S DATE:  3/16/2021    ICD-10 CODES  Active Hospital Problems    Diagnosis Date Noted    Non-pressure chronic ulcer of calf, right, with fat layer exposed ([facility] [L97.212] 10/02/2020    Non-healing surgical wound [T81.89XA] 2020    Venous insufficiency of left lower extremity [I87.2] 2020    Asymptomatic varicose veins of bilateral lower extremities [I83.93] 2020       PHYSICIAN PROCEDURES    CPT CODE  96600      Electronically signed by Radha Macario DPM on 3/16/2021 at 2:49 PM

## 2021-04-06 ENCOUNTER — HOSPITAL ENCOUNTER (OUTPATIENT)
Dept: WOUND CARE | Age: 76
Discharge: HOME | End: 2021-04-06
Payer: MEDICARE

## 2021-04-06 VITALS
SYSTOLIC BLOOD PRESSURE: 117 MMHG | DIASTOLIC BLOOD PRESSURE: 61 MMHG | HEART RATE: 49 BPM | RESPIRATION RATE: 20 BRPM | TEMPERATURE: 97.1 F

## 2021-04-06 PROCEDURE — 99212 OFFICE O/P EST SF 10 MIN: CPT | Performed by: NURSE PRACTITIONER

## 2021-04-06 PROCEDURE — 99213 OFFICE O/P EST LOW 20 MIN: CPT

## 2021-04-06 NOTE — DISCHARGE INSTR - COC
scale):    Last Weight:   Wt Readings from Last 1 Encounters:   12/17/20 145 lb (65.8 kg)     Mental Status:  {IP PT MENTAL STATUS:20030}    IV Access:  { MIESHA IV ACCESS:449092577}    Nursing Mobility/ADLs:  Walking   {[facility] DME AGXZ:426055199}  Transfer  {[facility] DME WGYK:210314666}  Bathing  {[facility] DME TDAR:682911836}  Dressing  {[facility] DME MOPK:627548367}  Toileting  {[facility] DME PHZA:218815440}  Feeding  {[facility] DME VCTZ:187396012}  Med Admin  {[facility] DME VZDV:351760206}  Med Delivery   { MIESHA MED Delivery:664121038}    Wound Care Documentation and Therapy:  Wound 08/07/20 Leg Right;Medial;Proximal #1 (Active)   Wound Image   04/06/21 1308   Wound Etiology Non-Healing Surgical 04/06/21 1308   Wound Cleansed Cleansed with saline 04/06/21 1308   Dressing/Treatment Antibacterial ointment;Collagen with Ag;Dry dressing 04/06/21 1324   Wound Length (cm) 0.4 cm 04/06/21 1308   Wound Width (cm) 0.3 cm 04/06/21 1308   Wound Depth (cm) 0.1 cm 04/06/21 1308   Wound Surface Area (cm^2) 0.12 cm^2 04/06/21 1308   Change in Wound Size % (l*w) 84 04/06/21 1308   Wound Volume (cm^3) 0.01 cm^3 04/06/21 1308   Wound Healing % 88 04/06/21 1308   Post-Procedure Length (cm) 0.5 cm 02/23/21 1522   Post-Procedure Width (cm) 0.5 cm 02/23/21 1522   Post-Procedure Depth (cm) 0.2 cm 02/23/21 1522   Post-Procedure Surface Area (cm^2) 0.25 cm^2 02/23/21 1522   Post-Procedure Volume (cm^3) 0.05 cm^3 02/23/21 1522   Undermining Starts ___ O'Clock 7 02/23/21 1443   Undermining Ends___ O'Clock 11 02/23/21 1443   Undermining Maxium Distance (cm) 0.3 02/23/21 1443   Wound Assessment Granulation tissue 04/06/21 1308   Drainage Amount None 04/06/21 1308   Drainage Description Serosanguinous 03/02/21 1430   Odor None 03/16/21 1434   Tabby-wound Assessment Other (Comment) 04/06/21 1308   Margins Defined edges 04/06/21 1308   Wound Thickness Description not for Pressure Injury Full thickness 04/06/21 1308   Number of days: 242       Wound 02/23/21 Leg Right;Medial;Distal Restriction: {CHP DME Yes amt example:215359087}  Last Modified Barium Swallow with Video (Video Swallowing Test): {Done Not Done HLFL:574636926}    Treatments at the Time of Hospital Discharge:   Respiratory Treatments: ***  Oxygen Therapy:  {Therapy; copd oxygen:81175}  Ventilator:    { CC Vent VNMP:744229423}    Rehab Therapies: {THERAPEUTIC INTERVENTION:9229175683}  Weight Bearing Status/Restrictions: { CC Weight Bearin}  Other Medical Equipment (for information only, NOT a DME order):  {EQUIPMENT:457754004}  Other Treatments: ***    Patient's personal belongings (please select all that are sent with patient):  {CHP DME Belongings:264528082}    RN SIGNATURE:  {Esignature:741549306}    CASE MANAGEMENT/SOCIAL WORK SECTION    Inpatient Status Date: ***    Readmission Risk Assessment Score:  Readmission Risk              Risk of Unplanned Readmission:        0           Discharging to Facility/ Agency   · Name:   · Address:  · Phone:  · Fax:    Dialysis Facility (if applicable)   · Name:  · Address:  · Dialysis Schedule:  · Phone:  · Fax:    / signature: {Esignature:283224763}    PHYSICIAN SECTION    Prognosis: {Prognosis:6801852642}    Condition at Discharge: [address] [place] [address] Patient Condition:654020106}    Rehab Potential (if transferring to Rehab): {Prognosis:4761017792}    Recommended Labs or Other Treatments After Discharge: ***    Physician Certification: I certify the above information and transfer of aPyam Mattson  is necessary for the continuing treatment of the diagnosis listed and that she requires {Admit to Appropriate Level of Care:90833} for {GREATER/LESS:394558775} 30 days.      Update Admission H&P: {CHP DME Changes in QEPGP:945923779}    PHYSICIAN SIGNATURE:  {Esignature:566891879}

## 2021-04-06 NOTE — PROGRESS NOTES
 Venous insufficiency of left lower extremity    Asymptomatic varicose veins of bilateral lower extremities    Non-healing surgical wound    Claudication in peripheral vascular disease (HCC)    Non-pressure chronic ulcer of calf, right, with fat layer exposed ([address]    Non-pressure chronic ulcer of calf with fat layer exposed ([address]      Problem List Items Addressed This Visit     None           PAST SURGICAL HISTORY    Past Surgical History:   Procedure Laterality Date    CARDIAC CATHETERIZATION      CARPAL TUNNEL RELEASE      COLONOSCOPY      HYSTERECTOMY, TOTAL ABDOMINAL      KNEE ARTHROPLASTY Bilateral     PA COLON CA SCRN NOT  [address] IND N/A 2017    COLONOSCOPY performed by Catherine Musa MD at [address] ESOPHAGOGASTRODUODENOSCOPY TRANSORAL DIAGNOSTIC N/A 2017    EGD ESOPHAGOGASTRODUODENOSCOPY [facility] [address] [facility] B[facility]     SKIN BIOPSY Right 2020    EXCISION OF LEG MASS RIGHT (PAT ON ADMIT) performed by Yanira Qureshi MD at [address]    Family History   Problem Relation Age of Onset    Arthritis Father     Other Father         gout    Heart Failure Father     Cancer Sister        SOCIAL HISTORY    Social History     Tobacco Use    Smoking status: Former Smoker     Packs/day: 1.00     Years: 20.00     Pack years: 20.00     Quit date:      Years since quittin.2    Smokeless tobacco: Never Used   Substance Use Topics    Alcohol use: No    Drug use: Never       ALLERGIES    Allergies   Allergen Reactions    Codeine      Nausea, lightheadedness, dizzy       MEDICATIONS    Current Outpatient Medications on File Prior to Encounter   Medication Sig Dispense Refill    pravastatin (PRAVACHOL) 40 MG tablet TAKE ONE TABLET BY MOUTH EVERY DAY 30 tablet 5    gabapentin (NEURONTIN) 400 MG capsule TAKE ONE CAPSULE BY MOUTH THREE TIMES A DAY 90 capsule 3    TRINTELLIX 10 MG TABS tablet TAKE ONE TABLET BY MOUTH DAILY 30 tablet 5    tiZANidine (ZANAFLEX) 4 MG tablet TAKE ONE TABLET BY MOUTH THREE TIMES A DAY AS NEEDED FOR MUSCLE SPASMS / PAIN 90 tablet 0    loratadine (CLARITIN) 10 MG tablet Take 1 tablet by mouth daily 30 tablet 0    amLODIPine (NORVASC) 5 MG tablet TAKE ONE TABLET BY MOUTH DAILY AT BEDTIME  3    donepezil (ARICEPT) 10 MG tablet take one-half tablet by mouth at bedtime for 2 weeks then increase to 1 tablet at bedtime  3    albuterol sulfate  (90 Base) MCG/ACT inhaler Inhale 2 puffs into the lungs 4 times daily as needed for Wheezing 3 Inhaler 1    aspirin 81 MG EC tablet Take 1 tablet by mouth daily 30 tablet 3    melatonin 1 MG tablet Take 1 tablet by mouth nightly as needed for Sleep 30 tablet 2    Oxygen Concentrator       vitamin D (CHOLECALCIFEROL) 1000 UNIT TABS tablet Take 2 tablets by mouth daily 60 tablet 3     No current facility-administered medications on file prior to encounter. REVIEW OF SYSTEMS    Pertinent items are noted in HPI. Objective:      /61   Pulse (!) 49   Temp 97.1 °F (36.2 °C) (Temporal)   Resp 20   LMP  (LMP Unknown)     Wt Readings from Last 3 Encounters:   12/17/20 145 lb (65.8 kg)   09/17/20 140 lb (63.5 kg)   09/04/20 142 lb (64.4 kg)       PHYSICAL EXAM    Constitutional:   Well nourished and well developed. Appears neat and clean. Patient is alert, oriented x3, and in no apparent distress. Respiratory:  Respiratory effort is easy and symmetric bilaterally. Rate is normal at rest and on room air. Vascular:  Pedal Pulses is not palpable and audible signal noted with doppler. Capillary refill is <5 sec to digits bilateral.  Extremities negative for pitting edema. Neurological:  Gross and Light touch intact. Protective sensation intact. Dermatological:  Wound description noted in wound assessment. Wound has a healed bridge of tissue between two wounds.  Two small non-healed areas remain, tender to touch with clean, red granular bases. Slight treva wound erythema, not overly warm to touch, no induration. On review of wound care with patient, reports showering without covering the wound, and appears to have poor understanding of clean technique regarding wound care/dressing changes. Reviewed again with  present. Also encouraged to elevate legs during the day. Psychiatric:  Judgement and insight intact. Short and long term memory intact. No evidence of depression, anxiety, or agitation. Patient is calm, cooperative, and communicative. Appropriate interactions and affect. Assessment:      Problem List Items Addressed This Visit     None        Procedure Note  Indications:  Based on my examination of this patient's wound(s)/ulcer(s) today, debridement is not required to promote healing and evaluate the wound base.     Wound 08/07/20 Leg Right;Medial;Proximal #1 (Active)   Wound Image   04/06/21 1308   Wound Etiology Non-Healing Surgical 04/06/21 1308   Wound Cleansed Cleansed with saline 04/06/21 1308   Dressing/Treatment Antibacterial ointment;Collagen with Ag;Dry dressing 04/06/21 1324   Wound Length (cm) 0.4 cm 04/06/21 1308   Wound Width (cm) 0.3 cm 04/06/21 1308   Wound Depth (cm) 0.1 cm 04/06/21 1308   Wound Surface Area (cm^2) 0.12 cm^2 04/06/21 1308   Change in Wound Size % (l*w) 84 04/06/21 1308   Wound Volume (cm^3) 0.01 cm^3 04/06/21 1308   Wound Healing % 88 04/06/21 1308   Post-Procedure Length (cm) 0.5 cm 02/23/21 1522   Post-Procedure Width (cm) 0.5 cm 02/23/21 1522   Post-Procedure Depth (cm) 0.2 cm 02/23/21 1522   Post-Procedure Surface Area (cm^2) 0.25 cm^2 02/23/21 1522   Post-Procedure Volume (cm^3) 0.05 cm^3 02/23/21 1522   Undermining Starts ___ O'Clock 7 02/23/21 1443   Undermining Ends___ O'Clock 11 02/23/21 1443   Undermining Maxium Distance (cm) 0.3 02/23/21 1443   Wound Assessment Granulation tissue 04/06/21 1308   Drainage Amount None 04/06/21 130 Drainage Description Serosanguinous 03/02/21 1430   Odor None 03/16/21 1434   Tabby-wound Assessment Other (Comment) 04/06/21 1308   Margins Defined edges 04/06/21 1308   Wound Thickness Description not for Pressure Injury Full thickness 04/06/21 1308   Number of days: 242       Wound 02/23/21 Leg Right;Medial;Distal #2 (Active)   Wound Image   04/06/21 1308   Wound Etiology Non-Healing Surgical 04/06/21 1308   Wound Cleansed Cleansed with saline 04/06/21 1308   Dressing/Treatment Antibacterial ointment;Collagen with Ag;Dry dressing 04/06/21 1324   Wound Length (cm) 0.4 cm 04/06/21 1308   Wound Width (cm) 0.2 cm 04/06/21 1308   Wound Depth (cm) 0.1 cm 04/06/21 1308   Wound Surface Area (cm^2) 0.08 cm^2 04/06/21 1308   Change in Wound Size % (l*w) 68 04/06/21 1308   Wound Volume (cm^3) 0.01 cm^3 04/06/21 1308   Wound Healing % 88 04/06/21 1308   Post-Procedure Length (cm) 0.4 cm 03/02/21 1510   Post-Procedure Width (cm) 0.3 cm 03/02/21 1510   Post-Procedure Depth (cm) 0.5 cm 03/02/21 1510   Post-Procedure Surface Area (cm^2) 0.12 cm^2 03/02/21 1510   Post-Procedure Volume (cm^3) 0.06 cm^3 03/02/21 1510   Undermining Starts ___ O'Clock 3 03/02/21 1430   Undermining Ends___ O'Clock 9 03/02/21 1430   Undermining Maxium Distance (cm) 0.6 03/02/21 1430   Wound Assessment Granulation tissue 04/06/21 1308   Drainage Amount None 04/06/21 1308   Drainage Description Yellow 03/16/21 1433   Odor None 03/16/21 1433   Tabby-wound Assessment Other (Comment) 04/06/21 1308   Margins Defined edges 04/06/21 1308   Wound Thickness Description not for Pressure Injury Full thickness 04/06/21 1308   Number of days: 41       Plan:     Continue with same tx plan as below, however, please obtain cast cover, or cover well with plastic bags prior to showering, and do wound care as soon as shower completed. Only clean items such as gauze, saline or antibiotic ointment should be touching the wound.  Do not touch the wound or near it when doing

## 2021-04-27 ENCOUNTER — HOSPITAL ENCOUNTER (OUTPATIENT)
Dept: WOUND CARE | Age: 76
Discharge: HOME | End: 2021-04-27
Payer: MEDICARE

## 2021-04-27 VITALS
SYSTOLIC BLOOD PRESSURE: 126 MMHG | RESPIRATION RATE: 20 BRPM | HEART RATE: 48 BPM | TEMPERATURE: 96.9 F | DIASTOLIC BLOOD PRESSURE: 52 MMHG

## 2021-04-27 DIAGNOSIS — I83.93 ASYMPTOMATIC VARICOSE VEINS OF BILATERAL LOWER EXTREMITIES: ICD-10-CM

## 2021-04-27 DIAGNOSIS — T81.89XD NON-HEALING SURGICAL WOUND, SUBSEQUENT ENCOUNTER: Primary | ICD-10-CM

## 2021-04-27 DIAGNOSIS — I87.2 VENOUS INSUFFICIENCY OF LEFT LOWER EXTREMITY: ICD-10-CM

## 2021-04-27 PROCEDURE — 99212 OFFICE O/P EST SF 10 MIN: CPT

## 2021-04-27 RX ORDER — CLOBETASOL PROPIONATE 0.5 MG/G
OINTMENT TOPICAL ONCE
Status: CANCELLED | OUTPATIENT
Start: 2021-04-27 | End: 2021-04-27

## 2021-04-27 RX ORDER — BACITRACIN ZINC AND POLYMYXIN B SULFATE 500; 1000 [USP'U]/G; [USP'U]/G
OINTMENT TOPICAL ONCE
Status: CANCELLED | OUTPATIENT
Start: 2021-04-27 | End: 2021-04-27

## 2021-04-27 RX ORDER — BETAMETHASONE DIPROPIONATE 0.05 %
OINTMENT (GRAM) TOPICAL ONCE
Status: CANCELLED | OUTPATIENT
Start: 2021-04-27 | End: 2021-04-27

## 2021-04-27 RX ORDER — LIDOCAINE HYDROCHLORIDE 20 MG/ML
JELLY TOPICAL ONCE
Status: CANCELLED | OUTPATIENT
Start: 2021-04-27 | End: 2021-04-27

## 2021-04-27 RX ORDER — GENTAMICIN SULFATE 1 MG/G
OINTMENT TOPICAL ONCE
Status: CANCELLED | OUTPATIENT
Start: 2021-04-27 | End: 2021-04-27

## 2021-04-27 RX ORDER — GINSENG 100 MG
CAPSULE ORAL ONCE
Status: CANCELLED | OUTPATIENT
Start: 2021-04-27 | End: 2021-04-27

## 2021-04-27 RX ORDER — LIDOCAINE 40 MG/G
CREAM TOPICAL ONCE
Status: CANCELLED | OUTPATIENT
Start: 2021-04-27 | End: 2021-04-27

## 2021-04-27 RX ORDER — LIDOCAINE HYDROCHLORIDE 40 MG/ML
SOLUTION TOPICAL ONCE
Status: CANCELLED | OUTPATIENT
Start: 2021-04-27 | End: 2021-04-27

## 2021-04-27 RX ORDER — LIDOCAINE 50 MG/G
OINTMENT TOPICAL ONCE
Status: CANCELLED | OUTPATIENT
Start: 2021-04-27 | End: 2021-04-27

## 2021-04-27 RX ORDER — BACITRACIN, NEOMYCIN, POLYMYXIN B 400; 3.5; 5 [USP'U]/G; MG/G; [USP'U]/G
OINTMENT TOPICAL ONCE
Status: CANCELLED | OUTPATIENT
Start: 2021-04-27 | End: 2021-04-27

## 2021-04-27 NOTE — PROGRESS NOTES
[address]                                                   Progress Note and Procedure Note      Tali Browne  MEDICAL RECORD NUMBER:  [id number]  AGE: 66 y.o. GENDER: female  : 1945  EPISODE DATE:  2021    Subjective:     Chief Complaint   Patient presents with    Wound Check     right leg wounds recheck         HISTORY of PRESENT ILLNESS HPI     Tali Browne is a 66 y.o. female who presents today for wound/ulcer evaluation. History of Wound Context: Patient present for her second visit s/p surgical wound from an I&D by Dr. Holley Lopez.    Quality of pain: aching  Severity:  3 / 10   Modifying Factors: None  Associated Signs/Symptoms: edema    Ulcer Identification:  Ulcer Type: venous  Contributing Factors: edema and venous stasis    Wound: N/A        PAST MEDICAL HISTORY        Diagnosis Date    Anxiety     Arthritis     COPD (chronic obstructive pulmonary disease) (HCC)     Generalized osteoarthrosis, unspecified site 2002    Hyperlipidemia     Hypertension     Major depressive disorder, single episode, moderate ([address] 2002    Morbid obesity ([address] 2002    OAB (overactive bladder)     Osteoporosis        PAST SURGICAL HISTORY    Past Surgical History:   Procedure Laterality Date    CARDIAC CATHETERIZATION      CARPAL TUNNEL RELEASE      COLONOSCOPY      HYSTERECTOMY, TOTAL ABDOMINAL      KNEE ARTHROPLASTY Bilateral     SC COLON CA SCRN NOT  [address] IND N/A 2017    [facility] [address] [facility] ESOPHAGOGASTRODUODENOSCOPY TRANSORAL DIAGNOSTIC N/A 2017    EGD ESOPHAGOGASTRODUODENOSCOPY performed by Greg Bernal MD at [address] Right 2020    EXCISION OF LEG MASS RIGHT (PAT ON ADMIT) performed by Rosie Kinsey MD at [address]    Family History   Problem Relation Age of Onset    Arthritis Father    Jt Other Father         gout    Heart Failure Father     Cancer Sister        SOCIAL HISTORY    Social History     Tobacco Use    Smoking status: Former Smoker     Packs/day: 1.00     Years: 20.00     Pack years: 20.00     Quit date:      Years since quittin.3    Smokeless tobacco: Never Used   Substance Use Topics    Alcohol use: No    Drug use: Never       ALLERGIES    Allergies   Allergen Reactions    Codeine      Nausea, lightheadedness, dizzy       MEDICATIONS    Current Outpatient Medications on File Prior to Encounter   Medication Sig Dispense Refill    pravastatin (PRAVACHOL) 40 MG tablet TAKE ONE TABLET BY MOUTH EVERY DAY 30 tablet 5    gabapentin (NEURONTIN) 400 MG capsule TAKE ONE CAPSULE BY MOUTH THREE TIMES A DAY 90 capsule 3    TRINTELLIX 10 MG TABS tablet TAKE ONE TABLET BY MOUTH DAILY 30 tablet 5    tiZANidine (ZANAFLEX) 4 MG tablet TAKE ONE TABLET BY MOUTH THREE TIMES A DAY AS NEEDED FOR MUSCLE SPASMS / PAIN 90 tablet 0    loratadine (CLARITIN) 10 MG tablet Take 1 tablet by mouth daily 30 tablet 0    amLODIPine (NORVASC) 5 MG tablet TAKE ONE TABLET BY MOUTH DAILY AT BEDTIME  3    donepezil (ARICEPT) 10 MG tablet take one-half tablet by mouth at bedtime for 2 weeks then increase to 1 tablet at bedtime  3    albuterol sulfate  (90 Base) MCG/ACT inhaler Inhale 2 puffs into the lungs 4 times daily as needed for Wheezing 3 Inhaler 1    aspirin 81 MG EC tablet Take 1 tablet by mouth daily 30 tablet 3    vitamin D (CHOLECALCIFEROL) 1000 UNIT TABS tablet Take 2 tablets by mouth daily 60 tablet 3    melatonin 1 MG tablet Take 1 tablet by mouth nightly as needed for Sleep 30 tablet 2    Oxygen Concentrator        No current facility-administered medications on file prior to encounter. REVIEW OF SYSTEMS    Pertinent items are noted in HPI.     Objective:      BP (!) 126/52   Pulse (!) 48   Temp 96.9 °F (36.1 °C) (Temporal)   Resp 20   LMP  (LMP Unknown)     Wt Readings Change in Wound Size % (l*w) 100 04/27/21 1316   Wound Volume (cm^3) 0 cm^3 04/27/21 1316   Wound Healing % 100 04/27/21 1316   Post-Procedure Length (cm) 0.5 cm 02/23/21 1522   Post-Procedure Width (cm) 0.5 cm 02/23/21 1522   Post-Procedure Depth (cm) 0.2 cm 02/23/21 1522   Post-Procedure Surface Area (cm^2) 0.25 cm^2 02/23/21 1522   Post-Procedure Volume (cm^3) 0.05 cm^3 02/23/21 1522   Undermining Starts ___ O'Clock 7 02/23/21 1443   Undermining Ends___ O'Clock 11 02/23/21 1443   Undermining Maxium Distance (cm) 0.3 02/23/21 1443   Wound Assessment Granulation tissue 04/06/21 1308   Drainage Amount None 04/27/21 1316   Drainage Description Serosanguinous 03/02/21 1430   Odor None 03/16/21 1434   Tabby-wound Assessment Other (Comment) 04/06/21 1308   Margins Defined edges 04/06/21 1308   Wound Thickness Description not for Pressure Injury Full thickness 04/27/21 1316   Number of days: 263       Wound 02/23/21 Leg Right;Medial;Distal #2 (Active)   Wound Image   04/27/21 1316   Wound Etiology Non-Healing Surgical 04/27/21 1316   Wound Cleansed Cleansed with saline 04/27/21 1316   Dressing/Treatment Antibacterial ointment;Collagen with Ag;Dry dressing 04/06/21 1324   Wound Length (cm) 0 cm 04/27/21 1316   Wound Width (cm) 0 cm 04/27/21 1316   Wound Depth (cm) 0 cm 04/27/21 1316   Wound Surface Area (cm^2) 0 cm^2 04/27/21 1316   Change in Wound Size % (l*w) 100 04/27/21 1316   Wound Volume (cm^3) 0 cm^3 04/27/21 1316   Wound Healing % 100 04/27/21 1316   Post-Procedure Length (cm) 0.4 cm 03/02/21 1510   Post-Procedure Width (cm) 0.3 cm 03/02/21 1510   Post-Procedure Depth (cm) 0.5 cm 03/02/21 1510   Post-Procedure Surface Area (cm^2) 0.12 cm^2 03/02/21 1510   Post-Procedure Volume (cm^3) 0.06 cm^3 03/02/21 1510   Undermining Starts ___ O'Clock 3 03/02/21 1430   Undermining Ends___ O'Clock 9 03/02/21 1430   Undermining Maxium Distance (cm) 0.6 03/02/21 1430   Wound Assessment Granulation tissue 04/06/21 1308 Drainage Amount None 04/27/21 1316   Drainage Description Yellow 03/16/21 1433   Odor None 03/16/21 1433   Tabby-wound Assessment Other (Comment) 04/06/21 1308   Margins Defined edges 04/06/21 1308   Wound Thickness Description not for Pressure Injury Full thickness 04/27/21 1316   Number of days: 62         Plan:   Patient examined. D/C all dressings. Leave open to air  Follow up as needed  Healed outcome       Treatment Note please see attached Discharge Instructions    Written patient dismissal instructions given to patient and signed by patient or POA. Discharge Instructions       W[facility] Physician Orders and Discharge [address]   [place] [facility]  Telephone: [phone]    NAME:  Meryle Bunnell OF BIRTH:  1945  MEDICAL RECORD NUMBER:  [id number]  DATE:  4/27/2021    Congratulations!! You have completed your treatment. 1. Return to your Primary Care Physician for all your health issues. 2. Resume your ordinary activities as tolerated. 3. Take your medications as prescribed by your primary care physician. 4. Check your skin daily for cracks, bruises, sores, or dryness. Use a moisturizer as needed. 5. Clean and dry your skin, using mild soap and warm water (not hot). 6. Avoid alcohol and caffeine and do not smoke. 7. Maintain a nutritious diet. 8. Avoid pressure on your wound site. Keep your legs elevated above the level of the heart whenever possible. 9. Continue to use wraps/stockings/compression as prescribed. 10. Replace compression stockings every four to six months as needed to ensure proper fit. 11. Wear well-fitting shoes and leg garments. THANK YOU FOR ALLOWING US TO SERVE YOU.  PLEASE CALL IF YOU DEVELOP ANOTHER WOUND. [phone]   Electronically signed by Zeinab Moore DPM on 4/27/2021 at 1:29 PM                        Electronically signed by Zeinab Moore DPM on 4/27/2021 at 1:30 PM

## 2021-04-27 NOTE — CODE DOCUMENTATION
[address] Physician Billing Sheet. Caleb Alexandravandana Mock  AGE: 66 y.o.    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

## 2021-04-29 DIAGNOSIS — M51.36 DEGENERATIVE DISC DISEASE, LUMBAR: ICD-10-CM

## 2021-04-29 DIAGNOSIS — I89.0 LYMPHEDEMA: Primary | ICD-10-CM

## 2021-04-29 DIAGNOSIS — E78.5 HYPERLIPIDEMIA, UNSPECIFIED HYPERLIPIDEMIA TYPE: ICD-10-CM

## 2021-04-29 DIAGNOSIS — M79.604 RIGHT LEG PAIN: ICD-10-CM

## 2021-04-29 DIAGNOSIS — S32.010G CLOSED COMPRESSION FRACTURE OF L1 LUMBAR VERTEBRA WITH DELAYED HEALING, SUBSEQUENT ENCOUNTER: ICD-10-CM

## 2021-04-29 RX ORDER — PRAVASTATIN SODIUM 40 MG
TABLET ORAL
Qty: 90 TABLET | Refills: 0 | Status: SHIPPED | OUTPATIENT
Start: 2021-04-29 | End: 2021-07-26

## 2021-04-29 RX ORDER — TIZANIDINE 4 MG/1
TABLET ORAL
Qty: 90 TABLET | Refills: 0 | Status: SHIPPED | OUTPATIENT
Start: 2021-04-29

## 2021-05-18 ENCOUNTER — HOSPITAL ENCOUNTER (OUTPATIENT)
Dept: WOUND CARE | Age: 76
Discharge: HOME | End: 2021-05-18
Payer: MEDICARE

## 2021-05-18 VITALS
DIASTOLIC BLOOD PRESSURE: 70 MMHG | RESPIRATION RATE: 18 BRPM | SYSTOLIC BLOOD PRESSURE: 106 MMHG | TEMPERATURE: 98 F | HEART RATE: 50 BPM

## 2021-05-18 DIAGNOSIS — I87.2 VENOUS INSUFFICIENCY OF LEFT LOWER EXTREMITY: ICD-10-CM

## 2021-05-18 DIAGNOSIS — L97.212 NON-PRESSURE CHRONIC ULCER OF RIGHT CALF WITH FAT LAYER EXPOSED (HCC): Primary | ICD-10-CM

## 2021-05-18 DIAGNOSIS — I83.93 ASYMPTOMATIC VARICOSE VEINS OF BILATERAL LOWER EXTREMITIES: ICD-10-CM

## 2021-05-18 PROCEDURE — 99212 OFFICE O/P EST SF 10 MIN: CPT

## 2021-05-18 RX ORDER — GENTAMICIN SULFATE 1 MG/G
OINTMENT TOPICAL ONCE
Status: CANCELLED | OUTPATIENT
Start: 2021-05-18 | End: 2021-05-18

## 2021-05-18 RX ORDER — LIDOCAINE 50 MG/G
OINTMENT TOPICAL ONCE
Status: CANCELLED | OUTPATIENT
Start: 2021-05-18 | End: 2021-05-18

## 2021-05-18 RX ORDER — BACITRACIN, NEOMYCIN, POLYMYXIN B 400; 3.5; 5 [USP'U]/G; MG/G; [USP'U]/G
OINTMENT TOPICAL ONCE
Status: CANCELLED | OUTPATIENT
Start: 2021-05-18 | End: 2021-05-18

## 2021-05-18 RX ORDER — LIDOCAINE HYDROCHLORIDE 40 MG/ML
SOLUTION TOPICAL ONCE
Status: CANCELLED | OUTPATIENT
Start: 2021-05-18 | End: 2021-05-18

## 2021-05-18 RX ORDER — BETAMETHASONE DIPROPIONATE 0.05 %
OINTMENT (GRAM) TOPICAL ONCE
Status: CANCELLED | OUTPATIENT
Start: 2021-05-18 | End: 2021-05-18

## 2021-05-18 RX ORDER — CLOBETASOL PROPIONATE 0.5 MG/G
OINTMENT TOPICAL ONCE
Status: CANCELLED | OUTPATIENT
Start: 2021-05-18 | End: 2021-05-18

## 2021-05-18 RX ORDER — LIDOCAINE HYDROCHLORIDE 20 MG/ML
JELLY TOPICAL ONCE
Status: CANCELLED | OUTPATIENT
Start: 2021-05-18 | End: 2021-05-18

## 2021-05-18 RX ORDER — BACITRACIN ZINC AND POLYMYXIN B SULFATE 500; 1000 [USP'U]/G; [USP'U]/G
OINTMENT TOPICAL ONCE
Status: CANCELLED | OUTPATIENT
Start: 2021-05-18 | End: 2021-05-18

## 2021-05-18 RX ORDER — LIDOCAINE 40 MG/G
CREAM TOPICAL ONCE
Status: CANCELLED | OUTPATIENT
Start: 2021-05-18 | End: 2021-05-18

## 2021-05-18 RX ORDER — GINSENG 100 MG
CAPSULE ORAL ONCE
Status: CANCELLED | OUTPATIENT
Start: 2021-05-18 | End: 2021-05-18

## 2021-05-18 NOTE — PROGRESS NOTES
[address]                                                   Progress Note and Procedure Note      Lonza Found  MEDICAL RECORD NUMBER:  [id number]  AGE: 66 y.o. GENDER: female  : 1945  EPISODE DATE:  2021    Subjective:     No chief complaint on file. HISTORY of PRESENT ILLNESS HPI     Lonza Found is a 66 y.o. female who presents today for wound/ulcer evaluation. History of Wound Context:  Patient presents to [facility] today for pain of the right LE without any open wounds. Patient was confused but baseline as before. She states that she is not wearing her compression tubigrips.   Quality of pain: aching  Severity:  3 / 10   Modifying Factors: None  Associated Signs/Symptoms: edema    Ulcer Identification:  Ulcer Type: venous  Contributing Factors: edema and venous stasis    Wound: N/A        PAST MEDICAL HISTORY        Diagnosis Date    Anxiety     Arthritis     COPD (chronic obstructive pulmonary disease) (HCC)     Generalized osteoarthrosis, unspecified site 2002    Hyperlipidemia     Hypertension     Major depressive disorder, single episode, moderate ([address] 2002    Morbid obesity ([address] 2002    OAB (overactive bladder)     Osteoporosis        PAST SURGICAL HISTORY    Past Surgical History:   Procedure Laterality Date    CARDIAC CATHETERIZATION      CARPAL TUNNEL RELEASE      COLONOSCOPY      HYSTERECTOMY, TOTAL ABDOMINAL      KNEE ARTHROPLASTY Bilateral     SD COLON CA SCRN NOT  [address] IND N/A 2017    COLONOSCOPY performed by Monique Manning MD at [address] ESOPHAGOGASTRODUODENOSCOPY TRANSORAL DIAGNOSTIC N/A 2017    EGD ESOPHAGOGASTRODUODENOSCOPY performed by Monique Manning MD at [address] Right 2020    EXCISION OF LEG MASS RIGHT (PAT ON ADMIT) performed by Sim Haas MD at [address]    Family History   Problem Relation Age of Onset    Arthritis Father     Other Father         gout    Heart Failure Father     Cancer Sister        SOCIAL HISTORY    Social History     Tobacco Use    Smoking status: Former Smoker     Packs/day: 1.00     Years: 20.00     Pack years: 20.00     Quit date:      Years since quittin.4    Smokeless tobacco: Never Used   Vaping Use    Vaping Use: Never used   Substance Use Topics    Alcohol use: No    Drug use: Never       ALLERGIES    Allergies   Allergen Reactions    Codeine      Nausea, lightheadedness, dizzy       MEDICATIONS    Current Outpatient Medications on File Prior to Encounter   Medication Sig Dispense Refill    Handicap Placard MISC by Does not apply route Exp 5 years 1 each 0    VORTIoxetine (TRINTELLIX) 10 MG TABS tablet TAKE ONE TABLET BY MOUTH DAILY 90 tablet 0    pravastatin (PRAVACHOL) 40 MG tablet TAKE ONE TABLET BY MOUTH EVERY DAY 90 tablet 0    tiZANidine (ZANAFLEX) 4 MG tablet TAKE ONE TABLET BY MOUTH THREE TIMES A DAY AS NEEDED FOR MUSCLE SPASMS / PAIN 90 tablet 0    gabapentin (NEURONTIN) 400 MG capsule TAKE ONE CAPSULE BY MOUTH THREE TIMES A DAY 90 capsule 3    loratadine (CLARITIN) 10 MG tablet Take 1 tablet by mouth daily 30 tablet 0    amLODIPine (NORVASC) 5 MG tablet TAKE ONE TABLET BY MOUTH DAILY AT BEDTIME  3    donepezil (ARICEPT) 10 MG tablet take one-half tablet by mouth at bedtime for 2 weeks then increase to 1 tablet at bedtime  3    albuterol sulfate  (90 Base) MCG/ACT inhaler Inhale 2 puffs into the lungs 4 times daily as needed for Wheezing 3 Inhaler 1    aspirin 81 MG EC tablet Take 1 tablet by mouth daily 30 tablet 3    vitamin D (CHOLECALCIFEROL) 1000 UNIT TABS tablet Take 2 tablets by mouth daily 60 tablet 3    melatonin 1 MG tablet Take 1 tablet by mouth nightly as needed for Sleep 30 tablet 2    Oxygen Concentrator        No current facility-administered medications on file prior to encounter.        REVIEW OF DEVELOP ANOTHER WOUND. [phone]                   Electronically signed by Samia Agarwal DPM on 5/18/2021 at 12:09 PM

## 2021-05-18 NOTE — CODE DOCUMENTATION
[address] Phys[address] Billing Sheet. Sarina De Soto Justice  AGE: 66 y.o.    GENDER: female  : 1945  TODAY'S DATE:  2021    ICD-10 CODES  [facility] Problems    Diagnosis Date Noted    Venous insufficiency of left lower extremity [I87.2] 2020    Pain and swelling due to varicose veins of both lower extremities [I83.813] 2020       PHYSICIAN PROCEDURES    CPT CODE  99969      Electronically signed by Roverto Xiong DPM on 2021 at 12:09 PM

## 2021-05-28 ENCOUNTER — VIRTUAL VISIT (OUTPATIENT)
Dept: FAMILY MEDICINE CLINIC | Age: 76
End: 2021-05-28
Payer: MEDICARE

## 2021-05-28 DIAGNOSIS — R42 EPISODIC LIGHTHEADEDNESS: Primary | ICD-10-CM

## 2021-05-28 PROCEDURE — 99421 OL DIG E/M SVC 5-10 MIN: CPT | Performed by: STUDENT IN AN ORGANIZED HEALTH CARE EDUCATION/TRAINING PROGRAM

## 2021-05-28 SDOH — ECONOMIC STABILITY: FOOD INSECURITY: WITHIN THE PAST 12 MONTHS, THE FOOD YOU BOUGHT JUST DIDN'T LAST AND YOU DIDN'T HAVE MONEY TO GET MORE.: NEVER TRUE

## 2021-05-28 ASSESSMENT — ENCOUNTER SYMPTOMS
COUGH: 0
ABDOMINAL PAIN: 0
SORE THROAT: 0
SHORTNESS OF BREATH: 0
VOMITING: 0
SINUS PRESSURE: 0

## 2021-05-28 NOTE — PROGRESS NOTES
This visit began at 2:20pm    Location of the visit was the [facility] primary care site. TELEHEALTH APPOINTMENT  Patient has been screened to determine that this visit qualifies for a \"Telephone Visit\". Patient is currently established with the current medical practice, the condition being reviewed was not addressed within the previous 7 days and is not likely be determined to need a procedure within the next 24 hours. This visit was via telephone due to the restrictions of the COVID-19 pandemic. All issues as below were discussed and addressed but no physical exam was performed. It was felt the patient should be evaluated in the clinic there will be comment below demonstrating they were directed there. The patient is aware and has given verbal consent to be billed for this telephone encounter.     Patient Name: Dolores Easton  MRN: [id number]    Chief Complaint   Patient presents with    Dizziness     x1 week     HPI  Dizziness  One episode last night  Feels like her balance was off  Symptoms occurred when she laid down and closed her eyes  She is not feeling dizzy now  Denied lightheadedness  Tyrone like the room was spinning   Denied ringing in the ears  No prior symptoms    Felt dizzy when she went from sitting to standing  Denied chest pain, shortness of breath, palpitations  She has not passed out at all  Denied nausea, vomiting, diarrhea    States she does not drink a lot of fluids during the day    PMH:  Current Outpatient Medications on File Prior to Visit   Medication Sig Dispense Refill    Handicap Placard MISC by Does not apply route Exp 5 years 1 each 0    pravastatin (PRAVACHOL) 40 MG tablet TAKE ONE TABLET BY MOUTH EVERY DAY 90 tablet 0    tiZANidine (ZANAFLEX) 4 MG tablet TAKE ONE TABLET BY MOUTH THREE TIMES A DAY AS NEEDED FOR MUSCLE SPASMS / PAIN 90 tablet 0    gabapentin (NEURONTIN) 400 MG capsule TAKE ONE CAPSULE BY MOUTH THREE TIMES A DAY 90 capsule 3    loratadine (CLARITIN) 10 MG tablet Take 1 tablet by mouth daily 30 tablet 0    amLODIPine (NORVASC) 5 MG tablet TAKE ONE TABLET BY MOUTH DAILY AT BEDTIME  3    donepezil (ARICEPT) 10 MG tablet take one-half tablet by mouth at bedtime for 2 weeks then increase to 1 tablet at bedtime  3    albuterol sulfate  (90 Base) MCG/ACT inhaler Inhale 2 puffs into the lungs 4 times daily as needed for Wheezing 3 Inhaler 1    aspirin 81 MG EC tablet Take 1 tablet by mouth daily 30 tablet 3    melatonin 1 MG tablet Take 1 tablet by mouth nightly as needed for Sleep 30 tablet 2    Oxygen Concentrator        No current facility-administered medications on file prior to visit.      Past Medical History:   Diagnosis Date    Anxiety     Arthritis     COPD [facility]chronic obstructive [facility] disease) [facility]     Generalized osteoarthrosis, unspecified site 12/27/2002    Hyperlipidemia     Hypertension     Major depressive disorder, single episode, moderate [facility] 12/27/2002    Morbid obesity ([facility] 12/27/2002    OAB (overactive bladder)     Osteoporosis      Past Surgical History:   Procedure Laterality Date    CARDIAC CATHETERIZATION      CARPAL TUNNEL RELEASE      COLONOSCOPY      HYSTERECTOMY, TOTAL ABDOMINAL      KNEE ARTHROPLASTY Bilateral     [place] COLON CA SCRN NOT  [address] IND N/A 6/22/2017    [facility] ESOPHAGOGASTRODUODENOSCOPY TRANSORAL DIAGNOSTIC N/A 6/22/2017    EGD ESOPHAGOGASTRODUODENOSCOPY performed by Michael Pritchett MD at [address] Right 9/17/2020    EXCISION OF LEG MASS RIGHT (PAT ON ADMIT) performed by Christi Isidro MD at [facility] Marital status:      Spouse name: Loco Valentine Number of children: 3    Years of education: 12    Highest education level: 12th grade   Occupational History    Occupation: [employer] Tobacco Use    Smoking status: Former Smoker     Packs/day: 1.00     Years: 20.00     Pack years: 20.00     Quit date:      Years since quittin.4    Smokeless tobacco: Never Used   Vaping Use    Vaping Use: Never used   Substance and Sexual Activity    Alcohol use: No    Drug use: Never    Sexual activity: Not Currently     Partners: Male   Other Topics Concern    Not on file   Social History Narrative    Not on file     Social Determinants of Health     Financial Resource Strain: Low Risk     Difficulty of Paying Living Expenses: Not hard at all   Food Insecurity: No Food Insecurity    Worried About [facility] in the Last Year: Never true    [facility] in the Last Year: Never true   Transportation Needs:     Lack of Transportation (Medical):  Lack of Transportation (Non-Medical):    Physical Activity:     Days of Exercise per Week:     Minutes of Exercise per Session:    Stress:     Feeling of Stress :    Social Connections:     Frequency of Communication with Friends and Family:     Frequency of Social Gatherings with Friends and Family:     Attends Islam Services:     Active Member of Clubs or Organizations:     Attends Club or Organization Meetings:     Marital Status:    Intimate Partner Violence:     Fear of Current or Ex-Partner:     Emotionally Abused:     Physically Abused:     Sexually Abused:      Family History   Problem Relation Age of Onset    Arthritis Father     Other Father         gout    Heart Failure Father     Cancer Sister      Allergies:  Codeine    Review of Systems   Constitutional: Negative for chills and fever. HENT: Negative for congestion, sinus pressure and sore throat. Respiratory: Negative for cough and shortness of breath. Cardiovascular: Negative for chest pain and palpitations. Gastrointestinal: Negative for abdominal pain and vomiting. Musculoskeletal: Negative for arthralgias and myalgias.    Skin: Negative for rash and wound. Neurological: Positive for dizziness. Negative for speech difficulty and light-headedness. Psychiatric/Behavioral: Negative for suicidal ideas. The patient is not nervous/anxious. PHYSICAL EXAM/ RESULTS    LMP  (LMP Unknown)     Vitals signs: pt does not have the proper equipment to take all VS.    The physical is not performed due to the visit being a telephone counter as indicated due to the restrictions of the COVID-19 pandemic    No results found for this or any previous visit (from the past 2016 hour(s)). Assessment:       Diagnosis Orders   1. Episodic lightheadedness     -Very hard to get story from patient, initially symptoms sound more vertiginous however then patient said she gets lightheaded when she goes from laying in bed to standing and symptoms only occur at nighttime  -She otherwise has no chest pain, shortness of breath, palpitations, nausea, vomiting or diarrhea  -Possibly orthostatic hypotension if symptoms are occurring from sitting to standing, patient does not drink a lot of fluids throughout the day, recommended that she increase her water intake and get up slowly especially at nighttime when going from laying to standing  -We will have her follow-up in the office in 1 week to recheck symptoms  -ER precautions given      No orders of the defined types were placed in this encounter. Plan:   Return in about 1 week (around 6/4/2021) for f/u with PCP. There are no Patient Instructions on file for this visit.     This visit ended at 2:29pm    Jeff Villegas DO

## 2021-06-03 ENCOUNTER — OFFICE VISIT (OUTPATIENT)
Dept: FAMILY MEDICINE CLINIC | Age: 76
End: 2021-06-03
Payer: MEDICARE

## 2021-06-03 DIAGNOSIS — R42 EPISODIC LIGHTHEADEDNESS: ICD-10-CM

## 2021-06-03 DIAGNOSIS — M79.604 RIGHT LEG PAIN: ICD-10-CM

## 2021-06-03 DIAGNOSIS — R42 VERTIGO: ICD-10-CM

## 2021-06-03 DIAGNOSIS — R42 EPISODIC LIGHTHEADEDNESS: Primary | ICD-10-CM

## 2021-06-03 LAB
ALBUMIN SERPL-MCNC: 4.8 G/DL (ref 3.5–4.6)
ALP BLD-CCNC: 62 U/L (ref 40–130)
ALT SERPL-CCNC: 20 U/L (ref 0–33)
ANION GAP SERPL CALCULATED.3IONS-SCNC: 12 MEQ/L (ref 9–15)
AST SERPL-CCNC: 27 U/L (ref 0–35)
BASOPHILS ABSOLUTE: 0.1 K/UL (ref 0–0.2)
BASOPHILS RELATIVE PERCENT: 1.1 %
BILIRUB SERPL-MCNC: 0.5 MG/DL (ref 0.2–0.7)
BUN BLDV-MCNC: 21 MG/DL (ref 8–23)
CALCIUM SERPL-MCNC: 9.9 MG/DL (ref 8.5–9.9)
CHLORIDE BLD-SCNC: 105 MEQ/L (ref 95–107)
CO2: 26 MEQ/L (ref 20–31)
CREAT SERPL-MCNC: 0.89 MG/DL (ref 0.5–0.9)
EOSINOPHILS ABSOLUTE: 0.3 K/UL (ref 0–0.7)
EOSINOPHILS RELATIVE PERCENT: 3.9 %
FOLATE: 16.8 NG/ML (ref 7.3–26.1)
GFR AFRICAN AMERICAN: >60
GFR NON-AFRICAN AMERICAN: >60
GLOBULIN: 2.7 G/DL (ref 2.3–3.5)
GLUCOSE BLD-MCNC: 97 MG/DL (ref 70–99)
HCT VFR BLD CALC: 44.7 % (ref 37–47)
HEMOGLOBIN: 14.9 G/DL (ref 12–16)
LYMPHOCYTES ABSOLUTE: 1.6 K/UL (ref 1–4.8)
LYMPHOCYTES RELATIVE PERCENT: 24.1 %
MCH RBC QN AUTO: 32.6 PG (ref 27–31.3)
MCHC RBC AUTO-ENTMCNC: 33.4 % (ref 33–37)
MCV RBC AUTO: 97.9 FL (ref 82–100)
MONOCYTES ABSOLUTE: 0.6 K/UL (ref 0.2–0.8)
MONOCYTES RELATIVE PERCENT: 9.2 %
NEUTROPHILS ABSOLUTE: 4.1 K/UL (ref 1.4–6.5)
NEUTROPHILS RELATIVE PERCENT: 61.7 %
PDW BLD-RTO: 14.4 % (ref 11.5–14.5)
PLATELET # BLD: 245 K/UL (ref 130–400)
POTASSIUM SERPL-SCNC: 4.5 MEQ/L (ref 3.4–4.9)
RBC # BLD: 4.56 M/UL (ref 4.2–5.4)
SODIUM BLD-SCNC: 143 MEQ/L (ref 135–144)
TOTAL PROTEIN: 7.5 G/DL (ref 6.3–8)
TSH REFLEX: 1.33 UIU/ML (ref 0.44–3.86)
VITAMIN B-12: 451 PG/ML (ref 232–1245)
WBC # BLD: 6.7 K/UL (ref 4.8–10.8)

## 2021-06-03 PROCEDURE — 99214 OFFICE O/P EST MOD 30 MIN: CPT | Performed by: NURSE PRACTITIONER

## 2021-06-03 RX ORDER — MECLIZINE HCL 12.5 MG/1
12.5 TABLET ORAL NIGHTLY PRN
Qty: 15 TABLET | Refills: 0 | Status: SHIPPED | OUTPATIENT
Start: 2021-06-03 | End: 2021-06-18

## 2021-06-03 RX ORDER — NAPROXEN 375 MG/1
375 TABLET ORAL 2 TIMES DAILY PRN
Qty: 60 TABLET | Refills: 0 | Status: SHIPPED | OUTPATIENT
Start: 2021-06-03

## 2021-06-03 ASSESSMENT — ENCOUNTER SYMPTOMS
BACK PAIN: 0
ABDOMINAL DISTENTION: 0
ABDOMINAL PAIN: 0
SHORTNESS OF BREATH: 0
COUGH: 0
CHEST TIGHTNESS: 0
PHOTOPHOBIA: 0
COLOR CHANGE: 1

## 2021-06-03 NOTE — PATIENT INSTRUCTIONS
Take antivert PRN for dizziness, monitor for drowsiness. Take naprosyn sparingly as needed for pain, take with food. Schedule follow up with Maddie Ortez.

## 2021-06-03 NOTE — PROGRESS NOTES
Subjective  Chief Complaint   Patient presents with    Dizziness     1 week f/u, states that it is not any better. having issues getting up and down.  Discuss Medications     trintellix is not covered states that she has not taken it in over a month    Leg Pain     she is having issues with her Rt lower leg again, area is all red and swollen around previous surgery site       HPI    Dizziness-saw Dr. Roberta Zhou last week via vv, has been occurring for approx 1 month, happens when she lays in bed at night, does fine during the day, at night will have to sit at the side of the bed before getting up. Still with pain in right lower ext from non healing wound. Significant pain in area, completed treatment in [facility]. Tylenol will ease the discomfort, but not take it away. Taking 2 tylenol arthritis twice per day.     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 ([address] 10/09/2020    Non-pressure chronic ulcer of calf, right, with fat layer exposed ([address] 10/02/2020    Claudication in peripheral vascular disease ([address] 08/11/2020    Non-healing surgical wound 08/07/2020    Venous insufficiency of left lower extremity 05/08/2020    Asymptomatic varicose veins of bilateral lower extremities 05/08/2020    Pain and swelling due to varicose veins of both lower extremities 04/27/2020    Slow transit constipation 10/13/2019    External hemorrhoid, bleeding 10/13/2019    Colitis 10/13/2019    Acute colitis 10/10/2019    Hypovolemic shock ([address] 10/07/2019    [facility] elevated [facility] 10/05/2019    Acute diastolic heart failure ([facility] 10/05/2019    Hypotension 10/01/2019    Neuromuscular scoliosis of lumbar region 2019    Hypertension     Hyperlipidemia     COPD (chronic obstructive pulmonary disease) (HCC)     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 ([facility] [address] 2010    Generalized osteoarthrosis, unspecified site 2002    Major depressive disorder, single episode, moderate ([facility] [address] 2002    Morbid [facility] 2002     Past Surgical History:   Procedure Laterality Date    CARDIAC CATHETERIZATION      CARPAL TUNNEL RELEASE      COLONOSCOPY      HYSTERECTOMY, TOTAL ABDOMINAL      KNEE ARTHROPLASTY Bilateral     [place] COLON CA SCRN NOT  [address] IND N/A 2017    COLONOSCOPY performed by Geoff Zavala MD at [address] ESOPHAGOGASTRODUODENOSCOPY TRANSORAL DIAGNOSTIC N/A 2017    EGD ESOPHAGOGASTRODUODENOSCOPY performed by Geoff Zavala MD at [address] Bilateral     SKIN BIOPSY Right 2020    EXCISION OF LEG MASS RIGHT (PAT ON ADMIT) performed by Tanika Hinson MD at 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    Tobacco Use    Smoking status: Former Smoker     Packs/day: 1.00     Years: 20.00     Pack years: 20.00     Quit date:      Years since quittin.4    Smokeless tobacco: Never Used   Vaping Use    Vaping Use: Never used   Substance and Sexual Activity    Alcohol use: No    Drug use: Never    Sexual activity: Not Currently     Partners: Male   Other Topics Concern    None   Social History Narrative  None     Social Determinants of Health     Financial Resource Strain: Low Risk     Difficulty of Paying Living Expenses: Not hard at all   Food Insecurity: No Food Insecurity    Worried About Running Out of Food in the Last Year: Never true    Abdulkadir of Food in the Last Year: Never true   Transportation Needs:     Lack of Transportation (Medical):      Lack of Transportation (Non-Medical):    Physical Activity:     Days of Exercise per Week:     Minutes of Exercise per Session:    Stress:     Feeling of Stress :    Social Connections:     Frequency of Communication with Friends and Family:     Frequency of Social Gatherings with Friends and Family:     Attends Confucianist Services:     Active Member of Clubs or Organizations:     Attends Club or Organization Meetings:     Marital Status:    Intimate Partner Violence:     Fear of Current or Ex-Partner:     Emotionally Abused:     Physically Abused:     Sexually Abused:      Current Outpatient Medications on File Prior to Visit   Medication Sig Dispense Refill    Handicap Placard MISC by Does not apply route Exp 5 years 1 each 0    pravastatin (PRAVACHOL) 40 MG tablet TAKE ONE TABLET BY MOUTH EVERY DAY 90 tablet 0    tiZANidine (ZANAFLEX) 4 MG tablet TAKE ONE TABLET BY MOUTH THREE TIMES A DAY AS NEEDED FOR MUSCLE SPASMS / PAIN 90 tablet 0    gabapentin (NEURONTIN) 400 MG capsule TAKE ONE CAPSULE BY MOUTH THREE TIMES A DAY 90 capsule 3    loratadine (CLARITIN) 10 MG tablet Take 1 tablet by mouth daily 30 tablet 0    amLODIPine (NORVASC) 5 MG tablet TAKE ONE TABLET BY MOUTH DAILY AT BEDTIME  3    donepezil (ARICEPT) 10 MG tablet take one-half tablet by mouth at bedtime for 2 weeks then increase to 1 tablet at bedtime  3    albuterol sulfate  (90 Base) MCG/ACT inhaler Inhale 2 puffs into the lungs 4 times daily as needed for Wheezing 3 Inhaler 1    aspirin 81 MG EC tablet Take 1 tablet by mouth daily 30 tablet 3    melatonin 1 MG tablet Take 1 tablet by mouth nightly as needed for Sleep 30 tablet 2    Oxygen Concentrator        No current facility-administered medications on file prior to visit. Allergies   Allergen Reactions    Codeine      Nausea, lightheadedness, dizzy       Review of Systems   Constitutional: Negative for chills, diaphoresis, fatigue and fever. HENT: Negative for congestion and ear pain. Eyes: Negative for photophobia and visual disturbance. Respiratory: Negative for cough, chest tightness and shortness of breath. Cardiovascular: Negative for chest pain, palpitations and leg swelling. Gastrointestinal: Negative for abdominal distention and abdominal pain. Genitourinary: Negative for difficulty urinating and dysuria. Musculoskeletal: Negative for arthralgias and back pain. Skin: Positive for color change and wound. Negative for rash. Neurological: Positive for dizziness. Negative for weakness and light-headedness. Psychiatric/Behavioral: Negative for dysphoric mood. The patient is not nervous/anxious. Objective  Vitals:    06/03/21 1038   BP: 116/84   Site: Left Upper Arm   Position: Sitting   Cuff Size: Medium Adult   Pulse: 67   Temp: 96.8 °F (36 °C)   TempSrc: Tympanic   SpO2: 96%   Weight: 151 lb (68.5 kg)   Height: 4' 10\" (1.473 m)     Physical Exam  Constitutional:       General: She is not in acute distress. Appearance: Normal appearance. She is obese. She is not ill-appearing, toxic-appearing or diaphoretic. HENT:      Head: Normocephalic and atraumatic. Right Ear: External ear normal.      Left Ear: External ear normal.   Cardiovascular:      Rate and Rhythm: Normal rate and regular rhythm. Pulses: Normal pulses. Heart sounds: Normal heart sounds. No murmur heard. Pulmonary:      Effort: Pulmonary effort is normal. No respiratory distress. Breath sounds: Normal breath sounds. No stridor. No wheezing, rhonchi or rales.    Chest:      Chest wall: No tenderness. Musculoskeletal:         General: Normal range of motion. Cervical back: Normal range of motion and neck supple. No tenderness. Right lower leg: No edema. Left lower leg: No edema. Lymphadenopathy:      Cervical: No cervical adenopathy. Skin:     General: Skin is warm and dry. Capillary Refill: Capillary refill takes less than 2 seconds. Coloration: Skin is not jaundiced or pale. Findings: Erythema present. No bruising, lesion or rash. Neurological:      General: No focal deficit present. Mental Status: She is alert and oriented to person, place, and time. Mental status is at baseline. Cranial Nerves: No cranial nerve deficit. Coordination: Coordination normal.      Gait: Gait normal.   Psychiatric:         Mood and Affect: Mood normal.         Behavior: Behavior normal.         Thought Content: Thought content normal.         Judgment: Judgment normal.     Negative orthostatic bp. Assessment& Plan     Diagnosis Orders   1. Episodic lightheadedness  CBC With Auto Differential    Comprehensive Metabolic Panel    TSH with Reflex    Vitamin B12 & Folate   2. Right leg pain  naproxen (NAPROSYN) 375 MG tablet   3. Vertigo  CBC With Auto Differential    Comprehensive Metabolic Panel    TSH with Reflex    Vitamin B12 & Folate    meclizine (ANTIVERT) 12.5 MG tablet     Check labs as ordered. Naprosyn sparingly PRN for severe pain, tylenol twice daily for pain. Antivert sparingly PRN for vertigo. F/u with aaron clarke. F/u in 2 weeks or sooner PRN. Side effects, adverse effects of the medication prescribed today, as well as treatment plan/ rationale and result expectations have been discussed with the patient who expresses understanding and desires to proceed. Close follow up to evaluate treatment results and for coordination of care. I have reviewed the patient's medical history in detail and updated the computerized patient record.     As always, patient is advised that if symptoms worsen in any way they will proceed to the nearest emergency room. Orders Placed This Encounter   Procedures    CBC With Auto Differential     Standing Status:   Future     Number of Occurrences:   1     Standing Expiration Date:   6/3/2022    Comprehensive Metabolic Panel     Standing Status:   Future     Number of Occurrences:   1     Standing Expiration Date:   6/3/2022    TSH with Reflex     Standing Status:   Future     Number of Occurrences:   1     Standing Expiration Date:   6/3/2022    Vitamin B12 & Folate     Standing Status:   Future     Number of Occurrences:   1     Standing Expiration Date:   6/3/2022       Orders Placed This Encounter   Medications    naproxen (NAPROSYN) 375 MG tablet     Sig: Take 1 tablet by mouth 2 times daily as needed for Pain     Dispense:  60 tablet     Refill:  0    meclizine (ANTIVERT) 12.5 MG tablet     Sig: Take 1 tablet by mouth nightly as needed for Dizziness     Dispense:  15 tablet     Refill:  0       Return in about 2 weeks (around 6/17/2021) for f/u dizziness, pain.     YULY Dawn - CNP

## 2021-06-04 ENCOUNTER — TELEPHONE (OUTPATIENT)
Dept: FAMILY MEDICINE CLINIC | Age: 76
End: 2021-06-04

## 2021-06-04 VITALS
HEART RATE: 67 BPM | OXYGEN SATURATION: 96 % | BODY MASS INDEX: 31.7 KG/M2 | SYSTOLIC BLOOD PRESSURE: 126 MMHG | HEIGHT: 58 IN | DIASTOLIC BLOOD PRESSURE: 78 MMHG | TEMPERATURE: 96.8 F | WEIGHT: 151 LBS

## 2021-06-04 NOTE — TELEPHONE ENCOUNTER
I spoke to pt's insurance co and they stated that trintellix is not covered. Would need to try viibryd or trazadone. Do you want a tier exception filed or try one of the others? Please advise.

## 2021-06-17 ENCOUNTER — OFFICE VISIT (OUTPATIENT)
Dept: FAMILY MEDICINE CLINIC | Age: 76
End: 2021-06-17
Payer: MEDICARE

## 2021-06-17 VITALS
BODY MASS INDEX: 32.14 KG/M2 | SYSTOLIC BLOOD PRESSURE: 118 MMHG | HEART RATE: 66 BPM | TEMPERATURE: 98.2 F | DIASTOLIC BLOOD PRESSURE: 70 MMHG | OXYGEN SATURATION: 98 % | WEIGHT: 153.8 LBS

## 2021-06-17 DIAGNOSIS — R42 VERTIGO: Primary | ICD-10-CM

## 2021-06-17 DIAGNOSIS — R68.2 DRY MOUTH: ICD-10-CM

## 2021-06-17 DIAGNOSIS — L97.212 NON-PRESSURE CHRONIC ULCER OF RIGHT CALF WITH FAT LAYER EXPOSED (HCC): ICD-10-CM

## 2021-06-17 PROCEDURE — 99214 OFFICE O/P EST MOD 30 MIN: CPT | Performed by: NURSE PRACTITIONER

## 2021-06-17 ASSESSMENT — ENCOUNTER SYMPTOMS
DIARRHEA: 0
SHORTNESS OF BREATH: 0
CONSTIPATION: 0
COUGH: 0
CHEST TIGHTNESS: 0
EYE PAIN: 0
COLOR CHANGE: 0
TROUBLE SWALLOWING: 0
ABDOMINAL PAIN: 0

## 2021-06-17 NOTE — PATIENT INSTRUCTIONS
Biotene mouth wash for dry mouth    Schedule check up with Dr. Shay Blackburn for check up and dizziness

## 2021-06-17 NOTE — PROGRESS NOTES
Subjective  Chief Complaint   Patient presents with    2 Week Follow-Up    Dizziness     still struggling, when raising up quickly. mostly when waking     Other     dry mouth       HPI     Here for follow up. Continues to have dizziness when lying down or getting up, \"room spinning. \" Has been taking the meclizine when dizziness occurs. Not getting relief of symptoms. Denies falls but feels as though she will fall when dizziness occurs. Gets up with assistance from spouse d/t dizziness. Has reported dry mouth for years. Chews gum or eats candy for the dry mouth with some relief. Still with concerns regarding wound on right leg.      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 ([address] 10/09/2020    Non-pressure chronic ulcer of calf, right, with fat layer exposed ([address] 10/02/2020    Claudication in peripheral vascular disease ([address] 08/11/2020    Non-healing surgical wound 08/07/2020    Venous insufficiency of left lower extremity 05/08/2020    Asymptomatic varicose veins of bilateral lower extremities 05/08/2020    Pain and swelling due to varicose veins of both lower extremities 04/27/2020    Slow transit constipation 10/13/2019    External hemorrhoid, bleeding 10/13/2019    Colitis 10/13/2019    Acute colitis 10/10/2019    Hypovolemic shock ([address] 10/07/2019    NSTEMI (non-ST elevated myocardial infarction) ([address] 10/05/2019    Acute diastolic heart failure ([address] 10/05/2019    Hypotension 10/01/2019    Neuromuscular scoliosis of lumbar region Insecurity    Worried About Running Out of Food in the Last Year: Never true    Abdulkadir of Food in the Last Year: Never true   Transportation Needs:     Lack of Transportation (Medical):      Lack of Transportation (Non-Medical):    Physical Activity:     Days of Exercise per Week:     Minutes of Exercise per Session:    Stress:     Feeling of Stress :    Social Connections:     Frequency of Communication with Friends and Family:     Frequency of Social Gatherings with Friends and Family:     Attends Yazdanism Services:     Active Member of Clubs or Organizations:     Attends Club or Organization Meetings:     Marital Status:    Intimate Partner Violence:     Fear of Current or Ex-Partner:     Emotionally Abused:     Physically Abused:     Sexually Abused:      Current Outpatient Medications on File Prior to Visit   Medication Sig Dispense Refill    naproxen (NAPROSYN) 375 MG tablet Take 1 tablet by mouth 2 times daily as needed for Pain 60 tablet 0    meclizine (ANTIVERT) 12.5 MG tablet Take 1 tablet by mouth nightly as needed for Dizziness 15 tablet 0    Handicap Placard MISC by Does not apply route Exp 5 years 1 each 0    pravastatin (PRAVACHOL) 40 MG tablet TAKE ONE TABLET BY MOUTH EVERY DAY 90 tablet 0    tiZANidine (ZANAFLEX) 4 MG tablet TAKE ONE TABLET BY MOUTH THREE TIMES A DAY AS NEEDED FOR MUSCLE SPASMS / PAIN 90 tablet 0    gabapentin (NEURONTIN) 400 MG capsule TAKE ONE CAPSULE BY MOUTH THREE TIMES A DAY 90 capsule 3    loratadine (CLARITIN) 10 MG tablet Take 1 tablet by mouth daily 30 tablet 0    amLODIPine (NORVASC) 5 MG tablet TAKE ONE TABLET BY MOUTH DAILY AT BEDTIME  3    donepezil (ARICEPT) 10 MG tablet take one-half tablet by mouth at bedtime for 2 weeks then increase to 1 tablet at bedtime  3    albuterol sulfate  (90 Base) MCG/ACT inhaler Inhale 2 puffs into the lungs 4 times daily as needed for Wheezing 3 Inhaler 1    aspirin 81 MG EC tablet Take 1 tablet by mouth daily 30 tablet 3    melatonin 1 MG tablet Take 1 tablet by mouth nightly as needed for Sleep 30 tablet 2    Oxygen Concentrator        No current facility-administered medications on file prior to visit. Allergies   Allergen Reactions    Codeine      Nausea, lightheadedness, dizzy       Review of Systems   Constitutional: Negative for activity change, appetite change and fatigue. HENT: Negative for ear pain, hearing loss and trouble swallowing. Eyes: Negative for pain and visual disturbance. Respiratory: Negative for cough, chest tightness and shortness of breath. Cardiovascular: Negative for chest pain, palpitations and leg swelling. Gastrointestinal: Negative for abdominal pain, constipation and diarrhea. Genitourinary: Negative for difficulty urinating. Musculoskeletal: Negative. Skin: Positive for wound. Negative for color change, pallor and rash. RLE wound, slow healing   Neurological: Positive for dizziness. Negative for light-headedness. Psychiatric/Behavioral: Negative. Objective  Vitals:    06/17/21 1111   BP: 118/70   Pulse: 66   Temp: 98.2 °F (36.8 °C)   SpO2: 98%   Weight: 153 lb 12.8 oz (69.8 kg)     Physical Exam  Vitals reviewed. Constitutional:       General: She is not in acute distress. Appearance: She is well-developed. HENT:      Head: Normocephalic and atraumatic. Right Ear: Hearing, tympanic membrane, ear canal and external ear normal.      Left Ear: Hearing, tympanic membrane, ear canal and external ear normal.      Nose: Nose normal.   Eyes:      General:         Right eye: No discharge. Left eye: No discharge. Conjunctiva/sclera: Conjunctivae normal.      Pupils: Pupils are equal, round, and reactive to light. Neck:      Thyroid: No thyromegaly. Cardiovascular:      Rate and Rhythm: Normal rate and regular rhythm. Heart sounds: Normal heart sounds. No murmur heard.      Pulmonary:      Effort: Pulmonary effort is normal. No respiratory distress. Breath sounds: Normal breath sounds. No wheezing or rales. Abdominal:      General: Bowel sounds are normal. There is no distension. Palpations: Abdomen is soft. There is no mass. Tenderness: There is no abdominal tenderness. There is no guarding or rebound. Musculoskeletal:         General: Normal range of motion. Right lower leg: No edema. Left lower leg: No edema. Lymphadenopathy:      Cervical: No cervical adenopathy. Skin:     General: Skin is warm and dry. Capillary Refill: Capillary refill takes less than 2 seconds. Coloration: Skin is not jaundiced or pale. Findings: Erythema present. No bruising, lesion or rash. Neurological:      General: No focal deficit present. Mental Status: She is alert and oriented to person, place, and time. Mental status is at baseline. Cranial Nerves: No cranial nerve deficit. Coordination: Coordination normal.      Gait: Gait normal.   Psychiatric:         Mood and Affect: Mood normal.         Speech: Speech normal.         Behavior: Behavior normal.         Thought Content: Thought content normal.         Judgment: Judgment normal.         Assessment& Plan     Diagnosis Orders   1. Vertigo  External Referral to Physical Therapy   2. Non-pressure chronic ulcer of right calf with fat layer exposed ([facility]     3. Dry mouth       Referral to PT. F/u with neuro. Will see DR. Zee for wound. Biotene for dry mouth. F/u in 3 months or sooner PRN. Side effects, adverse effects of the medication prescribed today, as well as treatment plan/ rationale and result expectations have been discussed with the patient who expresses understanding and desires to proceed. Close follow up to evaluate treatment results and for coordination of care. I have reviewed the patient's medical history in detail and updated the computerized patient record.     As always, patient is advised that if symptoms worsen in any way they will proceed to the nearest emergency room. Orders Placed This Encounter   Procedures    External Referral to Physical Therapy     Referral Priority:   Routine     Referral Type:   Eval and Treat     Referral Reason:   Specialty Services Required     Requested Specialty:   Physical Therapy     Number of Visits Requested:   1       No orders of the defined types were placed in this encounter. Return in about 3 months (around 9/17/2021), or if symptoms worsen or fail to improve.     Matthew Arana, APRN - CNP

## 2021-06-23 ENCOUNTER — TELEPHONE (OUTPATIENT)
Dept: FAMILY MEDICINE CLINIC | Age: 76
End: 2021-06-23

## 2021-07-01 ENCOUNTER — OFFICE VISIT (OUTPATIENT)
Dept: FAMILY MEDICINE CLINIC | Age: 76
End: 2021-07-01
Payer: MEDICARE

## 2021-07-01 VITALS — HEIGHT: 58 IN | WEIGHT: 153 LBS | TEMPERATURE: 97.9 F | BODY MASS INDEX: 32.12 KG/M2

## 2021-07-01 DIAGNOSIS — T14.8XXA NONHEALING NONSURGICAL WOUND: Primary | ICD-10-CM

## 2021-07-01 PROCEDURE — 99214 OFFICE O/P EST MOD 30 MIN: CPT | Performed by: FAMILY MEDICINE

## 2021-07-01 PROCEDURE — 97597 DBRDMT OPN WND 1ST 20 CM/<: CPT | Performed by: FAMILY MEDICINE

## 2021-07-01 RX ORDER — VORTIOXETINE 10 MG/1
TABLET, FILM COATED ORAL
COMMUNITY
Start: 2021-06-25 | End: 2021-10-28

## 2021-07-01 ASSESSMENT — ENCOUNTER SYMPTOMS: COLOR CHANGE: 1

## 2021-07-01 NOTE — PROGRESS NOTES
Diagnosis Orders   1. Nonhealing nonsurgical wound  silver sulfADIAZINE (SILVADENE) 1 % cream     Return in about 1 week (around 7/8/2021). Patient Instructions   Patient will Silvadene cream to the inner aspect of the wound daily and leave open to air. Every third day the patient will wash the wound somewhat roughly either with washcloth or hydrogen peroxide and washcloth in order to stimulate a small amount of bleeding. Then apply Silvadene cream as previously instructed. Follow-up 1 week      Subjective:      Patient ID: Kamila Cabrera is a 66 y.o. female who presents for:  Chief Complaint   Patient presents with    Wound Check     Inner right leg near ankle  pt believes this has been there for about 3 weeks - chart    [facility] maintenance will be addressed by patient's primary care physician       Patient has had a nonhealing wound on her leg since shortly after having a biopsy done. The biopsy was negative for cancer. She has been unable to get the wound to heal all the way. It is currently scabbed over. Does not bleed but is painful.       Current Outpatient Medications on File Prior to Visit   Medication Sig Dispense Refill    TRINTELLIX 10 MG TABS tablet TAKE ONE TABLET BY MOUTH DAILY      naproxen (NAPROSYN) 375 MG tablet Take 1 tablet by mouth 2 times daily as needed for Pain 60 tablet 0    Handicap Placard MISC by Does not apply route Exp 5 years 1 each 0    pravastatin (PRAVACHOL) 40 MG tablet TAKE ONE TABLET BY MOUTH EVERY DAY 90 tablet 0    tiZANidine (ZANAFLEX) 4 MG tablet TAKE ONE TABLET BY MOUTH THREE TIMES A DAY AS NEEDED FOR MUSCLE SPASMS / PAIN 90 tablet 0    gabapentin (NEURONTIN) 400 MG capsule TAKE ONE CAPSULE BY MOUTH THREE TIMES A DAY 90 capsule 3    loratadine (CLARITIN) 10 MG tablet Take 1 tablet by mouth daily 30 tablet 0    amLODIPine (NORVASC) 5 MG tablet TAKE ONE TABLET BY MOUTH DAILY AT BEDTIME  3    donepezil (ARICEPT) 10 MG tablet take one-half tablet by mouth at bedtime for 2 weeks then increase to 1 tablet at bedtime  3    albuterol sulfate  (90 Base) MCG/ACT inhaler Inhale 2 puffs into the lungs 4 times daily as needed for Wheezing 3 Inhaler 1    aspirin 81 MG EC tablet Take 1 tablet by mouth daily 30 tablet 3    melatonin 1 MG tablet Take 1 tablet by mouth nightly as needed for Sleep 30 tablet 2    Oxygen Concentrator        No current facility-administered medications on file prior to visit.      Past Medical History:   Diagnosis Date    Anxiety     Arthritis     [facility]     Generalized osteoarthrosis, unspecified site 2002    Hyperlipidemia     Hypertension     Major depressive disorder, single episode, moderate ([facility] 2002    Morbid obesity ([facility] 2002    OAB (overactive bladder)     Osteoporosis      Past Surgical History:   Procedure Laterality Date    CARDIAC CATHETERIZATION      CARPAL TUNNEL RELEASE      COLONOSCOPY      HYSTERECTOMY, TOTAL ABDOMINAL      KNEE ARTHROPLASTY Bilateral     MI COLON CA SCRN NOT  [address] IND N/A 2017    [facility] ESOPHAGOGASTRODUODENOSCOPY TRANSORAL DIAGNOSTIC N/A 2017    EGD ESOPHAGOGASTRODUODENOSCOPY [facility] [address] [facility] B[facility]     SKIN BIOPSY Right 2020    EXCISION OF LEG MASS RIGHT (PAT ON ADMIT) performed by Igor Vogel MD at [address] Marital status:      Spouse name: Nils Rodriguez Number of children: 3    Years of education: 15    Highest education level: 12th grade   Occupational History    Occupation: [employer]    Tobacco Use    Smoking status: Former Smoker     Packs/day: 1.00     Years: 20.00     Pack years: 20.00     Quit date:      Years since quittin.5    Smokeless tobacco: Never Used Vaping Use    Vaping Use: Never used   Substance and Sexual Activity    Alcohol use: No    Drug use: Never    Sexual activity: Not Currently     Partners: Male   Other Topics Concern    Not on file   Social History Narrative    Not on file     Social Determinants of Health     Financial Resource Strain: Low Risk     Difficulty of Paying Living Expenses: Not hard at all   Food Insecurity: No Food Insecurity    Worried About Running Out of Food in the Last Year: Never true    [address] in the Last Year: Never true   Transportation Needs:     Lack of Transportation (Medical):  Lack of Transportation (Non-Medical):    Physical Activity:     Days of Exercise per Week:     Minutes of Exercise per Session:    Stress:     Feeling of Stress :    Social Connections:     Frequency of Communication with Friends and Family:     Frequency of Social Gatherings with Friends and Family:     Attends Restorationism Services:     Active Member of Clubs or Organizations:     Attends Club or Organization Meetings:     Marital Status:    Intimate Partner Violence:     Fear of Current or Ex-Partner:     Emotionally Abused:     Physically Abused:     Sexually Abused:      Family History   Problem Relation Age of Onset    Arthritis Father     Other Father         gout    Heart Failure Father     Cancer Sister      Allergies:  Codeine    Review of Systems   Constitutional: Negative for chills and fever. Skin: Positive for color change and wound. Negative for rash. Allergic/Immunologic: Negative for environmental allergies, food allergies and immunocompromised state. Hematological: Negative for adenopathy. Does not bruise/bleed easily. Psychiatric/Behavioral: Negative for behavioral problems and sleep disturbance.        Objective:   Temp 97.9 °F (36.6 °C)   Ht 4' 10\" (1.473 m)   Wt 153 lb (69.4 kg)   LMP  (LMP Unknown)   BMI 31.98 kg/m²     Physical Exam  Constitutional:       General: She is not in acute distress. Appearance: Normal appearance. She is well-developed. She is not toxic-appearing. HENT:      Head: Normocephalic and atraumatic. Right Ear: Hearing and tympanic membrane normal.      Left Ear: Hearing and tympanic membrane normal.      Nose: Nose normal. No nasal deformity. Eyes:      General: Lids are normal.         Right eye: No discharge. Left eye: No discharge. Conjunctiva/sclera: Conjunctivae normal.      Pupils: Pupils are equal, round, and reactive to light. Neck:      Thyroid: No thyroid mass or thyromegaly. Vascular: No JVD. Trachea: Trachea and phonation normal.   Cardiovascular:      Rate and Rhythm: Normal rate and regular rhythm. Pulmonary:      Effort: No accessory muscle usage or respiratory distress. Musculoskeletal:      Cervical back: Full passive range of motion without pain. Comments: FROM all large muscle groups and joints as witnessed when walking to exam table, getting on, and getting off the exam table. Skin:     General: Skin is warm and dry. Findings: No rash. Neurological:      Mental Status: She is alert. Motor: No tremor or atrophy. Gait: Gait normal.   Psychiatric:         Speech: Speech normal.         Behavior: Behavior normal.         Thought Content: Thought content normal.             No results found for this visit on 07/01/21. Procedure:  Wound was soaked with hydrogen peroxide then rinse with saline. Rough debrided to the dermal layer with gauze. Silvadene applied bandage applied. Assessment:       Diagnosis Orders   1. Nonhealing nonsurgical wound  silver sulfADIAZINE (SILVADENE) 1 % cream         No orders of the defined types were placed in this encounter. Plan:   Return in about 1 week (around 7/8/2021). Patient Instructions   Patient will Silvadene cream to the inner aspect of the wound daily and leave open to air.     Every third day the patient will wash the wound somewhat roughly either with washcloth or hydrogen peroxide and washcloth in order to stimulate a small amount of bleeding. Then apply Silvadene cream as previously instructed. Follow-up 1 week      This note was partially created with the assistance of dictation. This may lead to grammatical or spelling errors. Unruly Rivas M.D.

## 2021-07-01 NOTE — PATIENT INSTRUCTIONS
Patient will Silvadene cream to the inner aspect of the wound daily and leave open to air. Every third day the patient will wash the wound somewhat roughly either with washcloth or hydrogen peroxide and washcloth in order to stimulate a small amount of bleeding. Then apply Silvadene cream as previously instructed.     Follow-up 1 week

## 2021-07-15 ENCOUNTER — OFFICE VISIT (OUTPATIENT)
Dept: FAMILY MEDICINE CLINIC | Age: 76
End: 2021-07-15
Payer: MEDICARE

## 2021-07-15 VITALS — HEIGHT: 58 IN | WEIGHT: 153 LBS | BODY MASS INDEX: 32.12 KG/M2 | TEMPERATURE: 97.7 F

## 2021-07-15 DIAGNOSIS — T14.8XXA NONHEALING NONSURGICAL WOUND: Primary | ICD-10-CM

## 2021-07-15 PROCEDURE — 99212 OFFICE O/P EST SF 10 MIN: CPT | Performed by: FAMILY MEDICINE

## 2021-07-15 ASSESSMENT — ENCOUNTER SYMPTOMS: COLOR CHANGE: 0

## 2021-07-15 NOTE — PROGRESS NOTES
Diagnosis Orders   1. Nonhealing nonsurgical wound       Return if symptoms worsen or fail to improve. Patient Instructions   Patient may stop Silvadene cream.    For massage to the area to help the scar resolve as well as decrease sensitization to the area. It will be key to keep swelling in the legs under control so the skin does not break open again. Subjective:      Patient ID: Rosalio Roque is a 66 y.o. female who presents for:  Chief Complaint   Patient presents with    Wound Check     Rt ankle, pt believes its not getting any better        Patient had no difficulty with plan. Wound seems to be healing up.  to touch. Current Outpatient Medications on File Prior to Visit   Medication Sig Dispense Refill    TRINTELLIX 10 MG TABS tablet TAKE ONE TABLET BY MOUTH DAILY      silver sulfADIAZINE (SILVADENE) 1 % cream Apply topically daily.  25 g 0    naproxen (NAPROSYN) 375 MG tablet Take 1 tablet by mouth 2 times daily as needed for Pain 60 tablet 0    Handicap Placard MISC by Does not apply route Exp 5 years 1 each 0    pravastatin (PRAVACHOL) 40 MG tablet TAKE ONE TABLET BY MOUTH EVERY DAY 90 tablet 0    tiZANidine (ZANAFLEX) 4 MG tablet TAKE ONE TABLET BY MOUTH THREE TIMES A DAY AS NEEDED FOR MUSCLE SPASMS / PAIN 90 tablet 0    gabapentin (NEURONTIN) 400 MG capsule TAKE ONE CAPSULE BY MOUTH THREE TIMES A DAY 90 capsule 3    loratadine (CLARITIN) 10 MG tablet Take 1 tablet by mouth daily 30 tablet 0    amLODIPine (NORVASC) 5 MG tablet TAKE ONE TABLET BY MOUTH DAILY AT BEDTIME  3    donepezil (ARICEPT) 10 MG tablet take one-half tablet by mouth at bedtime for 2 weeks then increase to 1 tablet at bedtime  3    albuterol sulfate  (90 Base) MCG/ACT inhaler Inhale 2 puffs into the lungs 4 times daily as needed for Wheezing 3 Inhaler 1    aspirin 81 MG EC tablet Take 1 tablet by mouth daily 30 tablet 3    melatonin 1 MG tablet Take 1 tablet by mouth nightly as needed for Sleep 30 tablet 2    Oxygen Concentrator        No current facility-administered medications on file prior to visit.      Past Medical History:   Diagnosis Date    Anxiety     Arthritis     [facility] disease[facility])     Generalized osteoarthrosis, unspecified site 2002    Hyperlipidemia     Hypertension     Major depressive disorder, single episode, moderate ([facility] [address] 2002    Morbid obesity ([facility] [address] 2002    OAB (overactive bladder)     Osteoporosis      Past Surgical History:   Procedure Laterality Date    CARDIAC CATHETERIZATION      CARPAL TUNNEL RELEASE      COLONOSCOPY      HYSTERECTOMY, TOTAL ABDOMINAL      KNEE ARTHROPLASTY Bilateral     [place] COLON CA SCRN NOT  [address] IND N/A 2017    COLONOSCOPY performed by Yves Leger MD at 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    Tobacco Use    Smoking status: Former Smoker     Packs/day: 1.00     Years: 20.00     Pack years: 20.00     Quit date:      Years since quittin.5    Smokeless tobacco: Never Used   Vaping Use    Vaping Use: Never used   Substance and Sexual Activity    Alcohol use: No    Drug use: Never    Sexual activity: Not Currently     Partners: Male   Other Topics Concern    Not on file   Social History Narrative    Not on file     Social Determinants of Health     Financial Resource Strain: Low Risk     Difficulty of Paying Living Expenses: Not hard at all   Food Insecurity: No Food Insecurity    Worried About [address] in the Last Year: Never true    Abdulkadir of Food in the Last Year: Never true   Transportation Needs:     Lack of Transportation (Medical):  Lack of Transportation (Non-Medical):    Physical Activity:     Days of Exercise per Week:     Minutes of Exercise per Session:    Stress:     Feeling of Stress :    Social Connections:     Frequency of Communication with Friends and Family:     Frequency of Social Gatherings with Friends and Family:     Attends [employer] Services:     Active Member of Clubs or Organizations:     Attends Club or Organization Meetings:     Marital Status:    Intimate Partner Violence:     Fear of Current or Ex-Partner:     Emotionally Abused:     Physically Abused:     Sexually Abused:      Family History   Problem Relation Age of Onset    Arthritis Father     Other Father         gout    Heart Failure Father     Cancer Sister      Allergies:  Codeine    Review of Systems   Constitutional: Negative for chills and fever. Skin: Positive for wound. Negative for color change and rash. Allergic/Immunologic: Negative for environmental allergies, food allergies and immunocompromised state. Hematological: Negative for adenopathy. Does not bruise/bleed easily. Psychiatric/Behavioral: Negative for behavioral problems and sleep disturbance. Objective:   Temp 97.7 °F (36.5 °C)   Ht 4' 10\" (1.473 m)   Wt 153 lb (69.4 kg)   LMP  (LMP Unknown)   BMI 31.98 kg/m²     Physical Exam  Constitutional:       General: She is not in acute distress. Appearance: Normal appearance. She is well-developed. She is not toxic-appearing. HENT:      Head: Normocephalic and atraumatic. Right Ear: Hearing and tympanic membrane normal.      Left Ear: Hearing and tympanic membrane normal.      Nose: Nose normal. No nasal deformity.    Eyes:      General: Lids are normal. Right eye: No discharge. Left eye: No discharge. Conjunctiva/sclera: Conjunctivae normal.      Pupils: Pupils are equal, round, and reactive to light. Neck:      Thyroid: No thyroid mass or thyromegaly. Vascular: No JVD. Trachea: Trachea and phonation normal.   Cardiovascular:      Rate and Rhythm: Normal rate and regular rhythm. Pulmonary:      Effort: No accessory muscle usage or respiratory distress. Musculoskeletal:      Cervical back: Full passive range of motion without pain. Comments: FROM all large muscle groups and joints as witnessed when walking to exam table, getting on, and getting off the exam table. Skin:     General: Skin is warm and dry. Findings: No rash. Comments: Refer to picture below. No eschar present. Neurological:      Mental Status: She is alert. Motor: No tremor or atrophy. Gait: Gait normal.   Psychiatric:         Speech: Speech normal.         Behavior: Behavior normal.         Thought Content: Thought content normal.           Before      After        No results found for this visit on 07/15/21. Assessment:       Diagnosis Orders   1. Nonhealing nonsurgical wound           No orders of the defined types were placed in this encounter. Plan:   Return if symptoms worsen or fail to improve. Patient Instructions   Patient may stop Silvadene cream.    For massage to the area to help the scar resolve as well as decrease sensitization to the area. It will be key to keep swelling in the legs under control so the skin does not break open again. This note was partially created with the assistance of dictation. This may lead to grammatical or spelling errors. Unruly Todd M.D.

## 2021-07-15 NOTE — PROGRESS NOTES
Return in about 1 week (around 7/8/2021). Patient Instructions   Patient will Silvadene cream to the inner aspect of the wound daily and leave open to air.     Every third day the patient will wash the wound somewhat roughly either with washcloth or hydrogen peroxide and washcloth in order to stimulate a small amount of bleeding.   Then apply Silvadene cream as previously instructed.     Follow-up 1 week

## 2021-07-15 NOTE — PATIENT INSTRUCTIONS
Patient may stop Silvadene cream.    For massage to the area to help the scar resolve as well as decrease sensitization to the area. It will be key to keep swelling in the legs under control so the skin does not break open again.

## 2021-07-15 NOTE — PROGRESS NOTES
Diagnosis Orders   1. Nonhealing nonsurgical wound       No follow-ups on file. Patient Instructions   Patient may stop Silvadene cream.    For massage to the area to help the scar resolve as well as decrease sensitization to the area. It will be key to keep swelling in the legs under control so the skin does not break open again. Subjective:      Patient ID: Janelle Ballard is a 66 y.o. female who presents for:  Chief Complaint   Patient presents with    Wound Check     Rt ankle, pt believes its not getting any better        HPI    Current Outpatient Medications on File Prior to Visit   Medication Sig Dispense Refill    TRINTELLIX 10 MG TABS tablet TAKE ONE TABLET BY MOUTH DAILY      silver sulfADIAZINE (SILVADENE) 1 % cream Apply topically daily.  25 g 0    naproxen (NAPROSYN) 375 MG tablet Take 1 tablet by mouth 2 times daily as needed for Pain 60 tablet 0    Handicap Placard MISC by Does not apply route Exp 5 years 1 each 0    pravastatin (PRAVACHOL) 40 MG tablet TAKE ONE TABLET BY MOUTH EVERY DAY 90 tablet 0    tiZANidine (ZANAFLEX) 4 MG tablet TAKE ONE TABLET BY MOUTH THREE TIMES A DAY AS NEEDED FOR MUSCLE SPASMS / PAIN 90 tablet 0    gabapentin (NEURONTIN) 400 MG capsule TAKE ONE CAPSULE BY MOUTH THREE TIMES A DAY 90 capsule 3    loratadine (CLARITIN) 10 MG tablet Take 1 tablet by mouth daily 30 tablet 0    amLODIPine (NORVASC) 5 MG tablet TAKE ONE TABLET BY MOUTH DAILY AT BEDTIME  3    donepezil (ARICEPT) 10 MG tablet take one-half tablet by mouth at bedtime for 2 weeks then increase to 1 tablet at bedtime  3    albuterol sulfate  (90 Base) MCG/ACT inhaler Inhale 2 puffs into the lungs 4 times daily as needed for Wheezing 3 Inhaler 1    aspirin 81 MG EC tablet Take 1 tablet by mouth daily 30 tablet 3    melatonin 1 MG tablet Take 1 tablet by mouth nightly as needed for Sleep 30 tablet 2    Oxygen Concentrator        No current facility-administered medications on file prior to visit.      Past Medical History:   Diagnosis Date    Anxiety     Arthritis     COPD (chronic obstructive pulmonary disease) ([facility])     Generalized osteoarthrosis, unspecified site 2002    Hyperlipidemia     Hypertension     Major depressive disorder, single episode, moderate ([facility] [address] 2002    Morbid obesity ([facility] [address] 2002    OAB (overactive bladder)     Osteoporosis      Past Surgical History:   Procedure Laterality Date    CARDIAC CATHETERIZATION      CARPAL TUNNEL RELEASE      COLONOSCOPY      HYSTERECTOMY, TOTAL ABDOMINAL      KNEE ARTHROPLASTY Bilateral     [place] COLON CA SCRN NOT  [address] IND N/A 2017    COLONOSCOPY performed by Saji Hlil MD at 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    Tobacco Use    Smoking status: Former Smoker     Packs/day: 1.00     Years: 20.00     Pack years: 20.00     Quit date:      Years since quittin.5    Smokeless tobacco: Never Used   Vaping Use    Vaping Use: Never used   Substance and Sexual Activity    Alcohol use: No    Drug use: Never    Sexual activity: Not Currently     Partners: Male   Other Topics Concern    Not on file   Social History Narrative    Not on file     Social Determinants of Health     Financial Resource Strain: Low Risk     Difficulty of Paying Living Expenses: Not hard at all   Food Insecurity: No Food Insecurity    [place] [address] [facility] [place] Last Year: Never true    Ran Out of Food in the Last Year: Never true   Transportation Needs:     Lack of Transportation (Medical):  Lack of Transportation (Non-Medical):    Physical Activity:     Days of Exercise per Week:     Minutes of Exercise per Session:    Stress:     Feeling of Stress :    Social Connections:     Frequency of Communication with Friends and Family:     Frequency of Social Gatherings with Friends and Family:     Attends [employer] Services:     Active Member of Clubs or Organizations:     Attends Club or Organization Meetings:     Marital Status:    Intimate Partner Violence:     Fear of Current or Ex-Partner:     Emotionally Abused:     Physically Abused:     Sexually Abused:      Family History   Problem Relation Age of Onset    Arthritis Father     Other Father         gout    Heart Failure Father     Cancer Sister      Allergies:  Codeine    Review of Systems    Objective:   Temp 97.7 °F (36.5 °C)   Ht 4' 10\" (1.473 m)   Wt 153 lb (69.4 kg)   LMP  (LMP Unknown)   BMI 31.98 kg/m²     Physical Exam    No results found for this visit on 07/15/21.     Recent Results (from the past 2016 hour(s))   Vitamin B12 & Folate    Collection Time: 06/03/21 11:30 AM   Result Value Ref Range    Vitamin B-12 451 232 - 1245 pg/mL    Folate 16.8 7.3 - 26.1 ng/mL   TSH with Reflex    Collection Time: 06/03/21 11:30 AM   Result Value Ref Range    TSH 1.330 0.440 - 3.860 uIU/mL   Comprehensive Metabolic Panel    Collection Time: 06/03/21 11:30 AM   Result Value Ref Range    Sodium 143 135 - 144 mEq/L    Potassium 4.5 3.4 - 4.9 mEq/L    Chloride 105 95 - 107 mEq/L    CO2 26 20 - 31 mEq/L    Anion Gap 12 9 - 15 mEq/L    Glucose 97 70 - 99 mg/dL    BUN 21 8 - 23 mg/dL    CREATININE 0.89 0.50 - 0.90 mg/dL    GFR Non-African American >60.0 >60    GFR  >60.0 >60    Calcium 9.9 8.5 - 9.9 mg/dL    Total Protein 7.5 6.3 - 8.0 g/dL    Albumin 4.8 (H) 3.5 - 4.6 g/dL    Total Bilirubin 0.5 0.2 - 0.7 mg/dL    Alkaline Phosphatase 62 40 - 130 U/L    ALT 20 0 - 33 U/L    AST 27 0 - 35 U/L    Globulin 2.7 2.3 - 3.5 g/dL   CBC With Auto Differential    Collection Time: 06/03/21 11:30 AM   Result Value Ref Range    WBC 6.7 4.8 - 10.8 K/uL    RBC 4.56 4.20 - 5.40 M/uL    Hemoglobin 14.9 12.0 - 16.0 g/dL    Hematocrit 44.7 37.0 - 47.0 %    MCV 97.9 82.0 - 100.0 fL    MCH 32.6 (H) 27.0 - 31.3 pg    MCHC 33.4 33.0 - 37.0 %    RDW 14.4 11.5 - 14.5 %    Platelets 260 043 - 813 K/uL    Neutrophils % 61.7 %    Lymphocytes % 24.1 %    Monocytes % 9.2 %    Eosinophils % 3.9 %    Basophils % 1.1 %    Neutrophils Absolute 4.1 1.4 - 6.5 K/uL    Lymphocytes Absolute 1.6 1.0 - 4.8 K/uL    Monocytes Absolute 0.6 0.2 - 0.8 K/uL    Eosinophils Absolute 0.3 0.0 - 0.7 K/uL    Basophils Absolute 0.1 0.0 - 0.2 K/uL           Assessment:       Diagnosis Orders   1. Nonhealing nonsurgical wound           No orders of the defined types were placed in this encounter. Plan:   No follow-ups on file. Patient Instructions   Patient may stop Silvadene cream.    For massage to the area to help the scar resolve as well as decrease sensitization to the area. It will be key to keep swelling in the legs under control so the skin does not break open again. This note was partially created with the assistance of dictation. This may lead to grammatical or spelling errors. Unruly Newby M.D.

## 2021-07-24 DIAGNOSIS — E78.5 HYPERLIPIDEMIA, UNSPECIFIED HYPERLIPIDEMIA TYPE: ICD-10-CM

## 2021-07-26 RX ORDER — PRAVASTATIN SODIUM 40 MG
TABLET ORAL
Qty: 90 TABLET | Refills: 0 | Status: SHIPPED | OUTPATIENT
Start: 2021-07-26 | End: 2021-11-04 | Stop reason: SDUPTHER

## 2021-08-29 NOTE — TELEPHONE ENCOUNTER
Future Appointments    This patient does not currently have any appointments scheduled.   Recent Visits    07/15/2021 Nonhealing nonsurgical wound   [address], MD   07/01/2021 Nonhealing nonsurgical wound   [facility] Lucero Ascencio MD   06/17/2021 [address] - CNP     Last fill 2/12/21

## 2021-08-31 RX ORDER — GABAPENTIN 400 MG/1
CAPSULE ORAL
Qty: 90 CAPSULE | Refills: 0 | OUTPATIENT
Start: 2021-08-31

## 2021-08-31 RX ORDER — GABAPENTIN 400 MG/1
CAPSULE ORAL
Qty: 90 CAPSULE | Refills: 0 | Status: SHIPPED | OUTPATIENT
Start: 2021-08-31 | End: 2021-09-02 | Stop reason: SDUPTHER

## 2021-09-02 RX ORDER — GABAPENTIN 400 MG/1
400 CAPSULE ORAL 3 TIMES DAILY
Qty: 90 CAPSULE | Refills: 2 | Status: SHIPPED | OUTPATIENT
Start: 2021-09-02 | End: 2021-12-03 | Stop reason: SDUPTHER

## 2021-10-28 RX ORDER — VORTIOXETINE 10 MG/1
TABLET, FILM COATED ORAL
Qty: 30 TABLET | Refills: 0 | Status: SHIPPED | OUTPATIENT
Start: 2021-10-28 | End: 2021-12-03 | Stop reason: SDUPTHER

## 2021-11-04 ENCOUNTER — TELEPHONE (OUTPATIENT)
Dept: FAMILY MEDICINE CLINIC | Age: 76
End: 2021-11-04

## 2021-11-04 DIAGNOSIS — E78.5 HYPERLIPIDEMIA, UNSPECIFIED HYPERLIPIDEMIA TYPE: ICD-10-CM

## 2021-11-04 RX ORDER — PRAVASTATIN SODIUM 40 MG
20 TABLET ORAL DAILY
Qty: 90 TABLET | Refills: 1 | Status: SHIPPED | OUTPATIENT
Start: 2021-11-04 | End: 2022-10-20

## 2021-11-04 NOTE — TELEPHONE ENCOUNTER
LOV: 6/17/2021  Next Appt: due for appt, had one scheduled for today, appt was cancelled.    Pharmacy confirmed with pt:  [facility]
Last fill 07/26/21
done

## 2021-12-03 RX ORDER — GABAPENTIN 400 MG/1
400 CAPSULE ORAL 3 TIMES DAILY
Qty: 90 CAPSULE | Refills: 2 | Status: SHIPPED | OUTPATIENT
Start: 2021-12-03 | End: 2022-08-26

## 2022-01-06 NOTE — TELEPHONE ENCOUNTER
Requesting medication refill. Please approve or deny this request.    Rx requested:  Requested Prescriptions     Pending Prescriptions Disp Refills    VORTIoxetine (TRINTELLIX) 10 MG TABS tablet [Pharmacy Med Name: Trintellix Oral Tablet 10 MG] 30 tablet 0     Sig: TAKE ONE TABLET BY MOUTH EVERY DAY       Last Office Visit:   6/17/2021    Next Visit Date:  No future appointments.   lmtcb

## 2022-03-29 ENCOUNTER — TELEPHONE (OUTPATIENT)
Dept: FAMILY MEDICINE CLINIC | Age: 77
End: 2022-03-29

## 2022-03-31 NOTE — TELEPHONE ENCOUNTER
Pt spouse called back. States no braces (arm, etc.) are needed for patient. States they did not request any from pharmacy.

## 2022-04-08 NOTE — TELEPHONE ENCOUNTER
LMTCB    NO APPT SCHEDULED - prior attempts to schedule failed       Future Appointments    This patient does not currently have any appointments scheduled.     Past Visits    Date Provider Specialty Visit Type Primary Dx   07/15/2021 Umberto Marquez MD Family Medicine Office Visit Nonhealing nonsurgical wound   07/01/2021 Umberto Marquez MD Family Medicine Office Visit Nonhealing nonsurgical wound   06/17/2021 YULY Khanna CNP Family Medicine Office Visit Vertigo   06/03/2021 YULY Khanna CNP Family Medicine Office Visit Episodic lightheadedness   05/28/2021 Guillaume Sterling DO Family Medicine Virtual Visit Episodic lightheadedness

## 2022-05-18 NOTE — TELEPHONE ENCOUNTER
Patient declined to scheduled appt.  I told her we will NOT refill anymore medication request with out an appointment

## 2022-07-01 ENCOUNTER — OFFICE VISIT (OUTPATIENT)
Dept: FAMILY MEDICINE CLINIC | Age: 77
End: 2022-07-01
Payer: MEDICARE

## 2022-07-01 VITALS
HEART RATE: 78 BPM | BODY MASS INDEX: 34 KG/M2 | HEIGHT: 58 IN | DIASTOLIC BLOOD PRESSURE: 72 MMHG | WEIGHT: 162 LBS | SYSTOLIC BLOOD PRESSURE: 106 MMHG | OXYGEN SATURATION: 95 %

## 2022-07-01 DIAGNOSIS — J43.8 OTHER EMPHYSEMA (HCC): ICD-10-CM

## 2022-07-01 DIAGNOSIS — F32.1 MAJOR DEPRESSIVE DISORDER, SINGLE EPISODE, MODERATE (HCC): ICD-10-CM

## 2022-07-01 DIAGNOSIS — I50.31 ACUTE DIASTOLIC HEART FAILURE (HCC): ICD-10-CM

## 2022-07-01 DIAGNOSIS — E78.5 HYPERLIPIDEMIA, UNSPECIFIED HYPERLIPIDEMIA TYPE: ICD-10-CM

## 2022-07-01 DIAGNOSIS — I10 PRIMARY HYPERTENSION: ICD-10-CM

## 2022-07-01 DIAGNOSIS — Z00.00 MEDICARE ANNUAL WELLNESS VISIT, SUBSEQUENT: Primary | ICD-10-CM

## 2022-07-01 PROCEDURE — 1123F ACP DISCUSS/DSCN MKR DOCD: CPT | Performed by: NURSE PRACTITIONER

## 2022-07-01 PROCEDURE — G0439 PPPS, SUBSEQ VISIT: HCPCS | Performed by: NURSE PRACTITIONER

## 2022-07-01 SDOH — ECONOMIC STABILITY: FOOD INSECURITY: WITHIN THE PAST 12 MONTHS, THE FOOD YOU BOUGHT JUST DIDN'T LAST AND YOU DIDN'T HAVE MONEY TO GET MORE.: NEVER TRUE

## 2022-07-01 SDOH — ECONOMIC STABILITY: FOOD INSECURITY: WITHIN THE PAST 12 MONTHS, YOU WORRIED THAT YOUR FOOD WOULD RUN OUT BEFORE YOU GOT MONEY TO BUY MORE.: NEVER TRUE

## 2022-07-01 ASSESSMENT — PATIENT HEALTH QUESTIONNAIRE - PHQ9
SUM OF ALL RESPONSES TO PHQ QUESTIONS 1-9: 4
1. LITTLE INTEREST OR PLEASURE IN DOING THINGS: 0
SUM OF ALL RESPONSES TO PHQ QUESTIONS 1-9: 4
8. MOVING OR SPEAKING SO SLOWLY THAT OTHER PEOPLE COULD HAVE NOTICED. OR THE OPPOSITE, BEING SO FIGETY OR RESTLESS THAT YOU HAVE BEEN MOVING AROUND A LOT MORE THAN USUAL: 0
10. IF YOU CHECKED OFF ANY PROBLEMS, HOW DIFFICULT HAVE THESE PROBLEMS MADE IT FOR YOU TO DO YOUR WORK, TAKE CARE OF THINGS AT HOME, OR GET ALONG WITH OTHER PEOPLE: 0
2. FEELING DOWN, DEPRESSED OR HOPELESS: 1
6. FEELING BAD ABOUT YOURSELF - OR THAT YOU ARE A FAILURE OR HAVE LET YOURSELF OR YOUR FAMILY DOWN: 1
9. THOUGHTS THAT YOU WOULD BE BETTER OFF DEAD, OR OF HURTING YOURSELF: 0
4. FEELING TIRED OR HAVING LITTLE ENERGY: 1
5. POOR APPETITE OR OVEREATING: 0
SUM OF ALL RESPONSES TO PHQ QUESTIONS 1-9: 4
SUM OF ALL RESPONSES TO PHQ QUESTIONS 1-9: 4
3. TROUBLE FALLING OR STAYING ASLEEP: 1
SUM OF ALL RESPONSES TO PHQ9 QUESTIONS 1 & 2: 1
7. TROUBLE CONCENTRATING ON THINGS, SUCH AS READING THE NEWSPAPER OR WATCHING TELEVISION: 0

## 2022-07-01 ASSESSMENT — SOCIAL DETERMINANTS OF HEALTH (SDOH): HOW HARD IS IT FOR YOU TO PAY FOR THE VERY BASICS LIKE FOOD, HOUSING, MEDICAL CARE, AND HEATING?: NOT HARD AT ALL

## 2022-07-01 ASSESSMENT — LIFESTYLE VARIABLES: HOW OFTEN DO YOU HAVE A DRINK CONTAINING ALCOHOL: NEVER

## 2022-07-01 NOTE — PROGRESS NOTES
Medicare Annual Wellness Visit    Deandre Guerra is here for Medicare AWV    Assessment & Plan   Medicare annual wellness visit, subsequent      Recommendations for Preventive Services Due: see orders and patient instructions/AVS.  Recommended screening schedule for the next 5-10 years is provided to the patient in written form: see Patient Instructions/AVS.     Return for Medicare Annual Wellness Visit in 1 year. Subjective   The following acute and/or chronic problems were also addressed today:   Diagnosis Orders   1. Medicare annual wellness visit, subsequent     2. Other emphysema ([facility]     3. Major depressive disorder, single episode, moderate ([facility]     4. Acute diastolic heart failure ([facility]     5. Primary hypertension     6. Hyperlipidemia, unspecified hyperlipidemia type       Has not f/u with neuro or cardiology. Pt and  encouraged to f/u with specialists. HTN well controlled so continue same. Check labs. No issues with depression, does not feel she needs trintelix. Will discontinue and f/u if symptoms return. Patient's complete Health Risk Assessment and screening values have been reviewed and are found in Flowsheets. The following problems were reviewed today and where indicated follow up appointments were made and/or referrals ordered.     Positive Risk Factor Screenings with Interventions:             General Health and ACP:  General  In general, how would you say your health is?: Very Good  In the past 7 days, have you experienced any of the following: New or Increased Pain, New or Increased Fatigue, Loneliness, Social Isolation, Stress or Anger?: (!) Yes  Select all that apply: (!) New or Increased Pain  Do you get the social and emotional support that you need?: Yes  Do you have a Living Will?: Yes    Advance Directives     Power of  Living Will ACP-Advance Directive ACP-Power of     Not on File Not on File Not on File Not on [facility] Interventions:  · Pain issues: was having pain in her back, but that subsided    Health Habits/Nutrition:     Physical Activity: Insufficiently Active    Days of Exercise per Week: 7 days    Minutes of Exercise per Session: 10 min     Have you lost any weight without trying in the past 3 months?: No  Body mass index: (!) 33.85  Have you seen the dentist within the past year?:  (unknown)    Health Habits/Nutrition Interventions:  · Dental exam overdue:  patient encouraged to make appointment with his/her dentist    Hearing/Vision:  Do you or your family notice any trouble with your hearing that hasn't been managed with hearing aids?: No  Do you have difficulty driving, watching TV, or doing any of your daily activities because of your eyesight?: No  Have you had an eye exam within the past year?: (!) No  No exam data present    Hearing/Vision Interventions:  · Vision concerns:  patient encouraged to make appointment with his/her eye specialist     ADLs:  In the past 7 days, did you need help from others to perform any of the following everyday activities: Eating, dressing, grooming, bathing, toileting, or walking/balance?: No  In the past 7 days, did you need help from others to take care of any of the following: Laundry, housekeeping, banking/finances, shopping, telephone use, food preparation, transportation, or taking medications?: (!) Yes  Select all that apply: (!) Transportation,Taking Medications,Laundry,Housekeeping,Banking/Finances,Food Preparation,Shopping    ADL Interventions:  · Patient declines any further evaluation/treatment for this issue          Objective   Vitals:    07/01/22 1445   BP: 106/72   Site: Left Upper Arm   Position: Sitting   Cuff Size: Medium Adult   Pulse: 78   SpO2: 95%   Weight: 162 lb (73.5 kg)   Height: 4' 10\" (1.473 m)      Body mass index is 33.86 kg/m².         General Appearance: alert and oriented to person, place and time, well developed and well- nourished, in no acute distress  Skin: warm and dry, no rash or erythema  Head: normocephalic and atraumatic  Eyes: pupils equal, round, and reactive to light, extraocular eye movements intact, conjunctivae normal  Neck: supple and non-tender without mass, no thyromegaly or thyroid nodules, no cervical lymphadenopathy  Pulmonary/Chest: clear to auscultation bilaterally- no wheezes, rales or rhonchi, normal air movement, no respiratory distress  Cardiovascular: normal rate, regular rhythm, normal S1 and S2, no murmurs, rubs, clicks, or gallops, distal pulses intact, no carotid bruits  Extremities: no cyanosis, clubbing or edema  Musculoskeletal: normal range of motion, no joint swelling, deformity or tenderness  Neurologic: reflexes normal and symmetric, no cranial nerve deficit, gait, coordination and speech normal       Allergies   Allergen Reactions    Codeine      Nausea, lightheadedness, dizzy     Prior to Visit Medications    Medication Sig Taking? Authorizing Provider   VORTIoxetine (TRINTELLIX) 10 MG TABS tablet TAKE ONE TABLET BY MOUTH EVERY DAY Yes YULY Mccall CNP   gabapentin (NEURONTIN) 400 MG capsule Take 1 capsule by mouth 3 times daily for 90 days. Yes YULY Mccall CNP   pravastatin (PRAVACHOL) 40 MG tablet Take 0.5 tablets by mouth daily Yes YULY Mccall CNP   silver sulfADIAZINE (SILVADENE) 1 % cream Apply topically daily.  Yes Marah Johnson MD   naproxen (NAPROSYN) 375 MG tablet Take 1 tablet by mouth 2 times daily as needed for Pain Yes YULY Mccall CNP   Handicap Placard MISC by Does not apply route Exp 5 years Yes YULY Mccall CNP   loratadine (CLARITIN) 10 MG tablet Take 1 tablet by mouth daily Yes YULY Mccall CNP   amLODIPine (NORVASC) 5 MG tablet TAKE ONE TABLET BY MOUTH DAILY AT BEDTIME Yes Historical Provider, MD   albuterol sulfate  (90 Base) MCG/ACT inhaler Inhale 2 puffs into the lungs 4 times daily as needed for Wheezing Yes YULY Drew CNP   melatonin 1 MG tablet Take 1 tablet by mouth nightly as needed for Sleep Yes YULY Drew CNP   Oxygen Concentrator  Yes Historical Provider, MD   tiZANidine (ZANAFLEX) 4 MG tablet TAKE ONE TABLET BY MOUTH THREE TIMES A DAY AS NEEDED FOR MUSCLE SPASMS / [place] YULY Walters CNP   donepezil (ARICEPT) 10 MG tablet take one-half tablet by mouth at bedtime for 2 weeks then increase to 1 tablet at bedtime  Historical Provider, MD   aspirin 81 MG EC tablet Take 1 tablet by mouth daily  Patient not taking: Reported on 7/1/2022  DO Reese Barnes (Including outside providers/suppliers regularly involved in providing care):   Patient Care Team:  YULY Drew CNP as PCP - General (Family Medicine)  YULY Drew CNP as PCP - REHABILITATION [facility] Empaneled Provider     Reviewed and updated this visit:  Tobacco  Allergies  Meds  Med Hx  Surg Hx  Soc Hx  Fam Hx

## 2022-07-05 DIAGNOSIS — I10 PRIMARY HYPERTENSION: ICD-10-CM

## 2022-07-05 DIAGNOSIS — E78.5 HYPERLIPIDEMIA, UNSPECIFIED HYPERLIPIDEMIA TYPE: ICD-10-CM

## 2022-07-05 LAB
ALBUMIN SERPL-MCNC: 4.4 G/DL (ref 3.5–4.6)
ALP BLD-CCNC: 62 U/L (ref 40–130)
ALT SERPL-CCNC: 13 U/L (ref 0–33)
ANION GAP SERPL CALCULATED.3IONS-SCNC: 12 MEQ/L (ref 9–15)
AST SERPL-CCNC: 31 U/L (ref 0–35)
BILIRUB SERPL-MCNC: 0.7 MG/DL (ref 0.2–0.7)
BUN BLDV-MCNC: 18 MG/DL (ref 8–23)
CALCIUM SERPL-MCNC: 10 MG/DL (ref 8.5–9.9)
CHLORIDE BLD-SCNC: 101 MEQ/L (ref 95–107)
CHOLESTEROL, TOTAL: 180 MG/DL (ref 0–199)
CO2: 24 MEQ/L (ref 20–31)
CREAT SERPL-MCNC: 0.87 MG/DL (ref 0.5–0.9)
GFR AFRICAN AMERICAN: >60
GFR NON-AFRICAN AMERICAN: >60
GLOBULIN: 2.6 G/DL (ref 2.3–3.5)
GLUCOSE BLD-MCNC: 88 MG/DL (ref 70–99)
HCT VFR BLD CALC: 44 % (ref 37–47)
HDLC SERPL-MCNC: 69 MG/DL (ref 40–59)
HEMOGLOBIN: 14.3 G/DL (ref 12–16)
LDL CHOLESTEROL CALCULATED: 90 MG/DL (ref 0–129)
MCH RBC QN AUTO: 29.3 PG (ref 27–31.3)
MCHC RBC AUTO-ENTMCNC: 32.4 % (ref 33–37)
MCV RBC AUTO: 90.3 FL (ref 82–100)
PDW BLD-RTO: 17.6 % (ref 11.5–14.5)
PLATELET # BLD: 243 K/UL (ref 130–400)
POTASSIUM SERPL-SCNC: 4.8 MEQ/L (ref 3.4–4.9)
RBC # BLD: 4.87 M/UL (ref 4.2–5.4)
SODIUM BLD-SCNC: 137 MEQ/L (ref 135–144)
TOTAL PROTEIN: 7 G/DL (ref 6.3–8)
TRIGL SERPL-MCNC: 103 MG/DL (ref 0–150)
WBC # BLD: 7.7 K/UL (ref 4.8–10.8)

## 2022-07-13 ENCOUNTER — TELEPHONE (OUTPATIENT)
Dept: FAMILY MEDICINE CLINIC | Age: 77
End: 2022-07-13

## 2022-07-13 NOTE — TELEPHONE ENCOUNTER
[employer]. Calling. States they faxed over a script requested by patient for an orthotic brace. Asking for script to be filled out and sent back. Asking that Diagnosis code And chart notes be included.      Ph. [phone]

## 2022-07-14 NOTE — TELEPHONE ENCOUNTER
FYI  Reached out to pt to try and schedule appt. Spouse answered. States patient does not want orthotic brace.

## 2022-07-14 NOTE — TELEPHONE ENCOUNTER
That is fine, but this was never discussed with me so we will at least need a VV to discuss why she needs an orthotic brace in order for me to provide office notes.

## 2022-08-11 ENCOUNTER — PATIENT MESSAGE (OUTPATIENT)
Dept: FAMILY MEDICINE CLINIC | Age: 77
End: 2022-08-11

## 2022-08-11 NOTE — TELEPHONE ENCOUNTER
From: Lisa Mock  To: Nancy Trentato  Sent: 8/11/2022 10:04 AM EDT  Subject: Problem with Prescription Change    Luke Courser,  This is Wan [place] daughter. Since my parents do not use MyChart, I've been asked to send this message on their behalf. At her last appt, my Mom indicated she did not feel she needed to continue to take Trintellix, but she thinks that was a mistake. She would like you to prescribe an antidepressant -- but something less expensive than Trintellix. If you could follow up with them via their phone ([phone]) that will be best since they cannot see any message replies you might send through [address] Thank you.

## 2022-08-15 RX ORDER — SERTRALINE HYDROCHLORIDE 25 MG/1
25 TABLET, FILM COATED ORAL DAILY
Qty: 30 TABLET | Refills: 0 | Status: SHIPPED | OUTPATIENT
Start: 2022-08-15 | End: 2022-09-23 | Stop reason: DRUGHIGH

## 2022-08-15 NOTE — TELEPHONE ENCOUNTER
Spoke to [employer], daughter. Aware that script is at pharmacy.  [facility] for Sept 23rd    Kaylah -# [phone]

## 2022-08-26 RX ORDER — GABAPENTIN 400 MG/1
CAPSULE ORAL
Qty: 90 CAPSULE | Refills: 0 | Status: SHIPPED | OUTPATIENT
Start: 2022-08-26 | End: 2023-03-26

## 2022-08-26 NOTE — TELEPHONE ENCOUNTER
Future Appointments    Encounter Information    Provider Department Appt Notes   9/23/2022 Irl Duty, APRN - [facility] 4 wqk follow up, meds   1/5/2023 Irl Duty, APRN - [facility] Return in 6 months (on 1/1/2023), or check up,   7/6/2023 Irl Duty, [facility] Medicare Annual Wellness Visit in 1 year.      Past Visits    Date Provider Specialty Visit Type Primary Dx   07/01/2022 Irl Duty, APRN - CNP Family Medicine Office Visit Medicare annual wellness visit, subsequent

## 2022-09-23 ENCOUNTER — OFFICE VISIT (OUTPATIENT)
Dept: FAMILY MEDICINE CLINIC | Age: 77
End: 2022-09-23
Payer: MEDICARE

## 2022-09-23 VITALS
TEMPERATURE: 97.1 F | WEIGHT: 163.8 LBS | HEART RATE: 69 BPM | DIASTOLIC BLOOD PRESSURE: 80 MMHG | OXYGEN SATURATION: 95 % | HEIGHT: 58 IN | BODY MASS INDEX: 34.38 KG/M2 | SYSTOLIC BLOOD PRESSURE: 134 MMHG

## 2022-09-23 DIAGNOSIS — J44.9 CHRONIC OBSTRUCTIVE PULMONARY DISEASE, UNSPECIFIED COPD TYPE (HCC): ICD-10-CM

## 2022-09-23 DIAGNOSIS — F32.1 MAJOR DEPRESSIVE DISORDER, SINGLE EPISODE, MODERATE (HCC): Primary | ICD-10-CM

## 2022-09-23 DIAGNOSIS — Z23 NEED FOR INFLUENZA VACCINATION: ICD-10-CM

## 2022-09-23 DIAGNOSIS — R41.3 MEMORY LOSS: ICD-10-CM

## 2022-09-23 PROCEDURE — 1123F ACP DISCUSS/DSCN MKR DOCD: CPT | Performed by: NURSE PRACTITIONER

## 2022-09-23 PROCEDURE — 99214 OFFICE O/P EST MOD 30 MIN: CPT | Performed by: NURSE PRACTITIONER

## 2022-09-23 PROCEDURE — 90694 VACC AIIV4 NO PRSRV 0.5ML IM: CPT | Performed by: NURSE PRACTITIONER

## 2022-09-23 PROCEDURE — G0008 ADMIN INFLUENZA VIRUS VAC: HCPCS | Performed by: NURSE PRACTITIONER

## 2022-09-23 RX ORDER — ALBUTEROL SULFATE 90 UG/1
2 AEROSOL, METERED RESPIRATORY (INHALATION) 4 TIMES DAILY PRN
Qty: 3 EACH | Refills: 1 | Status: SHIPPED | OUTPATIENT
Start: 2022-09-23

## 2022-09-23 ASSESSMENT — ENCOUNTER SYMPTOMS
TROUBLE SWALLOWING: 0
BACK PAIN: 0
ABDOMINAL PAIN: 0
COUGH: 0
CHEST TIGHTNESS: 0
DIARRHEA: 0
SHORTNESS OF BREATH: 1
EYE PAIN: 0
CONSTIPATION: 0
COLOR CHANGE: 0

## 2022-09-23 ASSESSMENT — COPD QUESTIONNAIRES: COPD: 1

## 2022-09-23 NOTE — PROGRESS NOTES
Subjective  Chief Complaint   Patient presents with    1 Month Follow-Up    COPD     Pt would rather get inhaler does not have the oxygen @ home and does not want it        COPD  She complains of shortness of breath. There is no cough. Pertinent negatives include no appetite change, chest pain, ear pain, fever or trouble swallowing. 1 month check up on depression. Started on zoloft 1 month ago, feels it is working, but could have room for improvement. Notices she is short tempered and irritable at times. Daughter here with patient who also feels she could have room for more improvement in her mood. No negative side effects. COPD-has occasional issues with shortness of breath when going up stairs or with movement. Daughter has checked oxygen level and it has always been normal, has never gone below 90%. They actually returned her oxygen since she was no longer using that. Asking about inhaler to have during those moments of shortness of breath. Noticing some progressing memory issues. Has not seen neurology in a couple years.      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 ([address] 10/09/2020    Non-pressure chronic ulcer of calf, right, with fat layer exposed ([address] 10/02/2020    Claudication in peripheral vascular disease ([address] 08/11/2020    Non-healing surgical wound 08/07/2020    Venous insufficiency of left lower extremity 05/08/2020    Asymptomatic varicose veins of bilateral lower extremities 05/08/2020    Pain and swelling due to varicose veins of both lower Vaping Use: Never used   Substance and Sexual Activity    Alcohol use: No    Drug use: Never    Sexual activity: Not Currently     Partners: Male     Social Determinants of Health     Financial Resource Strain: Low Risk     Difficulty of Paying Living Expenses: Not hard at all   Food Insecurity: No Food Insecurity    Worried About Running Out of Food in the Last Year: Never true    Ran Out of Food in the Last Year: Never true   Physical Activity: Insufficiently Active    Days of Exercise per Week: 7 days    Minutes of Exercise per Session: 10 min     Current Outpatient Medications on File Prior to Visit   Medication Sig Dispense Refill    gabapentin (NEURONTIN) 400 MG capsule TAKE ONE CAPSULE BY MOUTH THREE TIMES A DAY FOR 90 DAYS 90 capsule 0    pravastatin (PRAVACHOL) 40 MG tablet Take 0.5 tablets by mouth daily 90 tablet 1    naproxen (NAPROSYN) 375 MG tablet Take 1 tablet by mouth 2 times daily as needed for Pain 60 tablet 0    Handicap Placard MISC by Does not apply route Exp 5 years 1 each 0    loratadine (CLARITIN) 10 MG tablet Take 1 tablet by mouth daily 30 tablet 0    amLODIPine (NORVASC) 5 MG tablet TAKE ONE TABLET BY MOUTH DAILY AT BEDTIME  3    melatonin 1 MG tablet Take 1 tablet by mouth nightly as needed for Sleep 30 tablet 2    tiZANidine (ZANAFLEX) 4 MG tablet TAKE ONE TABLET BY MOUTH THREE TIMES A DAY AS NEEDED FOR MUSCLE SPASMS / PAIN 90 tablet 0    donepezil (ARICEPT) 10 MG tablet take one-half tablet by mouth at bedtime for 2 weeks then increase to 1 tablet at bedtime (Patient not taking: Reported on 9/23/2022)  3    aspirin 81 MG EC tablet Take 1 tablet by mouth daily (Patient not taking: No sig reported) 30 tablet 3    Oxygen Concentrator  (Patient not taking: Reported on 9/23/2022)       No current facility-administered medications on file prior to visit.      Allergies   Allergen Reactions    Codeine      Nausea, lightheadedness, dizzy       Review of Systems   Constitutional:  Negative for activity change, appetite change, chills, diaphoresis, fatigue and fever. HENT:  Negative for congestion, ear pain, hearing loss and trouble swallowing. Eyes:  Negative for pain and visual disturbance. Respiratory:  Positive for shortness of breath. Negative for cough and chest tightness. Cardiovascular:  Negative for chest pain, palpitations and leg swelling. Gastrointestinal:  Negative for abdominal pain, constipation and diarrhea. Endocrine: Negative for polydipsia, polyphagia and polyuria. Genitourinary:  Negative for difficulty urinating and dysuria. Musculoskeletal:  Negative for arthralgias and back pain. Skin:  Negative for color change and rash. Neurological:  Negative for dizziness and light-headedness. Memory   Psychiatric/Behavioral:  Positive for dysphoric mood. The patient is not nervous/anxious. Objective  Vitals:    09/23/22 1050   BP: 134/80   Pulse: 69   Temp: 97.1 °F (36.2 °C)   SpO2: 95%   Weight: 163 lb 12.8 oz (74.3 kg)   Height: 4' 10\" (1.473 m)     Physical Exam  Constitutional:       General: She is not in acute distress. Appearance: Normal appearance. She is obese. She is not ill-appearing, toxic-appearing or diaphoretic. HENT:      Head: Normocephalic and atraumatic. Right Ear: External ear normal.      Left Ear: External ear normal.      Nose: Nose normal. No congestion or rhinorrhea. Eyes:      Extraocular Movements: Extraocular movements intact. Conjunctiva/sclera: Conjunctivae normal.      Pupils: Pupils are equal, round, and reactive to light. Cardiovascular:      Rate and Rhythm: Normal rate and regular rhythm. Pulses: Normal pulses. Heart sounds: Normal heart sounds. No murmur heard. Pulmonary:      Effort: Pulmonary effort is normal. No respiratory distress. Breath sounds: Normal breath sounds. No stridor. No wheezing, rhonchi or rales. Chest:      Chest wall: No tenderness.    Musculoskeletal: General: Normal range of motion. Cervical back: Normal range of motion and neck supple. No tenderness. Right lower leg: No edema. Left lower leg: No edema. Lymphadenopathy:      Cervical: No cervical adenopathy. Skin:     General: Skin is warm. Capillary Refill: Capillary refill takes less than 2 seconds. Coloration: Skin is not jaundiced. Findings: No erythema or lesion. Neurological:      General: No focal deficit present. Mental Status: She is alert and oriented to person, place, and time. Mental status is at baseline. Cranial Nerves: No cranial nerve deficit. Coordination: Coordination normal.      Gait: Gait normal.   Psychiatric:         Mood and Affect: Mood normal.         Behavior: Behavior normal.         Thought Content: Thought content normal.         Judgment: Judgment normal.       Assessment& Plan     Diagnosis Orders   1. Major depressive disorder, single episode, moderate (HCC)  sertraline (ZOLOFT) 50 MG tablet      2. Chronic obstructive pulmonary disease, unspecified COPD type (HCC)  albuterol sulfate HFA (PROVENTIL;VENTOLIN;PROAIR) 108 (90 Base) MCG/ACT inhaler      3. Need for influenza vaccination  Influenza, FLUAD, (age 72 y+), IM, Preservative Free, 0.5 mL      4. Memory loss          Increase zoloft to 50 mg daily. Will f/u with neuro. Albuterol inhaler PRN. F/u in 4 weeks or sooner PRN. Side effects, adverse effects of the medication prescribed today, as well as treatment plan/ rationale and result expectations have been discussed with the patient who expresses understanding and desires to proceed. Close follow up to evaluate treatment results and for coordination of care. I have reviewed the patient's medical history in detail and updated the computerized patient record. As always, patient is advised that if symptoms worsen in any way they will proceed to the nearest emergency room.          Orders Placed This Encounter Procedures    Influenza, FLUAD, (age 72 y+), IM, Preservative Free, 0.5 mL       Orders Placed This Encounter   Medications    albuterol sulfate HFA (PROVENTIL;VENTOLIN;PROAIR) 108 (90 Base) MCG/ACT inhaler     Sig: Inhale 2 puffs into the lungs 4 times daily as needed for Wheezing     Dispense:  3 each     Refill:  1    sertraline (ZOLOFT) 50 MG tablet     Sig: Take 1 tablet by mouth daily     Dispense:  30 tablet     Refill:  5       Medications Discontinued During This Encounter   Medication Reason    silver sulfADIAZINE (SILVADENE) 1 % cream Therapy completed    sertraline (ZOLOFT) 25 MG tablet DOSE ADJUSTMENT    albuterol sulfate  (90 Base) MCG/ACT inhaler REORDER       Return in about 4 weeks (around 10/21/2022) for depression, copd.     Sarita Fernandez, APRN - CNP

## 2022-09-23 NOTE — PROGRESS NOTES
After obtaining consent, and per orders of Dr. Jennifer Grimaldo NP, injection of high dose flu given in Left deltoid by Yen Frye MA. Patient instructed to remain in clinic for 20 minutes afterwards, and to report any adverse reaction to me immediately.

## 2022-10-19 DIAGNOSIS — E78.5 HYPERLIPIDEMIA, UNSPECIFIED HYPERLIPIDEMIA TYPE: ICD-10-CM

## 2022-10-19 NOTE — TELEPHONE ENCOUNTER
Future Appointments    Encounter Information    Provider Department Appt Notes   10/21/2022 YULY Willoughby - [facility] Return in about 4 weeks (around 10/21/2022) for depression, copd   1/5/2023 YULY Willoughby [facility] Return in 6 months (on 1/1/2023), or check up,   7/6/2023 [facility] Medicare Annual Wellness Visit in 1 year.      Past Visits    Date Provider Specialty Visit Type Primary Dx   09/23/2022 YULY Willoughby - CNP Family Medicine Office Visit Major depressive disorder, single episode, moderate ([address]

## 2022-10-20 RX ORDER — PRAVASTATIN SODIUM 40 MG
TABLET ORAL
Qty: 90 TABLET | Refills: 0 | Status: SHIPPED | OUTPATIENT
Start: 2022-10-20

## 2022-11-12 ENCOUNTER — APPOINTMENT (OUTPATIENT)
Dept: CT IMAGING | Age: 77
DRG: 064 | End: 2022-11-12
Payer: MEDICARE

## 2022-11-12 ENCOUNTER — HOSPITAL ENCOUNTER (INPATIENT)
Age: 77
LOS: 6 days | Discharge: NURSING, REHAB OR LONG-TERM CARE | DRG: 064 | End: 2022-11-18
Attending: EMERGENCY MEDICINE | Admitting: INTERNAL MEDICINE
Payer: MEDICARE

## 2022-11-12 ENCOUNTER — APPOINTMENT (OUTPATIENT)
Dept: MRI IMAGING | Age: 77
DRG: 064 | End: 2022-11-12
Payer: MEDICARE

## 2022-11-12 ENCOUNTER — APPOINTMENT (OUTPATIENT)
Dept: GENERAL RADIOLOGY | Age: 77
DRG: 064 | End: 2022-11-12
Payer: MEDICARE

## 2022-11-12 DIAGNOSIS — I10 ESSENTIAL HYPERTENSION: ICD-10-CM

## 2022-11-12 DIAGNOSIS — J44.9 CHRONIC OBSTRUCTIVE PULMONARY DISEASE, UNSPECIFIED COPD TYPE (HCC): ICD-10-CM

## 2022-11-12 DIAGNOSIS — G45.9 TIA (TRANSIENT ISCHEMIC ATTACK): Primary | ICD-10-CM

## 2022-11-12 PROBLEM — I69.351 FLACCID HEMIPLEGIA OF RIGHT DOMINANT SIDE AS LATE EFFECT OF CEREBRAL INFARCTION (HCC): Status: ACTIVE | Noted: 2022-11-12

## 2022-11-12 PROBLEM — Z74.09 IMPAIRED MOBILITY AND ACTIVITIES OF DAILY LIVING: Status: ACTIVE | Noted: 2022-11-12

## 2022-11-12 PROBLEM — I63.512 CEREBROVASCULAR ACCIDENT (CVA) DUE TO STENOSIS OF LEFT MIDDLE CEREBRAL ARTERY (HCC): Status: ACTIVE | Noted: 2022-11-12

## 2022-11-12 PROBLEM — I77.3 FIBROMUSCULAR DYSPLASIA (HCC): Status: ACTIVE | Noted: 2022-11-12

## 2022-11-12 PROBLEM — Z78.9 IMPAIRED MOBILITY AND ACTIVITIES OF DAILY LIVING: Status: ACTIVE | Noted: 2022-11-12

## 2022-11-12 PROBLEM — I63.9 ACUTE CVA (CEREBROVASCULAR ACCIDENT) (HCC): Status: ACTIVE | Noted: 2022-11-12

## 2022-11-12 LAB
ALBUMIN SERPL-MCNC: 3.8 G/DL (ref 3.5–4.6)
ALP BLD-CCNC: 59 U/L (ref 40–130)
ALT SERPL-CCNC: 17 U/L (ref 0–33)
ANION GAP SERPL CALCULATED.3IONS-SCNC: 11 MEQ/L (ref 9–15)
APTT: 27.1 SEC (ref 24.4–36.8)
AST SERPL-CCNC: 29 U/L (ref 0–35)
BACTERIA: NEGATIVE /HPF
BASOPHILS ABSOLUTE: 0 K/UL (ref 0–0.2)
BASOPHILS RELATIVE PERCENT: 0.2 %
BILIRUB SERPL-MCNC: 0.8 MG/DL (ref 0.2–0.7)
BILIRUBIN URINE: NEGATIVE
BLOOD, URINE: NEGATIVE
BUN BLDV-MCNC: 15 MG/DL (ref 8–23)
CALCIUM SERPL-MCNC: 9.2 MG/DL (ref 8.5–9.9)
CHLORIDE BLD-SCNC: 107 MEQ/L (ref 95–107)
CLARITY: CLEAR
CO2: 23 MEQ/L (ref 20–31)
COLOR: YELLOW
CREAT SERPL-MCNC: 0.74 MG/DL (ref 0.5–0.9)
EOSINOPHILS ABSOLUTE: 0.4 K/UL (ref 0–0.7)
EOSINOPHILS RELATIVE PERCENT: 4.9 %
EPITHELIAL CELLS, UA: ABNORMAL /HPF (ref 0–5)
GFR SERPL CREATININE-BSD FRML MDRD: >60 ML/MIN/{1.73_M2}
GLOBULIN: 2.6 G/DL (ref 2.3–3.5)
GLUCOSE BLD-MCNC: 86 MG/DL (ref 70–99)
GLUCOSE BLD-MCNC: 98 MG/DL (ref 70–99)
GLUCOSE URINE: NEGATIVE MG/DL
HCT VFR BLD CALC: 42 % (ref 37–47)
HCT VFR BLD CALC: 44.3 % (ref 37–47)
HEMOGLOBIN: 14 G/DL (ref 12–16)
HEMOGLOBIN: 14.4 G/DL (ref 12–16)
HYALINE CASTS: ABNORMAL /HPF (ref 0–5)
INR BLD: 1
INR BLD: 1
KETONES, URINE: NEGATIVE MG/DL
LEUKOCYTE ESTERASE, URINE: ABNORMAL
LV EF: 55 %
LVEF MODALITY: NORMAL
LYMPHOCYTES ABSOLUTE: 0.2 K/UL (ref 1–4.8)
LYMPHOCYTES RELATIVE PERCENT: 3.1 %
MCH RBC QN AUTO: 31.4 PG (ref 27–31.3)
MCH RBC QN AUTO: 31.7 PG (ref 27–31.3)
MCHC RBC AUTO-ENTMCNC: 32.6 % (ref 33–37)
MCHC RBC AUTO-ENTMCNC: 33.3 % (ref 33–37)
MCV RBC AUTO: 95.4 FL (ref 79.4–94.8)
MCV RBC AUTO: 96.2 FL (ref 79.4–94.8)
MONOCYTES ABSOLUTE: 0.4 K/UL (ref 0.2–0.8)
MONOCYTES RELATIVE PERCENT: 5.7 %
NEUTROPHILS ABSOLUTE: 6.8 K/UL (ref 1.4–6.5)
NEUTROPHILS RELATIVE PERCENT: 86.1 %
NITRITE, URINE: NEGATIVE
PDW BLD-RTO: 14.1 % (ref 11.5–14.5)
PDW BLD-RTO: 14.2 % (ref 11.5–14.5)
PERFORMED ON: NORMAL
PH UA: 7.5 (ref 5–9)
PLATELET # BLD: 171 K/UL (ref 130–400)
PLATELET # BLD: 193 K/UL (ref 130–400)
POTASSIUM SERPL-SCNC: 4.4 MEQ/L (ref 3.4–4.9)
PROTEIN UA: ABNORMAL MG/DL
PROTHROMBIN TIME: 12.8 SEC (ref 12.3–14.9)
PROTHROMBIN TIME: 12.9 SEC (ref 12.3–14.9)
RBC # BLD: 4.41 M/UL (ref 4.2–5.4)
RBC # BLD: 4.6 M/UL (ref 4.2–5.4)
RBC UA: ABNORMAL /HPF (ref 0–5)
SARS-COV-2, NAAT: NOT DETECTED
SODIUM BLD-SCNC: 141 MEQ/L (ref 135–144)
SPECIFIC GRAVITY UA: 1.01 (ref 1–1.03)
TOTAL PROTEIN: 6.4 G/DL (ref 6.3–8)
URINE REFLEX TO CULTURE: ABNORMAL
UROBILINOGEN, URINE: 0.2 E.U./DL
WBC # BLD: 7.9 K/UL (ref 4.8–10.8)
WBC # BLD: 8.1 K/UL (ref 4.8–10.8)
WBC UA: ABNORMAL /HPF (ref 0–5)

## 2022-11-12 PROCEDURE — 71045 X-RAY EXAM CHEST 1 VIEW: CPT

## 2022-11-12 PROCEDURE — 70498 CT ANGIOGRAPHY NECK: CPT

## 2022-11-12 PROCEDURE — 93308 TTE F-UP OR LMTD: CPT

## 2022-11-12 PROCEDURE — 2500000003 HC RX 250 WO HCPCS: Performed by: INTERNAL MEDICINE

## 2022-11-12 PROCEDURE — 99222 1ST HOSP IP/OBS MODERATE 55: CPT | Performed by: PSYCHIATRY & NEUROLOGY

## 2022-11-12 PROCEDURE — 70450 CT HEAD/BRAIN W/O DYE: CPT

## 2022-11-12 PROCEDURE — 36415 COLL VENOUS BLD VENIPUNCTURE: CPT

## 2022-11-12 PROCEDURE — 97163 PT EVAL HIGH COMPLEX 45 MIN: CPT

## 2022-11-12 PROCEDURE — 85610 PROTHROMBIN TIME: CPT

## 2022-11-12 PROCEDURE — 2500000003 HC RX 250 WO HCPCS

## 2022-11-12 PROCEDURE — 70496 CT ANGIOGRAPHY HEAD: CPT

## 2022-11-12 PROCEDURE — 6360000002 HC RX W HCPCS: Performed by: EMERGENCY MEDICINE

## 2022-11-12 PROCEDURE — 87635 SARS-COV-2 COVID-19 AMP PRB: CPT

## 2022-11-12 PROCEDURE — 81001 URINALYSIS AUTO W/SCOPE: CPT

## 2022-11-12 PROCEDURE — 85025 COMPLETE CBC W/AUTO DIFF WBC: CPT

## 2022-11-12 PROCEDURE — 6830039000 HC L3 TRAUMA ALERT

## 2022-11-12 PROCEDURE — 2580000003 HC RX 258: Performed by: INTERNAL MEDICINE

## 2022-11-12 PROCEDURE — 99285 EMERGENCY DEPT VISIT HI MDM: CPT

## 2022-11-12 PROCEDURE — 93005 ELECTROCARDIOGRAM TRACING: CPT | Performed by: EMERGENCY MEDICINE

## 2022-11-12 PROCEDURE — 70551 MRI BRAIN STEM W/O DYE: CPT

## 2022-11-12 PROCEDURE — 6360000004 HC RX CONTRAST MEDICATION: Performed by: EMERGENCY MEDICINE

## 2022-11-12 PROCEDURE — 6360000002 HC RX W HCPCS: Performed by: INTERNAL MEDICINE

## 2022-11-12 PROCEDURE — 85730 THROMBOPLASTIN TIME PARTIAL: CPT

## 2022-11-12 PROCEDURE — 96374 THER/PROPH/DIAG INJ IV PUSH: CPT

## 2022-11-12 PROCEDURE — 92610 EVALUATE SWALLOWING FUNCTION: CPT

## 2022-11-12 PROCEDURE — 85027 COMPLETE CBC AUTOMATED: CPT

## 2022-11-12 PROCEDURE — 80053 COMPREHEN METABOLIC PANEL: CPT

## 2022-11-12 PROCEDURE — 1210000000 HC MED SURG R&B

## 2022-11-12 RX ORDER — HEPARIN SODIUM 1000 [USP'U]/ML
2000 INJECTION, SOLUTION INTRAVENOUS; SUBCUTANEOUS PRN
Status: DISCONTINUED | OUTPATIENT
Start: 2022-11-12 | End: 2022-11-12

## 2022-11-12 RX ORDER — POLYETHYLENE GLYCOL 3350 17 G/17G
17 POWDER, FOR SOLUTION ORAL DAILY PRN
Status: DISCONTINUED | OUTPATIENT
Start: 2022-11-12 | End: 2022-11-18 | Stop reason: HOSPADM

## 2022-11-12 RX ORDER — ATORVASTATIN CALCIUM 80 MG/1
80 TABLET, FILM COATED ORAL NIGHTLY
Status: DISCONTINUED | OUTPATIENT
Start: 2022-11-12 | End: 2022-11-16

## 2022-11-12 RX ORDER — HEPARIN SODIUM 10000 [USP'U]/100ML
5-30 INJECTION, SOLUTION INTRAVENOUS CONTINUOUS
Status: DISCONTINUED | OUTPATIENT
Start: 2022-11-12 | End: 2022-11-12

## 2022-11-12 RX ORDER — ENOXAPARIN SODIUM 100 MG/ML
40 INJECTION SUBCUTANEOUS DAILY
Status: DISCONTINUED | OUTPATIENT
Start: 2022-11-12 | End: 2022-11-12

## 2022-11-12 RX ORDER — ONDANSETRON 4 MG/1
4 TABLET, ORALLY DISINTEGRATING ORAL EVERY 8 HOURS PRN
Status: DISCONTINUED | OUTPATIENT
Start: 2022-11-12 | End: 2022-11-18 | Stop reason: HOSPADM

## 2022-11-12 RX ORDER — INSULIN LISPRO 100 [IU]/ML
0-4 INJECTION, SOLUTION INTRAVENOUS; SUBCUTANEOUS EVERY 4 HOURS
Status: DISCONTINUED | OUTPATIENT
Start: 2022-11-12 | End: 2022-11-18 | Stop reason: HOSPADM

## 2022-11-12 RX ORDER — DEXTROSE MONOHYDRATE 100 MG/ML
INJECTION, SOLUTION INTRAVENOUS CONTINUOUS PRN
Status: DISCONTINUED | OUTPATIENT
Start: 2022-11-12 | End: 2022-11-18 | Stop reason: HOSPADM

## 2022-11-12 RX ORDER — HYDRALAZINE HYDROCHLORIDE 20 MG/ML
10 INJECTION INTRAMUSCULAR; INTRAVENOUS ONCE
Status: COMPLETED | OUTPATIENT
Start: 2022-11-12 | End: 2022-11-12

## 2022-11-12 RX ORDER — ASPIRIN 81 MG/1
81 TABLET ORAL DAILY
Status: DISCONTINUED | OUTPATIENT
Start: 2022-11-12 | End: 2022-11-13

## 2022-11-12 RX ORDER — HEPARIN SODIUM 1000 [USP'U]/ML
4000 INJECTION, SOLUTION INTRAVENOUS; SUBCUTANEOUS PRN
Status: DISCONTINUED | OUTPATIENT
Start: 2022-11-12 | End: 2022-11-12

## 2022-11-12 RX ORDER — LABETALOL HYDROCHLORIDE 5 MG/ML
10 INJECTION, SOLUTION INTRAVENOUS EVERY 10 MIN PRN
Status: DISCONTINUED | OUTPATIENT
Start: 2022-11-12 | End: 2022-11-18 | Stop reason: HOSPADM

## 2022-11-12 RX ORDER — ONDANSETRON 2 MG/ML
4 INJECTION INTRAMUSCULAR; INTRAVENOUS EVERY 6 HOURS PRN
Status: DISCONTINUED | OUTPATIENT
Start: 2022-11-12 | End: 2022-11-18 | Stop reason: HOSPADM

## 2022-11-12 RX ORDER — WATER FOR INJ.,BACTERIOSTATIC
VIAL (ML) INJECTION
Status: COMPLETED
Start: 2022-11-12 | End: 2022-11-12

## 2022-11-12 RX ORDER — DEXTROSE, SODIUM CHLORIDE, SODIUM LACTATE, POTASSIUM CHLORIDE, AND CALCIUM CHLORIDE 5; .6; .31; .03; .02 G/100ML; G/100ML; G/100ML; G/100ML; G/100ML
INJECTION, SOLUTION INTRAVENOUS CONTINUOUS
Status: DISCONTINUED | OUTPATIENT
Start: 2022-11-12 | End: 2022-11-17

## 2022-11-12 RX ORDER — ASPIRIN 300 MG/1
300 SUPPOSITORY RECTAL DAILY
Status: DISCONTINUED | OUTPATIENT
Start: 2022-11-12 | End: 2022-11-13

## 2022-11-12 RX ADMIN — HEPARIN SODIUM AND DEXTROSE 12 UNITS/KG/HR: 10000; 5 INJECTION INTRAVENOUS at 14:26

## 2022-11-12 RX ADMIN — LABETALOL HYDROCHLORIDE 10 MG: 5 INJECTION, SOLUTION INTRAVENOUS at 17:48

## 2022-11-12 RX ADMIN — HYDRALAZINE HYDROCHLORIDE 10 MG: 20 INJECTION INTRAMUSCULAR; INTRAVENOUS at 04:11

## 2022-11-12 RX ADMIN — LABETALOL HYDROCHLORIDE 10 MG: 5 INJECTION, SOLUTION INTRAVENOUS at 11:02

## 2022-11-12 RX ADMIN — IOPAMIDOL 75 ML: 612 INJECTION, SOLUTION INTRAVENOUS at 08:02

## 2022-11-12 RX ADMIN — SODIUM CHLORIDE, SODIUM LACTATE, POTASSIUM CHLORIDE, CALCIUM CHLORIDE AND DEXTROSE MONOHYDRATE: 5; 600; 310; 30; 20 INJECTION, SOLUTION INTRAVENOUS at 18:57

## 2022-11-12 RX ADMIN — BACTERIOSTATIC WATER: 1 INJECTION, SOLUTION INTRAMUSCULAR; INTRAVENOUS; SUBCUTANEOUS at 17:11

## 2022-11-12 ASSESSMENT — ENCOUNTER SYMPTOMS
EYE DISCHARGE: 0
VOMITING: 0
CHEST TIGHTNESS: 0
SHORTNESS OF BREATH: 0
SORE THROAT: 0
PHOTOPHOBIA: 0
ABDOMINAL DISTENTION: 0
COUGH: 0
ABDOMINAL PAIN: 0
WHEEZING: 0

## 2022-11-12 ASSESSMENT — PAIN SCALES - GENERAL: PAINLEVEL_OUTOF10: 0

## 2022-11-12 ASSESSMENT — LIFESTYLE VARIABLES
HOW OFTEN DO YOU HAVE A DRINK CONTAINING ALCOHOL: NEVER
HOW OFTEN DO YOU HAVE A DRINK CONTAINING ALCOHOL: NEVER

## 2022-11-12 NOTE — FLOWSHEET NOTE
11/12/22 @ [facility] [facility]  Neurology answering service of consult # [id number]

## 2022-11-12 NOTE — ED TRIAGE NOTES
Patient to ER after fall at home. Reports that she was ambulating from bathroom back to her bedroom and felt generalized weakness. She states that \"I didn't really fall, I slid down, but laid on the floor on my back\". No LOC, no blood thinners. Patient is alert to self. Poor historian, confused with situation and time. No complaints of pain. History of COPD with intermittent O2 at home. Currently 87%-91% on room air. 2L O2 applied and improved to 96%.

## 2022-11-12 NOTE — PROGRESS NOTES
[facility]   Facility/Department: [facility]  Speech Language Pathology    Alanna Mock  1945  [id number]    Date: 11/12/2022      Speech Therapy attempted to see Catia Elliott on this date for a/an:    Clinical Bedside Swallow Evaluation and Speech Language Evaluation    Pt was unable to be seen due to: Other: Pt working with PT & transport waiting to take pt to CT.  SLP to re-attempt as able        Electronically signed by Delton Carrel, SLP on 11/12/22 at 10:19 AM EST

## 2022-11-12 NOTE — ED NOTES
Daughter and  at bedside. Ying Monge  [facility]  11/12/22 0092

## 2022-11-12 NOTE — PLAN OF CARE
Nutrition Problem #1: Inadequate oral intake  Intervention: Food and/or Nutrient Delivery: Continue NPO  Nutritional    Po versus TF

## 2022-11-12 NOTE — PROGRESS NOTES
Physical Therapy Med Surg Initial Assessment  Facility/Department: [facility]  Room: [facility]/[id number]       NAME: David Mcgee  : 1945 (76 y.o.)  MRN: [id number]  CODE STATUS: Full Code    Date of Service: 2022    Patient Diagnosis(es): TIA (transient ischemic attack) [G45.9]  Essential hypertension [I10]  Chronic obstructive pulmonary disease, unspecified COPD type ([address] [J44.9]  Acute CVA (cerebrovascular accident) [facility]) [I63.9]   Chief Complaint   Patient presents with    Fall     Patient Active Problem List    Diagnosis Date Noted    TIA (transient ischemic attack) 2022    Cerebrovascular accident (CVA) due to stenosis of left middle cerebral artery ([address] 2022    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 ([address] 2022    Fibromuscular dysplasia ([address] 2022    Impaired mobility and activities of daily living dt CVA 2022    Acute CVA (cerebrovascular accident) ([address] 2022    Non-pressure chronic ulcer of calf with fat layer exposed ([address] 10/09/2020    Non-pressure chronic ulcer of calf, right, with fat layer exposed ([address] 10/02/2020    Claudication in peripheral vascular disease ([address] 2020    Non-healing surgical wound 2020    Venous insufficiency of left lower extremity 2020    Asymptomatic varicose veins of bilateral lower extremities 2020    Pain and swelling due to varicose veins of both lower extremities 2020    Slow transit constipation 10/13/2019    External hemorrhoid, bleeding 10/13/2019    Colitis 10/13/2019    Acute colitis 10/10/2019    Hypovolemic shock ([address] 10/07/2019    NSTEMI (non-ST elevated myocardial infarction) ([address]     Acute diastolic heart failure ([address] 10/05/2019    Hypotension 10/01/2019    Neuromuscular scoliosis of lumbar region 2019    Hypertension     Hyperlipidemia     COPD (chronic obstructive pulmonary disease) (HCC)     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 ([facility] 08/27/2010    Generalized osteoarthrosis, unspecified site 12/27/2002    Major depressive disorder, single episode, moderate ([facility] 12/27/2002    Morbid obesity ([facility] 12/27/2002        Past Medical History:   Diagnosis Date    Anxiety     Arthritis     COPD (chronic obstructive pulmonary disease) (HCC)     Generalized osteoarthrosis, unspecified site 12/27/2002    Hyperlipidemia     Hypertension     Major depressive disorder, single episode, moderate ([facility] 12/27/2002    Morbid obesity ([facility] 12/27/2002    OAB (overactive bladder)     Osteoporosis      Past Surgical History:   Procedure Laterality Date    CARDIAC CATHETERIZATION      CARPAL TUNNEL RELEASE      COLONOSCOPY      HYSTERECTOMY, TOTAL ABDOMINAL (CERVIX REMOVED)      KNEE ARTHROPLASTY Bilateral     KS COLON CA SCRN NOT HI RSK IND N/A 6/22/2017    COLONOSCOPY performed by Derek Krishna MD at [address] TRANSORAL DIAGNOSTIC N/A 6/22/2017    EGD ESOPHAGOGASTRODUODENOSCOPY performed by Derek Krishna MD at [address] Bilateral     SKIN BIOPSY Right 9/17/2020    EXCISION OF LEG MASS RIGHT (PAT ON ADMIT) performed by Lakesha Wynn MD at [facility]       Chart Reviewed: Yes  Family / Caregiver Present: Yes (dtr)  Diagnosis: TIA (transient ischemic attack)  General Comment  Comments: pt resting in bed - agreeable to PT evaluation    Restrictions:  Restrictions/Precautions: Fall Risk (per clinical judgement)     SUBJECTIVE:   Subjective: \"i can do it. \"    Pain  Pain: Denies pre and post session pain.     Prior Level of Function:  Social/Functional History  Lives With: Spouse  Type of Home: House  Home Layout: Two level  Home Access: Stairs to enter with rails  Entrance Stairs - Number of Steps: 2  Entrance Stairs - Rails: Right  Bathroom Shower/Tub: Tub/Shower unit  Bathroom Equipment: Shower chair, Hand-held shower  Home Equipment: Jamison Dakzahira, rolling  ADL Assistance: Independent  Ambulation Assistance: Independent (with [employer])  Transfer Assistance: Independent  Active : No  Additional Comments: Pt inconsistent historian. PLOF and home set up verified by dtr. Pt's dtr stating that pt's dementia has been worsening over the past few years. OBJECTIVE:   Vision  Vision Exceptions: Wears glasses at all times (Decreased attention R visual field however attends with cues.)  Hearing: Within functional limits    Cognition:  Orientation Level: Oriented to person, Disoriented to place, Disoriented to time, Disoriented to situation  Follows Commands: Impaired (requires redirection frequently)    Observation/Palpation  Observation: No acute distress noted. Biases to L side. ROM:  RLE PROM: WFL  LLE PROM: WFL    Strength:  Strength RLE  Comment: flaccid  Strength LLE  Strength LLE: WFL  Strength Other  Other: Trunk strength severely impaired functionally    Neuro:  Balance  Sitting - Static: Poor; Fair  Sitting - Dynamic: Poor  Standing - Static: Poor  Standing - Dynamic: Poor  Comments: Retropulsive. Requires cues and physical assist to achieve and maintain midline in unsupported sitting. Tone:  (Generally hypotonic L hemibody)    Sensation: Impaired (R hemibody)    Bed mobility  Rolling to Left: Maximum assistance;Dependent/Total  Supine to Sit: Maximum assistance;Dependent/Total  Sit to Supine: Dependent/Total;2 Person assistance  Bed Mobility Comments: Pt requires signficant hand over hand assist to complete all transitions. Retropulsive when sitting up EOB requiring additional assist to return to bed. Transfers  Sit to Stand: Maximum Assistance  Stand to Sit: Maximum Assistance  Comment: R knee blocked.     Ambulation  Assistance: Maximum assistance  Quality of Gait: Standing X 15sec  Distance: standing only                   Activity Tolerance  Activity Tolerance: Patient tolerated treatment well    Patient Education  Education Given To: Patient; Family  Education Provided: Role of Therapy;Plan of Care  Education Method: Verbal  Education Outcome: Continued education needed; Unable to demonstrate understanding       ASSESSMENT:   Body Structures, Functions, Activity Limitations Requiring Skilled Therapeutic Intervention: Decreased functional mobility ; Decreased ADL status; Decreased body mechanics; Decreased strength;Decreased safe awareness;Decreased cognition;Decreased endurance;Decreased balance;Decreased vision/visual deficit; Decreased coordination  Decision Making: High Complexity  History: High  Exam: High  Clinical Presentation: High    Therapy Prognosis: Good  Barriers to Learning: insight         DISCHARGE RECOMMENDATIONS:  Discharge Recommendations: Continue to assess pending progress, Patient would benefit from continued therapy after discharge    Assessment: Continued PT indicated to progress mobility and facilitate DC at highest level of indep and safety. [facility] - rec therapy stay prior to DC home. Requires PT Follow-Up: Yes      PLAN OF CARE:  Physcial Therapy Plan  General Plan: 1 time a day 3-6 times a week  Current Treatment Recommendations: Strengthening, ROM, Balance training, Functional mobility training, Transfer training, ADL/Self-care training, Endurance training, Gait training, Neuromuscular re-education, Home exercise program, Safety education & training, Patient/Caregiver education & training, Equipment evaluation, education, & procurement, Positioning    Safety Devices  Type of Devices:  All fall risk precautions in place  Restraints  Restraints Initially in Place: No    Goals:  Long Term Goals  Long Term Goal 1: Pt to complete bed mobility with ModA  Long Term Goal 2: Pt to complete transfers with ModA  Long Term Goal 3: Pt to ambulate 5-10ft with LRD and ModA  Long Term Goal 4: Pt to sit EOB and maintain midline balance X 5min with SBA    AMPAC (6 CLICK) BASIC MOBILITY  AM-PAC Inpatient Mobility Raw Score : 8     Therapy Time:   Individual   Time In 1000   Time Out 1015   Minutes [address] [place] [address], 11/12/22 at 3:38 PM         Definitions for assistance levels  Independent = pt does not require any physical supervision or assistance from another person for activity completion. Device may be needed.   Stand by assistance = pt requires verbal cues or instructions from another person, close to but not touching, to perform the activity  Minimal assistance= pt performs 75% or more of the activity; assistance is required to complete the activity  Moderate assistance= pt performs 50% of the activity; assistance is required to complete the activity  Maximal assistance = pt performs 25% of the activity; assistance is required to complete the activity  Dependent = pt requires total physical assistance to accomplish the task

## 2022-11-12 NOTE — PROGRESS NOTES
[facility]   Facility/Department: [facility]  Speech Language Pathology  Clinical Bedside Swallow Evaluation    NAME:Fide Mock  : 1945 (76 y.o.)   [x]   confirmed    MRN: [id number]  ROOM: [id number]  ADMISSION DATE: 2022  PATIENT DIAGNOSIS(ES): TIA (transient ischemic attack) [G45.9]  Essential hypertension [I10]  Chronic obstructive pulmonary disease, unspecified COPD type [facility]) [J44.9]  Chief Complaint   Patient presents with    Fall     Patient Active Problem List    Diagnosis Date Noted    TIA (transient ischemic attack) 2022    Cerebrovascular accident (CVA) due to stenosis of left middle cerebral artery ([address] 2022    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 ([address] 2022    Fibromuscular dysplasia ([address] 2022    Non-pressure chronic ulcer of calf with fat layer exposed ([address] 10/09/2020    Non-pressure chronic ulcer of calf, right, with fat layer exposed ([address] 10/02/2020    Claudication in peripheral vascular disease ([address] 2020    Non-healing surgical wound 2020    Venous insufficiency of left lower extremity 2020    Asymptomatic varicose veins of bilateral lower extremities 2020    Pain and swelling due to varicose veins of both lower extremities 2020    Slow transit constipation 10/13/2019    External hemorrhoid, bleeding 10/13/2019    Colitis 10/13/2019    Acute colitis 10/10/2019    Hypovolemic shock ([address] 10/07/2019    NSTEMI (non-ST elevated myocardial infarction) ([address]     Acute diastolic heart failure ([address] 10/05/2019    Hypotension 10/01/2019    Neuromuscular scoliosis of lumbar region 2019    Hypertension     Hyperlipidemia     COPD (chronic obstructive pulmonary disease) (HCC)     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 ([address] 2010    Generalized osteoarthrosis, unspecified site 2002    Major depressive disorder, single episode, moderate ([address] 2002    Morbid NPO  Liquid Consistency Recommendation: NPO  Recommended Form of Meds: Via alternative means of nutrition         Reason for Referral  Beau Rowe was referred for a bedside swallow evaluation to assess the efficiency of her swallow function, identify signs and symptoms of aspiration, identify risk factors, and make recommendations regarding safe dietary consistencies, effective compensatory strategies, and safe eating environment. General  Chart Reviewed: Yes  Comments: Admitted 11/12/2022 secondary to slurred speech. RN reported that pt failed nursing swallow screen. CXR 11/12/2022 revealed bibasilar atelectasis. CT head completed upon admission in ER, which revealed no acute intracranial abnormality. Repeat CT head completed, but no results entered in EMR at time of evaluation/documentation. MRI orderered but complete at this time. Subjective  Subjective: Pt was alert, cooperative, and confused for BSE. Daughters at bedside. Behavior/Cognition: Alert; Cooperative;Confused  Respiratory Status: O2 via nasual cannula  O2 Device: Nasal cannula  Communication Observation: Dysarthria  Follows Directions: Simple  Dentition: Some missing teeth; Adequate  Patient Positioning: Upright in bed  Baseline Vocal Quality: Weak  Prior Dysphagia History: None  Consistencies Administered: Pureed; Thin - cup; Ice Chips    Vision and Hearing  Vision  Vision: Impaired  Vision Exceptions: Wears glasses at all times  Hearing  Hearing: Within functional limits    Current Diet level  Current Diet : NPO  Current Liquid Diet : NPO    Oral Motor  Labial: Decreased seal;Right droop  Dentition: Intact; Natural  Oral Hygiene: Moist;Clean  Lingual: Decreased strength;Right deviation    Oral/Pharyngeal Phase  Oral Phase - Comment:  Mod oral phase dysphagia  Pharyngeal Phase: Suspected pharyngeal phase dysphagia    PO Trials  Neuromuscular Estim Used: No  Assessment Method(s): Observation;Palpation  Patient Position: Upright in bed  Vocal Quality: Low volume; Fatigue  Consistency Presented: Pureed;Thin;Ice Chips  How Presented: SLP-fed/Presented  Bolus Acceptance: No impairment  Bolus Formation/Control: Impaired  Type of Impairment: Lip closure;Delayed  Propulsion: Delayed (# of seconds); Discoordination  Oral Residue: 10-50% of bolus  Initiation of Swallow: Delayed (# of seconds) (15-30+ seconds)  Laryngeal Elevation: Weak;Decreased  Aspiration Signs/Symptoms: Throat clear  Effective Modifications: None      Dysphagia Diagnosis  Dysphagia Diagnosis: Moderate oral stage dysphagia; Suspected needs further assessment  Dysphagia Impression : Pt presents with moderate oral phase dysphagia characterized by generalized oral motor weakness, decreased bilabial seal, weak lingual manipulation (suspected lingual pumping), and delayed AP transit of bolus. Pt also presents with suspected pharyngeal phase dysphagia characterized by increased time to initiate swallow, immediate throat clear following trials of thin liquids. At times, pt required cue to initiate swallow response, of which was still delayed. SLP REC: NPO.   Dysphagia Outcome Severity Scale: Level 2: Moderate Severe dysphagia- Maximum assistance or maximum use of strategies with partial PO only     Recommendations  Requires SLP Intervention: Yes  Recommendations: NPO  Referral To: Dietician  D/C Recommendations: Ongoing speech therapy is recommended during this hospitalization  Diet Solids Recommendation: NPO  Liquid Consistency Recommendation: NPO  Recommended Form of Meds: Via alternative means of nutrition  Therapeutic Interventions: Therapeutic PO trials with SLP  Duration of Treatment: for LOS or until all goals met  Frequency of Treatment: 2-4x/wk    Prognosis  Speech Therapy Prognosis  Prognosis: Fair    Education  Individuals consulted  Consulted and agree with results and recommendations: Patient;RN;Family member  Family member consulted: Daughters  RN Name: Becki    Treatment/Goals  Short-term Goals  Timeframe for Short-term Goals: 1 week  Goal 1: Pt will tolerate PO trials deemed appropriate by treating SLP with adequate mastication, oral clearance of bolus, and no overt s/s of aspiration in all given opportunities. Goal 2: Pt will complete oral motor ROM/strengthening exercises with 70% acc given cues as needed to increase lingual/labial/buccal strength and decrease risk of pocketing. Goal 3: Pt will complete pharyngeal exercises with 70% acc given cues as needed to increase pharyngeal musculature and decrease risk of aspiration. Goal 4: Pt will participate in MBS study, when appropriate, in order to more objectively assess pharyngeal phase of swallow and determine least restrictive diet. Long-term Goals  Timeframe for Long-term Goals: 2 weeks  Goal 1: Pt will tolerate least restrictive diet consistency with no adverse outcomes. Safety Devices  Safety Devices  Safety Devices in place: Yes  Type of devices: Call light within reach; Bed alarm in place  Restraints Initially in Place: No    Pain Assessment  Patient c/o pain: Location and pain level: \"Little pain\" in head  Pt able to proceed with session.     Pain Re-assessment  Patient c/o pain: Described as same as above      Therapy Time  SLP Individual Minutes  Time In: 4611  Time Out: 1130  Minutes: 15        Signature: Electronically signed by BARRON Lopez on 11/12/2022 at 12:09 PM

## 2022-11-12 NOTE — ED PROVIDER NOTES
[address] [facility] ED  eMERGENCY dEPARTMENT eNCOUnter      Pt Name: Jeb Randhawa  MRN: [id number]  [place] 1945  Date of evaluation: 11/12/2022  Provider: Denise Nunez MD    CHIEF COMPLAINT       Chief Complaint   Patient presents with    Fall         HISTORY OF PRESENT ILLNESS   (Location/Symptom, Timing/Onset,Context/Setting, Quality, Duration, Modifying Factors, Severity)  Note limiting factors. Jeb Randhawa is a 76 y.o. female who presents to the emergency department for evaluation of confusion that was temporary and weakness. Patient has memory issues so most of the history had to be obtained from the . He states that she went to bed early at 8 PM because she did not feel good. He decided to go bed at same time and said she was very fidgety all night to at 1 point approximately 1 AM she was more confused than usual and he seemed to think her speech was off. He tried to get her up to the bathroom and normally she can walk without any support she had difficulty walking. He eventually tried to put her in a lounge chair and she can is slid out of the chair onto the ground although not injuring herself. He states the confusion and difficulty talking that occurred in the middle the night has resolved. The patient does not have recollection of the events for the most part although she remembers sliding out of the chair. She does have oxygen at home but does not wear. HPI    NursingNotes were reviewed. REVIEW OF SYSTEMS    (2-9 systems for level 4, 10 or more for level 5)     Review of Systems   Constitutional:  Negative for chills and diaphoresis. HENT:  Negative for congestion, ear pain, mouth sores and sore throat. Eyes:  Negative for photophobia and discharge. Respiratory:  Negative for cough, chest tightness, shortness of breath and wheezing. Cardiovascular:  Negative for chest pain and palpitations.    Gastrointestinal:  Negative for abdominal distention, abdominal pain and vomiting. Endocrine: Negative for cold intolerance. Genitourinary:  Negative for difficulty urinating. Musculoskeletal:  Negative for arthralgias. Skin:  Negative for pallor and rash. Allergic/Immunologic: Negative for immunocompromised state. Neurological:  Negative for dizziness and syncope. Hematological:  Negative for adenopathy. Psychiatric/Behavioral:  Positive for confusion. Negative for agitation and hallucinations. All other systems reviewed and are negative. Except as noted above the remainder of the review of systems was reviewed and negative.        PAST MEDICAL HISTORY     Past Medical History:   Diagnosis Date    Anxiety     Arthritis     COPD (chronic obstructive pulmonary disease) (HCC)     Generalized osteoarthrosis, unspecified site 12/27/2002    Hyperlipidemia     Hypertension     Major depressive disorder, single episode, moderate ([facility] [facility] 12/27/2002    Morbid obesity ([facility] [address] 12/27/2002    OAB (overactive bladder)     Osteoporosis          SURGICALHISTORY       Past Surgical History:   Procedure Laterality Date    CARDIAC CATHETERIZATION      CARPAL TUNNEL RELEASE      COLONOSCOPY      HYSTERECTOMY, TOTAL ABDOMINAL (CERVIX REMOVED)      KNEE ARTHROPLASTY Bilateral     OR COLON CA SCRN NOT HI RSK IND N/A 6/22/2017    COLONOSCOPY performed by Sammi Osei MD at [address] TRANSORAL DIAGNOSTIC N/A 6/22/2017    EGD ESOPHAGOGASTRODUODENOSCOPY performed by Sammi Osei MD at [address] Bilateral     SKIN BIOPSY Right 9/17/2020    EXCISION OF LEG MASS RIGHT (PAT ON ADMIT) performed by Jeremias Connell MD at [facility]       Previous Medications    ALBUTEROL SULFATE HFA (PROVENTIL;VENTOLIN;PROAIR) 108 (90 BASE) MCG/ACT INHALER    Inhale 2 puffs into the lungs 4 times daily as needed for Wheezing    AMLODIPINE (NORVASC) 5 MG TABLET    TAKE ONE TABLET BY MOUTH DAILY AT BEDTIME    ASPIRIN 81 MG EC TABLET    Take 1 tablet by mouth daily    DONEPEZIL (ARICEPT) 10 MG TABLET    take one-half tablet by mouth at bedtime for 2 weeks then increase to 1 tablet at bedtime    GABAPENTIN (NEURONTIN) 400 MG CAPSULE    TAKE ONE CAPSULE BY MOUTH THREE TIMES A DAY FOR 90 DAYS    HANDICAP PLACARD MISC    by Does not apply route Exp 5 years    LORATADINE (CLARITIN) 10 MG TABLET    Take 1 tablet by mouth daily    MELATONIN 1 MG TABLET    Take 1 tablet by mouth nightly as needed for Sleep    NAPROXEN (NAPROSYN) 375 MG TABLET    Take 1 tablet by mouth 2 times daily as needed for Pain    OXYGEN CONCENTRATOR        PRAVASTATIN (PRAVACHOL) 40 MG TABLET    TAKE ONE-HALF TABLET BY MOUTH DAILY    SERTRALINE (ZOLOFT) 50 MG TABLET    Take 1 tablet by mouth daily    TIZANIDINE (ZANAFLEX) 4 MG TABLET    TAKE ONE TABLET BY MOUTH THREE TIMES A DAY AS NEEDED FOR MUSCLE SPASMS / PAIN       ALLERGIES     Codeine    FAMILY HISTORY       Family History   Problem Relation Age of Onset    Arthritis Father     Other Father         gout    Heart Failure Father     Cancer Sister           SOCIAL HISTORY       Social History     Socioeconomic History    Marital status:      Spouse name: Aniceto Thompson     Number of children: 3    Years of education: 12    Highest education level: 12th grade   Occupational History    Occupation: [employer]    Tobacco Use    Smoking status: Former     Packs/day: 1.00     Years: 20.00     Pack years: 20.00     Types: Cigarettes     Quit date:      Years since quittin.8    Smokeless tobacco: Never   Vaping Use    Vaping Use: Never used   Substance and Sexual Activity    Alcohol use: No    Drug use: Never    Sexual activity: Not Currently     Partners: Male     Social Determinants of Health     Financial Resource Strain: Low Risk     Difficulty of Paying Living Expenses: Not hard at all   Food Insecurity: No Food Insecurity    Worried About Running Out of Food in the Last Year: Never true    Ran Out of Food in the Last Year: Never true   Physical Activity: Insufficiently Active    Days of Exercise per Week: 7 days    Minutes of Exercise per Session: 10 min       SCREENINGS    [place] Coma Scale  Eye Opening: Spontaneous  Best Verbal Response: Oriented  Best Motor Response: Obeys commands  Gem Coma Scale Score: 15 @FLOW([id number])@      PHYSICAL EXAM    (up to 7 for level 4, 8 or more for level 5)     ED Triage Vitals   BP Temp Temp Source Heart Rate Resp SpO2 Height Weight   11/12/22 0326 11/12/22 0331 11/12/22 0331 11/12/22 0326 11/12/22 0326 11/12/22 0326 11/12/22 0328 11/12/22 0328   (!) 187/100 97.7 °F (36.5 °C) Oral 75 18 93 % 5' 3\" (1.6 m) 160 lb (72.6 kg)       Physical Exam  Vitals and nursing note reviewed. Constitutional:       Appearance: She is well-developed. HENT:      Head: Normocephalic. Nose: Nose normal.   Eyes:      Conjunctiva/sclera: Conjunctivae normal.      Pupils: Pupils are equal, round, and reactive to light. Cardiovascular:      Rate and Rhythm: Normal rate and regular rhythm. Heart sounds: Normal heart sounds. Pulmonary:      Effort: Pulmonary effort is normal.      Breath sounds: Normal breath sounds. Abdominal:      General: Bowel sounds are normal.      Palpations: Abdomen is soft. Tenderness: There is no abdominal tenderness. There is no guarding. Musculoskeletal:         General: Normal range of motion. Cervical back: Normal range of motion and neck supple. Skin:     General: Skin is warm and dry. Capillary Refill: Capillary refill takes less than 2 seconds. Neurological:      Mental Status: She is alert. Mental status is at baseline. Motor: Weakness present.    Psychiatric:         Mood and Affect: Mood normal.       DIAGNOSTIC RESULTS     EKG: All EKG's are interpreted by the Emergency Department Physician who either signs or Co-signsthis chart in the absence of a cardiologist.      RADIOLOGY: Non-plain filmimages such as CT, Ultrasound and MRI are read by the radiologist. Plain radiographic images are visualized and preliminarily interpreted by the emergency physician with the below findings:      Interpretation per the Radiologist below, if available at the time ofthis note:    XR CHEST PORTABLE   Final Result   Bibasilar atelectasis. CT HEAD WO CONTRAST    (Results Pending)         ED BEDSIDE ULTRASOUND:   Performed by ED Physician - none    LABS:  Labs Reviewed   CBC WITH AUTO DIFFERENTIAL - Abnormal; Notable for the following components:       Result Value    MCV 95.4 (*)     MCH 31.7 (*)     Neutrophils Absolute 6.8 (*)     Lymphocytes Absolute 0.2 (*)     All other components within normal limits   COMPREHENSIVE METABOLIC PANEL - Abnormal; Notable for the following components: Total Bilirubin 0.8 (*)     All other components within normal limits   URINALYSIS WITH REFLEX TO CULTURE - Abnormal; Notable for the following components:    Protein, UA TRACE (*)     Leukocyte Esterase, Urine SMALL (*)     All other components within normal limits   MICROSCOPIC URINALYSIS - Abnormal; Notable for the following components:    WBC, UA 6-9 (*)     All other components within normal limits   COVID-19, RAPID   PROTIME-INR       All other labs were within normal range or not returned as of this dictation. EMERGENCY DEPARTMENT COURSE and DIFFERENTIAL DIAGNOSIS/MDM:   Vitals:    Vitals:    11/12/22 0328 11/12/22 0331 11/12/22 0400 11/12/22 0411   BP: (!) 187/100  (!) 183/93 (!) 183/93   Pulse: 75 66 69    Resp: 18  30    Temp:  97.7 °F (36.5 °C)     TempSrc:  Oral     SpO2: 93%  96%    Weight: 160 lb (72.6 kg)      Height: 5' 3\" (1.6 m)               MDM  We have 2 daughters at the bedside along with the . Apparently one of the daughters also saw the unusual speech pattern and facial movements of the patient prior to calling 911.   She did have several minutes at least over 30 minutes of confusion and expressive aphasia with possible left facial droop. Again all of that has resolved by the time she arrived here. It appears the patient had a TIA and now has some persistent borderline hypertension. May be due to her TIA. She is admitted for further management. CRITICAL CARE TIME   Total Critical Care time was 41 minutes, excluding separately reportableprocedures. There was a high probability of clinicallysignificant/life threatening deterioration in the patient's condition which required my urgent intervention. CONSULTS:  None    PROCEDURES:  Unless otherwise noted below, none     Procedures    FINAL IMPRESSION      1. TIA (transient ischemic attack)    2. Essential hypertension    3. Chronic obstructive pulmonary disease, unspecified COPD type ([facility] [address]          DISPOSITION/PLAN   DISPOSITION Decision To Admit 11/12/2022 06:34:54 AM      PATIENT REFERRED TO:  No follow-up provider specified.     DISCHARGE MEDICATIONS:  New Prescriptions    No medications on file          (Please note that portions of this note were completed with a voice recognition program.  Efforts were made to edit the dictations but occasionally words are mis-transcribed.)    Nany Porras MD (electronically signed)  Attending Emergency Physician         Nany Porras MD  11/12/22 4358

## 2022-11-12 NOTE — CARE COORDINATION
[facility] [place] Case Management Initial Discharge Assessment    Met with Patient to discuss discharge plan. PCP: Armin Figueroa, APRN - CNP                                Date of Last Visit: WITHIN THE PAST [place] Patient: No        [facility] Notified: no    If no PCP, list provided? N/A    Discharge Planning    Living Arrangements: at home dependent on family care    Who do you live with? SPOUSE     Who helps you with your care:  spouse    If lives at home:     Do you have any barriers navigating in your home? yes - 1 STEP. ONCE IN THE HOME STAIRS. SPOUSE'S [facility] BENEFIT TO PROVIDE A STAIR LIFT. Patient can perform ADL? No    Current Services (outpatient and in home) :  None    Dialysis: No    Is transportation available to get to your appointments? Yes. [address]     DME Equipment:  yes - WALKER, BSC, SHOWER CHAIR     Respiratory equipment: None    Respiratory provider:  no     Pharmacy:  yes - [place] with Medication Assistance Program?  No      Patient agreeable to [address]? Declined    Patient agreeable to SNF/Rehab? [facility], [facility], [facility] [address] CHOICE     Other discharge needs identified? N/A    Does Patient Have a High-Risk for Readmission Diagnosis (CHF, PN, MI, COPD)? Yes, see care coordinator assessment    If Yes,    Consult with pulmonologist? Yes  Consult with cardiologist? Yes  Cardiac Rehab referral if EF <35%? N/A  Consult with Pharmacy for medication assessment prior to discharge? Yes  Consult with Behavioral health to aid in depression, anxiety, or coping issues? N/A  Palliative Care Consult? No  Pulmonary Rehab order for COPD, PN, and CHF (if EF > 35%)? Yes   Does patient have a reliable scale and know how to read it (for CHF)? Yes  Nutrition consult for CHF? Yes  Respiratory therapy consult that includes bedside instruction on administration of nebulizers and/or inhalers, and assessment of oxygen and equipment needs in the home? N/A    Initial Discharge Plan? MET W/PT AND SPOUSE TO ASSESS NEEDS AND DISCUSS DISCHARGE PLAN. THEY WOULD LIKE A SNF CLOSE TO HOME. 1ST CHOICE [facility], 2ND [facility], 3RD CHOICE [facility]. SPOUSE DOES NOT DRIVE, THEY RELY ON [facility] FOR TRANSPORTATION. CM/LSW TO FOLLOW CLINICAL COURSE. Note: please see concurrent daily documentation for any updates after initial note).         Readmission Risk              Risk of Unplanned Readmission:  0         Electronically signed by Austen Mccrary RN on 11/12/2022 at 4:52 PM

## 2022-11-12 NOTE — H&P
Hospital Medicine  History and Physical    Patient:  Bre Vences  MRN: [id number]    CHIEF COMPLAINT:    Chief Complaint   Patient presents with    Fall       History Obtained From:  Patient, EMR  Primary Care Physician: YULY Montalvo CNP    HISTORY OF PRESENT ILLNESS:   The patient is a 76 y.o. female with PMH of COPD, HTN, HLD, depression who presents with facial droop and memory issues. Per her daughter she got a call from her sister at 80am who said that her mother was slurring her words; said she had to get up to go to the bathroom but she could not get out of bed. They had him call 911. She did have some slight dysphagia last night and had a headache and feeling unwell before going to bed at 9am.  In the ER she had a right sided hemiparesis. Past Medical History:      Diagnosis Date    Anxiety     Arthritis     COPD (chronic obstructive pulmonary disease) (HCC)     Generalized osteoarthrosis, unspecified site 12/27/2002    Hyperlipidemia     Hypertension     Major depressive disorder, single episode, moderate ([address] 12/27/2002    Morbid obesity ([facility] [address] 12/27/2002    OAB (overactive bladder)     Osteoporosis      Past Surgical History:      Procedure Laterality Date    CARDIAC CATHETERIZATION      CARPAL TUNNEL RELEASE      COLONOSCOPY      HYSTERECTOMY, TOTAL ABDOMINAL (CERVIX REMOVED)      KNEE ARTHROPLASTY Bilateral     MS COLON CA SCRN NOT HI RSK IND N/A 6/22/2017    COLONOSCOPY performed by Oswald Moser MD at [address] TRANSORAL DIAGNOSTIC N/A 6/22/2017    EGD ESOPHAGOGASTRODUODENOSCOPY performed by Oswald Moser MD at [address] Bilateral     SKIN BIOPSY Right 9/17/2020    EXCISION OF LEG MASS RIGHT (PAT ON ADMIT) performed by Soham Oliveira MD at [facility]     Medications Prior to Admission:    Prior to Admission medications    Medication Sig Start Date End Date Taking?  Authorizing Provider pravastatin (PRAVACHOL) 40 MG tablet TAKE ONE-HALF TABLET BY MOUTH DAILY 10/20/22   YULY Carlos CNP   albuterol sulfate HFA (PROVENTIL;VENTOLIN;PROAIR) 108 (90 Base) MCG/ACT inhaler Inhale 2 puffs into the lungs 4 times daily as needed for Wheezing 9/23/22   YULY Carlos CNP   sertraline (ZOLOFT) 50 MG tablet Take 1 tablet by mouth daily 9/23/22   YULY Carlos CNP   gabapentin (NEURONTIN) 400 MG capsule TAKE ONE CAPSULE BY MOUTH THREE TIMES A DAY FOR 90 DAYS 8/26/22 3/26/23  YULY Carlos CNP   naproxen (NAPROSYN) 375 MG tablet Take 1 tablet by mouth 2 times daily as needed for Pain 6/3/21   YULY Carlos CNP   Handicap Placard [address] [facility] by Does not apply route Exp 5 years 4/29/21   YULY Carlos CNP   tiZANidine (ZANAFLEX) 4 MG tablet TAKE ONE TABLET BY MOUTH THREE TIMES A DAY AS NEEDED FOR MUSCLE SPASMS / PAIN 4/29/21   YULY Carlos CNP   loratadine (CLARITIN) 10 MG tablet Take 1 tablet by mouth daily 6/4/20   YULY Carlos CNP   amLODIPine (NORVASC) 5 MG tablet TAKE ONE TABLET BY MOUTH DAILY AT BEDTIME 11/6/19   Historical Provider, MD   donepezil (ARICEPT) 10 MG tablet take one-half tablet by mouth at bedtime for 2 weeks then increase to 1 tablet at bedtime  Patient not taking: Reported on 9/23/2022 11/5/19   Historical Provider, MD   aspirin 81 MG EC tablet Take 1 tablet by mouth daily  Patient not taking: No sig reported 10/13/19   Vicente Gómez DO   melatonin 1 MG tablet Take 1 tablet by mouth nightly as needed for Sleep 9/7/19   YULY Carlos CNP     Allergies:  Codeine    Social History:   TOBACCO:   reports that she quit smoking about 42 years ago. Her smoking use included cigarettes. She has a 20.00 pack-year smoking history. She has never used smokeless tobacco.  ETOH:   reports no history of alcohol use.     Family History:       Problem Relation Age of Onset    Arthritis Father     Other Father         gout    Heart Failure Father     Cancer Sister      REVIEW OF SYSTEMS:  Ten systems reviewed and negative except for stated in HPI    Physical Exam:    Vitals: BP (!) 197/84   Pulse 78   Temp 97.7 °F (36.5 °C) (Oral)   Resp 20   Ht 5' 3\" (1.6 m)   Wt 160 lb (72.6 kg)   LMP  (LMP Unknown)   SpO2 96%   BMI 28.34 kg/m²   General appearance: alert, appears stated age and cooperative  Skin:  No rashes or lesions on exposed skin  HEENT: Head: Normal, normocephalic, atraumatic. [place] Neck: supple, symmetrical, trachea midline  Lungs: clear to auscultation bilaterally  Heart: regular rate and rhythm  Abdomen: soft, non-tender; bowel sounds normal; no masses,  no organomegaly  Extremities: extremities normal, atraumatic, no cyanosis or edema  Neurologic: right sided flaccid paralysis    Recent Labs     11/12/22  0345   WBC 7.9   HGB 14.0        Recent Labs     11/12/22  0345      K 4.4      CO2 23   BUN 15   CREATININE 0.74   GLUCOSE 98   AST 29   ALT 17   BILITOT 0.8*   ALKPHOS 59     Troponin T: No results for input(s): TROPONINI in the last 72 hours. ABGs:   Lab Results   Component Value Date/Time    PHART 7.318 10/01/2019 11:16 PM    PO2ART 86 10/01/2019 11:16 PM    OOF2EDC 31 10/01/2019 11:16 PM     INR:   Recent Labs     11/12/22  0548   INR 1.0     URINALYSIS:  Recent Labs     11/12/22  0345   COLORU Yellow   PHUR 7.5   WBCUA 6-9*   RBCUA 0-2   BACTERIA Negative   CLARITYU Clear   SPECGRAV 1.014   LEUKOCYTESUR SMALL*   UROBILINOGEN 0.2   BILIRUBINUR Negative   BLOODU Negative   GLUCOSEU Negative     -----------------------------------------------------------------   CTA HEAD W WO CONTRAST    Result Date: 11/12/2022  1. No large vessel occlusion, significant stenosis or cerebral aneurysm identified within the brain. 2. Atherosclerosis without significant arterial stenosis identified within the neck.  3. Beaded appearance of the distal vertebral arteries suggesting fibromuscular dysplasia. CT HEAD WO CONTRAST    Result Date: 11/12/2022  EXAMINATION: CT OF THE HEAD WITHOUT CONTRAST  11/12/2022 4:34 am TECHNIQUE: CT of the head was performed without the administration of intravenous contrast. Automated exposure control, iterative reconstruction, and/or weight based adjustment of the mA/kV was utilized to reduce the radiation dose to as low as reasonably achievable. COMPARISON: None. HISTORY: ORDERING SYSTEM PROVIDED HISTORY: fall TECHNOLOGIST PROVIDED HISTORY: Reason for exam:->fall Has a \"code stroke\" or \"stroke alert\" been called? ->No Decision Support Exception - unselect if not a suspected or confirmed emergency medical condition->Emergency Medical Condition (MA) What reading provider will be dictating this exam?->CRC FINDINGS: BRAIN/VENTRICLES: There is no acute intracranial hemorrhage, mass effect or midline shift. No abnormal extra-axial fluid collection. The gray-white differentiation is maintained without evidence of an acute infarct. There is no evidence of hydrocephalus. The ventricles, cisterns and sulci are prominent consistent with atrophy. There is decreased attenuation within the periventricular white matter consistent with periventricular leukomalacia. ORBITS: The visualized portion of the orbits demonstrate no acute abnormality. SINUSES: The visualized paranasal sinuses and mastoid air cells demonstrate no acute abnormality. SOFT TISSUES/SKULL:  No acute abnormality of the visualized skull or soft tissues. 1. There is no acute intracranial abnormality. Specifically, there is no intracranial hemorrhage. 2. Atrophy and periventricular leukomalacia,     CTA NECK W WO CONTRAST    1. No large vessel occlusion, significant stenosis or cerebral aneurysm identified within the brain. 2. Atherosclerosis without significant arterial stenosis identified within the neck. 3. Beaded appearance of the distal vertebral arteries suggesting fibromuscular dysplasia.      XR CHEST PORTABLE    Result Date: 11/12/2022  EXAMINATION: ONE XRAY VIEW OF THE CHEST 11/12/2022 4:12 am COMPARISON: None. HISTORY: ORDERING SYSTEM PROVIDED HISTORY: incresed resp rate TECHNOLOGIST PROVIDED HISTORY: Reason for exam:->incresed resp rate What reading provider will be dictating this exam?->CRC FINDINGS: The cardiomediastinal silhouette is at the upper limits of normal for technique. There are low lung volumes with bronchovascular crowding and bibasilar atelectasis. No pneumothorax or pleural effusion. Bilateral shoulder arthroplasties. Bibasilar atelectasis. Assessment and Plan   TIA:  Neuro consult; MRI ordered; asa; statin; telemetry; bubble study. Discussed with neurology; dysphagia started yesterday at dinner; unclear onset for the hemiparesis. Will start heparin infusion at low dose with no bolus  HTN/HLD:  Continue home meds  COPD:  Not in an exacerebation  Chronic diastolic CHF:  Euvolemic on exams  Functional Status: Fall precautions. Up with assistance. PT OT  Diet: Cardiac   DVT ppx: Heparin  Disposition: Dependent on hospital course. Will discharge once medically stable. SW on board for discharge planning.      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    Hypotension I95.9    NSTEMI (non-ST elevated myocardial infarction) ([facility]) O46.9    Acute diastolic heart failure ([facility]) I50.31    Hypovolemic shock ([facility]) R57.1    Acute colitis K52.9    Slow transit constipation K59.01    External hemorrhoid, bleeding K64.4    Colitis K52.9    Pain and swelling due to varicose veins of both lower extremities I83.813    Venous insufficiency of left lower extremity I87.2    Asymptomatic varicose veins of bilateral lower extremities I83.93    Non-healing surgical wound T81.89XA    Claudication in peripheral vascular disease (HCC) I73.9    Non-pressure chronic ulcer of calf, right, with fat layer exposed ([address] L97.212    Non-pressure chronic ulcer of calf with fat layer exposed ([address] L97.202    TIA (transient ischemic attack) G45.9       Yeny Damon MD, MD  Admitting Hospitalist    Emergency Contact:

## 2022-11-12 NOTE — PROGRESS NOTES
Comprehensive Nutrition Assessment    Type and Reason for Visit:  Initial, Consult (' diet ed' standing CVA orders)    Nutrition Recommendations/Plan:   If unable to initiate po diet next  72 hrs, place enteral feeding device for TF  RDN available for TF order and manage  Recommend Standard with Fiber TF ( Jevity 1.5) @ 20 ml/hr, goal rate of 50 ml/hr with 100 ml water flushes every 4 hours ( if no IVF)     Malnutrition Assessment:  Malnutrition Status:  No malnutrition (11/12/22 1359)        Nutrition Assessment:    Pt not appropriate for education due to neuro status, noted during chart review, NPO status, due to failed BSE, Nutritional status appears adeqaute upon admission, not at risk due to inability to take po , TF recommendations provided if En access placed    Nutrition Related Findings:    PMH of COPD, HTN, HLD, depression who presents with facial droop and memory issues. admitted for CVA, failed BSE 11/12, labs noted, meds reviewed, new admit, limited info available Wound Type: None       Current Nutrition Intake & Therapies:    Average Meal Intake: NPO  Average Supplements Intake: None Ordered  Diet NPO    Anthropometric Measures:  Height: 5' 3\" (160 cm)  Ideal Body Weight (IBW): 115 lbs (52 kg)    Admission Body Weight: 160 lb (72.6 kg) (stated)  Current Body Weight: 158 lb (71.7 kg),   IBW.> 100%     Current BMI (kg/m2): 28  Usual Body Weight: 162 lb (73.5 kg) (( 7/22); 153 # ( 6/2021))  % Weight Change (Calculated): -2.5                    BMI Categories: Overweight (BMI 25.0-29. 9)    Estimated Daily Nutrient Needs:  Energy Requirements Based On: Kcal/kg  Weight Used for Energy Requirements: Current  Energy (kcal/day): 6341-4010 kcals @ 22-25 kcal/kg  Weight Used for Protein Requirements: Ideal  Protein (g/day): ~ 68 g protein @ 1.3 g/kg IBW  Method Used for Fluid Requirements: 1 ml/kcal  Fluid (ml/day): ~1800    Nutrition Diagnosis:   Inadequate oral intake related to swallowing difficulty as

## 2022-11-12 NOTE — PROGRESS NOTES
[facility] Occupational Therapy      Date: 2022  Patient Name: Darian Baig        MRN: [id number]  Account: [de-identified]   : 1945  (76 y.o.)  Room: [facility]/[place] [address]    Chart reviewed, attempted OT at 13:45 for OT eval. Patient not seen 2° to:    Pt off floor. Will attempt again when able.     Electronically signed by CHRISTOPHER Carvalho on 2022 at 2:48 PM

## 2022-11-12 NOTE — CONSULTS
Subjective:      Patient ID: Francy Burris is a 76 y.o. female who presents today for stroke. Ronda Aguirre HPI 64-year-old right-handed female admitted in the middle of the night. I was contacted at 10 AM this morning by Dr. Lauren Sandoval this patient was reported to have new weakness on the right upper and lower extremity. Events of the ER are noted and the daughter is at the bedside and history obtained. As per daughter patient was having some choking episodes in the late evening yesterday while she was eating and then she went to bed early. Around 3:00 she woke up to go to the bathroom and she could not walk and she staggered. She had speech difficulty as well but there is no mention of any right-sided weakness. Patient was brought to the emergency room and she had a complete evaluation including CT angiograms. We were not contacted with the same patient was admitted. Over course of time patient has become more weaker in the right upper and lower extremity. She is still able to speak and is not quite aphasic. Patient does have history suggestive of dementia although her work-up is not known to me. She is followed by other providers and I did asked the daughter if she would like them to continue though she prefers that I continue her care for now. Does not have any history of stroke though her CT angiogram shows narrowing of the left vertebral artery and there is some question of fibromuscular dysplasia.     Review of Systems   Unable to perform ROS: Mental status change     Past Medical History:   Diagnosis Date    Anxiety     Arthritis     COPD (chronic obstructive pulmonary disease) ([address]     Generalized osteoarthrosis, unspecified site 12/27/2002    Hyperlipidemia     Hypertension     Major depressive disorder, single episode, moderate ([address] 12/27/2002    Morbid obesity ([address] 12/27/2002    OAB (overactive bladder)     Osteoporosis      Past Surgical History:   Procedure Laterality Date    CARDIAC CATHETERIZATION CARPAL TUNNEL RELEASE      COLONOSCOPY      HYSTERECTOMY, TOTAL ABDOMINAL (CERVIX REMOVED)      KNEE ARTHROPLASTY Bilateral     NV COLON CA SCRN NOT HI RSK IND N/A 2017    COLONOSCOPY performed by Silvia Merrill MD at [address] TRANSORAL DIAGNOSTIC N/A 2017    EGD ESOPHAGOGASTRODUODENOSCOPY performed by Silvia Merrill MD at [address] Bilateral     SKIN BIOPSY Right 2020    EXCISION OF LEG MASS RIGHT (PAT ON ADMIT) performed by Ari Cortez MD at [address] [facility] History     Socioeconomic History    Marital status:      Spouse name: Silverio Champion     Number of children: 3    Years of education: 12    Highest education level: 12th grade   Occupational History    Occupation: [employer]    Tobacco Use    Smoking status: Former     Packs/day: 1.00     Years: 20.00     Pack years: 20.00     Types: Cigarettes     Quit date:      Years since quittin.8    Smokeless tobacco: Never   Vaping Use    Vaping Use: Never used   Substance and Sexual Activity    Alcohol use: No    Drug use: Never    Sexual activity: Not Currently     Partners: Male   Other Topics Concern    Not on file   Social History Narrative    Not on file     Social Determinants of Health     Financial Resource Strain: Low Risk     Difficulty of Paying Living Expenses: Not hard at all   Food Insecurity: No Food Insecurity    Worried About Running Out of Food in the Last Year: Never true    [address] in the Last Year: Never true   Transportation Needs: Not on file   Physical Activity: Insufficiently Active    Days of Exercise per Week: 7 days    Minutes of Exercise per Session: 10 min   Stress: Not on file   Social Connections: Not on file   Intimate Partner Violence: Not on file   Housing Stability: Not on file     Family History   Problem Relation Age of Onset    Arthritis Father     Other Father         gout    Heart Failure Father     Cancer Sister      Allergies   Allergen Reactions    Codeine      Nausea, lightheadedness, dizzy     Current Facility-Administered Medications   Medication Dose Route Frequency Provider Last Rate Last Admin    ondansetron (ZOFRAN-ODT) disintegrating tablet 4 mg  4 mg Oral Q8H PRN Maribel Macias MD        Or    ondansetron (ZOFRAN) injection 4 mg  4 mg IntraVENous Q6H PRN Maribel Macias MD        polyethylene glycol (GLYCOLAX) packet 17 g  17 g Oral Daily PRN Maribel Macias MD        aspirin EC tablet 81 mg  81 mg Oral Daily Maribel Macias MD        Or    aspirin suppository 300 mg  300 mg Rectal Daily Maribel Macias MD        labetalol (NORMODYNE;TRANDATE) injection 10 mg  10 mg IntraVENous Q10 Min PRN Maribel Macias MD   10 mg at 11/12/22 1102    atorvastatin (LIPITOR) tablet 80 mg  80 mg Oral Nightly Maribel Macias MD        heparin (porcine) injection 4,360 Units  60 Units/kg IntraVENous PRN Maribel Macias MD        heparin (porcine) injection 2,180 Units  30 Units/kg IntraVENous PRN Maribel Macias MD        heparin 25,000 units in dextrose 5% 250 mL (premix) infusion  5-30 Units/kg/hr IntraVENous Continuous Maribel Macias MD            Objective:   BP (!) 149/118   Pulse 77   Temp 98.6 °F (37 °C)   Resp 20   Ht 5' 3\" (1.6 m)   Wt 160 lb (72.6 kg)   LMP  (LMP Unknown)   SpO2 96%   BMI 28.34 kg/m²     Physical Exam  Vitals reviewed. Eyes:      Pupils: Pupils are equal, round, and reactive to light. Cardiovascular:      Rate and Rhythm: Normal rate and regular rhythm. Heart sounds: No murmur heard. Pulmonary:      Effort: Pulmonary effort is normal.      Breath sounds: Normal breath sounds. Abdominal:      General: Bowel sounds are normal.   Musculoskeletal:         General: Normal range of motion. Cervical back: Normal range of motion. Skin:     General: Skin is warm. Neurological:      Mental Status: She is alert and oriented to person, place, and time.       Cranial Nerves: No cranial nerve deficit. Sensory: No sensory deficit. Motor: No abnormal muscle tone. Coordination: Coordination normal.      Deep Tendon Reflexes: Reflexes are normal and symmetric. Babinski sign absent on the right side. Babinski sign absent on the left side. Comments: She is able to answer some question but could not go through review of system. There is no facial symmetry tongue is midline. For symmetrically and no gaze preference is notable. Patient is quite flaccid in the right arm with some movement in the right lower extremity. Did not attempt a walker and she had a positive Babinski on the right   Psychiatric:         Mood and Affect: Mood normal.       CTA HEAD W WO CONTRAST    Result Date: 11/12/2022  EXAMINATION: CTA OF THE NECK; CTA OF THE HEAD WITHOUT AND WITH CONTRAST 11/12/2022 7:57 am TECHNIQUE: CTA of the neck was performed with the administration of intravenous contrast. Multiplanar reformatted images are provided for review. MIP images are provided for review. Stenosis of the internal carotid arteries measured using NASCET criteria. Automated exposure control, iterative reconstruction, and/or weight based adjustment of the mA/kV was utilized to reduce the radiation dose to as low as reasonably achievable.; CTA of the head/brain was performed without and with the administration of intravenous contrast. Multiplanar reformatted images are provided for review. MIP images are provided for review. Automated exposure control, iterative reconstruction, and/or weight based adjustment of the mA/kV was utilized to reduce the radiation dose to as low as reasonably achievable.  COMPARISON: CT brain earlier same day HISTORY: ORDERING SYSTEM PROVIDED HISTORY: right sided weakness TECHNOLOGIST PROVIDED HISTORY: Reason for exam:->right sided weakness What reading provider will be dictating this exam?->CRC FINDINGS: CTA NECK: AORTIC ARCH/ARCH VESSELS: Atherosclerotic calcifications are present within the aortic arch. The origins of the great vessels are normal.  There is no significant stenosis of the innominate or subclavian arteries. CAROTID ARTERIES: There is mild calcified atherosclerotic plaque within the left common carotid artery. There is no significant stenosis of the common carotid arteries identified. There is no significant stenosis of the internal carotid arteries. There is no dissection or pseudoaneurysm. VERTEBRAL ARTERIES: There is a mildly beaded appearance of the distal V2 and V3 segments of the vertebral arteries suggesting fibromuscular dysplasia. There is no dissection or pseudoaneurysm. There is no significant stenosis. SOFT TISSUES: Lung apices are clear. There is no superior mediastinal lymphadenopathy. There is no cervical lymphadenopathy or soft tissue mass identified. The parotid glands and submandibular glands are normal. BONES: No lytic or blastic osseous lesions are identified. There is moderate multilevel neural foraminal narrowing and mild spinal canal stenosis from C4-C5 to C6-C7. CTA HEAD: ANTERIOR CIRCULATION: Atherosclerotic calcifications are present within the cavernous segments of the internal carotid arteries without significant stenosis evident. The anterior and middle cerebral arteries are normal. There is no significant stenosis or aneurysm evident. POSTERIOR CIRCULATION: The distal vertebral arteries are normal in appearance. The basilar artery is normal.  No significant stenosis or aneurysm is identified. The posterior cerebral arteries are normal in appearance. OTHER: No dural venous sinus thrombosis on this non-dedicated study. BRAIN: There is no mass effect or midline shift. There is no vascular malformation identified. 1. No large vessel occlusion, significant stenosis or cerebral aneurysm identified within the brain. 2. Atherosclerosis without significant arterial stenosis identified within the neck.  3. Beaded appearance of the distal vertebral arteries suggesting fibromuscular dysplasia. CT HEAD WO CONTRAST    Result Date: 11/12/2022  EXAMINATION: CT OF THE HEAD WITHOUT CONTRAST  11/12/2022 4:34 am TECHNIQUE: CT of the head was performed without the administration of intravenous contrast. Automated exposure control, iterative reconstruction, and/or weight based adjustment of the mA/kV was utilized to reduce the radiation dose to as low as reasonably achievable. COMPARISON: None. HISTORY: ORDERING SYSTEM PROVIDED HISTORY: fall TECHNOLOGIST PROVIDED HISTORY: Reason for exam:->fall Has a \"code stroke\" or \"stroke alert\" been called? ->No Decision Support Exception - unselect if not a suspected or confirmed emergency medical condition->Emergency Medical Condition (MA) What reading provider will be dictating this exam?->CRC FINDINGS: BRAIN/VENTRICLES: There is no acute intracranial hemorrhage, mass effect or midline shift. No abnormal extra-axial fluid collection. The gray-white differentiation is maintained without evidence of an acute infarct. There is no evidence of hydrocephalus. The ventricles, cisterns and sulci are prominent consistent with atrophy. There is decreased attenuation within the periventricular white matter consistent with periventricular leukomalacia. ORBITS: The visualized portion of the orbits demonstrate no acute abnormality. SINUSES: The visualized paranasal sinuses and mastoid air cells demonstrate no acute abnormality. SOFT TISSUES/SKULL:  No acute abnormality of the visualized skull or soft tissues. 1. There is no acute intracranial abnormality. Specifically, there is no intracranial hemorrhage.  2. Atrophy and periventricular leukomalacia,     CTA NECK W WO CONTRAST    Result Date: 11/12/2022  EXAMINATION: CTA OF THE NECK; CTA OF THE HEAD WITHOUT AND WITH CONTRAST 11/12/2022 7:57 am TECHNIQUE: CTA of the neck was performed with the administration of intravenous contrast. Multiplanar reformatted images are provided for review. MIP images are provided for review. Stenosis of the internal carotid arteries measured using NASCET criteria. Automated exposure control, iterative reconstruction, and/or weight based adjustment of the mA/kV was utilized to reduce the radiation dose to as low as reasonably achievable.; CTA of the head/brain was performed without and with the administration of intravenous contrast. Multiplanar reformatted images are provided for review. MIP images are provided for review. Automated exposure control, iterative reconstruction, and/or weight based adjustment of the mA/kV was utilized to reduce the radiation dose to as low as reasonably achievable. COMPARISON: CT brain earlier same day HISTORY: ORDERING SYSTEM PROVIDED HISTORY: right sided weakness TECHNOLOGIST PROVIDED HISTORY: Reason for exam:->right sided weakness What reading provider will be dictating this exam?->CRC FINDINGS: CTA NECK: AORTIC ARCH/ARCH VESSELS: Atherosclerotic calcifications are present within the aortic arch. The origins of the great vessels are normal.  There is no significant stenosis of the innominate or subclavian arteries. CAROTID ARTERIES: There is mild calcified atherosclerotic plaque within the left common carotid artery. There is no significant stenosis of the common carotid arteries identified. There is no significant stenosis of the internal carotid arteries. There is no dissection or pseudoaneurysm. VERTEBRAL ARTERIES: There is a mildly beaded appearance of the distal V2 and V3 segments of the vertebral arteries suggesting fibromuscular dysplasia. There is no dissection or pseudoaneurysm. There is no significant stenosis. SOFT TISSUES: Lung apices are clear. There is no superior mediastinal lymphadenopathy. There is no cervical lymphadenopathy or soft tissue mass identified. The parotid glands and submandibular glands are normal. BONES: No lytic or blastic osseous lesions are identified.   There is moderate multilevel neural foraminal narrowing and mild spinal canal stenosis from C4-C5 to C6-C7. CTA HEAD: ANTERIOR CIRCULATION: Atherosclerotic calcifications are present within the cavernous segments of the internal carotid arteries without significant stenosis evident. The anterior and middle cerebral arteries are normal. There is no significant stenosis or aneurysm evident. POSTERIOR CIRCULATION: The distal vertebral arteries are normal in appearance. The basilar artery is normal.  No significant stenosis or aneurysm is identified. The posterior cerebral arteries are normal in appearance. OTHER: No dural venous sinus thrombosis on this non-dedicated study. BRAIN: There is no mass effect or midline shift. There is no vascular malformation identified. 1. No large vessel occlusion, significant stenosis or cerebral aneurysm identified within the brain. 2. Atherosclerosis without significant arterial stenosis identified within the neck. 3. Beaded appearance of the distal vertebral arteries suggesting fibromuscular dysplasia. XR CHEST PORTABLE    Result Date: 11/12/2022  EXAMINATION: ONE XRAY VIEW OF THE CHEST 11/12/2022 4:12 am COMPARISON: None. HISTORY: ORDERING SYSTEM PROVIDED HISTORY: incresed resp rate TECHNOLOGIST PROVIDED HISTORY: Reason for exam:->incresed resp rate What reading provider will be dictating this exam?->CRC FINDINGS: The cardiomediastinal silhouette is at the upper limits of normal for technique. There are low lung volumes with bronchovascular crowding and bibasilar atelectasis. No pneumothorax or pleural effusion. Bilateral shoulder arthroplasties. Bibasilar atelectasis.        Lab Results   Component Value Date/Time    WBC 7.9 11/12/2022 03:45 AM    RBC 4.41 11/12/2022 03:45 AM    RBC 4.22 04/23/2012 11:12 AM    HGB 14.0 11/12/2022 03:45 AM    HCT 42.0 11/12/2022 03:45 AM    MCV 95.4 11/12/2022 03:45 AM    MCH 31.7 11/12/2022 03:45 AM    MCHC 33.3 11/12/2022 03:45 AM    RDW 14.1 11/12/2022 03:45 AM     11/12/2022 03:45 AM    MPV 9.9 10/12/2012 09:30 AM     Lab Results   Component Value Date/Time     11/12/2022 03:45 AM    K 4.4 11/12/2022 03:45 AM    K 4.0 10/23/2019 06:19 AM     11/12/2022 03:45 AM    CO2 23 11/12/2022 03:45 AM    BUN 15 11/12/2022 03:45 AM    CREATININE 0.74 11/12/2022 03:45 AM    GFRAA >60.0 07/05/2022 11:26 AM    LABGLOM >60.0 11/12/2022 03:45 AM    GLUCOSE 98 11/12/2022 03:45 AM    GLUCOSE 75 04/23/2012 11:12 AM    PROT 6.4 11/12/2022 03:45 AM    LABALBU 3.8 11/12/2022 03:45 AM    LABALBU 4.6 04/23/2012 11:12 AM    CALCIUM 9.2 11/12/2022 03:45 AM    BILITOT 0.8 11/12/2022 03:45 AM    ALKPHOS 59 11/12/2022 03:45 AM    AST 29 11/12/2022 03:45 AM    ALT 17 11/12/2022 03:45 AM     Lab Results   Component Value Date/Time    PROTIME 12.9 11/12/2022 05:48 AM    PROTIME 10.7 04/23/2012 11:12 AM    INR 1.0 11/12/2022 05:48 AM     Lab Results   Component Value Date/Time    TSH 4.230 10/10/2019 04:15 PM    QQLXDYSK46 451 06/03/2021 11:30 AM    FOLATE 16.8 06/03/2021 11:30 AM     Lab Results   Component Value Date/Time    TRIG 103 07/05/2022 11:26 AM    HDL 69 07/05/2022 11:26 AM    LDLCALC 90 07/05/2022 11:26 AM     Lab Results   Component Value Date/Time    LABAMPH Neg 08/26/2016 02:46 PM    BARBSCNU Neg 08/26/2016 02:46 PM    LABBENZ Neg 08/26/2016 02:46 PM    OPIATESCREENURINE Neg 08/26/2016 02:46 PM    PHENCYCLIDINESCREENURINE Neg 08/26/2016 02:46 PM    ETOH <10 08/26/2016 02:46 PM     No results found for: LITHIUM, DILFRTOT, VALPROATE    Assessment:   Left cerebral stroke likely to be subcortical and may have started in the early hours of the evening yesterday as she had some episodes reported of choking during dinner. She then went to bed and awoke at around 3:00 with difficulty to ambulate and weakness though it was not quite reported that this was one-sided.   Patient was brought to the hospital and was seen in the ER around 3.30 am, and had a CT

## 2022-11-13 ENCOUNTER — APPOINTMENT (OUTPATIENT)
Dept: CT IMAGING | Age: 77
DRG: 064 | End: 2022-11-13
Payer: MEDICARE

## 2022-11-13 LAB
CHOLESTEROL, TOTAL: 172 MG/DL (ref 0–199)
GLUCOSE BLD-MCNC: 100 MG/DL (ref 70–99)
GLUCOSE BLD-MCNC: 104 MG/DL (ref 70–99)
GLUCOSE BLD-MCNC: 106 MG/DL (ref 70–99)
GLUCOSE BLD-MCNC: 113 MG/DL (ref 70–99)
GLUCOSE BLD-MCNC: 114 MG/DL (ref 70–99)
GLUCOSE BLD-MCNC: 116 MG/DL (ref 70–99)
GLUCOSE BLD-MCNC: 123 MG/DL (ref 70–99)
HBA1C MFR BLD: 5.9 % (ref 4.8–5.9)
HCT VFR BLD CALC: 43.1 % (ref 37–47)
HDLC SERPL-MCNC: 72 MG/DL (ref 40–59)
HEMOGLOBIN: 14 G/DL (ref 12–16)
LDL CHOLESTEROL CALCULATED: 82 MG/DL (ref 0–129)
MCH RBC QN AUTO: 31.5 PG (ref 27–31.3)
MCHC RBC AUTO-ENTMCNC: 32.4 % (ref 33–37)
MCV RBC AUTO: 97 FL (ref 79.4–94.8)
PDW BLD-RTO: 14.2 % (ref 11.5–14.5)
PERFORMED ON: ABNORMAL
PLATELET # BLD: 182 K/UL (ref 130–400)
RBC # BLD: 4.44 M/UL (ref 4.2–5.4)
TRIGL SERPL-MCNC: 90 MG/DL (ref 0–150)
WBC # BLD: 6.7 K/UL (ref 4.8–10.8)

## 2022-11-13 PROCEDURE — 94762 N-INVAS EAR/PLS OXIMTRY CONT: CPT

## 2022-11-13 PROCEDURE — 1210000000 HC MED SURG R&B

## 2022-11-13 PROCEDURE — 36415 COLL VENOUS BLD VENIPUNCTURE: CPT

## 2022-11-13 PROCEDURE — 85027 COMPLETE CBC AUTOMATED: CPT

## 2022-11-13 PROCEDURE — 2500000003 HC RX 250 WO HCPCS: Performed by: INTERNAL MEDICINE

## 2022-11-13 PROCEDURE — 99233 SBSQ HOSP IP/OBS HIGH 50: CPT | Performed by: PSYCHIATRY & NEUROLOGY

## 2022-11-13 PROCEDURE — 70450 CT HEAD/BRAIN W/O DYE: CPT

## 2022-11-13 PROCEDURE — 2580000003 HC RX 258: Performed by: INTERNAL MEDICINE

## 2022-11-13 PROCEDURE — 83036 HEMOGLOBIN GLYCOSYLATED A1C: CPT

## 2022-11-13 PROCEDURE — 6360000002 HC RX W HCPCS: Performed by: INTERNAL MEDICINE

## 2022-11-13 PROCEDURE — 80061 LIPID PANEL: CPT

## 2022-11-13 PROCEDURE — 2700000000 HC OXYGEN THERAPY PER DAY

## 2022-11-13 PROCEDURE — 6370000000 HC RX 637 (ALT 250 FOR IP): Performed by: INTERNAL MEDICINE

## 2022-11-13 RX ORDER — HYDRALAZINE HYDROCHLORIDE 20 MG/ML
20 INJECTION INTRAMUSCULAR; INTRAVENOUS EVERY 4 HOURS
Status: DISCONTINUED | OUTPATIENT
Start: 2022-11-13 | End: 2022-11-14

## 2022-11-13 RX ORDER — HYDRALAZINE HYDROCHLORIDE 20 MG/ML
5 INJECTION INTRAMUSCULAR; INTRAVENOUS EVERY 4 HOURS PRN
Status: DISCONTINUED | OUTPATIENT
Start: 2022-11-13 | End: 2022-11-18 | Stop reason: HOSPADM

## 2022-11-13 RX ORDER — CLONIDINE 0.1 MG/24H
1 PATCH, EXTENDED RELEASE TRANSDERMAL WEEKLY
Status: DISCONTINUED | OUTPATIENT
Start: 2022-11-13 | End: 2022-11-14

## 2022-11-13 RX ORDER — ENALAPRILAT 2.5 MG/2ML
1.25 INJECTION INTRAVENOUS EVERY 6 HOURS SCHEDULED
Status: DISCONTINUED | OUTPATIENT
Start: 2022-11-13 | End: 2022-11-14

## 2022-11-13 RX ADMIN — HYDRALAZINE HYDROCHLORIDE 20 MG: 20 INJECTION INTRAMUSCULAR; INTRAVENOUS at 19:53

## 2022-11-13 RX ADMIN — LABETALOL HYDROCHLORIDE 10 MG: 5 INJECTION, SOLUTION INTRAVENOUS at 11:19

## 2022-11-13 RX ADMIN — HYDRALAZINE HYDROCHLORIDE 5 MG: 20 INJECTION INTRAMUSCULAR; INTRAVENOUS at 15:49

## 2022-11-13 RX ADMIN — SODIUM CHLORIDE, SODIUM LACTATE, POTASSIUM CHLORIDE, CALCIUM CHLORIDE AND DEXTROSE MONOHYDRATE: 5; 600; 310; 30; 20 INJECTION, SOLUTION INTRAVENOUS at 05:08

## 2022-11-13 RX ADMIN — SODIUM CHLORIDE, SODIUM LACTATE, POTASSIUM CHLORIDE, CALCIUM CHLORIDE AND DEXTROSE MONOHYDRATE: 5; 600; 310; 30; 20 INJECTION, SOLUTION INTRAVENOUS at 16:56

## 2022-11-13 RX ADMIN — LABETALOL HYDROCHLORIDE 10 MG: 5 INJECTION, SOLUTION INTRAVENOUS at 08:23

## 2022-11-13 RX ADMIN — ENALAPRILAT 1.25 MG: 1.25 INJECTION INTRAVENOUS at 21:18

## 2022-11-13 RX ADMIN — LABETALOL HYDROCHLORIDE 10 MG: 5 INJECTION, SOLUTION INTRAVENOUS at 04:29

## 2022-11-13 RX ADMIN — LABETALOL HYDROCHLORIDE 10 MG: 5 INJECTION, SOLUTION INTRAVENOUS at 00:29

## 2022-11-13 RX ADMIN — LABETALOL HYDROCHLORIDE 10 MG: 5 INJECTION, SOLUTION INTRAVENOUS at 00:03

## 2022-11-13 ASSESSMENT — ENCOUNTER SYMPTOMS
BACK PAIN: 0
VOMITING: 0
SHORTNESS OF BREATH: 0
TROUBLE SWALLOWING: 0
NAUSEA: 0
CHOKING: 0
PHOTOPHOBIA: 0
COLOR CHANGE: 0

## 2022-11-13 NOTE — FLOWSHEET NOTE
11/13/22 @ 35 [phone] 15 Notified [facility] answering service of Cardiology consult # [phone]

## 2022-11-13 NOTE — PROGRESS NOTES
Physical Therapy Missed Treatment   Facility/Department: [facility] MED SURG Q113/C146-48    NAME: Lakshmi Smith    : 1945 (76 y.o.)  MRN: [id number]    Account: [de-identified]  Gender: female    Chart reviewed, attempted PT at 26. Patient unavailable 2° to:    [x] Hold per nsg request- states patient has a brain bleed recently found on CT scan this AM as well as BP issues today, requesting to hold PT at this time. Will attempt PT treatment again at earliest convenience.       Electronically signed by Randall Nieto PTA on 22 at 11:18 AM EST

## 2022-11-13 NOTE — PROGRESS NOTES
Hospitalist Progress Note      PCP: Rayo Perez, APRN - CNP    Date of Admission: 11/12/2022    Chief Complaint:    Chief Complaint   Patient presents with    Fall       Subjective:  Patient has some aphasia today making ROS difficult. Advised patient for an NG for TF and she stated 'no' multiple times. Medications:  Reviewed    Infusion Medications    dextrose 5% in lactated ringers 100 mL/hr at 11/13/22 0508    dextrose       Scheduled Medications    aspirin  81 mg Oral Daily    Or    aspirin  300 mg Rectal Daily    atorvastatin  80 mg Oral Nightly    insulin lispro  0-4 Units SubCUTAneous Q4H     PRN Meds: ondansetron **OR** ondansetron, polyethylene glycol, labetalol, glucose, dextrose bolus **OR** dextrose bolus, glucagon (rDNA), dextrose    No intake or output data in the 24 hours ending 11/13/22 0919    Exam:    BP (!) 152/67   Pulse 59   Temp 99.9 °F (37.7 °C) (Oral)   Resp 16   Ht 5' 3\" (1.6 m)   Wt 160 lb (72.6 kg)   LMP  (LMP Unknown)   SpO2 94%   BMI 28.34 kg/m²     General appearance: Ill appearing. HEENT:  Conjunctivae/corneas clear. Neck: Trachea midline. Respiratory:  Normal respiratory effort. Clear to auscultation  Cardiovascular: Regular rate and rhythm  Abdomen: Soft, non-tender, non-distended with normal bowel sounds. Musculoskeletal: No clubbing, cyanosis or edema bilaterally  Neuro: Right sided hemiparesis.    Capillary Refill: Brisk,< 3 seconds   Peripheral Pulses: +2 palpable, equal bilaterally       Labs:   Recent Labs     11/12/22  0345 11/12/22  1123 11/13/22  0514   WBC 7.9 8.1 6.7   HGB 14.0 14.4 14.0   HCT 42.0 44.3 43.1    193 182     Recent Labs     11/12/22  0345      K 4.4      CO2 23   BUN 15   CREATININE 0.74   CALCIUM 9.2     Recent Labs     11/12/22  0345   AST 29   ALT 17   BILITOT 0.8*   ALKPHOS 59     Recent Labs     11/12/22  0548 11/12/22  1123   INR 1.0 1.0     No results for input(s): Giovanni Clifford in the last 72 hours.    Urinalysis:      Lab Results   Component Value Date/Time    NITRU Negative 11/12/2022 03:45 AM    WBCUA 6-9 11/12/2022 03:45 AM    BACTERIA Negative 11/12/2022 03:45 AM    RBCUA 0-2 11/12/2022 03:45 AM    BLOODU Negative 11/12/2022 03:45 AM    SPECGRAV 1.014 11/12/2022 03:45 AM    GLUCOSEU Negative 11/12/2022 03:45 AM    GLUCOSEU NEG 10/17/2011 10:15 AM       Radiology:  MRI brain without contrast   Final Result   1. Small acute infarct involving the left inferior ahsan. No mass effect or   midline shift. 2. Trace amount of blood layering within the occipital horns of the lateral   ventricles bilaterally. 3. At least partial loss of the normal signal void within the right vertebral   artery, which can be seen with slow flow/occlusion. This is of uncertain   chronicity. 4. Otherwise, no acute intracranial abnormality. 5. Moderate global parenchymal volume loss with mild chronic microvascular   ischemic changes. These results were sent to the [facility]  [address] ([address] [place] [address]) on   11/12/2022 at 2:22 pm to be communicated to the referring/covering health   care provider/office. CT HEAD WO CONTRAST   Final Result   Atrophy and chronic changes seen within the brain with no acute intracranial   abnormality. CTA HEAD W WO CONTRAST   Preliminary Result   1. No large vessel occlusion, significant stenosis or cerebral aneurysm   identified within the brain. 2. Atherosclerosis without significant arterial stenosis identified within   the neck. 3. Beaded appearance of the distal vertebral arteries suggesting   fibromuscular dysplasia. CTA NECK W WO CONTRAST   Preliminary Result   1. No large vessel occlusion, significant stenosis or cerebral aneurysm   identified within the brain. 2. Atherosclerosis without significant arterial stenosis identified within   the neck. 3. Beaded appearance of the distal vertebral arteries suggesting   fibromuscular dysplasia.          CT HEAD WO CONTRAST   Final Result   1. There is no acute intracranial abnormality. Specifically, there is no   intracranial hemorrhage. 2. Atrophy and periventricular leukomalacia,         XR CHEST PORTABLE   Final Result   Bibasilar atelectasis. XR CHEST (2 VW)    (Results Pending)   CT HEAD WO CONTRAST    (Results Pending)       Assessment/Plan:    Acute stroke POA:  Repeat CT scan today given the MRI findings of layering of blood; unable to be on heparin for this reason; neuro is managing; PT/OT; tight blood control  HTN/HLD:  Continue home meds  COPD:  Not in an exacerebation  Chronic diastolic CHF:  Euvolemic on exams  Functional Status: Fall precautions. Up with assistance. PT OT  Diet: Cardiac   DVT ppx: Heparin  Disposition: Dependent on hospital course. Will discharge once medically stable. SW on board for discharge planning. 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 I am managing a portion of pt care. Some medical issues are handled by other specialists. Additional work up and treatment should be done in out pt setting by pt PCP and other out pt providers. In addition to examining and evaluating pt, I spent additional time explaining care, normal and abnormal findings, and treatment plan. All of pt questions were answered. Counseling, diet and education were  provided. Case will be discussed with nursing staff when appropriate. Family will be updated if and when appropriate.       Diet: Diet NPO    Code Status: Full Code    PT/OT Eval     Electronically signed by Kenzie Bialey MD on 11/13/2022 at 9:19 AM

## 2022-11-13 NOTE — PROGRESS NOTES
Neurology Follow up    SUBJECTIVE: Patient was reevaluated yesterday after the MRI came back. She received heparin just for a few minutes and appeared to be some layering of hemorrhage on the ventricle. Repeat CT is done and has some minor hemorrhaging reported and this could be calcification in the ventricle. Patient is quite hemiplegic in the right upper and lower extremity with a pontine infarct noted.   Current Facility-Administered Medications   Medication Dose Route Frequency Provider Last Rate Last Admin    ondansetron (ZOFRAN-ODT) disintegrating tablet 4 mg  4 mg Oral Q8H PRN Vida Tracy MD        Or    ondansetron (ZOFRAN) injection 4 mg  4 mg IntraVENous Q6H PRN Vida Tracy MD        polyethylene glycol (GLYCOLAX) packet 17 g  17 g Oral Daily PRN Vida Tracy MD        aspirin EC tablet 81 mg  81 mg Oral Daily Vida Tracy MD        Or    aspirin suppository 300 mg  300 mg Rectal Daily Vida Tracy MD        labetalol (NORMODYNE;TRANDATE) injection 10 mg  10 mg IntraVENous Q10 Min PRN Vida Tracy MD   10 mg at 11/13/22 0823    atorvastatin (LIPITOR) tablet 80 mg  80 mg Oral Nightly Vida Tracy MD        dextrose 5 % in lactated ringers infusion   IntraVENous Continuous Vida Tracy  mL/hr at 11/13/22 0508 New Bag at 11/13/22 0508    glucose chewable tablet 16 g  4 tablet Oral PRN Vida Tracy MD        dextrose bolus 10% 125 mL  125 mL IntraVENous VIJAY Tracy MD        Or    dextrose bolus 10% 250 mL  250 mL IntraVENous PRN Vida Tracy MD        glucagon (rDNA) injection 1 mg  1 mg SubCUTAneous PRGEOVANNA Tracy MD        dextrose 10 % infusion   IntraVENous Continuous VIJAY Tracy MD        insulin lispro (HUMALOG) injection vial 0-4 Units  0-4 Units SubCUTAneous Q4H Vida Tracy MD           PHYSICAL EXAM:    BP (!) 152/67   Pulse 59   Temp 99.9 °F (37.7 °C) (Oral)   Resp 16   Ht 5' 3\" (1.6 m)   Wt 160 lb (72.6 kg)   LMP  (LMP Unknown)   SpO2 94% BMI 28.34 kg/m²    General Appearance:      Skin:  normal  CVS - Normal sounds, No murmurs , No carotid Bruits  RS -CTA  Abdomen Soft, BS present  Review of Systems   Constitutional:  Negative for fever. HENT:  Negative for ear pain, tinnitus and trouble swallowing. Eyes:  Negative for photophobia and visual disturbance. Respiratory:  Negative for choking and shortness of breath. Cardiovascular:  Negative for chest pain and palpitations. Gastrointestinal:  Negative for nausea and vomiting. Musculoskeletal:  Negative for back pain, gait problem, joint swelling, myalgias, neck pain and neck stiffness. Skin:  Negative for color change. Allergic/Immunologic: Negative for food allergies. Neurological:  Positive for weakness. Negative for dizziness, tremors, seizures, syncope, facial asymmetry, speech difficulty, light-headedness, numbness and headaches. Psychiatric/Behavioral:  Negative for behavioral problems, confusion, hallucinations and sleep disturbance. Mental Status Exam:             Level of Alertness:   awake            Orientation:   person, place,             Memory:   She has underlying baseline dementia          f          Language:  normal      Funduscopic Exam:     Cranial Nerves            Cranial nerve III           Pupils:  equal, round, reactive to light      Cranial nerves III, IV, VI           Extraocular Movements: intact        Motor: Patient has underlying dementia and has hemiplegia in the right upper and lower extremity.   She is not able to swallow            Sensory: Unable to perform        Pinprick             Right Upper Extremity:  normal             Left Upper Extremity:  normal             Right Lower Extremity:  normal             Left Lower Extremity:  normal           Vibration                         Touch            Proprioception                 Coordination: Unable to perform                    Reflexes:             Deep Tendon Reflexes: Reflexes are 2 +             Plantar response:                Right:  downgoing               Left:  downgoing    Vascular:  Cardiac Exam:  normal         CTA HEAD W WO CONTRAST    Result Date: 11/12/2022  EXAMINATION: CTA OF THE NECK; CTA OF THE HEAD WITHOUT AND WITH CONTRAST 11/12/2022 7:57 am TECHNIQUE: CTA of the neck was performed with the administration of intravenous contrast. Multiplanar reformatted images are provided for review. MIP images are provided for review. Stenosis of the internal carotid arteries measured using NASCET criteria. Automated exposure control, iterative reconstruction, and/or weight based adjustment of the mA/kV was utilized to reduce the radiation dose to as low as reasonably achievable.; CTA of the head/brain was performed without and with the administration of intravenous contrast. Multiplanar reformatted images are provided for review. MIP images are provided for review. Automated exposure control, iterative reconstruction, and/or weight based adjustment of the mA/kV was utilized to reduce the radiation dose to as low as reasonably achievable. COMPARISON: CT brain earlier same day HISTORY: ORDERING SYSTEM PROVIDED HISTORY: right sided weakness TECHNOLOGIST PROVIDED HISTORY: Reason for exam:->right sided weakness What reading provider will be dictating this exam?->CRC FINDINGS: CTA NECK: AORTIC ARCH/ARCH VESSELS: Atherosclerotic calcifications are present within the aortic arch. The origins of the great vessels are normal.  There is no significant stenosis of the innominate or subclavian arteries. CAROTID ARTERIES: There is mild calcified atherosclerotic plaque within the left common carotid artery. There is no significant stenosis of the common carotid arteries identified. There is no significant stenosis of the internal carotid arteries. There is no dissection or pseudoaneurysm.  VERTEBRAL ARTERIES: There is a mildly beaded appearance of the distal V2 and V3 segments of the vertebral arteries suggesting fibromuscular dysplasia. There is no dissection or pseudoaneurysm. There is no significant stenosis. SOFT TISSUES: Lung apices are clear. There is no superior mediastinal lymphadenopathy. There is no cervical lymphadenopathy or soft tissue mass identified. The parotid glands and submandibular glands are normal. BONES: No lytic or blastic osseous lesions are identified. There is moderate multilevel neural foraminal narrowing and mild spinal canal stenosis from C4-C5 to C6-C7. CTA HEAD: ANTERIOR CIRCULATION: Atherosclerotic calcifications are present within the cavernous segments of the internal carotid arteries without significant stenosis evident. The anterior and middle cerebral arteries are normal. There is no significant stenosis or aneurysm evident. POSTERIOR CIRCULATION: The distal vertebral arteries are normal in appearance. The basilar artery is normal.  No significant stenosis or aneurysm is identified. The posterior cerebral arteries are normal in appearance. OTHER: No dural venous sinus thrombosis on this non-dedicated study. BRAIN: There is no mass effect or midline shift. There is no vascular malformation identified. 1. No large vessel occlusion, significant stenosis or cerebral aneurysm identified within the brain. 2. Atherosclerosis without significant arterial stenosis identified within the neck. 3. Beaded appearance of the distal vertebral arteries suggesting fibromuscular dysplasia. CT HEAD WO CONTRAST    Result Date: 11/13/2022  EXAMINATION: CT OF THE HEAD WITHOUT CONTRAST  11/13/2022 9:25 am TECHNIQUE: CT of the head was performed without the administration of intravenous contrast. Automated exposure control, iterative reconstruction, and/or weight based adjustment of the mA/kV was utilized to reduce the radiation dose to as low as reasonably achievable. COMPARISON: None.  HISTORY: ORDERING SYSTEM PROVIDED HISTORY: eval for progression of blood layering within the occipital horns of the lateral ventricles TECHNOLOGIST PROVIDED HISTORY: Reason for exam:->eval for progression of blood layering within the occipital horns of the lateral ventricles Has a \"code stroke\" or \"stroke alert\" been called? ->No What reading provider will be dictating this exam?->CRC FINDINGS: BRAIN/VENTRICLES: Cerebral atrophy is noted globally. There is posterior trophic enlargement of the lateral ventricles similar to older examinations. There is a small amount of intermediate density hemorrhage in the occipital horns of the right and left ventricle similar in severity to 11/12/2022 MRI. 4th ventricle is patent. There is no shift of midline structures. No subarachnoid or subdural hemorrhage. ORBITS: The visualized portion of the orbits demonstrate no acute abnormality. SINUSES: Pilar bullosa of the left middle nasal turbinate. Right deviation of the nasal septum which appears chronic. Mild mucosal thickening in the sphenoid sinuses. Small air-fluid level in the inferior left frontal sinus. Mastoid air cells are clear. SOFT TISSUES/SKULL:  No acute abnormality of the visualized skull or soft tissues. 1.  Stable minimal layering hemorrhage in the occipital horns of the right and left lateral ventricle. No change from MRI dated 11/12/2022. 2.  Findings compatible with chronic paranasal sinusitis, mild. CT HEAD WO CONTRAST    Result Date: 11/12/2022  EXAMINATION: CT OF THE HEAD WITHOUT CONTRAST  11/12/2022 10:31 am TECHNIQUE: CT of the head was performed without the administration of intravenous contrast. Automated exposure control, iterative reconstruction, and/or weight based adjustment of the mA/kV was utilized to reduce the radiation dose to as low as reasonably achievable.  COMPARISON: 11/12/2022 HISTORY: ORDERING SYSTEM PROVIDED HISTORY: eval for hmg conversion TECHNOLOGIST PROVIDED HISTORY: Reason for exam:->eval for hmg conversion Has a \"code stroke\" or \"stroke alert\" been called? ->No What reading provider will be dictating this exam?->CRC FINDINGS: BRAIN/VENTRICLES: Generalized atrophy identified of the brain. Some low-attenuation areas identified in the periventricular and subcortical white matter to suggest chronic small vessel ischemic change. There is no acute intracranial hemorrhage, mass effect or midline shift. No abnormal extra-axial fluid collection. The gray-white differentiation is maintained without evidence of an acute infarct. There is no evidence of hydrocephalus. ORBITS: The visualized portion of the orbits demonstrate no acute abnormality. SINUSES: The visualized paranasal sinuses and mastoid air cells demonstrate no acute abnormality. SOFT TISSUES/SKULL:  No acute abnormality of the visualized skull or soft tissues. Atrophy and chronic changes seen within the brain with no acute intracranial abnormality. CT HEAD WO CONTRAST    Result Date: 11/12/2022  EXAMINATION: CT OF THE HEAD WITHOUT CONTRAST  11/12/2022 4:34 am TECHNIQUE: CT of the head was performed without the administration of intravenous contrast. Automated exposure control, iterative reconstruction, and/or weight based adjustment of the mA/kV was utilized to reduce the radiation dose to as low as reasonably achievable. COMPARISON: None. HISTORY: ORDERING SYSTEM PROVIDED HISTORY: fall TECHNOLOGIST PROVIDED HISTORY: Reason for exam:->fall Has a \"code stroke\" or \"stroke alert\" been called? ->No Decision Support Exception - unselect if not a suspected or confirmed emergency medical condition->Emergency Medical Condition (MA) What reading provider will be dictating this exam?->CRC FINDINGS: BRAIN/VENTRICLES: There is no acute intracranial hemorrhage, mass effect or midline shift. No abnormal extra-axial fluid collection. The gray-white differentiation is maintained without evidence of an acute infarct. There is no evidence of hydrocephalus.  The ventricles, cisterns and sulci are subclavian arteries. CAROTID ARTERIES: There is mild calcified atherosclerotic plaque within the left common carotid artery. There is no significant stenosis of the common carotid arteries identified. There is no significant stenosis of the internal carotid arteries. There is no dissection or pseudoaneurysm. VERTEBRAL ARTERIES: There is a mildly beaded appearance of the distal V2 and V3 segments of the vertebral arteries suggesting fibromuscular dysplasia. There is no dissection or pseudoaneurysm. There is no significant stenosis. SOFT TISSUES: Lung apices are clear. There is no superior mediastinal lymphadenopathy. There is no cervical lymphadenopathy or soft tissue mass identified. The parotid glands and submandibular glands are normal. BONES: No lytic or blastic osseous lesions are identified. There is moderate multilevel neural foraminal narrowing and mild spinal canal stenosis from C4-C5 to C6-C7. CTA HEAD: ANTERIOR CIRCULATION: Atherosclerotic calcifications are present within the cavernous segments of the internal carotid arteries without significant stenosis evident. The anterior and middle cerebral arteries are normal. There is no significant stenosis or aneurysm evident. POSTERIOR CIRCULATION: The distal vertebral arteries are normal in appearance. The basilar artery is normal.  No significant stenosis or aneurysm is identified. The posterior cerebral arteries are normal in appearance. OTHER: No dural venous sinus thrombosis on this non-dedicated study. BRAIN: There is no mass effect or midline shift. There is no vascular malformation identified. 1. No large vessel occlusion, significant stenosis or cerebral aneurysm identified within the brain. 2. Atherosclerosis without significant arterial stenosis identified within the neck. 3. Beaded appearance of the distal vertebral arteries suggesting fibromuscular dysplasia.      XR CHEST PORTABLE    Result Date: 11/12/2022  EXAMINATION: ONE XRAY VIEW OF THE CHEST 11/12/2022 4:12 am COMPARISON: None. HISTORY: ORDERING SYSTEM PROVIDED HISTORY: incresed resp rate TECHNOLOGIST PROVIDED HISTORY: Reason for exam:->incresed resp rate What reading provider will be dictating this exam?->CRC FINDINGS: The cardiomediastinal silhouette is at the upper limits of normal for technique. There are low lung volumes with bronchovascular crowding and bibasilar atelectasis. No pneumothorax or pleural effusion. Bilateral shoulder arthroplasties. Bibasilar atelectasis. MRI brain without contrast    Result Date: 11/12/2022  EXAMINATION: MRI OF THE BRAIN WITHOUT CONTRAST  11/12/2022 1:48 pm TECHNIQUE: Multiplanar multisequence MRI of the brain was performed without the administration of intravenous contrast. COMPARISON: None. HISTORY: ORDERING SYSTEM PROVIDED HISTORY: TIA TECHNOLOGIST PROVIDED HISTORY: Reason for exam:->TIA What is the sedation requirement?->None What reading provider will be dictating this exam?->CRC Initial evaluation. FINDINGS: INTRACRANIAL STRUCTURES/VENTRICLES: There is an acute infarct within the left inferior ahsan (DWI series 3, image 8). No mass effect or midline shift. A trace amount of blood is seen layering within the occipital horns of the lateral ventricles bilaterally. Areas of T2 FLAIR hyperintensity are seen in the periventricular and subcortical white matter, which are nonspecific, but may represent chronic microvascular ischemic change. There is prominence of the ventricles and sulci due to global parenchymal volume loss. The sellar/suprasellar regions appear unremarkable. There is at least partial loss of the normal signal void within the right vertebral artery. ORBITS: The visualized portion of the orbits demonstrate no acute abnormality. SINUSES: The visualized paranasal sinuses and mastoid air cells demonstrate no acute abnormality.  BONES/SOFT TISSUES: The bone marrow signal intensity appears normal. The soft tissues demonstrate no acute abnormality. 1. Small acute infarct involving the left inferior ahsan. No mass effect or midline shift. 2. Trace amount of blood layering within the occipital horns of the lateral ventricles bilaterally. 3. At least partial loss of the normal signal void within the right vertebral artery, which can be seen with slow flow/occlusion. This is of uncertain chronicity. 4. Otherwise, no acute intracranial abnormality. 5. Moderate global parenchymal volume loss with mild chronic microvascular ischemic changes. These results were sent to the [facility] [id number] ([address] [facility]) on 11/12/2022 at 2:22 pm to be communicated to the referring/covering health care provider/office. Recent Labs     11/12/22  0345 11/12/22  1123 11/13/22  0514   WBC 7.9 8.1 6.7   HGB 14.0 14.4 14.0    193 182     Recent Labs     11/12/22  0345      K 4.4      CO2 23   BUN 15   CREATININE 0.74   GLUCOSE 98     Recent Labs     11/12/22  0345   BILITOT 0.8*   ALKPHOS 59   AST 29   ALT 17     Lab Results   Component Value Date/Time    PROTIME 12.8 11/12/2022 11:23 AM    PROTIME 10.7 04/23/2012 11:12 AM    INR 1.0 11/12/2022 11:23 AM     No results found for: LITHIUM, DILFRTOT, VALPROATE    ASSESSMENT AND PLAN  Left pontine stroke with hemiplegia on the right upper and lower extremity. Patient has dysphagia as well. Patient has vertebral artery narrowing stenosis and may have fibromuscular dysplasia in this area. We were contemplating heparin and had just given her for few minutes till MRI results came back with some layering of hemorrhage. Repeat CT shows some minor layering of hemorrhage though this could be calcification. Hold aspirin for few days findings discussed the daughter that it is very difficult ascertain what the outcome is going to be. Of note her symptoms started much earlier and this was a wake-up stroke. Patient has history suggestion  of dementia has been seen by other providers.     Kenn HARRIS Monie Medrano MD, Georgie Gr, American Board of Psychiatry & Neurology  Board Certified in Vascular Neurology  Board Certified in Neuromuscular Medicine  Certified in . [address]

## 2022-11-13 NOTE — CONSULTS
We were consulted for blood pressure control    HPI:  Patient was admitted for some dysarthria and neurologic changes. She is got highly suspicious evidence for intracranial bleed. She is presently n.p.o. because of her dysarthria, we are consulted for blood pressure control. On exam, the patient can verbalize a little bit, but appears to be in at least moderate distress. Very concerned about her decreased mobility. HEENT is atraumatic normocephalic. No xanthelasmas noted no icterus is noted she is got regular rate and rhythm with occasional topic beat no S3 or S4 is noted. No obvious murmurs noted. Lungs are clear to auscultation without rhonchi rales or wheeze. Abdomen is obese but benign. She is got weakly palpable pulses distally and just a trace of edema. She had a chest x-ray which shows basilar atelectasis. Her CBC shows a white count of 6.6 with a hemoglobin of 14.0, and 182,000 platelets. Her lipid status shows that her HDL is 72 and LDL is 82. Hemoglobin A1c is 5.9. Her electrolytes show potassium 4.4, creatinine is 0.7. The rest of her CMP is unremarkable. She had a echocardiogram read by [facility] cardiology which showed normal LV function, negative bubble study, no significant valvular pathology    EKG shows normal sinus rhythm with ectopic atrial focus on occasion. Left ventricular hypertrophy. No acute ST or T wave changes are noted. Patient's past cardiac history is notable for the following. 2010, the patient had a normal heart catheterization. 2019 she had an echocardiogram which showed 2+ mitral insufficiency, 1-2+ tricuspid insufficiency, RVSP of 54, left atrial lodgment, and ejection fraction estimated 50%. She did have some diastolic dysfunction. The patient also had a Holter monitor which showed a lot of PACs.     The monitor on this admission did show 1 short bursts of most likely atrial fibrillation however very short in nature lasting just a few seconds. The patient also has a suspected sleep apnea, history of a nonhealing lower extremity wound, history of PVCs and PACs, hypertension, and hyperlipidemia. There is no family history of premature coronary disease according to our chart. This patient's medications upon last and we see list included amlodipine as well as pravastatin. Last creatinine was 0.89. Past medical history according the chart includes anxiety, arthritis, COPD, hypertension, hyperlipidemia, history of some depression at least in one case morbid obesity, overactive bladder, and osteoporosis. Past surgical history mentions above-mentioned cardiac catheterization, carpal tunnel release, colonoscopy, hysterectomy, knee arthroplasty bilaterally, EDG, colonoscopy, shoulder arthroplasty bilaterally and a skin biopsy. Last family history is notable for cancer, some heart failure, some arthritis    Social history includes the fact that she used to smoke but quit 1980. She denies any alcohol or drug abuse    Socially, the patient is  lives at home    She has allergies to codeine    Other 12 point review of systems are negative except what is outlined in patient's history of present illness. Unfortunately, due to her dysarthria, she has difficult voicing any particular complaints    Assessment:  Patient with significant neurologic findings after what appears to be a intracranial hemorrhage-relatively small-Per neurology's estimation  N.p.o. for now  Hypertension      Plan:  Patient's heart rate is a little too low for any particular beta-blocker, but some IV medications include enalapril, hydralazine, and a clonidine patch can be used in the interim.   Clonidine also may slow her heart rate  Since this is a bleed and not a stroke, I believe we should be more aggressive in her blood pressure control  As needed hydralazine is also ordered  We will review the echocardiogram,  We will also continue to monitor  Screen for sleep apnea  Check thyroids  Further recommendations pending

## 2022-11-13 NOTE — PROGRESS NOTES
[facility]   Facility/Department: [facility]  Speech Language Pathology    Beckila Vicente Mock  1945  [id number]    Date: 11/13/2022      Speech Therapy attempted to see Ty Krabbe on this date for a/an:    Treatment    Pt was unable to be seen due to: Other: Per family and nurse report, pt arrived back from CT with emesis episode with suspected aspiration. To re-attempt, as pt is able. Discussed with Becki RN. Electronically signed by Rm Rivera.  BARRON Marquez on 11/13/22 at 10:59 AM EST

## 2022-11-13 NOTE — CARE COORDINATION
11/13/22 10:05 - Attempted to talk to the patient regarding Rehab, but she is too weak and sick at this time. When I introduced myself as from Rehab, she did state, \"[facility] is her first choice, with Acute Inpatient Rehab as third choice. Left a Rehab brochure with the patient and told her we would check back at another time. The patient is just too weak and does not feel well enough to discuss Rehab at this time.  Electronically signed by Williams Simon RN on 11/13/22 at 2:28 PM EST

## 2022-11-13 NOTE — CARE COORDINATION
[facility] Pre-Admission Screening Document      Patient Name: Adrian Bender       MRN: [id number]    : 1945    Age: 76 y.o. Gender: female   Payor: Payor: Edmon Kocher / Plan: Bar Leys ESSENTIAL/PLUS / Product Type: *No Product type* /   MSSP: No    Admitted from: [facility] Floor: 2W  Attending Care Provider: Shanelle Thompson DO  Inpatient Rehab Referring Care Provider: Dr. Berenice Sanches  Primary Care Provider: YULY Mazariegos CNP  Inpatient Treatment Team including Consults: Treatment Team: Attending Provider: Shanelle Thompson DO; Consulting Physician: Reji Hendricks MD; Consulting Physician: Effie Nair MD; Utilization Reviewer: Oksana Gordon RN; Consulting Physician: Liam Mcfarland MD; Surgeon: Liam Mcfarland MD; Consulting Physician: [address] [place] [address] Ave Se, MD; Registered Nurse: Soraya Alas RN; : Urmila Beltran RN; Respiratory Therapist (Day): Yosvany Flores RCP; Registered Nurse: Danitza Lu RN    Reason for Hospitalization:   1. TIA (transient ischemic attack)    2. Essential hypertension    3. Chronic obstructive pulmonary disease, unspecified COPD type [facility])      Chief Complaint   Patient presents with    Fall     Isolation:No active isolations    Hospital Course:  Admit Date: 2022  3:30 AM  Inpatient Rehab Referral Date: 22  Narrative of hospital course/history of present illness: 76 y.o. female presented to ED with facial droop and memory issues. Per daughter,she got a call from her sister at 18 am who said that her mother was slurring her words. Stated her mother go up to go to the bathroom but she could not get out of bed. They called 911. Patient did have slight dysphagia and a headache the previous night and stated that she did not feel well before going to bed. In the ER she had right sided hemiparesis.  MRI of the brain 22 showed small acute infarct involving the left inferior ahsan. Trace amount of blood layering within the occipital horns lateral ventricles and bilaterally. 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 Cardiology: HTN. Meds adjusted, add diuretics for BP control. Pulmonology: Nocturnal hypoxemia possible JULIO C. Continue O2, consider CPAP.     OR 11/16 with Dr Jaz Edge: Percutaneous endoscopic gastrostomy tube     Medical & Surgical History/Current Comorbidities:  Past Medical History:   Diagnosis Date    Anxiety     Arthritis     COPD (chronic obstructive pulmonary disease) ([address]     Generalized osteoarthrosis, unspecified site 12/27/2002    Hyperlipidemia     Hypertension     Major depressive disorder, single episode, moderate ([address] 12/27/2002    Morbid obesity ([address] 12/27/2002    OAB (overactive bladder)     Osteoporosis     Sleep apnea 11/15/2022     Past Surgical History:   Procedure Laterality Date    CARDIAC CATHETERIZATION      CARPAL TUNNEL RELEASE      COLONOSCOPY      HYSTERECTOMY, TOTAL ABDOMINAL (CERVIX REMOVED)      KNEE ARTHROPLASTY Bilateral     VT COLON CA SCRN NOT HI RSK IND N/A 6/22/2017    COLONOSCOPY performed by Chad Rose MD at [address] TRANSORAL DIAGNOSTIC N/A 6/22/2017    EGD ESOPHAGOGASTRODUODENOSCOPY performed by Chad Rose MD at [address] Bilateral     SKIN BIOPSY Right 9/17/2020    EXCISION OF LEG MASS RIGHT (PAT ON ADMIT) performed by Valentin Lentz MD at [address] ACP-Advance Directive ACP-Power of     Not on File Not on File Not on File Not on File            Labs/Infection Control:  Recent Labs     11/14/22  0456 11/15/22  0552 11/16/22  0503   WBC 8.5 7.7 8.8   HGB 13.3 13.2 13.0   HCT 40.4 40.5 40.4    178 181   BUN 6* 7* 11   CREATININE 0.58 0.72 0.59   GLUCOSE 106* 115* 110*    142 141   K 3.9 3.5 3.7   CALCIUM 8.8 8.9 8.9      Blood cultures:  No results for input(s): BC in the last 72 hours. Urinalysis/C&S:  No results for input(s): NITRITE, LABCAST, WBCUA, RBCUA, MUCUS, TRICHOMONAS, YEAST, BACTERIA, LEUKOCYTESUR, BLOODU, GLUCOSEU, KETUA, AMORPHOUS, LABURIN in the last 72 hours. Radiology:  CTA OF THE NECK; CTA OF THE HEAD WITHOUT AND WITH CONTRAST  Result Date: 11/12/2022  1. No large vessel occlusion, significant stenosis or cerebral aneurysm identified within the brain. 2. Atherosclerosis without significant arterial stenosis identified within the neck. 3. Beaded appearance of the distal vertebral arteries suggesting fibromuscular dysplasia. CT HEAD WO CONTRAST  Result Date: 11/12/2022  Atrophy and chronic changes seen within the brain with no acute intracranial abnormality. CT HEAD WO CONTRAST  Result Date: 11/12/2022  1. There is no acute intracranial abnormality. Specifically, there is no intracranial hemorrhage. 2. Atrophy and periventricular leukomalacia,     XR CHEST PORTABLE  Result Date: 11/12/2022  Bibasilar atelectasis. MRI brain without contrast  Result Date: 11/12/2022  1. Small acute infarct involving the left inferior ahsan. No mass effect or midline shift. 2. Trace amount of blood layering within the occipital horns of the lateral ventricles bilaterally. 3. At least partial loss of the normal signal void within the right vertebral artery, which can be seen with slow flow/occlusion. This is of uncertain chronicity. 4. Otherwise, no acute intracranial abnormality. 5. Moderate global parenchymal volume loss with mild chronic microvascular ischemic changes. These results were sent to the [facility] [id number] ([address] [facility]) on 11/12/2022 at 2:22 pm to be communicated to the referring/covering health care provider/office. Medications/IV's:  The patient is currently not on DVT prophylaxis-on hold for procedure.      Scheduled:    metoprolol, 5 mg, IntraVENous, Q6H    cloNIDine, 1 patch, TransDERmal, Weekly    hydrALAZINE, 20 mg, IntraVENous, Q3H (SCHEDULED)    enalaprilat, 2.5 mg, IntraVENous, 4 times per day    [Held by provider] atorvastatin, 80 mg, Oral, Nightly    insulin lispro, 0-4 Units, SubCUTAneous, Q4H    PRN:  acetaminophen, hydrALAZINE, ondansetron **OR** ondansetron, polyethylene glycol, labetalol, glucose, dextrose bolus **OR** dextrose bolus, glucagon (rDNA), dextrose    Allergies: Allergies   Allergen Reactions    Codeine      Nausea, lightheadedness, dizzy         Most Recent Vitals, Height and Weight  BP (!) 145/53   Pulse 68   Temp 98.7 °F (37.1 °C) (Axillary)   Resp 22   Ht 5' 3\" (1.6 m)   Wt 160 lb (72.6 kg)   LMP  (LMP Unknown)   SpO2 97%   BMI 28.34 kg/m²     Weight Bearing Restrictions: WBAT       Current Diet Order: Diet NPO    Skin: WDL  Wound Care Documentation:     Lungs:   Respiratory  Respiratory Pattern: Tachypneic  Respiratory Depth: Shallow  Respiratory Quality/Effort: Accessory muscle use  Chest Assessment: Chest expansion symmetrical  L Breath Sounds: Diminished  R Breath Sounds: Diminished  Level of Activity/Mobility: BedrestWDL, HOB elevated.       Cognition and Behavior:  Language Preference (if other than English):      Alertness/Behavior  Neuro (WDL): Exceptions to WDL  Level of Consciousness: Alert (0)  History of Falling: Yes Cognition Comment: Max increased processing time    Short Term Memory Deficits     History of Falling: Yes    Safety          Prior Level of Function and Living Arrangements:  Social/Functional History  Lives With: Spouse  Type of Home: House  Home Layout: Two level  Home Access: Stairs to enter with rails  Entrance Stairs - Number of Steps: 2  Entrance Stairs - Rails: Right  Bathroom Shower/Tub: Tub/Shower unit  Bathroom Equipment: Shower chair, Hand-held shower  Home Equipment: carli Spain  ADL Assistance: Independent  Ambulation Assistance: Independent (with [employer])  Transfer Assistance: Independent  Active : No  Additional Comments: Pt inconsistent historian. Dental Appliances: None  Vision - Corrective Lenses: None  Hearing Aid: None  Personal Equipment:   Dental Appliances: None  Vision - Corrective Lenses: None  Hearing Aid: None      CURRENT FUNCTIONAL LEVEL:  Physical Therapy  Bed mobility:  Bed mobility  Rolling to Left: Maximum assistance;2 Person assistance (11/14/22 0957)  Rolling to Right: Maximum assistance;2 Person assistance (11/14/22 0957)  Supine to Sit: Maximum assistance;Dependent/Total;2 Person assistance (11/14/22 0957)  Sit to Supine: 2 Person assistance;Dependent/Total;Maximum assistance (11/14/22 0957)  Bed Mobility Comments: Focus on sequencing rolling L<>R in supine position during hygiene. pt requires step by step cues, however improves recall of sequencing to roll R following initial trials. unable to follow efficiently for sit<>supine transition.  (11/14/22 0957)  Transfers:  Transfers  Sit to Stand: Maximum Assistance (11/12/22 1339)  Stand to Sit: Maximum Assistance (11/12/22 1339)  Comment: NT - safety concerns (11/14/22 0958)  Gait:   Ambulation  Assistance: Maximum assistance (11/12/22 1339)  Quality of Gait: Standing X 15sec (11/12/22 1339)  Distance: standing only (11/12/22 1339)  Stairs:     W/C mobility:         Occupational Therapy  Hand Dominance:  (Pt unable to state; R side flaccid)  ADL  Feeding: NPO;Dependent/Total (11/14/22 1046)  Grooming: Dependent/Total (11/14/22 1046)  Grooming Skilled Clinical Factors: Attempts to perform oral care but decreased LUE coordination impacts her ability to complete (11/14/22 1046)  UE Bathing: Dependent/Total (11/14/22 1046)  LE Bathing: Dependent/Total (11/14/22 1046)  UE Dressing: Dependent/Total (11/14/22 1046)  LE Dressing: Dependent/Total (11/14/22 1046)  Toileting: Dependent/Total (11/14/22 1046)  Additional Comments: Simulated dressing, assisted with clean up and fresh gown following bowel and bladder accident in brief (22 1046)  Toilet Transfers  Toilet Transfer: Unable to assess (22 104)  Toilet Transfers Comments: Anticipate dependent (22)            Speech Language Pathology   Diagnosis: Pt presents with moderate to severe cognitive-linguistic deficits. Pt demonstrated diffiuclty with automatic speech,  following directions, identifiying objects, answering moderate level yes/no questions, and answering basic -Wh questions. Pt was orientated to person only. Disoriented to , time, location, and reason for hospitalization. Please note, pt's full cognitive status was unable to be fully assessed as the pt was limited by pain as the assessment progressed. Pt also demonstrates mild dysarthria. Pt exhibited decreased breath support, decreased rate of speech, and impercise articulation, negatively impacting her intelligibility. Diet/Swallow:  Dysphagia Diagnosis: Moderate oral stage dysphagia; Suspected needs further assessment  Dysphagia Impression : Pt presents with moderate oral phase dysphagia characterized by generalized oral motor weakness, decreased bilabial seal, weak lingual manipulation (suspected lingual pumping), and delayed AP transit of bolus. Pt also presents with suspected pharyngeal phase dysphagia characterized by increased time to initiate swallow, immediate throat clear following trials of thin liquids. At times, pt required cue to initiate swallow response, of which was still delayed. SLP REC: NPO.   Recommended Diet  Recommendations: NPO  Referral To: Dietician  Diet Solids Recommendation: NPO  Liquid Consistency Recommendation: NPO  Recommended Form of Meds: Via alternative means of nutrition        Rehab evaluation plan: Recommend SNF   Reviewer's Signature: Electronically signed by Reginald Fountain RN on 22 at 7:39 AM EST

## 2022-11-13 NOTE — PLAN OF CARE
Problem: Physical Therapy - Adult  Goal: By Discharge: Performs mobility at highest level of function for planned discharge setting. See evaluation for individualized goals.   11/12/2022 1346 by Kathlyne Seip, PT  Outcome: Progressing     Problem: Nutrition Deficit:  Goal: Optimize nutritional status  11/13/2022 0245 by Ramonita Meckel, RN  Outcome: Progressing  11/12/2022 1402 by Romario Bro RD, LD  Flowsheets (Taken 11/12/2022 1402)  Nutrient intake appropriate for improving, restoring, or maintaining nutritional needs:   Assess nutritional status and recommend course of action   Monitor oral intake, labs, and treatment plans   Recommend appropriate diets, oral nutritional supplements, and vitamin/mineral supplements   Recommend, monitor, and adjust tube feedings and TPN/PPN based on assessed needs     Problem: Chronic Conditions and Co-morbidities  Goal: Patient's chronic conditions and co-morbidity symptoms are monitored and maintained or improved  Outcome: Progressing

## 2022-11-14 ENCOUNTER — APPOINTMENT (OUTPATIENT)
Dept: CT IMAGING | Age: 77
DRG: 064 | End: 2022-11-14
Payer: MEDICARE

## 2022-11-14 PROBLEM — Z71.89 GOALS OF CARE, COUNSELING/DISCUSSION: Status: ACTIVE | Noted: 2022-11-14

## 2022-11-14 PROBLEM — Z71.89 ADVANCED CARE PLANNING/COUNSELING DISCUSSION: Status: ACTIVE | Noted: 2022-11-14

## 2022-11-14 PROBLEM — I63.212 CEREBROVASCULAR ACCIDENT (CVA) DUE TO STENOSIS OF LEFT VERTEBRAL ARTERY (HCC): Status: ACTIVE | Noted: 2022-11-14

## 2022-11-14 PROBLEM — R13.10 DYSPHAGIA: Status: ACTIVE | Noted: 2022-11-14

## 2022-11-14 PROBLEM — Z51.5 PALLIATIVE CARE ENCOUNTER: Status: ACTIVE | Noted: 2022-11-14

## 2022-11-14 LAB
ANION GAP SERPL CALCULATED.3IONS-SCNC: 10 MEQ/L (ref 9–15)
BASOPHILS ABSOLUTE: 0 K/UL (ref 0–0.2)
BASOPHILS RELATIVE PERCENT: 0.3 %
BUN BLDV-MCNC: 6 MG/DL (ref 8–23)
CALCIUM SERPL-MCNC: 8.8 MG/DL (ref 8.5–9.9)
CHLORIDE BLD-SCNC: 103 MEQ/L (ref 95–107)
CO2: 24 MEQ/L (ref 20–31)
CREAT SERPL-MCNC: 0.58 MG/DL (ref 0.5–0.9)
EKG ATRIAL RATE: 77 BPM
EKG P AXIS: 79 DEGREES
EKG P-R INTERVAL: 96 MS
EKG Q-T INTERVAL: 396 MS
EKG QRS DURATION: 84 MS
EKG QTC CALCULATION (BAZETT): 448 MS
EKG R AXIS: 47 DEGREES
EKG T AXIS: 67 DEGREES
EKG VENTRICULAR RATE: 77 BPM
EOSINOPHILS ABSOLUTE: 0.4 K/UL (ref 0–0.7)
EOSINOPHILS RELATIVE PERCENT: 4.8 %
GFR SERPL CREATININE-BSD FRML MDRD: >60 ML/MIN/{1.73_M2}
GLUCOSE BLD-MCNC: 104 MG/DL (ref 70–99)
GLUCOSE BLD-MCNC: 105 MG/DL (ref 70–99)
GLUCOSE BLD-MCNC: 106 MG/DL (ref 70–99)
GLUCOSE BLD-MCNC: 108 MG/DL (ref 70–99)
GLUCOSE BLD-MCNC: 120 MG/DL (ref 70–99)
GLUCOSE BLD-MCNC: 98 MG/DL (ref 70–99)
GLUCOSE BLD-MCNC: 99 MG/DL (ref 70–99)
HCT VFR BLD CALC: 40.4 % (ref 37–47)
HEMOGLOBIN: 13.3 G/DL (ref 12–16)
LYMPHOCYTES ABSOLUTE: 0.9 K/UL (ref 1–4.8)
LYMPHOCYTES RELATIVE PERCENT: 11.1 %
MCH RBC QN AUTO: 31.8 PG (ref 27–31.3)
MCHC RBC AUTO-ENTMCNC: 33 % (ref 33–37)
MCV RBC AUTO: 96.3 FL (ref 79.4–94.8)
MONOCYTES ABSOLUTE: 0.8 K/UL (ref 0.2–0.8)
MONOCYTES RELATIVE PERCENT: 9 %
NEUTROPHILS ABSOLUTE: 6.4 K/UL (ref 1.4–6.5)
NEUTROPHILS RELATIVE PERCENT: 74.8 %
PDW BLD-RTO: 14.4 % (ref 11.5–14.5)
PERFORMED ON: ABNORMAL
PERFORMED ON: NORMAL
PERFORMED ON: NORMAL
PLATELET # BLD: 178 K/UL (ref 130–400)
POTASSIUM SERPL-SCNC: 3.9 MEQ/L (ref 3.4–4.9)
RBC # BLD: 4.19 M/UL (ref 4.2–5.4)
SODIUM BLD-SCNC: 137 MEQ/L (ref 135–144)
TSH REFLEX: 3.94 UIU/ML (ref 0.44–3.86)
WBC # BLD: 8.5 K/UL (ref 4.8–10.8)

## 2022-11-14 PROCEDURE — 84443 ASSAY THYROID STIM HORMONE: CPT

## 2022-11-14 PROCEDURE — 6370000000 HC RX 637 (ALT 250 FOR IP): Performed by: INTERNAL MEDICINE

## 2022-11-14 PROCEDURE — 92523 SPEECH SOUND LANG COMPREHEN: CPT

## 2022-11-14 PROCEDURE — 85025 COMPLETE CBC W/AUTO DIFF WBC: CPT

## 2022-11-14 PROCEDURE — 97535 SELF CARE MNGMENT TRAINING: CPT

## 2022-11-14 PROCEDURE — 99232 SBSQ HOSP IP/OBS MODERATE 35: CPT | Performed by: PHYSICAL MEDICINE & REHABILITATION

## 2022-11-14 PROCEDURE — 80048 BASIC METABOLIC PNL TOTAL CA: CPT

## 2022-11-14 PROCEDURE — 1210000000 HC MED SURG R&B

## 2022-11-14 PROCEDURE — 2580000003 HC RX 258: Performed by: INTERNAL MEDICINE

## 2022-11-14 PROCEDURE — 97167 OT EVAL HIGH COMPLEX 60 MIN: CPT

## 2022-11-14 PROCEDURE — 6360000002 HC RX W HCPCS: Performed by: INTERNAL MEDICINE

## 2022-11-14 PROCEDURE — 36415 COLL VENOUS BLD VENIPUNCTURE: CPT

## 2022-11-14 PROCEDURE — 2700000000 HC OXYGEN THERAPY PER DAY

## 2022-11-14 PROCEDURE — APPSS30 APP SPLIT SHARED TIME 16-30 MINUTES: Performed by: NURSE PRACTITIONER

## 2022-11-14 PROCEDURE — 99223 1ST HOSP IP/OBS HIGH 75: CPT | Performed by: NURSE PRACTITIONER

## 2022-11-14 PROCEDURE — 92526 ORAL FUNCTION THERAPY: CPT

## 2022-11-14 PROCEDURE — 70450 CT HEAD/BRAIN W/O DYE: CPT

## 2022-11-14 PROCEDURE — 99233 SBSQ HOSP IP/OBS HIGH 50: CPT | Performed by: PSYCHIATRY & NEUROLOGY

## 2022-11-14 PROCEDURE — 2500000003 HC RX 250 WO HCPCS: Performed by: INTERNAL MEDICINE

## 2022-11-14 RX ORDER — ENALAPRILAT 2.5 MG/2ML
2.5 INJECTION INTRAVENOUS EVERY 6 HOURS SCHEDULED
Status: DISCONTINUED | OUTPATIENT
Start: 2022-11-14 | End: 2022-11-16

## 2022-11-14 RX ORDER — HYDRALAZINE HYDROCHLORIDE 20 MG/ML
20 INJECTION INTRAMUSCULAR; INTRAVENOUS
Status: DISCONTINUED | OUTPATIENT
Start: 2022-11-14 | End: 2022-11-16

## 2022-11-14 RX ORDER — CLONIDINE 0.3 MG/24H
1 PATCH, EXTENDED RELEASE TRANSDERMAL WEEKLY
Status: DISCONTINUED | OUTPATIENT
Start: 2022-11-14 | End: 2022-11-15

## 2022-11-14 RX ORDER — FUROSEMIDE 10 MG/ML
20 INJECTION INTRAMUSCULAR; INTRAVENOUS ONCE
Status: COMPLETED | OUTPATIENT
Start: 2022-11-14 | End: 2022-11-14

## 2022-11-14 RX ORDER — ACETAMINOPHEN 650 MG/1
650 SUPPOSITORY RECTAL EVERY 6 HOURS PRN
Status: DISCONTINUED | OUTPATIENT
Start: 2022-11-14 | End: 2022-11-18 | Stop reason: HOSPADM

## 2022-11-14 RX ADMIN — ENALAPRILAT 2.5 MG: 1.25 INJECTION INTRAVENOUS at 18:29

## 2022-11-14 RX ADMIN — HYDRALAZINE HYDROCHLORIDE 20 MG: 20 INJECTION INTRAMUSCULAR; INTRAVENOUS at 22:29

## 2022-11-14 RX ADMIN — HYDRALAZINE HYDROCHLORIDE 20 MG: 20 INJECTION INTRAMUSCULAR; INTRAVENOUS at 16:33

## 2022-11-14 RX ADMIN — HYDRALAZINE HYDROCHLORIDE 20 MG: 20 INJECTION INTRAMUSCULAR; INTRAVENOUS at 19:41

## 2022-11-14 RX ADMIN — ACETAMINOPHEN 650 MG: 650 SUPPOSITORY RECTAL at 14:09

## 2022-11-14 RX ADMIN — ENALAPRILAT 1.25 MG: 1.25 INJECTION INTRAVENOUS at 05:41

## 2022-11-14 RX ADMIN — FUROSEMIDE 20 MG: 10 INJECTION, SOLUTION INTRAMUSCULAR; INTRAVENOUS at 12:56

## 2022-11-14 RX ADMIN — HYDRALAZINE HYDROCHLORIDE 20 MG: 20 INJECTION INTRAMUSCULAR; INTRAVENOUS at 14:24

## 2022-11-14 RX ADMIN — SODIUM CHLORIDE, SODIUM LACTATE, POTASSIUM CHLORIDE, CALCIUM CHLORIDE AND DEXTROSE MONOHYDRATE: 5; 600; 310; 30; 20 INJECTION, SOLUTION INTRAVENOUS at 05:19

## 2022-11-14 RX ADMIN — HYDRALAZINE HYDROCHLORIDE 20 MG: 20 INJECTION INTRAMUSCULAR; INTRAVENOUS at 10:42

## 2022-11-14 ASSESSMENT — PAIN SCALES - GENERAL
PAINLEVEL_OUTOF10: 1
PAINLEVEL_OUTOF10: 5

## 2022-11-14 ASSESSMENT — PAIN DESCRIPTION - LOCATION: LOCATION: LEG

## 2022-11-14 ASSESSMENT — ENCOUNTER SYMPTOMS
COLOR CHANGE: 0
VOMITING: 0
TROUBLE SWALLOWING: 1
CHOKING: 0
COUGH: 0
SHORTNESS OF BREATH: 0
NAUSEA: 0
BACK PAIN: 0
WHEEZING: 0

## 2022-11-14 ASSESSMENT — PAIN DESCRIPTION - ORIENTATION: ORIENTATION: LEFT

## 2022-11-14 ASSESSMENT — PAIN SCALES - WONG BAKER: WONGBAKER_NUMERICALRESPONSE: 0

## 2022-11-14 ASSESSMENT — PAIN DESCRIPTION - DESCRIPTORS: DESCRIPTORS: SHARP

## 2022-11-14 NOTE — CARE COORDINATION
INFORMED SPOUSE THAT ADMIRALABRAHAN NEGRETE IS NOT ACCEPTING [facility] INSURANCE AT THIS TIME. HE WOULD LIKE TO TRY [facility]. WILL SEND REFERRAL. WILL FOLLOW. REFERRAL SENT TO [facility] PENDING ACCEPTANCE AND PRECERT.

## 2022-11-14 NOTE — PROGRESS NOTES
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 Possibility of some ventricular hemorrhage as well. Patient is currently alert and oriented x1-2, no acute distress, cooperative.  at bedside. Palliative care at bedside. Patient with some increasing right-sided weakness today. Right side is flaccid. She remains n.p.o. due to dysphagia. Dysarthria and aphasia noted. Right facial droop. Denies headache or vision changes. Hypertensive at times.     As noted above,   Current Facility-Administered Medications   Medication Dose Route Frequency Provider Last Rate Last Admin    [Held by provider] nitroglycerin (NITRO-BID) 2 % ointment 0.5 inch  0.5 inch Topical 4 times per day Juliette Henriquez MD        hydrALAZINE (APRESOLINE) injection 5 mg  5 mg IntraVENous Q4H PRN Juliette Henriquez MD   5 mg at 11/13/22 1549    enalaprilat (VASOTEC) injection 1.25 mg  1.25 mg IntraVENous 4 times per day Juliette Henriquez MD   1.25 mg at 11/14/22 0541    hydrALAZINE (APRESOLINE) injection 20 mg  20 mg IntraVENous Q4H Juliette Henriquez MD   20 mg at 11/13/22 1953    cloNIDine (CATAPRES) 0.1 MG/24HR 1 patch  1 patch TransDERmal Weekly Juliette Henriquez MD   1 patch at 11/13/22 1834    ondansetron (ZOFRAN-ODT) disintegrating tablet 4 mg  4 mg Oral Q8H PRN Dariana Suresh MD        Or    ondansetron (ZOFRAN) injection 4 mg  4 mg IntraVENous Q6H PRN Dariana Suresh MD        polyethylene glycol (GLYCOLAX) packet 17 g  17 g Oral Daily PRN Dariana Suresh MD        labetalol (NORMODYNE;TRANDATE) injection 10 mg  10 mg IntraVENous Q10 Min PRN Dariana Suresh MD   10 mg at 11/13/22 1119    [Held by provider] atorvastatin (LIPITOR) tablet 80 mg  80 mg Oral Nightly Dariana Suresh MD        dextrose 5 % in lactated ringers infusion   IntraVENous Continuous Dariana Suresh  mL/hr at 11/14/22 0519 New Bag at 11/14/22 0519    glucose chewable tablet 16 g  4 tablet Oral PRN Louis Katz MD        dextrose bolus 10% 125 mL  125 mL IntraVENous PRN Louis Katz MD        Or    dextrose bolus 10% 250 mL  250 mL IntraVENous PRN Louis Katz MD        glucagon (rDNA) injection 1 mg  1 mg SubCUTAneous PRN Louis Katz MD        dextrose 10 % infusion   IntraVENous Continuous PRN Louis Katz MD        insulin lispro (HUMALOG) injection vial 0-4 Units  0-4 Units SubCUTAneous Q4H Louis Katz MD           PHYSICAL EXAM:    BP (!) 158/45   Pulse 66   Temp 98.6 °F (37 °C) (Axillary)   Resp 18   Ht 5' 3\" (1.6 m)   Wt 160 lb (72.6 kg)   LMP  (LMP Unknown)   SpO2 97%   BMI 28.34 kg/m²    General Appearance:      Skin:  normal  CVS - Normal sounds, No murmurs , No carotid Bruits  RS -CTA  Abdomen Soft, BS present  Review of Systems   Constitutional:  Negative for fever. HENT:  Positive for trouble swallowing. Negative for hearing loss. Eyes:  Negative for visual disturbance. Respiratory:  Negative for cough, choking, shortness of breath and wheezing. Cardiovascular:  Negative for chest pain and palpitations. Gastrointestinal:  Negative for nausea and vomiting. Musculoskeletal:  Positive for gait problem. Negative for back pain, neck pain and neck stiffness. Skin:  Negative for color change. Neurological:  Positive for facial asymmetry, speech difficulty and weakness. Negative for dizziness, tremors, seizures, syncope, light-headedness, numbness and headaches. Psychiatric/Behavioral:  Positive for confusion. Negative for behavioral problems, hallucinations and sleep disturbance.        weakness  Mental Status Exam:             Level of Alertness:   awake            Orientation:   person, place,             Memory:   She has underlying baseline dementia          f            Language: Dysarthria and expressive aphasia      Funduscopic Exam:     Cranial Nerves Cranial nerve III           Pupils:  equal, round, reactive to light      Cranial nerves III, IV, VI           Extraocular Movements: intact        Motor: Patient has underlying dementia and has hemiplegia in the right upper and lower extremity. She is not able to swallow            Sensory: Unable to perform                 Vibration                         Touch            Proprioception                 Coordination: Unable to perform                    Reflexes:             Deep Tendon Reflexes:             Reflexes are 2 +             Plantar response:                Right:  downgoing               Left:  downgoing    Vascular:  Cardiac Exam:  normal         CTA HEAD W WO CONTRAST    Result Date: 11/12/2022  EXAMINATION: CTA OF THE NECK; CTA OF THE HEAD WITHOUT AND WITH CONTRAST 11/12/2022 7:57 am TECHNIQUE: CTA of the neck was performed with the administration of intravenous contrast. Multiplanar reformatted images are provided for review. MIP images are provided for review. Stenosis of the internal carotid arteries measured using NASCET criteria. Automated exposure control, iterative reconstruction, and/or weight based adjustment of the mA/kV was utilized to reduce the radiation dose to as low as reasonably achievable.; CTA of the head/brain was performed without and with the administration of intravenous contrast. Multiplanar reformatted images are provided for review. MIP images are provided for review. Automated exposure control, iterative reconstruction, and/or weight based adjustment of the mA/kV was utilized to reduce the radiation dose to as low as reasonably achievable.  COMPARISON: CT brain earlier same day HISTORY: ORDERING SYSTEM PROVIDED HISTORY: right sided weakness TECHNOLOGIST PROVIDED HISTORY: Reason for exam:->right sided weakness What reading provider will be dictating this exam?->CRC FINDINGS: CTA NECK: AORTIC ARCH/ARCH VESSELS: Atherosclerotic calcifications are present within the aortic arch.  The origins of the great vessels are normal.  There is no significant stenosis of the innominate or subclavian arteries. CAROTID ARTERIES: There is mild calcified atherosclerotic plaque within the left common carotid artery. There is no significant stenosis of the common carotid arteries identified. There is no significant stenosis of the internal carotid arteries. There is no dissection or pseudoaneurysm. VERTEBRAL ARTERIES: There is a mildly beaded appearance of the distal V2 and V3 segments of the vertebral arteries suggesting fibromuscular dysplasia. There is no dissection or pseudoaneurysm. There is no significant stenosis. SOFT TISSUES: Lung apices are clear. There is no superior mediastinal lymphadenopathy. There is no cervical lymphadenopathy or soft tissue mass identified. The parotid glands and submandibular glands are normal. BONES: No lytic or blastic osseous lesions are identified. There is moderate multilevel neural foraminal narrowing and mild spinal canal stenosis from C4-C5 to C6-C7. CTA HEAD: ANTERIOR CIRCULATION: Atherosclerotic calcifications are present within the cavernous segments of the internal carotid arteries without significant stenosis evident. The anterior and middle cerebral arteries are normal. There is no significant stenosis or aneurysm evident. POSTERIOR CIRCULATION: The distal vertebral arteries are normal in appearance. The basilar artery is normal.  No significant stenosis or aneurysm is identified. The posterior cerebral arteries are normal in appearance. OTHER: No dural venous sinus thrombosis on this non-dedicated study. BRAIN: There is no mass effect or midline shift. There is no vascular malformation identified. 1. No large vessel occlusion, significant stenosis or cerebral aneurysm identified within the brain. 2. Atherosclerosis without significant arterial stenosis identified within the neck.  3. Beaded appearance of the distal vertebral arteries suggesting fibromuscular dysplasia. CT HEAD WO CONTRAST    Result Date: 11/13/2022  EXAMINATION: CT OF THE HEAD WITHOUT CONTRAST  11/13/2022 9:25 am TECHNIQUE: CT of the head was performed without the administration of intravenous contrast. Automated exposure control, iterative reconstruction, and/or weight based adjustment of the mA/kV was utilized to reduce the radiation dose to as low as reasonably achievable. COMPARISON: None. HISTORY: ORDERING SYSTEM PROVIDED HISTORY: eval for progression of blood layering within the occipital horns of the lateral ventricles TECHNOLOGIST PROVIDED HISTORY: Reason for exam:->eval for progression of blood layering within the occipital horns of the lateral ventricles Has a \"code stroke\" or \"stroke alert\" been called? ->No What reading provider will be dictating this exam?->CRC FINDINGS: BRAIN/VENTRICLES: Cerebral atrophy is noted globally. There is posterior trophic enlargement of the lateral ventricles similar to older examinations. There is a small amount of intermediate density hemorrhage in the occipital horns of the right and left ventricle similar in severity to 11/12/2022 MRI. 4th ventricle is patent. There is no shift of midline structures. No subarachnoid or subdural hemorrhage. ORBITS: The visualized portion of the orbits demonstrate no acute abnormality. SINUSES: Pilar bullosa of the left middle nasal turbinate. Right deviation of the nasal septum which appears chronic. Mild mucosal thickening in the sphenoid sinuses. Small air-fluid level in the inferior left frontal sinus. Mastoid air cells are clear. SOFT TISSUES/SKULL:  No acute abnormality of the visualized skull or soft tissues. 1.  Stable minimal layering hemorrhage in the occipital horns of the right and left lateral ventricle. No change from MRI dated 11/12/2022. 2.  Findings compatible with chronic paranasal sinusitis, mild.      CT HEAD WO CONTRAST    Result Date: 11/12/2022  EXAMINATION: CT OF THE HEAD WITHOUT CONTRAST  11/12/2022 10:31 am TECHNIQUE: CT of the head was performed without the administration of intravenous contrast. Automated exposure control, iterative reconstruction, and/or weight based adjustment of the mA/kV was utilized to reduce the radiation dose to as low as reasonably achievable. COMPARISON: 11/12/2022 HISTORY: ORDERING SYSTEM PROVIDED HISTORY: eval for hmg conversion TECHNOLOGIST PROVIDED HISTORY: Reason for exam:->eval for hmg conversion Has a \"code stroke\" or \"stroke alert\" been called? ->No What reading provider will be dictating this exam?->CRC FINDINGS: BRAIN/VENTRICLES: Generalized atrophy identified of the brain. Some low-attenuation areas identified in the periventricular and subcortical white matter to suggest chronic small vessel ischemic change. There is no acute intracranial hemorrhage, mass effect or midline shift. No abnormal extra-axial fluid collection. The gray-white differentiation is maintained without evidence of an acute infarct. There is no evidence of hydrocephalus. ORBITS: The visualized portion of the orbits demonstrate no acute abnormality. SINUSES: The visualized paranasal sinuses and mastoid air cells demonstrate no acute abnormality. SOFT TISSUES/SKULL:  No acute abnormality of the visualized skull or soft tissues. Atrophy and chronic changes seen within the brain with no acute intracranial abnormality. CT HEAD WO CONTRAST    Result Date: 11/12/2022  EXAMINATION: CT OF THE HEAD WITHOUT CONTRAST  11/12/2022 4:34 am TECHNIQUE: CT of the head was performed without the administration of intravenous contrast. Automated exposure control, iterative reconstruction, and/or weight based adjustment of the mA/kV was utilized to reduce the radiation dose to as low as reasonably achievable. COMPARISON: None.  HISTORY: ORDERING SYSTEM PROVIDED HISTORY: fall TECHNOLOGIST PROVIDED HISTORY: Reason for exam:->fall Has a \"code stroke\" or \"stroke alert\" been called? ->No Decision Support Exception - unselect if not a suspected or confirmed emergency medical condition->Emergency Medical Condition (MA) What reading provider will be dictating this exam?->CRC FINDINGS: BRAIN/VENTRICLES: There is no acute intracranial hemorrhage, mass effect or midline shift. No abnormal extra-axial fluid collection. The gray-white differentiation is maintained without evidence of an acute infarct. There is no evidence of hydrocephalus. The ventricles, cisterns and sulci are prominent consistent with atrophy. There is decreased attenuation within the periventricular white matter consistent with periventricular leukomalacia. ORBITS: The visualized portion of the orbits demonstrate no acute abnormality. SINUSES: The visualized paranasal sinuses and mastoid air cells demonstrate no acute abnormality. SOFT TISSUES/SKULL:  No acute abnormality of the visualized skull or soft tissues. 1. There is no acute intracranial abnormality. Specifically, there is no intracranial hemorrhage. 2. Atrophy and periventricular leukomalacia,     CTA NECK W WO CONTRAST    Result Date: 11/12/2022  EXAMINATION: CTA OF THE NECK; CTA OF THE HEAD WITHOUT AND WITH CONTRAST 11/12/2022 7:57 am TECHNIQUE: CTA of the neck was performed with the administration of intravenous contrast. Multiplanar reformatted images are provided for review. MIP images are provided for review. Stenosis of the internal carotid arteries measured using NASCET criteria. Automated exposure control, iterative reconstruction, and/or weight based adjustment of the mA/kV was utilized to reduce the radiation dose to as low as reasonably achievable.; CTA of the head/brain was performed without and with the administration of intravenous contrast. Multiplanar reformatted images are provided for review. MIP images are provided for review.  Automated exposure control, iterative reconstruction, and/or weight based adjustment of the mA/kV was utilized to reduce the radiation dose to as low as reasonably achievable. COMPARISON: CT brain earlier same day HISTORY: ORDERING SYSTEM PROVIDED HISTORY: right sided weakness TECHNOLOGIST PROVIDED HISTORY: Reason for exam:->right sided weakness What reading provider will be dictating this exam?->CRC FINDINGS: CTA NECK: AORTIC ARCH/ARCH VESSELS: Atherosclerotic calcifications are present within the aortic arch. The origins of the great vessels are normal.  There is no significant stenosis of the innominate or subclavian arteries. CAROTID ARTERIES: There is mild calcified atherosclerotic plaque within the left common carotid artery. There is no significant stenosis of the common carotid arteries identified. There is no significant stenosis of the internal carotid arteries. There is no dissection or pseudoaneurysm. VERTEBRAL ARTERIES: There is a mildly beaded appearance of the distal V2 and V3 segments of the vertebral arteries suggesting fibromuscular dysplasia. There is no dissection or pseudoaneurysm. There is no significant stenosis. SOFT TISSUES: Lung apices are clear. There is no superior mediastinal lymphadenopathy. There is no cervical lymphadenopathy or soft tissue mass identified. The parotid glands and submandibular glands are normal. BONES: No lytic or blastic osseous lesions are identified. There is moderate multilevel neural foraminal narrowing and mild spinal canal stenosis from C4-C5 to C6-C7. CTA HEAD: ANTERIOR CIRCULATION: Atherosclerotic calcifications are present within the cavernous segments of the internal carotid arteries without significant stenosis evident. The anterior and middle cerebral arteries are normal. There is no significant stenosis or aneurysm evident. POSTERIOR CIRCULATION: The distal vertebral arteries are normal in appearance. The basilar artery is normal.  No significant stenosis or aneurysm is identified. The posterior cerebral arteries are normal in appearance.  OTHER: No dural venous sinus thrombosis on this non-dedicated study. BRAIN: There is no mass effect or midline shift. There is no vascular malformation identified. 1. No large vessel occlusion, significant stenosis or cerebral aneurysm identified within the brain. 2. Atherosclerosis without significant arterial stenosis identified within the neck. 3. Beaded appearance of the distal vertebral arteries suggesting fibromuscular dysplasia. XR CHEST PORTABLE    Result Date: 11/12/2022  EXAMINATION: ONE XRAY VIEW OF THE CHEST 11/12/2022 4:12 am COMPARISON: None. HISTORY: ORDERING SYSTEM PROVIDED HISTORY: incresed resp rate TECHNOLOGIST PROVIDED HISTORY: Reason for exam:->incresed resp rate What reading provider will be dictating this exam?->CRC FINDINGS: The cardiomediastinal silhouette is at the upper limits of normal for technique. There are low lung volumes with bronchovascular crowding and bibasilar atelectasis. No pneumothorax or pleural effusion. Bilateral shoulder arthroplasties. Bibasilar atelectasis. MRI brain without contrast    Result Date: 11/12/2022  EXAMINATION: MRI OF THE BRAIN WITHOUT CONTRAST  11/12/2022 1:48 pm TECHNIQUE: Multiplanar multisequence MRI of the brain was performed without the administration of intravenous contrast. COMPARISON: None. HISTORY: ORDERING SYSTEM PROVIDED HISTORY: TIA TECHNOLOGIST PROVIDED HISTORY: Reason for exam:->TIA What is the sedation requirement?->None What reading provider will be dictating this exam?->CRC Initial evaluation. FINDINGS: INTRACRANIAL STRUCTURES/VENTRICLES: There is an acute infarct within the left inferior ahsan (DWI series 3, image 8). No mass effect or midline shift. A trace amount of blood is seen layering within the occipital horns of the lateral ventricles bilaterally. Areas of T2 FLAIR hyperintensity are seen in the periventricular and subcortical white matter, which are nonspecific, but may represent chronic microvascular ischemic change. There is prominence of the ventricles and sulci due to global parenchymal volume loss. The sellar/suprasellar regions appear unremarkable. There is at least partial loss of the normal signal void within the right vertebral artery. ORBITS: The visualized portion of the orbits demonstrate no acute abnormality. SINUSES: The visualized paranasal sinuses and mastoid air cells demonstrate no acute abnormality. BONES/SOFT TISSUES: The bone marrow signal intensity appears normal. The soft tissues demonstrate no acute abnormality. 1. Small acute infarct involving the left inferior ahsan. No mass effect or midline shift. 2. Trace amount of blood layering within the occipital horns of the lateral ventricles bilaterally. 3. At least partial loss of the normal signal void within the right vertebral artery, which can be seen with slow flow/occlusion. This is of uncertain chronicity. 4. Otherwise, no acute intracranial abnormality. 5. Moderate global parenchymal volume loss with mild chronic microvascular ischemic changes. These results were sent to the [facility] [address] ([address] [place] [address]) on 11/12/2022 at 2:22 pm to be communicated to the referring/covering health care provider/office. Recent Labs     11/12/22  1123 11/13/22  0514 11/14/22  0456   WBC 8.1 6.7 8.5   HGB 14.4 14.0 13.3    182 178       Recent Labs     11/12/22  0345 11/14/22  0456    137   K 4.4 3.9    103   CO2 23 24   BUN 15 6*   CREATININE 0.74 0.58   GLUCOSE 98 106*       Recent Labs     11/12/22  0345   BILITOT 0.8*   ALKPHOS 59   AST 29   ALT 17       Lab Results   Component Value Date/Time    PROTIME 12.8 11/12/2022 11:23 AM    PROTIME 10.7 04/23/2012 11:12 AM    INR 1.0 11/12/2022 11:23 AM     No results found for: LITHIUM, DILFRTOT, VALPROATE    ASSESSMENT AND PLAN  Left pontine stroke with hemiplegia on the right upper and lower extremity. Patient has dysphagia as well.   Patient has vertebral artery narrowing stenosis and may have fibromuscular dysplasia in this area. We were contemplating heparin and had just given her for few minutes till MRI results came back with some layering of hemorrhage. Repeat CT shows some minor layering of hemorrhage though this could be calcification. Hold aspirin for few days findings discussed the daughter that it is very difficult ascertain what the outcome is going to be. Of note her symptoms started much earlier and this was a wake-up stroke. Patient has history suggestion  of dementia has been seen by other providers. 11/14/2022:  Left pontine ischemic CVA with ventricular hemorrhage repeat CT of the head done 11/13/2022 stable. Some increasing right-sided weakness today therefore we will repeat CT of the head given its location and propensity for evolution and hemorrhage. Vertebral artery fibromuscular dysplasia  LDL 82  Hemoglobin A1c 5.9  Dysphagia, remains n.p.o. palliative care is following for discussion regarding alternate means of nutrition. Hypertension, ongoing, needs ongoing attention to blood pressure control. I have personally performed a face to face diagnostic evaluation on this patient, reviewed all data and investigations, and am the sole provider of all clinical decisions on the neurological status of this patient. as noted above, as noted above Left pontine cva , hemiplegia, vertebral stenosis , OK for asa, not ventricular  bleed, its calcification, 60 % time spent       Kenn Freeman MD, Emil Alonso, American Board of Psychiatry & Neurology  Board Certified in Vascular Neurology  Board Certified in Neuromuscular Medicine  Certified in [place]

## 2022-11-14 NOTE — PROGRESS NOTES
[facility]  Facility/Department: [address] [place] [address] [address]  Speech Language Pathology   Treatment Note      Vilma Lat  1945  [id number]  [x]   confirmed      Date: 2022    TIA (transient ischemic attack) [G45.9]  Essential hypertension [I10]  Chronic obstructive pulmonary disease, unspecified COPD type ([facility] [J44.9]  Acute CVA (cerebrovascular accident) ([facility] [address] [I63.9]    Restrictions/Precautions: Fall Risk, NPO    Weight: 160 lb (72.6 kg)     Diet NPO    SpO2: 95 % (22 1247)  O2 Flow Rate (L/min): 3 L/min (22 1214)  No active isolations      Subjective:  Cooperative, Self Limiting, Distractible, and Confused        Interventions used this date:  Dysphagia Treatment      Objective/Assessment:  Patient progressing towards goals:  Short Term Goals  Time Frame for Short Term Goals: 1 week or LOS until goals are met  Goal 1: Within 1-5 days of implementing the ST POC, pt's cognition will be assessed with additional goals added to pt's POC as deemed necesary by treating SLP. Goal 2: To address pt's cognitive deficits and promote orientation, pt will state name of facility, time within 1 hour, reason in hospital, current month and year with 100% accuracy with min assist, with use of external aid. Goal 3: Pt will complete confrontational naming tasks with 70% accuracy with mild verbal cues to help the patient express their basic wants and needs. Goal 4: Pt will follow 1-2 step directions given orally with 70% accuracy with min cues to increase the pt's ability to follow directions provided by caregivers for safe follow through with ADLs. Goal 5: Pt will answer mid level yes/no questions with 80% accuracy with min cues to assist the caregiver in obtaining important information regarding the patient's personal, medical, and safety needs.   Goal 6: Pt will be educated on compensatory strategies to promote speech intelligibility in 5/5 given opportunities so the patient has a better understanding of strategies that will enhance his/her ability to express their wants and needs. Short-term Goals  Timeframe for Short-term Goals: 1 week  Goal 1: Pt will tolerate PO trials deemed appropriate by treating SLP with adequate mastication, oral clearance of bolus, and no overt s/s of aspiration in all given opportunities. SLP provided oral care to pt before PO trials. Pt observed to have min dried secretions on lips and tongue. Pt trialed ice chips this date. Demonstrated decreased acceptance of bolus and decreased labial seal.  SLP required to tip ice chip into oral cavity. Pt demonstrated decreased A-P transit and holding of ice chip for 10-30 seconds. Limited manipulation of ice chip observed. Pt demonstrated increased fatigue during trials. Swallow elicited in 3/3 trials followed by immediate wet cough or throat clear. Pt became fatigued after these trials. Pt trialed 1/8 teaspoon of puree consistencies. Demonstrated decreased acceptance of bolus and decreased labial seal. Required moderate to max verbal cues to manipulate bolus for A-P transit. Oral holding for ~20-30 seconds. Immediate wet cough observed. Moderate residue on superior area of tongue. Unable to clear residue. SLP provided oral care to clear pt of all remaining residue. Pt easily fatigued after trial.  PO trials deemed unsafe to continue. Rec: MBS to further assess swallow function. However, due to increased fatigue and increased confusion an MBS is not recommended for this date. Goal 2: Pt will complete oral motor ROM/strengthening exercises with 70% acc given cues as needed to increase lingual/labial/buccal strength and decrease risk of pocketing. --Pt demonstrated decreased ability to follow directions this date. Pt lateralized tongue in 1/5 trials following models, verbal cues, and tactile cues. Pt was observed to protrude tongue instead. SLP noted right lingual deviation.   Pt unable to complete lingual press/pullback this date despite models and verbal cues. Goal 3: Pt will complete pharyngeal exercises with 70% acc given cues as needed to increase pharyngeal musculature and decrease risk of aspiration. Goal 4: Pt will participate in MBS study, when appropriate, in order to more objectively assess pharyngeal phase of swallow and determine least restrictive diet. Due to increased confusion and limited participation this date, a MBS is not recommended at this time. Continue to monitor for possible MBS. Treatment/Activity Tolerance:  Patient limited by pain    Plan:  Continue per POC    Pain Assessment:  Patient does not c/o pain. Pt initially denied pain. However, pt was observed to scream in pain as the session progressed. RN in room at time of pain. RN aware. Pain Re-assessment:  Patient does not c/o pain. Pt initially denied pain. However, pt was observed to scream in pain as the session progressed. RN in room at time of pain. RN aware. Patient/Caregiver Education:  Patient educated on session and progression towards goals. Caregiver education on session and progress towards goals. Patient stated verbal understanding of directions. Caregiver stated verbal understanding of directions. Education needs reinforcement.     Safety Devices:  Bed alarm in place and Call light within reach      Therapy Time  SLP Individual Minutes  Time In: 1020  Time Out: 1030  Minutes: 10       Signature: Electronically signed by BARRON Dietrich on 11/14/2022 at 2:55 PM

## 2022-11-14 NOTE — PROGRESS NOTES
Physical Therapy Med Surg Daily Treatment Note  Facility/Department: [facility]  Room: O91/D510-50       NAME: Richie Hooper  : 1945 (76 y.o.)  MRN: [id number]  CODE STATUS: Full Code    Date of Service: 2022    Patient Diagnosis(es): TIA (transient ischemic attack) [G45.9]  Essential hypertension [I10]  Chronic obstructive pulmonary disease, unspecified COPD type ([address] [J44.9]  Acute CVA (cerebrovascular accident) [facility]) [I63.9]   Chief Complaint   Patient presents with    Fall     Patient Active Problem List    Diagnosis Date Noted    Cerebrovascular accident (CVA) due to stenosis of left middle cerebral artery ([address] 2022    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 ([address] 2022    Fibromuscular dysplasia ([address] 2022    Impaired mobility and activities of daily living dt CVA 2022    Acute CVA (cerebrovascular accident) ([address] 2022    Non-pressure chronic ulcer of calf with fat layer exposed ([address] 10/09/2020    Non-pressure chronic ulcer of calf, right, with fat layer exposed ([address] 10/02/2020    Claudication in peripheral vascular disease ([address] 2020    Non-healing surgical wound 2020    Venous insufficiency of left lower extremity 2020    Asymptomatic varicose veins of bilateral lower extremities 2020    Pain and swelling due to varicose veins of both lower extremities 2020    Slow transit constipation 10/13/2019    External hemorrhoid, bleeding 10/13/2019    Colitis 10/13/2019    Acute colitis 10/10/2019    Hypovolemic shock ([address] 10/07/2019    NSTEMI (non-ST elevated myocardial infarction) ([address]     Acute diastolic heart failure ([address] 10/05/2019    Hypotension 10/01/2019    Neuromuscular scoliosis of lumbar region 2019    Hypertension     Hyperlipidemia     COPD (chronic obstructive pulmonary disease) ([address]     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 ([facility] 2010    Generalized osteoarthrosis, unspecified site 12/27/2002    Major depressive disorder, single episode, moderate ([facility] [address] 12/27/2002    Morbid obesity ([address] 12/27/2002        Past Medical History:   Diagnosis Date    Anxiety     Arthritis     COPD (chronic obstructive pulmonary disease) (HCC)     Generalized osteoarthrosis, unspecified site 12/27/2002    Hyperlipidemia     Hypertension     Major depressive disorder, single episode, moderate ([address] 12/27/2002    Morbid obesity ([address] 12/27/2002    OAB (overactive bladder)     Osteoporosis      Past Surgical History:   Procedure Laterality Date    CARDIAC CATHETERIZATION      CARPAL TUNNEL RELEASE      COLONOSCOPY      HYSTERECTOMY, TOTAL ABDOMINAL (CERVIX REMOVED)      KNEE ARTHROPLASTY Bilateral     AR COLON CA SCRN NOT HI RSK IND N/A 6/22/2017    COLONOSCOPY performed by Angelo Tanner MD at [address] TRANSORAL DIAGNOSTIC N/A 6/22/2017    EGD ESOPHAGOGASTRODUODENOSCOPY performed by Angelo Tanner MD at [address] Bilateral     SKIN BIOPSY Right 9/17/2020    EXCISION OF LEG MASS RIGHT (PAT ON ADMIT) performed by Albania Brito MD at [facility] OR       Chart Reviewed: Yes  Patient assessed for rehabilitation services?: Yes  Family / Caregiver Present: No  Diagnosis: Cerebrovascular accident (CVA) due to stenosis of left middle cerebral artery ([address]  General Comment  Comments: pt resting in bed - agreeable to PT tx    Restrictions:  Restrictions/Precautions: Fall Risk    SUBJECTIVE:   Subjective: \"I don't know. \"    Pain  Pain: Unable to state. pt often groaning with active movement, however at rest, pt appears in no discomfort. RN notified. OBJECTIVE:   Orientation  Orientation Level: Disoriented to place; Disoriented to time;Disoriented to situation;Disoriented to person    Bed mobility  Rolling to Left: Maximum assistance;2 Person assistance  Rolling to Right: Maximum assistance;2 Person assistance  Supine to Sit: Maximum assistance;Dependent/Total;2 Person assistance  Sit to Supine: 2 Person assistance;Dependent/Total;Maximum assistance  Bed Mobility Comments: Focus on sequencing rolling L<>R in supine position during hygiene. pt requires step by step cues, however improves recall of sequencing to roll R following initial trials. unable to follow efficiently for sit<>supine transition. Transfers  Comment: NT - safety concerns                   Balance  Comments: NMR: D1/D2 flexion and extension of R LE with AAROM. pt with activation of quads and glutes noted. Activity Tolerance  Activity Tolerance: Patient limited by fatigue;Treatment limited secondary to decreased cognition    Patient Education  Education Given To: Patient  Education Provided: Transfer Training;Orientation  Education Method: Verbal  Barriers to Learning: Cognition  Education Outcome: Unable to verbalize; Unable to demonstrate understanding;Continued education needed     ASSESSMENT   Assessment: Focus on bed mobility sequencing. Pt with increased confusion this date compared to evaluation date Sat 11/12. However was able to initiate sequencing of bed mobility tasks with practice. Specific Instructions for Next Treatment: Progress to sitting up EOB as tolerated. Discharge Recommendations:  Continue to assess pending progress, Patient would benefit from continued therapy after discharge         Goals  Long Term Goals  Long Term Goal 1: Pt to roll L<>R in supine ModA  Long Term Goal 2: Pt to complete transition sit<>supine with ModA  Long Term Goal 3: Pt to complete transfers with ModA  Long Term Goal 4: Pt to sit EOB and maintain midline balance X 5min with SBA    PLAN    General Plan: Continue with current plan  Specific Instructions for Next Treatment: Progress to sitting up EOB as tolerated. Safety Devices  Type of Devices:  All fall risk precautions in place     [facility] (6 CLICK) BASIC MOBILITY  AM-PAC Inpatient Mobility Raw Score : 8     Therapy Time   Individual   Time In 0928   Time Out 0954   Minutes 26     Timed Code Treatment Minutes: 26 Minutes (transfers 24min; 2min NMR)       Janes Yanes, PT, 11/14/22 at 10:30 AM         Definitions for assistance levels  Independent = pt does not require any physical supervision or assistance from another person for activity completion. Device may be needed.   Stand by assistance = pt requires verbal cues or instructions from another person, close to but not touching, to perform the activity  Minimal assistance= pt performs 75% or more of the activity; assistance is required to complete the activity  Moderate assistance= pt performs 50% of the activity; assistance is required to complete the activity  Maximal assistance = pt performs 25% of the activity; assistance is required to complete the activity  Dependent = pt requires total physical assistance to accomplish the task

## 2022-11-14 NOTE — PROGRESS NOTES
[facility] OCCUPATIONAL THERAPY EVALUATION - ACUTE     NAME: Edyta Sanchez  : 1945 (76 y.o.)  MRN: [id number]  CODE STATUS: Full Code  [id number]    Date of Service: 2022    Patient Diagnosis(es): TIA (transient ischemic attack) [G45.9]  Essential hypertension [I10]  Chronic obstructive pulmonary disease, unspecified COPD type ([address] [J44.9]  Acute CVA (cerebrovascular accident) [facility]) [I63.9]   Patient Active Problem List    Diagnosis Date Noted    Dysphagia 2022    Cerebrovascular accident (CVA) due to stenosis of left vertebral artery ([address] 2022    Palliative care encounter 2022    Advanced care planning/counseling discussion 2022    Goals of care, counseling/discussion 2022    Cerebrovascular accident (CVA) due to stenosis of left middle cerebral artery ([address] 2022    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 ([address] 2022    Fibromuscular dysplasia ([address] 2022    Impaired mobility and activities of daily living dt CVA 2022    Acute CVA (cerebrovascular accident) ([address] 2022    Non-pressure chronic ulcer of calf with fat layer exposed ([address] 10/09/2020    Non-pressure chronic ulcer of calf, right, with fat layer exposed ([address] 10/02/2020    Claudication in peripheral vascular disease ([address] 2020    Non-healing surgical wound 2020    Venous insufficiency of left lower extremity 2020    Asymptomatic varicose veins of bilateral lower extremities 2020    Pain and swelling due to varicose veins of both lower extremities 2020    Slow transit constipation 10/13/2019    External hemorrhoid, bleeding 10/13/2019    Colitis 10/13/2019    Acute colitis 10/10/2019    Hypovolemic shock ([address] 10/07/2019    NSTEMI (non-ST elevated myocardial infarction) ([address]     Acute diastolic heart failure ([facility] 10/05/2019    Hypotension 10/01/2019    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    Hypertension     Hyperlipidemia     COPD (chronic obstructive pulmonary disease) (HCC)     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 ([address] 08/27/2010    Generalized osteoarthrosis, unspecified site 12/27/2002    Major depressive disorder, single episode, moderate ([address] 12/27/2002    Morbid obesity ([address] 12/27/2002        Past Medical History:   Diagnosis Date    Anxiety     Arthritis     COPD (chronic obstructive pulmonary disease) (HCC)     Generalized osteoarthrosis, unspecified site 12/27/2002    Hyperlipidemia     Hypertension     Major depressive disorder, single episode, moderate ([address] 12/27/2002    Morbid obesity ([address] 12/27/2002    OAB (overactive bladder)     Osteoporosis      Past Surgical History:   Procedure Laterality Date    CARDIAC CATHETERIZATION      CARPAL TUNNEL RELEASE      COLONOSCOPY      HYSTERECTOMY, TOTAL ABDOMINAL (CERVIX REMOVED)      KNEE ARTHROPLASTY Bilateral     GA COLON CA SCRN NOT HI RSK IND N/A 6/22/2017    COLONOSCOPY performed by Nataly Calderon MD at [address] TRANSORAL DIAGNOSTIC N/A 6/22/2017    EGD ESOPHAGOGASTRODUODENOSCOPY performed by Nataly Calderon MD at [address] Bilateral     SKIN BIOPSY Right 9/17/2020    EXCISION OF LEG MASS RIGHT (PAT ON ADMIT) performed by Carmen Díaz MD at [place] [address]  Restrictions/Precautions: Fall Risk, NPO     Safety Devices: Safety Devices  Type of Devices: All fall risk precautions in place     Patient's date of birth confirmed: Pt verbally unable, verified via wrist band    General:  Chart Reviewed: Yes  Patient assessed for rehabilitation services?: Yes    Subjective  Subjective: \"Okay I can try. \"       Pain at start of treatment: No    Pain at end of treatment: No    Location:   Nursing notified: Not Applicable  RN:   Intervention: Repositioned    Prior Level of Function:  Social/Functional History  Lives With: Spouse  Type of Home: House  Home Layout: Two level  Home Access: Stairs to enter with rails  Entrance Stairs - Number of Steps: 2  Entrance Stairs - Rails: Right  Bathroom Shower/Tub: Tub/Shower unit  Bathroom Equipment: Shower chair, Hand-held shower  Home Equipment: Jacolyn Hosston, rolling  ADL Assistance: Independent  Ambulation Assistance: Independent (with [address])  Transfer Assistance: Independent  Active : No  Additional Comments: Pt inconsistent historian. PLOF and home set up from [facility], verified by dtr. Pt's dtr stating that pt's dementia has been worsening over the past few years. OBJECTIVE:     Orientation Status:  Orientation  Orientation Level: Disoriented to place; Disoriented to time;Disoriented to situation;Disoriented to person    Observation:  Observation/Palpation  Posture: Poor  Observation: Pt biases to L side, appears uncomfortable. Pt noted to have soiled brief with bedding also soiled. Changed pt's brief, gown and bedding, washed back d/t urine spreading up back. Left pt in clean bed and nursing notified. Cognition Status:  Cognition  Overall Cognitive Status: Exceptions  Arousal/Alertness: Inconsistent responses to stimuli  Following Commands: Inconsistently follows commands  Attention Span: Difficulty dividing attention; Difficulty attending to directions  Memory: Decreased recall of recent events;Decreased short term memory;Decreased long term memory;Decreased recall of precautions;Decreased recall of biographical Information  Safety Judgement: Decreased awareness of need for assistance;Decreased awareness of need for safety  Problem Solving: Assistance required to correct errors made;Assistance required to implement solutions;Assistance required to identify errors made;Assistance required to generate solutions;Decreased awareness of errors  Insights: Not aware of deficits  Initiation: Requires cues for all  Sequencing: Requires cues for all  Cognition Comment: Max increased processing time    Perception Status:  Perception  Overall Perceptual Status: Impaired  Unilateral Attention: Cues to attend right visual field;Cues to attend to right side of body;Cues to maintain midline in sitting  Initiation: Hand over hand to initiate tasks  Motor Planning: Hand over hand to sequence tasks  Perseveration: Perseverates during conversation    Vision and Hearing Status:      Vision - Basic Assessment  Prior Vision: Wears glasses all the time  Visual History: No significant visual history  Patient Visual Report:  (Pt appears unaware of deficits)  Visual Field Cut: Right  Oculo Motor Control: Impaired (Decreased scanning to R side)    GROSS ASSESSMENT AROM/PROM:  AROM: Within functional limits (LUE WFL, RUE flaccid)  PROM: Generally decreased, functional    ROM:   LUE AROM (degrees)  LUE AROM : WFL  Left Hand AROM (degrees)  Left Hand AROM: WFL  RUE PROM (degrees)  RUE PROM: WFL  RUE AROM (degrees)  RUE General AROM: Flaccid  Right Hand PROM (degrees)  Right Hand PROM: WFL  Right Hand AROM (degrees)  Right Hand General AROM: Flaccid    UE STRENGTH:  Strength: Generally decreased, functional (LUE decreased but functional, RUE flaccid)    UE COORDINATION:  Coordination: Generally decreased, functional (LUE only.  RUE flaccid)    UE TONE:  Tone: Abnormal (RUE)    UE SENSATION:  Sensation: Impaired (Appears hypersensitive to touch, states 'ouch' frequently)    Hand Dominance:  Hand Dominance  Hand Dominance:  (Pt unable to state; R side flaccid)    ADL Status:  ADL  Feeding: NPO;Dependent/Total  Grooming: Dependent/Total  Grooming Skilled Clinical Factors: Attempts to perform oral care but decreased LUE coordination impacts her ability to complete  UE Bathing: Dependent/Total  LE Bathing: Dependent/Total  UE Dressing: Dependent/Total  LE Dressing: Dependent/Total  Toileting: Dependent/Total  Additional Comments: Simulated dressing, assisted with clean up and fresh gown following bowel and bladder accident in brief  Toilet Transfers  Toilet Transfer: Unable to assess  Toilet Transfers Comments: Anticipate dependent    Educated pt on rolling for ADL tasks, encouraged pt to participate to best of her ability with toileting hygiene. F follow through for ADL tasks. Functional Mobility:    Transfers  Sit to stand: Unable to assess  Stand to sit: Unable to assess  Transfer Comments: Unsafe to progress    Patient ambulated NT due to unable to safely progress to standing      Bed Mobility  Bed mobility  Rolling to Left: Maximum assistance;2 Person assistance  Rolling to Right: Maximum assistance;2 Person assistance  Supine to Sit: Maximum assistance;Dependent/Total;2 Person assistance  Sit to Supine: 2 Person assistance;Dependent/Total;Maximum assistance  Bed Mobility Comments: Pt requires step by step cues for all mobility, max increased time for sequencing    Seated and Standing Balance:  Balance  Sitting: Impaired  Sitting - Static: Poor (constant support) (Assist of 2)  Sitting - Dynamic: Poor (constant support) (Assist of 2)  Standing:  (Unsafe to progress)    Functional Endurance:  Activity Tolerance  Activity Tolerance: Patient limited by pain;Treatment limited secondary to decreased cognition;Treatment limited secondary to agitation    D/C Recommendations:  OT D/C RECOMMENDATIONS  REQUIRES OT FOLLOW-UP: Yes    Equipment Recommendations:  OT Equipment Recommendations  Other: Continue to assess    OT Education:   Patient Education  Education Given To: Patient  Education Provided: Role of Therapy;Plan of Care  Education Method: Verbal;Demonstration  Barriers to Learning: Cognition  Education Outcome: Unable to verbalize; Unable to demonstrate understanding;Continued education needed    OT Follow Up:   OT D/C RECOMMENDATIONS  REQUIRES OT FOLLOW-UP: Yes       Assessment/Discharge Disposition:  Assessment: Pt is a 76year old woman from home who presents to [facility] with the above deficits which impact her ability to perform ADLs and IADLs. Pt. signficantly limited d/t weakness, fatigue and decreased cognition. Pt would benefit from continued OT to maximize independence and safety with ADL tasks.   Performance deficits / Impairments: Decreased functional mobility , Decreased ADL status, Decreased strength, Decreased endurance, Decreased balance, Decreased high-level IADLs, Decreased cognition, Decreased safe awareness, Decreased sensation, Decreased fine motor control, Decreased ROM, Decreased vision/visual deficit, Decreased coordination, Decreased posture  Prognosis: Good  Discharge Recommendations: Continue to assess pending progress  Decision Making: High Complexity  History: Pt's medical history is complex  Exam: Pt. has 14 performance deficits  Assistance / Modification: Pt. requires total A    AMPAC (Six Click) Self care Score   How much help for putting on and taking off regular lower body clothing?: Total  How much help for Bathing?: Total  How much help for Toileting?: Total  How much help for putting on and taking off regular upper body clothing?: Total  How much help for taking care of personal grooming?: Total  How much help for eating meals?: Total  AM-PAC Inpatient Daily Activity Raw Score: 6  AM-PAC Inpatient ADL T-Scale Score : 17.07  ADL Inpatient CMS 0-100% Score: 100    Therapy key for assistance levels -   Independent/Mod I = Pt. is able to perform task with no assistance but may require a device   Stand by assistance = Pt. does not perform task at an independent level but does not need physical assistance, requires verbal cues  Minimal, Moderate, Maximal Assistance = Pt. requires physical assistance (25%, 50%, 75% assist from helper) for task but is able to actively participate in task   Dependent = Pt. requires total assistance with task and is not able to actively participate with task completion     Plan:  Occupational Therapy Plan  Times Per Week: 1-4x  Therapy Duration:  (Length of acute stay)  Current Treatment Recommendations: Strengthening, Balance training, ROM, Functional mobility training, Endurance training, Cognitive reorientation, Neuromuscular re-education, Positioning, Safety education & training, Patient/Caregiver education & training, Equipment evaluation, education, & procurement, Self-Care / ADL, Home management training, Cognitive/Perceptual training, Coordination training, Sensory integraion    Goals:   Patient will:    - Improve functional endurance to tolerate/complete 30 mins of ADL's  - Be Mod A in UB ADLs   - Be Mod A in LB ADLs  - Be Max A in ADL transfers without LOB  - Be Max A in toileting tasks  - Improve R hand fine motor coordination to [facility] in order to manage clothing fasteners/self-care containers in a timely manner  - Improve R UE Function (AROM, strength, motor control, tone normalization) to complete ADLs as projected. - Improve L UE strength and endurance to 3+/5 in order to participate in self-care activities as projected. - Access appropriate D/C site with as few architectural barriers as possible. - Sequence self-care tasks with 50% verbal cues for sequencing    Patient Goal: Patient goals : Pt unable to state a goal at this time     Discussed and agreed upon: Yes Comments:       Therapy Time:   Individual   Time In 0927   Time Out 0954   Minutes 27     ADL/IADL training: 10 minutes  Eval: 17 minutes       Electronically signed by:     CHRISTOPHER Diehl,   11/14/2022, 11:56 AM

## 2022-11-14 NOTE — PROGRESS NOTES
Subjective: The patient complains of severe acute on chronic progressive fatigue and aphasia and right-sided weakness partially relieved by rest, PT, OT and meds   and exacerbated by exertion and recent CVA. She originally presented to ED with facial droop and memory issues. Per daughter,she got a call from her sister at 18 am who said that her mother was slurring her words. Stated her mother go up to go to the bathroom but she could not get out of bed. They called 911. Patient did have slight dysphagia and a headache the previous night and stated that she did not feel well before going to bed. In the ER she had right sided hemiparesis. MRI of the brain 11/12/22 showed small acute infarct involving the left inferior ahsan. Trace amount of blood layering within the occipital horns lateral ventricles and bilaterally  On acute worsening today arthritic neurological changes and being worked up for intracranial bleeding blood pressures closely being monitored by multiple services including cardiology and medical specialists. 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 *      .    Reviewed recent nursing note and discussed current status and planned care with acute care providers, \"Per family and nurse report, pt arrived back from CT with emesis episode with suspected aspiration. To re-attempt, as pt is able. Discussed with Becki RN.  \".      Am concerned about her oral pharyngeal dysphagia she is now n.p.o.      ROS x10:  The patient also complains of severely impaired mobility and activities of daily living. Otherwise no new problems with vision, hearing, nose, mouth, throat, dermal, cardiovascular, GI, , pulmonary, musculoskeletal, psychiatric or neurological.        Vital signs:  BP (!) 158/45   Pulse 66   Temp 98.6 °F (37 °C) (Axillary)   Resp 18   Ht 5' 3\" (1.6 m)   Wt 160 lb (72.6 kg)   LMP  (LMP Unknown)   SpO2 97%   BMI 28.34 kg/m²   I/O:   PO/Intake:   NPO fair PO intake, monitoring for dysphagia    Bowel/Bladder:  incontinent,    General:  Patient is well developed, adequately nourished, and    well kempt. HEENT:    PERRLA, hearing intact to loud voice, external inspection of ear and nose benign. Inspection of lips, tongue and gums benign  Musculoskeletal: No significant change in strength or tone. All joints stable. Inspection and palpation of digits and nails show no clubbing, cyanosis or inflammatory conditions. Neuro/Psychiatric: Affect: flat-   aphasicNo significant change in deep tendon reflexes or sensation  Lungs:  Diminished, CTA-B  . Respiration effort is normal at rest.   Heart:   S1 = S2,   RRR. Abdomen:  Soft, non-tender    Extremities:  Trace  lower extremity edema but no unusual tenderness. Skin:   BUE bruises dt blood draws      Rehabilitation:  Physical Therapy:   Bed mobility:  Bed mobility  Rolling to Left: Maximum assistance;Dependent/Total (11/12/22 1338)  Supine to Sit: Maximum assistance;Dependent/Total (11/12/22 1338)  Sit to Supine: Dependent/Total;2 Person assistance (11/12/22 1338)  Bed Mobility Comments: Pt requires signficant hand over hand assist to complete all transitions. Retropulsive when sitting up EOB requiring additional assist to return to bed. (11/12/22 1338)  Transfers:  Transfers  Sit to Stand: Maximum Assistance (11/12/22 1339)  Stand to Sit: Maximum Assistance (11/12/22 1339)  Comment: R knee blocked.  (11/12/22 1339)  Gait: Ambulation  Assistance: Maximum assistance (11/12/22 1339)  Quality of Gait: Standing X 15sec (11/12/22 1339)  Distance: standing only (11/12/22 1339)  Stairs:     W/C mobility:       Occupational Therapy:                    Speech Therapy:            Diet/Swallow:        Dysphagia Outcome Severity Scale: Level 2: Moderate Severe dysphagia- Maximum assistance or maximum use of strategies with partial PO only     Therapeutic Interventions: Therapeutic PO trials with SLP    COGNITION  OT:    SP:           Lab/X-ray studies reviewed, analyzed and discussed with patient and staff:   Recent Results (from the past 24 hour(s))   POCT Glucose    Collection Time: 11/13/22  7:59 AM   Result Value Ref Range    POC Glucose 116 (H) 70 - 99 mg/dl    Performed on ACCU-CHEK    POCT Glucose    Collection Time: 11/13/22 11:30 AM   Result Value Ref Range    POC Glucose 114 (H) 70 - 99 mg/dl    Performed on ACCU-CHEK    POCT Glucose    Collection Time: 11/13/22 11:47 AM   Result Value Ref Range    POC Glucose 123 (H) 70 - 99 mg/dl    Performed on ACCU-CHEK    POCT Glucose    Collection Time: 11/13/22  3:40 PM   Result Value Ref Range    POC Glucose 106 (H) 70 - 99 mg/dl    Performed on ACCU-CHEK    POCT Glucose    Collection Time: 11/13/22  8:07 PM   Result Value Ref Range    POC Glucose 100 (H) 70 - 99 mg/dl    Performed on ACCU-CHEK    POCT Glucose    Collection Time: 11/13/22 11:57 PM   Result Value Ref Range    POC Glucose 108 (H) 70 - 99 mg/dl    Performed on ACCU-CHEK    POCT Glucose    Collection Time: 11/14/22  2:19 AM   Result Value Ref Range    POC Glucose 98 70 - 99 mg/dl    Performed on ACCU-CHEK    Basic Metabolic Panel    Collection Time: 11/14/22  4:56 AM   Result Value Ref Range    Sodium 137 135 - 144 mEq/L    Potassium 3.9 3.4 - 4.9 mEq/L    Chloride 103 95 - 107 mEq/L    CO2 24 20 - 31 mEq/L    Anion Gap 10 9 - 15 mEq/L    Glucose 106 (H) 70 - 99 mg/dL    BUN 6 (L) 8 - 23 mg/dL    Creatinine 0.58 0.50 - 0.90 mg/dL Est, Glom Filt Rate >60.0 >60    Calcium 8.8 8.5 - 9.9 mg/dL   TSH with Reflex    Collection Time: 11/14/22  4:56 AM   Result Value Ref Range    TSH 3.940 (H) 0.440 - 3.860 uIU/mL     Previous extensive, complex labs, notes and diagnostics reviewed and analyzed. ALLERGIES:    Allergies as of 11/12/2022 - Fully Reviewed 09/23/2022   Allergen Reaction Noted    Codeine  12/27/2002      (please also verify by checking [facility])     Complex Physical Medicine & Rehab Issues Assess & Plan:   Severe abnormality of gait and mobility and impaired self-care and ADL's secondary to   CVA with right-sided weakness aphasia and dysphagia. Updated functional and medical status reassessed regarding patients ability to participate in therapies and patient found to be able to participate in:  TBD         Will continue to follow to attempt to get patient to the most efficient but most effective level of care will be in their best interest.  Continue to focus on energy conservation heart rate and blood pressure monitoring before during and after therapy endurance and consistency of function. Bowel constipation   and Bladder dysfunction   overactive, neurogenic bladder:  frequent toileting, ambulate to bathroom with assistance, check post void residuals. Check for C.difficile x1 if >2 loose stools in 24 hours, continue bowel & bladder program.  Monitor for UTI symptoms including lethargy and confusion    Severe OA pain and generalized OA pain: reassess pain every shift and prior to and after each therapy session, give prn Tylenol   and consider scheduled Tylenol, modalities prn in therapy, consider Lidoderm, K-pad prn. Skin healing    breakdown   risk:  continue pressure relief program.  Daily skin exams and reports from nursing.     Severe fatigue due to immobility and nutritional deficits: monitoring for dysphagia   Add vitamin B12 vitamin D and CoQ10 titrate dosing and add protein supplementation with low carb content. Complex discharge planning:   Discussed with care team-last 24 hour events noted. I will continue to follow along and reassess functional and medical status as we strive to improve patient's functional and medical outcomes progressing to the most efficient and lowest level of care. Complex Active General Medical Issues that complicate care:     1. 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 *          Events and functional changes in the past 24 hours reviewed decline in functional status noted and is of concern      Focus of today's plan-   monitor for aspiration pneumonia and ability to participate in rehab.       Swapna Hernandez D.O., PM&R     Attending    [facility] Paulette Cruz

## 2022-11-14 NOTE — PROGRESS NOTES
Hospitalist Progress Note      PCP: Janae Mcduffie, APRN - CNP    Date of Admission: 11/12/2022    Chief Complaint:    Chief Complaint   Patient presents with    Fall       Subjective:  No acute events overnight. Speaking a little bit more clearly, but still acute dysphagia. ST evaluated, deemed unsafe to swallow, should remain n.p.o. at present. Discussion of treatment options and neck steps with patient and palliative care during impromptu care conference in room today,  wishes to proceed with PEG placement for nutrition/hydration/medication. IR consult placed for PEG. Patient denies any acute pain. She does not remember why she is hospitalized. Medications:  Reviewed    Infusion Medications    dextrose 5% in lactated ringers 100 mL/hr at 11/14/22 0519    dextrose       Scheduled Medications    [Held by provider] nitroglycerin  0.5 inch Topical 4 times per day    enalaprilat  1.25 mg IntraVENous 4 times per day    hydrALAZINE  20 mg IntraVENous Q4H    cloNIDine  1 patch TransDERmal Weekly    [Held by provider] atorvastatin  80 mg Oral Nightly    insulin lispro  0-4 Units SubCUTAneous Q4H     PRN Meds: hydrALAZINE, ondansetron **OR** ondansetron, polyethylene glycol, labetalol, glucose, dextrose bolus **OR** dextrose bolus, glucagon (rDNA), dextrose    No intake or output data in the 24 hours ending 11/14/22 0815    Exam:    BP (!) 158/45   Pulse 66   Temp 98.6 °F (37 °C) (Axillary)   Resp 18   Ht 5' 3\" (1.6 m)   Wt 160 lb (72.6 kg)   LMP  (LMP Unknown)   SpO2 97%   BMI 28.34 kg/m²     General appearance: Elderly female reclining in bed in NAD.  present at bedside. HEENT:  PERRLA, EOMI. Neck: Trachea midline,   Cardiovascular: Regular rate and rhythm  Respiratory:  Normal respiratory effort. Clear to auscultation  Abdomen: Soft, non-tender, non-distended with normal bowel sounds. Musculoskeletal: No clubbing, cyanosis or edema bilaterally  Neuro: Right sided hemiparesis. Faint right hand/finger flexion/extension to command. Sensation to light touch grossly intact. No right shoulder, elbow, hip, or knee movement to command. Mild dysarthria, but no aphasia today. Labs:   Recent Labs     11/12/22  0345 11/12/22  1123 11/13/22  0514   WBC 7.9 8.1 6.7   HGB 14.0 14.4 14.0   HCT 42.0 44.3 43.1    193 182       Recent Labs     11/12/22  0345 11/14/22  0456    137   K 4.4 3.9    103   CO2 23 24   BUN 15 6*   CREATININE 0.74 0.58   CALCIUM 9.2 8.8       Recent Labs     11/12/22  0345   AST 29   ALT 17   BILITOT 0.8*   ALKPHOS 59       Recent Labs     11/12/22  0548 11/12/22  1123   INR 1.0 1.0       No results for input(s): Yonis Brice in the last 72 hours. Urinalysis:      Lab Results   Component Value Date/Time    NITRU Negative 11/12/2022 03:45 AM    WBCUA 6-9 11/12/2022 03:45 AM    BACTERIA Negative 11/12/2022 03:45 AM    RBCUA 0-2 11/12/2022 03:45 AM    BLOODU Negative 11/12/2022 03:45 AM    SPECGRAV 1.014 11/12/2022 03:45 AM    GLUCOSEU Negative 11/12/2022 03:45 AM    GLUCOSEU NEG 10/17/2011 10:15 AM       Radiology:  CT HEAD WO CONTRAST   Final Result   1. Stable minimal layering hemorrhage in the occipital horns of the right   and left lateral ventricle. No change from MRI dated 11/12/2022.      2.  Findings compatible with chronic paranasal sinusitis, mild. MRI brain without contrast   Final Result   1. Small acute infarct involving the left inferior ahsan. No mass effect or   midline shift. 2. Trace amount of blood layering within the occipital horns of the lateral   ventricles bilaterally. 3. At least partial loss of the normal signal void within the right vertebral   artery, which can be seen with slow flow/occlusion. This is of uncertain   chronicity. 4. Otherwise, no acute intracranial abnormality. 5. Moderate global parenchymal volume loss with mild chronic microvascular   ischemic changes.    These results were sent to the Results [address] ([address] [facility]) on   11/12/2022 at 2:22 pm to be communicated to the referring/covering health   care provider/office. CT HEAD WO CONTRAST   Final Result   Atrophy and chronic changes seen within the brain with no acute intracranial   abnormality. CTA HEAD W WO CONTRAST   Final Result   1. No large vessel occlusion, significant stenosis or cerebral aneurysm   identified within the brain. 2. Atherosclerosis without significant arterial stenosis identified within   the neck. 3. Beaded appearance of the distal vertebral arteries suggesting   fibromuscular dysplasia. CTA NECK W WO CONTRAST   Final Result   1. No large vessel occlusion, significant stenosis or cerebral aneurysm   identified within the brain. 2. Atherosclerosis without significant arterial stenosis identified within   the neck. 3. Beaded appearance of the distal vertebral arteries suggesting   fibromuscular dysplasia. CT HEAD WO CONTRAST   Final Result   1. There is no acute intracranial abnormality. Specifically, there is no   intracranial hemorrhage. 2. Atrophy and periventricular leukomalacia,         XR CHEST PORTABLE   Final Result   Bibasilar atelectasis. XR CHEST (2 VW)    (Results Pending)       Assessment/Plan:    Acute stroke POA:  Repeat CT scan today given the MRI findings of layering of blood; unable to be on heparin for this reason; neuro is managing; PT/OT; tight blood control. Tylenol suppository PRN pain. 11/14: Consult to IR for PEG placement per  wishes following discussion, but IR apparently out of area for next several days. Follow up consult placed to [facility] for PEG consideration. HTN/HLD:  Continue home meds  COPD:  Not in an exacerebation  Chronic diastolic CHF:  Euvolemic on exams  Functional Status: Fall precautions. Up with assistance. PT OT  Diet: Cardiac   DVT ppx: Heparin  Disposition: Dependent on hospital course.  Will discharge once medically stable. CONRADO on board for discharge planning. 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 I am managing a portion of pt care. Some medical issues are handled by other specialists. Additional work up and treatment should be done in out pt setting by pt PCP and other out pt providers. In addition to examining and evaluating pt, I spent additional time explaining care, normal and abnormal findings, and treatment plan. All of pt questions were answered. Counseling, diet and education were  provided. Case will be discussed with nursing staff when appropriate. Family will be updated if and when appropriate.       Diet: Diet NPO    Code Status: Full Code    PT/OT Eval     Electronically signed by Shanelle Thompson DO on 11/14/2022 at 8:15 AM

## 2022-11-14 NOTE — CONSULTS
Physical Medicine & Rehabilitation  Consult Note      Admitting Physician: Areli Alegria MD    Primary Care Provider: YULY Tomlinson CNP     Reason for Consult:  Asses rehab needs, promote physical and mental function, analyze level of care to determine rehab needs, improve ability to actively participate in the rehabilitation process, and decrease likelihood of re-admit to the hospital after discharge. History of Present Illness:    Cindy Ashley is a 76 y.o. female admitted to [facility] on 11/12/2022. HPI    76 y.o. female presented to ED with facial droop and memory issues. Per daughter,she got a call from her sister at 18 am who said that her mother was slurring her words. Stated her mother go up to go to the bathroom but she could not get out of bed. They called 911. Patient did have slight dysphagia and a headache the previous night and stated that she did not feel well before going to bed. In the ER she had right sided hemiparesis. MRI of the brain 11/12/22 showed small acute infarct involving the left inferior ahsan. Trace amount of blood layering within the occipital horns lateral ventricles and bilaterally  On acute worsening today arthritic neurological changes and being worked up for intracranial bleeding blood pressures closely being monitored by multiple services including cardiology and medical specialists. Being closely monitored for small cranial bleed  I reviewed recent nursing notes discussed care with acute care providers, \"  see notes  \". Events from the previous 24 hours reviewed    .       Their inpatient work up has included:    Imaging:  Imaging and other studies reviewed and discussed with patient and staff    CTA HEAD W WO CONTRAST    Result Date: 11/12/2022  EXAMINATION: CTA OF THE NECK; CTA OF THE HEAD WITHOUT AND WITH CONTRAST 11/12/2022 7:57 am TECHNIQUE: CTA of the neck was performed with the administration of intravenous contrast. Multiplanar reformatted images are provided for review. MIP images are provided for review. Stenosis of the internal carotid arteries measured using NASCET criteria. Automated exposure control, iterative reconstruction, and/or weight based adjustment of the mA/kV was utilized to reduce the radiation dose to as low as reasonably achievable.; CTA of the head/brain was performed without and with the administration of intravenous contrast. Multiplanar reformatted images are provided for review. MIP images are provided for review. Automated exposure control, iterative reconstruction, and/or weight based adjustment of the mA/kV was utilized to reduce the radiation dose to as low as reasonably achievable. COMPARISON: CT brain earlier same day HISTORY: ORDERING SYSTEM PROVIDED HISTORY: right sided weakness TECHNOLOGIST PROVIDED HISTORY: Reason for exam:->right sided weakness What reading provider will be dictating this exam?->CRC FINDINGS: CTA NECK: AORTIC ARCH/ARCH VESSELS: Atherosclerotic calcifications are present within the aortic arch. The origins of the great vessels are normal.  There is no significant stenosis of the innominate or subclavian arteries. CAROTID ARTERIES: There is mild calcified atherosclerotic plaque within the left common carotid artery. There is no significant stenosis of the common carotid arteries identified. There is no significant stenosis of the internal carotid arteries. There is no dissection or pseudoaneurysm. VERTEBRAL ARTERIES: There is a mildly beaded appearance of the distal V2 and V3 segments of the vertebral arteries suggesting fibromuscular dysplasia. There is no dissection or pseudoaneurysm. There is no significant stenosis. SOFT TISSUES: Lung apices are clear. There is no superior mediastinal lymphadenopathy. There is no cervical lymphadenopathy or soft tissue mass identified.   The parotid glands and submandibular glands are normal. BONES: No lytic or blastic osseous lesions are identified. There is moderate multilevel neural foraminal narrowing and mild spinal canal stenosis from C4-C5 to C6-C7. CTA HEAD: ANTERIOR CIRCULATION: Atherosclerotic calcifications are present within the cavernous segments of the internal carotid arteries without significant stenosis evident. The anterior and middle cerebral arteries are normal. There is no significant stenosis or aneurysm evident. POSTERIOR CIRCULATION: The distal vertebral arteries are normal in appearance. The basilar artery is normal.  No significant stenosis or aneurysm is identified. The posterior cerebral arteries are normal in appearance. OTHER: No dural venous sinus thrombosis on this non-dedicated study. BRAIN: There is no mass effect or midline shift. There is no vascular malformation identified. 1. No large vessel occlusion, significant stenosis or cerebral aneurysm identified within the brain. 2. Atherosclerosis without significant arterial stenosis identified within the neck. 3. Beaded appearance of the distal vertebral arteries suggesting fibromuscular dysplasia. CT HEAD WO CONTRAST    Result Date: 11/13/2022  EXAMINATION: CT OF THE HEAD WITHOUT CONTRAST  11/13/2022 9:25 am TECHNIQUE: CT of the head was performed without the administration of intravenous contrast. Automated exposure control, iterative reconstruction, and/or weight based adjustment of the mA/kV was utilized to reduce the radiation dose to as low as reasonably achievable. COMPARISON: None. HISTORY: ORDERING SYSTEM PROVIDED HISTORY: eval for progression of blood layering within the occipital horns of the lateral ventricles TECHNOLOGIST PROVIDED HISTORY: Reason for exam:->eval for progression of blood layering within the occipital horns of the lateral ventricles Has a \"code stroke\" or \"stroke alert\" been called? ->No What reading provider will be dictating this exam?->CRC FINDINGS: BRAIN/VENTRICLES: Cerebral atrophy is noted globally.   There is posterior trophic enlargement of the lateral ventricles similar to older examinations. There is a small amount of intermediate density hemorrhage in the occipital horns of the right and left ventricle similar in severity to 11/12/2022 MRI. 4th ventricle is patent. There is no shift of midline structures. No subarachnoid or subdural hemorrhage. ORBITS: The visualized portion of the orbits demonstrate no acute abnormality. SINUSES: Pilar bullosa of the left middle nasal turbinate. Right deviation of the nasal septum which appears chronic. Mild mucosal thickening in the sphenoid sinuses. Small air-fluid level in the inferior left frontal sinus. Mastoid air cells are clear. SOFT TISSUES/SKULL:  No acute abnormality of the visualized skull or soft tissues. 1.  Stable minimal layering hemorrhage in the occipital horns of the right and left lateral ventricle. No change from MRI dated 11/12/2022. 2.  Findings compatible with chronic paranasal sinusitis, mild. CT HEAD WO CONTRAST    Result Date: 11/12/2022  EXAMINATION: CT OF THE HEAD WITHOUT CONTRAST  11/12/2022 10:31 am TECHNIQUE: CT of the head was performed without the administration of intravenous contrast. Automated exposure control, iterative reconstruction, and/or weight based adjustment of the mA/kV was utilized to reduce the radiation dose to as low as reasonably achievable. COMPARISON: 11/12/2022 HISTORY: ORDERING SYSTEM PROVIDED HISTORY: eval for hmg conversion TECHNOLOGIST PROVIDED HISTORY: Reason for exam:->eval for hmg conversion Has a \"code stroke\" or \"stroke alert\" been called? ->No What reading provider will be dictating this exam?->CRC FINDINGS: BRAIN/VENTRICLES: Generalized atrophy identified of the brain. Some low-attenuation areas identified in the periventricular and subcortical white matter to suggest chronic small vessel ischemic change. There is no acute intracranial hemorrhage, mass effect or midline shift.   No abnormal extra-axial fluid collection. The gray-white differentiation is maintained without evidence of an acute infarct. There is no evidence of hydrocephalus. ORBITS: The visualized portion of the orbits demonstrate no acute abnormality. SINUSES: The visualized paranasal sinuses and mastoid air cells demonstrate no acute abnormality. SOFT TISSUES/SKULL:  No acute abnormality of the visualized skull or soft tissues. Atrophy and chronic changes seen within the brain with no acute intracranial abnormality. CT HEAD WO CONTRAST    Result Date: 11/12/2022  EXAMINATION: CT OF THE HEAD WITHOUT CONTRAST  11/12/2022 4:34 am TECHNIQUE: CT of the head was performed without the administration of intravenous contrast. Automated exposure control, iterative reconstruction, and/or weight based adjustment of the mA/kV was utilized to reduce the radiation dose to as low as reasonably achievable. COMPARISON: None. HISTORY: ORDERING SYSTEM PROVIDED HISTORY: fall TECHNOLOGIST PROVIDED HISTORY: Reason for exam:->fall Has a \"code stroke\" or \"stroke alert\" been called? ->No Decision Support Exception - unselect if not a suspected or confirmed emergency medical condition->Emergency Medical Condition (MA) What reading provider will be dictating this exam?->CRC FINDINGS: BRAIN/VENTRICLES: There is no acute intracranial hemorrhage, mass effect or midline shift. No abnormal extra-axial fluid collection. The gray-white differentiation is maintained without evidence of an acute infarct. There is no evidence of hydrocephalus. The ventricles, cisterns and sulci are prominent consistent with atrophy. There is decreased attenuation within the periventricular white matter consistent with periventricular leukomalacia. ORBITS: The visualized portion of the orbits demonstrate no acute abnormality. SINUSES: The visualized paranasal sinuses and mastoid air cells demonstrate no acute abnormality.  SOFT TISSUES/SKULL:  No acute abnormality of the visualized skull or soft tissues. 1. There is no acute intracranial abnormality. Specifically, there is no intracranial hemorrhage. 2. Atrophy and periventricular leukomalacia,     CTA NECK W WO CONTRAST    Result Date: 11/12/2022  EXAMINATION: CTA OF THE NECK; CTA OF THE HEAD WITHOUT AND WITH CONTRAST 11/12/2022 7:57 am TECHNIQUE: CTA of the neck was performed with the administration of intravenous contrast. Multiplanar reformatted images are provided for review. MIP images are provided for review. Stenosis of the internal carotid arteries measured using NASCET criteria. Automated exposure control, iterative reconstruction, and/or weight based adjustment of the mA/kV was utilized to reduce the radiation dose to as low as reasonably achievable.; CTA of the head/brain was performed without and with the administration of intravenous contrast. Multiplanar reformatted images are provided for review. MIP images are provided for review. Automated exposure control, iterative reconstruction, and/or weight based adjustment of the mA/kV was utilized to reduce the radiation dose to as low as reasonably achievable. COMPARISON: CT brain earlier same day HISTORY: ORDERING SYSTEM PROVIDED HISTORY: right sided weakness TECHNOLOGIST PROVIDED HISTORY: Reason for exam:->right sided weakness What reading provider will be dictating this exam?->CRC FINDINGS: CTA NECK: AORTIC ARCH/ARCH VESSELS: Atherosclerotic calcifications are present within the aortic arch. The origins of the great vessels are normal.  There is no significant stenosis of the innominate or subclavian arteries. CAROTID ARTERIES: There is mild calcified atherosclerotic plaque within the left common carotid artery. There is no significant stenosis of the common carotid arteries identified. There is no significant stenosis of the internal carotid arteries. There is no dissection or pseudoaneurysm.  VERTEBRAL ARTERIES: There is a mildly beaded appearance of the distal V2 and V3 segments of the vertebral arteries suggesting fibromuscular dysplasia. There is no dissection or pseudoaneurysm. There is no significant stenosis. SOFT TISSUES: Lung apices are clear. There is no superior mediastinal lymphadenopathy. There is no cervical lymphadenopathy or soft tissue mass identified. The parotid glands and submandibular glands are normal. BONES: No lytic or blastic osseous lesions are identified. There is moderate multilevel neural foraminal narrowing and mild spinal canal stenosis from C4-C5 to C6-C7. CTA HEAD: ANTERIOR CIRCULATION: Atherosclerotic calcifications are present within the cavernous segments of the internal carotid arteries without significant stenosis evident. The anterior and middle cerebral arteries are normal. There is no significant stenosis or aneurysm evident. POSTERIOR CIRCULATION: The distal vertebral arteries are normal in appearance. The basilar artery is normal.  No significant stenosis or aneurysm is identified. The posterior cerebral arteries are normal in appearance. OTHER: No dural venous sinus thrombosis on this non-dedicated study. BRAIN: There is no mass effect or midline shift. There is no vascular malformation identified. 1. No large vessel occlusion, significant stenosis or cerebral aneurysm identified within the brain. 2. Atherosclerosis without significant arterial stenosis identified within the neck. 3. Beaded appearance of the distal vertebral arteries suggesting fibromuscular dysplasia. XR CHEST PORTABLE    Result Date: 11/12/2022  EXAMINATION: ONE XRAY VIEW OF THE CHEST 11/12/2022 4:12 am COMPARISON: None. HISTORY: ORDERING SYSTEM PROVIDED HISTORY: incresed resp rate TECHNOLOGIST PROVIDED HISTORY: Reason for exam:->incresed resp rate What reading provider will be dictating this exam?->CRC FINDINGS: The cardiomediastinal silhouette is at the upper limits of normal for technique.   There are low lung volumes with bronchovascular crowding and bibasilar atelectasis. No pneumothorax or pleural effusion. Bilateral shoulder arthroplasties. Bibasilar atelectasis. MRI brain without contrast    Result Date: 11/12/2022  EXAMINATION: MRI OF THE BRAIN WITHOUT CONTRAST  11/12/2022 1:48 pm TECHNIQUE: Multiplanar multisequence MRI of the brain was performed without the administration of intravenous contrast. COMPARISON: None. HISTORY: ORDERING SYSTEM PROVIDED HISTORY: TIA TECHNOLOGIST PROVIDED HISTORY: Reason for exam:->TIA What is the sedation requirement?->None What reading provider will be dictating this exam?->CRC Initial evaluation. FINDINGS: INTRACRANIAL STRUCTURES/VENTRICLES: There is an acute infarct within the left inferior ahsan (DWI series 3, image 8). No mass effect or midline shift. A trace amount of blood is seen layering within the occipital horns of the lateral ventricles bilaterally. Areas of T2 FLAIR hyperintensity are seen in the periventricular and subcortical white matter, which are nonspecific, but may represent chronic microvascular ischemic change. There is prominence of the ventricles and sulci due to global parenchymal volume loss. The sellar/suprasellar regions appear unremarkable. There is at least partial loss of the normal signal void within the right vertebral artery. ORBITS: The visualized portion of the orbits demonstrate no acute abnormality. SINUSES: The visualized paranasal sinuses and mastoid air cells demonstrate no acute abnormality. BONES/SOFT TISSUES: The bone marrow signal intensity appears normal. The soft tissues demonstrate no acute abnormality. 1. Small acute infarct involving the left inferior ahsan. No mass effect or midline shift. 2. Trace amount of blood layering within the occipital horns of the lateral ventricles bilaterally. 3. At least partial loss of the normal signal void within the right vertebral artery, which can be seen with slow flow/occlusion. This is of uncertain chronicity.  4. Otherwise, no acute intracranial abnormality. 5. Moderate global parenchymal volume loss with mild chronic microvascular ischemic changes. These results were sent to the [facility] [id number] ([address] [place] [address]) on 11/12/2022 at 2:22 pm to be communicated to the referring/covering health care provider/office.               Labs:    Labs reviewed and discussed with patient and staff    Lab Results   Component Value Date/Time    POCGLU 106 11/13/2022 03:40 PM    POCGLU 123 11/13/2022 11:47 AM    POCGLU 114 11/13/2022 11:30 AM    POCGLU 116 11/13/2022 07:59 AM    POCGLU 113 11/13/2022 04:01 AM     Lab Results   Component Value Date/Time     11/12/2022 03:45 AM    K 4.4 11/12/2022 03:45 AM    K 4.0 10/23/2019 06:19 AM     11/12/2022 03:45 AM    CO2 23 11/12/2022 03:45 AM    BUN 15 11/12/2022 03:45 AM    CREATININE 0.74 11/12/2022 03:45 AM    CALCIUM 9.2 11/12/2022 03:45 AM    LABALBU 3.8 11/12/2022 03:45 AM    LABALBU 4.6 04/23/2012 11:12 AM    BILITOT 0.8 11/12/2022 03:45 AM    ALKPHOS 59 11/12/2022 03:45 AM    AST 29 11/12/2022 03:45 AM    ALT 17 11/12/2022 03:45 AM     Lab Results   Component Value Date/Time    WBC 6.7 11/13/2022 05:14 AM    RBC 4.44 11/13/2022 05:14 AM    RBC 4.22 04/23/2012 11:12 AM    HGB 14.0 11/13/2022 05:14 AM    HCT 43.1 11/13/2022 05:14 AM    MCV 97.0 11/13/2022 05:14 AM    MCH 31.5 11/13/2022 05:14 AM    MCHC 32.4 11/13/2022 05:14 AM    RDW 14.2 11/13/2022 05:14 AM     11/13/2022 05:14 AM    MPV 9.9 10/12/2012 09:30 AM     Lab Results   Component Value Date/Time    VITD25 15.1 10/02/2019 12:50 PM     Lab Results   Component Value Date/Time    APPEARANCE CLEAR 10/17/2011 10:15 AM    COLORU Yellow 11/12/2022 03:45 AM    NITRU Negative 11/12/2022 03:45 AM    GLUCOSEU Negative 11/12/2022 03:45 AM    GLUCOSEU NEG 10/17/2011 10:15 AM    KETUA Negative 11/12/2022 03:45 AM    UROBILINOGEN 0.2 11/12/2022 03:45 AM    BILIRUBINUR Negative 11/12/2022 03:45 AM    BILIRUBINUR N 04/29/2019 12:15 PM BILIRUBINUR NEG 10/17/2011 10:15 AM     Lab Results   Component Value Date/Time    PROTIME 12.8 11/12/2022 11:23 AM    PROTIME 10.7 04/23/2012 11:12 AM     Lab Results   Component Value Date/Time    INR 1.0 11/12/2022 11:23 AM         I discussed results with patient. Current Rehabilitation Assessments:    Rehabilitation:  Physical Therapy  Bed mobility:  Bed mobility  Rolling to Left: Maximum assistance;Dependent/Total (11/12/22 1338)  Supine to Sit: Maximum assistance;Dependent/Total (11/12/22 1338)  Sit to Supine: Dependent/Total;2 Person assistance (11/12/22 1338)  Bed Mobility Comments: Pt requires signficant hand over hand assist to complete all transitions. Retropulsive when sitting up EOB requiring additional assist to return to bed. (11/12/22 1338)  Transfers:  Transfers  Sit to Stand: Maximum Assistance (11/12/22 1339)  Stand to Sit: Maximum Assistance (11/12/22 1339)  Comment: R knee blocked. (11/12/22 1339)  Gait:   Ambulation  Assistance: Maximum assistance (11/12/22 1339)  Quality of Gait: Standing X 15sec (11/12/22 1339)  Distance: standing only (11/12/22 1339)  Stairs:     W/C mobility:         Occupational therapy:                      Speech therapy:            Diet/Swallow:        Dysphagia Outcome Severity Scale: Level 2: Moderate Severe dysphagia- Maximum assistance or maximum use of strategies with partial PO only     Therapeutic Interventions: Therapeutic PO trials with SLP          COGNITION  OT:    SP:              Re-evals pending      Past Medical History:        Diagnosis Date    Anxiety     Arthritis     COPD (chronic obstructive pulmonary disease) ([address]     Generalized osteoarthrosis, unspecified site 12/27/2002    Hyperlipidemia     Hypertension     Major depressive disorder, single episode, moderate ([address] 12/27/2002    Morbid obesity ([address] 12/27/2002    OAB (overactive bladder)     Osteoporosis          PastSurgical History:        Procedure Laterality Date    CARDIAC CATHETERIZATION      CARPAL TUNNEL RELEASE      COLONOSCOPY      HYSTERECTOMY, TOTAL ABDOMINAL (CERVIX REMOVED)      KNEE ARTHROPLASTY Bilateral     OR COLON CA SCRN NOT HI RSK IND N/A 6/22/2017    COLONOSCOPY performed by Minerva Prakash MD at [address] TRANSORAL DIAGNOSTIC N/A 6/22/2017    EGD ESOPHAGOGASTRODUODENOSCOPY performed by Minerva Prakash MD at [address] Bilateral     SKIN BIOPSY Right 9/17/2020    EXCISION OF LEG MASS RIGHT (PAT ON ADMIT) performed by Elia Mena MD at [facility]         Allergies:     Allergies   Allergen Reactions    Codeine      Nausea, lightheadedness, dizzy          CurrentMedications:   Current Facility-Administered Medications: [Held by provider] nitroglycerin (NITRO-BID) 2 % ointment 0.5 inch, 0.5 inch, Topical, 4 times per day  hydrALAZINE (APRESOLINE) injection 5 mg, 5 mg, IntraVENous, Q4H PRN  enalaprilat (VASOTEC) injection 1.25 mg, 1.25 mg, IntraVENous, 4 times per day  hydrALAZINE (APRESOLINE) injection 20 mg, 20 mg, IntraVENous, Q4H  cloNIDine (CATAPRES) 0.1 MG/24HR 1 patch, 1 patch, TransDERmal, Weekly  ondansetron (ZOFRAN-ODT) disintegrating tablet 4 mg, 4 mg, Oral, Q8H PRN **OR** ondansetron (ZOFRAN) injection 4 mg, 4 mg, IntraVENous, Q6H PRN  polyethylene glycol (GLYCOLAX) packet 17 g, 17 g, Oral, Daily PRN  labetalol (NORMODYNE;TRANDATE) injection 10 mg, 10 mg, IntraVENous, Q10 Min PRN  [Held by provider] atorvastatin (LIPITOR) tablet 80 mg, 80 mg, Oral, Nightly  dextrose 5 % in lactated ringers infusion, , IntraVENous, Continuous  glucose chewable tablet 16 g, 4 tablet, Oral, PRN  dextrose bolus 10% 125 mL, 125 mL, IntraVENous, PRN **OR** dextrose bolus 10% 250 mL, 250 mL, IntraVENous, PRN  glucagon (rDNA) injection 1 mg, 1 mg, SubCUTAneous, PRN  dextrose 10 % infusion, , IntraVENous, Continuous PRN  insulin lispro (HUMALOG) injection vial 0-4 Units, 0-4 Units, SubCUTAneous, Q4H      Social History:  Social History     Socioeconomic History    Marital status:      Spouse name: [place]     Number of children: 3    Years of education: 12    Highest education level: 12th grade   Occupational History    Occupation: [employer]    Tobacco Use    Smoking status: Former     Packs/day: 1.00     Years: 20.00     Pack years: 20.00     Types: Cigarettes     Quit date:      Years since quittin.8    Smokeless tobacco: Never   Vaping Use    Vaping Use: Never used   Substance and Sexual Activity    Alcohol use: No    Drug use: Never    Sexual activity: Not Currently     Partners: Male   Other Topics Concern    Not on file   Social History Narrative    Social/Functional History              Social Determinants of Health     Financial Resource Strain: Low Risk     Difficulty of Paying Living Expenses: Not hard at all   Food Insecurity: No Food Insecurity    Worried About Running Out of Food in the Last Year: Never true    Ran Out of Food in the Last Year: Never true   Transportation Needs: Not on file   Physical Activity: Insufficiently Active    Days of Exercise per Week: 7 days    Minutes of Exercise per Session: 10 min   Stress: Not on file   Social Connections: Not on file   Intimate Partner Violence: Not on file   Housing Stability: Not on file        This history was obtained from family and caregivers and discussed to confirm. Family History:       Problem Relation Age of Onset    Arthritis Father     Other Father         gout    Heart Failure Father     Cancer Sister        Review of Systems:  Review of Systems          Physical Exam:  BP (!) 152/67   Pulse 59   Temp 99.9 °F (37.7 °C) (Oral)   Resp 16   Ht 5' 3\" (1.6 m)   Wt 160 lb (72.6 kg)   LMP  (LMP Unknown)   SpO2 94%   BMI 28.34 kg/m²      Physical Exam  Ortho Exam  Neurologic Exam      ROS x10: The patient also complains of severely impaired mobility and activities of daily living.   Otherwise no new problems with vision, hearing, nose, mouth, throat, dermal, cardiovascular, GI, , pulmonary, musculoskeletal, psychiatric or neurological. See also Acute Rehab PM&R H&P. Vital signs:  BP (!) 152/67   Pulse 59   Temp 99.9 °F (37.7 °C) (Oral)   Resp 16   Ht 5' 3\" (1.6 m)   Wt 160 lb (72.6 kg)   LMP  (LMP Unknown)   SpO2 94%   BMI 28.34 kg/m²   I/O:   PO/Intake:  fair PO intake,    diet    Bowel:   continent   Bladder: continent    serrano  General:  Patient is well developed,   adequately nourished, and    well kempt. HEENT:    Pupils equal, hearing intact to loud voice, external inspection of ear and nose benign. Inspection of lips, tongue and gums benign    Musculoskeletal: No significant change in strength or tone. All joints stable. Inspection and palpation of digits and nails show no clubbing, cyanosis or inflammatory conditions. Neuro/Psychiatric: Affect: flat but pleasant. Alert and oriented to person, place and situation with    cues. No significant change in deep tendon reflexes or sensation  Lungs:  Diminished, CTA-B. Respiration effort is   normal at rest.     Heart:   S1 = S2,   RRR. Abdomen:  Soft, non-tender, no enlargement of liver or spleen. Extremities:    lower extremity edema    Skin:   Intact to general survey, disoriented and tired    Rehabilitation:  Physical Therapy:   Bed mobility:  Bed mobility  Rolling to Left: Maximum assistance;Dependent/Total (11/12/22 1338)  Supine to Sit: Maximum assistance;Dependent/Total (11/12/22 1338)  Sit to Supine: Dependent/Total;2 Person assistance (11/12/22 1338)  Bed Mobility Comments: Pt requires signficant hand over hand assist to complete all transitions. Retropulsive when sitting up EOB requiring additional assist to return to bed. (11/12/22 1338)  Transfers:  Transfers  Sit to Stand: Maximum Assistance (11/12/22 1339)  Stand to Sit: Maximum Assistance (11/12/22 1339)  Comment: R knee blocked.  (11/12/22 1339)  Gait: Ambulation  Assistance: Maximum assistance (11/12/22 1339)  Quality of Gait: Standing X 15sec (11/12/22 1339)  Distance: standing only (11/12/22 1339)  Stairs:     W/C mobility:       Occupational Therapy:                    Speech Therapy:            Diet/Swallow:        Dysphagia Outcome Severity Scale: Level 2: Moderate Severe dysphagia- Maximum assistance or maximum use of strategies with partial PO only     Therapeutic Interventions: Therapeutic PO trials with SLP       Lab/X-ray studies reviewed, analyzed and discussed with patient and staff:   Recent Results (from the past 24 hour(s))   POCT Glucose    Collection Time: 11/12/22  7:49 PM   Result Value Ref Range    POC Glucose 86 70 - 99 mg/dl    Performed on ACCU-CHEK    POCT Glucose    Collection Time: 11/12/22 11:55 PM   Result Value Ref Range    POC Glucose 104 (H) 70 - 99 mg/dl    Performed on ACCU-CHEK    POCT Glucose    Collection Time: 11/13/22  4:01 AM   Result Value Ref Range    POC Glucose 113 (H) 70 - 99 mg/dl    Performed on ACCU-CHEK    Hemoglobin A1c    Collection Time: 11/13/22  5:13 AM   Result Value Ref Range    Hemoglobin A1C 5.9 4.8 - 5.9 %   Lipid Panel    Collection Time: 11/13/22  5:13 AM   Result Value Ref Range    Cholesterol, Total 172 0 - 199 mg/dL    Triglycerides 90 0 - 150 mg/dL    HDL 72 (H) 40 - 59 mg/dL    LDL Calculated 82 0 - 129 mg/dL   CBC    Collection Time: 11/13/22  5:14 AM   Result Value Ref Range    WBC 6.7 4.8 - 10.8 K/uL    RBC 4.44 4.20 - 5.40 M/uL    Hemoglobin 14.0 12.0 - 16.0 g/dL    Hematocrit 43.1 37.0 - 47.0 %    MCV 97.0 (H) 79.4 - 94.8 fL    MCH 31.5 (H) 27.0 - 31.3 pg    MCHC 32.4 (L) 33.0 - 37.0 %    RDW 14.2 11.5 - 14.5 %    Platelets 498 765 - 136 K/uL   POCT Glucose    Collection Time: 11/13/22  7:59 AM   Result Value Ref Range    POC Glucose 116 (H) 70 - 99 mg/dl    Performed on ACCU-CHEK    POCT Glucose    Collection Time: 11/13/22 11:30 AM   Result Value Ref Range    POC Glucose 114 (H) 70 - 99 mg/dl    Performed on ACCU-CHEK    POCT Glucose    Collection Time: 11/13/22 11:47 AM   Result Value Ref Range    POC Glucose 123 (H) 70 - 99 mg/dl    Performed on ACCU-CHEK    POCT Glucose    Collection Time: 11/13/22  3:40 PM   Result Value Ref Range    POC Glucose 106 (H) 70 - 99 mg/dl    Performed on ACCU-CHEK        CTA HEAD W WO CONTRAST    Result Date: 11/12/2022  EXAMINATION: CTA OF THE NECK; CTA OF THE HEAD WITHOUT AND WITH CONTRAST 11/12/2022 7:57 am TECHNIQUE: CTA of the neck was performed with the administration of intravenous contrast. Multiplanar reformatted images are provided for review. MIP images are provided for review. Stenosis of the internal carotid arteries measured using NASCET criteria. Automated exposure control, iterative reconstruction, and/or weight based adjustment of the mA/kV was utilized to reduce the radiation dose to as low as reasonably achievable.; CTA of the head/brain was performed without and with the administration of intravenous contrast. Multiplanar reformatted images are provided for review. MIP images are provided for review. Automated exposure control, iterative reconstruction, and/or weight based adjustment of the mA/kV was utilized to reduce the radiation dose to as low as reasonably achievable. COMPARISON: CT brain earlier same day HISTORY: ORDERING SYSTEM PROVIDED HISTORY: right sided weakness TECHNOLOGIST PROVIDED HISTORY: Reason for exam:->right sided weakness What reading provider will be dictating this exam?->CRC FINDINGS: CTA NECK: AORTIC ARCH/ARCH VESSELS: Atherosclerotic calcifications are present within the aortic arch. The origins of the great vessels are normal.  There is no significant stenosis of the innominate or subclavian arteries. CAROTID ARTERIES: There is mild calcified atherosclerotic plaque within the left common carotid artery. There is no significant stenosis of the common carotid arteries identified.  There is no significant stenosis of the internal carotid arteries. There is no dissection or pseudoaneurysm. VERTEBRAL ARTERIES: There is a mildly beaded appearance of the distal V2 and V3 segments of the vertebral arteries suggesting fibromuscular dysplasia. There is no dissection or pseudoaneurysm. There is no significant stenosis. SOFT TISSUES: Lung apices are clear. There is no superior mediastinal lymphadenopathy. There is no cervical lymphadenopathy or soft tissue mass identified. The parotid glands and submandibular glands are normal. BONES: No lytic or blastic osseous lesions are identified. There is moderate multilevel neural foraminal narrowing and mild spinal canal stenosis from C4-C5 to C6-C7. CTA HEAD: ANTERIOR CIRCULATION: Atherosclerotic calcifications are present within the cavernous segments of the internal carotid arteries without significant stenosis evident. The anterior and middle cerebral arteries are normal. There is no significant stenosis or aneurysm evident. POSTERIOR CIRCULATION: The distal vertebral arteries are normal in appearance. The basilar artery is normal.  No significant stenosis or aneurysm is identified. The posterior cerebral arteries are normal in appearance. OTHER: No dural venous sinus thrombosis on this non-dedicated study. BRAIN: There is no mass effect or midline shift. There is no vascular malformation identified. 1. No large vessel occlusion, significant stenosis or cerebral aneurysm identified within the brain. 2. Atherosclerosis without significant arterial stenosis identified within the neck. 3. Beaded appearance of the distal vertebral arteries suggesting fibromuscular dysplasia.      CT HEAD WO CONTRAST    Result Date: 11/13/2022  EXAMINATION: CT OF THE HEAD WITHOUT CONTRAST  11/13/2022 9:25 am TECHNIQUE: CT of the head was performed without the administration of intravenous contrast. Automated exposure control, iterative reconstruction, and/or weight based adjustment of the mA/kV was utilized to reduce the radiation dose to as low as reasonably achievable. COMPARISON: None. HISTORY: ORDERING SYSTEM PROVIDED HISTORY: eval for progression of blood layering within the occipital horns of the lateral ventricles TECHNOLOGIST PROVIDED HISTORY: Reason for exam:->eval for progression of blood layering within the occipital horns of the lateral ventricles Has a \"code stroke\" or \"stroke alert\" been called? ->No What reading provider will be dictating this exam?->CRC FINDINGS: BRAIN/VENTRICLES: Cerebral atrophy is noted globally. There is posterior trophic enlargement of the lateral ventricles similar to older examinations. There is a small amount of intermediate density hemorrhage in the occipital horns of the right and left ventricle similar in severity to 11/12/2022 MRI. 4th ventricle is patent. There is no shift of midline structures. No subarachnoid or subdural hemorrhage. ORBITS: The visualized portion of the orbits demonstrate no acute abnormality. SINUSES: Pilar bullosa of the left middle nasal turbinate. Right deviation of the nasal septum which appears chronic. Mild mucosal thickening in the sphenoid sinuses. Small air-fluid level in the inferior left frontal sinus. Mastoid air cells are clear. SOFT TISSUES/SKULL:  No acute abnormality of the visualized skull or soft tissues. 1.  Stable minimal layering hemorrhage in the occipital horns of the right and left lateral ventricle. No change from MRI dated 11/12/2022. 2.  Findings compatible with chronic paranasal sinusitis, mild. CT HEAD WO CONTRAST    Result Date: 11/12/2022  EXAMINATION: CT OF THE HEAD WITHOUT CONTRAST  11/12/2022 10:31 am TECHNIQUE: CT of the head was performed without the administration of intravenous contrast. Automated exposure control, iterative reconstruction, and/or weight based adjustment of the mA/kV was utilized to reduce the radiation dose to as low as reasonably achievable.  COMPARISON: 11/12/2022 HISTORY: ORDERING SYSTEM PROVIDED HISTORY: eval for hmg conversion TECHNOLOGIST PROVIDED HISTORY: Reason for exam:->eval for hmg conversion Has a \"code stroke\" or \"stroke alert\" been called? ->No What reading provider will be dictating this exam?->CRC FINDINGS: BRAIN/VENTRICLES: Generalized atrophy identified of the brain. Some low-attenuation areas identified in the periventricular and subcortical white matter to suggest chronic small vessel ischemic change. There is no acute intracranial hemorrhage, mass effect or midline shift. No abnormal extra-axial fluid collection. The gray-white differentiation is maintained without evidence of an acute infarct. There is no evidence of hydrocephalus. ORBITS: The visualized portion of the orbits demonstrate no acute abnormality. SINUSES: The visualized paranasal sinuses and mastoid air cells demonstrate no acute abnormality. SOFT TISSUES/SKULL:  No acute abnormality of the visualized skull or soft tissues. Atrophy and chronic changes seen within the brain with no acute intracranial abnormality. CT HEAD WO CONTRAST    Result Date: 11/12/2022  EXAMINATION: CT OF THE HEAD WITHOUT CONTRAST  11/12/2022 4:34 am TECHNIQUE: CT of the head was performed without the administration of intravenous contrast. Automated exposure control, iterative reconstruction, and/or weight based adjustment of the mA/kV was utilized to reduce the radiation dose to as low as reasonably achievable. COMPARISON: None. HISTORY: ORDERING SYSTEM PROVIDED HISTORY: fall TECHNOLOGIST PROVIDED HISTORY: Reason for exam:->fall Has a \"code stroke\" or \"stroke alert\" been called? ->No Decision Support Exception - unselect if not a suspected or confirmed emergency medical condition->Emergency Medical Condition (MA) What reading provider will be dictating this exam?->CRC FINDINGS: BRAIN/VENTRICLES: There is no acute intracranial hemorrhage, mass effect or midline shift. No abnormal extra-axial fluid collection.   The gray-white differentiation is maintained without evidence of an acute infarct. There is no evidence of hydrocephalus. The ventricles, cisterns and sulci are prominent consistent with atrophy. There is decreased attenuation within the periventricular white matter consistent with periventricular leukomalacia. ORBITS: The visualized portion of the orbits demonstrate no acute abnormality. SINUSES: The visualized paranasal sinuses and mastoid air cells demonstrate no acute abnormality. SOFT TISSUES/SKULL:  No acute abnormality of the visualized skull or soft tissues. 1. There is no acute intracranial abnormality. Specifically, there is no intracranial hemorrhage. 2. Atrophy and periventricular leukomalacia,     CTA NECK W WO CONTRAST    Result Date: 11/12/2022  EXAMINATION: CTA OF THE NECK; CTA OF THE HEAD WITHOUT AND WITH CONTRAST 11/12/2022 7:57 am TECHNIQUE: CTA of the neck was performed with the administration of intravenous contrast. Multiplanar reformatted images are provided for review. MIP images are provided for review. Stenosis of the internal carotid arteries measured using NASCET criteria. Automated exposure control, iterative reconstruction, and/or weight based adjustment of the mA/kV was utilized to reduce the radiation dose to as low as reasonably achievable.; CTA of the head/brain was performed without and with the administration of intravenous contrast. Multiplanar reformatted images are provided for review. MIP images are provided for review. Automated exposure control, iterative reconstruction, and/or weight based adjustment of the mA/kV was utilized to reduce the radiation dose to as low as reasonably achievable.  COMPARISON: CT brain earlier same day HISTORY: ORDERING SYSTEM PROVIDED HISTORY: right sided weakness TECHNOLOGIST PROVIDED HISTORY: Reason for exam:->right sided weakness What reading provider will be dictating this exam?->CRC FINDINGS: CTA NECK: AORTIC ARCH/ARCH VESSELS: Atherosclerotic calcifications are present within the aortic arch. The origins of the great vessels are normal.  There is no significant stenosis of the innominate or subclavian arteries. CAROTID ARTERIES: There is mild calcified atherosclerotic plaque within the left common carotid artery. There is no significant stenosis of the common carotid arteries identified. There is no significant stenosis of the internal carotid arteries. There is no dissection or pseudoaneurysm. VERTEBRAL ARTERIES: There is a mildly beaded appearance of the distal V2 and V3 segments of the vertebral arteries suggesting fibromuscular dysplasia. There is no dissection or pseudoaneurysm. There is no significant stenosis. SOFT TISSUES: Lung apices are clear. There is no superior mediastinal lymphadenopathy. There is no cervical lymphadenopathy or soft tissue mass identified. The parotid glands and submandibular glands are normal. BONES: No lytic or blastic osseous lesions are identified. There is moderate multilevel neural foraminal narrowing and mild spinal canal stenosis from C4-C5 to C6-C7. CTA HEAD: ANTERIOR CIRCULATION: Atherosclerotic calcifications are present within the cavernous segments of the internal carotid arteries without significant stenosis evident. The anterior and middle cerebral arteries are normal. There is no significant stenosis or aneurysm evident. POSTERIOR CIRCULATION: The distal vertebral arteries are normal in appearance. The basilar artery is normal.  No significant stenosis or aneurysm is identified. The posterior cerebral arteries are normal in appearance. OTHER: No dural venous sinus thrombosis on this non-dedicated study. BRAIN: There is no mass effect or midline shift. There is no vascular malformation identified. 1. No large vessel occlusion, significant stenosis or cerebral aneurysm identified within the brain. 2. Atherosclerosis without significant arterial stenosis identified within the neck. 3. Beaded appearance of the distal vertebral arteries suggesting fibromuscular dysplasia. XR CHEST PORTABLE    Result Date: 11/12/2022  EXAMINATION: ONE XRAY VIEW OF THE CHEST 11/12/2022 4:12 am COMPARISON: None. HISTORY: ORDERING SYSTEM PROVIDED HISTORY: incresed resp rate TECHNOLOGIST PROVIDED HISTORY: Reason for exam:->incresed resp rate What reading provider will be dictating this exam?->CRC FINDINGS: The cardiomediastinal silhouette is at the upper limits of normal for technique. There are low lung volumes with bronchovascular crowding and bibasilar atelectasis. No pneumothorax or pleural effusion. Bilateral shoulder arthroplasties. Bibasilar atelectasis. MRI brain without contrast    Result Date: 11/12/2022  EXAMINATION: MRI OF THE BRAIN WITHOUT CONTRAST  11/12/2022 1:48 pm TECHNIQUE: Multiplanar multisequence MRI of the brain was performed without the administration of intravenous contrast. COMPARISON: None. HISTORY: ORDERING SYSTEM PROVIDED HISTORY: TIA TECHNOLOGIST PROVIDED HISTORY: Reason for exam:->TIA What is the sedation requirement?->None What reading provider will be dictating this exam?->CRC Initial evaluation. FINDINGS: INTRACRANIAL STRUCTURES/VENTRICLES: There is an acute infarct within the left inferior ahsan (DWI series 3, image 8). No mass effect or midline shift. A trace amount of blood is seen layering within the occipital horns of the lateral ventricles bilaterally. Areas of T2 FLAIR hyperintensity are seen in the periventricular and subcortical white matter, which are nonspecific, but may represent chronic microvascular ischemic change. There is prominence of the ventricles and sulci due to global parenchymal volume loss. The sellar/suprasellar regions appear unremarkable. There is at least partial loss of the normal signal void within the right vertebral artery. ORBITS: The visualized portion of the orbits demonstrate no acute abnormality.  SINUSES: The visualized paranasal sinuses and mastoid air cells demonstrate no acute abnormality. BONES/SOFT TISSUES: The bone marrow signal intensity appears normal. The soft tissues demonstrate no acute abnormality.            Diagnostics:    Recent Results (from the past 24 hour(s))   POCT Glucose    Collection Time: 11/12/22  7:49 PM   Result Value Ref Range    POC Glucose 86 70 - 99 mg/dl    Performed on ACCU-CHEK    POCT Glucose    Collection Time: 11/12/22 11:55 PM   Result Value Ref Range    POC Glucose 104 (H) 70 - 99 mg/dl    Performed on ACCU-CHEK    POCT Glucose    Collection Time: 11/13/22  4:01 AM   Result Value Ref Range    POC Glucose 113 (H) 70 - 99 mg/dl    Performed on ACCU-CHEK    Hemoglobin A1c    Collection Time: 11/13/22  5:13 AM   Result Value Ref Range    Hemoglobin A1C 5.9 4.8 - 5.9 %   Lipid Panel    Collection Time: 11/13/22  5:13 AM   Result Value Ref Range    Cholesterol, Total 172 0 - 199 mg/dL    Triglycerides 90 0 - 150 mg/dL    HDL 72 (H) 40 - 59 mg/dL    LDL Calculated 82 0 - 129 mg/dL   CBC    Collection Time: 11/13/22  5:14 AM   Result Value Ref Range    WBC 6.7 4.8 - 10.8 K/uL    RBC 4.44 4.20 - 5.40 M/uL    Hemoglobin 14.0 12.0 - 16.0 g/dL    Hematocrit 43.1 37.0 - 47.0 %    MCV 97.0 (H) 79.4 - 94.8 fL    MCH 31.5 (H) 27.0 - 31.3 pg    MCHC 32.4 (L) 33.0 - 37.0 %    RDW 14.2 11.5 - 14.5 %    Platelets 250 856 - 465 K/uL   POCT Glucose    Collection Time: 11/13/22  7:59 AM   Result Value Ref Range    POC Glucose 116 (H) 70 - 99 mg/dl    Performed on ACCU-CHEK    POCT Glucose    Collection Time: 11/13/22 11:30 AM   Result Value Ref Range    POC Glucose 114 (H) 70 - 99 mg/dl    Performed on ACCU-CHEK    POCT Glucose    Collection Time: 11/13/22 11:47 AM   Result Value Ref Range    POC Glucose 123 (H) 70 - 99 mg/dl    Performed on ACCU-CHEK    POCT Glucose    Collection Time: 11/13/22  3:40 PM   Result Value Ref Range    POC Glucose 106 (H) 70 - 99 mg/dl    Performed on ACCU-CHEK 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  Due to the diagnoses above the patient has had significant decline in function and is now requiring acute intensive therapy to optimize function. They are expected to achieve meaningful functional gains. Due to medical complexity as above, rehabilitation physician services is reasonable and necessary including face-to-face visits at least 3 days/week with anticipated need to treat, manage and modify the rehabilitation course of treatment including interdisciplinary team conferences. They will require rehab physician care to monitor for neurogenic bowel bladder, postoperative pain, titration of opiate and high risk medications,   blood pressure and blood sugar control. It is my opinion that they will be able to tolerate and benefit from 3 hours of therapy a day. I reviewed the various options re: levels of care with the patient and family. Please see pre-admission screen note for further details. I discussed acute rehab with the patient and verify that the patient is able and willing to participate in 3 hours of therapy a day. Rehab and Acute Care Case Management has also reinforced this expectation.   This patient requires multidisciplinary rehabilitation treatment, including daily care and management from a PM&R physician, 24-hour rehabilitation nursing, Physical Therapy, Occupational Therapy, rehabilitation psychology, consideration of speech and language pathology, recreational therapy, nutritional services, and a rehabilitation . I feel that it is reasonable to plan for a discharge to home setting after acute rehab. Versus       skilled rehabilitation level of care. It is my opinion that they will NOT be able to tolerate and benefit from 3 hours of therapy a day. I reviewed the various other options re: levels of care with the patient and family. Once patient has improved, he able to get a better handle on her rehab needs. Happy to monitor her on a daily basis. Specialized nursing care to focus on: Bowel and bladder issues-Monitor for urinary retention-check PVRs, bladder scan--cath if no void. Wound management re   -pressure relief protocols-side to side turns  IV medication administration       Monitor endurance and if necessary spread therapy out over a 7-day window-adding scheduled rest breaks when needed. Focus on energy conservation. Monitor heart rate and   cardiac medications effects on heart rate and blood pressure before, during and after therapy. Progress toward endurance training with pulse ox monitoring for saturation and heart rate. continue to monitor closely for dehydration-- Improve hydration and nutrition by adding Vitamin B12 shot times one, adding Protein supplements and push PO fluids. Treat and monitor for higher level cognitive deficits, focus on difficulty with sequencing and problem-solving. Focus on higher-level balance and falls risk issues focusing on balance training and monitoring for orthostasis. Above recommendations are indicated to address medical complexity and need for appropriate rehab services. Will tailor individual care and rehab plan per individuals needs re monitor daily to address rehab needs given clinical worsening today and need for close monitoring.     Focus of today's plan-   continue to monitor in acute setting      Required Certification Data (potential inpatient rehabilitation facility patient's only)    Deficits:weakness, nutrition, mobility, impaired gait, high risk for falls, frequent falls, decreased endurance, deconditioning , debility, cardiovascular compromise, balance, ADL's, balance and righting reactions, and cognitive deficits     Disability:mobility, locomotion, self care, and cognitive    Potential barriers to progress/discharge:complex medical conditions         It was my pleasure to evaluate David Mcgee today. Please call [phone] with questions.     Wilfredo Johnson MD

## 2022-11-14 NOTE — FLOWSHEET NOTE
Speech therapy in to see patient this morning. Patient did not pass testing. Maintaining NPO status. Cardiology, Dr. Nathen Ledesma, aware of elevated BP's. Will continue to monitor. Down for CT head.

## 2022-11-14 NOTE — CARE COORDINATION
SPOKE WITH SPOUSE FOR ANOTHER SNF CHOICE AND HE [facility]. [facility] SENT TO [facility]. NO[facility] TO FOLLOW.

## 2022-11-14 NOTE — CONSULTS
Palliative Care Consult Note  Patient: Edyta Sanchez  Gender: female  YOB: 1945  Unit/Bed: U404/I986-28  CodeStatus: Full Code  Inpatient Treatment Team: Treatment Team: Attending Provider: Fabiola Easley DO; Consulting Physician: Alona Cheema MD; Consulting Physician: Steven Gilbert MD; : Lokesh Dempsey, RN; Registered Nurse: Bobby Champion, RN; : Nathaly Wang RN; Registered Nurse: Luis Daniel Abdi RN; Utilization Reviewer: Delana Landau, RN  Admit Date:  11/12/2022    Chief Complaint: Inability to ambulate    History of Presenting Illness:      Edyta Sanchez is a 76 y.o. female on hospital day 2 with a history of COPD, HTN, HLD, anxiety, OA, MDD, OAB, and osteoporosis. Patient presented to the ER from home due to slurring her words and not being able to get up out of bed to go to the bathroom. Patient also had some dysphagia and a headache prior to going to bed. In the ER, patient exhibited right sided hemiparesis. She was admitted for a TIA. Palliative care consulted for goals of care, code status discussion, family support, and symptom management. Patient is alert and oriented to person and that she is in a hospital. Unable to state which hospital. Disoriented to date/time/situation. States she is doing [place]. \" Denies pain or issues with her breathing. Patient is difficult to understand due to expressive aphasia. Patient is from home with her spouse. Patient is currently NPO and failed swallow eval. She has significant right sided hemiparesis. Plan is for [facility] for rehab post discharge. Review of Systems:       Review of Systems   Unable to perform ROS: Mental status change     Physical Examination:       BP (!) 158/45   Pulse 66   Temp 98.6 °F (37 °C) (Axillary)   Resp 18   Ht 5' 3\" (1.6 m)   Wt 160 lb (72.6 kg)   LMP  (LMP Unknown)   SpO2 97%   BMI 28.34 kg/m²    Physical Exam  Constitutional:       General: She is not in acute distress.   HENT: Head: Normocephalic and atraumatic. Nose: No rhinorrhea. Eyes:      General: No scleral icterus. Right eye: No discharge. Left eye: No discharge. Extraocular Movements: Extraocular movements intact. Conjunctiva/sclera: Conjunctivae normal.   Cardiovascular:      Rate and Rhythm: Regular rhythm. Bradycardia present. Comments: Regularly irregular. Pulmonary:      Effort: Pulmonary effort is normal.      Breath sounds: Decreased breath sounds present. No wheezing or rales. Abdominal:      General: Bowel sounds are normal. There is no distension. Palpations: Abdomen is soft. Tenderness: There is no abdominal tenderness. Musculoskeletal:      Cervical back: Neck supple. Right lower leg: No edema. Left lower leg: No edema. Skin:     General: Skin is warm and dry. Coloration: Skin is pale. Neurological:      Mental Status: She is alert. She is disoriented. Motor: Weakness present. Comments: Right sided hemiparesis. Expressive aphasia. Psychiatric:         Mood and Affect: Mood normal. Affect is flat. Behavior: Behavior normal. Behavior is cooperative. Cognition and Memory: Cognition is impaired. Allergies:       Allergies   Allergen Reactions    Codeine      Nausea, lightheadedness, dizzy       Medications:      Current Facility-Administered Medications   Medication Dose Route Frequency Provider Last Rate Last Admin    [Held by provider] nitroglycerin (NITRO-BID) 2 % ointment 0.5 inch  0.5 inch Topical 4 times per day Nicolás Bernal MD        hydrALAZINE (APRESOLINE) injection 5 mg  5 mg IntraVENous Q4H PRN Nicolás Bernal MD   5 mg at 11/13/22 1549    enalaprilat (VASOTEC) injection 1.25 mg  1.25 mg IntraVENous 4 times per day Nicolás Bernal MD   1.25 mg at 11/14/22 0541    hydrALAZINE (APRESOLINE) injection 20 mg  20 mg IntraVENous Q4H Nicolás Bernal MD   20 mg at 11/13/22 0653    cloNIDine Ashley Mesa MD at [address] [facility] N/A 2017    EGD ESOPHAGOGASTRODUODENOSCOPY performed by Nataly Calderon MD at [address] Bilateral     SKIN BIOPSY Right 2020    EXCISION OF LEG MASS RIGHT (PAT ON ADMIT) performed by Carmen Díza MD at [address] Hx:  Social History     Socioeconomic History    Marital status:      Spouse name: Bonita Lechuga     Number of children: 3    Years of education: 12    Highest education level: 12th grade   Occupational History    Occupation: [employer]    Tobacco Use    Smoking status: Former     Packs/day: 1.00     Years: 20.00     Pack years: 20.00     Types: Cigarettes     Quit date:      Years since quittin.8    Smokeless tobacco: Never   Vaping Use    Vaping Use: Never used   Substance and Sexual Activity    Alcohol use: No    Drug use: Never    Sexual activity: Not Currently     Partners: Male   Social History Narrative    Lives With: Spouse Ephraim    Type of Home: [address]: Two level    Home Access: Stairs to enter with rails - Number of Steps: 2- Rails: Right    Bathroom Shower/Tub: Tub/Shower unit    Bathroom Equipment: Shower chair, Hand-held shower    Home Equipment: Rosea Mon, rolling    ADL Assistance: Independent    Ambulation Assistance: Independent (with [employer])    Transfer Assistance: Independent    Active : No    Additional Comments: Pt inconsistent historian. PLOF and home set up verified by dtr. Pt's dtr stating that pt's dementia has been worsening over the past few years.                       Social Determinants of Health     Financial Resource Strain: Low Risk     Difficulty of Paying Living Expenses: Not hard at all   Food Insecurity: No Food Insecurity    Worried About [address] in the Last Year: Never true    Ran Out of Food in the Last Year: Never true   Physical Activity: Insufficiently Active    Days of Exercise per Week: 7 days    Minutes of Exercise per Session: 10 min       Family Hx:  Family History   Problem Relation Age of Onset    Arthritis Father     Other Father         gout    Heart Failure Father     Cancer Sister        LABS: Reviewed     CBC:  Lab Results   Component Value Date/Time    WBC 8.5 11/14/2022 04:56 AM    RBC 4.19 11/14/2022 04:56 AM    RBC 4.22 04/23/2012 11:12 AM    HGB 13.3 11/14/2022 04:56 AM    HCT 40.4 11/14/2022 04:56 AM    MCV 96.3 11/14/2022 04:56 AM    MCH 31.8 11/14/2022 04:56 AM    MCHC 33.0 11/14/2022 04:56 AM    RDW 14.4 11/14/2022 04:56 AM     11/14/2022 04:56 AM    MPV 9.9 10/12/2012 09:30 AM     CBC with Differential:   Lab Results   Component Value Date/Time    WBC 8.5 11/14/2022 04:56 AM    RBC 4.19 11/14/2022 04:56 AM    RBC 4.22 04/23/2012 11:12 AM    HGB 13.3 11/14/2022 04:56 AM    HCT 40.4 11/14/2022 04:56 AM     11/14/2022 04:56 AM    MCV 96.3 11/14/2022 04:56 AM    MCH 31.8 11/14/2022 04:56 AM    MCHC 33.0 11/14/2022 04:56 AM    RDW 14.4 11/14/2022 04:56 AM    NRBC 1 10/15/2019 12:39 PM    BANDSPCT 13 10/15/2019 12:39 PM    METASPCT 1 10/15/2019 12:39 PM    LYMPHOPCT 11.1 11/14/2022 04:56 AM    MONOPCT 9.0 11/14/2022 04:56 AM    MYELOPCT 1 10/15/2019 12:39 PM    EOSPCT 5.3 01/23/2012 11:31 AM    BASOPCT 0.3 11/14/2022 04:56 AM    MONOSABS 0.8 11/14/2022 04:56 AM    LYMPHSABS 0.9 11/14/2022 04:56 AM    EOSABS 0.4 11/14/2022 04:56 AM    BASOSABS 0.0 11/14/2022 04:56 AM     CMP:    Lab Results   Component Value Date/Time     11/14/2022 04:56 AM    K 3.9 11/14/2022 04:56 AM    K 4.0 10/23/2019 06:19 AM     11/14/2022 04:56 AM    CO2 24 11/14/2022 04:56 AM    BUN 6 11/14/2022 04:56 AM    CREATININE 0.58 11/14/2022 04:56 AM    GFRAA >60.0 07/05/2022 11:26 AM    LABGLOM >60.0 11/14/2022 04:56 AM    GLUCOSE 106 11/14/2022 04:56 AM    GLUCOSE 75 04/23/2012 11:12 AM    PROT 6.4 11/12/2022 03:45 AM    LABALBU 3.8 11/12/2022 03:45 AM    LABALBU 4.6 04/23/2012 11:12 AM    CALCIUM 8.8 11/14/2022 04:56 AM    BILITOT 0.8 11/12/2022 03:45 AM    ALKPHOS 59 11/12/2022 03:45 AM    AST 29 11/12/2022 03:45 AM    ALT 17 11/12/2022 03:45 AM     BMP:    Lab Results   Component Value Date/Time     11/14/2022 04:56 AM    K 3.9 11/14/2022 04:56 AM    K 4.0 10/23/2019 06:19 AM     11/14/2022 04:56 AM    CO2 24 11/14/2022 04:56 AM    BUN 6 11/14/2022 04:56 AM    LABALBU 3.8 11/12/2022 03:45 AM    LABALBU 4.6 04/23/2012 11:12 AM    CREATININE 0.58 11/14/2022 04:56 AM    CALCIUM 8.8 11/14/2022 04:56 AM    GFRAA >60.0 07/05/2022 11:26 AM    LABGLOM >60.0 11/14/2022 04:56 AM    GLUCOSE 106 11/14/2022 04:56 AM    GLUCOSE 75 04/23/2012 11:12 AM     TSH:   Lab Results   Component Value Date/Time    TSH 4.230 10/10/2019 04:15 PM     Urinalysis:   Lab Results   Component Value Date/Time    NITRU Negative 11/12/2022 03:45 AM    WBCUA 6-9 11/12/2022 03:45 AM    BACTERIA Negative 11/12/2022 03:45 AM    RBCUA 0-2 11/12/2022 03:45 AM    BLOODU Negative 11/12/2022 03:45 AM    SPECGRAV 1.014 11/12/2022 03:45 AM    GLUCOSEU Negative 11/12/2022 03:45 AM    GLUCOSEU NEG 10/17/2011 10:15 AM     Albumin:   Lab Results   Component Value Date    LABALBU 3.8 11/12/2022     Weight Trends  Wt Readings from Last 10 Encounters:   11/12/22 160 lb (72.6 kg)   11/12/22 158 lb 4 oz (71.8 kg)   09/23/22 163 lb 12.8 oz (74.3 kg)   07/01/22 162 lb (73.5 kg)   07/15/21 153 lb (69.4 kg)   07/01/21 153 lb (69.4 kg)   06/17/21 153 lb 12.8 oz (69.8 kg)   06/03/21 151 lb (68.5 kg)   12/17/20 145 lb (65.8 kg)   09/17/20 140 lb (63.5 kg)          FUNCTIONAL ADL´S:     Independent: [  ] Eating, [   ] Dressing, [   ] Transferring, [   ] Toileting, [   ] Terrie Kelsy, [   ] Continence  Dependent   : [ x ] Eating, [ x ] Dressing, [ x ] Transferring, [ x ] Toileting, [ x ] Bathing, [ x ] Continence  W. assistant : [  ] Eating, [   ] Dressing, [   ] Transferring, [   ] Eduard Abdi, [ ] Bathing, [   ] Continence    Radiology: Reviewed      CT HEAD WO CONTRAST    Result Date: 11/13/2022  EXAMINATION: CT OF THE HEAD WITHOUT CONTRAST  11/13/2022 9:25 am TECHNIQUE: CT of the head was performed without the administration of intravenous contrast. Automated exposure control, iterative reconstruction, and/or weight based adjustment of the mA/kV was utilized to reduce the radiation dose to as low as reasonably achievable. COMPARISON: None. HISTORY: ORDERING SYSTEM PROVIDED HISTORY: eval for progression of blood layering within the occipital horns of the lateral ventricles TECHNOLOGIST PROVIDED HISTORY: Reason for exam:->eval for progression of blood layering within the occipital horns of the lateral ventricles Has a \"code stroke\" or \"stroke alert\" been called? ->No What reading provider will be dictating this exam?->CRC FINDINGS: BRAIN/VENTRICLES: Cerebral atrophy is noted globally. There is posterior trophic enlargement of the lateral ventricles similar to older examinations. There is a small amount of intermediate density hemorrhage in the occipital horns of the right and left ventricle similar in severity to 11/12/2022 MRI. 4th ventricle is patent. There is no shift of midline structures. No subarachnoid or subdural hemorrhage. ORBITS: The visualized portion of the orbits demonstrate no acute abnormality. SINUSES: Pilar bullosa of the left middle nasal turbinate. Right deviation of the nasal septum which appears chronic. Mild mucosal thickening in the sphenoid sinuses. Small air-fluid level in the inferior left frontal sinus. Mastoid air cells are clear. SOFT TISSUES/SKULL:  No acute abnormality of the visualized skull or soft tissues. 1.  Stable minimal layering hemorrhage in the occipital horns of the right and left lateral ventricle. No change from MRI dated 11/12/2022. 2.  Findings compatible with chronic paranasal sinusitis, mild.      CT HEAD WO CONTRAST    Result Date: 11/12/2022  EXAMINATION: CT OF THE HEAD WITHOUT CONTRAST  11/12/2022 10:31 am TECHNIQUE: CT of the head was performed without the administration of intravenous contrast. Automated exposure control, iterative reconstruction, and/or weight based adjustment of the mA/kV was utilized to reduce the radiation dose to as low as reasonably achievable. COMPARISON: 11/12/2022 HISTORY: ORDERING SYSTEM PROVIDED HISTORY: eval for hmg conversion TECHNOLOGIST PROVIDED HISTORY: Reason for exam:->eval for hmg conversion Has a \"code stroke\" or \"stroke alert\" been called? ->No What reading provider will be dictating this exam?->CRC FINDINGS: BRAIN/VENTRICLES: Generalized atrophy identified of the brain. Some low-attenuation areas identified in the periventricular and subcortical white matter to suggest chronic small vessel ischemic change. There is no acute intracranial hemorrhage, mass effect or midline shift. No abnormal extra-axial fluid collection. The gray-white differentiation is maintained without evidence of an acute infarct. There is no evidence of hydrocephalus. ORBITS: The visualized portion of the orbits demonstrate no acute abnormality. SINUSES: The visualized paranasal sinuses and mastoid air cells demonstrate no acute abnormality. SOFT TISSUES/SKULL:  No acute abnormality of the visualized skull or soft tissues. Atrophy and chronic changes seen within the brain with no acute intracranial abnormality. MRI brain without contrast    Result Date: 11/12/2022  EXAMINATION: MRI OF THE BRAIN WITHOUT CONTRAST  11/12/2022 1:48 pm TECHNIQUE: Multiplanar multisequence MRI of the brain was performed without the administration of intravenous contrast. COMPARISON: None. HISTORY: ORDERING SYSTEM PROVIDED HISTORY: TIA TECHNOLOGIST PROVIDED HISTORY: Reason for exam:->TIA What is the sedation requirement?->None What reading provider will be dictating this exam?->CRC Initial evaluation.  FINDINGS: INTRACRANIAL STRUCTURES/VENTRICLES: There is an acute infarct within the left inferior ahsna (DWI series 3, image 8). No mass effect or midline shift. A trace amount of blood is seen layering within the occipital horns of the lateral ventricles bilaterally. Areas of T2 FLAIR hyperintensity are seen in the periventricular and subcortical white matter, which are nonspecific, but may represent chronic microvascular ischemic change. There is prominence of the ventricles and sulci due to global parenchymal volume loss. The sellar/suprasellar regions appear unremarkable. There is at least partial loss of the normal signal void within the right vertebral artery. ORBITS: The visualized portion of the orbits demonstrate no acute abnormality. SINUSES: The visualized paranasal sinuses and mastoid air cells demonstrate no acute abnormality. BONES/SOFT TISSUES: The bone marrow signal intensity appears normal. The soft tissues demonstrate no acute abnormality. 1. Small acute infarct involving the left inferior ahsan. No mass effect or midline shift. 2. Trace amount of blood layering within the occipital horns of the lateral ventricles bilaterally. 3. At least partial loss of the normal signal void within the right vertebral artery, which can be seen with slow flow/occlusion. This is of uncertain chronicity. 4. Otherwise, no acute intracranial abnormality. 5. Moderate global parenchymal volume loss with mild chronic microvascular ischemic changes. These results were sent to the [facility] [id number] ([address] [place] [address]) on 11/12/2022 at 2:22 pm to be communicated to the referring/covering health care provider/office. Assessment and plan:    Patient without decision-making capacity. Palliative care encounter  Explained the role of palliative care in treating patient. Discussed symptom management related to chronic disease/condition. Provided emotional support and active listening.  Patient with limited understanding at this time due to altered mental status. Discussed goals of care with spouse at bedside along with neurology and primary service. Spouse wishes to proceed with PEG tube for patient. If for some reason, they decide against artificial nutrition, she would be eligible for hospice. Hospice currently inconsistent with patient's goals of care. -Advance Care Planning  Discussed goals of care with patient's spouse. Patient's spouse has made the decision for patient to be a FULL CODE. Next of kin is spouse, Jarrod Delong. -Goals of Care Discussion:  Disease process and goals of treatment were discussed in basic terms. Fide's goal is to optimize available comfort care measures, continue with full treatment, have PEG tube placed, and go to SNF for rehabilitation. We discussed the palliative care philosophy in light of those goals. We discussed all care options contingent on treatment response and QOL. Much active listening, presence, and emotional support were given.      Will continue to follow to monitor progression of treatment.     -Patient is currently being treated for multiple medical conditions by other providers    Left pontine stroke  HTN/HLD  COPD  Chronic diastolic HF  Right sided hemiplegia  Dysphagia  Possible underlying dementia    MDM: High    Electronically signed by YULY Mckeon CNP on 11/14/2022 at 8:46 AM

## 2022-11-14 NOTE — PLAN OF CARE
See OT evaluation for all goals and OT POC.  Electronically signed by PAPI Allison/L on 11/14/2022 at 12:01 PM

## 2022-11-14 NOTE — CARE COORDINATION
Per Berenice, \" Unable to take pt due to her insurance. Referral send to [facility]. DC PLAN: Marshall Sober. REFERRAL SEND . LSW WILL FOLLOW. Electronically signed by ANTHONY Hernandez on 11/14/2022 at 3:11 PM    Recevied a Phone call from Mukesh at [facility]. [facility] unable to take due to possible peg tube. Notify CM. LSW will follow. Electronically signed by ANTHONY Hernandez on 11/14/2022 at 3:20 PM    REFERRAL SEND TO [address] [place] [address] [facility]. LSW WILL FOLLOW.      Electronically signed by ANTHONY Hernandez on 11/14/2022 at 4:51 PM

## 2022-11-14 NOTE — PROGRESS NOTES
[facility]   Facility/Department: [facility]  Speech Language Pathology  Initial Speech/Language/Cognitive Assessment    NAME:Fide Mock  : 1945 (76 y.o.)   [x]   confirmed    MRN: [id number]  ROOM: W271/[id number]  ADMISSION DATE: 2022  PATIENT DIAGNOSIS(ES): TIA (transient ischemic attack) [G45.9]  Essential hypertension [I10]  Chronic obstructive pulmonary disease, unspecified COPD type ([address] [J44.9]  Acute CVA (cerebrovascular accident) [facility]) [I63.9]  Chief Complaint   Patient presents with    Fall     Patient Active Problem List    Diagnosis Date Noted    Dysphagia 2022    Cerebrovascular accident (CVA) due to stenosis of left vertebral artery ([address] 2022    Palliative care encounter 2022    Advanced care planning/counseling discussion 2022    Goals of care, counseling/discussion 2022    Cerebrovascular accident (CVA) due to stenosis of left middle cerebral artery ([address] 2022    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 ([address] 2022    Fibromuscular dysplasia ([address] 2022    Impaired mobility and activities of daily living dt CVA 2022    Acute CVA (cerebrovascular accident) ([address] 2022    Non-pressure chronic ulcer of calf with fat layer exposed ([address] 10/09/2020    Non-pressure chronic ulcer of calf, right, with fat layer exposed ([address] 10/02/2020    Claudication in peripheral vascular disease ([address] 2020    Non-healing surgical wound 2020    Venous insufficiency of left lower extremity 2020    Asymptomatic varicose veins of bilateral lower extremities 2020    Pain and swelling due to varicose veins of both lower extremities 2020    Slow transit constipation 10/13/2019    External hemorrhoid, bleeding 10/13/2019    Colitis 10/13/2019    Acute colitis 10/10/2019    Hypovolemic shock ([address] 10/07/2019    NSTEMI (non-ST elevated myocardial infarction) ([facility] 10/05/2019 Acute diastolic heart failure ([address] 10/05/2019    Hypotension 10/01/2019    Neuromuscular scoliosis of lumbar region 2019    Hypertension     Hyperlipidemia     COPD (chronic obstructive pulmonary disease) ([address]     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 ([address] 2010    Generalized osteoarthrosis, unspecified site 2002    Major depressive disorder, single episode, moderate ([address] 2002    Morbid obesity ([address] 2002     Past Medical History:   Diagnosis Date    Anxiety     Arthritis     COPD (chronic obstructive pulmonary disease) (HCC)     Generalized osteoarthrosis, unspecified site 2002    Hyperlipidemia     Hypertension     Major depressive disorder, single episode, moderate ([address] 2002    Morbid obesity ([address] 2002    OAB (overactive bladder)     Osteoporosis      Past Surgical History:   Procedure Laterality Date    CARDIAC CATHETERIZATION      CARPAL TUNNEL RELEASE      COLONOSCOPY      HYSTERECTOMY, TOTAL ABDOMINAL (CERVIX REMOVED)      KNEE ARTHROPLASTY Bilateral     WA COLON CA SCRN NOT HI RSK IND N/A 2017    COLONOSCOPY performed by Pino Felipe MD at [address] TRANSORAL DIAGNOSTIC N/A 2017    EGD ESOPHAGOGASTRODUODENOSCOPY performed by Pino Felipe MD at [address] Bilateral     SKIN BIOPSY Right 2020    EXCISION OF LEG MASS RIGHT (PAT ON ADMIT) performed by Stephie Whiting MD at [address]: 2022    Date of Evaluation: 2022   Evaluating Therapist: BARRON Perez        Diagnosis: Pt presents with moderate to severe cognitive-linguistic deficits. Pt demonstrated diffiuclty with automatic speech,  following directions, identifiying objects, answering moderate level yes/no questions, and answering basic -Wh questions. Pt was orientated to person only. Disoriented to , time, location, and reason for hospitalization.   Please note, pt's full cognitive status was unable to be fully assessed as the pt was limited by pain as the assessment progressed. Pt also demonstrates mild dysarthria. Pt exhibited decreased breath support, decreased rate of speech, and impercise articulation, negatively impacting her intelligibility. General  Chart reviewed: YES  Subjective/Behavior:Pt limited by pain  Oxygen: NC 3L  Social/Functional History  Lives With: Spouse  Type of Home: House  Additional Comments: Pt inconsistent historian. Vision and Hearing  Vision  Vision: Impaired  Vision Exceptions: Wears glasses at all times  Hearing  Hearing: Within functional limits     Oral/Motor  Oral Motor   Labial: Decreased seal;Right droop  Dentition: Intact; Natural  Oral Hygiene: Moist;Clean  Lingual: Decreased strength;Right deviation    Motor Speech  Motor Speech  Dysarthric Characteristics: Decreased breath support;Decreased rate; Imprecise  Intelligibility: Impaired  Conversation Intelligibility (%): 80 %    Comprehension  Auditory Comprehension  Comprehension: Exceptions  Basic Questions: Moderate  Complex Questions: Severe  One Step Commands: Mild  Two Step Commands: Moderate  Multistep Commands: Severe  Complex/Abstract Commands: Severe  Common Objects: Severe    Expression  Expression  Primary Mode of Expression: Verbal  Verbal Expression  Verbal Expression: Exceptions to functional limits  Automatic Speech: Mild  Confrontation: Moderate  Convergent:  (Unable to assess. Limited by pain)  Divergent:  (Unable to assess. Limited by pain)  Interfering Components: Perseverations  Written Expression  Written Expression: Unable to assess    Cognition  Orientation  Overall Orientation Status: Impaired  Orientation Level: Oriented to person;Disoriented to place; Disoriented to time;Disoriented to situation  Attention  Attention: Within Functional Limits  Memory  Memory: Exceptions to [facility]  Problem Solving  Problem Solving: Unable to assess  Numeric Reasoning  Numeric Reasoning: Unable to assess  Abstract Reasoning  Abstract Reasoning: Unable to assess  Safety/Judgment  Safety/Judgment: Unable to assess    Recommendations  Patient Education: Pt, , and RN eduated on results of SLE  Patient Education Response: Needs reinforcement; No evidence of learning  Duration of Treatment: for LOS or until all goals met  Frequency of Treatment: 2-4x/wk    Prognosis  Speech Therapy Prognosis  Prognosis: Fair  Prognosis Considerations: Age;Medical Diagnosis;Participation Level    Education  Individuals consulted  Consulted and agree with results and recommendations: Patient;RN;Family member  Family member consulted:   RN Name: Javier Zheng    Treatment/Goals  Short Term Goals  Time Frame for Short Term Goals: 1 week or LOS until goals are met  Goal 1: Within 1-5 days of implementing the ST POC, pt's cognition will be assessed with additional goals added to pt's POC as deemed necesary by treating SLP. Goal 2: To address pt's cognitive deficits and promote orientation, pt will state name of facility, time within 1 hour, reason in hospital, current month and year with 100% accuracy with min assist, with use of external aid. Goal 3: Pt will complete confrontational naming tasks with 70% accuracy with mild verbal cues to help the patient express their basic wants and needs. Goal 4: Pt will follow 1-2 step directions given orally with 70% accuracy with min cues to increase the pt's ability to follow directions provided by caregivers for safe follow through with ADLs. Goal 5: Pt will answer mid level yes/no questions with 80% accuracy with min cues to assist the caregiver in obtaining important information regarding the patient's personal, medical, and safety needs.   Goal 6: Pt will be educated on compensatory strategies to promote speech intelligibility in 5/5 given opportunities so the patient has a better understanding of strategies that will enhance his/her ability to express their wants and needs. Long Term Goals  Time Frame for Long Term Goals: 1 week or LOS until goals are met  Goal 1: Pt will demonstrate functional cognitive-linguistic abilities in all opportunities with modified independence in order to safely complete ADLs. Goal 2: Pt will improve his/her Speech Intelligibility for effective communication of wants, needs, feelings, ideas, and medical/safety information with familiar and unfamiliar listeners. Safety Devices  Safety Devices  Safety Devices in place: Yes  Type of devices: Call light within reach; Bed alarm in place  Restraints Initially in Place: No    Pain Assessment  Patient does not c/o pain. Pt began to scream in pain as the session progressed. RN in room during evaluation. RN aware. Pain Re-assessment  Patient does not c/o pain. Pt began to scream in pain as the session progressed. RN in room during evaluation. RN aware.        Therapy Time  SLP Individual Minutes  Time In: 1000  Time Out: 56  Minutes: 20                 Signature: Electronically signed by BARRON Salcido on 11/14/2022 at 2:39 PM

## 2022-11-14 NOTE — PROGRESS NOTES
[facility] Heart Progress Note      Patient: Ari Cons    Unit/Bed: W017/X284-55  YOB: 1945  MRN: [id number]  Admit Date:  11/12/2022  Hospital Day: 2    Rounding Date: 11/14/2022    Rounding Cardiologist:  OLESYA Kruger MD    PRIMARY Cardiologist:  Lalo Contreras    Subjective Complaint:   Still with dysarthria. Physical Examination:     BP (!) 183/85   Pulse 66   Temp 98.6 °F (37 °C) (Axillary)   Resp 18   Ht 5' 3\" (1.6 m)   Wt 160 lb (72.6 kg)   LMP  (LMP Unknown)   SpO2 97%   BMI 28.34 kg/m²     No intake or output data in the 24 hours ending 11/14/22 [address] examined at bedside in moderately ill. Focused exam reveals:     Cardiac: Heart regular rate and rhythm     Lungs:  clear to auscultation bilaterally- no wheezes, rales or rhonchi, normal air movement, no respiratory distress    Extremities:   Trace edema    Telemetry:      normal sinus  and very little ectopy but heart rate is on the low side          LABS:   CBC:   Recent Labs     11/12/22  1123 11/13/22  0514 11/14/22  0456   WBC 8.1 6.7 8.5   HGB 14.4 14.0 13.3    182 178      BMP:    Recent Labs     11/12/22  0345 11/14/22  0456    137   K 4.4 3.9    103   CO2 23 24   BUN 15 6*   CREATININE 0.74 0.58   GLUCOSE 98 106*              Troponin: No results for input(s): TROPONINT in the last 72 hours. BNP: No results for input(s): PROBNP in the last 72 hours. INR:   Recent Labs     11/12/22  0548 11/12/22  1123   INR 1.0 1.0      Mg: No results for input(s): MG in the last 72 hours. Cardia    Summary   Bubble study negative   Normal left ventricle structure and function. Left ventricular ejection fraction is visually estimated at 55%. No Doppler data.  Limited study      Signaturec Testing: See above       Assessment:  Patient is n.p.o.  Still have to make progress on hypertension  Increase hydralazine  Increase Vasotec  Increase clonidine patch  Gentle diuretics per blood pressure control  Patient to get PEG  Patient to get repeat CT  Plan:  As above, increase Vasotec, clonidine patch, and hydralazine.   Lasix 20 mg IV x1  Goals to get blood pressure less than 143 systolically  [id number] signed by Jose Manuel Sanches MD on 11/14/2022 at 11:51 AM

## 2022-11-15 PROBLEM — G45.9 TIA (TRANSIENT ISCHEMIC ATTACK): Status: ACTIVE | Noted: 2022-11-15

## 2022-11-15 PROBLEM — R47.01 APHASIA: Status: ACTIVE | Noted: 2022-11-15

## 2022-11-15 PROBLEM — G47.30 SLEEP APNEA: Status: ACTIVE | Noted: 2022-11-15

## 2022-11-15 PROBLEM — R13.12 DYSPHAGIA, OROPHARYNGEAL PHASE: Status: ACTIVE | Noted: 2022-11-14

## 2022-11-15 PROBLEM — G47.34 NOCTURNAL HYPOXIA: Status: ACTIVE | Noted: 2022-11-15

## 2022-11-15 LAB
ANION GAP SERPL CALCULATED.3IONS-SCNC: 9 MEQ/L (ref 9–15)
BASOPHILS ABSOLUTE: 0 K/UL (ref 0–0.2)
BASOPHILS RELATIVE PERCENT: 0.5 %
BUN BLDV-MCNC: 7 MG/DL (ref 8–23)
CALCIUM SERPL-MCNC: 8.9 MG/DL (ref 8.5–9.9)
CHLORIDE BLD-SCNC: 108 MEQ/L (ref 95–107)
CO2: 25 MEQ/L (ref 20–31)
CREAT SERPL-MCNC: 0.72 MG/DL (ref 0.5–0.9)
EOSINOPHILS ABSOLUTE: 0.3 K/UL (ref 0–0.7)
EOSINOPHILS RELATIVE PERCENT: 4.5 %
GFR SERPL CREATININE-BSD FRML MDRD: >60 ML/MIN/{1.73_M2}
GLUCOSE BLD-MCNC: 101 MG/DL (ref 70–99)
GLUCOSE BLD-MCNC: 102 MG/DL (ref 70–99)
GLUCOSE BLD-MCNC: 110 MG/DL (ref 70–99)
GLUCOSE BLD-MCNC: 115 MG/DL (ref 70–99)
GLUCOSE BLD-MCNC: 88 MG/DL (ref 70–99)
GLUCOSE BLD-MCNC: 95 MG/DL (ref 70–99)
GLUCOSE BLD-MCNC: 98 MG/DL (ref 70–99)
HCT VFR BLD CALC: 40.5 % (ref 37–47)
HEMOGLOBIN: 13.2 G/DL (ref 12–16)
LYMPHOCYTES ABSOLUTE: 1.3 K/UL (ref 1–4.8)
LYMPHOCYTES RELATIVE PERCENT: 16.4 %
MCH RBC QN AUTO: 31.5 PG (ref 27–31.3)
MCHC RBC AUTO-ENTMCNC: 32.6 % (ref 33–37)
MCV RBC AUTO: 96.4 FL (ref 79.4–94.8)
MONOCYTES ABSOLUTE: 0.9 K/UL (ref 0.2–0.8)
MONOCYTES RELATIVE PERCENT: 12.1 %
NEUTROPHILS ABSOLUTE: 5.1 K/UL (ref 1.4–6.5)
NEUTROPHILS RELATIVE PERCENT: 66.5 %
PDW BLD-RTO: 14.6 % (ref 11.5–14.5)
PERFORMED ON: ABNORMAL
PERFORMED ON: NORMAL
PLATELET # BLD: 178 K/UL (ref 130–400)
POTASSIUM SERPL-SCNC: 3.5 MEQ/L (ref 3.4–4.9)
RBC # BLD: 4.2 M/UL (ref 4.2–5.4)
SODIUM BLD-SCNC: 142 MEQ/L (ref 135–144)
WBC # BLD: 7.7 K/UL (ref 4.8–10.8)

## 2022-11-15 PROCEDURE — 2580000003 HC RX 258: Performed by: INTERNAL MEDICINE

## 2022-11-15 PROCEDURE — 1210000000 HC MED SURG R&B

## 2022-11-15 PROCEDURE — 6370000000 HC RX 637 (ALT 250 FOR IP): Performed by: INTERNAL MEDICINE

## 2022-11-15 PROCEDURE — APPNB15 APP NON BILLABLE TIME 0-15 MINS: Performed by: NURSE PRACTITIONER

## 2022-11-15 PROCEDURE — 92526 ORAL FUNCTION THERAPY: CPT

## 2022-11-15 PROCEDURE — 99232 SBSQ HOSP IP/OBS MODERATE 35: CPT | Performed by: PHYSICAL MEDICINE & REHABILITATION

## 2022-11-15 PROCEDURE — 99232 SBSQ HOSP IP/OBS MODERATE 35: CPT | Performed by: NURSE PRACTITIONER

## 2022-11-15 PROCEDURE — 85025 COMPLETE CBC W/AUTO DIFF WBC: CPT

## 2022-11-15 PROCEDURE — APPSS15 APP SPLIT SHARED TIME 0-15 MINUTES: Performed by: NURSE PRACTITIONER

## 2022-11-15 PROCEDURE — 2500000003 HC RX 250 WO HCPCS: Performed by: INTERNAL MEDICINE

## 2022-11-15 PROCEDURE — 99223 1ST HOSP IP/OBS HIGH 75: CPT | Performed by: COLON & RECTAL SURGERY

## 2022-11-15 PROCEDURE — 2700000000 HC OXYGEN THERAPY PER DAY

## 2022-11-15 PROCEDURE — 99233 SBSQ HOSP IP/OBS HIGH 50: CPT | Performed by: PSYCHIATRY & NEUROLOGY

## 2022-11-15 PROCEDURE — 36415 COLL VENOUS BLD VENIPUNCTURE: CPT

## 2022-11-15 PROCEDURE — 99223 1ST HOSP IP/OBS HIGH 75: CPT | Performed by: INTERNAL MEDICINE

## 2022-11-15 PROCEDURE — 80048 BASIC METABOLIC PNL TOTAL CA: CPT

## 2022-11-15 PROCEDURE — 6360000002 HC RX W HCPCS: Performed by: INTERNAL MEDICINE

## 2022-11-15 RX ORDER — METOPROLOL TARTRATE 5 MG/5ML
5 INJECTION INTRAVENOUS EVERY 6 HOURS
Status: DISCONTINUED | OUTPATIENT
Start: 2022-11-15 | End: 2022-11-16

## 2022-11-15 RX ORDER — CLONIDINE 0.2 MG/24H
1 PATCH, EXTENDED RELEASE TRANSDERMAL WEEKLY
Status: DISCONTINUED | OUTPATIENT
Start: 2022-11-15 | End: 2022-11-16

## 2022-11-15 RX ORDER — POTASSIUM CHLORIDE 7.45 MG/ML
10 INJECTION INTRAVENOUS ONCE
Status: COMPLETED | OUTPATIENT
Start: 2022-11-15 | End: 2022-11-15

## 2022-11-15 RX ADMIN — HYDRALAZINE HYDROCHLORIDE 20 MG: 20 INJECTION INTRAMUSCULAR; INTRAVENOUS at 04:10

## 2022-11-15 RX ADMIN — ENALAPRILAT 2.5 MG: 1.25 INJECTION INTRAVENOUS at 00:10

## 2022-11-15 RX ADMIN — HYDRALAZINE HYDROCHLORIDE 20 MG: 20 INJECTION INTRAMUSCULAR; INTRAVENOUS at 01:10

## 2022-11-15 RX ADMIN — SODIUM CHLORIDE, SODIUM LACTATE, POTASSIUM CHLORIDE, CALCIUM CHLORIDE AND DEXTROSE MONOHYDRATE: 5; 600; 310; 30; 20 INJECTION, SOLUTION INTRAVENOUS at 23:13

## 2022-11-15 RX ADMIN — POTASSIUM CHLORIDE 10 MEQ: 7.46 INJECTION, SOLUTION INTRAVENOUS at 15:05

## 2022-11-15 RX ADMIN — SODIUM CHLORIDE, SODIUM LACTATE, POTASSIUM CHLORIDE, CALCIUM CHLORIDE AND DEXTROSE MONOHYDRATE: 5; 600; 310; 30; 20 INJECTION, SOLUTION INTRAVENOUS at 02:11

## 2022-11-15 RX ADMIN — HYDRALAZINE HYDROCHLORIDE 20 MG: 20 INJECTION INTRAMUSCULAR; INTRAVENOUS at 11:32

## 2022-11-15 RX ADMIN — SODIUM CHLORIDE, SODIUM LACTATE, POTASSIUM CHLORIDE, CALCIUM CHLORIDE AND DEXTROSE MONOHYDRATE: 5; 600; 310; 30; 20 INJECTION, SOLUTION INTRAVENOUS at 12:34

## 2022-11-15 RX ADMIN — ENALAPRILAT 2.5 MG: 1.25 INJECTION INTRAVENOUS at 23:32

## 2022-11-15 RX ADMIN — METOPROLOL TARTRATE 5 MG: 1 INJECTION, SOLUTION INTRAVENOUS at 21:12

## 2022-11-15 RX ADMIN — METOPROLOL TARTRATE 5 MG: 1 INJECTION, SOLUTION INTRAVENOUS at 14:45

## 2022-11-15 RX ADMIN — HYDRALAZINE HYDROCHLORIDE 20 MG: 20 INJECTION INTRAMUSCULAR; INTRAVENOUS at 19:02

## 2022-11-15 RX ADMIN — HYDRALAZINE HYDROCHLORIDE 20 MG: 20 INJECTION INTRAMUSCULAR; INTRAVENOUS at 14:55

## 2022-11-15 RX ADMIN — HYDRALAZINE HYDROCHLORIDE 20 MG: 20 INJECTION INTRAMUSCULAR; INTRAVENOUS at 06:56

## 2022-11-15 ASSESSMENT — ENCOUNTER SYMPTOMS
COUGH: 0
BACK PAIN: 0
WHEEZING: 0
CHOKING: 0
VOMITING: 0
COLOR CHANGE: 0
SHORTNESS OF BREATH: 0
NAUSEA: 0
TROUBLE SWALLOWING: 1

## 2022-11-15 NOTE — PROGRESS NOTES
Subjective: The patient complains of severe acute on chronic progressive fatigue and aphasia and right-sided weakness partially relieved by rest, PT, OT and meds   and exacerbated by exertion and recent CVA. She originally presented to ED with facial droop and memory issues. Per daughter,she got a call from her sister at 18 am who said that her mother was slurring her words. Stated her mother go up to go to the bathroom but she could not get out of bed. They called 911. Patient did have slight dysphagia and a headache the previous night and stated that she did not feel well before going to bed. In the ER she had right sided hemiparesis. MRI of the brain 11/12/22 showed small acute infarct involving the left inferior ahsan. Trace amount of blood layering within the occipital horns lateral ventricles and bilaterally. On acute worsening today arthritic neurological changes and being worked up for intracranial bleeding blood pressures closely being monitored by multiple services including cardiology and medical specialists. 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  *      .    Reviewed recent nursing note and discussed current status and planned care with acute care providers, \" INFORMED SPOUSE THAT [facility] IS NOT ACCEPTING [facility] AT THIS TIME. HE WOULD LIKE TO TRY [facility]. WILL SEND REFERRAL. WILL FOLLOW. REFERRAL SENT TO [facility] PENDING ACCEPTANCE AND PRECERT. \". Am concerned about her oral pharyngeal dysphagia she is now n.p.o.      ROS x10: The patient also complains of severely impaired mobility and activities of daily living. Otherwise no new problems with vision, hearing, nose, mouth, throat, dermal, cardiovascular, GI, , pulmonary, musculoskeletal, psychiatric or neurological.        Vital signs:  BP (!) 131/46   Pulse 88   Temp 98.4 °F (36.9 °C) (Axillary)   Resp 19   Ht 5' 3\" (1.6 m)   Wt 160 lb (72.6 kg)   LMP  (LMP Unknown)   SpO2 94%   BMI 28.34 kg/m²   I/O:   PO/Intake:   NPO      Bowel/Bladder:  incontinent,    General:  Patient is well developed, adequately nourished, and    well kempt. HEENT:    PERRLA, hearing intact to loud voice, external inspection of ear and nose benign. Inspection of lips, tongue and gums benign  Musculoskeletal: No significant change in strength or tone. All joints stable. Inspection and palpation of digits and nails show no clubbing, cyanosis or inflammatory conditions. Neuro/Psychiatric: Affect: flat- sleepy   aphasic No significant change in deep tendon reflexes or sensation  Lungs:  Diminished, CTA-B  .  tachypnic  Heart:   S1 = S2,   RRR. Abdomen:  Soft, non-tender    Extremities:  Trace  lower extremity edema but no unusual tenderness.   Skin:   BUE bruises dt blood draws      Rehabilitation:  Physical Therapy:   Bed mobility:  Bed mobility  Rolling to Left: Maximum assistance;2 Person assistance (11/14/22 0957)  Rolling to Right: Maximum assistance;2 Person assistance (11/14/22 0957)  Supine to Sit: Maximum assistance;Dependent/Total;2 Person assistance (11/14/22 0957)  Sit to Supine: 2 Person assistance;Dependent/Total;Maximum assistance (11/14/22 0957)  Bed Mobility Comments: Focus on sequencing rolling L<>R in supine position during hygiene. pt requires step by step cues, however improves recall of sequencing to roll R following initial trials. unable to follow efficiently for sit<>supine transition.  (11/14/22 0957)  Transfers:  Transfers  Sit to Stand: Maximum Assistance (11/12/22 1339)  Stand to Sit: Maximum Assistance (11/12/22 1339)  Comment: NT - safety concerns (11/14/22 0958)  Gait:   Ambulation  Assistance: Maximum assistance (11/12/22 1339)  Quality of Gait: Standing X 15sec (11/12/22 1339)  Distance: standing only (11/12/22 1339)  Stairs:     W/C mobility:       Occupational Therapy:   Hand Dominance:  (Pt unable to state; R side flaccid)  ADL  Feeding: NPO;Dependent/Total (11/14/22 1046)  Grooming: Dependent/Total (11/14/22 1046)  Grooming Skilled Clinical Factors: Attempts to perform oral care but decreased LUE coordination impacts her ability to complete (11/14/22 1046)  UE Bathing: Dependent/Total (11/14/22 1046)  LE Bathing: Dependent/Total (11/14/22 1046)  UE Dressing: Dependent/Total (11/14/22 1046)  LE Dressing: Dependent/Total (11/14/22 1046)  Toileting: Dependent/Total (11/14/22 1046)  Additional Comments: Simulated dressing, assisted with clean up and fresh gown following bowel and bladder accident in brief (11/14/22 1046)  Toilet Transfers  Toilet Transfer: Unable to assess (11/14/22 1048)  Toilet Transfers Comments: Anticipate dependent (11/14/22 1048)          Speech Therapy:      Comprehension: Exceptions  Verbal Expression: Exceptions to functional limits  Diet/Swallow:        Dysphagia Outcome Severity Scale: Level 2: Moderate Severe dysphagia- Maximum assistance or maximum use of strategies with partial PO only     Therapeutic Interventions: Therapeutic PO trials with SLP    COGNITION  OT: Cognition Comment: Max increased processing time  SP:           Lab/X-ray studies reviewed, analyzed and discussed with patient and staff:   Recent Results (from the past 24 hour(s))   POCT Glucose    Collection Time: 11/14/22  8:04 AM   Result Value Ref Range    POC Glucose 105 (H) 70 - 99 mg/dl    Performed on ACCU-CHEK    POCT Glucose    Collection Time: 11/14/22 11:43 AM   Result Value Ref Range    POC Glucose 99 70 - 99 mg/dl    Performed on ACCU-CHEK    POCT Glucose    Collection Time: 11/14/22  4:40 PM   Result Value Ref Range    POC Glucose 104 (H) 70 - 99 mg/dl    Performed on ACCU-CHEK    POCT Glucose    Collection Time: 11/14/22  9:42 PM   Result Value Ref Range    POC Glucose 120 (H) 70 - 99 mg/dl    Performed on ACCU-CHEK    POCT Glucose    Collection Time: 11/15/22  2:13 AM   Result Value Ref Range    POC Glucose 98 70 - 99 mg/dl    Performed on ACCU-CHEK    CBC with Auto Differential    Collection Time: 11/15/22  5:52 AM   Result Value Ref Range    WBC 7.7 4.8 - 10.8 K/uL    RBC 4.20 4. 20 - 5.40 M/uL    Hemoglobin 13.2 12.0 - 16.0 g/dL    Hematocrit 40.5 37.0 - 47.0 %    MCV 96.4 (H) 79.4 - 94.8 fL    MCH 31.5 (H) 27.0 - 31.3 pg    MCHC 32.6 (L) 33.0 - 37.0 %    RDW 14.6 (H) 11.5 - 14.5 %    Platelets 912 485 - 822 K/uL    Neutrophils % 66.5 %    Lymphocytes % 16.4 %    Monocytes % 12.1 %    Eosinophils % 4.5 %    Basophils % 0.5 %    Neutrophils Absolute 5.1 1.4 - 6.5 K/uL    Lymphocytes Absolute 1.3 1.0 - 4.8 K/uL    Monocytes Absolute 0.9 (H) 0.2 - 0.8 K/uL    Eosinophils Absolute 0.3 0.0 - 0.7 K/uL    Basophils Absolute 0.0 0.0 - 0.2 K/uL   Basic Metabolic Panel    Collection Time: 11/15/22  5:52 AM   Result Value Ref Range    Sodium 142 135 - 144 mEq/L    Potassium 3.5 3.4 - 4.9 mEq/L    Chloride 108 (H) 95 - 107 mEq/L    CO2 25 20 - 31 mEq/L    Anion Gap 9 9 - 15 mEq/L    Glucose 115 (H) 70 - 99 mg/dL    BUN 7 (L) 8 - 23 mg/dL    Creatinine 0.72 0.50 - 0.90 mg/dL    Est, Glom Filt Rate >60.0 >60    Calcium 8.9 8.5 - 9.9 mg/dL   POCT Glucose    Collection Time: 11/15/22  6:07 AM   Result Value Ref Range    POC Glucose 101 (H) 70 - 99 mg/dl    Performed on ACCU-CHEK      Previous extensive, complex labs, notes and diagnostics reviewed and analyzed. ALLERGIES:    Allergies as of 11/12/2022 - Fully Reviewed 09/23/2022   Allergen Reaction Noted    Codeine  12/27/2002      (please also verify by checking [facility])     Complex Physical Medicine & Rehab Issues Assess & Plan:   Severe abnormality of gait and mobility and impaired self-care and ADL's secondary to   CVA with right-sided weakness aphasia and dysphagia. Updated functional and medical status reassessed regarding patients ability to participate in therapies and patient found to be able to participate in:   skilled rehab when medically stable         Will continue to follow to attempt to get patient to the most efficient but most effective level of care will be in their best interest.  Continue to focus on energy conservation heart rate and blood pressure monitoring before during and after therapy endurance and consistency of function. Bowel constipation   and Bladder dysfunction   overactive, neurogenic bladder:  frequent toileting, ambulate to bathroom with assistance, check post void residuals. Check for C.difficile x1 if >2 loose stools in 24 hours, continue bowel & bladder program.  Monitor for UTI symptoms including lethargy and confusion    Severe OA pain and generalized OA pain: reassess pain every shift and prior to and after each therapy session, give prn Tylenol   and consider scheduled Tylenol, modalities prn in therapy, consider Lidoderm, K-pad prn. Skin healing    breakdown   risk:  continue pressure relief program.  Daily skin exams and reports from nursing. Severe fatigue due to immobility and nutritional deficits: monitoring for dysphagia   Add vitamin B12 vitamin D and CoQ10 titrate dosing and add protein supplementation with low carb content. Complex discharge planning:   Discussed with care team-last 24 hour events noted.   I will continue to follow along and reassess functional and medical status as we strive to improve patient's functional and medical outcomes progressing to the most efficient and lowest level of care. Complex Active General Medical Issues that complicate care:     1. 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 *          Events and functional changes in the past 24 hours reviewed decline in functional status noted and is of concern       Focus on coordinating dc plans--likely to SNF--then with patient family and care givers and order necessary equipment.         Katlin Kline D.O., PM&R     Attending    [facility] [place] [place]

## 2022-11-15 NOTE — CONSULTS
Pulmonary Medicine  Consult Note      Reason for consultation: Nocturnal hypoxia    Consulting physician: Dr. Lizeth Bhatti:      This is 64-year-old female with past medical history significant for hypertension, hyperlipidemia, COPD, depression was presented with facial droop and memory issue. She was having slurred speech. Currently She is sleepy but arousable. She still falls back to sleep unable to give much history. Chart was reviewed for some detail. In ER she was found having right-sided hemiparesis. She had overnight pulse oximetry done and significant O2 desaturation during sleep so pulmonary was consulted regarding nocturnal hypoxia. I have seen her last time in office in 2017 at that time she was using 3 L O2 during sleep and prn during daytime. She also had on albuterol HFA for underlying COPD but she was not using much bronchodilator therapy before. She has no fever or chills. No nausea vomiting diarrhea abdominal pain.     Past Medical History:        Diagnosis Date    Anxiety     Arthritis     COPD (chronic obstructive pulmonary disease) (HCC)     Generalized osteoarthrosis, unspecified site 12/27/2002    Hyperlipidemia     Hypertension     Major depressive disorder, single episode, moderate ([address] 12/27/2002    Morbid obesity ([address] 12/27/2002    OAB (overactive bladder)     Osteoporosis        Past Surgical History:        Procedure Laterality Date    CARDIAC CATHETERIZATION      CARPAL TUNNEL RELEASE      COLONOSCOPY      HYSTERECTOMY, TOTAL ABDOMINAL (CERVIX REMOVED)      KNEE ARTHROPLASTY Bilateral     NM COLON CA SCRN NOT HI RSK IND N/A 6/22/2017    COLONOSCOPY performed by Payton Julian MD at [address] ESOPHAGOGASTRODUODENOSCOPY TRANSORAL DIAGNOSTIC N/A 6/22/2017    EGD ESOPHAGOGASTRODUODENOSCOPY performed by Payton Julian MD at [address] Bilateral     SKIN BIOPSY Right 9/17/2020    EXCISION OF LEG MASS RIGHT (PAT ON ADMIT) performed by Stephie Whiting MD at [place] [address] History:     reports that she quit smoking about 42 years ago. Her smoking use included cigarettes. She has a 20.00 pack-year smoking history. She has never used smokeless tobacco. She reports that she does not drink alcohol and does not use drugs. Family History:       Problem Relation Age of Onset    Arthritis Father     Other Father         gout    Heart Failure Father     Cancer Sister        Allergies:  Codeine        MEDICATIONS during current hospitalization:    Continuous Infusions:   dextrose 5% in lactated ringers 100 mL/hr at 11/15/22 1234    dextrose         Scheduled Meds:   metoprolol  5 mg IntraVENous Q6H    cloNIDine  1 patch TransDERmal Weekly    hydrALAZINE  20 mg IntraVENous Q3H (SCHEDULED)    enalaprilat  2.5 mg IntraVENous 4 times per day    [Held by provider] atorvastatin  80 mg Oral Nightly    insulin lispro  0-4 Units SubCUTAneous Q4H       PRN Meds:acetaminophen, hydrALAZINE, ondansetron **OR** ondansetron, polyethylene glycol, labetalol, glucose, dextrose bolus **OR** dextrose bolus, glucagon (rDNA), dextrose    REVIEW OF SYSTEMS:  As in history of present illness  Other 14 point review of system is negative. PHYSICAL EXAM:    Vitals:  BP (!) 154/65   Pulse 87   Temp 98.7 °F (37.1 °C) (Axillary)   Resp 20   Ht 5' 3\" (1.6 m)   Wt 160 lb (72.6 kg)   LMP  (LMP Unknown)   SpO2 96%   BMI 28.34 kg/m²   General: She is very sleepy but arousable. .comfortable in bed, No distress. Head: Atraumatic , Normocephalic   Eyes: PERRL. No sclera icterus. No conjunctival injection. No discharge   ENT: No nasal  discharge. Pharynx clear. Neck:  Trachea midline. No thyromegaly, no JVD, No cervical adenopathy. Chest : Bilaterally symmetrical ,Normal effort,  No accessory muscle use  Lung : Diminished BS bilateraly. No Rales. Few scattered wheezing. No rhonchi. Heart[de-identified] Normal  rate. Regular rhythm.  No mumur , CT brain earlier same day HISTORY: ORDERING SYSTEM PROVIDED HISTORY: right sided weakness TECHNOLOGIST PROVIDED HISTORY: Reason for exam:->right sided weakness What reading provider will be dictating this exam?->CRC FINDINGS: CTA NECK: AORTIC ARCH/ARCH VESSELS: Atherosclerotic calcifications are present within the aortic arch. The origins of the great vessels are normal.  There is no significant stenosis of the innominate or subclavian arteries. CAROTID ARTERIES: There is mild calcified atherosclerotic plaque within the left common carotid artery. There is no significant stenosis of the common carotid arteries identified. There is no significant stenosis of the internal carotid arteries. There is no dissection or pseudoaneurysm. VERTEBRAL ARTERIES: There is a mildly beaded appearance of the distal V2 and V3 segments of the vertebral arteries suggesting fibromuscular dysplasia. There is no dissection or pseudoaneurysm. There is no significant stenosis. SOFT TISSUES: Lung apices are clear. There is no superior mediastinal lymphadenopathy. There is no cervical lymphadenopathy or soft tissue mass identified. The parotid glands and submandibular glands are normal. BONES: No lytic or blastic osseous lesions are identified. There is moderate multilevel neural foraminal narrowing and mild spinal canal stenosis from C4-C5 to C6-C7. CTA HEAD: ANTERIOR CIRCULATION: Atherosclerotic calcifications are present within the cavernous segments of the internal carotid arteries without significant stenosis evident. The anterior and middle cerebral arteries are normal. There is no significant stenosis or aneurysm evident. POSTERIOR CIRCULATION: The distal vertebral arteries are normal in appearance. The basilar artery is normal.  No significant stenosis or aneurysm is identified. The posterior cerebral arteries are normal in appearance. OTHER: No dural venous sinus thrombosis on this non-dedicated study.  BRAIN: There is no mass effect or midline shift. There is no vascular malformation identified. 1. No large vessel occlusion, significant stenosis or cerebral aneurysm identified within the brain. 2. Atherosclerosis without significant arterial stenosis identified within the neck. 3. Beaded appearance of the distal vertebral arteries suggesting fibromuscular dysplasia. CT HEAD WO CONTRAST    Result Date: 11/14/2022  EXAMINATION: CT OF THE HEAD WITHOUT CONTRAST  11/14/2022 12:00 pm TECHNIQUE: CT of the head was performed without the administration of intravenous contrast. Automated exposure control, iterative reconstruction, and/or weight based adjustment of the mA/kV was utilized to reduce the radiation dose to as low as reasonably achievable. COMPARISON: Reviewed the previous MRI of the brain dated November 12. Reviewed the previous CT brain back to November 12. HISTORY: ORDERING SYSTEM PROVIDED HISTORY: cva, ventricular hemorrhage TECHNOLOGIST PROVIDED HISTORY: Reason for exam:->cva, ventricular hemorrhage Has a \"code stroke\" or \"stroke alert\" been called? ->No What reading provider will be dictating this exam?->CRC FINDINGS: Peripheral CSF space ventricular system correlates with the age group. There is no focal mass effect or midline shift. There is no evidence for a sizable area of a new acute recent insult in progression to the brain parenchyma. Discrete the blood CSF fluid level is seen in the dependent portion of the right left lateral ventricles more noticeable on the left. These were seen previously. These are stable over time. There is no further increase in intraventricular hemorrhagic component. The hyperdensity of the blood component has mild diminished since the previous study indicated more late acute phase/early subacute phase. Stable discrete intraventricular hemorrhage since recent studies.      CT HEAD WO CONTRAST    Result Date: 11/13/2022  EXAMINATION: CT OF THE HEAD WITHOUT CONTRAST  11/13/2022 9:25 am TECHNIQUE: CT of the head was performed without the administration of intravenous contrast. Automated exposure control, iterative reconstruction, and/or weight based adjustment of the mA/kV was utilized to reduce the radiation dose to as low as reasonably achievable. COMPARISON: None. HISTORY: ORDERING SYSTEM PROVIDED HISTORY: eval for progression of blood layering within the occipital horns of the lateral ventricles TECHNOLOGIST PROVIDED HISTORY: Reason for exam:->eval for progression of blood layering within the occipital horns of the lateral ventricles Has a \"code stroke\" or \"stroke alert\" been called? ->No What reading provider will be dictating this exam?->CRC FINDINGS: BRAIN/VENTRICLES: Cerebral atrophy is noted globally. There is posterior trophic enlargement of the lateral ventricles similar to older examinations. There is a small amount of intermediate density hemorrhage in the occipital horns of the right and left ventricle similar in severity to 11/12/2022 MRI. 4th ventricle is patent. There is no shift of midline structures. No subarachnoid or subdural hemorrhage. ORBITS: The visualized portion of the orbits demonstrate no acute abnormality. SINUSES: Pilar bullosa of the left middle nasal turbinate. Right deviation of the nasal septum which appears chronic. Mild mucosal thickening in the sphenoid sinuses. Small air-fluid level in the inferior left frontal sinus. Mastoid air cells are clear. SOFT TISSUES/SKULL:  No acute abnormality of the visualized skull or soft tissues. 1.  Stable minimal layering hemorrhage in the occipital horns of the right and left lateral ventricle. No change from MRI dated 11/12/2022. 2.  Findings compatible with chronic paranasal sinusitis, mild.      CT HEAD WO CONTRAST    Result Date: 11/12/2022  EXAMINATION: CT OF THE HEAD WITHOUT CONTRAST  11/12/2022 10:31 am TECHNIQUE: CT of the head was performed without the administration of intravenous contrast. Automated exposure control, iterative reconstruction, and/or weight based adjustment of the mA/kV was utilized to reduce the radiation dose to as low as reasonably achievable. COMPARISON: 11/12/2022 HISTORY: ORDERING SYSTEM PROVIDED HISTORY: eval for hmg conversion TECHNOLOGIST PROVIDED HISTORY: Reason for exam:->eval for hmg conversion Has a \"code stroke\" or \"stroke alert\" been called? ->No What reading provider will be dictating this exam?->CRC FINDINGS: BRAIN/VENTRICLES: Generalized atrophy identified of the brain. Some low-attenuation areas identified in the periventricular and subcortical white matter to suggest chronic small vessel ischemic change. There is no acute intracranial hemorrhage, mass effect or midline shift. No abnormal extra-axial fluid collection. The gray-white differentiation is maintained without evidence of an acute infarct. There is no evidence of hydrocephalus. ORBITS: The visualized portion of the orbits demonstrate no acute abnormality. SINUSES: The visualized paranasal sinuses and mastoid air cells demonstrate no acute abnormality. SOFT TISSUES/SKULL:  No acute abnormality of the visualized skull or soft tissues. Atrophy and chronic changes seen within the brain with no acute intracranial abnormality. CT HEAD WO CONTRAST    Result Date: 11/12/2022  EXAMINATION: CT OF THE HEAD WITHOUT CONTRAST  11/12/2022 4:34 am TECHNIQUE: CT of the head was performed without the administration of intravenous contrast. Automated exposure control, iterative reconstruction, and/or weight based adjustment of the mA/kV was utilized to reduce the radiation dose to as low as reasonably achievable. COMPARISON: None. HISTORY: ORDERING SYSTEM PROVIDED HISTORY: fall TECHNOLOGIST PROVIDED HISTORY: Reason for exam:->fall Has a \"code stroke\" or \"stroke alert\" been called? ->No Decision Support Exception - unselect if not a suspected or confirmed emergency medical condition->Emergency Medical Condition (MA) What reading provider will be dictating this exam?->CRC FINDINGS: BRAIN/VENTRICLES: There is no acute intracranial hemorrhage, mass effect or midline shift. No abnormal extra-axial fluid collection. The gray-white differentiation is maintained without evidence of an acute infarct. There is no evidence of hydrocephalus. The ventricles, cisterns and sulci are prominent consistent with atrophy. There is decreased attenuation within the periventricular white matter consistent with periventricular leukomalacia. ORBITS: The visualized portion of the orbits demonstrate no acute abnormality. SINUSES: The visualized paranasal sinuses and mastoid air cells demonstrate no acute abnormality. SOFT TISSUES/SKULL:  No acute abnormality of the visualized skull or soft tissues. 1. There is no acute intracranial abnormality. Specifically, there is no intracranial hemorrhage. 2. Atrophy and periventricular leukomalacia,     CTA NECK W WO CONTRAST    Result Date: 11/14/2022  EXAMINATION: CTA OF THE NECK; CTA OF THE HEAD WITHOUT AND WITH CONTRAST 11/12/2022 7:57 am TECHNIQUE: CTA of the neck was performed with the administration of intravenous contrast. Multiplanar reformatted images are provided for review. MIP images are provided for review. Stenosis of the internal carotid arteries measured using NASCET criteria. Automated exposure control, iterative reconstruction, and/or weight based adjustment of the mA/kV was utilized to reduce the radiation dose to as low as reasonably achievable.; CTA of the head/brain was performed without and with the administration of intravenous contrast. Multiplanar reformatted images are provided for review. MIP images are provided for review. Automated exposure control, iterative reconstruction, and/or weight based adjustment of the mA/kV was utilized to reduce the radiation dose to as low as reasonably achievable.  COMPARISON: CT brain earlier same day HISTORY: ORDERING SYSTEM PROVIDED HISTORY: right sided weakness TECHNOLOGIST PROVIDED HISTORY: Reason for exam:->right sided weakness What reading provider will be dictating this exam?->CRC FINDINGS: CTA NECK: AORTIC ARCH/ARCH VESSELS: Atherosclerotic calcifications are present within the aortic arch. The origins of the great vessels are normal.  There is no significant stenosis of the innominate or subclavian arteries. CAROTID ARTERIES: There is mild calcified atherosclerotic plaque within the left common carotid artery. There is no significant stenosis of the common carotid arteries identified. There is no significant stenosis of the internal carotid arteries. There is no dissection or pseudoaneurysm. VERTEBRAL ARTERIES: There is a mildly beaded appearance of the distal V2 and V3 segments of the vertebral arteries suggesting fibromuscular dysplasia. There is no dissection or pseudoaneurysm. There is no significant stenosis. SOFT TISSUES: Lung apices are clear. There is no superior mediastinal lymphadenopathy. There is no cervical lymphadenopathy or soft tissue mass identified. The parotid glands and submandibular glands are normal. BONES: No lytic or blastic osseous lesions are identified. There is moderate multilevel neural foraminal narrowing and mild spinal canal stenosis from C4-C5 to C6-C7. CTA HEAD: ANTERIOR CIRCULATION: Atherosclerotic calcifications are present within the cavernous segments of the internal carotid arteries without significant stenosis evident. The anterior and middle cerebral arteries are normal. There is no significant stenosis or aneurysm evident. POSTERIOR CIRCULATION: The distal vertebral arteries are normal in appearance. The basilar artery is normal.  No significant stenosis or aneurysm is identified. The posterior cerebral arteries are normal in appearance. OTHER: No dural venous sinus thrombosis on this non-dedicated study. BRAIN: There is no mass effect or midline shift.   There is no vascular malformation identified. 1. No large vessel occlusion, significant stenosis or cerebral aneurysm identified within the brain. 2. Atherosclerosis without significant arterial stenosis identified within the neck. 3. Beaded appearance of the distal vertebral arteries suggesting fibromuscular dysplasia. XR CHEST PORTABLE    Result Date: 11/12/2022  EXAMINATION: ONE XRAY VIEW OF THE CHEST 11/12/2022 4:12 am COMPARISON: None. HISTORY: ORDERING SYSTEM PROVIDED HISTORY: incresed resp rate TECHNOLOGIST PROVIDED HISTORY: Reason for exam:->incresed resp rate What reading provider will be dictating this exam?->CRC FINDINGS: The cardiomediastinal silhouette is at the upper limits of normal for technique. There are low lung volumes with bronchovascular crowding and bibasilar atelectasis. No pneumothorax or pleural effusion. Bilateral shoulder arthroplasties. Bibasilar atelectasis. MRI brain without contrast    Result Date: 11/12/2022  EXAMINATION: MRI OF THE BRAIN WITHOUT CONTRAST  11/12/2022 1:48 pm TECHNIQUE: Multiplanar multisequence MRI of the brain was performed without the administration of intravenous contrast. COMPARISON: None. HISTORY: ORDERING SYSTEM PROVIDED HISTORY: TIA TECHNOLOGIST PROVIDED HISTORY: Reason for exam:->TIA What is the sedation requirement?->None What reading provider will be dictating this exam?->CRC Initial evaluation. FINDINGS: INTRACRANIAL STRUCTURES/VENTRICLES: There is an acute infarct within the left inferior ahsan (DWI series 3, image 8). No mass effect or midline shift. A trace amount of blood is seen layering within the occipital horns of the lateral ventricles bilaterally. Areas of T2 FLAIR hyperintensity are seen in the periventricular and subcortical white matter, which are nonspecific, but may represent chronic microvascular ischemic change. There is prominence of the ventricles and sulci due to global parenchymal volume loss. The sellar/suprasellar regions appear unremarkable. There is at least partial loss of the normal signal void within the right vertebral artery. ORBITS: The visualized portion of the orbits demonstrate no acute abnormality. SINUSES: The visualized paranasal sinuses and mastoid air cells demonstrate no acute abnormality. BONES/SOFT TISSUES: The bone marrow signal intensity appears normal. The soft tissues demonstrate no acute abnormality. 1. Small acute infarct involving the left inferior ahsan. No mass effect or midline shift. 2. Trace amount of blood layering within the occipital horns of the lateral ventricles bilaterally. 3. At least partial loss of the normal signal void within the right vertebral artery, which can be seen with slow flow/occlusion. This is of uncertain chronicity. 4. Otherwise, no acute intracranial abnormality. 5. Moderate global parenchymal volume loss with mild chronic microvascular ischemic changes. These results were sent to the [facility] [id number] ([address] [place] [address]) on 11/12/2022 at 2:22 pm to be communicated to the referring/covering health care provider/office. Assessment and  plan:       Nocturnal hypoxemia possible JULIO C  COPD without exacerbation  Bibasilar atelectasis  CVA with left-sided weakness    Patient has significant O2 discharge is more than half an hours during sleep. Currently using 3 L O2 via nasal cannula. She has been on O2 during sleep. I have seen her last in 2017 and at that time she was on 3 L O2 during sleep. Continue O2 to keep saturation 90% or above. She may have underlying JULIO C. Overall if overall condition improved recommend to have sleep study done. Consider CPAP therapy if significant sleep apnea with O2 desaturation. At this time continue present treatment plan. We will give nebulizer with albuterol every 6 hours as needed for wheezing. We will follow-up    Thank you for consult     NOTE: This report was transcribed using voice recognition software.  Every effort was made to ensure accuracy; however, inadvertent computerized transcription errors may be present.     Electronically signed by Patrice Reid MD, FCCP on 11/15/2022 at 5:31 PM

## 2022-11-15 NOTE — PROGRESS NOTES
IR consulted for PEG tube placement. Dr. Jorja Sandifer is out of office and this will be addressed by general surgery Dr. Sravanthi Allison Will sign off IR.

## 2022-11-15 NOTE — PROGRESS NOTES
Hospitalist Progress Note      PCP: Deb Argueta, APRN - CNP    Date of Admission: 11/12/2022    Chief Complaint:    Chief Complaint   Patient presents with    Fall       Subjective:  No acute events overnight. Medications:  Reviewed    Infusion Medications    dextrose 5% in lactated ringers 100 mL/hr at 11/15/22 1234    dextrose       Scheduled Medications    metoprolol  5 mg IntraVENous Q6H    potassium chloride  10 mEq IntraVENous Once    cloNIDine  1 patch TransDERmal Weekly    hydrALAZINE  20 mg IntraVENous Q3H (SCHEDULED)    enalaprilat  2.5 mg IntraVENous 4 times per day    [Held by provider] atorvastatin  80 mg Oral Nightly    insulin lispro  0-4 Units SubCUTAneous Q4H     PRN Meds: acetaminophen, hydrALAZINE, ondansetron **OR** ondansetron, polyethylene glycol, labetalol, glucose, dextrose bolus **OR** dextrose bolus, glucagon (rDNA), dextrose      Intake/Output Summary (Last 24 hours) at 11/15/2022 1403  Last data filed at 11/14/2022 1839  Gross per 24 hour   Intake --   Output 450 ml   Net -450 ml       Exam:    BP (!) 125/57   Pulse 84   Temp 98.7 °F (37.1 °C) (Axillary)   Resp 20   Ht 5' 3\" (1.6 m)   Wt 160 lb (72.6 kg)   LMP  (LMP Unknown)   SpO2 96%   BMI 28.34 kg/m²     General appearance: Elderly female reclining in bed in NAD.  present at bedside. HEENT:  PERRLA, EOMI. Neck: Trachea midline,   Cardiovascular: Regular rate and rhythm  Respiratory:  Normal respiratory effort. Clear to auscultation. Abdomen: Soft, non-distended with normal bowel sounds. Neuro: Somnolent. Doesn't cooperate as much with exam today.           Labs:   Recent Labs     11/13/22 0514 11/14/22  0456 11/15/22  0552   WBC 6.7 8.5 7.7   HGB 14.0 13.3 13.2   HCT 43.1 40.4 40.5    178 178       Recent Labs     11/14/22  0456 11/15/22  0552    142   K 3.9 3.5    108*   CO2 24 25   BUN 6* 7*   CREATININE 0.58 0.72   CALCIUM 8.8 8.9       No results for input(s): AST, ALT, BILIDIR, BILITOT, ALKPHOS in the last 72 hours. No results for input(s): INR in the last 72 hours. No results for input(s): Marty Velázquez in the last 72 hours. Urinalysis:      Lab Results   Component Value Date/Time    NITRU Negative 11/12/2022 03:45 AM    WBCUA 6-9 11/12/2022 03:45 AM    BACTERIA Negative 11/12/2022 03:45 AM    RBCUA 0-2 11/12/2022 03:45 AM    BLOODU Negative 11/12/2022 03:45 AM    SPECGRAV 1.014 11/12/2022 03:45 AM    GLUCOSEU Negative 11/12/2022 03:45 AM    GLUCOSEU NEG 10/17/2011 10:15 AM       Radiology:  CT HEAD WO CONTRAST   Final Result   Stable discrete intraventricular hemorrhage since recent studies. CT HEAD WO CONTRAST   Final Result   1. Stable minimal layering hemorrhage in the occipital horns of the right   and left lateral ventricle. No change from MRI dated 11/12/2022.      2.  Findings compatible with chronic paranasal sinusitis, mild. MRI brain without contrast   Final Result   1. Small acute infarct involving the left inferior ahsan. No mass effect or   midline shift. 2. Trace amount of blood layering within the occipital horns of the lateral   ventricles bilaterally. 3. At least partial loss of the normal signal void within the right vertebral   artery, which can be seen with slow flow/occlusion. This is of uncertain   chronicity. 4. Otherwise, no acute intracranial abnormality. 5. Moderate global parenchymal volume loss with mild chronic microvascular   ischemic changes. These results were sent to the [facility] [id number] ([address] [place] [address]) on   11/12/2022 at 2:22 pm to be communicated to the referring/covering health   care provider/office. CT HEAD WO CONTRAST   Final Result   Atrophy and chronic changes seen within the brain with no acute intracranial   abnormality. CTA HEAD W WO CONTRAST   Final Result   1. No large vessel occlusion, significant stenosis or cerebral aneurysm   identified within the brain.    2. Atherosclerosis without significant arterial stenosis identified within   the neck. 3. Beaded appearance of the distal vertebral arteries suggesting   fibromuscular dysplasia. CTA NECK W WO CONTRAST   Final Result   1. No large vessel occlusion, significant stenosis or cerebral aneurysm   identified within the brain. 2. Atherosclerosis without significant arterial stenosis identified within   the neck. 3. Beaded appearance of the distal vertebral arteries suggesting   fibromuscular dysplasia. CT HEAD WO CONTRAST   Final Result   1. There is no acute intracranial abnormality. Specifically, there is no   intracranial hemorrhage. 2. Atrophy and periventricular leukomalacia,         XR CHEST PORTABLE   Final Result   Bibasilar atelectasis. XR CHEST (2 VW)    (Results Pending)       Assessment/Plan:    Acute stroke POA:  Repeat CT scan today given the MRI findings of layering of blood; unable to be on heparin for this reason; neuro is managing; PT/OT; tight blood control. Tylenol suppository PRN pain. 11/14: Consult to IR for PEG placement per  wishes following discussion, but IR apparently out of area for next several days. Follow up consult placed to [facility] for PEG consideration. 11/15: Planned for PEG placement with Col[facility] next AM.  Neurology planning for antiplatelet monotherapy resumption after PEG placed. Continue therapies. HTN/HLD:  Continue home meds  COPD:  Not in an exacerebation  Chronic diastolic CHF:  Euvolemic on exams  Functional Status: Fall precautions. Up with assistance. PT OT  Diet: Cardiac   DVT ppx: Heparin  Disposition: Dependent on hospital course. Will discharge once medically stable. CONRADO on board for discharge planning.      Active Hospital Problems    Diagnosis Date Noted    Cerebrovascular accident (CVA) due to stenosis of left middle cerebral artery [facility]) [I63.512] 11/12/2022     Priority: High    Aphasia [R47.01] 11/15/2022     Priority: Medium    TIA (transient ischemic attack) [G45.9] 11/15/2022     Priority: Medium    Dysphagia, oropharyngeal phase [R13.12] 11/14/2022     Priority: Medium    Cerebrovascular accident (CVA) due to stenosis of left vertebral artery [facility]) [I63.212] 11/14/2022     Priority: Medium    Palliative care encounter [Z51.5] 11/14/2022     Priority: Medium    Advanced care planning/counseling discussion [Z71.89] 11/14/2022     Priority: Medium    Goals of care, counseling/discussion [Z71.89] 11/14/2022     Priority: Medium    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 [facility]) [I69.351] 11/12/2022     Priority: Medium    Fibromuscular dysplasia ([facility] [address] [I77.3] 11/12/2022     Priority: Medium    Impaired mobility and activities of daily living dt CVA [Z74.09, Z78.9] 11/12/2022     Priority: Medium    Acute CVA (cerebrovascular accident) [facility]) [I63.9] 11/12/2022     Priority: Medium    Hypertension [I10]     COPD (chronic obstructive pulmonary disease) ([facility] [address] [J44.9]         Additional work up or/and treatment plan may be added today or then after based on clinical progression. I am managing a portion of pt care. Some medical issues are handled by other specialists. Additional work up and treatment should be done in out pt setting by pt PCP and other out pt providers. In addition to examining and evaluating pt, I spent additional time explaining care, normal and abnormal findings, and treatment plan. All of pt questions were answered. Counseling, diet and education were  provided. Case will be discussed with nursing staff when appropriate. Family will be updated if and when appropriate.       Diet: Diet NPO    Code Status: Full Code    PT/OT Eval     Electronically signed by Keily Barone DO on 11/15/2022 at 2:03 PM

## 2022-11-15 NOTE — PROGRESS NOTES
[facility]  Facility/Department: [facility]  Speech Language Pathology   Treatment Note      Edyta Sanchez  1945  [id number]  [x]   confirmed      Date: 11/15/2022    TIA (transient ischemic attack) [G45.9]  Essential hypertension [I10]  Chronic obstructive pulmonary disease, unspecified COPD type ([facility] [address] [J44.9]  Acute CVA (cerebrovascular accident) ([facility] [address] [I63.9]    Restrictions/Precautions: Fall Risk, NPO    Weight: 160 lb (72.6 kg)     Diet NPO    SpO2: 94 % (11/15/22 0610)  O2 Flow Rate (L/min): 3 L/min (22 1214)  No active isolations      Subjective:  Alert, Cooperative, Pleasant, and Confused      Patient's spouse present for treatment session. Patient scheduled for PEG placement tomorrow. Interventions used this date:  Dysphagia Treatment      Objective/Assessment:  Patient progressing towards goals:  Short-term Goals  Timeframe for Short-term Goals: 1 week  Goal 1: Pt will tolerate PO trials deemed appropriate by treating SLP with adequate mastication, oral clearance of bolus, and no overt s/s of aspiration in all given opportunities. Patient trialed ice chips. Patient accepted ice chips from teaspoon with weak labial seal noted and increased time for bolus transfer into oral cavity. Oral holding observed with patient observed to make 'popping' sound while blowing air out with lips consistently throughout trials. Suspected delayed A-P transit with decreased bolus control. Pharyngeal swallow noted in 2/3 ice chip trials with max verbal prompting and tactile cueing needed. Same results with teaspoon coated in pureed solids. Pharyngeal swallow noted in 1/2 trials of spoon coated pureed solids. Weak cough noted following the one trial that resulted in pharyngeal swallow. Additional PO trials deferred due to inconsistent ability to produce pharyngeal swallow.    Goal 2: Pt will complete oral motor ROM/strengthening exercises with 70% acc given cues as needed to increase lingual/labial/buccal strength and decrease risk of pocketing. Patient completed lingual ROM exercise x4 following max verbal prompting. Patient unable to follow directions to complete labial ROM exercises despite max verbal prompting. Goal 3: Pt will complete pharyngeal exercises with 70% acc given cues as needed to increase pharyngeal musculature and decrease risk of aspiration. Patient unable to follow directions to complete at this time despite models. Goal 4: Pt will participate in MBS study, when appropriate, in order to more objectively assess pharyngeal phase of swallow and determine least restrictive diet. MBS is not recommended at this time. Patient unable to consistently produce a pharyngeal swallow at bedside. Recommend continue NPO. Discussed with pt, her spouse, and TATIANA Oliver. Treatment/Activity Tolerance:  Patient tolerated treatment well    Plan:  Continue per POC    Pain Assessment:  Patient does not c/o pain. Pain Re-assessment:  Patient does not c/o pain. Patient/Caregiver Education:  Patient educated on session and progression towards goals. Caregiver education on session and progress towards goals. Caregiver stated verbal understanding of directions.     Safety Devices:  Bed alarm in place, Call light within reach, and Telesitter in use      Therapy Time  Time in: 1118  Time out: 1130  Total minutes: 12      Signature: Electronically signed by BARRON Dickens on 11/15/2022 at 11:41 AM

## 2022-11-15 NOTE — PROGRESS NOTES
[facility] Heart Progress Note      Patient: Marcus Trinidad    Unit/Bed: K911/U154-56  YOB: 1945  MRN: [id number]  Admit Date:  11/12/2022  Hospital Day: 3    Rounding Date: 11/15/2022    Rounding Cardiologist:  OLESYA Mahmood MD    PRIMARY Cardiologist: [facility]    Subjective Complaint:   Is a little short of breath--the cooperative. Physical Examination:     BP (!) 125/57   Pulse 84   Temp 98.7 °F (37.1 °C) (Axillary)   Resp 20   Ht 5' 3\" (1.6 m)   Wt 160 lb (72.6 kg)   LMP  (LMP Unknown)   SpO2 96%   BMI 28.34 kg/m²       Intake/Output Summary (Last 24 hours) at 11/15/2022 1307  Last data filed at 11/14/2022 1839  Gross per 24 hour   Intake --   Output 450 ml   Net -450 ml                  [place] examined at bedside in moderately ill. Focused exam reveals:     Cardiac: Heart regular rate and rhythm     Lungs:  clear to auscultation bilaterally- no wheezes, rales or rhonchi, normal air movement, no respiratory distress    Extremities:   negative    Telemetry:      normal sinus , paroxismal supraventricular tachycardia, and not true atrial fibrillation, short bursts          LABS:   CBC:   Recent Labs     11/13/22 0514 11/14/22  0456 11/15/22  0552   WBC 6.7 8.5 7.7   HGB 14.0 13.3 13.2    178 178      BMP:    Recent Labs     11/14/22  0456 11/15/22  0552    142   K 3.9 3.5    108*   CO2 24 25   BUN 6* 7*   CREATININE 0.58 0.72   GLUCOSE 106* 115*              Troponin: No results for input(s): TROPONINT in the last 72 hours. BNP: No results for input(s): PROBNP in the last 72 hours. INR: No results for input(s): INR in the last 72 hours. Mg: No results for input(s): MG in the last 72 hours. Cardiac Testing:       Summary   Bubble study negative   Normal left ventricle structure and function. Left ventricular ejection fraction is visually estimated at 55%. No Doppler data.  Limited study      Signature    Nocturnal O2 sat shows desaturation 30 minutes consistent with deep apnea    Assessment:  CVA  Hypertension  Difficulty swallowing  Nocturnal pulse oximetry shows desaturation greater than a half an hour   states that she snores  No true atrial fibrillation, but has multifocal tachycardia, or at least 2 atrial foci  Hypertension  N.p.o. status      Plan:  Start IV beta-blocker  Consult pulmonology for obstructive sleep apnea  Continue to monitor  Consider loop recorder if needed  Try to keep systolic blood pressure between 100 and 140  PEG tomorrow    Plan:  See above  Electronically signed by Alicia Scott MD on 11/15/2022 at 1:07 PM

## 2022-11-15 NOTE — PROGRESS NOTES
Palliative Care Progress Note  Patient: Edyta Sanchez  Gender: female  YOB: 1945  Unit/Bed: J096/P666-56  CodeStatus: Full Code  Inpatient Treatment Team: Treatment Team: Attending Provider: Fabiola Easley DO; Consulting Physician: Alona Cheema MD; Consulting Physician: Steven Gilbert MD; : Lokesh Dempsey, RN; Utilization Reviewer: Delana Landau, RN; Consulting Physician: Shawn Pena MD; : Nathaly Wang, RN; : Karo Ward, RN; Registered Nurse: Wilfredo Mendieta, RN; Utilization Reviewer: Daryn Medina, RN; Nursing Student: Catalina Mathis; Surgeon: Shawn Pena MD  Admit Date:  11/12/2022    Chief Complaint: Inability to ambulate    History of Presenting Illness:      Edyta Sanchez is a 76 y.o. female on hospital day 3 with a history of COPD, HTN, HLD, anxiety, OA, MDD, OAB, and osteoporosis. Patient presented to the ER from home due to slurring her words and not being able to get up out of bed to go to the bathroom. Patient also had some dysphagia and a headache prior to going to bed. In the ER, patient exhibited right sided hemiparesis. She was admitted for a TIA. Palliative care consulted for goals of care, code status discussion, family support, and symptom management. Patient is alert and oriented to person and that she is in a hospital. Unable to state which hospital. Disoriented to date/time/situation. States she is doing [place]. \" Denies pain or issues with her breathing. Patient is difficult to understand due to expressive aphasia. Patient is from home with her spouse. Patient is currently NPO and failed swallow eval. She has significant right sided hemiparesis. Plan is for [facility] for rehab post discharge. 11/15/22:  Patient is drowsy but easily aroused. Drifts back to sleep easily. Spouse at bedside. Plan is for a PEG tube tomorrow. Spouse reports no new complaints from patient.  They are now looking into [facility] for d/c. CT head yesterday showed: Impression   Stable discrete intraventricular hemorrhage since recent studies. Review of Systems:       Review of Systems   Unable to perform ROS: Mental status change     Physical Examination:       BP (!) 125/57   Pulse 84   Temp 98.7 °F (37.1 °C) (Axillary)   Resp 20   Ht 5' 3\" (1.6 m)   Wt 160 lb (72.6 kg)   LMP  (LMP Unknown)   SpO2 96%   BMI 28.34 kg/m²    Physical Exam  Constitutional:       General: She is not in acute distress. HENT:      Head: Normocephalic and atraumatic. Nose: No rhinorrhea. Eyes:      General: No scleral icterus. Right eye: No discharge. Left eye: No discharge. Extraocular Movements: Extraocular movements intact. Conjunctiva/sclera: Conjunctivae normal.   Cardiovascular:      Rate and Rhythm: Regular rhythm. Bradycardia present. Comments: Regularly irregular. Pulmonary:      Effort: Pulmonary effort is normal.      Breath sounds: Decreased breath sounds present. No wheezing or rales. Abdominal:      General: Bowel sounds are normal. There is no distension. Palpations: Abdomen is soft. Tenderness: There is no abdominal tenderness. Musculoskeletal:      Cervical back: Neck supple. Right lower leg: No edema. Left lower leg: No edema. Skin:     General: Skin is warm and dry. Coloration: Skin is pale. Neurological:      Mental Status: She is lethargic. Motor: Weakness present. Psychiatric:         Mood and Affect: Mood normal. Affect is flat. Behavior: Behavior normal.         Cognition and Memory: Cognition is impaired. Allergies:       Allergies   Allergen Reactions    Codeine      Nausea, lightheadedness, dizzy       Medications:      Current Facility-Administered Medications   Medication Dose Route Frequency Provider Last Rate Last Admin    metoprolol (LOPRESSOR) injection 5 mg  5 mg IntraVENous Laz Telles MD        potassium chloride 10 mEq/100 mL IVPB (Peripheral Line)  10 mEq IntraVENous Once Alicia Scott MD        cloNIDine (CATAPRES) 0.2 MG/24HR 1 patch  1 patch TransDERmal Weekly Alicia Scott MD        hydrALAZINE (APRESOLINE) injection 20 mg  20 mg IntraVENous Q3H (SCHEDULED) Alicia Scott MD   20 mg at 11/15/22 1132    enalaprilat (VASOTEC) injection 2.5 mg  2.5 mg IntraVENous 4 times per day Alicia Scott MD   2.5 mg at 11/15/22 0010    acetaminophen (TYLENOL) suppository 650 mg  650 mg Rectal Q6H PRN Real Pathak, DO   650 mg at 11/14/22 1409    hydrALAZINE (APRESOLINE) injection 5 mg  5 mg IntraVENous Q4H PRN Alicia Scott MD   5 mg at 11/13/22 1549    ondansetron (ZOFRAN-ODT) disintegrating tablet 4 mg  4 mg Oral Q8H PRN Kenzie Bailey MD        Or    ondansetron (ZOFRAN) injection 4 mg  4 mg IntraVENous Q6H PRN Kenzie Bailey MD        polyethylene glycol (GLYCOLAX) packet 17 g  17 g Oral Daily PRN Kenzie Bailey MD        labetalol (NORMODYNE;TRANDATE) injection 10 mg  10 mg IntraVENous Q10 Min PRN Kenzie Bailey MD   10 mg at 11/13/22 1119    [Held by provider] atorvastatin (LIPITOR) tablet 80 mg  80 mg Oral Nightly Kenzie Bailey MD        dextrose 5 % in lactated ringers infusion   IntraVENous Continuous Kenzie Bailey  mL/hr at 11/15/22 1234 New Bag at 11/15/22 1234    glucose chewable tablet 16 g  4 tablet Oral PRN Kenzie Bailey MD        dextrose bolus 10% 125 mL  125 mL IntraVENous PRN Kenzie Bailey MD        Or    dextrose bolus 10% 250 mL  250 mL IntraVENous PRN Kenzie Bailey MD        glucagon (rDNA) injection 1 mg  1 mg SubCUTAneous PRN Kenzie Bailey MD        dextrose 10 % infusion   IntraVENous Continuous PRN Kenzie Bailey MD        insulin lispro (HUMALOG) injection vial 0-4 Units  0-4 Units SubCUTAneous Q4H Kenzie Tamazight, MD           History:       PMHx:  Past Medical History:   Diagnosis Date    Anxiety     Arthritis     [facility] Generalized osteoarthrosis, unspecified site 2002    Hyperlipidemia     Hypertension     Major depressive disorder, single episode, moderate ([facility] 2002    Morbid obesity ([facility] 2002    OAB (overactive bladder)     Osteoporosis        PSHx:  Past Surgical History:   Procedure Laterality Date    CARDIAC CATHETERIZATION      CARPAL TUNNEL RELEASE      COLONOSCOPY      HYSTERECTOMY, TOTAL ABDOMINAL (CERVIX REMOVED)      KNEE ARTHROPLASTY Bilateral     TN COLON CA SCRN NOT HI RSK IND N/A 2017    COLONOSCOPY performed by Prince Demarcus MD at [address] TRANSORAL DIAGNOSTIC N/A 2017    EGD ESOPHAGOGASTRODUODENOSCOPY performed by Prince Demarcus MD at [address] Bilateral     SKIN BIOPSY Right 2020    EXCISION OF LEG MASS RIGHT (PAT ON ADMIT) performed by Tonio Harrell MD at [place] [address] Hx:  Social History     Socioeconomic History    Marital status:      Spouse name: Aniceto Thompson     Number of children: 3    Years of education: 12    Highest education level: 12th grade   Occupational History    Occupation: [employer]    Tobacco Use    Smoking status: Former     Packs/day: 1.00     Years: 20.00     Pack years: 20.00     Types: Cigarettes     Quit date:      Years since quittin.9    Smokeless tobacco: Never   Vaping Use    Vaping Use: Never used   Substance and Sexual Activity    Alcohol use: No    Drug use: Never    Sexual activity: Not Currently     Partners: Male   Social History Narrative    Lives With: Spouse Ephraim    Type of Home: [address] in [address]: Two level    Home Access: Stairs to enter with rails - Number of Steps: 2- Rails: Right    Bathroom Shower/Tub: Tub/Shower unit    Bathroom Equipment: Shower chair, Hand-held shower    Home Equipment: Rilla Beards, rolling    ADL Assistance: Independent    Ambulation Assistance: Independent (with [employer]) Transfer Assistance: Independent    Active : No    Additional Comments: Pt inconsistent historian. PLOF and home set up verified by dtr. Pt's dtr stating that pt's dementia has been worsening over the past few years.                       Social Determinants of Health     Financial Resource Strain: Low Risk     Difficulty of Paying Living Expenses: Not hard at all   Food Insecurity: No Food Insecurity    Worried About Running Out of Food in the Last Year: Never true    Ran Out of Food in the Last Year: Never true   Physical Activity: Insufficiently Active    Days of Exercise per Week: 7 days    Minutes of Exercise per Session: 10 min       Family Hx:  Family History   Problem Relation Age of Onset    Arthritis Father     Other Father         gout    Heart Failure Father     Cancer Sister        LABS: Reviewed     CBC:  Lab Results   Component Value Date/Time    WBC 7.7 11/15/2022 05:52 AM    RBC 4.20 11/15/2022 05:52 AM    RBC 4.22 04/23/2012 11:12 AM    HGB 13.2 11/15/2022 05:52 AM    HCT 40.5 11/15/2022 05:52 AM    MCV 96.4 11/15/2022 05:52 AM    MCH 31.5 11/15/2022 05:52 AM    MCHC 32.6 11/15/2022 05:52 AM    RDW 14.6 11/15/2022 05:52 AM     11/15/2022 05:52 AM    MPV 9.9 10/12/2012 09:30 AM     CBC with Differential:   Lab Results   Component Value Date/Time    WBC 7.7 11/15/2022 05:52 AM    RBC 4.20 11/15/2022 05:52 AM    RBC 4.22 04/23/2012 11:12 AM    HGB 13.2 11/15/2022 05:52 AM    HCT 40.5 11/15/2022 05:52 AM     11/15/2022 05:52 AM    MCV 96.4 11/15/2022 05:52 AM    MCH 31.5 11/15/2022 05:52 AM    MCHC 32.6 11/15/2022 05:52 AM    RDW 14.6 11/15/2022 05:52 AM    NRBC 1 10/15/2019 12:39 PM    BANDSPCT 13 10/15/2019 12:39 PM    METASPCT 1 10/15/2019 12:39 PM    LYMPHOPCT 16.4 11/15/2022 05:52 AM    MONOPCT 12.1 11/15/2022 05:52 AM    MYELOPCT 1 10/15/2019 12:39 PM    EOSPCT 5.3 01/23/2012 11:31 AM    BASOPCT 0.5 11/15/2022 05:52 AM    MONOSABS 0.9 11/15/2022 05:52 AM    LYMPHSABS 1.3 11/15/2022 05:52 AM    EOSABS 0.3 11/15/2022 05:52 AM    BASOSABS 0.0 11/15/2022 05:52 AM     CMP:    Lab Results   Component Value Date/Time     11/15/2022 05:52 AM    K 3.5 11/15/2022 05:52 AM    K 4.0 10/23/2019 06:19 AM     11/15/2022 05:52 AM    CO2 25 11/15/2022 05:52 AM    BUN 7 11/15/2022 05:52 AM    CREATININE 0.72 11/15/2022 05:52 AM    GFRAA >60.0 07/05/2022 11:26 AM    LABGLOM >60.0 11/15/2022 05:52 AM    GLUCOSE 115 11/15/2022 05:52 AM    GLUCOSE 75 04/23/2012 11:12 AM    PROT 6.4 11/12/2022 03:45 AM    LABALBU 3.8 11/12/2022 03:45 AM    LABALBU 4.6 04/23/2012 11:12 AM    CALCIUM 8.9 11/15/2022 05:52 AM    BILITOT 0.8 11/12/2022 03:45 AM    ALKPHOS 59 11/12/2022 03:45 AM    AST 29 11/12/2022 03:45 AM    ALT 17 11/12/2022 03:45 AM     BMP:    Lab Results   Component Value Date/Time     11/15/2022 05:52 AM    K 3.5 11/15/2022 05:52 AM    K 4.0 10/23/2019 06:19 AM     11/15/2022 05:52 AM    CO2 25 11/15/2022 05:52 AM    BUN 7 11/15/2022 05:52 AM    LABALBU 3.8 11/12/2022 03:45 AM    LABALBU 4.6 04/23/2012 11:12 AM    CREATININE 0.72 11/15/2022 05:52 AM    CALCIUM 8.9 11/15/2022 05:52 AM    GFRAA >60.0 07/05/2022 11:26 AM    LABGLOM >60.0 11/15/2022 05:52 AM    GLUCOSE 115 11/15/2022 05:52 AM    GLUCOSE 75 04/23/2012 11:12 AM     TSH:   Lab Results   Component Value Date/Time    TSH 4.230 10/10/2019 04:15 PM     Urinalysis:   Lab Results   Component Value Date/Time    NITRU Negative 11/12/2022 03:45 AM    WBCUA 6-9 11/12/2022 03:45 AM    BACTERIA Negative 11/12/2022 03:45 AM    RBCUA 0-2 11/12/2022 03:45 AM    BLOODU Negative 11/12/2022 03:45 AM    SPECGRAV 1.014 11/12/2022 03:45 AM    GLUCOSEU Negative 11/12/2022 03:45 AM    GLUCOSEU NEG 10/17/2011 10:15 AM     Albumin:   Lab Results   Component Value Date    LABALBU 3.8 11/12/2022     Weight Trends  Wt Readings from Last 10 Encounters:   11/12/22 160 lb (72.6 kg)   11/12/22 158 lb 4 oz (71.8 kg)   09/23/22 163 lb 12.8 oz (74.3 kg) 07/01/22 162 lb (73.5 kg)   07/15/21 153 lb (69.4 kg)   07/01/21 153 lb (69.4 kg)   06/17/21 153 lb 12.8 oz (69.8 kg)   06/03/21 151 lb (68.5 kg)   12/17/20 145 lb (65.8 kg)   09/17/20 140 lb (63.5 kg)          FUNCTIONAL ADL´S:     Independent: [  ] Eating, [   ] Dressing, [   ] Transferring, [   ] Toileting, [   ] Bathing, [   ] Continence  Dependent   : [ x ] Eating, [ x ] Dressing, [ x ] Transferring, [ x ] Toileting, [ x ] Bathing, [ x ] Continence  W. assistant : [  ] Eating, [   ] Dressing, [   ] Transferring, [   ] Soham Ruiz, [   ] Matthew Sena, [   ] Continence    Radiology: Reviewed      CT HEAD WO CONTRAST    Result Date: 11/13/2022  EXAMINATION: CT OF THE HEAD WITHOUT CONTRAST  11/13/2022 9:25 am TECHNIQUE: CT of the head was performed without the administration of intravenous contrast. Automated exposure control, iterative reconstruction, and/or weight based adjustment of the mA/kV was utilized to reduce the radiation dose to as low as reasonably achievable. COMPARISON: None. HISTORY: ORDERING SYSTEM PROVIDED HISTORY: eval for progression of blood layering within the occipital horns of the lateral ventricles TECHNOLOGIST PROVIDED HISTORY: Reason for exam:->eval for progression of blood layering within the occipital horns of the lateral ventricles Has a \"code stroke\" or \"stroke alert\" been called? ->No What reading provider will be dictating this exam?->CRC FINDINGS: BRAIN/VENTRICLES: Cerebral atrophy is noted globally. There is posterior trophic enlargement of the lateral ventricles similar to older examinations. There is a small amount of intermediate density hemorrhage in the occipital horns of the right and left ventricle similar in severity to 11/12/2022 MRI. 4th ventricle is patent. There is no shift of midline structures. No subarachnoid or subdural hemorrhage. ORBITS: The visualized portion of the orbits demonstrate no acute abnormality. SINUSES: Pilar bullosa of the left middle nasal turbinate. Right deviation of the nasal septum which appears chronic. Mild mucosal thickening in the sphenoid sinuses. Small air-fluid level in the inferior left frontal sinus. Mastoid air cells are clear. SOFT TISSUES/SKULL:  No acute abnormality of the visualized skull or soft tissues. 1.  Stable minimal layering hemorrhage in the occipital horns of the right and left lateral ventricle. No change from MRI dated 11/12/2022. 2.  Findings compatible with chronic paranasal sinusitis, mild. CT HEAD WO CONTRAST    Result Date: 11/12/2022  EXAMINATION: CT OF THE HEAD WITHOUT CONTRAST  11/12/2022 10:31 am TECHNIQUE: CT of the head was performed without the administration of intravenous contrast. Automated exposure control, iterative reconstruction, and/or weight based adjustment of the mA/kV was utilized to reduce the radiation dose to as low as reasonably achievable. COMPARISON: 11/12/2022 HISTORY: ORDERING SYSTEM PROVIDED HISTORY: eval for hmg conversion TECHNOLOGIST PROVIDED HISTORY: Reason for exam:->eval for hmg conversion Has a \"code stroke\" or \"stroke alert\" been called? ->No What reading provider will be dictating this exam?->CRC FINDINGS: BRAIN/VENTRICLES: Generalized atrophy identified of the brain. Some low-attenuation areas identified in the periventricular and subcortical white matter to suggest chronic small vessel ischemic change. There is no acute intracranial hemorrhage, mass effect or midline shift. No abnormal extra-axial fluid collection. The gray-white differentiation is maintained without evidence of an acute infarct. There is no evidence of hydrocephalus. ORBITS: The visualized portion of the orbits demonstrate no acute abnormality. SINUSES: The visualized paranasal sinuses and mastoid air cells demonstrate no acute abnormality. SOFT TISSUES/SKULL:  No acute abnormality of the visualized skull or soft tissues.      Atrophy and chronic changes seen within the brain with no acute intracranial abnormality. MRI brain without contrast    Result Date: 11/12/2022  EXAMINATION: MRI OF THE BRAIN WITHOUT CONTRAST  11/12/2022 1:48 pm TECHNIQUE: Multiplanar multisequence MRI of the brain was performed without the administration of intravenous contrast. COMPARISON: None. HISTORY: ORDERING SYSTEM PROVIDED HISTORY: TIA TECHNOLOGIST PROVIDED HISTORY: Reason for exam:->TIA What is the sedation requirement?->None What reading provider will be dictating this exam?->CRC Initial evaluation. FINDINGS: INTRACRANIAL STRUCTURES/VENTRICLES: There is an acute infarct within the left inferior ahsan (DWI series 3, image 8). No mass effect or midline shift. A trace amount of blood is seen layering within the occipital horns of the lateral ventricles bilaterally. Areas of T2 FLAIR hyperintensity are seen in the periventricular and subcortical white matter, which are nonspecific, but may represent chronic microvascular ischemic change. There is prominence of the ventricles and sulci due to global parenchymal volume loss. The sellar/suprasellar regions appear unremarkable. There is at least partial loss of the normal signal void within the right vertebral artery. ORBITS: The visualized portion of the orbits demonstrate no acute abnormality. SINUSES: The visualized paranasal sinuses and mastoid air cells demonstrate no acute abnormality. BONES/SOFT TISSUES: The bone marrow signal intensity appears normal. The soft tissues demonstrate no acute abnormality. 1. Small acute infarct involving the left inferior ahsan. No mass effect or midline shift. 2. Trace amount of blood layering within the occipital horns of the lateral ventricles bilaterally. 3. At least partial loss of the normal signal void within the right vertebral artery, which can be seen with slow flow/occlusion. This is of uncertain chronicity. 4. Otherwise, no acute intracranial abnormality.  5. Moderate global parenchymal volume loss with mild chronic microvascular ischemic changes. These results were sent to the [facility] [address] ([facility]) on 11/12/2022 at 2:22 pm to be communicated to the referring/covering health care provider/office. Assessment and plan:    Patient without decision-making capacity. Palliative care encounter  Explained the role of palliative care in treating patient. Discussed symptom management related to chronic disease/condition. Provided emotional support and active listening. Patient with limited understanding at this time due to altered mental status. Discussed goals of care with spouse at bedside along with neurology and primary service. Spouse wishes to proceed with PEG tube for patient. If for some reason, they decide against artificial nutrition, she would be eligible for hospice. Hospice currently inconsistent with patient's goals of care. -Advance Care Planning  Discussed goals of care with patient's spouse. Patient's spouse has made the decision for patient to be a FULL CODE. Next of kin is spouse, Mary Morataya. -Goals of Care Discussion:  Disease process and goals of treatment were discussed in basic terms. Fide's goal is to optimize available comfort care measures, continue with full treatment, have PEG tube placed, and go to SNF for rehabilitation. We discussed the palliative care philosophy in light of those goals. We discussed all care options contingent on treatment response and QOL. Much active listening, presence, and emotional support were given. Will continue to follow to monitor progression of treatment. 11/15/22:  Spouse requesting call to daughter, Oracio Mcwilliams. Will call later this evening as she is currently at work. Will sign off and follow as an outpatient. D/W nurse. No further needs/concerns. Attempted to call daughter, Oracio Mcwilliams, at 4 p.m.  Number listed in chart is not currently working.    -Patient is currently being treated for multiple medical conditions by other providers    Left pontine stroke  HTN/HLD  COPD  Chronic diastolic HF  Right sided hemiplegia  Dysphagia  Possible underlying dementia    MDM: Moderate    Electronically signed by YULY Anderson CNP on 11/15/2022 at 1:36 PM

## 2022-11-15 NOTE — PROGRESS NOTES
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 Possibility of some ventricular hemorrhage as well. Patient is currently alert and oriented x1-2, no acute distress, cooperative.  at bedside. Right side is flaccid. She remains n.p.o. due to dysphagia. Dysarthria and aphasia noted. Right facial droop. Denies headache or vision changes. Blood pressure controlled.     Pt sleepy this am    Current Facility-Administered Medications   Medication Dose Route Frequency Provider Last Rate Last Admin    metoprolol (LOPRESSOR) injection 5 mg  5 mg IntraVENous Q6H Raleigh Raygoza MD        potassium chloride 10 mEq/100 mL IVPB (Peripheral Line)  10 mEq IntraVENous Once Raleigh Raygoza MD        cloNIDine (CATAPRES) 0.2 MG/24HR 1 patch  1 patch TransDERmal Weekly Raleigh Raygoza MD        hydrALAZINE (APRESOLINE) injection 20 mg  20 mg IntraVENous Q3H (SCHEDULED) Raleigh Raygoza MD   20 mg at 11/15/22 1132    enalaprilat (VASOTEC) injection 2.5 mg  2.5 mg IntraVENous 4 times per day Raleigh Raygoza MD   2.5 mg at 11/15/22 0010    acetaminophen (TYLENOL) suppository 650 mg  650 mg Rectal Q6H PRN Ian Sanz DO   650 mg at 11/14/22 1409    hydrALAZINE (APRESOLINE) injection 5 mg  5 mg IntraVENous Q4H PRN Raleigh Raygoza MD   5 mg at 11/13/22 1549    ondansetron (ZOFRAN-ODT) disintegrating tablet 4 mg  4 mg Oral Q8H PRN Yeny Damon MD        Or    ondansetron (ZOFRAN) injection 4 mg  4 mg IntraVENous Q6H PRN Yeny Damon MD        polyethylene glycol (GLYCOLAX) packet 17 g  17 g Oral Daily PRN Yeny Damon MD        labetalol (NORMODYNE;TRANDATE) injection 10 mg  10 mg IntraVENous Q10 Min PRN Yeny Damon MD   10 mg at 11/13/22 1119    [Held by provider] atorvastatin (LIPITOR) tablet 80 mg  80 mg Oral Nightly Yeny Damon MD        dextrose 5 % in lactated ringers infusion   IntraVENous Continuous Janet Posey  mL/hr at 11/15/22 1234 New Bag at 11/15/22 1234    glucose chewable tablet 16 g  4 tablet Oral PRN Janet Posey MD        dextrose bolus 10% 125 mL  125 mL IntraVENous PRN Janet Posey MD        Or    dextrose bolus 10% 250 mL  250 mL IntraVENous PRN Janet Psoey MD        glucagon (rDNA) injection 1 mg  1 mg SubCUTAneous PRN Janet Posey MD        dextrose 10 % infusion   IntraVENous Continuous PRN Janet Posey MD        insulin lispro (HUMALOG) injection vial 0-4 Units  0-4 Units SubCUTAneous Q4H Janet Posey MD           PHYSICAL EXAM:    BP (!) 125/57   Pulse 84   Temp 98.7 °F (37.1 °C) (Axillary)   Resp 20   Ht 5' 3\" (1.6 m)   Wt 160 lb (72.6 kg)   LMP  (LMP Unknown)   SpO2 96%   BMI 28.34 kg/m²    General Appearance:      Skin:  normal  CVS - Normal sounds, No murmurs , No carotid Bruits  RS -CTA  Abdomen Soft, BS present  Review of Systems   Constitutional:  Negative for fever. HENT:  Positive for trouble swallowing. Negative for hearing loss. Eyes:  Negative for visual disturbance. Respiratory:  Negative for cough, choking, shortness of breath and wheezing. Cardiovascular:  Negative for chest pain and palpitations. Gastrointestinal:  Negative for nausea and vomiting. Musculoskeletal:  Positive for gait problem. Negative for back pain, neck pain and neck stiffness. Skin:  Negative for color change. Neurological:  Positive for facial asymmetry, speech difficulty and weakness. Negative for dizziness, tremors, seizures, syncope, light-headedness, numbness and headaches. Psychiatric/Behavioral:  Positive for confusion. Negative for behavioral problems, hallucinations and sleep disturbance.        As noted above   Mental Status Exam:             Level of Alertness:   awake            Orientation:   person, place,             Memory:   She has underlying baseline dementia          f Language: Dysarthria and expressive aphasia      Funduscopic Exam:     Cranial Nerves            Cranial nerve III           Pupils:  equal, round, reactive to light      Cranial nerves III, IV, VI           Extraocular Movements: intact        Motor: Patient has underlying dementia and has hemiplegia in the right upper and lower extremity. She is not able to swallow            Sensory: Unable to perform                 Vibration                         Touch            Proprioception                 Coordination: Unable to perform                    Reflexes:             Deep Tendon Reflexes:             Reflexes are 2 +             Plantar response:                Right:  downgoing               Left:  downgoing    Vascular:  Cardiac Exam:  normal         CTA HEAD W WO CONTRAST    Result Date: 11/12/2022  EXAMINATION: CTA OF THE NECK; CTA OF THE HEAD WITHOUT AND WITH CONTRAST 11/12/2022 7:57 am TECHNIQUE: CTA of the neck was performed with the administration of intravenous contrast. Multiplanar reformatted images are provided for review. MIP images are provided for review. Stenosis of the internal carotid arteries measured using NASCET criteria. Automated exposure control, iterative reconstruction, and/or weight based adjustment of the mA/kV was utilized to reduce the radiation dose to as low as reasonably achievable.; CTA of the head/brain was performed without and with the administration of intravenous contrast. Multiplanar reformatted images are provided for review. MIP images are provided for review. Automated exposure control, iterative reconstruction, and/or weight based adjustment of the mA/kV was utilized to reduce the radiation dose to as low as reasonably achievable.  COMPARISON: CT brain earlier same day HISTORY: ORDERING SYSTEM PROVIDED HISTORY: right sided weakness TECHNOLOGIST PROVIDED HISTORY: Reason for exam:->right sided weakness What reading provider will be dictating this exam?->CRC FINDINGS: CTA NECK: AORTIC ARCH/ARCH VESSELS: Atherosclerotic calcifications are present within the aortic arch. The origins of the great vessels are normal.  There is no significant stenosis of the innominate or subclavian arteries. CAROTID ARTERIES: There is mild calcified atherosclerotic plaque within the left common carotid artery. There is no significant stenosis of the common carotid arteries identified. There is no significant stenosis of the internal carotid arteries. There is no dissection or pseudoaneurysm. VERTEBRAL ARTERIES: There is a mildly beaded appearance of the distal V2 and V3 segments of the vertebral arteries suggesting fibromuscular dysplasia. There is no dissection or pseudoaneurysm. There is no significant stenosis. SOFT TISSUES: Lung apices are clear. There is no superior mediastinal lymphadenopathy. There is no cervical lymphadenopathy or soft tissue mass identified. The parotid glands and submandibular glands are normal. BONES: No lytic or blastic osseous lesions are identified. There is moderate multilevel neural foraminal narrowing and mild spinal canal stenosis from C4-C5 to C6-C7. CTA HEAD: ANTERIOR CIRCULATION: Atherosclerotic calcifications are present within the cavernous segments of the internal carotid arteries without significant stenosis evident. The anterior and middle cerebral arteries are normal. There is no significant stenosis or aneurysm evident. POSTERIOR CIRCULATION: The distal vertebral arteries are normal in appearance. The basilar artery is normal.  No significant stenosis or aneurysm is identified. The posterior cerebral arteries are normal in appearance. OTHER: No dural venous sinus thrombosis on this non-dedicated study. BRAIN: There is no mass effect or midline shift. There is no vascular malformation identified. 1. No large vessel occlusion, significant stenosis or cerebral aneurysm identified within the brain.  2. Atherosclerosis without significant arterial stenosis identified within the neck. 3. Beaded appearance of the distal vertebral arteries suggesting fibromuscular dysplasia. CT HEAD WO CONTRAST    Result Date: 11/13/2022  EXAMINATION: CT OF THE HEAD WITHOUT CONTRAST  11/13/2022 9:25 am TECHNIQUE: CT of the head was performed without the administration of intravenous contrast. Automated exposure control, iterative reconstruction, and/or weight based adjustment of the mA/kV was utilized to reduce the radiation dose to as low as reasonably achievable. COMPARISON: None. HISTORY: ORDERING SYSTEM PROVIDED HISTORY: eval for progression of blood layering within the occipital horns of the lateral ventricles TECHNOLOGIST PROVIDED HISTORY: Reason for exam:->eval for progression of blood layering within the occipital horns of the lateral ventricles Has a \"code stroke\" or \"stroke alert\" been called? ->No What reading provider will be dictating this exam?->CRC FINDINGS: BRAIN/VENTRICLES: Cerebral atrophy is noted globally. There is posterior trophic enlargement of the lateral ventricles similar to older examinations. There is a small amount of intermediate density hemorrhage in the occipital horns of the right and left ventricle similar in severity to 11/12/2022 MRI. 4th ventricle is patent. There is no shift of midline structures. No subarachnoid or subdural hemorrhage. ORBITS: The visualized portion of the orbits demonstrate no acute abnormality. SINUSES: Pilar bullosa of the left middle nasal turbinate. Right deviation of the nasal septum which appears chronic. Mild mucosal thickening in the sphenoid sinuses. Small air-fluid level in the inferior left frontal sinus. Mastoid air cells are clear. SOFT TISSUES/SKULL:  No acute abnormality of the visualized skull or soft tissues. 1.  Stable minimal layering hemorrhage in the occipital horns of the right and left lateral ventricle.   No change from MRI dated 11/12/2022. 2.  Findings compatible with chronic paranasal sinusitis, mild. CT HEAD WO CONTRAST    Result Date: 11/12/2022  EXAMINATION: CT OF THE HEAD WITHOUT CONTRAST  11/12/2022 10:31 am TECHNIQUE: CT of the head was performed without the administration of intravenous contrast. Automated exposure control, iterative reconstruction, and/or weight based adjustment of the mA/kV was utilized to reduce the radiation dose to as low as reasonably achievable. COMPARISON: 11/12/2022 HISTORY: ORDERING SYSTEM PROVIDED HISTORY: eval for hmg conversion TECHNOLOGIST PROVIDED HISTORY: Reason for exam:->eval for hmg conversion Has a \"code stroke\" or \"stroke alert\" been called? ->No What reading provider will be dictating this exam?->CRC FINDINGS: BRAIN/VENTRICLES: Generalized atrophy identified of the brain. Some low-attenuation areas identified in the periventricular and subcortical white matter to suggest chronic small vessel ischemic change. There is no acute intracranial hemorrhage, mass effect or midline shift. No abnormal extra-axial fluid collection. The gray-white differentiation is maintained without evidence of an acute infarct. There is no evidence of hydrocephalus. ORBITS: The visualized portion of the orbits demonstrate no acute abnormality. SINUSES: The visualized paranasal sinuses and mastoid air cells demonstrate no acute abnormality. SOFT TISSUES/SKULL:  No acute abnormality of the visualized skull or soft tissues. Atrophy and chronic changes seen within the brain with no acute intracranial abnormality. CT HEAD WO CONTRAST    Result Date: 11/12/2022  EXAMINATION: CT OF THE HEAD WITHOUT CONTRAST  11/12/2022 4:34 am TECHNIQUE: CT of the head was performed without the administration of intravenous contrast. Automated exposure control, iterative reconstruction, and/or weight based adjustment of the mA/kV was utilized to reduce the radiation dose to as low as reasonably achievable. COMPARISON: None.  HISTORY: ORDERING SYSTEM PROVIDED HISTORY: fall TECHNOLOGIST PROVIDED HISTORY: Reason for exam:->fall Has a \"code stroke\" or \"stroke alert\" been called? ->No Decision Support Exception - unselect if not a suspected or confirmed emergency medical condition->Emergency Medical Condition (MA) What reading provider will be dictating this exam?->CRC FINDINGS: BRAIN/VENTRICLES: There is no acute intracranial hemorrhage, mass effect or midline shift. No abnormal extra-axial fluid collection. The gray-white differentiation is maintained without evidence of an acute infarct. There is no evidence of hydrocephalus. The ventricles, cisterns and sulci are prominent consistent with atrophy. There is decreased attenuation within the periventricular white matter consistent with periventricular leukomalacia. ORBITS: The visualized portion of the orbits demonstrate no acute abnormality. SINUSES: The visualized paranasal sinuses and mastoid air cells demonstrate no acute abnormality. SOFT TISSUES/SKULL:  No acute abnormality of the visualized skull or soft tissues. 1. There is no acute intracranial abnormality. Specifically, there is no intracranial hemorrhage. 2. Atrophy and periventricular leukomalacia,     CTA NECK W WO CONTRAST    Result Date: 11/12/2022  EXAMINATION: CTA OF THE NECK; CTA OF THE HEAD WITHOUT AND WITH CONTRAST 11/12/2022 7:57 am TECHNIQUE: CTA of the neck was performed with the administration of intravenous contrast. Multiplanar reformatted images are provided for review. MIP images are provided for review. Stenosis of the internal carotid arteries measured using NASCET criteria. Automated exposure control, iterative reconstruction, and/or weight based adjustment of the mA/kV was utilized to reduce the radiation dose to as low as reasonably achievable.; CTA of the head/brain was performed without and with the administration of intravenous contrast. Multiplanar reformatted images are provided for review. MIP images are provided for review.  Automated exposure control, iterative reconstruction, and/or weight based adjustment of the mA/kV was utilized to reduce the radiation dose to as low as reasonably achievable. COMPARISON: CT brain earlier same day HISTORY: ORDERING SYSTEM PROVIDED HISTORY: right sided weakness TECHNOLOGIST PROVIDED HISTORY: Reason for exam:->right sided weakness What reading provider will be dictating this exam?->CRC FINDINGS: CTA NECK: AORTIC ARCH/ARCH VESSELS: Atherosclerotic calcifications are present within the aortic arch. The origins of the great vessels are normal.  There is no significant stenosis of the innominate or subclavian arteries. CAROTID ARTERIES: There is mild calcified atherosclerotic plaque within the left common carotid artery. There is no significant stenosis of the common carotid arteries identified. There is no significant stenosis of the internal carotid arteries. There is no dissection or pseudoaneurysm. VERTEBRAL ARTERIES: There is a mildly beaded appearance of the distal V2 and V3 segments of the vertebral arteries suggesting fibromuscular dysplasia. There is no dissection or pseudoaneurysm. There is no significant stenosis. SOFT TISSUES: Lung apices are clear. There is no superior mediastinal lymphadenopathy. There is no cervical lymphadenopathy or soft tissue mass identified. The parotid glands and submandibular glands are normal. BONES: No lytic or blastic osseous lesions are identified. There is moderate multilevel neural foraminal narrowing and mild spinal canal stenosis from C4-C5 to C6-C7. CTA HEAD: ANTERIOR CIRCULATION: Atherosclerotic calcifications are present within the cavernous segments of the internal carotid arteries without significant stenosis evident. The anterior and middle cerebral arteries are normal. There is no significant stenosis or aneurysm evident. POSTERIOR CIRCULATION: The distal vertebral arteries are normal in appearance.   The basilar artery is normal.  No significant stenosis or aneurysm is identified. The posterior cerebral arteries are normal in appearance. OTHER: No dural venous sinus thrombosis on this non-dedicated study. BRAIN: There is no mass effect or midline shift. There is no vascular malformation identified. 1. No large vessel occlusion, significant stenosis or cerebral aneurysm identified within the brain. 2. Atherosclerosis without significant arterial stenosis identified within the neck. 3. Beaded appearance of the distal vertebral arteries suggesting fibromuscular dysplasia. XR CHEST PORTABLE    Result Date: 11/12/2022  EXAMINATION: ONE XRAY VIEW OF THE CHEST 11/12/2022 4:12 am COMPARISON: None. HISTORY: ORDERING SYSTEM PROVIDED HISTORY: incresed resp rate TECHNOLOGIST PROVIDED HISTORY: Reason for exam:->incresed resp rate What reading provider will be dictating this exam?->CRC FINDINGS: The cardiomediastinal silhouette is at the upper limits of normal for technique. There are low lung volumes with bronchovascular crowding and bibasilar atelectasis. No pneumothorax or pleural effusion. Bilateral shoulder arthroplasties. Bibasilar atelectasis. MRI brain without contrast    Result Date: 11/12/2022  EXAMINATION: MRI OF THE BRAIN WITHOUT CONTRAST  11/12/2022 1:48 pm TECHNIQUE: Multiplanar multisequence MRI of the brain was performed without the administration of intravenous contrast. COMPARISON: None. HISTORY: ORDERING SYSTEM PROVIDED HISTORY: TIA TECHNOLOGIST PROVIDED HISTORY: Reason for exam:->TIA What is the sedation requirement?->None What reading provider will be dictating this exam?->CRC Initial evaluation. FINDINGS: INTRACRANIAL STRUCTURES/VENTRICLES: There is an acute infarct within the left inferior ahsan (DWI series 3, image 8). No mass effect or midline shift. A trace amount of blood is seen layering within the occipital horns of the lateral ventricles bilaterally.   Areas of T2 FLAIR hyperintensity are seen in the periventricular and subcortical white matter, which are nonspecific, but may represent chronic microvascular ischemic change. There is prominence of the ventricles and sulci due to global parenchymal volume loss. The sellar/suprasellar regions appear unremarkable. There is at least partial loss of the normal signal void within the right vertebral artery. ORBITS: The visualized portion of the orbits demonstrate no acute abnormality. SINUSES: The visualized paranasal sinuses and mastoid air cells demonstrate no acute abnormality. BONES/SOFT TISSUES: The bone marrow signal intensity appears normal. The soft tissues demonstrate no acute abnormality. 1. Small acute infarct involving the left inferior ahsan. No mass effect or midline shift. 2. Trace amount of blood layering within the occipital horns of the lateral ventricles bilaterally. 3. At least partial loss of the normal signal void within the right vertebral artery, which can be seen with slow flow/occlusion. This is of uncertain chronicity. 4. Otherwise, no acute intracranial abnormality. 5. Moderate global parenchymal volume loss with mild chronic microvascular ischemic changes. These results were sent to the [facility] [id number] ([address] [place] [address]) on 11/12/2022 at 2:22 pm to be communicated to the referring/covering health care provider/office. Recent Labs     11/13/22  0514 11/14/22  0456 11/15/22  0552   WBC 6.7 8.5 7.7   HGB 14.0 13.3 13.2    178 178       Recent Labs     11/14/22 0456 11/15/22  0552    142   K 3.9 3.5    108*   CO2 24 25   BUN 6* 7*   CREATININE 0.58 0.72   GLUCOSE 106* 115*       No results for input(s): BILITOT, ALKPHOS, AST, ALT in the last 72 hours.     Lab Results   Component Value Date/Time    PROTIME 12.8 11/12/2022 11:23 AM    PROTIME 10.7 04/23/2012 11:12 AM    INR 1.0 11/12/2022 11:23 AM     No results found for: LITHIUM, DILFRTOT, VALPROATE    ASSESSMENT AND PLAN  Left pontine stroke with hemiplegia on the right upper and lower extremity. Patient has dysphagia as well. Patient has vertebral artery narrowing stenosis and may have fibromuscular dysplasia in this area. We were contemplating heparin and had just given her for few minutes till MRI results came back with some layering of hemorrhage. Repeat CT shows some minor layering of hemorrhage though this could be calcification. Hold aspirin for few days findings discussed the daughter that it is very difficult ascertain what the outcome is going to be. Of note her symptoms started much earlier and this was a wake-up stroke. Patient has history suggestion  of dementia has been seen by other providers. 11/14/2022:  Left pontine ischemic CVA with ventricular hemorrhage repeat CT of the head done 11/13/2022 stable. Some increasing right-sided weakness today therefore we will repeat CT of the head given its location and propensity for evolution and hemorrhage. Vertebral artery fibromuscular dysplasia  LDL 82  Hemoglobin A1c 5.9  Dysphagia, remains n.p.o. palliative care is following for discussion regarding alternate means of nutrition. Hypertension, ongoing, needs ongoing attention to blood pressure control. 11/15/2022:  Repeat CT of the head negative for acute findings yesterday  Dysphagia, remains n.p.o. with plans for PEG tube placement. Plan to initiate antiplatelet therapy tomorrow once PEG tube placed. I have personally performed a face to face diagnostic evaluation on this patient, reviewed all data and investigations, and am the sole provider of all clinical decisions on the neurological status of this patient. dysphagia, Will need PEG, prognosis caanot be ascertained. 60 % time spent       Kenn Grimaldo MD, Mozella Babinski, American Board of Psychiatry & Neurology  Board Certified in Vascular Neurology  Board Certified in Neuromuscular Medicine  Certified in [facility] [place] [facility]

## 2022-11-15 NOTE — PLAN OF CARE
Problem: Nutrition Deficit:  Goal: Optimize nutritional status  Outcome: Progressing     Problem: Chronic Conditions and Co-morbidities  Goal: Patient's chronic conditions and co-morbidity symptoms are monitored and maintained or improved  Outcome: Progressing     Problem: Skin/Tissue Integrity  Goal: Absence of new skin breakdown  Description: 1. Monitor for areas of redness and/or skin breakdown  2. Assess vascular access sites hourly  3. Every 4-6 hours minimum:  Change oxygen saturation probe site  4. Every 4-6 hours:  If on nasal continuous positive airway pressure, respiratory therapy assess nares and determine need for appliance change or resting period. Outcome: Progressing     Problem: Safety - Adult  Goal: Free from fall injury  Outcome: Progressing     Problem: ABCDS Injury Assessment  Goal: Absence of physical injury  Outcome: Progressing     Problem: SLP Adult - Impaired Communication  Goal: By Discharge: Demonstrates communication skills at highest level of function for planned discharge setting. See evaluation for individualized goals.   11/14/2022 1441 by Jena Rome, SLP  Outcome: Progressing

## 2022-11-15 NOTE — DISCHARGE INSTR - COC
Continuity of Care Form    Patient Name: Jeb Randhawa   :  1945  MRN:  [id number]    Admit date:  2022  Discharge date:  2022    Code Status Order: Full Code   Advance Directives:     Admitting Physician:  Abel Anand MD  PCP: Pardeep Herrera, APRN - CNP    Discharging Nurse: Leslie June RN  [address] Unit/Room#: W559/C592-45  Discharging Unit Phone Number: [phone]    Emergency Contact:   Extended Emergency Contact Information  Primary Emergency Contact: Jen Valadez  Address: [address] Phone: [phone]  Relation: Spouse  Secondary Emergency Contact: Snehal Mar Phone: [phone]  Relation: Child   needed?  No    Past Surgical History:  Past Surgical History:   Procedure Laterality Date    CARDIAC CATHETERIZATION      CARPAL TUNNEL RELEASE      COLONOSCOPY      HYSTERECTOMY, TOTAL ABDOMINAL (CERVIX REMOVED)      KNEE ARTHROPLASTY Bilateral     ME COLON CA SCRN NOT HI RSK IND N/A 2017    COLONOSCOPY performed by Silvia Merrill MD at [address] TRANSORAL DIAGNOSTIC N/A 2017    EGD ESOPHAGOGASTRODUODENOSCOPY performed by Silvia Merrill MD at [address] Bilateral     SKIN BIOPSY Right 2020    EXCISION OF LEG MASS RIGHT (PAT ON ADMIT) performed by Ari Cortez MD at [facility]       Immunization History:   Immunization History   Administered Date(s) Administered    Influenza, FLUAD, (age 72 y+), Adjuvanted, 0.5mL 2020, 2022    Influenza, High Dose (Fluzone 65 yrs and older) 10/03/2017, 2018    Pneumococcal Conjugate 13-valent (Axpzlkg60) 10/03/2017    Pneumococcal Polysaccharide (Gmytdpxvs50) 10/03/2018    Zoster Recombinant (Shingrix) 2019       Active Problems:  Patient Active Problem List   Diagnosis Code    Hypertension I10    Hyperlipidemia E78.5 COPD (chronic obstructive pulmonary disease) ([facility]) J44.9    Generalized osteoarthrosis, unspecified site M15.9    Major depressive disorder, single episode, moderate ([facility]) F32.1    Morbid obesity ([facility]) E66.01    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 ([facility]) M87.00    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    Hypotension I95.9    NSTEMI (non-ST elevated myocardial infarction) ([facility]) L08.8    Acute diastolic heart failure ([facility]) I50.31    Hypovolemic shock ([facility]) R57.1    Acute colitis K52.9    Slow transit constipation K59.01    External hemorrhoid, bleeding K64.4    Colitis K52.9    Pain and swelling due to varicose veins of both lower extremities I83.813    Venous insufficiency of left lower extremity I87.2    Asymptomatic varicose veins of bilateral lower extremities I83.93    Non-healing surgical wound T81.89XA    Claudication in peripheral vascular disease ([facility]) I73.9    Non-pressure chronic ulcer of calf, right, with fat layer exposed ([address] L97.212    Non-pressure chronic ulcer of calf with fat layer exposed ([address] L97.202    Cerebrovascular accident (CVA) due to stenosis of left middle cerebral artery ([facility]) I63.512    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 [facility]) I69.351    Fibromuscular dysplasia ([facility]) I77.3    Impaired mobility and activities of daily living dt CVA Z74.09, Z78.9    Acute CVA (cerebrovascular accident) ([address] I63.9    Dysphagia, oropharyngeal phase R13.12    Cerebrovascular accident (CVA) due to stenosis of left vertebral artery ([address] J95.601    Palliative care encounter Z51.5    Advanced care planning/counseling discussion Z71.89    Goals of care, counseling/discussion Z71.89    Aphasia R47.01       Isolation/Infection:   Isolation            No Isolation          Patient Infection Status       Infection Onset Added Last Indicated Last Indicated By Review Planned Expiration Resolved Resolved By    None active    Resolved    COVID-19 (Rule Out) 11/12/22 11/12/22 11/12/22 COVID-19, Rapid (Ordered)   11/12/22 Rule-Out Test Resulted    COVID-19 (Rule Out) 09/17/20 09/17/20 09/17/20 COVID-19 (Ordered)   09/17/20 Rule-Out Test Resulted            Nurse Assessment:  Last Vital Signs: BP (!) 148/62   Pulse 88   Temp 98.4 °F (36.9 °C) (Axillary)   Resp 19   Ht 5' 3\" (1.6 m)   Wt 160 lb (72.6 kg)   LMP  (LMP Unknown)   SpO2 94%   BMI 28.34 kg/m²     Last documented pain score (0-10 scale): Pain Level: 1  Last Weight:   Wt Readings from Last 1 Encounters:   11/12/22 160 lb (72.6 kg)     Mental Status:  oriented and alert    IV Access:  - None    Nursing Mobility/ADLs:  Walking   Dependent  Transfer  Dependent  Bathing  Dependent  Dressing  Dependent  Toileting  Dependent  Feeding  Dependent  Med Admin  Dependent  Med Delivery    crushed via peg    Wound Care Documentation and Therapy:        Elimination:  Continence: Bowel: {YES / GK:13957}  Bladder: {YES / YY:81969}  Urinary Catheter: {Urinary Catheter:793750705}   Colostomy/Ileostomy/Ileal Conduit: No       Date of Last BM: 11/17/2022    Intake/Output Summary (Last 24 hours) at 11/15/2022 1144  Last data filed at 11/14/2022 1839  Gross per 24 hour   Intake --   Output 450 ml   Net -450 ml     I/O last 3 completed shifts:  In: -   Out: 450 [Urine:450]    Safety Concerns: At Risk for Falls    Impairments/Disabilities:      Speech and Paralysis - right hemiparesis 2/2 [facility]    Nutrition Therapy:  Current Nutrition Therapy:   - Tube Feedings:  Standard with fiber Jevity 1.5 @ 50/hr continuously [id number] H2o flushes    Routes of Feeding: Gastrostomy Tube  Liquids: NPO  Daily Fluid Restriction: no  Last Modified Barium Swallow with Video (Video Swallowing Test): done on ***/***    Treatments at the Time of Hospital Discharge:   Respiratory Treatments: see orders  Oxygen Therapy:  is on oxygen at 4 L/min per nasal cannula.   Ventilator:    - No ventilator support    Rehab Therapies: Physical Therapy, Occupational Therapy, and Speech/Language Therapy  Weight Bearing Status/Restrictions: No weight bearing restrictions  Other Medical Equipment (for information only, NOT a DME order):  wheelchair  Other Treatments:       Patient's personal belongings (please select all that are sent with patient):  Glasses,  accompanying with clothing    RN SIGNATURE:  Electronically signed by Barbara Raymond RN on 11/18/22 at 3:07 PM EST    CASE MANAGEMENT/SOCIAL WORK SECTION    Inpatient Status Date: 11/12/22    Readmission Risk Assessment Score:  Readmission Risk              Risk of Unplanned Readmission:  7           Discharging to Facility/ Agency   Name: Dhara Del  Address:  [place] [address]  Fax:    Dialysis Facility (if applicable)   Name:  Address:  Dialysis Schedule:  Phone:  Fax:    / signature: Electronically signed by ANTHONY Ervin on 11/15/22 at 11:44 AM EST    PHYSICIAN SECTION    Prognosis: {Prognosis:5755294585}    Condition at Discharge: [address] Patient Condition:724928407}    Rehab Potential (if transferring to Rehab): {Prognosis:0532436174}    Recommended Labs or Other Treatments After Discharge: Chem-6 and magnesium once a week for 3 weeks--help determine electrolyte imbalance. Follow-up with Dr. Aleisha Pichardo at [facility] has been arranged. This will be at a new location in [facility].  If questions, call [phone]    Physician Certification: I certify the above information and transfer of Ngoc Gomez  is necessary for the continuing treatment of the diagnosis listed and that she requires {Admit to Appropriate Level of Care:31026} for {GREATER/LESS:501419739} 30 days.      Update Admission H&P: {CHP DME Changes in [facility]:163052540}    PHYSICIAN SIGNATURE:  {Esignature:156087761}

## 2022-11-15 NOTE — CONSULTS
Department of General Surgery - Adult  Surgical Service general surgery  Attending Consult Note      Reason for Consult: Need for gastrostomy tube      CHIEF COMPLAINT: Inability to eat    History Obtained From:  electronic medical record    HISTORY OF PRESENT ILLNESS:                The patient is a 76 y.o. female who presents with inability to eat. Patient has a CVA which is limited her ability to eat as well as right-sided strength. I reviewed her recent CAT scan of her head. I was asked to place a PEG tube for assistance with ongoing nutrition.  agreeable to proceed.     Past Medical History:        Diagnosis Date    Anxiety     Arthritis     COPD (chronic obstructive pulmonary disease) ([facility])     Generalized osteoarthrosis, unspecified site 12/27/2002    Hyperlipidemia     Hypertension     Major depressive disorder, single episode, moderate ([facility] 12/27/2002    Morbid obesity ([facility] 12/27/2002    OAB (overactive bladder)     Osteoporosis      Past Surgical History:        Procedure Laterality Date    CARDIAC CATHETERIZATION      CARPAL TUNNEL RELEASE      COLONOSCOPY      HYSTERECTOMY, TOTAL ABDOMINAL (CERVIX REMOVED)      KNEE ARTHROPLASTY Bilateral     HI COLON CA SCRN NOT HI RSK IND N/A 6/22/2017    COLONOSCOPY performed by Stephan Vázquez MD at [address] TRANSORAL DIAGNOSTIC N/A 6/22/2017    EGD ESOPHAGOGASTRODUODENOSCOPY performed by Stephan Vázquez MD at [address] Bilateral     SKIN BIOPSY Right 9/17/2020    EXCISION OF LEG MASS RIGHT (PAT ON ADMIT) performed by Zamzam Leigh MD at [facility]     Current Medications:   Current Facility-Administered Medications: cloNIDine (CATAPRES) 0.3 MG/24HR 1 patch, 1 patch, TransDERmal, Weekly  hydrALAZINE (APRESOLINE) injection 20 mg, 20 mg, IntraVENous, Q3H (SCHEDULED)  enalaprilat (VASOTEC) injection 2.5 mg, 2.5 mg, IntraVENous, 4 times per day  acetaminophen (TYLENOL) suppository 650 mg, 650 mg, Rectal, Q6H PRN  [Held by provider] nitroglycerin (NITRO-BID) 2 % ointment 0.5 inch, 0.5 inch, Topical, 4 times per day  hydrALAZINE (APRESOLINE) injection 5 mg, 5 mg, IntraVENous, Q4H PRN  ondansetron (ZOFRAN-ODT) disintegrating tablet 4 mg, 4 mg, Oral, Q8H PRN **OR** ondansetron (ZOFRAN) injection 4 mg, 4 mg, IntraVENous, Q6H PRN  polyethylene glycol (GLYCOLAX) packet 17 g, 17 g, Oral, Daily PRN  labetalol (NORMODYNE;TRANDATE) injection 10 mg, 10 mg, IntraVENous, Q10 Min PRN  [Held by provider] atorvastatin (LIPITOR) tablet 80 mg, 80 mg, Oral, Nightly  dextrose 5 % in lactated ringers infusion, , IntraVENous, Continuous  glucose chewable tablet 16 g, 4 tablet, Oral, PRN  dextrose bolus 10% 125 mL, 125 mL, IntraVENous, PRN **OR** dextrose bolus 10% 250 mL, 250 mL, IntraVENous, PRN  glucagon (rDNA) injection 1 mg, 1 mg, SubCUTAneous, PRN  dextrose 10 % infusion, , IntraVENous, Continuous PRN  insulin lispro (HUMALOG) injection vial 0-4 Units, 0-4 Units, SubCUTAneous, Q4H  Allergies:  Codeine    Social History:   TOBACCO:   reports that she quit smoking about 42 years ago. Her smoking use included cigarettes. She has a 20.00 pack-year smoking history. She has never used smokeless tobacco.  ETOH:   reports no history of alcohol use. DRUGS:   reports no history of drug use.   Family History:       Problem Relation Age of Onset    Arthritis Father     Other Father         gout    Heart Failure Father     Cancer Sister        REVIEW OF SYSTEMS:    Review of systems not obtained due to patient factors - mental status    PHYSICAL EXAM:    VITALS:  BP (!) 131/46   Pulse 88   Temp 98.4 °F (36.9 °C) (Axillary)   Resp 19   Ht 5' 3\" (1.6 m)   Wt 160 lb (72.6 kg)   LMP  (LMP Unknown)   SpO2 94%   BMI 28.34 kg/m²   CONSTITUTIONAL: Resting, not oriented  EYES:  Lids and lashes normal, pupils equal, round and reactive to light, extra ocular muscles intact, sclera clear, conjunctiva normal  ENT:  Normocephalic, without obvious abnormality, atraumatic, sinuses nontender on palpation, external ears without lesions, oral pharynx with moist mucus membranes, tonsils without erythema or exudates, gums normal and good dentition. NECK:  Supple, symmetrical, trachea midline, no adenopathy, thyroid symmetric, not enlarged and no tenderness, skin normal  HEMATOLOGIC/LYMPHATICS:  no cervical lymphadenopathy  BACK:  Symmetric, no curvature, spinous processes are non-tender on palpation, paraspinous muscles are non-tender on palpation, no costal vertebral tenderness  LUNGS:  No increased work of breathing, good air exchange, clear to auscultation bilaterally, no crackles or wheezing  CARDIOVASCULAR:  Normal apical impulse, regular rate and rhythm, normal S1 and S2, no S3 or S4, and no murmur noted  ABDOMEN: Abdomen soft without abdominal wall hernia. MUSCULOSKELETAL:  There is no redness, warmth, or swelling of the joints. Full range of motion noted. Motor strength is 5 out of 5 all extremities bilaterally.   Tone is normal.  NEUROLOGIC: Awake not oriented  SKIN:  no bruising or bleeding  DATA:    CBC:   Lab Results   Component Value Date/Time    WBC 7.7 11/15/2022 05:52 AM    RBC 4.20 11/15/2022 05:52 AM    RBC 4.22 04/23/2012 11:12 AM    HGB 13.2 11/15/2022 05:52 AM    HCT 40.5 11/15/2022 05:52 AM    MCV 96.4 11/15/2022 05:52 AM    MCH 31.5 11/15/2022 05:52 AM    MCHC 32.6 11/15/2022 05:52 AM    RDW 14.6 11/15/2022 05:52 AM     11/15/2022 05:52 AM    MPV 9.9 10/12/2012 09:30 AM     CMP:    Lab Results   Component Value Date/Time     11/15/2022 05:52 AM    K 3.5 11/15/2022 05:52 AM    K 4.0 10/23/2019 06:19 AM     11/15/2022 05:52 AM    CO2 25 11/15/2022 05:52 AM    BUN 7 11/15/2022 05:52 AM    CREATININE 0.72 11/15/2022 05:52 AM    GFRAA >60.0 07/05/2022 11:26 AM    LABGLOM >60.0 11/15/2022 05:52 AM    GLUCOSE 115 11/15/2022 05:52 AM    GLUCOSE 75 04/23/2012 11:12 AM    PROT 6.4 11/12/2022 03:45 AM    LABALBU 3.8 11/12/2022 03:45 AM    LABALBU 4.6 04/23/2012 11:12 AM    CALCIUM 8.9 11/15/2022 05:52 AM    BILITOT 0.8 11/12/2022 03:45 AM    ALKPHOS 59 11/12/2022 03:45 AM    AST 29 11/12/2022 03:45 AM    ALT 17 11/12/2022 03:45 AM     Radiology Review: CT of the head reviewed    IMPRESSION/RECOMMENDATIONS:      Inability to eat secondary to CVA    Percutaneous endoscopic gastrostomy tube scheduled for tomorrow morning. Consent to be obtained from .     Please call if any questions

## 2022-11-15 NOTE — CARE COORDINATION
YARAW SPOKE WITH MASSIMO AND ALONZO JJ HAS ACCEPTED THE PT BUT HAVE NOT STARTED PRECERT YET. PT WILL BE GETTING A PEG TOMORROW AND PRECERT WILL BE STARTED AFTER THAT.

## 2022-11-16 ENCOUNTER — ANESTHESIA EVENT (OUTPATIENT)
Dept: OPERATING ROOM | Age: 77
DRG: 064 | End: 2022-11-16
Payer: MEDICARE

## 2022-11-16 ENCOUNTER — ANESTHESIA (OUTPATIENT)
Dept: OPERATING ROOM | Age: 77
DRG: 064 | End: 2022-11-16
Payer: MEDICARE

## 2022-11-16 LAB
ANION GAP SERPL CALCULATED.3IONS-SCNC: 12 MEQ/L (ref 9–15)
BASOPHILS ABSOLUTE: 0.1 K/UL (ref 0–0.2)
BASOPHILS RELATIVE PERCENT: 0.8 %
BUN BLDV-MCNC: 11 MG/DL (ref 8–23)
CALCIUM SERPL-MCNC: 8.9 MG/DL (ref 8.5–9.9)
CHLORIDE BLD-SCNC: 105 MEQ/L (ref 95–107)
CO2: 24 MEQ/L (ref 20–31)
CREAT SERPL-MCNC: 0.59 MG/DL (ref 0.5–0.9)
EOSINOPHILS ABSOLUTE: 0.4 K/UL (ref 0–0.7)
EOSINOPHILS RELATIVE PERCENT: 5.1 %
GFR SERPL CREATININE-BSD FRML MDRD: >60 ML/MIN/{1.73_M2}
GLUCOSE BLD-MCNC: 104 MG/DL (ref 70–99)
GLUCOSE BLD-MCNC: 110 MG/DL (ref 70–99)
GLUCOSE BLD-MCNC: 111 MG/DL (ref 70–99)
GLUCOSE BLD-MCNC: 113 MG/DL (ref 70–99)
GLUCOSE BLD-MCNC: 126 MG/DL (ref 70–99)
GLUCOSE BLD-MCNC: 89 MG/DL (ref 70–99)
HCT VFR BLD CALC: 40.4 % (ref 37–47)
HEMOGLOBIN: 13 G/DL (ref 12–16)
LYMPHOCYTES ABSOLUTE: 1.3 K/UL (ref 1–4.8)
LYMPHOCYTES RELATIVE PERCENT: 15.1 %
MCH RBC QN AUTO: 31.5 PG (ref 27–31.3)
MCHC RBC AUTO-ENTMCNC: 32.2 % (ref 33–37)
MCV RBC AUTO: 97.8 FL (ref 79.4–94.8)
MONOCYTES ABSOLUTE: 1.1 K/UL (ref 0.2–0.8)
MONOCYTES RELATIVE PERCENT: 12 %
NEUTROPHILS ABSOLUTE: 5.9 K/UL (ref 1.4–6.5)
NEUTROPHILS RELATIVE PERCENT: 67 %
PDW BLD-RTO: 14.9 % (ref 11.5–14.5)
PERFORMED ON: ABNORMAL
PERFORMED ON: NORMAL
PLATELET # BLD: 181 K/UL (ref 130–400)
POTASSIUM SERPL-SCNC: 3.7 MEQ/L (ref 3.4–4.9)
RBC # BLD: 4.13 M/UL (ref 4.2–5.4)
SODIUM BLD-SCNC: 141 MEQ/L (ref 135–144)
WBC # BLD: 8.8 K/UL (ref 4.8–10.8)

## 2022-11-16 PROCEDURE — 94640 AIRWAY INHALATION TREATMENT: CPT

## 2022-11-16 PROCEDURE — 3609013300 HC EGD TUBE PLACEMENT: Performed by: COLON & RECTAL SURGERY

## 2022-11-16 PROCEDURE — 7100000000 HC PACU RECOVERY - FIRST 15 MIN: Performed by: COLON & RECTAL SURGERY

## 2022-11-16 PROCEDURE — 1210000000 HC MED SURG R&B

## 2022-11-16 PROCEDURE — 2700000000 HC OXYGEN THERAPY PER DAY

## 2022-11-16 PROCEDURE — 3700000001 HC ADD 15 MINUTES (ANESTHESIA): Performed by: COLON & RECTAL SURGERY

## 2022-11-16 PROCEDURE — 7100000001 HC PACU RECOVERY - ADDTL 15 MIN: Performed by: COLON & RECTAL SURGERY

## 2022-11-16 PROCEDURE — 6370000000 HC RX 637 (ALT 250 FOR IP): Performed by: INTERNAL MEDICINE

## 2022-11-16 PROCEDURE — 0DH63UZ INSERTION OF FEEDING DEVICE INTO STOMACH, PERCUTANEOUS APPROACH: ICD-10-PCS | Performed by: COLON & RECTAL SURGERY

## 2022-11-16 PROCEDURE — 6370000000 HC RX 637 (ALT 250 FOR IP): Performed by: NURSE PRACTITIONER

## 2022-11-16 PROCEDURE — 85025 COMPLETE CBC W/AUTO DIFF WBC: CPT

## 2022-11-16 PROCEDURE — 99233 SBSQ HOSP IP/OBS HIGH 50: CPT | Performed by: INTERNAL MEDICINE

## 2022-11-16 PROCEDURE — 2580000003 HC RX 258: Performed by: COLON & RECTAL SURGERY

## 2022-11-16 PROCEDURE — 3700000000 HC ANESTHESIA ATTENDED CARE: Performed by: COLON & RECTAL SURGERY

## 2022-11-16 PROCEDURE — 99232 SBSQ HOSP IP/OBS MODERATE 35: CPT | Performed by: PHYSICAL MEDICINE & REHABILITATION

## 2022-11-16 PROCEDURE — 6360000002 HC RX W HCPCS: Performed by: INTERNAL MEDICINE

## 2022-11-16 PROCEDURE — 80048 BASIC METABOLIC PNL TOTAL CA: CPT

## 2022-11-16 PROCEDURE — 2500000003 HC RX 250 WO HCPCS: Performed by: INTERNAL MEDICINE

## 2022-11-16 PROCEDURE — APPSS15 APP SPLIT SHARED TIME 0-15 MINUTES: Performed by: NURSE PRACTITIONER

## 2022-11-16 PROCEDURE — 94761 N-INVAS EAR/PLS OXIMETRY MLT: CPT

## 2022-11-16 PROCEDURE — 36415 COLL VENOUS BLD VENIPUNCTURE: CPT

## 2022-11-16 PROCEDURE — 2709999900 HC NON-CHARGEABLE SUPPLY: Performed by: COLON & RECTAL SURGERY

## 2022-11-16 PROCEDURE — 43246 EGD PLACE GASTROSTOMY TUBE: CPT | Performed by: COLON & RECTAL SURGERY

## 2022-11-16 PROCEDURE — 6360000002 HC RX W HCPCS: Performed by: ANESTHESIOLOGY

## 2022-11-16 RX ORDER — DIPHENHYDRAMINE HYDROCHLORIDE 50 MG/ML
12.5 INJECTION INTRAMUSCULAR; INTRAVENOUS
Status: DISCONTINUED | OUTPATIENT
Start: 2022-11-16 | End: 2022-11-16 | Stop reason: HOSPADM

## 2022-11-16 RX ORDER — MAGNESIUM HYDROXIDE 1200 MG/15ML
LIQUID ORAL PRN
Status: DISCONTINUED | OUTPATIENT
Start: 2022-11-16 | End: 2022-11-16 | Stop reason: HOSPADM

## 2022-11-16 RX ORDER — METOPROLOL TARTRATE 50 MG/1
50 TABLET, FILM COATED ORAL 2 TIMES DAILY
Status: DISCONTINUED | OUTPATIENT
Start: 2022-11-16 | End: 2022-11-17

## 2022-11-16 RX ORDER — HYDRALAZINE HYDROCHLORIDE 50 MG/1
50 TABLET, FILM COATED ORAL 2 TIMES DAILY
Status: DISCONTINUED | OUTPATIENT
Start: 2022-11-16 | End: 2022-11-16

## 2022-11-16 RX ORDER — METOPROLOL TARTRATE 50 MG/1
50 TABLET, FILM COATED ORAL 2 TIMES DAILY
Status: DISCONTINUED | OUTPATIENT
Start: 2022-11-16 | End: 2022-11-16

## 2022-11-16 RX ORDER — CLONIDINE 0.1 MG/24H
1 PATCH, EXTENDED RELEASE TRANSDERMAL WEEKLY
Status: DISCONTINUED | OUTPATIENT
Start: 2022-11-16 | End: 2022-11-17

## 2022-11-16 RX ORDER — FUROSEMIDE 20 MG/1
20 TABLET ORAL
Status: DISCONTINUED | OUTPATIENT
Start: 2022-11-16 | End: 2022-11-18 | Stop reason: HOSPADM

## 2022-11-16 RX ORDER — ALBUTEROL SULFATE 2.5 MG/3ML
2.5 SOLUTION RESPIRATORY (INHALATION)
Status: DISCONTINUED | OUTPATIENT
Start: 2022-11-16 | End: 2022-11-18 | Stop reason: HOSPADM

## 2022-11-16 RX ORDER — ONDANSETRON 2 MG/ML
4 INJECTION INTRAMUSCULAR; INTRAVENOUS
Status: DISCONTINUED | OUTPATIENT
Start: 2022-11-16 | End: 2022-11-16 | Stop reason: HOSPADM

## 2022-11-16 RX ORDER — IPRATROPIUM BROMIDE AND ALBUTEROL SULFATE 2.5; .5 MG/3ML; MG/3ML
1 SOLUTION RESPIRATORY (INHALATION) 3 TIMES DAILY
Status: DISCONTINUED | OUTPATIENT
Start: 2022-11-16 | End: 2022-11-18 | Stop reason: HOSPADM

## 2022-11-16 RX ORDER — IPRATROPIUM BROMIDE AND ALBUTEROL SULFATE 2.5; .5 MG/3ML; MG/3ML
1 SOLUTION RESPIRATORY (INHALATION)
Status: DISCONTINUED | OUTPATIENT
Start: 2022-11-16 | End: 2022-11-16

## 2022-11-16 RX ORDER — PREDNISONE 20 MG/1
40 TABLET ORAL DAILY
Status: DISCONTINUED | OUTPATIENT
Start: 2022-11-16 | End: 2022-11-18 | Stop reason: HOSPADM

## 2022-11-16 RX ORDER — SODIUM CHLORIDE 0.9 % (FLUSH) 0.9 %
5-40 SYRINGE (ML) INJECTION EVERY 12 HOURS SCHEDULED
Status: DISCONTINUED | OUTPATIENT
Start: 2022-11-16 | End: 2022-11-16 | Stop reason: HOSPADM

## 2022-11-16 RX ORDER — SODIUM CHLORIDE 0.9 % (FLUSH) 0.9 %
5-40 SYRINGE (ML) INJECTION PRN
Status: DISCONTINUED | OUTPATIENT
Start: 2022-11-16 | End: 2022-11-16 | Stop reason: HOSPADM

## 2022-11-16 RX ORDER — HYDRALAZINE HYDROCHLORIDE 50 MG/1
50 TABLET, FILM COATED ORAL 2 TIMES DAILY
Status: DISCONTINUED | OUTPATIENT
Start: 2022-11-16 | End: 2022-11-18

## 2022-11-16 RX ORDER — ASPIRIN 81 MG/1
81 TABLET, CHEWABLE ORAL DAILY
Status: DISCONTINUED | OUTPATIENT
Start: 2022-11-16 | End: 2022-11-16

## 2022-11-16 RX ORDER — ASPIRIN 81 MG/1
81 TABLET, CHEWABLE ORAL DAILY
Status: DISCONTINUED | OUTPATIENT
Start: 2022-11-16 | End: 2022-11-18 | Stop reason: HOSPADM

## 2022-11-16 RX ORDER — ATORVASTATIN CALCIUM 80 MG/1
80 TABLET, FILM COATED ORAL NIGHTLY
Status: DISCONTINUED | OUTPATIENT
Start: 2022-11-16 | End: 2022-11-18 | Stop reason: HOSPADM

## 2022-11-16 RX ORDER — FENTANYL CITRATE 50 UG/ML
50 INJECTION, SOLUTION INTRAMUSCULAR; INTRAVENOUS EVERY 10 MIN PRN
Status: DISCONTINUED | OUTPATIENT
Start: 2022-11-16 | End: 2022-11-16 | Stop reason: HOSPADM

## 2022-11-16 RX ORDER — ACETAMINOPHEN 160 MG/5ML
650 SOLUTION ORAL EVERY 4 HOURS PRN
Status: DISCONTINUED | OUTPATIENT
Start: 2022-11-16 | End: 2022-11-18 | Stop reason: HOSPADM

## 2022-11-16 RX ORDER — PROPOFOL 10 MG/ML
INJECTION, EMULSION INTRAVENOUS PRN
Status: DISCONTINUED | OUTPATIENT
Start: 2022-11-16 | End: 2022-11-16 | Stop reason: SDUPTHER

## 2022-11-16 RX ORDER — SODIUM CHLORIDE 9 MG/ML
25 INJECTION, SOLUTION INTRAVENOUS PRN
Status: DISCONTINUED | OUTPATIENT
Start: 2022-11-16 | End: 2022-11-16 | Stop reason: HOSPADM

## 2022-11-16 RX ORDER — FUROSEMIDE 20 MG/1
20 TABLET ORAL
Status: DISCONTINUED | OUTPATIENT
Start: 2022-11-16 | End: 2022-11-16

## 2022-11-16 RX ORDER — ENOXAPARIN SODIUM 100 MG/ML
40 INJECTION SUBCUTANEOUS DAILY
Status: DISCONTINUED | OUTPATIENT
Start: 2022-11-17 | End: 2022-11-18 | Stop reason: HOSPADM

## 2022-11-16 RX ORDER — LOSARTAN POTASSIUM 25 MG/1
25 TABLET ORAL DAILY
Status: DISCONTINUED | OUTPATIENT
Start: 2022-11-16 | End: 2022-11-18 | Stop reason: HOSPADM

## 2022-11-16 RX ORDER — MEPERIDINE HYDROCHLORIDE 25 MG/ML
12.5 INJECTION INTRAMUSCULAR; INTRAVENOUS; SUBCUTANEOUS
Status: DISCONTINUED | OUTPATIENT
Start: 2022-11-16 | End: 2022-11-16 | Stop reason: HOSPADM

## 2022-11-16 RX ORDER — METOCLOPRAMIDE HYDROCHLORIDE 5 MG/ML
10 INJECTION INTRAMUSCULAR; INTRAVENOUS
Status: DISCONTINUED | OUTPATIENT
Start: 2022-11-16 | End: 2022-11-16 | Stop reason: HOSPADM

## 2022-11-16 RX ORDER — POTASSIUM BICARBONATE 25 MEQ/1
25 TABLET, EFFERVESCENT ORAL
Status: DISCONTINUED | OUTPATIENT
Start: 2022-11-16 | End: 2022-11-18 | Stop reason: HOSPADM

## 2022-11-16 RX ORDER — POTASSIUM BICARBONATE 25 MEQ/1
25 TABLET, EFFERVESCENT ORAL
Status: DISCONTINUED | OUTPATIENT
Start: 2022-11-16 | End: 2022-11-16

## 2022-11-16 RX ADMIN — PREDNISONE 40 MG: 20 TABLET ORAL at 18:32

## 2022-11-16 RX ADMIN — PROPOFOL 100 MCG/KG/MIN: 10 INJECTION, EMULSION INTRAVENOUS at 09:13

## 2022-11-16 RX ADMIN — SODIUM CHLORIDE, SODIUM LACTATE, POTASSIUM CHLORIDE, CALCIUM CHLORIDE AND DEXTROSE MONOHYDRATE: 5; 600; 310; 30; 20 INJECTION, SOLUTION INTRAVENOUS at 14:09

## 2022-11-16 RX ADMIN — IPRATROPIUM BROMIDE AND ALBUTEROL SULFATE 1 AMPULE: .5; 2.5 SOLUTION RESPIRATORY (INHALATION) at 21:09

## 2022-11-16 RX ADMIN — HYDRALAZINE HYDROCHLORIDE 20 MG: 20 INJECTION INTRAMUSCULAR; INTRAVENOUS at 01:02

## 2022-11-16 RX ADMIN — FUROSEMIDE 20 MG: 20 TABLET ORAL at 13:55

## 2022-11-16 RX ADMIN — HYDRALAZINE HYDROCHLORIDE 20 MG: 20 INJECTION INTRAMUSCULAR; INTRAVENOUS at 04:39

## 2022-11-16 RX ADMIN — LOSARTAN POTASSIUM 25 MG: 25 TABLET, FILM COATED ORAL at 13:53

## 2022-11-16 RX ADMIN — PROPOFOL 50 MG: 10 INJECTION, EMULSION INTRAVENOUS at 09:10

## 2022-11-16 RX ADMIN — ATORVASTATIN CALCIUM 80 MG: 80 TABLET, FILM COATED ORAL at 22:13

## 2022-11-16 RX ADMIN — HYDRALAZINE HYDROCHLORIDE 50 MG: 50 TABLET, FILM COATED ORAL at 13:56

## 2022-11-16 RX ADMIN — METOPROLOL TARTRATE 5 MG: 1 INJECTION, SOLUTION INTRAVENOUS at 02:33

## 2022-11-16 RX ADMIN — POTASSIUM BICARBONATE 25 MEQ: 977.5 TABLET, EFFERVESCENT ORAL at 13:57

## 2022-11-16 RX ADMIN — ACETAMINOPHEN 650 MG: 160 SOLUTION ORAL at 13:57

## 2022-11-16 RX ADMIN — METOPROLOL TARTRATE 50 MG: 50 TABLET ORAL at 13:54

## 2022-11-16 RX ADMIN — ENALAPRILAT 2.5 MG: 1.25 INJECTION INTRAVENOUS at 06:16

## 2022-11-16 RX ADMIN — HYDRALAZINE HYDROCHLORIDE 50 MG: 50 TABLET, FILM COATED ORAL at 22:13

## 2022-11-16 RX ADMIN — ASPIRIN 81 MG: 81 TABLET, CHEWABLE ORAL at 18:32

## 2022-11-16 RX ADMIN — METOPROLOL TARTRATE 50 MG: 50 TABLET ORAL at 22:13

## 2022-11-16 ASSESSMENT — ENCOUNTER SYMPTOMS
CHOKING: 0
SHORTNESS OF BREATH: 0
VOMITING: 0
COUGH: 0
BACK PAIN: 0
WHEEZING: 0
TROUBLE SWALLOWING: 1
COLOR CHANGE: 0
NAUSEA: 0

## 2022-11-16 ASSESSMENT — PAIN DESCRIPTION - LOCATION: LOCATION: ABDOMEN

## 2022-11-16 NOTE — ANESTHESIA POSTPROCEDURE EVALUATION
Department of Anesthesiology  Postprocedure Note    Patient: Anali Mixon  MRN: [id number]  YOB: 1945  Date of evaluation: 11/16/2022      Procedure Summary     Date: 11/16/22 Room / Location: [address]    Anesthesia Start: 9336 Anesthesia Stop:     Procedure: PERCUTANEOUS ENDOSCOPIC GASTROSTOMY TUBE PLACEMENT Diagnosis:       Dysphagia, unspecified type      (DYSPHAGIA)    Surgeons: Elia Mena MD Responsible Provider: Jeff Robert MD    Anesthesia Type: MAC ASA Status: 4          Anesthesia Type: No value filed.     Stanley Phase I:      Stanley Phase II:        Anesthesia Post Evaluation    Patient location during evaluation: PACU  Patient participation: complete - patient participated  Level of consciousness: awake  Pain score: 0  Airway patency: patent  Nausea & Vomiting: no nausea  Complications: no  Cardiovascular status: hemodynamically stable  Respiratory status: acceptable  Hydration status: euvolemic

## 2022-11-16 NOTE — PROGRESS NOTES
Physical Therapy Missed Treatment   Facility/Department: [facility] MED SURG L422/Z812-50    NAME: Jeb Randhawa    : 1945 (76 y.o.)  MRN: [id number]    Account: [de-identified]  Gender: female    Chart reviewed, attempted PT at 08:28. Patient unavailable 2° to:    [] Hold per nsg request    [] Pt declined    [] Nsg notified   [] Other notified    [x] Pt. . off floor for test/procedure. Pt in short stay surgery for percutaneous endoscopic gastrostomy tube placement. [] Pt. Unavailable       Will attempt PT treatment again at earliest convenience.       Electronically signed by Adrien Graham PTA on 22 at 8:28 AM EST

## 2022-11-16 NOTE — PROGRESS NOTES
INPATIENT PROGRESS NOTES    PATIENT NAME: Marcus Trinidad  MRN: [id number]  SERVICE DATE:  2022   SERVICE TIME:  4:54 PM      PRIMARY SERVICE: Pulmonary Disease    CHIEF COMPLAINTS: COPD exacerbation    INTERVAL HPI: Patient seen and examined at bedside, Interval Notes, orders reviewed. Nursing notes noted    Patient report denies chest pain, mild shortness of breath, no coughing, no nausea no vomiting, She is on 4 L O2 saturation 96%, temperature 97.5    Review of system:     GI Abdominal pain No  Skin Rash No    Social History     Tobacco Use    Smoking status: Former     Packs/day: 1.00     Years: 20.00     Pack years: 20.00     Types: Cigarettes     Quit date:      Years since quittin.9    Smokeless tobacco: Never   Substance Use Topics    Alcohol use: No         Problem Relation Age of Onset    Arthritis Father     Other Father         gout    Heart Failure Father     Cancer Sister          OBJECTIVE    Body mass index is 28.34 kg/m². PHYSICAL EXAM:  Vitals:  BP (!) 160/69   Pulse 63   Temp 97.5 °F (36.4 °C) (Temporal)   Resp 23   Ht 5' 3\" (1.6 m)   Wt 160 lb (72.6 kg)   LMP  (LMP Unknown)   SpO2 96%   BMI 28.34 kg/m²     General: alert, cooperative, no distress  Head: normocephalic, atraumatic  Eyes:No gross abnormalities. ENT:  MMM no lesions  Neck:  supple and no masses  Chest : Diminished air movement, bilateral wheezing, no rales, nontender, tympanic  Heart[de-identified] Heart sounds are normal.  Regular rate and rhythm without murmur, gallop or rub. ABD:  symmetric, soft, non-tender  Musculoskeletal : no cyanosis, no clubbing, and no edema  Neuro:  Grossly normal  Skin: No rashes or nodules noted.   Lymph node:  no cervical nodes  Urology: No [place]   Psychiatric: appropriate    DATA:   Recent Labs     11/15/22  0552 22  0503   WBC 7.7 8.8   HGB 13.2 13.0   HCT 40.5 40.4   MCV 96.4* 97.8*    181     Recent Labs     11/15/22  0552 22  0503    141   K 3.5 3.7 * 105   CO2 25 24   BUN 7* 11   CREATININE 0.72 0.59   GLUCOSE 115* 110*   CALCIUM 8.9 8.9   LABGLOM >60.0 >60.0       MV Settings:          No results for input(s): PHART, FHP9BSC, PO2ART, QTF0VEC, BEART, B6AOSUCM in the last 72 hours. O2 Device: Nasal cannula  O2 Flow Rate (L/min): 4 L/min    Diet NPO  ADULT TUBE FEEDING; PEG; Standard with Fiber; Continuous; 25; Yes; 25; Q 12 hours; 50; 30; Q 6 hours     MEDICATIONS during current hospitalization:    Continuous Infusions:   dextrose 5% in lactated ringers 100 mL/hr at 11/16/22 1409    dextrose         Scheduled Meds:   cloNIDine  1 patch TransDERmal Weekly    losartan  25 mg PEG Tube Daily    atorvastatin  80 mg PEG Tube Nightly    furosemide  20 mg PEG Tube Once per day on Mon Wed Fri    hydrALAZINE  50 mg PEG Tube BID    metoprolol tartrate  50 mg PEG Tube BID    potassium bicarbonate  25 mEq PEG Tube Once per day on Mon Wed Fri    [START ON 11/17/2022] enoxaparin  40 mg SubCUTAneous Daily    aspirin  81 mg PEG Tube Daily    insulin lispro  0-4 Units SubCUTAneous Q4H       PRN Meds:acetaminophen, acetaminophen, hydrALAZINE, ondansetron **OR** ondansetron, polyethylene glycol, labetalol, glucose, dextrose bolus **OR** dextrose bolus, glucagon (rDNA), dextrose    Radiology  CTA HEAD W WO CONTRAST    Result Date: 11/14/2022  EXAMINATION: CTA OF THE NECK; CTA OF THE HEAD WITHOUT AND WITH CONTRAST 11/12/2022 7:57 am TECHNIQUE: CTA of the neck was performed with the administration of intravenous contrast. Multiplanar reformatted images are provided for review. MIP images are provided for review. Stenosis of the internal carotid arteries measured using NASCET criteria.  Automated exposure control, iterative reconstruction, and/or weight based adjustment of the mA/kV was utilized to reduce the radiation dose to as low as reasonably achievable.; CTA of the head/brain was performed without and with the administration of intravenous contrast. Multiplanar reformatted images are provided for review. MIP images are provided for review. Automated exposure control, iterative reconstruction, and/or weight based adjustment of the mA/kV was utilized to reduce the radiation dose to as low as reasonably achievable. COMPARISON: CT brain earlier same day HISTORY: ORDERING SYSTEM PROVIDED HISTORY: right sided weakness TECHNOLOGIST PROVIDED HISTORY: Reason for exam:->right sided weakness What reading provider will be dictating this exam?->CRC FINDINGS: CTA NECK: AORTIC ARCH/ARCH VESSELS: Atherosclerotic calcifications are present within the aortic arch. The origins of the great vessels are normal.  There is no significant stenosis of the innominate or subclavian arteries. CAROTID ARTERIES: There is mild calcified atherosclerotic plaque within the left common carotid artery. There is no significant stenosis of the common carotid arteries identified. There is no significant stenosis of the internal carotid arteries. There is no dissection or pseudoaneurysm. VERTEBRAL ARTERIES: There is a mildly beaded appearance of the distal V2 and V3 segments of the vertebral arteries suggesting fibromuscular dysplasia. There is no dissection or pseudoaneurysm. There is no significant stenosis. SOFT TISSUES: Lung apices are clear. There is no superior mediastinal lymphadenopathy. There is no cervical lymphadenopathy or soft tissue mass identified. The parotid glands and submandibular glands are normal. BONES: No lytic or blastic osseous lesions are identified. There is moderate multilevel neural foraminal narrowing and mild spinal canal stenosis from C4-C5 to C6-C7. CTA HEAD: ANTERIOR CIRCULATION: Atherosclerotic calcifications are present within the cavernous segments of the internal carotid arteries without significant stenosis evident. The anterior and middle cerebral arteries are normal. There is no significant stenosis or aneurysm evident.  POSTERIOR CIRCULATION: The distal vertebral arteries are normal in appearance. The basilar artery is normal.  No significant stenosis or aneurysm is identified. The posterior cerebral arteries are normal in appearance. OTHER: No dural venous sinus thrombosis on this non-dedicated study. BRAIN: There is no mass effect or midline shift. There is no vascular malformation identified. 1. No large vessel occlusion, significant stenosis or cerebral aneurysm identified within the brain. 2. Atherosclerosis without significant arterial stenosis identified within the neck. 3. Beaded appearance of the distal vertebral arteries suggesting fibromuscular dysplasia. CT HEAD WO CONTRAST    Result Date: 11/14/2022  EXAMINATION: CT OF THE HEAD WITHOUT CONTRAST  11/14/2022 12:00 pm TECHNIQUE: CT of the head was performed without the administration of intravenous contrast. Automated exposure control, iterative reconstruction, and/or weight based adjustment of the mA/kV was utilized to reduce the radiation dose to as low as reasonably achievable. COMPARISON: Reviewed the previous MRI of the brain dated November 12. Reviewed the previous CT brain back to November 12. HISTORY: ORDERING SYSTEM PROVIDED HISTORY: cva, ventricular hemorrhage TECHNOLOGIST PROVIDED HISTORY: Reason for exam:->cva, ventricular hemorrhage Has a \"code stroke\" or \"stroke alert\" been called? ->No What reading provider will be dictating this exam?->CRC FINDINGS: Peripheral CSF space ventricular system correlates with the age group. There is no focal mass effect or midline shift. There is no evidence for a sizable area of a new acute recent insult in progression to the brain parenchyma. Discrete the blood CSF fluid level is seen in the dependent portion of the right left lateral ventricles more noticeable on the left. These were seen previously. These are stable over time. There is no further increase in intraventricular hemorrhagic component.   The hyperdensity of the blood component has mild diminished since the previous study indicated more late acute phase/early subacute phase. Stable discrete intraventricular hemorrhage since recent studies. CT HEAD WO CONTRAST    Result Date: 11/13/2022  EXAMINATION: CT OF THE HEAD WITHOUT CONTRAST  11/13/2022 9:25 am TECHNIQUE: CT of the head was performed without the administration of intravenous contrast. Automated exposure control, iterative reconstruction, and/or weight based adjustment of the mA/kV was utilized to reduce the radiation dose to as low as reasonably achievable. COMPARISON: None. HISTORY: ORDERING SYSTEM PROVIDED HISTORY: eval for progression of blood layering within the occipital horns of the lateral ventricles TECHNOLOGIST PROVIDED HISTORY: Reason for exam:->eval for progression of blood layering within the occipital horns of the lateral ventricles Has a \"code stroke\" or \"stroke alert\" been called? ->No What reading provider will be dictating this exam?->CRC FINDINGS: BRAIN/VENTRICLES: Cerebral atrophy is noted globally. There is posterior trophic enlargement of the lateral ventricles similar to older examinations. There is a small amount of intermediate density hemorrhage in the occipital horns of the right and left ventricle similar in severity to 11/12/2022 MRI. 4th ventricle is patent. There is no shift of midline structures. No subarachnoid or subdural hemorrhage. ORBITS: The visualized portion of the orbits demonstrate no acute abnormality. SINUSES: Pilar bullosa of the left middle nasal turbinate. Right deviation of the nasal septum which appears chronic. Mild mucosal thickening in the sphenoid sinuses. Small air-fluid level in the inferior left frontal sinus. Mastoid air cells are clear. SOFT TISSUES/SKULL:  No acute abnormality of the visualized skull or soft tissues. 1.  Stable minimal layering hemorrhage in the occipital horns of the right and left lateral ventricle.   No change from MRI dated 11/12/2022. 2.  Findings compatible with chronic paranasal sinusitis, mild. CT HEAD WO CONTRAST    Result Date: 11/12/2022  EXAMINATION: CT OF THE HEAD WITHOUT CONTRAST  11/12/2022 10:31 am TECHNIQUE: CT of the head was performed without the administration of intravenous contrast. Automated exposure control, iterative reconstruction, and/or weight based adjustment of the mA/kV was utilized to reduce the radiation dose to as low as reasonably achievable. COMPARISON: 11/12/2022 HISTORY: ORDERING SYSTEM PROVIDED HISTORY: eval for hmg conversion TECHNOLOGIST PROVIDED HISTORY: Reason for exam:->eval for hmg conversion Has a \"code stroke\" or \"stroke alert\" been called? ->No What reading provider will be dictating this exam?->CRC FINDINGS: BRAIN/VENTRICLES: Generalized atrophy identified of the brain. Some low-attenuation areas identified in the periventricular and subcortical white matter to suggest chronic small vessel ischemic change. There is no acute intracranial hemorrhage, mass effect or midline shift. No abnormal extra-axial fluid collection. The gray-white differentiation is maintained without evidence of an acute infarct. There is no evidence of hydrocephalus. ORBITS: The visualized portion of the orbits demonstrate no acute abnormality. SINUSES: The visualized paranasal sinuses and mastoid air cells demonstrate no acute abnormality. SOFT TISSUES/SKULL:  No acute abnormality of the visualized skull or soft tissues. Atrophy and chronic changes seen within the brain with no acute intracranial abnormality. CT HEAD WO CONTRAST    Result Date: 11/12/2022  EXAMINATION: CT OF THE HEAD WITHOUT CONTRAST  11/12/2022 4:34 am TECHNIQUE: CT of the head was performed without the administration of intravenous contrast. Automated exposure control, iterative reconstruction, and/or weight based adjustment of the mA/kV was utilized to reduce the radiation dose to as low as reasonably achievable. COMPARISON: None.  HISTORY: ORDERING SYSTEM PROVIDED HISTORY: fall TECHNOLOGIST PROVIDED HISTORY: Reason for exam:->fall Has a \"code stroke\" or \"stroke alert\" been called? ->No Decision Support Exception - unselect if not a suspected or confirmed emergency medical condition->Emergency Medical Condition (MA) What reading provider will be dictating this exam?->CRC FINDINGS: BRAIN/VENTRICLES: There is no acute intracranial hemorrhage, mass effect or midline shift. No abnormal extra-axial fluid collection. The gray-white differentiation is maintained without evidence of an acute infarct. There is no evidence of hydrocephalus. The ventricles, cisterns and sulci are prominent consistent with atrophy. There is decreased attenuation within the periventricular white matter consistent with periventricular leukomalacia. ORBITS: The visualized portion of the orbits demonstrate no acute abnormality. SINUSES: The visualized paranasal sinuses and mastoid air cells demonstrate no acute abnormality. SOFT TISSUES/SKULL:  No acute abnormality of the visualized skull or soft tissues. 1. There is no acute intracranial abnormality. Specifically, there is no intracranial hemorrhage. 2. Atrophy and periventricular leukomalacia,     CTA NECK W WO CONTRAST    Result Date: 11/14/2022  EXAMINATION: CTA OF THE NECK; CTA OF THE HEAD WITHOUT AND WITH CONTRAST 11/12/2022 7:57 am TECHNIQUE: CTA of the neck was performed with the administration of intravenous contrast. Multiplanar reformatted images are provided for review. MIP images are provided for review. Stenosis of the internal carotid arteries measured using NASCET criteria. Automated exposure control, iterative reconstruction, and/or weight based adjustment of the mA/kV was utilized to reduce the radiation dose to as low as reasonably achievable.; CTA of the head/brain was performed without and with the administration of intravenous contrast. Multiplanar reformatted images are provided for review.   MIP images are provided for review. Automated exposure control, iterative reconstruction, and/or weight based adjustment of the mA/kV was utilized to reduce the radiation dose to as low as reasonably achievable. COMPARISON: CT brain earlier same day HISTORY: ORDERING SYSTEM PROVIDED HISTORY: right sided weakness TECHNOLOGIST PROVIDED HISTORY: Reason for exam:->right sided weakness What reading provider will be dictating this exam?->CRC FINDINGS: CTA NECK: AORTIC ARCH/ARCH VESSELS: Atherosclerotic calcifications are present within the aortic arch. The origins of the great vessels are normal.  There is no significant stenosis of the innominate or subclavian arteries. CAROTID ARTERIES: There is mild calcified atherosclerotic plaque within the left common carotid artery. There is no significant stenosis of the common carotid arteries identified. There is no significant stenosis of the internal carotid arteries. There is no dissection or pseudoaneurysm. VERTEBRAL ARTERIES: There is a mildly beaded appearance of the distal V2 and V3 segments of the vertebral arteries suggesting fibromuscular dysplasia. There is no dissection or pseudoaneurysm. There is no significant stenosis. SOFT TISSUES: Lung apices are clear. There is no superior mediastinal lymphadenopathy. There is no cervical lymphadenopathy or soft tissue mass identified. The parotid glands and submandibular glands are normal. BONES: No lytic or blastic osseous lesions are identified. There is moderate multilevel neural foraminal narrowing and mild spinal canal stenosis from C4-C5 to C6-C7. CTA HEAD: ANTERIOR CIRCULATION: Atherosclerotic calcifications are present within the cavernous segments of the internal carotid arteries without significant stenosis evident. The anterior and middle cerebral arteries are normal. There is no significant stenosis or aneurysm evident. POSTERIOR CIRCULATION: The distal vertebral arteries are normal in appearance.   The basilar artery is normal.  No significant stenosis or aneurysm is identified. The posterior cerebral arteries are normal in appearance. OTHER: No dural venous sinus thrombosis on this non-dedicated study. BRAIN: There is no mass effect or midline shift. There is no vascular malformation identified. 1. No large vessel occlusion, significant stenosis or cerebral aneurysm identified within the brain. 2. Atherosclerosis without significant arterial stenosis identified within the neck. 3. Beaded appearance of the distal vertebral arteries suggesting fibromuscular dysplasia. XR CHEST PORTABLE    Result Date: 11/12/2022  EXAMINATION: ONE XRAY VIEW OF THE CHEST 11/12/2022 4:12 am COMPARISON: None. HISTORY: ORDERING SYSTEM PROVIDED HISTORY: incresed resp rate TECHNOLOGIST PROVIDED HISTORY: Reason for exam:->incresed resp rate What reading provider will be dictating this exam?->CRC FINDINGS: The cardiomediastinal silhouette is at the upper limits of normal for technique. There are low lung volumes with bronchovascular crowding and bibasilar atelectasis. No pneumothorax or pleural effusion. Bilateral shoulder arthroplasties. Bibasilar atelectasis. MRI brain without contrast    Result Date: 11/12/2022  EXAMINATION: MRI OF THE BRAIN WITHOUT CONTRAST  11/12/2022 1:48 pm TECHNIQUE: Multiplanar multisequence MRI of the brain was performed without the administration of intravenous contrast. COMPARISON: None. HISTORY: ORDERING SYSTEM PROVIDED HISTORY: TIA TECHNOLOGIST PROVIDED HISTORY: Reason for exam:->TIA What is the sedation requirement?->None What reading provider will be dictating this exam?->CRC Initial evaluation. FINDINGS: INTRACRANIAL STRUCTURES/VENTRICLES: There is an acute infarct within the left inferior ahsan (DWI series 3, image 8). No mass effect or midline shift. A trace amount of blood is seen layering within the occipital horns of the lateral ventricles bilaterally.   Areas of T2 FLAIR hyperintensity are seen in the periventricular and subcortical white matter, which are nonspecific, but may represent chronic microvascular ischemic change. There is prominence of the ventricles and sulci due to global parenchymal volume loss. The sellar/suprasellar regions appear unremarkable. There is at least partial loss of the normal signal void within the right vertebral artery. ORBITS: The visualized portion of the orbits demonstrate no acute abnormality. SINUSES: The visualized paranasal sinuses and mastoid air cells demonstrate no acute abnormality. BONES/SOFT TISSUES: The bone marrow signal intensity appears normal. The soft tissues demonstrate no acute abnormality. 1. Small acute infarct involving the left inferior ahsan. No mass effect or midline shift. 2. Trace amount of blood layering within the occipital horns of the lateral ventricles bilaterally. 3. At least partial loss of the normal signal void within the right vertebral artery, which can be seen with slow flow/occlusion. This is of uncertain chronicity. 4. Otherwise, no acute intracranial abnormality. 5. Moderate global parenchymal volume loss with mild chronic microvascular ischemic changes. These results were sent to the [facility] [id number] ([address] [place] [address]) on 11/12/2022 at 2:22 pm to be communicated to the referring/covering health care provider/office. EGD    Result Date: 11/16/2022  Site: [facility] Patient Name: Blu De La O Procedure Date: 11/16/2022 MRN: [id number] YOB: 1945 Gender: Female Attending MD: Reena Kruger Indications:        -  Dysphagia Medications:        -  See the Anesthesia note for documentation of the administered medications Complications:        -  No immediate complications.  Estimated Blood Loss:        -  Estimated blood loss was minimal. Procedure:        - The scope was introduced through the mouth and advanced to the second part           of the duodenum.        -  The upper GI endoscopy was accomplished without difficulty. -  The patient tolerated the procedure well. Findings:        -  The patient was placed in the supine position for PEG placement. The           stomach was insufflated to appose gastric and abdominal walls. A site was           located in the body of the stomach with excellent transillumination and manual           external pressure for placement. The abdominal wall was marked and prepped in           a sterile manner. The area was anesthetized with 4 mL of 1% lidocaine. The           trocar needle was introduced through the abdominal wall and into the stomach           under direct endoscopic view. A snare was introduced through the endoscope and           opened in the gastric lumen. The guide wire was passed through the trocar and           into the open snare. The snare was closed around the guide wire. The           endoscope and snare were removed, pulling the wire out through the mouth. A           skin incision was made at the site of needle insertion. The externally           removable 20 Fr gastrostomy tube was lubricated. The G-tube was tied to the           guide wire and pulled through the mouth and into the stomach. The trocar           needle was removed, and the gastrostomy tube was pulled out from the stomach           through the skin. The external bumper was attached to the gastrostomy tube,           and the tube was cut to remove the guide wire. The final position of the           gastrostomy tube was confirmed by relook endoscopy, and skin marking noted to           be 3 cm at the external bumper. The final tension and compression of the           abdominal wall by the PEG tube and external bumper were checked and revealed           that the bumper was moderately tight and mildly deforming the skin. The           feeding tube was capped, and the tube site cleaned and dressed.   Estimated           blood loss was minimal. Impression:        -  An externally removable PEG placement was successfully completed. -  No specimens collected. Recommendation:        -  Patient has a contact number available for emergencies. The signs and           symptoms of potential delayed complications were discussed with the patient. Return to normal activities tomorrow. Written discharge instructions were           provided to the patient.        -  Return patient to [facility] for [facility] care. Procedure Code(s):        - 03522, Esophagogastroduodenoscopy, flexible, transoral; with directed           placement of percutaneous gastrostomy tube Diagnosis Code(s):        - R13.10, Dysphagia, unspecified       CPT(R) - 2021 copyright [employer]. All Rights Reserved. The CPT codes, CCI edits and ICD codes generated are intended as suggestions       and were generated based on input data. These codes are preliminary and upon        review may be revised to meet current compliance and payer requirements. The provider is responsible for the final determination of appropriate codes,       and modifiers. Scope Withdrawal Time:       00:00:01 Signature Name: Juan Frausto MD Signature Statement: This document has been electronically signed. Note Initiated On:11/16/2022 Signature Date:11/16/2022 9:43 AM            IMPRESSION AND SUGGESTION:  Patient is at risk due to   COPD with acute exacerbation  Nocturnal hypoxia  Possible JULIO C      Recommendation  Prednisone 40 mg daily for 5 days  Continue cardioprotective medications  Continue diuretics and target negative balance  Watch volume status and avoid overload  Consider DC IV fluid  DuoNebs  Incentive spirometer  O2 to keep sat 90 to 92%  Monitor blood sugar target 1 40-1 80        I spent 30 min with this patient, greater the 50% of this time was spent in counseling and/or coordinating of care.     Electronically signed by Nav Chambers MD,  [facility]   on 11/16/2022 at 4:54 PM

## 2022-11-16 NOTE — ANESTHESIA PRE PROCEDURE
Department of Anesthesiology  Preprocedure Note       Name:  Finesse Andrea   Age:  76 y.o.  :  1945                                          MRN:  [id number]         Date:  2022      Surgeon: Ana Aburto):  Ara Abraham MD    Procedure: Procedure(s):  PERCUTANEOUS ENDOSCOPIC GASTROSTOMY TUBE PLACEMENT  ROOM 271    Medications prior to admission:   Prior to Admission medications    Medication Sig Start Date End Date Taking?  Authorizing Provider   pravastatin (PRAVACHOL) 40 MG tablet TAKE ONE-HALF TABLET BY MOUTH DAILY 10/20/22   Noreene Poor, APRN - CNP   albuterol sulfate HFA (PROVENTIL;VENTOLIN;PROAIR) 108 (90 Base) MCG/ACT inhaler Inhale 2 puffs into the lungs 4 times daily as needed for Wheezing 22   Noreene Poor, APRN - CNP   sertraline (ZOLOFT) 50 MG tablet Take 1 tablet by mouth daily 22   Noreene Poor, APRN - CNP   gabapentin (NEURONTIN) 400 MG capsule TAKE ONE CAPSULE BY MOUTH THREE TIMES A DAY FOR 90 DAYS 8/26/22 3/26/23  Noreene Poor, APRN - CNP   naproxen (NAPROSYN) 375 MG tablet Take 1 tablet by mouth 2 times daily as needed for Pain 6/3/21   Noreene Poor, APRN - CNP   Handicap Placard [address] [facility] by Does not apply route Exp 5 years 21   Noreene Poor, APRN - CNP   tiZANidine (ZANAFLEX) 4 MG tablet TAKE ONE TABLET BY MOUTH THREE TIMES A DAY AS NEEDED FOR MUSCLE SPASMS / PAIN 21   Noreene Poor, APRN - CNP   loratadine (CLARITIN) 10 MG tablet Take 1 tablet by mouth daily 20   Noreene Poor, APRN - CNP   amLODIPine (NORVASC) 5 MG tablet TAKE ONE TABLET BY MOUTH DAILY AT BEDTIME 19   Historical Provider, MD   donepezil (ARICEPT) 10 MG tablet take one-half tablet by mouth at bedtime for 2 weeks then increase to 1 tablet at bedtime  Patient not taking: Reported on 2022   Historical Provider, MD   aspirin 81 MG EC tablet Take 1 tablet by mouth daily  Patient not taking: No sig reported 10/13/19   Rito Webb DO melatonin 1 MG tablet Take 1 tablet by mouth nightly as needed for Sleep 9/7/19   Manjit Ball, APRN - CNP       Current medications:    Current Facility-Administered Medications   Medication Dose Route Frequency Provider Last Rate Last Admin    metoprolol (LOPRESSOR) injection 5 mg  5 mg IntraVENous Q6H Deboraha Schirmer, MD   5 mg at 11/16/22 0233    cloNIDine (CATAPRES) 0.2 MG/24HR 1 patch  1 patch TransDERmal Weekly Deboraha Schirmer, MD   1 patch at 11/15/22 1609    hydrALAZINE (APRESOLINE) injection 20 mg  20 mg IntraVENous Q3H (SCHEDULED) Deboraha Schirmer, MD   20 mg at 11/16/22 0439    enalaprilat (VASOTEC) injection 2.5 mg  2.5 mg IntraVENous 4 times per day Deboraha Schirmer, MD   2.5 mg at 11/16/22 0616    acetaminophen (TYLENOL) suppository 650 mg  650 mg Rectal Q6H PRN Virkendal Cartocampbell DO   650 mg at 11/14/22 1409    hydrALAZINE (APRESOLINE) injection 5 mg  5 mg IntraVENous Q4H PRN Deboraha Schirmer, MD   5 mg at 11/13/22 1549    ondansetron (ZOFRAN-ODT) disintegrating tablet 4 mg  4 mg Oral Q8H PRN Shiv Escobar MD        Or    ondansetron (ZOFRAN) injection 4 mg  4 mg IntraVENous Q6H PRN Shiv Escobar MD        polyethylene glycol (GLYCOLAX) packet 17 g  17 g Oral Daily PRN Shiv Escobar MD        labetalol (NORMODYNE;TRANDATE) injection 10 mg  10 mg IntraVENous Q10 Min PRN Shiv Escobar MD   10 mg at 11/13/22 1119    [Held by provider] atorvastatin (LIPITOR) tablet 80 mg  80 mg Oral Nightly Shiv Escobar MD        dextrose 5 % in lactated ringers infusion   IntraVENous Continuous Shiv Escobar  mL/hr at 11/15/22 2313 New Bag at 11/15/22 2313    glucose chewable tablet 16 g  4 tablet Oral PRN Shiv Escobar MD        dextrose bolus 10% 125 mL  125 mL IntraVENous PRN Shiv Escoabr MD        Or    dextrose bolus 10% 250 mL  250 mL IntraVENous PRN Shiv Escobar MD        glucagon (rDNA) injection 1 mg  1 mg SubCUTAneous PRN Shiv Escobar MD        dextrose 10 % infusion   IntraVENous Continuous PRN Christina Betancur MD        insulin lispro (HUMALOG) injection vial 0-4 Units  0-4 Units SubCUTAneous Q4H Christina Betancur MD           Allergies:     Allergies   Allergen Reactions    Codeine      Nausea, lightheadedness, dizzy       Problem List:    Patient Active Problem List   Diagnosis Code    Hypertension I10    Hyperlipidemia E78.5    COPD (chronic obstructive pulmonary disease) ([facility]) J44.9    Generalized osteoarthrosis, unspecified site M15.9    Major depressive disorder, single episode, moderate ([address] F32.1    Morbid obesity ([address] E66.01    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 ([facility]) M87.00    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    Hypotension I95.9    NSTEMI (non-ST elevated myocardial infarction) ([facility]) I21.4    Acute diastolic heart failure ([facility]) I50.31    Hypovolemic shock ([facility]) R57.1    Acute colitis K52.9    Slow transit constipation K59.01    External hemorrhoid, bleeding K64.4    Colitis K52.9    Pain and swelling due to varicose veins of both lower extremities I83.813    Venous insufficiency of left lower extremity I87.2    Asymptomatic varicose veins of bilateral lower extremities I83.93    Non-healing surgical wound T81.89XA    Claudication in peripheral vascular disease ([facility]) I73.9    Non-pressure chronic ulcer of calf, right, with fat layer exposed ([address] L97.212    Non-pressure chronic ulcer of calf with fat layer exposed ([address] L97.202    Cerebrovascular accident (CVA) due to stenosis of left middle cerebral artery ([facility]) A23.817    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 ([facility]) I69.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    Fibromuscular dysplasia ([facility]) I77.3    Impaired mobility and activities of daily living dt CVA Z74.09, Z78.9    Acute CVA (cerebrovascular accident) ([address] I63.9    Dysphagia R13.10    Cerebrovascular accident (CVA) due to stenosis of left vertebral artery ([address] F21.485    Palliative care encounter Z51.5    Advanced care planning/counseling discussion Z70.80    Goals of care, counseling/discussion Z71.89    Aphasia R47.01    TIA (transient ischemic attack) G45.9    Nocturnal hypoxia G47.34    Sleep apnea G47.30       Past Medical History:        Diagnosis Date    Anxiety     Arthritis     COPD (chronic obstructive pulmonary disease) (HCC)     Generalized osteoarthrosis, unspecified site 2002    Hyperlipidemia     Hypertension     Major depressive disorder, single episode, moderate ([facility] 2002    Morbid obesity ([facility] 2002    OAB (overactive bladder)     Osteoporosis     Sleep apnea 11/15/2022       Past Surgical History:        Procedure Laterality Date    CARDIAC CATHETERIZATION      CARPAL TUNNEL RELEASE      COLONOSCOPY      HYSTERECTOMY, TOTAL ABDOMINAL (CERVIX REMOVED)      KNEE ARTHROPLASTY Bilateral     [place] COLON CA SCRN NOT HI RSK IND N/A 2017    COLONOSCOPY performed by La Simmons MD at [address] ESOPHAGOGASTRODUODENOSCOPY TRANSORAL DIAGNOSTIC N/A 2017    EGD ESOPHAGOGASTRODUODENOSCOPY performed by La Simmons MD at [address] Right 2020    EXCISION OF LEG MASS RIGHT (PAT ON ADMIT) performed by Martha Priest MD at [facility] History:    Social History     Tobacco Use    Smoking status: Former     Packs/day: 1.00     Years: 20.00     Pack years: 20.00     Types: Cigarettes     Quit date:      Years since quittin.9    Smokeless tobacco: Never   Substance Use Topics    Alcohol use:  No                                Counseling given: Not Answered      Vital Signs (Current):   Vitals:    22 0232 22 0439 22 0440 22 0613   BP: 135/72 (!) 153/76 (!) 153/76 (!) 145/53   Pulse: 81  72 68   Resp:       Temp:       TempSrc:       SpO2: 96%  95% 97%   Weight:       Height:                                                  BP Readings from Last 3 Encounters:   11/16/22 (!) 145/53   09/23/22 134/80   07/01/22 106/72       NPO Status:                                                                                 BMI:   Wt Readings from Last 3 Encounters:   11/12/22 160 lb (72.6 kg)   11/12/22 158 lb 4 oz (71.8 kg)   09/23/22 163 lb 12.8 oz (74.3 kg)     Body mass index is 28.34 kg/m².     CBC:   Lab Results   Component Value Date/Time    WBC 8.8 11/16/2022 05:03 AM    RBC 4.13 11/16/2022 05:03 AM    RBC 4.22 04/23/2012 11:12 AM    HGB 13.0 11/16/2022 05:03 AM    HCT 40.4 11/16/2022 05:03 AM    MCV 97.8 11/16/2022 05:03 AM    RDW 14.9 11/16/2022 05:03 AM     11/16/2022 05:03 AM       CMP:   Lab Results   Component Value Date/Time     11/16/2022 05:03 AM    K 3.7 11/16/2022 05:03 AM    K 4.0 10/23/2019 06:19 AM     11/16/2022 05:03 AM    CO2 24 11/16/2022 05:03 AM    BUN 11 11/16/2022 05:03 AM    CREATININE 0.59 11/16/2022 05:03 AM    GFRAA >60.0 07/05/2022 11:26 AM    LABGLOM >60.0 11/16/2022 05:03 AM    GLUCOSE 110 11/16/2022 05:03 AM    GLUCOSE 75 04/23/2012 11:12 AM    PROT 6.4 11/12/2022 03:45 AM    CALCIUM 8.9 11/16/2022 05:03 AM    BILITOT 0.8 11/12/2022 03:45 AM    ALKPHOS 59 11/12/2022 03:45 AM    AST 29 11/12/2022 03:45 AM    ALT 17 11/12/2022 03:45 AM       POC Tests:   Recent Labs     11/16/22  0613   POCGLU 104*       Coags:   Lab Results   Component Value Date/Time    PROTIME 12.8 11/12/2022 11:23 AM    PROTIME 10.7 04/23/2012 11:12 AM    INR 1.0 11/12/2022 11:23 AM    APTT 27.1 11/12/2022 11:23 AM       HCG (If Applicable): No results found for: PREGTESTUR, PREGSERUM, HCG, HCGQUANT     ABGs:   Lab Results   Component Value Date/Time    PHART 7.318 10/01/2019 11:16 PM    PO2ART 86 10/01/2019 11:16 PM    RKG3XQP 31 10/01/2019 11:16 PM    USX3DPN 15.7 10/01/2019 11:16 PM    BEART -11 10/01/2019 11:16 PM    F0KGYNTB 96 10/01/2019 11:16 PM        Type & Screen (If Applicable):  No results found for: LABABO, [facility]    Drug/Infectious Status (If Applicable):  No results found for: HIV, HEPCAB    COVID-19 Screening (If Applicable):   Lab Results   Component Value Date/Time    COVID19 Not Detected 11/12/2022 05:07 AM           Anesthesia Evaluation  Patient summary reviewed and Nursing notes reviewed no history of anesthetic complications:   Airway: Mallampati: II  TM distance: >3 FB   Neck ROM: full  Mouth opening: > = 3 FB   Dental: normal exam         Pulmonary:normal exam    (+) COPD:  sleep apnea:                             Cardiovascular:    (+) hypertension:, past MI:,       ECG reviewed                        Neuro/Psych:   (+) CVA:, TIA,             GI/Hepatic/Renal: Neg GI/Hepatic/Renal ROS            Endo/Other: Negative Endo/Other ROS                    Abdominal:             Vascular: negative vascular ROS. Other Findings:           Anesthesia Plan      MAC     ASA 4             Anesthetic plan and risks discussed with patient.                         Norma Motley MD   11/16/2022

## 2022-11-16 NOTE — PROGRESS NOTES
[address]   Facility/Department: [facility]  Speech Language Pathology    Jeffery Mock  1945  [id number]    Date: 11/16/2022      Speech Therapy attempted to see Elinaforeign Vences on this date for a/an:    Treatment    Pt was unable to be seen due to:   Patient is currently off unit - PEG placement. Will re-attempt at earliest convenience.          Electronically signed by Constantin Lundberg SLP on 11/16/22 at 9:04 AM EST

## 2022-11-16 NOTE — PROGRESS NOTES
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 Possibility of some ventricular hemorrhage as well mention but Dr. Louie Amor feels this is calcification. Patient is currently alert and oriented x1-2, no acute distress, cooperative.  at bedside. Right side is flaccid. She remains n.p.o. due to dysphagia. PEG tube placed this morning. Dysarthria and aphasia noted. Right facial droop. Denies headache or vision changes. Blood pressure mildly elevated at times.     Pt stable, right weakness     Current Facility-Administered Medications   Medication Dose Route Frequency Provider Last Rate Last Admin    cloNIDine (CATAPRES) 0.1 MG/24HR 1 patch  1 patch TransDERmal Weekly Nat Miguel MD   1 patch at 11/16/22 1355    losartan (COZAAR) tablet 25 mg  25 mg PEG Tube Daily Nat Miguel MD   25 mg at 11/16/22 1353    atorvastatin (LIPITOR) tablet 80 mg  80 mg PEG Tube Nightly Nat Miguel MD        furosemide (LASIX) tablet 20 mg  20 mg PEG Tube Once per day on Mon Wed Fri [place] Annia Azar MD   20 mg at 11/16/22 1355    hydrALAZINE (APRESOLINE) tablet 50 mg  50 mg PEG Tube BID Nat Miguel MD   50 mg at 11/16/22 1356    metoprolol tartrate (LOPRESSOR) tablet 50 mg  50 mg PEG Tube BID Nat Miguel MD   50 mg at 11/16/22 1354    potassium bicarbonate (K-LYTE) disintegrating tablet 25 mEq  25 mEq PEG Tube Once per day on Mon Wed Fri [place] Annia Azar MD   25 mEq at 11/16/22 1357    [START ON 11/17/2022] enoxaparin (LOVENOX) injection 40 mg  40 mg SubCUTAneous Daily Nat Miguel MD        acetaminophen (TYLENOL) 160 MG/5ML solution 650 mg  650 mg Oral Q4H PRN Maryana Crowemer, DO   650 mg at 11/16/22 1357    acetaminophen (TYLENOL) suppository 650 mg  650 mg Rectal Q6H PRN Ronn Hdz MD   650 mg at 11/14/22 1409    hydrALAZINE (APRESOLINE) injection 5 mg  5 mg IntraVENous Q4H PRN Primitivo Samson MD   5 mg at 11/13/22 1549    ondansetron (ZOFRAN-ODT) disintegrating tablet 4 mg  4 mg Oral Q8H PRN Primitivo Samosn MD        Or    ondansetron [facility]) injection 4 mg  4 mg IntraVENous Q6H PRN Primitivo Samson MD        polyethylene glycol (GLYCOLAX) packet 17 g  17 g Oral Daily PRN Primitivo Samson MD        labetalol (NORMODYNE;TRANDATE) injection 10 mg  10 mg IntraVENous Q10 Min PRN Primitivo Samson MD   10 mg at 11/13/22 1119    dextrose 5 % in lactated ringers infusion   IntraVENous Continuous Primitivo Samson  mL/hr at 11/16/22 1409 New Bag at 11/16/22 1409    glucose chewable tablet 16 g  4 tablet Oral PRN Primitivo Samson MD        dextrose bolus 10% 125 mL  125 mL IntraVENous PRN Primitivo Samson MD        Or    dextrose bolus 10% 250 mL  250 mL IntraVENous PRN Primitivo Samson MD        glucagon (rDNA) injection 1 mg  1 mg SubCUTAneous PRN Primitivo Samson MD        dextrose 10 % infusion   IntraVENous Continuous PRN Primitivo Samson MD        insulin lispro (HUMALOG) injection vial 0-4 Units  0-4 Units SubCUTAneous Q4H Primitivo Samson MD           PHYSICAL EXAM:    BP (!) 160/69   Pulse 63   Temp 97.5 °F (36.4 °C) (Temporal)   Resp 23   Ht 5' 3\" (1.6 m)   Wt 160 lb (72.6 kg)   LMP  (LMP Unknown)   SpO2 96%   BMI 28.34 kg/m²    General Appearance:      Skin:  normal  CVS - Normal sounds, No murmurs , No carotid Bruits  RS -CTA  Abdomen Soft, BS present  Review of Systems   Constitutional:  Negative for fever. HENT:  Positive for trouble swallowing. Negative for hearing loss. Eyes:  Negative for visual disturbance. Respiratory:  Negative for cough, choking, shortness of breath and wheezing. Cardiovascular:  Negative for chest pain and palpitations. Gastrointestinal:  Negative for nausea and vomiting. Musculoskeletal:  Positive for gait problem.  Negative for back pain, neck pain and neck stiffness. Skin:  Negative for color change. Neurological:  Positive for facial asymmetry, speech difficulty and weakness. Negative for dizziness, tremors, seizures, syncope, light-headedness, numbness and headaches. Psychiatric/Behavioral:  Positive for confusion. Negative for behavioral problems, hallucinations and sleep disturbance. Mental Status Exam:             Level of Alertness:   awake            Orientation:   person, place,             Memory:   She has underlying baseline dementia          f            Language: Dysarthria and expressive aphasia      Funduscopic Exam:     Cranial Nerves            Cranial nerve III           Pupils:  equal, round, reactive to light      Cranial nerves III, IV, VI           Extraocular Movements: intact        Motor: Patient has underlying dementia and has hemiplegia in the right upper and lower extremity. She is not able to swallow            Sensory: Unable to perform                 Vibration                         Touch            Proprioception                 Coordination: Unable to perform                    Reflexes:             Deep Tendon Reflexes:             Reflexes are 2 +             Plantar response:                Right:  downgoing               Left:  downgoing    Vascular:  Cardiac Exam:  normal         CTA HEAD W WO CONTRAST    Result Date: 11/12/2022  EXAMINATION: CTA OF THE NECK; CTA OF THE HEAD WITHOUT AND WITH CONTRAST 11/12/2022 7:57 am TECHNIQUE: CTA of the neck was performed with the administration of intravenous contrast. Multiplanar reformatted images are provided for review. MIP images are provided for review. Stenosis of the internal carotid arteries measured using NASCET criteria.  Automated exposure control, iterative reconstruction, and/or weight based adjustment of the mA/kV was utilized to reduce the radiation dose to as low as reasonably achievable.; CTA of the head/brain was performed without and with the administration of intravenous contrast. Multiplanar reformatted images are provided for review. MIP images are provided for review. Automated exposure control, iterative reconstruction, and/or weight based adjustment of the mA/kV was utilized to reduce the radiation dose to as low as reasonably achievable. COMPARISON: CT brain earlier same day HISTORY: ORDERING SYSTEM PROVIDED HISTORY: right sided weakness TECHNOLOGIST PROVIDED HISTORY: Reason for exam:->right sided weakness What reading provider will be dictating this exam?->CRC FINDINGS: CTA NECK: AORTIC ARCH/ARCH VESSELS: Atherosclerotic calcifications are present within the aortic arch. The origins of the great vessels are normal.  There is no significant stenosis of the innominate or subclavian arteries. CAROTID ARTERIES: There is mild calcified atherosclerotic plaque within the left common carotid artery. There is no significant stenosis of the common carotid arteries identified. There is no significant stenosis of the internal carotid arteries. There is no dissection or pseudoaneurysm. VERTEBRAL ARTERIES: There is a mildly beaded appearance of the distal V2 and V3 segments of the vertebral arteries suggesting fibromuscular dysplasia. There is no dissection or pseudoaneurysm. There is no significant stenosis. SOFT TISSUES: Lung apices are clear. There is no superior mediastinal lymphadenopathy. There is no cervical lymphadenopathy or soft tissue mass identified. The parotid glands and submandibular glands are normal. BONES: No lytic or blastic osseous lesions are identified. There is moderate multilevel neural foraminal narrowing and mild spinal canal stenosis from C4-C5 to C6-C7. CTA HEAD: ANTERIOR CIRCULATION: Atherosclerotic calcifications are present within the cavernous segments of the internal carotid arteries without significant stenosis evident.   The anterior and middle cerebral arteries are normal. There is no significant stenosis or aneurysm evident. POSTERIOR CIRCULATION: The distal vertebral arteries are normal in appearance. The basilar artery is normal.  No significant stenosis or aneurysm is identified. The posterior cerebral arteries are normal in appearance. OTHER: No dural venous sinus thrombosis on this non-dedicated study. BRAIN: There is no mass effect or midline shift. There is no vascular malformation identified. 1. No large vessel occlusion, significant stenosis or cerebral aneurysm identified within the brain. 2. Atherosclerosis without significant arterial stenosis identified within the neck. 3. Beaded appearance of the distal vertebral arteries suggesting fibromuscular dysplasia. CT HEAD WO CONTRAST    Result Date: 11/13/2022  EXAMINATION: CT OF THE HEAD WITHOUT CONTRAST  11/13/2022 9:25 am TECHNIQUE: CT of the head was performed without the administration of intravenous contrast. Automated exposure control, iterative reconstruction, and/or weight based adjustment of the mA/kV was utilized to reduce the radiation dose to as low as reasonably achievable. COMPARISON: None. HISTORY: ORDERING SYSTEM PROVIDED HISTORY: eval for progression of blood layering within the occipital horns of the lateral ventricles TECHNOLOGIST PROVIDED HISTORY: Reason for exam:->eval for progression of blood layering within the occipital horns of the lateral ventricles Has a \"code stroke\" or \"stroke alert\" been called? ->No What reading provider will be dictating this exam?->CRC FINDINGS: BRAIN/VENTRICLES: Cerebral atrophy is noted globally. There is posterior trophic enlargement of the lateral ventricles similar to older examinations. There is a small amount of intermediate density hemorrhage in the occipital horns of the right and left ventricle similar in severity to 11/12/2022 MRI. 4th ventricle is patent. There is no shift of midline structures. No subarachnoid or subdural hemorrhage.  ORBITS: The visualized portion of the orbits demonstrate no acute abnormality. SINUSES: Pilar bullosa of the left middle nasal turbinate. Right deviation of the nasal septum which appears chronic. Mild mucosal thickening in the sphenoid sinuses. Small air-fluid level in the inferior left frontal sinus. Mastoid air cells are clear. SOFT TISSUES/SKULL:  No acute abnormality of the visualized skull or soft tissues. 1.  Stable minimal layering hemorrhage in the occipital horns of the right and left lateral ventricle. No change from MRI dated 11/12/2022. 2.  Findings compatible with chronic paranasal sinusitis, mild. CT HEAD WO CONTRAST    Result Date: 11/12/2022  EXAMINATION: CT OF THE HEAD WITHOUT CONTRAST  11/12/2022 10:31 am TECHNIQUE: CT of the head was performed without the administration of intravenous contrast. Automated exposure control, iterative reconstruction, and/or weight based adjustment of the mA/kV was utilized to reduce the radiation dose to as low as reasonably achievable. COMPARISON: 11/12/2022 HISTORY: ORDERING SYSTEM PROVIDED HISTORY: eval for hmg conversion TECHNOLOGIST PROVIDED HISTORY: Reason for exam:->eval for hmg conversion Has a \"code stroke\" or \"stroke alert\" been called? ->No What reading provider will be dictating this exam?->CRC FINDINGS: BRAIN/VENTRICLES: Generalized atrophy identified of the brain. Some low-attenuation areas identified in the periventricular and subcortical white matter to suggest chronic small vessel ischemic change. There is no acute intracranial hemorrhage, mass effect or midline shift. No abnormal extra-axial fluid collection. The gray-white differentiation is maintained without evidence of an acute infarct. There is no evidence of hydrocephalus. ORBITS: The visualized portion of the orbits demonstrate no acute abnormality. SINUSES: The visualized paranasal sinuses and mastoid air cells demonstrate no acute abnormality.  SOFT TISSUES/SKULL:  No acute abnormality of the visualized skull or soft tissues. Atrophy and chronic changes seen within the brain with no acute intracranial abnormality. CT HEAD WO CONTRAST    Result Date: 11/12/2022  EXAMINATION: CT OF THE HEAD WITHOUT CONTRAST  11/12/2022 4:34 am TECHNIQUE: CT of the head was performed without the administration of intravenous contrast. Automated exposure control, iterative reconstruction, and/or weight based adjustment of the mA/kV was utilized to reduce the radiation dose to as low as reasonably achievable. COMPARISON: None. HISTORY: ORDERING SYSTEM PROVIDED HISTORY: fall TECHNOLOGIST PROVIDED HISTORY: Reason for exam:->fall Has a \"code stroke\" or \"stroke alert\" been called? ->No Decision Support Exception - unselect if not a suspected or confirmed emergency medical condition->Emergency Medical Condition (MA) What reading provider will be dictating this exam?->CRC FINDINGS: BRAIN/VENTRICLES: There is no acute intracranial hemorrhage, mass effect or midline shift. No abnormal extra-axial fluid collection. The gray-white differentiation is maintained without evidence of an acute infarct. There is no evidence of hydrocephalus. The ventricles, cisterns and sulci are prominent consistent with atrophy. There is decreased attenuation within the periventricular white matter consistent with periventricular leukomalacia. ORBITS: The visualized portion of the orbits demonstrate no acute abnormality. SINUSES: The visualized paranasal sinuses and mastoid air cells demonstrate no acute abnormality. SOFT TISSUES/SKULL:  No acute abnormality of the visualized skull or soft tissues. 1. There is no acute intracranial abnormality. Specifically, there is no intracranial hemorrhage.  2. Atrophy and periventricular leukomalacia,     CTA NECK W WO CONTRAST    Result Date: 11/12/2022  EXAMINATION: CTA OF THE NECK; CTA OF THE HEAD WITHOUT AND WITH CONTRAST 11/12/2022 7:57 am TECHNIQUE: CTA of the neck was performed with the administration of intravenous contrast. Multiplanar reformatted images are provided for review. MIP images are provided for review. Stenosis of the internal carotid arteries measured using NASCET criteria. Automated exposure control, iterative reconstruction, and/or weight based adjustment of the mA/kV was utilized to reduce the radiation dose to as low as reasonably achievable.; CTA of the head/brain was performed without and with the administration of intravenous contrast. Multiplanar reformatted images are provided for review. MIP images are provided for review. Automated exposure control, iterative reconstruction, and/or weight based adjustment of the mA/kV was utilized to reduce the radiation dose to as low as reasonably achievable. COMPARISON: CT brain earlier same day HISTORY: ORDERING SYSTEM PROVIDED HISTORY: right sided weakness TECHNOLOGIST PROVIDED HISTORY: Reason for exam:->right sided weakness What reading provider will be dictating this exam?->CRC FINDINGS: CTA NECK: AORTIC ARCH/ARCH VESSELS: Atherosclerotic calcifications are present within the aortic arch. The origins of the great vessels are normal.  There is no significant stenosis of the innominate or subclavian arteries. CAROTID ARTERIES: There is mild calcified atherosclerotic plaque within the left common carotid artery. There is no significant stenosis of the common carotid arteries identified. There is no significant stenosis of the internal carotid arteries. There is no dissection or pseudoaneurysm. VERTEBRAL ARTERIES: There is a mildly beaded appearance of the distal V2 and V3 segments of the vertebral arteries suggesting fibromuscular dysplasia. There is no dissection or pseudoaneurysm. There is no significant stenosis. SOFT TISSUES: Lung apices are clear. There is no superior mediastinal lymphadenopathy. There is no cervical lymphadenopathy or soft tissue mass identified.   The parotid glands and submandibular glands are normal. BONES: No lytic or blastic osseous lesions are identified. There is moderate multilevel neural foraminal narrowing and mild spinal canal stenosis from C4-C5 to C6-C7. CTA HEAD: ANTERIOR CIRCULATION: Atherosclerotic calcifications are present within the cavernous segments of the internal carotid arteries without significant stenosis evident. The anterior and middle cerebral arteries are normal. There is no significant stenosis or aneurysm evident. POSTERIOR CIRCULATION: The distal vertebral arteries are normal in appearance. The basilar artery is normal.  No significant stenosis or aneurysm is identified. The posterior cerebral arteries are normal in appearance. OTHER: No dural venous sinus thrombosis on this non-dedicated study. BRAIN: There is no mass effect or midline shift. There is no vascular malformation identified. 1. No large vessel occlusion, significant stenosis or cerebral aneurysm identified within the brain. 2. Atherosclerosis without significant arterial stenosis identified within the neck. 3. Beaded appearance of the distal vertebral arteries suggesting fibromuscular dysplasia. XR CHEST PORTABLE    Result Date: 11/12/2022  EXAMINATION: ONE XRAY VIEW OF THE CHEST 11/12/2022 4:12 am COMPARISON: None. HISTORY: ORDERING SYSTEM PROVIDED HISTORY: incresed resp rate TECHNOLOGIST PROVIDED HISTORY: Reason for exam:->incresed resp rate What reading provider will be dictating this exam?->CRC FINDINGS: The cardiomediastinal silhouette is at the upper limits of normal for technique. There are low lung volumes with bronchovascular crowding and bibasilar atelectasis. No pneumothorax or pleural effusion. Bilateral shoulder arthroplasties. Bibasilar atelectasis.      MRI brain without contrast    Result Date: 11/12/2022  EXAMINATION: MRI OF THE BRAIN WITHOUT CONTRAST  11/12/2022 1:48 pm TECHNIQUE: Multiplanar multisequence MRI of the brain was performed without the administration of intravenous contrast. COMPARISON: None. HISTORY: ORDERING SYSTEM PROVIDED HISTORY: TIA TECHNOLOGIST PROVIDED HISTORY: Reason for exam:->TIA What is the sedation requirement?->None What reading provider will be dictating this exam?->CRC Initial evaluation. FINDINGS: INTRACRANIAL STRUCTURES/VENTRICLES: There is an acute infarct within the left inferior ahsan (DWI series 3, image 8). No mass effect or midline shift. A trace amount of blood is seen layering within the occipital horns of the lateral ventricles bilaterally. Areas of T2 FLAIR hyperintensity are seen in the periventricular and subcortical white matter, which are nonspecific, but may represent chronic microvascular ischemic change. There is prominence of the ventricles and sulci due to global parenchymal volume loss. The sellar/suprasellar regions appear unremarkable. There is at least partial loss of the normal signal void within the right vertebral artery. ORBITS: The visualized portion of the orbits demonstrate no acute abnormality. SINUSES: The visualized paranasal sinuses and mastoid air cells demonstrate no acute abnormality. BONES/SOFT TISSUES: The bone marrow signal intensity appears normal. The soft tissues demonstrate no acute abnormality. 1. Small acute infarct involving the left inferior ahsan. No mass effect or midline shift. 2. Trace amount of blood layering within the occipital horns of the lateral ventricles bilaterally. 3. At least partial loss of the normal signal void within the right vertebral artery, which can be seen with slow flow/occlusion. This is of uncertain chronicity. 4. Otherwise, no acute intracranial abnormality. 5. Moderate global parenchymal volume loss with mild chronic microvascular ischemic changes. These results were sent to the [facility] [id number] ([address] [place] [address]) on 11/12/2022 at 2:22 pm to be communicated to the referring/covering health care provider/office.        Recent Labs     11/14/22  0456 11/15/22  0552 11/16/22  0503   WBC 8.5 7.7 8.8 HGB 13.3 13.2 13.0    178 181       Recent Labs     11/14/22  0456 11/15/22  0552 11/16/22  0503    142 141   K 3.9 3.5 3.7    108* 105   CO2 24 25 24   BUN 6* 7* 11   CREATININE 0.58 0.72 0.59   GLUCOSE 106* 115* 110*       No results for input(s): BILITOT, ALKPHOS, AST, ALT in the last 72 hours. Lab Results   Component Value Date/Time    PROTIME 12.8 11/12/2022 11:23 AM    PROTIME 10.7 04/23/2012 11:12 AM    INR 1.0 11/12/2022 11:23 AM     No results found for: LITHIUM, DILFRTOT, VALPROATE    ASSESSMENT AND PLAN  Left pontine stroke with hemiplegia on the right upper and lower extremity. Patient has dysphagia as well. Patient has vertebral artery narrowing stenosis and may have fibromuscular dysplasia in this area. We were contemplating heparin and had just given her for few minutes till MRI results came back with some layering of hemorrhage. Repeat CT shows some minor layering of hemorrhage though this could be calcification. Hold aspirin for few days findings discussed the daughter that it is very difficult ascertain what the outcome is going to be. Of note her symptoms started much earlier and this was a wake-up stroke. Patient has history suggestion  of dementia has been seen by other providers. 11/14/2022:  Left pontine ischemic CVA with ventricular hemorrhage repeat CT of the head done 11/13/2022 stable. Some increasing right-sided weakness today therefore we will repeat CT of the head given its location and propensity for evolution and hemorrhage. Vertebral artery fibromuscular dysplasia  LDL 82  Hemoglobin A1c 5.9  Dysphagia, remains n.p.o. palliative care is following for discussion regarding alternate means of nutrition. Hypertension, ongoing, needs ongoing attention to blood pressure control. 11/15/2022:  Repeat CT of the head negative for acute findings yesterday  Dysphagia, remains n.p.o. with plans for PEG tube placement.   Plan to initiate antiplatelet therapy tomorrow once PEG tube placed. I have personally performed a face to face diagnostic evaluation on this patient, reviewed all data and investigations, and am the sole provider of all clinical decisions on the neurological status of this patient. dysphagia, Will need PEG, prognosis caanot be ascertained. 60 % time spent       11/16/22:  PEG tube placed today. Initiate aspirin 81 mg daily    I have personally performed a face to face diagnostic evaluation on this patient, reviewed all data and investigations, and am the sole provider of all clinical decisions on the neurological status of this patient.right weakness, pontine cva  Needs rehab, not I[facility] 60% time spent tommyal       Dhruv R. Walterine Babinski, MD, [facility], American Board of Psychiatry & Neurology  Board Certified in Vascular Neurology  Board Certified in Neuromuscular Medicine  Certified in . [place]

## 2022-11-16 NOTE — PROGRESS NOTES
[facility]   Facility/Department: [facility]  Speech Language Pathology    Sherren Ruth Justice  1945  [id number]    Date: 11/16/2022      Speech Therapy attempted to see Richie Hooper on this date for a/an:    Treatment    Pt was unable to be seen due to:   Patient is too lethargic to participate Pt asleep upon SLP arrival. Pt would minimally rouse to max tactile and verbal cues prior to falling back to sleep. Will re-attempt at earliest convenience.           Electronically signed by BARRON Abreu on 11/16/22 at 3:40 PM EST

## 2022-11-16 NOTE — PROGRESS NOTES
[facility] Progress Note      Patient: Gabino Dominguez    Unit/Bed: T443/W088-21  YOB: 1945  MRN: [id number]  Admit Date:  11/12/2022  Hospital Day: 4    Rounding Date: 11/16/2022    Rounding Cardiologist:  OLESYA Mcfarlane MD    PRIMARY Cardiologist: [facility]    Subjective Complaint:   Patient a little drowsy after getting PEG tube. Physical Examination:     BP (!) 146/64   Pulse 80   Temp 97.5 °F (36.4 °C) (Temporal)   Resp 23   Ht 5' 3\" (1.6 m)   Wt 160 lb (72.6 kg)   LMP  (LMP Unknown)   SpO2 93%   BMI 28.34 kg/m²     No intake or output data in the 24 hours ending 11/16/22 [address] [place] [address] examined at bedside in moderately ill. Focused exam reveals:     Cardiac: Heart regular rate and rhythm     Lungs:  wheezing present-slight    Extremities:   negative    Telemetry:      normal sinus          LABS:   CBC:   Recent Labs     11/14/22  0456 11/15/22  0552 11/16/22  0503   WBC 8.5 7.7 8.8   HGB 13.3 13.2 13.0    178 181      BMP:    Recent Labs     11/14/22 0456 11/15/22  0552 11/16/22  0503    142 141   K 3.9 3.5 3.7    108* 105   CO2 24 25 24   BUN 6* 7* 11   CREATININE 0.58 0.72 0.59   GLUCOSE 106* 115* 110*              Troponin: No results for input(s): TROPONINT in the last 72 hours. BNP: No results for input(s): PROBNP in the last 72 hours. INR: No results for input(s): INR in the last 72 hours. Mg: No results for input(s): MG in the last 72 hours. Cardiac Testing:       Summary   Bubble study negative   Normal left ventricle structure and function. Left ventricular ejection fraction is visually estimated at 55%. No Doppler data. Limited study       Assessment:  Stroke  Hypertension  Dysphagia  Status post PEG tube  Plan:   We will change meds to PEG tube  Aim is to get systolic blood pressure in the 110-140 range  See orders  Wean off clonidine patch  Resume DVT prophylaxsis    Electronically signed by Camelia Fong Kelli Greenwood MD on 11/16/2022 at 11:56 AM

## 2022-11-16 NOTE — PROGRESS NOTES
[facility] [place] Respiratory Therapy Evaluation   Current Order:  duoneb q6      Home Regimen: prn      Ordering Physician: liz  Re-evaluation Date:  11/19     Diagnosis: copd      Patient Status: Stable     The following MDI Criteria must be met in order to convert aerosol to MDI with spacer. If unable to meet, MDI will be converted to aerosol:  []  Patient able to demonstrate the ability to use MDI effectively  []  Patient alert and cooperative  []  Patient able to take deep breath with 5-10 second hold  []  Medication(s) available in this delivery method   []  Peak flow greater than or equal to 200 ml/min            Current Order Substituted To  (same drug, same frequency)   Aerosol to MDI [] Albuterol Sulfate 0.083% unit dose by aerosol Albuterol Sulfate MDI 2 puffs by inhalation with spacer    [] Levalbuterol 1.25 mg unit dose by aerosol Levalbuterol MDI 2 puffs by inhalation with spacer    [] Levalbuterol 0.63 mg unit dose by aerosol Levalbuterol MDI 2 puffs by inhalation with spacer    [] Ipratropium Bromide 0.02% unit dose by aerosol Ipratropium Bromide MDI 2 puffs by inhalation with spacer    [] Duoneb (Ipratropium + Albuterol) unit dose by aerosol Ipratropium MDI + Albuterol MDI 2 puffs by inhalation w/spacer   MDI to Aerosol [] Albuterol Sulfate MDI Albuterol Sulfate 0.083% unit dose by aerosol    [] Levalbuterol MDI 2 puffs by inhalation Levalbuterol 1.25 mg unit dose by aerosol    [] Ipratropium Bromide MDI by inhalation Ipratropium Bromide 0.02% unit dose by aerosol    [] Combivent (Ipratropium + Albuterol) MDI by inhalation Duoneb (Ipratropium + Albuterol) unit dose by aerosol       Treatment Assessment [Frequency/Schedule]:  Change frequency to: ______duoneb tid alb q2 prn____________________________________________per Protocol, P&T, MEC      Points 0 1 2 3 4   Pulmonary Status  Non-Smoker  []   Smoking history   < 20 pack years  []   Smoking history  ?  20 pack years  []   Pulmonary Disorder  (acute or chronic)  [x]   Severe or Chronic w/ Exacerbation  []     Surgical Status No [x]   Surgeries     General []   Surgery Lower []   Abdominal Thoracic or []   Upper Abdominal Thoracic with  PulmonaryDisorder  []     Chest X-ray Clear/Not  Ordered     []  Chronic Changes  Results Pending  []  Infiltrates, atelectasis, pleural effusion, or edema  [x]  Infiltrates in more than one lobe []  Infiltrate + Atelectasis, &/or pleural effusion  []    Respiratory Pattern Regular,  RR = 12-20 [x]  Increased,  RR = 21-25 []  SANCHEZ, irregular,  or RR = 26-30 []  Decreased FEV1  or RR = 31-35 []  Severe SOB, use  of accessory muscles, or RR ? 35  []    Mental Status Alert, oriented,  Cooperative [x]  Confused but Follows commands []  Lethargic or unable to follow commands []  Obtunded  []  Comatose  []    Breath Sounds Clear to  auscultation  []  Decreased unilaterally or  in bases only [x]  Decreased  bilaterally  []  Crackles or intermittent wheezes []  Wheezes []    Cough Strong, Spontan., & nonproductive [x]  Strong,  spontaneous, &  productive []  Weak,  Nonproductive []  Weak, productive or  with wheezes []  No spontaneous  cough or may require suctioning []    Level of Activity Ambulatory []  Ambulatory w/ Assist  [x]  Non-ambulatory []  Paraplegic []  Quadriplegic []    Total    Score:____7___     Triage Score:___4_____      Tri       Triage:     1. (>20) Freq: Q3    2. (16-20) Freq: Q4   3. (11-15) Freq: QID & Albuterol Q2 PRN    4. (6-10) Freq: TID & Albuterol Q2 PRN    5. (0-5) Freq Q4prn

## 2022-11-16 NOTE — PROGRESS NOTES
Hospitalist Progress Note      PCP: Abbey Lechuga, APRN - CNP    Date of Admission: 11/12/2022    Chief Complaint:    Chief Complaint   Patient presents with    Fall       Subjective:  No acute events overnight. PEG placed this morning, no immediate surgical complications reported. Patient somnolent but denies any acute pain on return to medical floor. Medications:  Reviewed    Infusion Medications    dextrose 5% in lactated ringers 100 mL/hr at 11/15/22 2313    dextrose       Scheduled Medications    metoprolol  5 mg IntraVENous Q6H    cloNIDine  1 patch TransDERmal Weekly    hydrALAZINE  20 mg IntraVENous Q3H (SCHEDULED)    enalaprilat  2.5 mg IntraVENous 4 times per day    [Held by provider] atorvastatin  80 mg Oral Nightly    insulin lispro  0-4 Units SubCUTAneous Q4H     PRN Meds: acetaminophen, hydrALAZINE, ondansetron **OR** ondansetron, polyethylene glycol, labetalol, glucose, dextrose bolus **OR** dextrose bolus, glucagon (rDNA), dextrose    No intake or output data in the 24 hours ending 11/16/22 0901      Exam:    BP (!) 145/53   Pulse 68   Temp 98.7 °F (37.1 °C) (Axillary)   Resp 22   Ht 5' 3\" (1.6 m)   Wt 160 lb (72.6 kg)   LMP  (LMP Unknown)   SpO2 97%   BMI 28.34 kg/m²     General appearance: Elderly female reclining in bed in NAD.  and daughter present at bedside. HEENT:  PERRLA, EOMI. Neck: Trachea midline,   Cardiovascular: Regular rate and rhythm  Respiratory:  Normal respiratory effort. Clear to auscultation. Abdomen: Soft, non-distended with normal bowel sounds. New PEG in place, with overlying abdominal binder C/D/I  Neuro: Somnolent but oriented to self and family.            Labs:   Recent Labs     11/14/22 0456 11/15/22  0552 11/16/22  0503   WBC 8.5 7.7 8.8   HGB 13.3 13.2 13.0   HCT 40.4 40.5 40.4    178 181       Recent Labs     11/14/22 0456 11/15/22  0552 11/16/22  0503    142 141   K 3.9 3.5 3.7    108* 105   CO2 24 25 24   BUN 6* 7* 11   CREATININE 0.58 0.72 0.59   CALCIUM 8.8 8.9 8.9       No results for input(s): AST, ALT, BILIDIR, BILITOT, ALKPHOS in the last 72 hours. No results for input(s): INR in the last 72 hours. No results for input(s): Jesse Beets in the last 72 hours. Urinalysis:      Lab Results   Component Value Date/Time    NITRU Negative 11/12/2022 03:45 AM    WBCUA 6-9 11/12/2022 03:45 AM    BACTERIA Negative 11/12/2022 03:45 AM    RBCUA 0-2 11/12/2022 03:45 AM    BLOODU Negative 11/12/2022 03:45 AM    SPECGRAV 1.014 11/12/2022 03:45 AM    GLUCOSEU Negative 11/12/2022 03:45 AM    GLUCOSEU NEG 10/17/2011 10:15 AM       Radiology:  CT HEAD WO CONTRAST   Final Result   Stable discrete intraventricular hemorrhage since recent studies. CT HEAD WO CONTRAST   Final Result   1. Stable minimal layering hemorrhage in the occipital horns of the right   and left lateral ventricle. No change from MRI dated 11/12/2022.      2.  Findings compatible with chronic paranasal sinusitis, mild. MRI brain without contrast   Final Result   1. Small acute infarct involving the left inferior ahsan. No mass effect or   midline shift. 2. Trace amount of blood layering within the occipital horns of the lateral   ventricles bilaterally. 3. At least partial loss of the normal signal void within the right vertebral   artery, which can be seen with slow flow/occlusion. This is of uncertain   chronicity. 4. Otherwise, no acute intracranial abnormality. 5. Moderate global parenchymal volume loss with mild chronic microvascular   ischemic changes. These results were sent to the [employer] [id number] ([address] [place] [address]) on   11/12/2022 at 2:22 pm to be communicated to the referring/covering health   care provider/office. CT HEAD WO CONTRAST   Final Result   Atrophy and chronic changes seen within the brain with no acute intracranial   abnormality. CTA HEAD W WO CONTRAST   Final Result   1.  No large vessel occlusion, significant stenosis or cerebral aneurysm   identified within the brain. 2. Atherosclerosis without significant arterial stenosis identified within   the neck. 3. Beaded appearance of the distal vertebral arteries suggesting   fibromuscular dysplasia. CTA NECK W WO CONTRAST   Final Result   1. No large vessel occlusion, significant stenosis or cerebral aneurysm   identified within the brain. 2. Atherosclerosis without significant arterial stenosis identified within   the neck. 3. Beaded appearance of the distal vertebral arteries suggesting   fibromuscular dysplasia. CT HEAD WO CONTRAST   Final Result   1. There is no acute intracranial abnormality. Specifically, there is no   intracranial hemorrhage. 2. Atrophy and periventricular leukomalacia,         XR CHEST PORTABLE   Final Result   Bibasilar atelectasis. XR CHEST (2 VW)    (Results Pending)       Assessment/Plan:    Acute stroke POA:  Repeat CT scan today given the MRI findings of layering of blood; unable to be on heparin for this reason; neuro is managing; PT/OT; tight blood control. Tylenol suppository PRN pain. 11/14: Consult to IR for PEG placement per  wishes following discussion, but IR apparently out of area for next several days. Follow up consult placed to [facility] for PEG consideration. 11/15: Planned for PEG placement with Colorectal Surgery next AM.  Neurology planning for antiplatelet monotherapy resumption after PEG placed. Continue therapies. 11/16: PEG successfully placed this morning. Dietary consult for PEG feeding recommendations. Monitor overnight. Pending precertification to [facility] for further therapy following discharge. HTN/HLD:  Continue home meds  COPD:  Not in an exacerebation  Chronic diastolic CHF:  Euvolemic on exams  Functional Status: Fall precautions. Up with assistance. PT OT  Diet: Cardiac   DVT ppx: Heparin  Disposition: Dependent on hospital course. Will discharge once medically stable.  on board for discharge planning. Active Hospital Problems    Diagnosis Date Noted    Cerebrovascular accident (CVA) due to stenosis of left middle cerebral artery [facility]) [I63.512] 11/12/2022     Priority: High    Aphasia [R47.01] 11/15/2022     Priority: Medium    TIA (transient ischemic attack) [G45.9] 11/15/2022     Priority: Medium    Nocturnal hypoxia [G47.34] 11/15/2022     Priority: Medium    Sleep apnea [G47.30] 11/15/2022     Priority: Medium    Dysphagia [R13.10] 11/14/2022     Priority: Medium    Cerebrovascular accident (CVA) due to stenosis of left vertebral artery ([address] [I63.212] 11/14/2022     Priority: Medium    Palliative care encounter [Z51.5] 11/14/2022     Priority: Medium    Advanced care planning/counseling discussion [Z71.89] 11/14/2022     Priority: Medium    Goals of care, counseling/discussion [Z71.89] 11/14/2022     Priority: Medium    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 [facility]) [I69.351] 11/12/2022     Priority: Medium    Fibromuscular dysplasia ([address] [I77.3] 11/12/2022     Priority: Medium    Impaired mobility and activities of daily living dt CVA [Z74.09, Z78.9] 11/12/2022     Priority: Medium    Acute CVA (cerebrovascular accident) [facility]) [I63.9] 11/12/2022     Priority: Medium    Hypertension [I10]     COPD (chronic obstructive pulmonary disease) ([address] [J44.9]         Additional work up or/and treatment plan may be added today or then after based on clinical progression. I am managing a portion of pt care. Some medical issues are handled by other specialists. Additional work up and treatment should be done in out pt setting by pt PCP and other out pt providers. In addition to examining and evaluating pt, I spent additional time explaining care, normal and abnormal findings, and treatment plan. All of pt questions were answered. Counseling, diet and education were  provided.  Case will be discussed with nursing staff when appropriate. Family will be updated if and when appropriate.       Diet: Diet NPO    Code Status: Full Code    PT/OT Eval     Electronically signed by Sonido Rock DO on 11/16/2022 at 9:01 AM

## 2022-11-16 NOTE — PROGRESS NOTES
Subjective: The patient complains of severe acute on chronic progressive fatigue and aphasia and right-sided weakness partially relieved by rest, PT, OT and meds   and exacerbated by exertion and recent CVA. She originally presented to ED with facial droop and memory issues. Per daughter,she got a call from her sister at 18 am who said that her mother was slurring her words. Stated her mother go up to go to the bathroom but she could not get out of bed. They called 911. Patient did have slight dysphagia and a headache the previous night and stated that she did not feel well before going to bed. In the ER she had right sided hemiparesis. MRI of the brain 11/12/22 showed small acute infarct involving the left inferior ahsan. Trace amount of blood layering within the occipital horns lateral ventricles and bilaterally. On acute worsening today arthritic neurological changes and being worked up for intracranial bleeding blood pressures closely being monitored by multiple services including cardiology and medical specialists. 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  *      .    Reviewed recent nursing note and discussed current status and planned care with acute care providers,        Am concerned about her oral pharyngeal dysphagia she is now n.p.o. pending a PEG. ROS x10: The patient also complains of severely impaired mobility and activities of daily living. Otherwise no new problems with vision, hearing, nose, mouth, throat, dermal, cardiovascular, GI, , pulmonary, musculoskeletal, psychiatric or neurological.        Vital signs:  BP (!) 145/53   Pulse 68   Temp 98.7 °F (37.1 °C) (Axillary)   Resp 22   Ht 5' 3\" (1.6 m)   Wt 160 lb (72.6 kg)   LMP  (LMP Unknown)   SpO2 97%   BMI 28.34 kg/m²   I/O:   PO/Intake:   NPO      Bowel/Bladder:  incontinent,    General:  Patient is well developed, adequately nourished, and    well kempt. HEENT:    PERRLA, hearing intact to loud voice, external inspection of ear and nose benign. Inspection of lips, tongue and gums benign  Musculoskeletal: No significant change in strength or tone. All joints stable. Inspection and palpation of digits and nails show no clubbing, cyanosis or inflammatory conditions. Neuro/Psychiatric: Affect: flat- sleepy   aphasic No significant change in deep tendon reflexes or sensation  Lungs:  Diminished, CTA-B  .  tachypnic  Heart:   S1 = S2,   RRR. Abdomen:  Soft,  PEG-tender    Extremities:  Trace  lower extremity edema but no unusual tenderness. Skin:   BUE bruises dt blood draws--PEG --new      Rehabilitation:  Physical Therapy:   Bed mobility:  Bed mobility  Rolling to Left: Maximum assistance;2 Person assistance (11/14/22 0957)  Rolling to Right: Maximum assistance;2 Person assistance (11/14/22 0957)  Supine to Sit: Maximum assistance;Dependent/Total;2 Person assistance (11/14/22 0957)  Sit to Supine: 2 Person assistance;Dependent/Total;Maximum assistance (11/14/22 0957)  Bed Mobility Comments: Focus on sequencing rolling L<>R in supine position during hygiene.  pt requires step by step cues, however improves recall of sequencing to roll R following initial trials. unable to follow efficiently for sit<>supine transition.  (11/14/22 0957)  Transfers:  Transfers  Sit to Stand: Maximum Assistance (11/12/22 1339)  Stand to Sit: Maximum Assistance (11/12/22 1339)  Comment: NT - safety concerns (11/14/22 0958)  Gait:   Ambulation  Assistance: Maximum assistance (11/12/22 1339)  Quality of Gait: Standing X 15sec (11/12/22 1339)  Distance: standing only (11/12/22 1339)  Stairs:     W/C mobility:       Occupational Therapy:   Hand Dominance:  (Pt unable to state; R side flaccid)  ADL  Feeding: NPO;Dependent/Total (11/14/22 1046)  Grooming: Dependent/Total (11/14/22 1046)  Grooming Skilled Clinical Factors: Attempts to perform oral care but decreased LUE coordination impacts her ability to complete (11/14/22 1046)  UE Bathing: Dependent/Total (11/14/22 1046)  LE Bathing: Dependent/Total (11/14/22 1046)  UE Dressing: Dependent/Total (11/14/22 1046)  LE Dressing: Dependent/Total (11/14/22 1046)  Toileting: Dependent/Total (11/14/22 1046)  Additional Comments: Simulated dressing, assisted with clean up and fresh gown following bowel and bladder accident in brief (11/14/22 1046)  Toilet Transfers  Toilet Transfer: Unable to assess (11/14/22 1048)  Toilet Transfers Comments: Anticipate dependent (11/14/22 1048)          Speech Therapy:      Comprehension: Exceptions  Verbal Expression: Exceptions to functional limits  Diet/Swallow:        Dysphagia Outcome Severity Scale: Level 2: Moderate Severe dysphagia- Maximum assistance or maximum use of strategies with partial PO only     Therapeutic Interventions: Therapeutic PO trials with SLP    COGNITION  OT: Cognition Comment: Max increased processing time  SP:           Lab/X-ray studies reviewed, analyzed and discussed with patient and staff:   Recent Results (from the past 24 hour(s))   POCT Glucose    Collection Time: 11/15/22 11:33 AM   Result Value Ref Range    POC Glucose 110 (H) 70 - 99 mg/dl    Performed on ACCU-CHEK    POCT Glucose    Collection Time: 11/15/22  2:58 PM   Result Value Ref Range    POC Glucose 95 70 - 99 mg/dl    Performed on ACCU-CHEK    POCT Glucose    Collection Time: 11/15/22  4:13 PM   Result Value Ref Range    POC Glucose 102 (H) 70 - 99 mg/dl    Performed on ACCU-CHEK    POCT Glucose    Collection Time: 11/15/22 11:11 PM   Result Value Ref Range    POC Glucose 88 70 - 99 mg/dl    Performed on ACCU-CHEK    POCT Glucose    Collection Time: 11/16/22  2:32 AM   Result Value Ref Range    POC Glucose 111 (H) 70 - 99 mg/dl    Performed on ACCU-CHEK    CBC with Auto Differential    Collection Time: 11/16/22  5:03 AM   Result Value Ref Range    WBC 8.8 4.8 - 10.8 K/uL    RBC 4.13 (L) 4.20 - 5.40 M/uL    Hemoglobin 13.0 12.0 - 16.0 g/dL    Hematocrit 40.4 37.0 - 47.0 %    MCV 97.8 (H) 79.4 - 94.8 fL    MCH 31.5 (H) 27.0 - 31.3 pg    MCHC 32.2 (L) 33.0 - 37.0 %    RDW 14.9 (H) 11.5 - 14.5 %    Platelets 091 458 - 158 K/uL    Neutrophils % 67.0 %    Lymphocytes % 15.1 %    Monocytes % 12.0 %    Eosinophils % 5.1 %    Basophils % 0.8 %    Neutrophils Absolute 5.9 1.4 - 6.5 K/uL    Lymphocytes Absolute 1.3 1.0 - 4.8 K/uL    Monocytes Absolute 1.1 (H) 0.2 - 0.8 K/uL    Eosinophils Absolute 0.4 0.0 - 0.7 K/uL    Basophils Absolute 0.1 0.0 - 0.2 K/uL   Basic Metabolic Panel    Collection Time: 11/16/22  5:03 AM   Result Value Ref Range    Sodium 141 135 - 144 mEq/L    Potassium 3.7 3.4 - 4.9 mEq/L    Chloride 105 95 - 107 mEq/L    CO2 24 20 - 31 mEq/L    Anion Gap 12 9 - 15 mEq/L    Glucose 110 (H) 70 - 99 mg/dL    BUN 11 8 - 23 mg/dL    Creatinine 0.59 0.50 - 0.90 mg/dL    Est, Glom Filt Rate >60.0 >60    Calcium 8.9 8.5 - 9.9 mg/dL   POCT Glucose    Collection Time: 11/16/22  6:13 AM   Result Value Ref Range    POC Glucose 104 (H) 70 - 99 mg/dl    Performed on ACCU-CHEK      Previous extensive, complex labs, notes and diagnostics reviewed and analyzed. ALLERGIES:    Allergies as of 11/12/2022 - Fully Reviewed 09/23/2022   Allergen Reaction Noted    Codeine  12/27/2002      (please also verify by checking [facility])     Complex Physical Medicine & Rehab Issues Assess & Plan:   Severe abnormality of gait and mobility and impaired self-care and ADL's secondary to   CVA with right-sided weakness aphasia and dysphagia. Updated functional and medical status reassessed regarding patients ability to participate in therapies and patient found to be able to participate in:   skilled rehab when medically stable         Will continue to follow to attempt to get patient to the most efficient but most effective level of care will be in their best interest.  Continue to focus on energy conservation heart rate and blood pressure monitoring before during and after therapy endurance and consistency of function. Bowel constipation   and Bladder dysfunction   overactive, neurogenic bladder:  frequent toileting, ambulate to bathroom with assistance, check post void residuals. Check for C.difficile x1 if >2 loose stools in 24 hours, continue bowel & bladder program.  Monitor for UTI symptoms including lethargy and confusion    Severe OA pain and generalized OA pain: reassess pain every shift and prior to and after each therapy session, give prn Tylenol   and consider scheduled Tylenol, modalities prn in therapy, consider Lidoderm, K-pad prn. Skin healing    breakdown   risk:  continue pressure relief program.  Daily skin exams and reports from nursing. Severe fatigue due to immobility and nutritional deficits: monitoring for dysphagia   Add vitamin B12 vitamin D and CoQ10 titrate dosing and add protein supplementation with low carb content. Patient is pending- PEG placement. Continue SLP. Complex discharge planning:   Discussed with care team-last 24 hour events noted.   I will continue to follow along and reassess functional and medical status as we strive to improve patient's functional and medical outcomes progressing to the most efficient and lowest level of care. Complex Active General Medical Issues that complicate care:     1. 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 *          Events and functional changes in the past 24 hours reviewed decline in functional status noted and is of concern       Focus on coordinating dc plans--likely to SNF -then reconsider for Acute rehab once able to participate.         Holley Farrell D.O., PM&R     Attending    [facility]

## 2022-11-16 NOTE — PLAN OF CARE
Problem: Nutrition Deficit:  Goal: Optimize nutritional status  Outcome: Progressing     Problem: Chronic Conditions and Co-morbidities  Goal: Patient's chronic conditions and co-morbidity symptoms are monitored and maintained or improved  Outcome: Progressing     Problem: Skin/Tissue Integrity  Goal: Absence of new skin breakdown  Description: 1. Monitor for areas of redness and/or skin breakdown  2. Assess vascular access sites hourly  3. Every 4-6 hours minimum:  Change oxygen saturation probe site  4. Every 4-6 hours:  If on nasal continuous positive airway pressure, respiratory therapy assess nares and determine need for appliance change or resting period.   Outcome: Progressing     Problem: Safety - Adult  Goal: Free from fall injury  Outcome: Progressing     Problem: ABCDS Injury Assessment  Goal: Absence of physical injury  Outcome: Progressing

## 2022-11-17 LAB
ANION GAP SERPL CALCULATED.3IONS-SCNC: 10 MEQ/L (ref 9–15)
BASOPHILS ABSOLUTE: 0 K/UL (ref 0–0.2)
BASOPHILS RELATIVE PERCENT: 0.1 %
BUN BLDV-MCNC: 14 MG/DL (ref 8–23)
CALCIUM SERPL-MCNC: 8.8 MG/DL (ref 8.5–9.9)
CHLORIDE BLD-SCNC: 103 MEQ/L (ref 95–107)
CO2: 26 MEQ/L (ref 20–31)
CREAT SERPL-MCNC: 0.53 MG/DL (ref 0.5–0.9)
EOSINOPHILS ABSOLUTE: 0 K/UL (ref 0–0.7)
EOSINOPHILS RELATIVE PERCENT: 0.1 %
GFR SERPL CREATININE-BSD FRML MDRD: >60 ML/MIN/{1.73_M2}
GLUCOSE BLD-MCNC: 113 MG/DL (ref 70–99)
GLUCOSE BLD-MCNC: 115 MG/DL (ref 70–99)
GLUCOSE BLD-MCNC: 117 MG/DL (ref 70–99)
GLUCOSE BLD-MCNC: 123 MG/DL (ref 70–99)
GLUCOSE BLD-MCNC: 128 MG/DL (ref 70–99)
GLUCOSE BLD-MCNC: 131 MG/DL (ref 70–99)
GLUCOSE BLD-MCNC: 135 MG/DL (ref 70–99)
GLUCOSE BLD-MCNC: 143 MG/DL (ref 70–99)
HCT VFR BLD CALC: 38.1 % (ref 37–47)
HEMOGLOBIN: 12.5 G/DL (ref 12–16)
LYMPHOCYTES ABSOLUTE: 1.2 K/UL (ref 1–4.8)
LYMPHOCYTES RELATIVE PERCENT: 9.8 %
MAGNESIUM: 2 MG/DL (ref 1.7–2.4)
MCH RBC QN AUTO: 31.4 PG (ref 27–31.3)
MCHC RBC AUTO-ENTMCNC: 32.9 % (ref 33–37)
MCV RBC AUTO: 95.6 FL (ref 79.4–94.8)
MONOCYTES ABSOLUTE: 0.9 K/UL (ref 0.2–0.8)
MONOCYTES RELATIVE PERCENT: 7.4 %
NEUTROPHILS ABSOLUTE: 10.2 K/UL (ref 1.4–6.5)
NEUTROPHILS RELATIVE PERCENT: 82.6 %
PDW BLD-RTO: 14.3 % (ref 11.5–14.5)
PERFORMED ON: ABNORMAL
PLATELET # BLD: 163 K/UL (ref 130–400)
POTASSIUM SERPL-SCNC: 3.9 MEQ/L (ref 3.4–4.9)
RBC # BLD: 3.98 M/UL (ref 4.2–5.4)
SODIUM BLD-SCNC: 139 MEQ/L (ref 135–144)
WBC # BLD: 12.4 K/UL (ref 4.8–10.8)

## 2022-11-17 PROCEDURE — 94640 AIRWAY INHALATION TREATMENT: CPT

## 2022-11-17 PROCEDURE — 6370000000 HC RX 637 (ALT 250 FOR IP): Performed by: INTERNAL MEDICINE

## 2022-11-17 PROCEDURE — 85025 COMPLETE CBC W/AUTO DIFF WBC: CPT

## 2022-11-17 PROCEDURE — 6360000002 HC RX W HCPCS: Performed by: INTERNAL MEDICINE

## 2022-11-17 PROCEDURE — 97535 SELF CARE MNGMENT TRAINING: CPT

## 2022-11-17 PROCEDURE — 83735 ASSAY OF MAGNESIUM: CPT

## 2022-11-17 PROCEDURE — 80048 BASIC METABOLIC PNL TOTAL CA: CPT

## 2022-11-17 PROCEDURE — 94761 N-INVAS EAR/PLS OXIMETRY MLT: CPT

## 2022-11-17 PROCEDURE — 92507 TX SP LANG VOICE COMM INDIV: CPT

## 2022-11-17 PROCEDURE — APPSS15 APP SPLIT SHARED TIME 0-15 MINUTES: Performed by: NURSE PRACTITIONER

## 2022-11-17 PROCEDURE — 92526 ORAL FUNCTION THERAPY: CPT

## 2022-11-17 PROCEDURE — 99232 SBSQ HOSP IP/OBS MODERATE 35: CPT | Performed by: INTERNAL MEDICINE

## 2022-11-17 PROCEDURE — 36415 COLL VENOUS BLD VENIPUNCTURE: CPT

## 2022-11-17 PROCEDURE — 2700000000 HC OXYGEN THERAPY PER DAY

## 2022-11-17 PROCEDURE — 6370000000 HC RX 637 (ALT 250 FOR IP): Performed by: NURSE PRACTITIONER

## 2022-11-17 PROCEDURE — 1210000000 HC MED SURG R&B

## 2022-11-17 RX ORDER — METOPROLOL TARTRATE 50 MG/1
100 TABLET, FILM COATED ORAL 2 TIMES DAILY
Status: DISCONTINUED | OUTPATIENT
Start: 2022-11-17 | End: 2022-11-18 | Stop reason: HOSPADM

## 2022-11-17 RX ADMIN — HYDRALAZINE HYDROCHLORIDE 50 MG: 50 TABLET, FILM COATED ORAL at 20:12

## 2022-11-17 RX ADMIN — LOSARTAN POTASSIUM 25 MG: 25 TABLET, FILM COATED ORAL at 09:03

## 2022-11-17 RX ADMIN — ACETAMINOPHEN 650 MG: 160 SOLUTION ORAL at 09:04

## 2022-11-17 RX ADMIN — METOPROLOL TARTRATE 50 MG: 50 TABLET ORAL at 09:03

## 2022-11-17 RX ADMIN — IPRATROPIUM BROMIDE AND ALBUTEROL SULFATE 1 AMPULE: .5; 2.5 SOLUTION RESPIRATORY (INHALATION) at 12:32

## 2022-11-17 RX ADMIN — PREDNISONE 40 MG: 20 TABLET ORAL at 09:03

## 2022-11-17 RX ADMIN — ATORVASTATIN CALCIUM 80 MG: 80 TABLET, FILM COATED ORAL at 20:19

## 2022-11-17 RX ADMIN — METOPROLOL TARTRATE 100 MG: 50 TABLET, FILM COATED ORAL at 20:19

## 2022-11-17 RX ADMIN — HYDRALAZINE HYDROCHLORIDE 50 MG: 50 TABLET, FILM COATED ORAL at 09:03

## 2022-11-17 RX ADMIN — ACETAMINOPHEN 650 MG: 160 SOLUTION ORAL at 16:48

## 2022-11-17 RX ADMIN — ASPIRIN 81 MG: 81 TABLET, CHEWABLE ORAL at 09:03

## 2022-11-17 RX ADMIN — ENOXAPARIN SODIUM 40 MG: 100 INJECTION SUBCUTANEOUS at 09:04

## 2022-11-17 RX ADMIN — IPRATROPIUM BROMIDE AND ALBUTEROL SULFATE 1 AMPULE: .5; 2.5 SOLUTION RESPIRATORY (INHALATION) at 07:45

## 2022-11-17 RX ADMIN — IPRATROPIUM BROMIDE AND ALBUTEROL SULFATE 1 AMPULE: .5; 2.5 SOLUTION RESPIRATORY (INHALATION) at 19:41

## 2022-11-17 ASSESSMENT — ENCOUNTER SYMPTOMS
WHEEZING: 0
NAUSEA: 0
COUGH: 0
VOMITING: 0
SHORTNESS OF BREATH: 0
BACK PAIN: 0
CHOKING: 0
COLOR CHANGE: 0
TROUBLE SWALLOWING: 1

## 2022-11-17 ASSESSMENT — PAIN SCALES - GENERAL
PAINLEVEL_OUTOF10: 3
PAINLEVEL_OUTOF10: 0
PAINLEVEL_OUTOF10: 3

## 2022-11-17 ASSESSMENT — PAIN DESCRIPTION - LOCATION: LOCATION: ABDOMEN

## 2022-11-17 NOTE — PROGRESS NOTES
[facility] Heart Progress Note      Patient: Yennifer Joyce    Unit/Bed: B760/Q103-21  YOB: 1945  MRN: [id number]  Admit Date:  11/12/2022  Hospital Day: 5    Rounding Date: 11/17/2022    Rounding Cardiologist:  OLESYA Rojo MD    PRIMARY Cardiologist: [facility]    Subjective Complaint:   Denies any chest pain with exertion or at rest, palpitations, syncope, or edema. ,  That is post PEG. Seems little more comfortable. Physical Examination:     BP (!) 132/46   Pulse 58   Temp 99.1 °F (37.3 °C)   Resp 18   Ht 5' 3\" (1.6 m)   Wt 160 lb (72.6 kg)   LMP  (LMP Unknown)   SpO2 93%   BMI 28.34 kg/m²       Intake/Output Summary (Last 24 hours) at 11/17/2022 1607  Last data filed at 11/16/2022 1955  Gross per 24 hour   Intake 6586.34 ml   Output 800 ml   Net 5786.34 ml                  Yennifer Joyce examined at bedside in in no apparent distress and moderately ill. Focused exam reveals:     Cardiac: Heart regular rate and rhythm     Lungs:  clear to auscultation bilaterally- no wheezes, rales or rhonchi, normal air movement, no respiratory distress    Extremities:   Trace edema    Telemetry:      normal sinus , paroxismal supraventricular tachycardia, and looks like ectopic atrial tachycardia but rapid          LABS:   CBC:   Recent Labs     11/15/22  0552 11/16/22  0503 11/17/22  0512   WBC 7.7 8.8 12.4*   HGB 13.2 13.0 12.5    181 163      BMP:    Recent Labs     11/15/22  0552 11/16/22  0503 11/17/22  0512    141 139   K 3.5 3.7 3.9   * 105 103   CO2 25 24 26   BUN 7* 11 14   CREATININE 0.72 0.59 0.53   GLUCOSE 115* 110* 131*              Troponin: No results for input(s): TROPONINT in the last 72 hours. BNP: No results for input(s): PROBNP in the last 72 hours. INR: No results for input(s): INR in the last 72 hours.    Mg:   Recent Labs     11/17/22  0512   MG 2.0       Cardiac Testing:       Summary   Bubble study negative   Normal left ventricle structure and function. Left ventricular ejection fraction is visually estimated at 55%. No Doppler data.  Limited study    Assessment:  CVA  Patient with SVT, but not classic atrial fibrillation  Status post PEG for swallowing difficulties  Hypertension  Obstructive sleep apnea suspicions    Plan:  inCrease beta-blocker  DC clonidine patch  Electronically signed by Andrew Block MD on 11/17/2022 at 4:07 PM

## 2022-11-17 NOTE — PROGRESS NOTES
Physical Therapy Med Surg Daily Treatment Note  Facility/Department: [facility]  [place]       NAME: Beau Rowe  : 1945 (76 y.o.)  MRN: [id number]  CODE STATUS: Full Code    Date of Service: 2022    Patient Diagnosis(es): TIA (transient ischemic attack) [G45.9]  Essential hypertension [I10]  Chronic obstructive pulmonary disease, unspecified COPD type ([address] [J44.9]  Acute CVA (cerebrovascular accident) [facility]) [I63.9]   Chief Complaint   Patient presents with    Fall     Patient Active Problem List    Diagnosis Date Noted    Aphasia 11/15/2022    TIA (transient ischemic attack) 11/15/2022    Nocturnal hypoxia 11/15/2022    Sleep apnea 11/15/2022    Dysphagia 2022    Cerebrovascular accident (CVA) due to stenosis of left vertebral artery ([address] 2022    Palliative care encounter 2022    Advanced care planning/counseling discussion 2022    Goals of care, counseling/discussion 2022    Cerebrovascular accident (CVA) due to stenosis of left middle cerebral artery ([address] 2022    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 ([address] 2022    Fibromuscular dysplasia ([address] 2022    Impaired mobility and activities of daily living dt CVA 2022    Acute CVA (cerebrovascular accident) ([address] 2022    Non-pressure chronic ulcer of calf with fat layer exposed ([address] 10/09/2020    Non-pressure chronic ulcer of calf, right, with fat layer exposed ([address] 10/02/2020    Claudication in peripheral vascular disease ([address] 2020    Non-healing surgical wound 2020    Venous insufficiency of left lower extremity 2020    Asymptomatic varicose veins of bilateral lower extremities 2020    Pain and swelling due to varicose veins of both lower extremities 2020    Slow transit constipation 10/13/2019    External hemorrhoid, bleeding 10/13/2019    Colitis 10/13/2019    Acute colitis 10/10/2019 Hypovolemic shock ([address] 10/07/2019    NSTEMI (non-ST elevated myocardial infarction) ([address] 89/01/7313    Acute diastolic heart failure ([address] 10/05/2019    Hypotension 10/01/2019    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    Hypertension     Hyperlipidemia     COPD (chronic obstructive pulmonary disease) ([address]     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 ([address] 08/27/2010    Generalized osteoarthrosis, unspecified site 12/27/2002    Major depressive disorder, single episode, moderate ([address] 12/27/2002    Morbid obesity ([address] 12/27/2002        Past Medical History:   Diagnosis Date    Anxiety     Arthritis     COPD (chronic obstructive pulmonary disease) ([facility])     Generalized osteoarthrosis, unspecified site 12/27/2002    Hyperlipidemia     Hypertension     Major depressive disorder, single episode, moderate ([address] 12/27/2002    Morbid obesity ([address] 12/27/2002    OAB (overactive bladder)     Osteoporosis     Sleep apnea 11/15/2022     Past Surgical History:   Procedure Laterality Date    CARDIAC CATHETERIZATION      CARPAL TUNNEL RELEASE      COLONOSCOPY      GASTROSTOMY TUBE PLACEMENT N/A 11/16/2022    PERCUTANEOUS ENDOSCOPIC GASTROSTOMY TUBE PLACEMENT performed by Albania Brito MD at [address], [address] ([address] [facility])      KNEE ARTHROPLASTY Bilateral     DE COLON CA SCRN NOT HI RSK IND N/A 6/22/2017    COLONOSCOPY performed by Angelo Tanner MD at [address] TRANSORAL DIAGNOSTIC N/A 6/22/2017    EGD ESOPHAGOGASTRODUODENOSCOPY performed by Angelo Tanner MD at [address] Bilateral     SKIN BIOPSY Right 9/17/2020    EXCISION OF LEG MASS RIGHT (PAT ON ADMIT) performed by Albania Brito MD at [facility]       Chart Reviewed: Yes  Family / Caregiver Present: Yes  Diagnosis: Cerebrovascular accident (CVA) due to stenosis of left middle cerebral artery ([address]  General Comment  Comments: pt initially agreeable to tx but once initiaited pt becomes agitiated and resistive. Restrictions:  Restrictions/Precautions: Fall Risk;NPO    SUBJECTIVE:   Subjective: \"Yes. \"    Pain  Pain: Unable to state. pt often groaning with active movement, however at rest, pt appears in no discomfort. RN notified. OBJECTIVE:        Bed mobility  Supine to Sit: Dependent/Total;2 Person assistance  Sit to Supine: Dependent/Total;2 Person assistance  Bed Mobility Comments: Pt requires step by step cues for all mobility, max increased time for sequencing pt resistive to movement, groans in pain with movement/touching of BLE. once sitting EOB pt with severe posterior pushing Total A needed to maintain sitting. Transfers  Comment: NT - safety concerns              Activity Tolerance  Activity Tolerance: Patient limited by fatigue;Treatment limited secondary to decreased cognition          ASSESSMENT   Assessment: pt greatly limited by cognition, pt refusing NC to be placed back in nose max encouragement needed. pt initially agreeable to tx but demos poor tolerance/participation once tx initiated. pt groaning in pain with movement and unable to maintain sitting without total A d/t severe posterior pushing.      Discharge Recommendations:  Continue to assess pending progress, Patient would benefit from continued therapy after discharge         Goals  Long Term Goals  Long Term Goal 1: Pt to roll L<>R in supine ModA  Long Term Goal 2: Pt to complete transition sit<>supine with ModA  Long Term Goal 3: Pt to complete transfers with ModA  Long Term Goal 4: Pt to sit EOB and maintain midline balance X 5min with SBA    PLAN    General Plan: Continue with current plan  Safety Devices  Type of Devices: Bed alarm in place, Call light within reach, Left in bed, Nurse notified     [facility] (6 CLICK) [address] Mobility Raw Score : 8      Therapy Time   Individual   Time In 1022   Time Out 1038   Minutes 16      BM: [address] PTA, 11/17/22 at 10:54 AM         Definitions for assistance levels  Independent = pt does not require any physical supervision or assistance from another person for activity completion. Device may be needed.   Stand by assistance = pt requires verbal cues or instructions from another person, close to but not touching, to perform the activity  Minimal assistance= pt performs 75% or more of the activity; assistance is required to complete the activity  Moderate assistance= pt performs 50% of the activity; assistance is required to complete the activity  Maximal assistance = pt performs 25% of the activity; assistance is required to complete the activity  Dependent = pt requires total physical assistance to accomplish the task

## 2022-11-17 NOTE — PROGRESS NOTES
Subjective: The patient complains of severe acute on chronic progressive fatigue and aphasia and right-sided weakness partially relieved by rest, PT, OT and meds   and exacerbated by exertion and recent CVA. She originally presented to ED with facial droop and memory issues. Per daughter,she got a call from her sister at 18 am who said that her mother was slurring her words. Stated her mother go up to go to the bathroom but she could not get out of bed. They called 911. Patient did have slight dysphagia and a headache the previous night and stated that she did not feel well before going to bed. In the ER she had right sided hemiparesis. MRI of the brain 11/12/22 showed small acute infarct involving the left inferior ahsan. Trace amount of blood layering within the occipital horns lateral ventricles and bilaterally. On acute worsening today arthritic neurological changes and being worked up for intracranial bleeding blood pressures closely being monitored by multiple services including cardiology and medical specialists. 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  *      .    Reviewed recent nursing note and discussed current status and planned care with acute care providers,  \"Pt noted moaning in pain states that her legs and back are hurting very badly. Per Henri Christian scale 7-8/10. Secured messaging sent to [facility] Santiago at this time awaiting response. Pt has been repositioned and pulled up in bed, this has not provided relief for pain at this time\"    I left a message on perfect serve for Dr. Hernandez Rehman. I am concerned about her ability to do any level of therapy even skilled with this type of pain. It looks as she is on Neurontin 400 mg 3 times daily at home likely neuropathic pain she is getting prednisone here which should help with the arthritic pain. She may benefit from scheduled Tylenol topical such as Lidoderm to her low back and/or lidocaine gel to her feet. She has been refusing therapy of any kind even bed mobility because of this pain. ROS x10: The patient also complains of severely impaired mobility and activities of daily living. Otherwise no new problems with vision, hearing, nose, mouth, throat, dermal, cardiovascular, GI, , pulmonary, musculoskeletal, psychiatric or neurological.        Vital signs:  BP (!) 145/111   Pulse 67   Temp 98.2 °F (36.8 °C) (Axillary)   Resp 17   Ht 5' 3\" (1.6 m)   Wt 160 lb (72.6 kg)   LMP  (LMP Unknown)   SpO2 95%   BMI 28.34 kg/m²   I/O:   PO/Intake:   NPO      Bowel/Bladder:  incontinent,    General:  Patient is well developed, adequately nourished, and    well kempt. HEENT:    PERRLA, hearing intact to loud voice, external inspection of ear and nose benign. Inspection of lips, tongue and gums benign  Musculoskeletal: No significant change in strength or tone. All joints stable. Inspection and palpation of digits and nails show no clubbing, cyanosis or inflammatory conditions.    Neuro/Psychiatric: Affect: flat- sleepy   aphasic No significant change in deep tendon reflexes or sensation  Lungs:  Diminished, CTA-B  . Heart:   S1 = S2,   RRR. Abdomen:  Soft,  PEG-tender    Extremities:  Trace  lower extremity edema but  severe BLE tenderness. Skin:   BUE bruises dt blood draws--PEG --new      Rehabilitation:  Physical Therapy:   Bed mobility:  Bed mobility  Rolling to Left: Maximum assistance;2 Person assistance (11/14/22 0957)  Rolling to Right: Maximum assistance;2 Person assistance (11/14/22 0957)  Supine to Sit: Maximum assistance;Dependent/Total;2 Person assistance (11/14/22 0957)  Sit to Supine: 2 Person assistance;Dependent/Total;Maximum assistance (11/14/22 0957)  Bed Mobility Comments: Focus on sequencing rolling L<>R in supine position during hygiene. pt requires step by step cues, however improves recall of sequencing to roll R following initial trials. unable to follow efficiently for sit<>supine transition.  (11/14/22 0957)  Transfers:  Transfers  Sit to Stand: Maximum Assistance (11/12/22 1339)  Stand to Sit: Maximum Assistance (11/12/22 1339)  Comment: NT - safety concerns (11/14/22 0958)  Gait:   Ambulation  Assistance: Maximum assistance (11/12/22 1339)  Quality of Gait: Standing X 15sec (11/12/22 1339)  Distance: standing only (11/12/22 1339)  Stairs:     W/C mobility:       Occupational Therapy:   Hand Dominance:  (Pt unable to state; R side flaccid)  ADL  Feeding: NPO;Dependent/Total (11/14/22 1046)  Grooming: Dependent/Total (11/14/22 1046)  Grooming Skilled Clinical Factors: Attempts to perform oral care but decreased LUE coordination impacts her ability to complete (11/14/22 1046)  UE Bathing: Dependent/Total (11/14/22 1046)  LE Bathing: Dependent/Total (11/14/22 1046)  UE Dressing: Dependent/Total (11/14/22 1046)  LE Dressing: Dependent/Total (11/14/22 1046)  Toileting: Dependent/Total (11/14/22 1046)  Additional Comments: Simulated dressing, assisted with clean up and fresh gown following bowel and bladder accident in brief (11/14/22 1046)  Toilet Transfers  Toilet Transfer: Unable to assess (11/14/22 1048)  Toilet Transfers Comments: Anticipate dependent (11/14/22 1048)          Speech Therapy:      Comprehension: Exceptions  Verbal Expression: Exceptions to functional limits  Diet/Swallow:        Dysphagia Outcome Severity Scale: Level 2: Moderate Severe dysphagia- Maximum assistance or maximum use of strategies with partial PO only     Therapeutic Interventions: Therapeutic PO trials with SLP    COGNITION  OT: Cognition Comment: Max increased processing time  SP:           Lab/X-ray studies reviewed, analyzed and discussed with patient and staff:   Recent Results (from the past 24 hour(s))   POCT Glucose    Collection Time: 11/16/22 12:00 PM   Result Value Ref Range    POC Glucose 89 70 - 99 mg/dl    Performed on ACCU-CHEK    POCT Glucose    Collection Time: 11/16/22  4:07 PM   Result Value Ref Range    POC Glucose 113 (H) 70 - 99 mg/dl    Performed on ACCU-CHEK    POCT Glucose    Collection Time: 11/16/22  9:28 PM   Result Value Ref Range    POC Glucose 126 (H) 70 - 99 mg/dl    Performed on ACCU-CHEK    POCT Glucose    Collection Time: 11/17/22  1:09 AM   Result Value Ref Range    POC Glucose 128 (H) 70 - 99 mg/dl    Performed on ACCU-CHEK    POCT Glucose    Collection Time: 11/17/22  4:27 AM   Result Value Ref Range    POC Glucose 123 (H) 70 - 99 mg/dl    Performed on ACCU-CHEK    CBC with Auto Differential    Collection Time: 11/17/22  5:12 AM   Result Value Ref Range    WBC 12.4 (H) 4.8 - 10.8 K/uL    RBC 3.98 (L) 4.20 - 5.40 M/uL    Hemoglobin 12.5 12.0 - 16.0 g/dL    Hematocrit 38.1 37.0 - 47.0 %    MCV 95.6 (H) 79.4 - 94.8 fL    MCH 31.4 (H) 27.0 - 31.3 pg    MCHC 32.9 (L) 33.0 - 37.0 %    RDW 14.3 11.5 - 14.5 %    Platelets 159 795 - 913 K/uL    Neutrophils % 82.6 %    Lymphocytes % 9.8 %    Monocytes % 7.4 %    Eosinophils % 0.1 %    Basophils % 0.1 %    Neutrophils Absolute 10.2 (H) 1.4 - 6.5 K/uL    Lymphocytes Absolute 1.2 1.0 - 4.8 K/uL    Monocytes Absolute 0.9 (H) 0.2 - 0.8 K/uL Eosinophils Absolute 0.0 0.0 - 0.7 K/uL    Basophils Absolute 0.0 0.0 - 0.2 K/uL   Basic Metabolic Panel    Collection Time: 11/17/22  5:12 AM   Result Value Ref Range    Sodium 139 135 - 144 mEq/L    Potassium 3.9 3.4 - 4.9 mEq/L    Chloride 103 95 - 107 mEq/L    CO2 26 20 - 31 mEq/L    Anion Gap 10 9 - 15 mEq/L    Glucose 131 (H) 70 - 99 mg/dL    BUN 14 8 - 23 mg/dL    Creatinine 0.53 0.50 - 0.90 mg/dL    Est, Glom Filt Rate >60.0 >60    Calcium 8.8 8.5 - 9.9 mg/dL   Magnesium    Collection Time: 11/17/22  5:12 AM   Result Value Ref Range    Magnesium 2.0 1.7 - 2.4 mg/dL   POCT Glucose    Collection Time: 11/17/22  7:57 AM   Result Value Ref Range    POC Glucose 115 (H) 70 - 99 mg/dl    Performed on ACCU-CHEK      Previous extensive, complex labs, notes and diagnostics reviewed and analyzed. ALLERGIES:    Allergies as of 11/12/2022 - Fully Reviewed 09/23/2022   Allergen Reaction Noted    Codeine  12/27/2002      (please also verify by checking [facility])     Complex Physical Medicine & Rehab Issues Assess & Plan:   Severe abnormality of gait and mobility and impaired self-care and ADL's secondary to   CVA with right-sided weakness aphasia and dysphagia. Updated functional and medical status reassessed regarding patients ability to participate in therapies and patient found to be able to participate in:   skilled rehab when medically stable         Will continue to follow to attempt to get patient to the most efficient but most effective level of care will be in their best interest.  Continue to focus on energy conservation heart rate and blood pressure monitoring before during and after therapy endurance and consistency of function. Bowel constipation   and Bladder dysfunction   overactive, neurogenic bladder:  frequent toileting, ambulate to bathroom with assistance, check post void residuals.   Check for C.difficile x1 if >2 loose stools in 24 hours, continue bowel & bladder program.  Monitor for UTI symptoms including lethargy and confusion    Severe OA neuropathic and pain and generalized OA pain: reassess pain every shift and prior to and after each therapy session, give prn Tylenol   and consider scheduled Tylenol, modalities prn in therapy, consider Lidoderm, K-pad prn. Resume Neurontin 400 mg 3 times daily, add scheduled Tylenol, consider as needed low-dose opiate or SAMe opiate for the severe pain and use topicals when able. Skin healing    breakdown   risk:  continue pressure relief program.  Daily skin exams and reports from nursing. Severe fatigue due to immobility and nutritional deficits: monitoring for dysphagia   Add vitamin B12 vitamin D and CoQ10 titrate dosing and add protein supplementation with low carb content. Patient is pending- PEG placement. Continue SLP. Complex discharge planning:   Discussed with care team-last 24 hour events noted. I will continue to follow along and reassess functional and medical status as we strive to improve patient's functional and medical outcomes progressing to the most efficient and lowest level of care. Complex Active General Medical Issues that complicate care:     1. 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  * Events and functional changes in the past 24 hours reviewed decline in functional status noted and is of concern       Focus on coordinating dc plans--likely to SNF -then reconsider for Acute rehab once able to participate.         Heather Krishnamurthy D.O., PM&R     Attending    [facility]

## 2022-11-17 NOTE — PROGRESS NOTES
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 Possibility of some ventricular hemorrhage as well mention but Dr. Kaye Lebron feels this is calcification. Patient is currently alert and oriented x1-2, no acute distress, cooperative.  at bedside. Right side is flaccid. She remains n.p.o. due to dysphagia. PEG tube placed. Dysarthria and expressive aphasia noted. Right facial droop. Denies headache or vision changes. Blood pressure elevated at times.   Antiplatelet therapy has been initiated  Pt still paralysed     Current Facility-Administered Medications   Medication Dose Route Frequency Provider Last Rate Last Admin    cloNIDine (CATAPRES) 0.1 MG/24HR 1 patch  1 patch TransDERmal Weekly Mahesh Ramos MD   1 patch at 11/16/22 1355    losartan (COZAAR) tablet 25 mg  25 mg PEG Tube Daily Mahesh Ramos MD   25 mg at 11/17/22 0903    atorvastatin (LIPITOR) tablet 80 mg  80 mg PEG Tube Nightly Mahesh Ramos MD   80 mg at 11/16/22 2213    furosemide (LASIX) tablet 20 mg  20 mg PEG Tube Once per day on Mon Wed Fri Ladarius Hartley MD   20 mg at 11/16/22 1355    hydrALAZINE (APRESOLINE) tablet 50 mg  50 mg PEG Tube BID Mahesh Ramos MD   50 mg at 11/17/22 0903    metoprolol tartrate (LOPRESSOR) tablet 50 mg  50 mg PEG Tube BID Mahesh Ramos MD   50 mg at 11/17/22 8944    potassium bicarbonate (K-LYTE) disintegrating tablet 25 mEq  25 mEq PEG Tube Once per day on Mon Wed Fri Ladarius Hartley MD   25 mEq at 11/16/22 1357    enoxaparin (LOVENOX) injection 40 mg  40 mg SubCUTAneous Daily Mahesh Ramos MD   40 mg at 11/17/22 0904    acetaminophen (TYLENOL) 160 MG/5ML solution 650 mg  650 mg Oral Q4H PRN Caesar Howell DO   650 mg at 11/17/22 5661    aspirin chewable tablet 81 mg  81 mg PEG Tube Daily YULY Motta - CNP   81 mg at 11/17/22 0903    predniSONE (DELTASONE) tablet 40 mg  40 mg Oral Daily Jenifer Scott MD   40 mg at 11/17/22 1592    ipratropium-albuterol (DUONEB) nebulizer solution 1 ampule  1 ampule Inhalation TID Jenifer Scott MD   1 ampule at 11/17/22 0745    albuterol (PROVENTIL) nebulizer solution 2.5 mg  2.5 mg Nebulization Q2H PRN Jenifer Scott MD        acetaminophen (TYLENOL) suppository 650 mg  650 mg Rectal Q6H PRN Ari Cortez MD   650 mg at 11/14/22 1409    hydrALAZINE (APRESOLINE) injection 5 mg  5 mg IntraVENous Q4H PRN Ari Cortez MD   5 mg at 11/13/22 1549    ondansetron (ZOFRAN-ODT) disintegrating tablet 4 mg  4 mg Oral Q8H PRN Ari Cortez MD        Or    [facility] injection 4 mg  4 mg IntraVENous Q6H PRN Ari Cortez MD        polyethylene glycol (GLYCOLAX) packet 17 g  17 g Oral Daily PRN Ari Cortez MD        labetalol (NORMODYNE;TRANDATE) injection 10 mg  10 mg IntraVENous Q10 Min PRN Ari Cortez MD   10 mg at 11/13/22 1119    dextrose 5 % in lactated ringers infusion   IntraVENous Continuous Ari Cortez  mL/hr at 11/16/22 1955 Rate Verify at 11/16/22 1955    glucose chewable tablet 16 g  4 tablet Oral PRN Ari Cortez MD        dextrose bolus 10% 125 mL  125 mL IntraVENous PRGEOVANNA Cortez MD        Or    dextrose bolus 10% 250 mL  250 mL IntraVENous PRGEOVANNA Cortez MD        glucagon (rDNA) injection 1 mg  1 mg SubCUTAneous PRN Ari Cortez MD        dextrose 10 % infusion   IntraVENous Continuous PRGEOVANNA Cortez MD        insulin lispro (HUMALOG) injection vial 0-4 Units  0-4 Units SubCUTAneous Q4H Ari Cortez MD           PHYSICAL EXAM:    BP (!) 145/111   Pulse 67   Temp 98.2 °F (36.8 °C) (Axillary)   Resp 17   Ht 5' 3\" (1.6 m)   Wt 160 lb (72.6 kg)   LMP  (LMP Unknown)   SpO2 90%   BMI 28.34 kg/m²    General Appearance:      Skin:  normal  CVS - Normal sounds, No murmurs , No carotid Bruits  RS -CTA  Abdomen Soft, BS present  Review of Systems   Constitutional:  Negative for fever. HENT:  Positive for trouble swallowing. Negative for hearing loss. Eyes:  Negative for visual disturbance. Respiratory:  Negative for cough, choking, shortness of breath and wheezing. Cardiovascular:  Negative for chest pain and palpitations. Gastrointestinal:  Negative for nausea and vomiting. Musculoskeletal:  Positive for gait problem. Negative for back pain, neck pain and neck stiffness. Skin:  Negative for color change. Neurological:  Positive for facial asymmetry, speech difficulty and weakness. Negative for dizziness, tremors, seizures, syncope, light-headedness, numbness and headaches. Psychiatric/Behavioral:  Positive for confusion. Negative for behavioral problems, hallucinations and sleep disturbance. Mental Status Exam:             Level of Alertness:   awake            Orientation:   person, place,             Memory:   She has underlying baseline dementia          f            Language: Dysarthria and expressive aphasia      Funduscopic Exam:     Cranial Nerves            Cranial nerve III           Pupils:  equal, round, reactive to light      Cranial nerves III, IV, VI           Extraocular Movements: intact        Motor: Patient has underlying dementia and has hemiplegia in the right upper and lower extremity.   She is not able to swallow            Sensory: Unable to perform                 Vibration                         Touch            Proprioception                 Coordination: Unable to perform                    Reflexes:             Deep Tendon Reflexes:             Reflexes are 2 +             Plantar response:                Right:  downgoing               Left:  downgoing    Vascular:  Cardiac Exam:  normal         CTA HEAD W WO CONTRAST    Result Date: 11/12/2022  EXAMINATION: CTA OF THE NECK; CTA OF THE HEAD WITHOUT AND WITH CONTRAST 11/12/2022 7:57 am TECHNIQUE: CTA of the neck was performed with the administration of intravenous contrast. Multiplanar reformatted images are provided for review. MIP images are provided for review. Stenosis of the internal carotid arteries measured using NASCET criteria. Automated exposure control, iterative reconstruction, and/or weight based adjustment of the mA/kV was utilized to reduce the radiation dose to as low as reasonably achievable.; CTA of the head/brain was performed without and with the administration of intravenous contrast. Multiplanar reformatted images are provided for review. MIP images are provided for review. Automated exposure control, iterative reconstruction, and/or weight based adjustment of the mA/kV was utilized to reduce the radiation dose to as low as reasonably achievable. COMPARISON: CT brain earlier same day HISTORY: ORDERING SYSTEM PROVIDED HISTORY: right sided weakness TECHNOLOGIST PROVIDED HISTORY: Reason for exam:->right sided weakness What reading provider will be dictating this exam?->CRC FINDINGS: CTA NECK: AORTIC ARCH/ARCH VESSELS: Atherosclerotic calcifications are present within the aortic arch. The origins of the great vessels are normal.  There is no significant stenosis of the innominate or subclavian arteries. CAROTID ARTERIES: There is mild calcified atherosclerotic plaque within the left common carotid artery. There is no significant stenosis of the common carotid arteries identified. There is no significant stenosis of the internal carotid arteries. There is no dissection or pseudoaneurysm. VERTEBRAL ARTERIES: There is a mildly beaded appearance of the distal V2 and V3 segments of the vertebral arteries suggesting fibromuscular dysplasia. There is no dissection or pseudoaneurysm. There is no significant stenosis. SOFT TISSUES: Lung apices are clear. There is no superior mediastinal lymphadenopathy.   There is no cervical lymphadenopathy or soft tissue mass identified. The parotid glands and submandibular glands are normal. BONES: No lytic or blastic osseous lesions are identified. There is moderate multilevel neural foraminal narrowing and mild spinal canal stenosis from C4-C5 to C6-C7. CTA HEAD: ANTERIOR CIRCULATION: Atherosclerotic calcifications are present within the cavernous segments of the internal carotid arteries without significant stenosis evident. The anterior and middle cerebral arteries are normal. There is no significant stenosis or aneurysm evident. POSTERIOR CIRCULATION: The distal vertebral arteries are normal in appearance. The basilar artery is normal.  No significant stenosis or aneurysm is identified. The posterior cerebral arteries are normal in appearance. OTHER: No dural venous sinus thrombosis on this non-dedicated study. BRAIN: There is no mass effect or midline shift. There is no vascular malformation identified. 1. No large vessel occlusion, significant stenosis or cerebral aneurysm identified within the brain. 2. Atherosclerosis without significant arterial stenosis identified within the neck. 3. Beaded appearance of the distal vertebral arteries suggesting fibromuscular dysplasia. CT HEAD WO CONTRAST    Result Date: 11/13/2022  EXAMINATION: CT OF THE HEAD WITHOUT CONTRAST  11/13/2022 9:25 am TECHNIQUE: CT of the head was performed without the administration of intravenous contrast. Automated exposure control, iterative reconstruction, and/or weight based adjustment of the mA/kV was utilized to reduce the radiation dose to as low as reasonably achievable. COMPARISON: None. HISTORY: ORDERING SYSTEM PROVIDED HISTORY: eval for progression of blood layering within the occipital horns of the lateral ventricles TECHNOLOGIST PROVIDED HISTORY: Reason for exam:->eval for progression of blood layering within the occipital horns of the lateral ventricles Has a \"code stroke\" or \"stroke alert\" been called? ->No What reading provider will be dictating this exam?->CRC FINDINGS: BRAIN/VENTRICLES: Cerebral atrophy is noted globally. There is posterior trophic enlargement of the lateral ventricles similar to older examinations. There is a small amount of intermediate density hemorrhage in the occipital horns of the right and left ventricle similar in severity to 11/12/2022 MRI. 4th ventricle is patent. There is no shift of midline structures. No subarachnoid or subdural hemorrhage. ORBITS: The visualized portion of the orbits demonstrate no acute abnormality. SINUSES: Pilar bullosa of the left middle nasal turbinate. Right deviation of the nasal septum which appears chronic. Mild mucosal thickening in the sphenoid sinuses. Small air-fluid level in the inferior left frontal sinus. Mastoid air cells are clear. SOFT TISSUES/SKULL:  No acute abnormality of the visualized skull or soft tissues. 1.  Stable minimal layering hemorrhage in the occipital horns of the right and left lateral ventricle. No change from MRI dated 11/12/2022. 2.  Findings compatible with chronic paranasal sinusitis, mild. CT HEAD WO CONTRAST    Result Date: 11/12/2022  EXAMINATION: CT OF THE HEAD WITHOUT CONTRAST  11/12/2022 10:31 am TECHNIQUE: CT of the head was performed without the administration of intravenous contrast. Automated exposure control, iterative reconstruction, and/or weight based adjustment of the mA/kV was utilized to reduce the radiation dose to as low as reasonably achievable. COMPARISON: 11/12/2022 HISTORY: ORDERING SYSTEM PROVIDED HISTORY: eval for hmg conversion TECHNOLOGIST PROVIDED HISTORY: Reason for exam:->eval for hmg conversion Has a \"code stroke\" or \"stroke alert\" been called? ->No What reading provider will be dictating this exam?->CRC FINDINGS: BRAIN/VENTRICLES: Generalized atrophy identified of the brain.   Some low-attenuation areas identified in the periventricular and subcortical white matter to suggest chronic small vessel ischemic change. There is no acute intracranial hemorrhage, mass effect or midline shift. No abnormal extra-axial fluid collection. The gray-white differentiation is maintained without evidence of an acute infarct. There is no evidence of hydrocephalus. ORBITS: The visualized portion of the orbits demonstrate no acute abnormality. SINUSES: The visualized paranasal sinuses and mastoid air cells demonstrate no acute abnormality. SOFT TISSUES/SKULL:  No acute abnormality of the visualized skull or soft tissues. Atrophy and chronic changes seen within the brain with no acute intracranial abnormality. CT HEAD WO CONTRAST    Result Date: 11/12/2022  EXAMINATION: CT OF THE HEAD WITHOUT CONTRAST  11/12/2022 4:34 am TECHNIQUE: CT of the head was performed without the administration of intravenous contrast. Automated exposure control, iterative reconstruction, and/or weight based adjustment of the mA/kV was utilized to reduce the radiation dose to as low as reasonably achievable. COMPARISON: None. HISTORY: ORDERING SYSTEM PROVIDED HISTORY: fall TECHNOLOGIST PROVIDED HISTORY: Reason for exam:->fall Has a \"code stroke\" or \"stroke alert\" been called? ->No Decision Support Exception - unselect if not a suspected or confirmed emergency medical condition->Emergency Medical Condition (MA) What reading provider will be dictating this exam?->CRC FINDINGS: BRAIN/VENTRICLES: There is no acute intracranial hemorrhage, mass effect or midline shift. No abnormal extra-axial fluid collection. The gray-white differentiation is maintained without evidence of an acute infarct. There is no evidence of hydrocephalus. The ventricles, cisterns and sulci are prominent consistent with atrophy. There is decreased attenuation within the periventricular white matter consistent with periventricular leukomalacia. ORBITS: The visualized portion of the orbits demonstrate no acute abnormality.  SINUSES: The visualized paranasal sinuses and mastoid air cells demonstrate no acute abnormality. SOFT TISSUES/SKULL:  No acute abnormality of the visualized skull or soft tissues. 1. There is no acute intracranial abnormality. Specifically, there is no intracranial hemorrhage. 2. Atrophy and periventricular leukomalacia,     CTA NECK W WO CONTRAST    Result Date: 11/12/2022  EXAMINATION: CTA OF THE NECK; CTA OF THE HEAD WITHOUT AND WITH CONTRAST 11/12/2022 7:57 am TECHNIQUE: CTA of the neck was performed with the administration of intravenous contrast. Multiplanar reformatted images are provided for review. MIP images are provided for review. Stenosis of the internal carotid arteries measured using NASCET criteria. Automated exposure control, iterative reconstruction, and/or weight based adjustment of the mA/kV was utilized to reduce the radiation dose to as low as reasonably achievable.; CTA of the head/brain was performed without and with the administration of intravenous contrast. Multiplanar reformatted images are provided for review. MIP images are provided for review. Automated exposure control, iterative reconstruction, and/or weight based adjustment of the mA/kV was utilized to reduce the radiation dose to as low as reasonably achievable. COMPARISON: CT brain earlier same day HISTORY: ORDERING SYSTEM PROVIDED HISTORY: right sided weakness TECHNOLOGIST PROVIDED HISTORY: Reason for exam:->right sided weakness What reading provider will be dictating this exam?->CRC FINDINGS: CTA NECK: AORTIC ARCH/ARCH VESSELS: Atherosclerotic calcifications are present within the aortic arch. The origins of the great vessels are normal.  There is no significant stenosis of the innominate or subclavian arteries. CAROTID ARTERIES: There is mild calcified atherosclerotic plaque within the left common carotid artery. There is no significant stenosis of the common carotid arteries identified. There is no significant stenosis of the internal carotid arteries.  There is no dissection or pseudoaneurysm. VERTEBRAL ARTERIES: There is a mildly beaded appearance of the distal V2 and V3 segments of the vertebral arteries suggesting fibromuscular dysplasia. There is no dissection or pseudoaneurysm. There is no significant stenosis. SOFT TISSUES: Lung apices are clear. There is no superior mediastinal lymphadenopathy. There is no cervical lymphadenopathy or soft tissue mass identified. The parotid glands and submandibular glands are normal. BONES: No lytic or blastic osseous lesions are identified. There is moderate multilevel neural foraminal narrowing and mild spinal canal stenosis from C4-C5 to C6-C7. CTA HEAD: ANTERIOR CIRCULATION: Atherosclerotic calcifications are present within the cavernous segments of the internal carotid arteries without significant stenosis evident. The anterior and middle cerebral arteries are normal. There is no significant stenosis or aneurysm evident. POSTERIOR CIRCULATION: The distal vertebral arteries are normal in appearance. The basilar artery is normal.  No significant stenosis or aneurysm is identified. The posterior cerebral arteries are normal in appearance. OTHER: No dural venous sinus thrombosis on this non-dedicated study. BRAIN: There is no mass effect or midline shift. There is no vascular malformation identified. 1. No large vessel occlusion, significant stenosis or cerebral aneurysm identified within the brain. 2. Atherosclerosis without significant arterial stenosis identified within the neck. 3. Beaded appearance of the distal vertebral arteries suggesting fibromuscular dysplasia. XR CHEST PORTABLE    Result Date: 11/12/2022  EXAMINATION: ONE XRAY VIEW OF THE CHEST 11/12/2022 4:12 am COMPARISON: None.  HISTORY: ORDERING SYSTEM PROVIDED HISTORY: incresed resp rate TECHNOLOGIST PROVIDED HISTORY: Reason for exam:->incresed resp rate What reading provider will be dictating this exam?->CRC FINDINGS: The cardiomediastinal silhouette is at the upper limits of normal for technique. There are low lung volumes with bronchovascular crowding and bibasilar atelectasis. No pneumothorax or pleural effusion. Bilateral shoulder arthroplasties. Bibasilar atelectasis. MRI brain without contrast    Result Date: 11/12/2022  EXAMINATION: MRI OF THE BRAIN WITHOUT CONTRAST  11/12/2022 1:48 pm TECHNIQUE: Multiplanar multisequence MRI of the brain was performed without the administration of intravenous contrast. COMPARISON: None. HISTORY: ORDERING SYSTEM PROVIDED HISTORY: TIA TECHNOLOGIST PROVIDED HISTORY: Reason for exam:->TIA What is the sedation requirement?->None What reading provider will be dictating this exam?->CRC Initial evaluation. FINDINGS: INTRACRANIAL STRUCTURES/VENTRICLES: There is an acute infarct within the left inferior ahsan (DWI series 3, image 8). No mass effect or midline shift. A trace amount of blood is seen layering within the occipital horns of the lateral ventricles bilaterally. Areas of T2 FLAIR hyperintensity are seen in the periventricular and subcortical white matter, which are nonspecific, but may represent chronic microvascular ischemic change. There is prominence of the ventricles and sulci due to global parenchymal volume loss. The sellar/suprasellar regions appear unremarkable. There is at least partial loss of the normal signal void within the right vertebral artery. ORBITS: The visualized portion of the orbits demonstrate no acute abnormality. SINUSES: The visualized paranasal sinuses and mastoid air cells demonstrate no acute abnormality. BONES/SOFT TISSUES: The bone marrow signal intensity appears normal. The soft tissues demonstrate no acute abnormality. 1. Small acute infarct involving the left inferior ahsan. No mass effect or midline shift. 2. Trace amount of blood layering within the occipital horns of the lateral ventricles bilaterally.  3. At least partial loss of the normal signal void within the right vertebral artery, which can be seen with slow flow/occlusion. This is of uncertain chronicity. 4. Otherwise, no acute intracranial abnormality. 5. Moderate global parenchymal volume loss with mild chronic microvascular ischemic changes. These results were sent to the [facility] [id number] ([facility]) on 11/12/2022 at 2:22 pm to be communicated to the referring/covering health care provider/office. Recent Labs     11/15/22  0552 11/16/22  0503 11/17/22  0512   WBC 7.7 8.8 12.4*   HGB 13.2 13.0 12.5    181 163       Recent Labs     11/15/22  0552 11/16/22  0503 11/17/22  0512    141 139   K 3.5 3.7 3.9   * 105 103   CO2 25 24 26   BUN 7* 11 14   CREATININE 0.72 0.59 0.53   GLUCOSE 115* 110* 131*       No results for input(s): BILITOT, ALKPHOS, AST, ALT in the last 72 hours. Lab Results   Component Value Date/Time    PROTIME 12.8 11/12/2022 11:23 AM    PROTIME 10.7 04/23/2012 11:12 AM    INR 1.0 11/12/2022 11:23 AM     No results found for: LITHIUM, DILFRTOT, VALPROATE    ASSESSMENT AND PLAN  Left pontine stroke with hemiplegia on the right upper and lower extremity. Patient has dysphagia as well. Patient has vertebral artery narrowing stenosis and may have fibromuscular dysplasia in this area. We were contemplating heparin and had just given her for few minutes till MRI results came back with some layering of hemorrhage. Repeat CT shows some minor layering of hemorrhage though this could be calcification. Hold aspirin for few days findings discussed the daughter that it is very difficult ascertain what the outcome is going to be. Of note her symptoms started much earlier and this was a wake-up stroke. Patient has history suggestion  of dementia has been seen by other providers. 11/14/2022:  Left pontine ischemic CVA with ventricular hemorrhage repeat CT of the head done 11/13/2022 stable.   Some increasing right-sided weakness today therefore we will repeat CT of the head given its location and propensity for evolution and hemorrhage. Vertebral artery fibromuscular dysplasia  LDL 82  I have personally performed a face to face diagnostic evaluation on this patient, reviewed all data and investigations, and am the sole provider of all clinical decisions on the neurological status of this patient. as noted above, await PEG and rehab, 60% time spent       Kenn Medrano MD, [address], American Board of Psychiatry & Neurology  Board Certified in Vascular Neurology  Board Certified in Neuromuscular Medicine  Certified in Neurorehabilitation     Hemoglobin A1c 5.9  Dysphagia, remains n.p.o. palliative care is following for discussion regarding alternate means of nutrition. Hypertension, ongoing, needs ongoing attention to blood pressure control. 11/15/2022:  Repeat CT of the head negative for acute findings yesterday  Dysphagia, remains n.p.o. with plans for PEG tube placement. Plan to initiate antiplatelet therapy tomorrow once PEG tube placed. I have personally performed a face to face diagnostic evaluation on this patient, reviewed all data and investigations, and am the sole provider of all clinical decisions on the neurological status of this patient. dysphagia, Will need PEG, prognosis caanot be ascertained. 60 % time spent       11/16/22:  PEG tube placed today. Initiate aspirin 81 mg daily    I have personally performed a face to face diagnostic evaluation on this patient, reviewed all data and investigations, and am the sole provider of all clinical decisions on the neurological status of this patient.right weakness, pontine cva  Needs rehab, not [facility] 60% time spent eval     11/17/22:  Left pontine ischemic CVA resulting in right-sided weakness, dysarthria and dysphagia. Status post PEG tube placement. Aspirin 81 mg daily has been initiated. Okay to DC from neurology standpoint. Plans for discharge to skilled nursing facility. Follow-up 4 to 6 weeks.

## 2022-11-17 NOTE — PROGRESS NOTES
Hospitalist Progress Note      PCP: Betty Swenson, APRN - CNP    Date of Admission: 11/12/2022    Chief Complaint:    Chief Complaint   Patient presents with    Fall       Subjective:  No acute events overnight. PEG placed 33/29, no complications      Medications:  Reviewed    Infusion Medications    dextrose       Scheduled Medications    metoprolol tartrate  100 mg PEG Tube BID    losartan  25 mg PEG Tube Daily    atorvastatin  80 mg PEG Tube Nightly    furosemide  20 mg PEG Tube Once per day on Mon Wed Fri    hydrALAZINE  50 mg PEG Tube BID    potassium bicarbonate  25 mEq PEG Tube Once per day on Mon Wed Fri    enoxaparin  40 mg SubCUTAneous Daily    aspirin  81 mg PEG Tube Daily    predniSONE  40 mg Oral Daily    ipratropium-albuterol  1 ampule Inhalation TID    insulin lispro  0-4 Units SubCUTAneous Q4H     PRN Meds: acetaminophen, albuterol, acetaminophen, hydrALAZINE, ondansetron **OR** ondansetron, polyethylene glycol, labetalol, glucose, dextrose bolus **OR** dextrose bolus, glucagon (rDNA), dextrose      Intake/Output Summary (Last 24 hours) at 11/17/2022 1616  Last data filed at 11/16/2022 1955  Gross per 24 hour   Intake 6586.34 ml   Output 800 ml   Net 5786.34 ml         Exam:    BP (!) 132/46   Pulse 58   Temp 99.1 °F (37.3 °C)   Resp 18   Ht 5' 3\" (1.6 m)   Wt 160 lb (72.6 kg)   LMP  (LMP Unknown)   SpO2 93%   BMI 28.34 kg/m²     General appearance: Elderly female reclining in bed in NAD.  and daughter present at bedside. HEENT:  MANUELA, EOMI. Neck: Trachea midline, slightly dysarthric speech, but clearer than previously. Cardiovascular: Regular rate and rhythm  Respiratory:  Normal respiratory effort. Clear to auscultation. Abdomen: Soft, non-distended with normal bowel sounds. New PEG in place, with overlying abdominal binder C/D/I  Neuro: Somnolent but oriented to self and family. Moves left side to command.         Labs:   Recent Labs     11/15/22  7334 11/16/22  0503 11/17/22  0512   WBC 7.7 8.8 12.4*   HGB 13.2 13.0 12.5   HCT 40.5 40.4 38.1    181 163       Recent Labs     11/15/22  0552 11/16/22  0503 11/17/22  0512    141 139   K 3.5 3.7 3.9   * 105 103   CO2 25 24 26   BUN 7* 11 14   CREATININE 0.72 0.59 0.53   CALCIUM 8.9 8.9 8.8       No results for input(s): AST, ALT, BILIDIR, BILITOT, ALKPHOS in the last 72 hours. No results for input(s): INR in the last 72 hours. No results for input(s): Donzella Rands in the last 72 hours. Urinalysis:      Lab Results   Component Value Date/Time    NITRU Negative 11/12/2022 03:45 AM    WBCUA 6-9 11/12/2022 03:45 AM    BACTERIA Negative 11/12/2022 03:45 AM    RBCUA 0-2 11/12/2022 03:45 AM    BLOODU Negative 11/12/2022 03:45 AM    SPECGRAV 1.014 11/12/2022 03:45 AM    GLUCOSEU Negative 11/12/2022 03:45 AM    GLUCOSEU NEG 10/17/2011 10:15 AM       Radiology:  CT HEAD WO CONTRAST   Final Result   Stable discrete intraventricular hemorrhage since recent studies. CT HEAD WO CONTRAST   Final Result   1. Stable minimal layering hemorrhage in the occipital horns of the right   and left lateral ventricle. No change from MRI dated 11/12/2022.      2.  Findings compatible with chronic paranasal sinusitis, mild. MRI brain without contrast   Final Result   1. Small acute infarct involving the left inferior ahsan. No mass effect or   midline shift. 2. Trace amount of blood layering within the occipital horns of the lateral   ventricles bilaterally. 3. At least partial loss of the normal signal void within the right vertebral   artery, which can be seen with slow flow/occlusion. This is of uncertain   chronicity. 4. Otherwise, no acute intracranial abnormality. 5. Moderate global parenchymal volume loss with mild chronic microvascular   ischemic changes.    These results were sent to the [facility] [id number] ([address] [place] [address]) on   11/12/2022 at 2:22 pm to be communicated to the referring/covering health   care provider/office. CT HEAD WO CONTRAST   Final Result   Atrophy and chronic changes seen within the brain with no acute intracranial   abnormality. CTA HEAD W WO CONTRAST   Final Result   1. No large vessel occlusion, significant stenosis or cerebral aneurysm   identified within the brain. 2. Atherosclerosis without significant arterial stenosis identified within   the neck. 3. Beaded appearance of the distal vertebral arteries suggesting   fibromuscular dysplasia. CTA NECK W WO CONTRAST   Final Result   1. No large vessel occlusion, significant stenosis or cerebral aneurysm   identified within the brain. 2. Atherosclerosis without significant arterial stenosis identified within   the neck. 3. Beaded appearance of the distal vertebral arteries suggesting   fibromuscular dysplasia. CT HEAD WO CONTRAST   Final Result   1. There is no acute intracranial abnormality. Specifically, there is no   intracranial hemorrhage. 2. Atrophy and periventricular leukomalacia,         XR CHEST PORTABLE   Final Result   Bibasilar atelectasis. XR CHEST (2 VW)    (Results Pending)       Assessment/Plan:    Acute stroke POA:  Repeat CT scan today given the MRI findings of layering of blood; unable to be on heparin for this reason; neuro is managing; PT/OT; tight blood control. Tylenol suppository PRN pain. 11/14: Consult to IR for PEG placement per  wishes following discussion, but IR apparently out of area for next several days. Follow up consult placed to [facility] for PEG consideration. 11/15: Planned for PEG placement with Colorectal Surgery next AM.  Neurology planning for antiplatelet monotherapy resumption after PEG placed. Continue therapies. 11/16: PEG successfully placed this morning. Dietary consult for PEG feeding recommendations. Monitor overnight.   Pending precertification to [employer] for further therapy following discharge. HTN/HLD:  Continue home meds  COPD:  Not in an exacerebation  Chronic diastolic CHF:  Euvolemic on exams  Functional Status: Fall precautions. Up with assistance. PT OT  Diet: Cardiac   DVT ppx: Heparin  Disposition: Dependent on hospital course. Will discharge once medically stable. SW on board for discharge planning. Active Hospital Problems    Diagnosis Date Noted    Cerebrovascular accident (CVA) due to stenosis of left middle cerebral artery [facility]) [I63.512] 11/12/2022     Priority: High    Aphasia [R47.01] 11/15/2022     Priority: Medium    TIA (transient ischemic attack) [G45.9] 11/15/2022     Priority: Medium    Nocturnal hypoxia [G47.34] 11/15/2022     Priority: Medium    Sleep apnea [G47.30] 11/15/2022     Priority: Medium    Dysphagia [R13.10] 11/14/2022     Priority: Medium    Cerebrovascular accident (CVA) due to stenosis of left vertebral artery ([facility] [address] [I63.212] 11/14/2022     Priority: Medium    Palliative care encounter [Z51.5] 11/14/2022     Priority: Medium    Advanced care planning/counseling discussion [Z71.89] 11/14/2022     Priority: Medium    Goals of care, counseling/discussion [Z71.89] 11/14/2022     Priority: Medium    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 [facility]) [I69.351] 11/12/2022     Priority: Medium    Fibromuscular dysplasia ([facility] [address] [I77.3] 11/12/2022     Priority: Medium    Impaired mobility and activities of daily living dt CVA [Z74.09, Z78.9] 11/12/2022     Priority: Medium    Acute CVA (cerebrovascular accident) [facility]) [I63.9] 11/12/2022     Priority: Medium    Hypertension [I10]     COPD (chronic obstructive pulmonary disease) ([facility] [address] [J44.9]         Additional work up or/and treatment plan may be added today or then after based on clinical progression. I am managing a portion of pt care. Some medical issues are handled by other specialists.  Additional work up and treatment should be done in out pt setting by pt PCP and other out pt providers. In addition to examining and evaluating pt, I spent additional time explaining care, normal and abnormal findings, and treatment plan. All of pt questions were answered. Counseling, diet and education were  provided. Case will be discussed with nursing staff when appropriate. Family will be updated if and when appropriate. Diet: Diet NPO  ADULT TUBE FEEDING; PEG; Standard with Fiber; Continuous; 25; Yes; 25; Q 12 hours; 50; 30;  Q 6 hours    Code Status: Full Code    PT/OT Eval     Electronically signed by Marcia Wang DO on 11/17/2022 at 4:16 PM

## 2022-11-17 NOTE — PROGRESS NOTES
INPATIENT PROGRESS NOTES    PATIENT NAME: Miguel Alvarez  MRN: [id number]  SERVICE DATE:  2022   SERVICE TIME:  3:54 PM      PRIMARY SERVICE: Pulmonary Disease    CHIEF COMPLAINTS: COPD exacerbation    INTERVAL HPI: Patient seen and examined at bedside, Interval Notes, orders reviewed. Nursing notes noted    Doing better, no chest pain, no shortness of breath, no coughing, she is on 4 L O2 saturation 93%. Review of system:     GI Abdominal pain No  Skin Rash No    Social History     Tobacco Use    Smoking status: Former     Packs/day: 1.00     Years: 20.00     Pack years: 20.00     Types: Cigarettes     Quit date:      Years since quittin.9    Smokeless tobacco: Never   Substance Use Topics    Alcohol use: No         Problem Relation Age of Onset    Arthritis Father     Other Father         gout    Heart Failure Father     Cancer Sister          OBJECTIVE    Body mass index is 28.34 kg/m². PHYSICAL EXAM:  Vitals:  BP (!) 132/46   Pulse 58   Temp 99.1 °F (37.3 °C)   Resp 18   Ht 5' 3\" (1.6 m)   Wt 160 lb (72.6 kg)   LMP  (LMP Unknown)   SpO2 93%   BMI 28.34 kg/m²     General: alert, cooperative, no distress  Head: normocephalic, atraumatic  Eyes:No gross abnormalities. ENT:  MMM no lesions  Neck:  supple and no masses  Chest : Improved air movement compared to yesterday, no wheezing, no rales, nontender, tympanic  Heart[de-identified] Heart sounds are normal.  Regular rate and rhythm without murmur, gallop or rub. ABD:  symmetric, soft, non-tender  Musculoskeletal : no cyanosis, no clubbing, and no edema  Neuro:  Grossly normal  Skin: No rashes or nodules noted.   Lymph node:  no cervical nodes  Urology: No [place]   Psychiatric: appropriate    DATA:   Recent Labs     22  0503 22  0512   WBC 8.8 12.4*   HGB 13.0 12.5   HCT 40.4 38.1   MCV 97.8* 95.6*    163       Recent Labs     22  0503 22  0512    139   K 3.7 3.9    103   CO2 24 26   BUN 11 14 CREATININE 0.59 0.53   GLUCOSE 110* 131*   CALCIUM 8.9 8.8   LABGLOM >60.0 >60.0         MV Settings:          No results for input(s): PHART, DQR2GMA, PO2ART, CEC3BCK, BEART, C2NLTTAB in the last 72 hours. O2 Device: Nasal cannula  O2 Flow Rate (L/min): 4 L/min    Diet NPO  ADULT TUBE FEEDING; PEG; Standard with Fiber; Continuous; 25; Yes; 25; Q 12 hours; 50; 30; Q 6 hours     MEDICATIONS during current hospitalization:    Continuous Infusions:   dextrose         Scheduled Meds:   cloNIDine  1 patch TransDERmal Weekly    losartan  25 mg PEG Tube Daily    atorvastatin  80 mg PEG Tube Nightly    furosemide  20 mg PEG Tube Once per day on Mon Wed Fri    hydrALAZINE  50 mg PEG Tube BID    metoprolol tartrate  50 mg PEG Tube BID    potassium bicarbonate  25 mEq PEG Tube Once per day on Mon Wed Fri    enoxaparin  40 mg SubCUTAneous Daily    aspirin  81 mg PEG Tube Daily    predniSONE  40 mg Oral Daily    ipratropium-albuterol  1 ampule Inhalation TID    insulin lispro  0-4 Units SubCUTAneous Q4H       PRN Meds:acetaminophen, albuterol, acetaminophen, hydrALAZINE, ondansetron **OR** ondansetron, polyethylene glycol, labetalol, glucose, dextrose bolus **OR** dextrose bolus, glucagon (rDNA), dextrose    Radiology  CTA HEAD W WO CONTRAST    Result Date: 11/14/2022  EXAMINATION: CTA OF THE NECK; CTA OF THE HEAD WITHOUT AND WITH CONTRAST 11/12/2022 7:57 am TECHNIQUE: CTA of the neck was performed with the administration of intravenous contrast. Multiplanar reformatted images are provided for review. MIP images are provided for review. Stenosis of the internal carotid arteries measured using NASCET criteria.  Automated exposure control, iterative reconstruction, and/or weight based adjustment of the mA/kV was utilized to reduce the radiation dose to as low as reasonably achievable.; CTA of the head/brain was performed without and with the administration of intravenous contrast. Multiplanar reformatted images are provided for review. MIP images are provided for review. Automated exposure control, iterative reconstruction, and/or weight based adjustment of the mA/kV was utilized to reduce the radiation dose to as low as reasonably achievable. COMPARISON: CT brain earlier same day HISTORY: ORDERING SYSTEM PROVIDED HISTORY: right sided weakness TECHNOLOGIST PROVIDED HISTORY: Reason for exam:->right sided weakness What reading provider will be dictating this exam?->CRC FINDINGS: CTA NECK: AORTIC ARCH/ARCH VESSELS: Atherosclerotic calcifications are present within the aortic arch. The origins of the great vessels are normal.  There is no significant stenosis of the innominate or subclavian arteries. CAROTID ARTERIES: There is mild calcified atherosclerotic plaque within the left common carotid artery. There is no significant stenosis of the common carotid arteries identified. There is no significant stenosis of the internal carotid arteries. There is no dissection or pseudoaneurysm. VERTEBRAL ARTERIES: There is a mildly beaded appearance of the distal V2 and V3 segments of the vertebral arteries suggesting fibromuscular dysplasia. There is no dissection or pseudoaneurysm. There is no significant stenosis. SOFT TISSUES: Lung apices are clear. There is no superior mediastinal lymphadenopathy. There is no cervical lymphadenopathy or soft tissue mass identified. The parotid glands and submandibular glands are normal. BONES: No lytic or blastic osseous lesions are identified. There is moderate multilevel neural foraminal narrowing and mild spinal canal stenosis from C4-C5 to C6-C7. CTA HEAD: ANTERIOR CIRCULATION: Atherosclerotic calcifications are present within the cavernous segments of the internal carotid arteries without significant stenosis evident. The anterior and middle cerebral arteries are normal. There is no significant stenosis or aneurysm evident.  POSTERIOR CIRCULATION: The distal vertebral arteries are normal in appearance. The basilar artery is normal.  No significant stenosis or aneurysm is identified. The posterior cerebral arteries are normal in appearance. OTHER: No dural venous sinus thrombosis on this non-dedicated study. BRAIN: There is no mass effect or midline shift. There is no vascular malformation identified. 1. No large vessel occlusion, significant stenosis or cerebral aneurysm identified within the brain. 2. Atherosclerosis without significant arterial stenosis identified within the neck. 3. Beaded appearance of the distal vertebral arteries suggesting fibromuscular dysplasia. CT HEAD WO CONTRAST    Result Date: 11/14/2022  EXAMINATION: CT OF THE HEAD WITHOUT CONTRAST  11/14/2022 12:00 pm TECHNIQUE: CT of the head was performed without the administration of intravenous contrast. Automated exposure control, iterative reconstruction, and/or weight based adjustment of the mA/kV was utilized to reduce the radiation dose to as low as reasonably achievable. COMPARISON: Reviewed the previous MRI of the brain dated November 12. Reviewed the previous CT brain back to November 12. HISTORY: ORDERING SYSTEM PROVIDED HISTORY: cva, ventricular hemorrhage TECHNOLOGIST PROVIDED HISTORY: Reason for exam:->cva, ventricular hemorrhage Has a \"code stroke\" or \"stroke alert\" been called? ->No What reading provider will be dictating this exam?->CRC FINDINGS: Peripheral CSF space ventricular system correlates with the age group. There is no focal mass effect or midline shift. There is no evidence for a sizable area of a new acute recent insult in progression to the brain parenchyma. Discrete the blood CSF fluid level is seen in the dependent portion of the right left lateral ventricles more noticeable on the left. These were seen previously. These are stable over time. There is no further increase in intraventricular hemorrhagic component.   The hyperdensity of the blood component has mild diminished since the previous study indicated more late acute phase/early subacute phase. Stable discrete intraventricular hemorrhage since recent studies. CT HEAD WO CONTRAST    Result Date: 11/13/2022  EXAMINATION: CT OF THE HEAD WITHOUT CONTRAST  11/13/2022 9:25 am TECHNIQUE: CT of the head was performed without the administration of intravenous contrast. Automated exposure control, iterative reconstruction, and/or weight based adjustment of the mA/kV was utilized to reduce the radiation dose to as low as reasonably achievable. COMPARISON: None. HISTORY: ORDERING SYSTEM PROVIDED HISTORY: eval for progression of blood layering within the occipital horns of the lateral ventricles TECHNOLOGIST PROVIDED HISTORY: Reason for exam:->eval for progression of blood layering within the occipital horns of the lateral ventricles Has a \"code stroke\" or \"stroke alert\" been called? ->No What reading provider will be dictating this exam?->CRC FINDINGS: BRAIN/VENTRICLES: Cerebral atrophy is noted globally. There is posterior trophic enlargement of the lateral ventricles similar to older examinations. There is a small amount of intermediate density hemorrhage in the occipital horns of the right and left ventricle similar in severity to 11/12/2022 MRI. 4th ventricle is patent. There is no shift of midline structures. No subarachnoid or subdural hemorrhage. ORBITS: The visualized portion of the orbits demonstrate no acute abnormality. SINUSES: Pilar bullosa of the left middle nasal turbinate. Right deviation of the nasal septum which appears chronic. Mild mucosal thickening in the sphenoid sinuses. Small air-fluid level in the inferior left frontal sinus. Mastoid air cells are clear. SOFT TISSUES/SKULL:  No acute abnormality of the visualized skull or soft tissues. 1.  Stable minimal layering hemorrhage in the occipital horns of the right and left lateral ventricle.   No change from MRI dated 11/12/2022. 2.  Findings compatible with chronic paranasal sinusitis, mild. CT HEAD WO CONTRAST    Result Date: 11/12/2022  EXAMINATION: CT OF THE HEAD WITHOUT CONTRAST  11/12/2022 10:31 am TECHNIQUE: CT of the head was performed without the administration of intravenous contrast. Automated exposure control, iterative reconstruction, and/or weight based adjustment of the mA/kV was utilized to reduce the radiation dose to as low as reasonably achievable. COMPARISON: 11/12/2022 HISTORY: ORDERING SYSTEM PROVIDED HISTORY: eval for hmg conversion TECHNOLOGIST PROVIDED HISTORY: Reason for exam:->eval for hmg conversion Has a \"code stroke\" or \"stroke alert\" been called? ->No What reading provider will be dictating this exam?->CRC FINDINGS: BRAIN/VENTRICLES: Generalized atrophy identified of the brain. Some low-attenuation areas identified in the periventricular and subcortical white matter to suggest chronic small vessel ischemic change. There is no acute intracranial hemorrhage, mass effect or midline shift. No abnormal extra-axial fluid collection. The gray-white differentiation is maintained without evidence of an acute infarct. There is no evidence of hydrocephalus. ORBITS: The visualized portion of the orbits demonstrate no acute abnormality. SINUSES: The visualized paranasal sinuses and mastoid air cells demonstrate no acute abnormality. SOFT TISSUES/SKULL:  No acute abnormality of the visualized skull or soft tissues. Atrophy and chronic changes seen within the brain with no acute intracranial abnormality. CT HEAD WO CONTRAST    Result Date: 11/12/2022  EXAMINATION: CT OF THE HEAD WITHOUT CONTRAST  11/12/2022 4:34 am TECHNIQUE: CT of the head was performed without the administration of intravenous contrast. Automated exposure control, iterative reconstruction, and/or weight based adjustment of the mA/kV was utilized to reduce the radiation dose to as low as reasonably achievable. COMPARISON: None.  HISTORY: ORDERING SYSTEM PROVIDED HISTORY: fall TECHNOLOGIST PROVIDED HISTORY: Reason for exam:->fall Has a \"code stroke\" or \"stroke alert\" been called? ->No Decision Support Exception - unselect if not a suspected or confirmed emergency medical condition->Emergency Medical Condition (MA) What reading provider will be dictating this exam?->CRC FINDINGS: BRAIN/VENTRICLES: There is no acute intracranial hemorrhage, mass effect or midline shift. No abnormal extra-axial fluid collection. The gray-white differentiation is maintained without evidence of an acute infarct. There is no evidence of hydrocephalus. The ventricles, cisterns and sulci are prominent consistent with atrophy. There is decreased attenuation within the periventricular white matter consistent with periventricular leukomalacia. ORBITS: The visualized portion of the orbits demonstrate no acute abnormality. SINUSES: The visualized paranasal sinuses and mastoid air cells demonstrate no acute abnormality. SOFT TISSUES/SKULL:  No acute abnormality of the visualized skull or soft tissues. 1. There is no acute intracranial abnormality. Specifically, there is no intracranial hemorrhage. 2. Atrophy and periventricular leukomalacia,     CTA NECK W WO CONTRAST    Result Date: 11/14/2022  EXAMINATION: CTA OF THE NECK; CTA OF THE HEAD WITHOUT AND WITH CONTRAST 11/12/2022 7:57 am TECHNIQUE: CTA of the neck was performed with the administration of intravenous contrast. Multiplanar reformatted images are provided for review. MIP images are provided for review. Stenosis of the internal carotid arteries measured using NASCET criteria. Automated exposure control, iterative reconstruction, and/or weight based adjustment of the mA/kV was utilized to reduce the radiation dose to as low as reasonably achievable.; CTA of the head/brain was performed without and with the administration of intravenous contrast. Multiplanar reformatted images are provided for review. MIP images are provided for review.  Automated exposure control, iterative reconstruction, and/or weight based adjustment of the mA/kV was utilized to reduce the radiation dose to as low as reasonably achievable. COMPARISON: CT brain earlier same day HISTORY: ORDERING SYSTEM PROVIDED HISTORY: right sided weakness TECHNOLOGIST PROVIDED HISTORY: Reason for exam:->right sided weakness What reading provider will be dictating this exam?->CRC FINDINGS: CTA NECK: AORTIC ARCH/ARCH VESSELS: Atherosclerotic calcifications are present within the aortic arch. The origins of the great vessels are normal.  There is no significant stenosis of the innominate or subclavian arteries. CAROTID ARTERIES: There is mild calcified atherosclerotic plaque within the left common carotid artery. There is no significant stenosis of the common carotid arteries identified. There is no significant stenosis of the internal carotid arteries. There is no dissection or pseudoaneurysm. VERTEBRAL ARTERIES: There is a mildly beaded appearance of the distal V2 and V3 segments of the vertebral arteries suggesting fibromuscular dysplasia. There is no dissection or pseudoaneurysm. There is no significant stenosis. SOFT TISSUES: Lung apices are clear. There is no superior mediastinal lymphadenopathy. There is no cervical lymphadenopathy or soft tissue mass identified. The parotid glands and submandibular glands are normal. BONES: No lytic or blastic osseous lesions are identified. There is moderate multilevel neural foraminal narrowing and mild spinal canal stenosis from C4-C5 to C6-C7. CTA HEAD: ANTERIOR CIRCULATION: Atherosclerotic calcifications are present within the cavernous segments of the internal carotid arteries without significant stenosis evident. The anterior and middle cerebral arteries are normal. There is no significant stenosis or aneurysm evident. POSTERIOR CIRCULATION: The distal vertebral arteries are normal in appearance.   The basilar artery is normal.  No significant stenosis or aneurysm is identified. The posterior cerebral arteries are normal in appearance. OTHER: No dural venous sinus thrombosis on this non-dedicated study. BRAIN: There is no mass effect or midline shift. There is no vascular malformation identified. 1. No large vessel occlusion, significant stenosis or cerebral aneurysm identified within the brain. 2. Atherosclerosis without significant arterial stenosis identified within the neck. 3. Beaded appearance of the distal vertebral arteries suggesting fibromuscular dysplasia. XR CHEST PORTABLE    Result Date: 11/12/2022  EXAMINATION: ONE XRAY VIEW OF THE CHEST 11/12/2022 4:12 am COMPARISON: None. HISTORY: ORDERING SYSTEM PROVIDED HISTORY: incresed resp rate TECHNOLOGIST PROVIDED HISTORY: Reason for exam:->incresed resp rate What reading provider will be dictating this exam?->CRC FINDINGS: The cardiomediastinal silhouette is at the upper limits of normal for technique. There are low lung volumes with bronchovascular crowding and bibasilar atelectasis. No pneumothorax or pleural effusion. Bilateral shoulder arthroplasties. Bibasilar atelectasis. MRI brain without contrast    Result Date: 11/12/2022  EXAMINATION: MRI OF THE BRAIN WITHOUT CONTRAST  11/12/2022 1:48 pm TECHNIQUE: Multiplanar multisequence MRI of the brain was performed without the administration of intravenous contrast. COMPARISON: None. HISTORY: ORDERING SYSTEM PROVIDED HISTORY: TIA TECHNOLOGIST PROVIDED HISTORY: Reason for exam:->TIA What is the sedation requirement?->None What reading provider will be dictating this exam?->CRC Initial evaluation. FINDINGS: INTRACRANIAL STRUCTURES/VENTRICLES: There is an acute infarct within the left inferior ahsan (DWI series 3, image 8). No mass effect or midline shift. A trace amount of blood is seen layering within the occipital horns of the lateral ventricles bilaterally.   Areas of T2 FLAIR hyperintensity are seen in the periventricular and subcortical white matter, which are nonspecific, but may represent chronic microvascular ischemic change. There is prominence of the ventricles and sulci due to global parenchymal volume loss. The sellar/suprasellar regions appear unremarkable. There is at least partial loss of the normal signal void within the right vertebral artery. ORBITS: The visualized portion of the orbits demonstrate no acute abnormality. SINUSES: The visualized paranasal sinuses and mastoid air cells demonstrate no acute abnormality. BONES/SOFT TISSUES: The bone marrow signal intensity appears normal. The soft tissues demonstrate no acute abnormality. 1. Small acute infarct involving the left inferior ahsan. No mass effect or midline shift. 2. Trace amount of blood layering within the occipital horns of the lateral ventricles bilaterally. 3. At least partial loss of the normal signal void within the right vertebral artery, which can be seen with slow flow/occlusion. This is of uncertain chronicity. 4. Otherwise, no acute intracranial abnormality. 5. Moderate global parenchymal volume loss with mild chronic microvascular ischemic changes. These results were sent to the [facility] [address] ([address] [place] [address]) on 11/12/2022 at 2:22 pm to be communicated to the referring/covering health care provider/office. EGD    Result Date: 11/16/2022  Site: [facility] Patient Name: Miguelina Berger Procedure Date: 11/16/2022 MRN: [id number] YOB: 1945 Gender: Female Attending MD: Alena Alvarez Indications:        -  Dysphagia Medications:        -  See the Anesthesia note for documentation of the administered medications Complications:        -  No immediate complications. Estimated Blood Loss:        -  Estimated blood loss was minimal. Procedure:        - The scope was introduced through the mouth and advanced to the second part           of the duodenum.        -  The upper GI endoscopy was accomplished without difficulty. -  The patient tolerated the procedure well. Findings:        -  The patient was placed in the supine position for PEG placement. The           stomach was insufflated to appose gastric and abdominal walls. A site was           located in the body of the stomach with excellent transillumination and manual           external pressure for placement. The abdominal wall was marked and prepped in           a sterile manner. The area was anesthetized with 4 mL of 1% lidocaine. The           trocar needle was introduced through the abdominal wall and into the stomach           under direct endoscopic view. A snare was introduced through the endoscope and           opened in the gastric lumen. The guide wire was passed through the trocar and           into the open snare. The snare was closed around the guide wire. The           endoscope and snare were removed, pulling the wire out through the mouth. A           skin incision was made at the site of needle insertion. The externally           removable 20 Fr gastrostomy tube was lubricated. The G-tube was tied to the           guide wire and pulled through the mouth and into the stomach. The trocar           needle was removed, and the gastrostomy tube was pulled out from the stomach           through the skin. The external bumper was attached to the gastrostomy tube,           and the tube was cut to remove the guide wire. The final position of the           gastrostomy tube was confirmed by relook endoscopy, and skin marking noted to           be 3 cm at the external bumper. The final tension and compression of the           abdominal wall by the PEG tube and external bumper were checked and revealed           that the bumper was moderately tight and mildly deforming the skin. The           feeding tube was capped, and the tube site cleaned and dressed.   Estimated           blood loss was minimal. Impression:        -  An externally removable PEG placement was successfully completed. -  No specimens collected. Recommendation:        -  Patient has a contact number available for emergencies. The signs and           symptoms of potential delayed complications were discussed with the patient. Return to normal activities tomorrow. Written discharge instructions were           provided to the patient.        -  Return [facility] to [facility] for [facility] care. Procedure Code(s):        - 44769, Esophagogastroduodenoscopy, flexible, transoral; with directed           placement of percutaneous gastrostomy tube Diagnosis Code(s):        - R13.10, Dysphagia, unspecified       CPT(R) - 2021 copyright [employer]. All Rights Reserved. The CPT codes, CCI edits and ICD codes generated are intended as suggestions       and were generated based on input data. These codes are preliminary and upon        review may be revised to meet current compliance and payer requirements. The provider is responsible for the final determination of appropriate codes,       and modifiers. Scope Withdrawal Time:       00:00:01 Signature Name: Eliel Mitchell MD Signature Statement: This document has been electronically signed.  Note Initiated On:11/16/2022 Signature Date:11/16/2022 9:43 AM            IMPRESSION AND SUGGESTION:  Patient is at risk due to   COPD with acute exacerbation  Nocturnal hypoxia  Possible JULIO C      Recommendation  Continue prednisone  Continue cardioprotective medications  Continue diuretics and target negative balance  Watch volume status and avoid overload  Continue current inhaler  Incentive spirometer  O2 to keep sat 90 to 92%, wean as tolerated  Monitor blood sugar target 1 40-1 80        Electronically signed by Luz Duval MD,  [facility]   on 11/17/2022 at 3:54 PM

## 2022-11-17 NOTE — PLAN OF CARE
Problem: Nutrition Deficit:  Goal: Optimize nutritional status  Outcome: Progressing     Problem: Chronic Conditions and Co-morbidities  Goal: Patient's chronic conditions and co-morbidity symptoms are monitored and maintained or improved  Outcome: Progressing     Problem: Skin/Tissue Integrity  Goal: Absence of new skin breakdown  Description: 1. Monitor for areas of redness and/or skin breakdown  2. Assess vascular access sites hourly  3. Every 4-6 hours minimum:  Change oxygen saturation probe site  4. Every 4-6 hours:  If on nasal continuous positive airway pressure, respiratory therapy assess nares and determine need for appliance change or resting period.   Outcome: Progressing     Problem: Safety - Adult  Goal: Free from fall injury  Outcome: Progressing     Problem: ABCDS Injury Assessment  Goal: Absence of physical injury  Outcome: Progressing     Problem: Discharge Planning  Goal: Discharge to home or other facility with appropriate resources  Outcome: Progressing     Problem: Pain  Goal: Verbalizes/displays adequate comfort level or baseline comfort level  Outcome: Progressing

## 2022-11-17 NOTE — PROGRESS NOTES
[facility]  Facility/Department: [facility]  Speech Language Pathology   Treatment Note      Beau Rowe  1945  [id number]  [x]   confirmed      Date: 2022    TIA (transient ischemic attack) [G45.9]  Essential hypertension [I10]  Chronic obstructive pulmonary disease, unspecified COPD type ([facility] [address] [J44.9]  [facility] [I63.9]    Restrictions/Precautions: Fall Risk, NPO    Weight: 160 lb (72.6 kg)     Diet NPO  ADULT TUBE FEEDING; PEG; Standard with Fiber; Continuous; 25; Yes; 25; Q 12 hours; 50; 30; Q 6 hours    SpO2: 95 % (22)  O2 Flow Rate (L/min): 4 L/min (22)  No active isolations      Subjective:  Alert and Cooperative        Interventions used this date:  Expressive Language, Receptive Language, and Dysphagia Treatment      Objective/Assessment:  Patient progressing towards goals:  Short Term Goals  Time Frame for Short Term Goals: 1 week or LOS until goals are met  Goal 1: Within 1-5 days of implementing the ST POC, pt's cognition will be assessed with additional goals added to pt's POC as deemed necesary by treating SLP. Unable to assess at this time d/t aphasia. Goal 2: To address pt's cognitive deficits and promote orientation, pt will state name of facility, time within 1 hour, reason in hospital, current month and year with 100% accuracy with min assist, with use of external aid. Patient able to state her name only at this time. Models provided for remaining orientation information. Goal 3: Pt will complete confrontational naming tasks with 70% accuracy with mild verbal cues to help the patient express their basic wants and needs. Patient named objects in immediate environment with 30% accuracy independently, increased to 50% accuracy with phonemic cue, increased to 80% accuracy following a model.    Goal 4: Pt will follow 1-2 step directions given orally with 70% accuracy with min cues to increase the pt's ability to follow directions provided by caregivers for safe follow through with ADLs. Patient followed 1-step directions with 50% accuracy independently, increased to 80% accuracy with repetition of direction and model. Goal 5: Pt will answer mid level yes/no questions with 80% accuracy with min cues to assist the caregiver in obtaining important information regarding the patient's personal, medical, and safety needs. Patient answered yes/no questions with 75% accuracy independently. Goal 6: Pt will be educated on compensatory strategies to promote speech intelligibility in 5/5 given opportunities so the patient has a better understanding of strategies that will enhance his/her ability to express their wants and needs. Not addressed    Short-term Goals  Timeframe for Short-term Goals: 1 week  Goal 1: Pt will tolerate PO trials deemed appropriate by treating SLP with adequate mastication, oral clearance of bolus, and no overt s/s of aspiration in all given opportunities. Patient trialed ice chips. Patient accepted ice chips from teaspoon with weak labial seal noted and increased time for bolus transfer into oral cavity. Oral holding observed with patient observed to make 'popping' sound while blowing air out with lips consistently throughout trials. Suspected delayed A-P transit with decreased bolus control. Pharyngeal swallow noted in 3/4 ice chip trials with max verbal prompting and tactile cueing needed. Same results with teaspoon coated in pureed solids. Pharyngeal swallow noted in 2/3 trials of spoon coated pureed solids, 30+ seconds required to initiate pharyngeal swallow. Weak cough noted following the one trial. Additional PO trials deferred due to inconsistent ability to produce pharyngeal swallow. Goal 2: Pt will complete oral motor ROM/strengthening exercises with 70% acc given cues as needed to increase lingual/labial/buccal strength and decrease risk of pocketing.   Patient completed lingual ROM exercise x7 following max verbal prompting. Patient unable to follow directions to complete labial ROM exercises despite max verbal prompting. Goal 3: Pt will complete pharyngeal exercises with 70% acc given cues as needed to increase pharyngeal musculature and decrease risk of aspiration. Patient unable to follow directions to complete at this time despite models. Goal 4: Pt will participate in MBS study, when appropriate, in order to more objectively assess pharyngeal phase of swallow and determine least restrictive diet. MBS is not recommended at this time. Patient unable to consistently produce a pharyngeal swallow at bedside. Treatment/Activity Tolerance:  Patient tolerated treatment well    Plan:  Continue per POC    Pain Assessment:  Patient did not complain of pain but produced groaning sound and help her abdomen when SLP repositioned her. Patient stated \"no\" when asked if she was in pain. Notified RN Rody Looney. Pain Re-assessment:  No change    Patient/Caregiver Education:  No education provided at this time.     Safety Devices:  Bed alarm in place and Telesitter in use      Therapy Time  Time in: 0820  Time out: 0840  Dysphagia minutes: 10  Speech/language minutes: 10  Total minutes: 20      Signature: Electronically signed by BARRON Jennings on 11/17/2022 at 9:27 AM

## 2022-11-18 ENCOUNTER — OFFICE VISIT (OUTPATIENT)
Dept: GERIATRIC MEDICINE | Age: 77
End: 2022-11-18

## 2022-11-18 VITALS
RESPIRATION RATE: 18 BRPM | DIASTOLIC BLOOD PRESSURE: 64 MMHG | TEMPERATURE: 97.5 F | SYSTOLIC BLOOD PRESSURE: 168 MMHG | BODY MASS INDEX: 28.35 KG/M2 | HEART RATE: 52 BPM | WEIGHT: 160 LBS | OXYGEN SATURATION: 97 % | HEIGHT: 63 IN

## 2022-11-18 DIAGNOSIS — I63.212 CEREBROVASCULAR ACCIDENT (CVA) DUE TO STENOSIS OF LEFT VERTEBRAL ARTERY (HCC): Primary | ICD-10-CM

## 2022-11-18 LAB
ANION GAP SERPL CALCULATED.3IONS-SCNC: 13 MEQ/L (ref 9–15)
BASOPHILS ABSOLUTE: 0.1 K/UL (ref 0–0.2)
BASOPHILS RELATIVE PERCENT: 0.8 %
BUN BLDV-MCNC: 19 MG/DL (ref 8–23)
CALCIUM SERPL-MCNC: 8.7 MG/DL (ref 8.5–9.9)
CHLORIDE BLD-SCNC: 99 MEQ/L (ref 95–107)
CO2: 25 MEQ/L (ref 20–31)
CREAT SERPL-MCNC: 0.57 MG/DL (ref 0.5–0.9)
EOSINOPHILS ABSOLUTE: 0.1 K/UL (ref 0–0.7)
EOSINOPHILS RELATIVE PERCENT: 1.2 %
GFR SERPL CREATININE-BSD FRML MDRD: >60 ML/MIN/{1.73_M2}
GLUCOSE BLD-MCNC: 106 MG/DL (ref 70–99)
GLUCOSE BLD-MCNC: 112 MG/DL (ref 70–99)
GLUCOSE BLD-MCNC: 127 MG/DL (ref 70–99)
HCT VFR BLD CALC: 39.6 % (ref 37–47)
HEMOGLOBIN: 12.7 G/DL (ref 12–16)
LYMPHOCYTES ABSOLUTE: 2 K/UL (ref 1–4.8)
LYMPHOCYTES RELATIVE PERCENT: 18.4 %
MCH RBC QN AUTO: 31.2 PG (ref 27–31.3)
MCHC RBC AUTO-ENTMCNC: 32.1 % (ref 33–37)
MCV RBC AUTO: 97.4 FL (ref 79.4–94.8)
MONOCYTES ABSOLUTE: 1 K/UL (ref 0.2–0.8)
MONOCYTES RELATIVE PERCENT: 9.7 %
NEUTROPHILS ABSOLUTE: 7.4 K/UL (ref 1.4–6.5)
NEUTROPHILS RELATIVE PERCENT: 69.9 %
PDW BLD-RTO: 14.1 % (ref 11.5–14.5)
PERFORMED ON: ABNORMAL
PERFORMED ON: ABNORMAL
PLATELET # BLD: 167 K/UL (ref 130–400)
POTASSIUM SERPL-SCNC: 3.5 MEQ/L (ref 3.4–4.9)
RBC # BLD: 4.06 M/UL (ref 4.2–5.4)
SODIUM BLD-SCNC: 137 MEQ/L (ref 135–144)
WBC # BLD: 10.6 K/UL (ref 4.8–10.8)

## 2022-11-18 PROCEDURE — 92507 TX SP LANG VOICE COMM INDIV: CPT

## 2022-11-18 PROCEDURE — 36415 COLL VENOUS BLD VENIPUNCTURE: CPT

## 2022-11-18 PROCEDURE — APPSS15 APP SPLIT SHARED TIME 0-15 MINUTES: Performed by: NURSE PRACTITIONER

## 2022-11-18 PROCEDURE — 94761 N-INVAS EAR/PLS OXIMETRY MLT: CPT

## 2022-11-18 PROCEDURE — 6370000000 HC RX 637 (ALT 250 FOR IP): Performed by: INTERNAL MEDICINE

## 2022-11-18 PROCEDURE — 2700000000 HC OXYGEN THERAPY PER DAY

## 2022-11-18 PROCEDURE — 6370000000 HC RX 637 (ALT 250 FOR IP): Performed by: NURSE PRACTITIONER

## 2022-11-18 PROCEDURE — 85025 COMPLETE CBC W/AUTO DIFF WBC: CPT

## 2022-11-18 PROCEDURE — 92526 ORAL FUNCTION THERAPY: CPT

## 2022-11-18 PROCEDURE — 80048 BASIC METABOLIC PNL TOTAL CA: CPT

## 2022-11-18 PROCEDURE — 97535 SELF CARE MNGMENT TRAINING: CPT

## 2022-11-18 PROCEDURE — 6360000002 HC RX W HCPCS: Performed by: INTERNAL MEDICINE

## 2022-11-18 PROCEDURE — 94640 AIRWAY INHALATION TREATMENT: CPT

## 2022-11-18 PROCEDURE — 99232 SBSQ HOSP IP/OBS MODERATE 35: CPT | Performed by: PHYSICAL MEDICINE & REHABILITATION

## 2022-11-18 RX ORDER — GABAPENTIN 400 MG/1
400 CAPSULE ORAL 3 TIMES DAILY
Status: DISCONTINUED | OUTPATIENT
Start: 2022-11-18 | End: 2022-11-18 | Stop reason: HOSPADM

## 2022-11-18 RX ORDER — LIDOCAINE 4 G/G
2 PATCH TOPICAL DAILY
Status: DISCONTINUED | OUTPATIENT
Start: 2022-11-18 | End: 2022-11-18 | Stop reason: HOSPADM

## 2022-11-18 RX ORDER — LOSARTAN POTASSIUM 25 MG/1
25 TABLET ORAL DAILY
Qty: 30 TABLET | Refills: 3 | Status: ON HOLD | OUTPATIENT
Start: 2022-11-19

## 2022-11-18 RX ORDER — HYDRALAZINE HYDROCHLORIDE 50 MG/1
50 TABLET, FILM COATED ORAL 3 TIMES DAILY
Status: DISCONTINUED | OUTPATIENT
Start: 2022-11-18 | End: 2022-11-18 | Stop reason: HOSPADM

## 2022-11-18 RX ORDER — LIDOCAINE 4 G/G
2 PATCH TOPICAL DAILY PRN
Qty: 2 EACH | Refills: 0 | Status: ON HOLD | OUTPATIENT
Start: 2022-11-18

## 2022-11-18 RX ORDER — POTASSIUM BICARBONATE 25 MEQ/1
25 TABLET, EFFERVESCENT ORAL
Qty: 60 TABLET | Refills: 3 | Status: ON HOLD | OUTPATIENT
Start: 2022-11-21

## 2022-11-18 RX ORDER — POTASSIUM BICARBONATE 25 MEQ/1
25 TABLET, EFFERVESCENT ORAL ONCE
Status: COMPLETED | OUTPATIENT
Start: 2022-11-18 | End: 2022-11-18

## 2022-11-18 RX ORDER — METOPROLOL TARTRATE 100 MG/1
100 TABLET ORAL 2 TIMES DAILY
Qty: 60 TABLET | Refills: 3 | Status: ON HOLD | OUTPATIENT
Start: 2022-11-18

## 2022-11-18 RX ORDER — NITROGLYCERIN 0.4 MG/1
0.4 TABLET SUBLINGUAL EVERY 5 MIN PRN
Qty: 25 TABLET | Refills: 3 | Status: ON HOLD | OUTPATIENT
Start: 2022-11-18

## 2022-11-18 RX ORDER — PREDNISONE 20 MG/1
40 TABLET ORAL DAILY
Qty: 6 TABLET | Refills: 0 | Status: SHIPPED | OUTPATIENT
Start: 2022-11-18 | End: 2022-11-21

## 2022-11-18 RX ORDER — HYDRALAZINE HYDROCHLORIDE 50 MG/1
50 TABLET, FILM COATED ORAL 3 TIMES DAILY
Qty: 90 TABLET | Refills: 3 | Status: ON HOLD | OUTPATIENT
Start: 2022-11-18

## 2022-11-18 RX ORDER — FUROSEMIDE 20 MG/1
20 TABLET ORAL
Qty: 60 TABLET | Refills: 3 | Status: ON HOLD | OUTPATIENT
Start: 2022-11-21

## 2022-11-18 RX ADMIN — FUROSEMIDE 20 MG: 20 TABLET ORAL at 10:45

## 2022-11-18 RX ADMIN — ASPIRIN 81 MG: 81 TABLET, CHEWABLE ORAL at 10:45

## 2022-11-18 RX ADMIN — PREDNISONE 40 MG: 20 TABLET ORAL at 10:45

## 2022-11-18 RX ADMIN — METOPROLOL TARTRATE 100 MG: 50 TABLET, FILM COATED ORAL at 10:45

## 2022-11-18 RX ADMIN — ENOXAPARIN SODIUM 40 MG: 100 INJECTION SUBCUTANEOUS at 10:44

## 2022-11-18 RX ADMIN — IPRATROPIUM BROMIDE AND ALBUTEROL SULFATE 1 AMPULE: .5; 2.5 SOLUTION RESPIRATORY (INHALATION) at 13:23

## 2022-11-18 RX ADMIN — HYDRALAZINE HYDROCHLORIDE 50 MG: 50 TABLET, FILM COATED ORAL at 13:49

## 2022-11-18 RX ADMIN — ACETAMINOPHEN 650 MG: 160 SOLUTION ORAL at 01:41

## 2022-11-18 RX ADMIN — IPRATROPIUM BROMIDE AND ALBUTEROL SULFATE 1 AMPULE: .5; 2.5 SOLUTION RESPIRATORY (INHALATION) at 07:53

## 2022-11-18 RX ADMIN — POTASSIUM BICARBONATE 25 MEQ: 977.5 TABLET, EFFERVESCENT ORAL at 11:00

## 2022-11-18 RX ADMIN — HYDRALAZINE HYDROCHLORIDE 50 MG: 50 TABLET, FILM COATED ORAL at 10:45

## 2022-11-18 RX ADMIN — LOSARTAN POTASSIUM 25 MG: 25 TABLET, FILM COATED ORAL at 10:45

## 2022-11-18 RX ADMIN — POTASSIUM BICARBONATE 25 MEQ: 977.5 TABLET, EFFERVESCENT ORAL at 13:49

## 2022-11-18 RX ADMIN — GABAPENTIN 400 MG: 400 CAPSULE ORAL at 13:49

## 2022-11-18 ASSESSMENT — ENCOUNTER SYMPTOMS
NAUSEA: 0
TROUBLE SWALLOWING: 1
WHEEZING: 0
COLOR CHANGE: 0
VOMITING: 0
BACK PAIN: 0
SHORTNESS OF BREATH: 0
COUGH: 0
CHOKING: 0

## 2022-11-18 ASSESSMENT — PAIN SCALES - GENERAL: PAINLEVEL_OUTOF10: 7

## 2022-11-18 ASSESSMENT — PAIN DESCRIPTION - LOCATION: LOCATION: ABDOMEN

## 2022-11-18 NOTE — PROGRESS NOTES
Pt noted moaning in pain states that her legs and back are hurting very badly. Per Ra Blanc scale 7-8/10. Secured messaging sent to Laveta Find at this time awaiting response. Pt has been repositioned and pulled up in bed, this has not provided relief for pain at this time. New order for lidocaine patches. Patches administered and acetaminophen. Is ineffective for pain management. Gail Muroer contacted via secured messaging to notify at this time.

## 2022-11-18 NOTE — PROGRESS NOTES
INPATIENT PROGRESS NOTES    PATIENT NAME: Catia Elliott  MRN: [id number]  SERVICE DATE:  November 18, 2022   SERVICE TIME:  2:00 PM      PRIMARY SERVICE: Pulmonary Disease    CHIEF COMPLAIN: Hypoxia, COPD exacerbation      INTERVAL HPI: Patient seen and examined at bedside, Interval Notes, orders reviewed. Nursing notes noted  Patient is doing better. Currently on 4 L O2 via nasal cannula O2 saturation 97%. No shortness of breath. No cough. OBJECTIVE    Body mass index is 28.34 kg/m². PHYSICAL EXAM:  Vitals:  BP (!) 168/64   Pulse 52   Temp 97.5 °F (36.4 °C) (Oral)   Resp 18   Ht 5' 3\" (1.6 m)   Wt 160 lb (72.6 kg)   LMP  (LMP Unknown)   SpO2 97%   BMI 28.34 kg/m²   General: Alert, awake . comfortable in bed, No distress. Head: Atraumatic , Normocephalic   Eyes: PERRL. No sclera icterus. No conjunctival injection. No discharge   ENT: No nasal  discharge. Pharynx clear. Neck:  Trachea midline. No thyromegaly, no JVD, No cervical adenopathy. Chest : Bilaterally symmetrical ,Normal effort,  No accessory muscle use  Lung : . Fair BS bilateral, decreased BS at bases. No Rales. No wheezing. No rhonchi. Heart[de-identified] Normal  rate. Regular rhythm. No mumur ,  Rub or gallop  ABD: Non-tender. Non-distended. No masses. No organmegaly. Normal bowel sounds. No hernia. Ext : No Pitting both leg , No Cyanosis No clubbing  Neuro: no focal weakness          DATA:   Recent Labs     11/17/22  0512 11/18/22  0501   WBC 12.4* 10.6   HGB 12.5 12.7   HCT 38.1 39.6   MCV 95.6* 97.4*    167     Recent Labs     11/17/22  0512 11/18/22  0501    137   K 3.9 3.5    99   CO2 26 25   BUN 14 19   CREATININE 0.53 0.57   GLUCOSE 131* 106*   CALCIUM 8.8 8.7   LABGLOM >60.0 >60.0       MV Settings:          No results for input(s): PHART, SSN2CNI, PO2ART, RPU9KOL, BEART, Q6WHXIVL in the last 72 hours.     O2 Device: Nasal cannula  O2 Flow Rate (L/min): 4 L/min (DECREASED TO 3)    Diet NPO  ADULT TUBE FEEDING; PEG; Standard with Fiber; Continuous; 25; Yes; 25; Q 12 hours; 50; 180; Q 6 hours     MEDICATIONS during current hospitalization:    Continuous Infusions:   dextrose         Scheduled Meds:   lidocaine  2 patch TransDERmal Daily    gabapentin  400 mg Oral TID    hydrALAZINE  50 mg PEG Tube TID    metoprolol tartrate  100 mg PEG Tube BID    losartan  25 mg PEG Tube Daily    atorvastatin  80 mg PEG Tube Nightly    furosemide  20 mg PEG Tube Once per day on Mon Wed Fri    potassium bicarbonate  25 mEq PEG Tube Once per day on Mon Wed Fri    enoxaparin  40 mg SubCUTAneous Daily    aspirin  81 mg PEG Tube Daily    predniSONE  40 mg Oral Daily    ipratropium-albuterol  1 ampule Inhalation TID    insulin lispro  0-4 Units SubCUTAneous Q4H       PRN Meds:acetaminophen, albuterol, acetaminophen, hydrALAZINE, ondansetron **OR** ondansetron, polyethylene glycol, labetalol, glucose, dextrose bolus **OR** dextrose bolus, glucagon (rDNA), dextrose    Radiology  CTA HEAD W WO CONTRAST    Result Date: 11/14/2022  EXAMINATION: CTA OF THE NECK; CTA OF THE HEAD WITHOUT AND WITH CONTRAST 11/12/2022 7:57 am TECHNIQUE: CTA of the neck was performed with the administration of intravenous contrast. Multiplanar reformatted images are provided for review. MIP images are provided for review. Stenosis of the internal carotid arteries measured using NASCET criteria. Automated exposure control, iterative reconstruction, and/or weight based adjustment of the mA/kV was utilized to reduce the radiation dose to as low as reasonably achievable.; CTA of the head/brain was performed without and with the administration of intravenous contrast. Multiplanar reformatted images are provided for review. MIP images are provided for review. Automated exposure control, iterative reconstruction, and/or weight based adjustment of the mA/kV was utilized to reduce the radiation dose to as low as reasonably achievable.  COMPARISON: CT brain earlier same day HISTORY: ORDERING SYSTEM PROVIDED HISTORY: right sided weakness TECHNOLOGIST PROVIDED HISTORY: Reason for exam:->right sided weakness What reading provider will be dictating this exam?->CRC FINDINGS: CTA NECK: AORTIC ARCH/ARCH VESSELS: Atherosclerotic calcifications are present within the aortic arch. The origins of the great vessels are normal.  There is no significant stenosis of the innominate or subclavian arteries. CAROTID ARTERIES: There is mild calcified atherosclerotic plaque within the left common carotid artery. There is no significant stenosis of the common carotid arteries identified. There is no significant stenosis of the internal carotid arteries. There is no dissection or pseudoaneurysm. VERTEBRAL ARTERIES: There is a mildly beaded appearance of the distal V2 and V3 segments of the vertebral arteries suggesting fibromuscular dysplasia. There is no dissection or pseudoaneurysm. There is no significant stenosis. SOFT TISSUES: Lung apices are clear. There is no superior mediastinal lymphadenopathy. There is no cervical lymphadenopathy or soft tissue mass identified. The parotid glands and submandibular glands are normal. BONES: No lytic or blastic osseous lesions are identified. There is moderate multilevel neural foraminal narrowing and mild spinal canal stenosis from C4-C5 to C6-C7. CTA HEAD: ANTERIOR CIRCULATION: Atherosclerotic calcifications are present within the cavernous segments of the internal carotid arteries without significant stenosis evident. The anterior and middle cerebral arteries are normal. There is no significant stenosis or aneurysm evident. POSTERIOR CIRCULATION: The distal vertebral arteries are normal in appearance. The basilar artery is normal.  No significant stenosis or aneurysm is identified. The posterior cerebral arteries are normal in appearance. OTHER: No dural venous sinus thrombosis on this non-dedicated study. BRAIN: There is no mass effect or midline shift.   There is no vascular malformation identified. 1. No large vessel occlusion, significant stenosis or cerebral aneurysm identified within the brain. 2. Atherosclerosis without significant arterial stenosis identified within the neck. 3. Beaded appearance of the distal vertebral arteries suggesting fibromuscular dysplasia. CT HEAD WO CONTRAST    Result Date: 11/14/2022  EXAMINATION: CT OF THE HEAD WITHOUT CONTRAST  11/14/2022 12:00 pm TECHNIQUE: CT of the head was performed without the administration of intravenous contrast. Automated exposure control, iterative reconstruction, and/or weight based adjustment of the mA/kV was utilized to reduce the radiation dose to as low as reasonably achievable. COMPARISON: Reviewed the previous MRI of the brain dated November 12. Reviewed the previous CT brain back to November 12. HISTORY: ORDERING SYSTEM PROVIDED HISTORY: cva, ventricular hemorrhage TECHNOLOGIST PROVIDED HISTORY: Reason for exam:->cva, ventricular hemorrhage Has a \"code stroke\" or \"stroke alert\" been called? ->No What reading provider will be dictating this exam?->CRC FINDINGS: Peripheral CSF space ventricular system correlates with the age group. There is no focal mass effect or midline shift. There is no evidence for a sizable area of a new acute recent insult in progression to the brain parenchyma. Discrete the blood CSF fluid level is seen in the dependent portion of the right left lateral ventricles more noticeable on the left. These were seen previously. These are stable over time. There is no further increase in intraventricular hemorrhagic component. The hyperdensity of the blood component has mild diminished since the previous study indicated more late acute phase/early subacute phase. Stable discrete intraventricular hemorrhage since recent studies.      CT HEAD WO CONTRAST    Result Date: 11/13/2022  EXAMINATION: CT OF THE HEAD WITHOUT CONTRAST  11/13/2022 9:25 am TECHNIQUE: CT of the head was performed without the administration of intravenous contrast. Automated exposure control, iterative reconstruction, and/or weight based adjustment of the mA/kV was utilized to reduce the radiation dose to as low as reasonably achievable. COMPARISON: None. HISTORY: ORDERING SYSTEM PROVIDED HISTORY: eval for progression of blood layering within the occipital horns of the lateral ventricles TECHNOLOGIST PROVIDED HISTORY: Reason for exam:->eval for progression of blood layering within the occipital horns of the lateral ventricles Has a \"code stroke\" or \"stroke alert\" been called? ->No What reading provider will be dictating this exam?->CRC FINDINGS: BRAIN/VENTRICLES: Cerebral atrophy is noted globally. There is posterior trophic enlargement of the lateral ventricles similar to older examinations. There is a small amount of intermediate density hemorrhage in the occipital horns of the right and left ventricle similar in severity to 11/12/2022 MRI. 4th ventricle is patent. There is no shift of midline structures. No subarachnoid or subdural hemorrhage. ORBITS: The visualized portion of the orbits demonstrate no acute abnormality. SINUSES: Pilar bullosa of the left middle nasal turbinate. Right deviation of the nasal septum which appears chronic. Mild mucosal thickening in the sphenoid sinuses. Small air-fluid level in the inferior left frontal sinus. Mastoid air cells are clear. SOFT TISSUES/SKULL:  No acute abnormality of the visualized skull or soft tissues. 1.  Stable minimal layering hemorrhage in the occipital horns of the right and left lateral ventricle. No change from MRI dated 11/12/2022. 2.  Findings compatible with chronic paranasal sinusitis, mild.      CT HEAD WO CONTRAST    Result Date: 11/12/2022  EXAMINATION: CT OF THE HEAD WITHOUT CONTRAST  11/12/2022 10:31 am TECHNIQUE: CT of the head was performed without the administration of intravenous contrast. Automated exposure control, iterative reconstruction, and/or weight based adjustment of the mA/kV was utilized to reduce the radiation dose to as low as reasonably achievable. COMPARISON: 11/12/2022 HISTORY: ORDERING SYSTEM PROVIDED HISTORY: eval for hmg conversion TECHNOLOGIST PROVIDED HISTORY: Reason for exam:->eval for hmg conversion Has a \"code stroke\" or \"stroke alert\" been called? ->No What reading provider will be dictating this exam?->CRC FINDINGS: BRAIN/VENTRICLES: Generalized atrophy identified of the brain. Some low-attenuation areas identified in the periventricular and subcortical white matter to suggest chronic small vessel ischemic change. There is no acute intracranial hemorrhage, mass effect or midline shift. No abnormal extra-axial fluid collection. The gray-white differentiation is maintained without evidence of an acute infarct. There is no evidence of hydrocephalus. ORBITS: The visualized portion of the orbits demonstrate no acute abnormality. SINUSES: The visualized paranasal sinuses and mastoid air cells demonstrate no acute abnormality. SOFT TISSUES/SKULL:  No acute abnormality of the visualized skull or soft tissues. Atrophy and chronic changes seen within the brain with no acute intracranial abnormality. CT HEAD WO CONTRAST    Result Date: 11/12/2022  EXAMINATION: CT OF THE HEAD WITHOUT CONTRAST  11/12/2022 4:34 am TECHNIQUE: CT of the head was performed without the administration of intravenous contrast. Automated exposure control, iterative reconstruction, and/or weight based adjustment of the mA/kV was utilized to reduce the radiation dose to as low as reasonably achievable. COMPARISON: None. HISTORY: ORDERING SYSTEM PROVIDED HISTORY: fall TECHNOLOGIST PROVIDED HISTORY: Reason for exam:->fall Has a \"code stroke\" or \"stroke alert\" been called? ->No Decision Support Exception - unselect if not a suspected or confirmed emergency medical condition->Emergency Medical Condition (MA) What reading provider will be dictating this exam?->CRC FINDINGS: BRAIN/VENTRICLES: There is no acute intracranial hemorrhage, mass effect or midline shift. No abnormal extra-axial fluid collection. The gray-white differentiation is maintained without evidence of an acute infarct. There is no evidence of hydrocephalus. The ventricles, cisterns and sulci are prominent consistent with atrophy. There is decreased attenuation within the periventricular white matter consistent with periventricular leukomalacia. ORBITS: The visualized portion of the orbits demonstrate no acute abnormality. SINUSES: The visualized paranasal sinuses and mastoid air cells demonstrate no acute abnormality. SOFT TISSUES/SKULL:  No acute abnormality of the visualized skull or soft tissues. 1. There is no acute intracranial abnormality. Specifically, there is no intracranial hemorrhage. 2. Atrophy and periventricular leukomalacia,     CTA NECK W WO CONTRAST    Result Date: 11/14/2022  EXAMINATION: CTA OF THE NECK; CTA OF THE HEAD WITHOUT AND WITH CONTRAST 11/12/2022 7:57 am TECHNIQUE: CTA of the neck was performed with the administration of intravenous contrast. Multiplanar reformatted images are provided for review. MIP images are provided for review. Stenosis of the internal carotid arteries measured using NASCET criteria. Automated exposure control, iterative reconstruction, and/or weight based adjustment of the mA/kV was utilized to reduce the radiation dose to as low as reasonably achievable.; CTA of the head/brain was performed without and with the administration of intravenous contrast. Multiplanar reformatted images are provided for review. MIP images are provided for review. Automated exposure control, iterative reconstruction, and/or weight based adjustment of the mA/kV was utilized to reduce the radiation dose to as low as reasonably achievable.  COMPARISON: CT brain earlier same day HISTORY: ORDERING SYSTEM PROVIDED HISTORY: right sided weakness TECHNOLOGIST PROVIDED HISTORY: Reason for exam:->right sided weakness What reading provider will be dictating this exam?->CRC FINDINGS: CTA NECK: AORTIC ARCH/ARCH VESSELS: Atherosclerotic calcifications are present within the aortic arch. The origins of the great vessels are normal.  There is no significant stenosis of the innominate or subclavian arteries. CAROTID ARTERIES: There is mild calcified atherosclerotic plaque within the left common carotid artery. There is no significant stenosis of the common carotid arteries identified. There is no significant stenosis of the internal carotid arteries. There is no dissection or pseudoaneurysm. VERTEBRAL ARTERIES: There is a mildly beaded appearance of the distal V2 and V3 segments of the vertebral arteries suggesting fibromuscular dysplasia. There is no dissection or pseudoaneurysm. There is no significant stenosis. SOFT TISSUES: Lung apices are clear. There is no superior mediastinal lymphadenopathy. There is no cervical lymphadenopathy or soft tissue mass identified. The parotid glands and submandibular glands are normal. BONES: No lytic or blastic osseous lesions are identified. There is moderate multilevel neural foraminal narrowing and mild spinal canal stenosis from C4-C5 to C6-C7. CTA HEAD: ANTERIOR CIRCULATION: Atherosclerotic calcifications are present within the cavernous segments of the internal carotid arteries without significant stenosis evident. The anterior and middle cerebral arteries are normal. There is no significant stenosis or aneurysm evident. POSTERIOR CIRCULATION: The distal vertebral arteries are normal in appearance. The basilar artery is normal.  No significant stenosis or aneurysm is identified. The posterior cerebral arteries are normal in appearance. OTHER: No dural venous sinus thrombosis on this non-dedicated study. BRAIN: There is no mass effect or midline shift. There is no vascular malformation identified.      1. No large vessel occlusion, significant stenosis or cerebral aneurysm identified within the brain. 2. Atherosclerosis without significant arterial stenosis identified within the neck. 3. Beaded appearance of the distal vertebral arteries suggesting fibromuscular dysplasia. XR CHEST PORTABLE    Result Date: 11/12/2022  EXAMINATION: ONE XRAY VIEW OF THE CHEST 11/12/2022 4:12 am COMPARISON: None. HISTORY: ORDERING SYSTEM PROVIDED HISTORY: incresed resp rate TECHNOLOGIST PROVIDED HISTORY: Reason for exam:->incresed resp rate What reading provider will be dictating this exam?->CRC FINDINGS: The cardiomediastinal silhouette is at the upper limits of normal for technique. There are low lung volumes with bronchovascular crowding and bibasilar atelectasis. No pneumothorax or pleural effusion. Bilateral shoulder arthroplasties. Bibasilar atelectasis. MRI brain without contrast    Result Date: 11/12/2022  EXAMINATION: MRI OF THE BRAIN WITHOUT CONTRAST  11/12/2022 1:48 pm TECHNIQUE: Multiplanar multisequence MRI of the brain was performed without the administration of intravenous contrast. COMPARISON: None. HISTORY: ORDERING SYSTEM PROVIDED HISTORY: TIA TECHNOLOGIST PROVIDED HISTORY: Reason for exam:->TIA What is the sedation requirement?->None What reading provider will be dictating this exam?->CRC Initial evaluation. FINDINGS: INTRACRANIAL STRUCTURES/VENTRICLES: There is an acute infarct within the left inferior ahsan (DWI series 3, image 8). No mass effect or midline shift. A trace amount of blood is seen layering within the occipital horns of the lateral ventricles bilaterally. Areas of T2 FLAIR hyperintensity are seen in the periventricular and subcortical white matter, which are nonspecific, but may represent chronic microvascular ischemic change. There is prominence of the ventricles and sulci due to global parenchymal volume loss. The sellar/suprasellar regions appear unremarkable.   There is at least partial loss of the normal signal void within the right vertebral artery. ORBITS: The visualized portion of the orbits demonstrate no acute abnormality. SINUSES: The visualized paranasal sinuses and mastoid air cells demonstrate no acute abnormality. BONES/SOFT TISSUES: The bone marrow signal intensity appears normal. The soft tissues demonstrate no acute abnormality. 1. Small acute infarct involving the left inferior ahsan. No mass effect or midline shift. 2. Trace amount of blood layering within the occipital horns of the lateral ventricles bilaterally. 3. At least partial loss of the normal signal void within the right vertebral artery, which can be seen with slow flow/occlusion. This is of uncertain chronicity. 4. Otherwise, no acute intracranial abnormality. 5. Moderate global parenchymal volume loss with mild chronic microvascular ischemic changes. These results were sent to the [facility] [id number] ([address] [place] [address]) on 11/12/2022 at 2:22 pm to be communicated to the referring/covering health care provider/office. EGD    Result Date: 11/16/2022  Site: [facility] Patient Name: Hansa Limb Procedure Date: 11/16/2022 MRN: [id number] YOB: 1945 Gender: Female Attending MD: Ken Dash Indications:        -  Dysphagia Medications:        -  See the Anesthesia note for documentation of the administered medications Complications:        -  No immediate complications. Estimated Blood Loss:        -  Estimated blood loss was minimal. Procedure:        - The scope was introduced through the mouth and advanced to the second part           of the duodenum.        -  The upper GI endoscopy was accomplished without difficulty. -  The patient tolerated the procedure well. Findings:        -  The patient was placed in the supine position for PEG placement. The           stomach was insufflated to appose gastric and abdominal walls.   A site was           located in the body of the stomach with excellent transillumination and manual           external pressure for placement. The abdominal wall was marked and prepped in           a sterile manner. The area was anesthetized with 4 mL of 1% lidocaine. The           trocar needle was introduced through the abdominal wall and into the stomach           under direct endoscopic view. A snare was introduced through the endoscope and           opened in the gastric lumen. The guide wire was passed through the trocar and           into the open snare. The snare was closed around the guide wire. The           endoscope and snare were removed, pulling the wire out through the mouth. A           skin incision was made at the site of needle insertion. The externally           removable 20 Fr gastrostomy tube was lubricated. The G-tube was tied to the           guide wire and pulled through the mouth and into the stomach. The trocar           needle was removed, and the gastrostomy tube was pulled out from the stomach           through the skin. The external bumper was attached to the gastrostomy tube,           and the tube was cut to remove the guide wire. The final position of the           gastrostomy tube was confirmed by relook endoscopy, and skin marking noted to           be 3 cm at the external bumper. The final tension and compression of the           abdominal wall by the PEG tube and external bumper were checked and revealed           that the bumper was moderately tight and mildly deforming the skin. The           feeding tube was capped, and the tube site cleaned and dressed. Estimated           blood loss was minimal. Impression:        -  An externally removable PEG placement was successfully completed. -  No specimens collected. Recommendation:        -  Patient has a contact number available for emergencies. The signs and           symptoms of potential delayed complications were discussed with the patient.            Return to normal activities tomorrow. Written discharge instructions were           provided to the patient.        -  Return [facility] to [facility] for [facility] care. Procedure Code(s):        - 44109, Esophagogastroduodenoscopy, flexible, transoral; with directed           placement of percutaneous gastrostomy tube Diagnosis Code(s):        - R13.10, Dysphagia, unspecified       CPT(R) - 2021 copyright [employer]. All Rights Reserved. The CPT codes, CCI edits and ICD codes generated are intended as suggestions       and were generated based on input data. These codes are preliminary and upon        review may be revised to meet current compliance and payer requirements. The provider is responsible for the final determination of appropriate codes,       and modifiers. Scope Withdrawal Time:       00:00:01 Signature Name: Yesenia Roberts MD Signature Statement: This document has been electronically signed. Note Initiated On:11/16/2022 Signature Date:11/16/2022 9:43 AM        IMPRESSION AND SUGGESTION:  COPD with acute exacerbation  Nocturnal hypoxia  Possible JULIO C    Continue O2 to keep saturation 90% or above. Discussed with family at bedside. Patient is going to nursing home today. Continue diuretic and target negative balance. Continue current bronchodilator therapy. Incentive spirometer. Follow-up in office 4 weeks. NOTE: This report was transcribed using voice recognition software. Every effort was made to ensure accuracy; however, inadvertent computerized transcription errors may be present.       Electronically signed by Lesley Daniel MD, [facility] on 11/18/2022 at 2:00 PM

## 2022-11-18 NOTE — PROGRESS NOTES
Physical Therapy Med Surg Daily Treatment Note  Facility/Department: [facility]  Room: 69/S922-05       NAME: Cindy Ashley  : 1945 (76 y.o.)  MRN: [id number]  CODE STATUS: Full Code    Date of Service: 2022    Patient Diagnosis(es): TIA (transient ischemic attack) [G45.9]  Essential hypertension [I10]  Chronic obstructive pulmonary disease, unspecified COPD type ([address] [J44.9]  Acute CVA (cerebrovascular accident) [facility]) [I63.9]   Chief Complaint   Patient presents with    Fall     Patient Active Problem List    Diagnosis Date Noted    Aphasia 11/15/2022    TIA (transient ischemic attack) 11/15/2022    Nocturnal hypoxia 11/15/2022    Sleep apnea 11/15/2022    Dysphagia 2022    Cerebrovascular accident (CVA) due to stenosis of left vertebral artery ([address] 2022    Palliative care encounter 2022    Advanced care planning/counseling discussion 2022    Goals of care, counseling/discussion 2022    Cerebrovascular accident (CVA) due to stenosis of left middle cerebral artery ([address] 2022    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 ([address] 2022    Fibromuscular dysplasia ([address] 2022    Impaired mobility and activities of daily living dt CVA 2022    Acute CVA (cerebrovascular accident) ([address] 2022    Non-pressure chronic ulcer of calf with fat layer exposed ([address] 10/09/2020    Non-pressure chronic ulcer of calf, right, with fat layer exposed ([address] 10/02/2020    Claudication in peripheral vascular disease ([address] 2020    Non-healing surgical wound 2020    Venous insufficiency of left lower extremity 2020    Asymptomatic varicose veins of bilateral lower extremities 2020    Pain and swelling due to varicose veins of both lower extremities 2020    Slow transit constipation 10/13/2019    External hemorrhoid, bleeding 10/13/2019    Colitis 10/13/2019    Acute colitis 10/10/2019 Hypovolemic shock ([address] 10/07/2019    NSTEMI (non-ST elevated myocardial infarction) ([address] 33/80/1746    Acute diastolic heart failure ([address] 10/05/2019    Hypotension 10/01/2019    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    Hypertension     Hyperlipidemia     COPD (chronic obstructive pulmonary disease) ([address]     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 ([address] 08/27/2010    Generalized osteoarthrosis, unspecified site 12/27/2002    Major depressive disorder, single episode, moderate ([address] 12/27/2002    Morbid obesity ([address] 12/27/2002        Past Medical History:   Diagnosis Date    Anxiety     Arthritis     COPD (chronic obstructive pulmonary disease) (formerly [facility])     Generalized osteoarthrosis, unspecified site 12/27/2002    Hyperlipidemia     Hypertension     Major depressive disorder, single episode, moderate ([address] 12/27/2002    Morbid obesity ([address] 12/27/2002    OAB (overactive bladder)     Osteoporosis     Sleep apnea 11/15/2022     Past Surgical History:   Procedure Laterality Date    CARDIAC CATHETERIZATION      CARPAL TUNNEL RELEASE      COLONOSCOPY      GASTROSTOMY TUBE PLACEMENT N/A 11/16/2022    PERCUTANEOUS ENDOSCOPIC GASTROSTOMY TUBE PLACEMENT performed by Carmen Díaz MD at [facility], [address] [facility] ([address] [facility])      KNEE ARTHROPLASTY Bilateral     AZ COLON CA SCRN NOT HI RSK IND N/A 6/22/2017    COLONOSCOPY performed by Nataly Calderon MD at [address] TRANSORAL DIAGNOSTIC N/A 6/22/2017    EGD ESOPHAGOGASTRODUODENOSCOPY performed by Nataly Calderon MD at [address] Bilateral     SKIN BIOPSY Right 9/17/2020    EXCISION OF LEG MASS RIGHT (PAT ON ADMIT) performed by Carmen Díaz MD at [facility]            Restrictions: fall risk. SUBJECTIVE:   Subjective: pt gave month of birth, answered yes or no. changed mind very quick. did not want to be touched due to so much pain.     Pain  Pain: yelled with leg movement. balled up left hand in a fist when attempted to be moved. easily agitated. OBJECTIVE:   Orientation  Overall Orientation Status: Impaired  Orientation Level: Oriented to person  Cognition  Overall Cognitive Status: Exceptions  Arousal/Alertness: Inconsistent responses to stimuli  Following Commands: Inconsistently follows commands  Attention Span: Difficulty dividing attention; Difficulty attending to directions  Insights: Not aware of deficits    Bed Mobility Training  Bed Mobility Training: Yes  Overall Level of Assistance: Maximum assistance;Assist X2 (anticipated. pt declined to get up out of bed.)  Rolling: Moderate assistance;Maximum assistance +2 for safety. Moved pts legs passively to check rom. Pt held left leg in extension and resisted movement. ASSESSMENT pt easily agitated and did not understand why we were asking her to roll over. Educated her on the need to change positions and check her skin/back for skin issues. Pt very tender when legs moved or slightly touched. Pt initially denied any pain. Pt able to roll to both sides with mod to max assist +2 for safety. Pt very agitated with movement. Extended time needed to work with patient and encourage her to participate. Discharge Recommendations:  Continue to assess pending progress, Patient would benefit from continued therapy after discharge         Goals  Long Term Goals  Long Term Goal 1: Pt to roll L<>R in supine ModA  Long Term Goal 2: Pt to complete transition sit<>supine with ModA  Long Term Goal 3: Pt to complete transfers with ModA  Long Term Goal 4: Pt to sit EOB and maintain midline balance X 5min with SBA    PLAN    General Plan: Continue with current plan        [facility] (6 CLICK) BASIC MOBILITY  AM-PAC Inpatient Mobility Raw Score : 7     Therapy Time   Individual   Time In  0905   Time Out 0925   Minutes 20   20 minutes bed nancy.            Bolivar Frost PTA, 11/18/22 at 10:02 AM         Definitions for assistance levels  Independent = pt does not require any physical supervision or assistance from another person for activity completion. Device may be needed.   Stand by assistance = pt requires verbal cues or instructions from another person, close to but not touching, to perform the activity  Minimal assistance= pt performs 75% or more of the activity; assistance is required to complete the activity  Moderate assistance= pt performs 50% of the activity; assistance is required to complete the activity  Maximal assistance = pt performs 25% of the activity; assistance is required to complete the activity  Dependent = pt requires total physical assistance to accomplish the task

## 2022-11-18 NOTE — PROGRESS NOTES
Pt crying out. Stating \"oh my leg, it hurts\"  Pt is continually moving left leg. Repositioned in bed and tylenol given via PEG. Pt remains agitated and restless. Will continue to monitor.

## 2022-11-18 NOTE — PROGRESS NOTES
Comprehensive Nutrition Assessment    Type and Reason for Visit:  Reassess    Nutrition Recommendations/Plan:   Continue NPO, Continue current Tube Feeding     Malnutrition Assessment:  Malnutrition Status:  No malnutrition (11/12/22 1359)      Nutrition Assessment:    Pt had PEG placed 11/16 d/t inability to safely take po diet. Pt receiving standard fiber formula (Jevity1.5) @ 50ml/hr as recommended. Pt tolerating current TF at goal.    Nutrition Related Findings:    PMH: COPD, HTN, HLD; admitted for CVA, BSE 11/12-NPO. +1 LUE and trace RUE&RLE edema noted. Labs noted, meds reviewed. PEG placed 11/16. IVF d/c'd 11/16. Wound Type: None       Current Nutrition Intake & Therapies:    Average Meal Intake: NPO  Current Tube Feeding (TF) Orders:  Feeding Route: PEG  Formula: Standard with Fiber (Jevity 1.5)  Schedule: Continuous  Feeding Regimen: 50ml/hr, 1200mls  Additives/Modulars: None  Water Flushes: 180mls qid  Current TF & Flush Orders Provides: 1800kcals/76.5gms protein/1630mls H2O  Goal TF & Flush Orders Provides: 1800kcals/76.5gms protein/1630mls H2O    Anthropometric Measures:  Height: 5' 3\" (160 cm)  Ideal Body Weight (IBW): 115 lbs (52 kg)    Admission Body Weight: 160 lb (72.6 kg) (stated)  Current Body Weight: 158 lb (71.7 kg) (11/12),  Weight Source: Bed Scale  Current BMI (kg/m2): 28  Usual Body Weight: 162 lb (73.5 kg) (( 7/22); 153 # ( 6/2021))  % Weight Change (Calculated): -2.5                    BMI Categories: Overweight (BMI 25.0-29. 9)    Estimated Daily Nutrient Needs:  Energy Requirements Based On: Kcal/kg  Weight Used for Energy Requirements: Current  Energy (kcal/day): 0768-0739 kcals @ 22-25 kcal/kg  Weight Used for Protein Requirements: Ideal  Protein (g/day): ~ 68 g protein @ 1.3 g/kg IBW  Method Used for Fluid Requirements: 1 ml/kcal  Fluid (ml/day): ~1800    Nutrition Diagnosis:   Inadequate oral intake related to swallowing difficulty as evidenced by NPO status, EN support    Nutrition

## 2022-11-18 NOTE — PROGRESS NOTES
[facility] Heart Progress Note      Patient: Richie Hooper    Unit/Bed: K392/C607-65  YOB: 1945  MRN: [id number]  Admit Date:  11/12/2022  Hospital Day: 6    Rounding Date: 11/18/2022    Rounding Cardiologist:  OLESYA Butler MD    PRIMARY Cardiologist: Apoorva Waterman    Subjective Complaint:   Denies any chest pain with exertion or at rest, palpitations, syncope, or edema.,  Patient appears a little confused. However,  says is not too far off her baseline. .     Physical Examination:     BP (!) 168/64   Pulse (!) 48   Temp 97.5 °F (36.4 °C) (Oral)   Resp 20   Ht 5' 3\" (1.6 m)   Wt 160 lb (72.6 kg)   LMP  (LMP Unknown)   SpO2 95%   BMI 28.34 kg/m²       Intake/Output Summary (Last 24 hours) at 11/18/2022 1156  Last data filed at 11/18/2022 0800  Gross per 24 hour   Intake 1474 ml   Output 100 ml   Net 1374 ml                  Sherren Ruth Justice examined at bedside in moderately ill. Focused exam reveals:     Cardiac: Heart regular rate and rhythm     Lungs:  clear to auscultation bilaterally- no wheezes, rales or rhonchi, normal air movement, no respiratory distress    Extremities:   negative    Telemetry:      No significant arrhythmias        LABS:   CBC:   Recent Labs     11/16/22  0503 11/17/22  0512 11/18/22  0501   WBC 8.8 12.4* 10.6   HGB 13.0 12.5 12.7    163 167      BMP:    Recent Labs     11/16/22  0503 11/17/22  0512 11/18/22  0501    139 137   K 3.7 3.9 3.5    103 99   CO2 24 26 25   BUN 11 14 19   CREATININE 0.59 0.53 0.57   GLUCOSE 110* 131* 106*              Troponin: No results for input(s): TROPONINT in the last 72 hours. BNP: No results for input(s): PROBNP in the last 72 hours. INR: No results for input(s): INR in the last 72 hours. Mg:   Recent Labs     11/17/22 0512   MG 2.0       Cardiac Testing:         Summary   Bubble study negative   Normal left ventricle structure and function.    Left ventricular ejection fraction is visually estimated at 55%. No Doppler data.  Limited study     Assessment:  CVA-hemorrhagic  Baseline dementia  Occasional SVT-not prolonged  Status post PEG  Patient to be discharged  Plan:  Okay to go to nursing facility  We will reconcile medications  Follow-up with Dr. Marian Ravi has been arranged  Increase potassium supplementation--we will check occasional BMP  See orders  Electronically signed by Almas Davila MD on 11/18/2022 at 11:56 AM

## 2022-11-18 NOTE — PROGRESS NOTES
Pt awake and continuing to say \"ouch\" when touched. At times she specifically states her legs hurt. Has only slept approximately 1.5 hours tonight.

## 2022-11-18 NOTE — PROGRESS NOTES
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 Possibility of some ventricular hemorrhage as well mention but Dr. Monie Medrano feels this is calcification. Patient is currently alert and oriented x2, no acute distress, cooperative. Right side is flaccid. She remains n.p.o. due to dysphagia. PEG tube placed. Dysarthria and expressive aphasia noted. Right facial droop. Denies headache or vision changes. Blood pressure elevated at times.   Antiplatelet therapy has been initiated    As noted above,    Current Facility-Administered Medications   Medication Dose Route Frequency Provider Last Rate Last Admin    lidocaine 4 % external patch 2 patch  2 patch TransDERmal Daily Jsoe Millan DO   2 patch at 11/18/22 1044    metoprolol tartrate (LOPRESSOR) tablet 100 mg  100 mg PEG Tube BID Jose Manuel Sanches MD   100 mg at 11/18/22 1045    losartan (COZAAR) tablet 25 mg  25 mg PEG Tube Daily Jose Manuel Sanchse MD   25 mg at 11/18/22 1045    atorvastatin (LIPITOR) tablet 80 mg  80 mg PEG Tube Nightly Jose Manuel Sanches MD   80 mg at 11/17/22 2019    furosemide (LASIX) tablet 20 mg  20 mg PEG Tube Once per day on Mon Wed Fri Jessica Dixon MD   20 mg at 11/18/22 1045    hydrALAZINE (APRESOLINE) tablet 50 mg  50 mg PEG Tube BID Jose Manuel Sanches MD   50 mg at 11/18/22 1045    potassium bicarbonate (K-LYTE) disintegrating tablet 25 mEq  25 mEq PEG Tube Once per day on Mon Wed Fri Jessica Dixon MD   25 mEq at 11/18/22 1100    enoxaparin (LOVENOX) injection 40 mg  40 mg SubCUTAneous Daily Jose Manuel Sanches MD   40 mg at 11/18/22 1044    acetaminophen (TYLENOL) 160 MG/5ML solution 650 mg  650 mg Oral Q4H PRN Jose Millan DO   650 mg at 11/18/22 0141    aspirin chewable tablet 81 mg  81 mg PEG Tube Daily YULY Decker - CNP   81 mg at 11/18/22 1045    predniSONE (DELTASONE) tablet 40 mg  40 mg Oral Daily Dora Figueroa MD   40 mg at 11/18/22 1045    ipratropium-albuterol (DUONEB) nebulizer solution 1 ampule  1 ampule Inhalation TID Dora Figueroa MD   1 ampule at 11/18/22 0753    albuterol (PROVENTIL) nebulizer solution 2.5 mg  2.5 mg Nebulization Q2H PRN Dora Figueroa MD        acetaminophen (TYLENOL) suppository 650 mg  650 mg Rectal Q6H PRN Lakesha Wynn MD   650 mg at 11/14/22 1409    hydrALAZINE (APRESOLINE) injection 5 mg  5 mg IntraVENous Q4H PRN Lakesha Wynn MD   5 mg at 11/13/22 1549    ondansetron (ZOFRAN-ODT) disintegrating tablet 4 mg  4 mg Oral Q8H PRN Lakesha Wynn MD        Or    ondansetron [facility]) injection 4 mg  4 mg IntraVENous Q6H PRN Lakesha Wynn MD        polyethylene glycol (GLYCOLAX) packet 17 g  17 g Oral Daily PRN Lakesha Wynn MD        labetalol (NORMODYNE;TRANDATE) injection 10 mg  10 mg IntraVENous Q10 Min PRN Lakesha Wynn MD   10 mg at 11/13/22 1119    glucose chewable tablet 16 g  4 tablet Oral PRN Lakesha Wynn MD        dextrose bolus 10% 125 mL  125 mL IntraVENous PRN Lakesha Wynn MD        Or    dextrose bolus 10% 250 mL  250 mL IntraVENous PRN Lakesha Wynn MD        glucagon (rDNA) injection 1 mg  1 mg SubCUTAneous PRN Lakesha Wynn MD        dextrose 10 % infusion   IntraVENous Continuous PRN Lakesha Wynn MD        insulin lispro (HUMALOG) injection vial 0-4 Units  0-4 Units SubCUTAneous Q4H Lakesha Wynn MD           PHYSICAL EXAM:    BP (!) 131/96   Pulse (!) 48   Temp 98.1 °F (36.7 °C) (Oral)   Resp 20   Ht 5' 3\" (1.6 m)   Wt 160 lb (72.6 kg)   LMP  (LMP Unknown)   SpO2 95%   BMI 28.34 kg/m²    General Appearance:      Skin:  normal  CVS - Normal sounds, No murmurs , No carotid Bruits  RS -CTA  Abdomen Soft, BS present  Review of Systems   Constitutional:  Negative for fever. HENT:  Positive for trouble swallowing.  Negative for hearing loss. Eyes:  Negative for visual disturbance. Respiratory:  Negative for cough, choking, shortness of breath and wheezing. Cardiovascular:  Negative for chest pain and palpitations. Gastrointestinal:  Negative for nausea and vomiting. Musculoskeletal:  Positive for gait problem. Negative for back pain, neck pain and neck stiffness. Skin:  Negative for color change. Neurological:  Positive for facial asymmetry, speech difficulty and weakness. Negative for dizziness, tremors, seizures, syncope, light-headedness, numbness and headaches. Psychiatric/Behavioral:  Positive for confusion. Negative for behavioral problems, hallucinations and sleep disturbance. Mental Status Exam:             Level of Alertness:   awake            Orientation:   person, place,             Memory:   She has underlying baseline dementia          f            Language: Dysarthria and expressive aphasia      Funduscopic Exam:     Cranial Nerves            Cranial nerve III           Pupils:  equal, round, reactive to light      Cranial nerves III, IV, VI           Extraocular Movements: intact        Motor: Patient has underlying dementia and has hemiplegia in the right upper and lower extremity.   She is not able to swallow            Sensory: Unable to perform                 Vibration                         Touch            Proprioception                 Coordination: Unable to perform                    Reflexes:             Deep Tendon Reflexes:             Reflexes are 2 +             Plantar response:                Right:  downgoing               Left:  downgoing    Vascular:  Cardiac Exam:  normal       No change ib exam,   CTA HEAD W WO CONTRAST    Result Date: 11/12/2022  EXAMINATION: CTA OF THE NECK; CTA OF THE HEAD WITHOUT AND WITH CONTRAST 11/12/2022 7:57 am TECHNIQUE: CTA of the neck was performed with the administration of intravenous contrast. Multiplanar reformatted images are provided for review. MIP images are provided for review. Stenosis of the internal carotid arteries measured using NASCET criteria. Automated exposure control, iterative reconstruction, and/or weight based adjustment of the mA/kV was utilized to reduce the radiation dose to as low as reasonably achievable.; CTA of the head/brain was performed without and with the administration of intravenous contrast. Multiplanar reformatted images are provided for review. MIP images are provided for review. Automated exposure control, iterative reconstruction, and/or weight based adjustment of the mA/kV was utilized to reduce the radiation dose to as low as reasonably achievable. COMPARISON: CT brain earlier same day HISTORY: ORDERING SYSTEM PROVIDED HISTORY: right sided weakness TECHNOLOGIST PROVIDED HISTORY: Reason for exam:->right sided weakness What reading provider will be dictating this exam?->CRC FINDINGS: CTA NECK: AORTIC ARCH/ARCH VESSELS: Atherosclerotic calcifications are present within the aortic arch. The origins of the great vessels are normal.  There is no significant stenosis of the innominate or subclavian arteries. CAROTID ARTERIES: There is mild calcified atherosclerotic plaque within the left common carotid artery. There is no significant stenosis of the common carotid arteries identified. There is no significant stenosis of the internal carotid arteries. There is no dissection or pseudoaneurysm. VERTEBRAL ARTERIES: There is a mildly beaded appearance of the distal V2 and V3 segments of the vertebral arteries suggesting fibromuscular dysplasia. There is no dissection or pseudoaneurysm. There is no significant stenosis. SOFT TISSUES: Lung apices are clear. There is no superior mediastinal lymphadenopathy. There is no cervical lymphadenopathy or soft tissue mass identified. The parotid glands and submandibular glands are normal. BONES: No lytic or blastic osseous lesions are identified.   There is moderate multilevel neural foraminal narrowing and mild spinal canal stenosis from C4-C5 to C6-C7. CTA HEAD: ANTERIOR CIRCULATION: Atherosclerotic calcifications are present within the cavernous segments of the internal carotid arteries without significant stenosis evident. The anterior and middle cerebral arteries are normal. There is no significant stenosis or aneurysm evident. POSTERIOR CIRCULATION: The distal vertebral arteries are normal in appearance. The basilar artery is normal.  No significant stenosis or aneurysm is identified. The posterior cerebral arteries are normal in appearance. OTHER: No dural venous sinus thrombosis on this non-dedicated study. BRAIN: There is no mass effect or midline shift. There is no vascular malformation identified. 1. No large vessel occlusion, significant stenosis or cerebral aneurysm identified within the brain. 2. Atherosclerosis without significant arterial stenosis identified within the neck. 3. Beaded appearance of the distal vertebral arteries suggesting fibromuscular dysplasia. CT HEAD WO CONTRAST    Result Date: 11/13/2022  EXAMINATION: CT OF THE HEAD WITHOUT CONTRAST  11/13/2022 9:25 am TECHNIQUE: CT of the head was performed without the administration of intravenous contrast. Automated exposure control, iterative reconstruction, and/or weight based adjustment of the mA/kV was utilized to reduce the radiation dose to as low as reasonably achievable. COMPARISON: None. HISTORY: ORDERING SYSTEM PROVIDED HISTORY: eval for progression of blood layering within the occipital horns of the lateral ventricles TECHNOLOGIST PROVIDED HISTORY: Reason for exam:->eval for progression of blood layering within the occipital horns of the lateral ventricles Has a \"code stroke\" or \"stroke alert\" been called? ->No What reading provider will be dictating this exam?->CRC FINDINGS: BRAIN/VENTRICLES: Cerebral atrophy is noted globally.   There is posterior trophic enlargement of the lateral ventricles similar to older examinations. There is a small amount of intermediate density hemorrhage in the occipital horns of the right and left ventricle similar in severity to 11/12/2022 MRI. 4th ventricle is patent. There is no shift of midline structures. No subarachnoid or subdural hemorrhage. ORBITS: The visualized portion of the orbits demonstrate no acute abnormality. SINUSES: Pilar bullosa of the left middle nasal turbinate. Right deviation of the nasal septum which appears chronic. Mild mucosal thickening in the sphenoid sinuses. Small air-fluid level in the inferior left frontal sinus. Mastoid air cells are clear. SOFT TISSUES/SKULL:  No acute abnormality of the visualized skull or soft tissues. 1.  Stable minimal layering hemorrhage in the occipital horns of the right and left lateral ventricle. No change from MRI dated 11/12/2022. 2.  Findings compatible with chronic paranasal sinusitis, mild. CT HEAD WO CONTRAST    Result Date: 11/12/2022  EXAMINATION: CT OF THE HEAD WITHOUT CONTRAST  11/12/2022 10:31 am TECHNIQUE: CT of the head was performed without the administration of intravenous contrast. Automated exposure control, iterative reconstruction, and/or weight based adjustment of the mA/kV was utilized to reduce the radiation dose to as low as reasonably achievable. COMPARISON: 11/12/2022 HISTORY: ORDERING SYSTEM PROVIDED HISTORY: eval for hmg conversion TECHNOLOGIST PROVIDED HISTORY: Reason for exam:->eval for hmg conversion Has a \"code stroke\" or \"stroke alert\" been called? ->No What reading provider will be dictating this exam?->CRC FINDINGS: BRAIN/VENTRICLES: Generalized atrophy identified of the brain. Some low-attenuation areas identified in the periventricular and subcortical white matter to suggest chronic small vessel ischemic change. There is no acute intracranial hemorrhage, mass effect or midline shift. No abnormal extra-axial fluid collection.   The gray-white differentiation is maintained without evidence of an acute infarct. There is no evidence of hydrocephalus. ORBITS: The visualized portion of the orbits demonstrate no acute abnormality. SINUSES: The visualized paranasal sinuses and mastoid air cells demonstrate no acute abnormality. SOFT TISSUES/SKULL:  No acute abnormality of the visualized skull or soft tissues. Atrophy and chronic changes seen within the brain with no acute intracranial abnormality. CT HEAD WO CONTRAST    Result Date: 11/12/2022  EXAMINATION: CT OF THE HEAD WITHOUT CONTRAST  11/12/2022 4:34 am TECHNIQUE: CT of the head was performed without the administration of intravenous contrast. Automated exposure control, iterative reconstruction, and/or weight based adjustment of the mA/kV was utilized to reduce the radiation dose to as low as reasonably achievable. COMPARISON: None. HISTORY: ORDERING SYSTEM PROVIDED HISTORY: fall TECHNOLOGIST PROVIDED HISTORY: Reason for exam:->fall Has a \"code stroke\" or \"stroke alert\" been called? ->No Decision Support Exception - unselect if not a suspected or confirmed emergency medical condition->Emergency Medical Condition (MA) What reading provider will be dictating this exam?->CRC FINDINGS: BRAIN/VENTRICLES: There is no acute intracranial hemorrhage, mass effect or midline shift. No abnormal extra-axial fluid collection. The gray-white differentiation is maintained without evidence of an acute infarct. There is no evidence of hydrocephalus. The ventricles, cisterns and sulci are prominent consistent with atrophy. There is decreased attenuation within the periventricular white matter consistent with periventricular leukomalacia. ORBITS: The visualized portion of the orbits demonstrate no acute abnormality. SINUSES: The visualized paranasal sinuses and mastoid air cells demonstrate no acute abnormality. SOFT TISSUES/SKULL:  No acute abnormality of the visualized skull or soft tissues.      1. There is no acute intracranial abnormality. Specifically, there is no intracranial hemorrhage. 2. Atrophy and periventricular leukomalacia,     CTA NECK W WO CONTRAST    Result Date: 11/12/2022  EXAMINATION: CTA OF THE NECK; CTA OF THE HEAD WITHOUT AND WITH CONTRAST 11/12/2022 7:57 am TECHNIQUE: CTA of the neck was performed with the administration of intravenous contrast. Multiplanar reformatted images are provided for review. MIP images are provided for review. Stenosis of the internal carotid arteries measured using NASCET criteria. Automated exposure control, iterative reconstruction, and/or weight based adjustment of the mA/kV was utilized to reduce the radiation dose to as low as reasonably achievable.; CTA of the head/brain was performed without and with the administration of intravenous contrast. Multiplanar reformatted images are provided for review. MIP images are provided for review. Automated exposure control, iterative reconstruction, and/or weight based adjustment of the mA/kV was utilized to reduce the radiation dose to as low as reasonably achievable. COMPARISON: CT brain earlier same day HISTORY: ORDERING SYSTEM PROVIDED HISTORY: right sided weakness TECHNOLOGIST PROVIDED HISTORY: Reason for exam:->right sided weakness What reading provider will be dictating this exam?->CRC FINDINGS: CTA NECK: AORTIC ARCH/ARCH VESSELS: Atherosclerotic calcifications are present within the aortic arch. The origins of the great vessels are normal.  There is no significant stenosis of the innominate or subclavian arteries. CAROTID ARTERIES: There is mild calcified atherosclerotic plaque within the left common carotid artery. There is no significant stenosis of the common carotid arteries identified. There is no significant stenosis of the internal carotid arteries. There is no dissection or pseudoaneurysm.  VERTEBRAL ARTERIES: There is a mildly beaded appearance of the distal V2 and V3 segments of the vertebral arteries suggesting fibromuscular dysplasia. There is no dissection or pseudoaneurysm. There is no significant stenosis. SOFT TISSUES: Lung apices are clear. There is no superior mediastinal lymphadenopathy. There is no cervical lymphadenopathy or soft tissue mass identified. The parotid glands and submandibular glands are normal. BONES: No lytic or blastic osseous lesions are identified. There is moderate multilevel neural foraminal narrowing and mild spinal canal stenosis from C4-C5 to C6-C7. CTA HEAD: ANTERIOR CIRCULATION: Atherosclerotic calcifications are present within the cavernous segments of the internal carotid arteries without significant stenosis evident. The anterior and middle cerebral arteries are normal. There is no significant stenosis or aneurysm evident. POSTERIOR CIRCULATION: The distal vertebral arteries are normal in appearance. The basilar artery is normal.  No significant stenosis or aneurysm is identified. The posterior cerebral arteries are normal in appearance. OTHER: No dural venous sinus thrombosis on this non-dedicated study. BRAIN: There is no mass effect or midline shift. There is no vascular malformation identified. 1. No large vessel occlusion, significant stenosis or cerebral aneurysm identified within the brain. 2. Atherosclerosis without significant arterial stenosis identified within the neck. 3. Beaded appearance of the distal vertebral arteries suggesting fibromuscular dysplasia. XR CHEST PORTABLE    Result Date: 11/12/2022  EXAMINATION: ONE XRAY VIEW OF THE CHEST 11/12/2022 4:12 am COMPARISON: None. HISTORY: ORDERING SYSTEM PROVIDED HISTORY: incresed resp rate TECHNOLOGIST PROVIDED HISTORY: Reason for exam:->incresed resp rate What reading provider will be dictating this exam?->CRC FINDINGS: The cardiomediastinal silhouette is at the upper limits of normal for technique. There are low lung volumes with bronchovascular crowding and bibasilar atelectasis. No pneumothorax or pleural effusion. Bilateral shoulder arthroplasties. Bibasilar atelectasis. MRI brain without contrast    Result Date: 11/12/2022  EXAMINATION: MRI OF THE BRAIN WITHOUT CONTRAST  11/12/2022 1:48 pm TECHNIQUE: Multiplanar multisequence MRI of the brain was performed without the administration of intravenous contrast. COMPARISON: None. HISTORY: ORDERING SYSTEM PROVIDED HISTORY: TIA TECHNOLOGIST PROVIDED HISTORY: Reason for exam:->TIA What is the sedation requirement?->None What reading provider will be dictating this exam?->CRC Initial evaluation. FINDINGS: INTRACRANIAL STRUCTURES/VENTRICLES: There is an acute infarct within the left inferior ahsan (DWI series 3, image 8). No mass effect or midline shift. A trace amount of blood is seen layering within the occipital horns of the lateral ventricles bilaterally. Areas of T2 FLAIR hyperintensity are seen in the periventricular and subcortical white matter, which are nonspecific, but may represent chronic microvascular ischemic change. There is prominence of the ventricles and sulci due to global parenchymal volume loss. The sellar/suprasellar regions appear unremarkable. There is at least partial loss of the normal signal void within the right vertebral artery. ORBITS: The visualized portion of the orbits demonstrate no acute abnormality. SINUSES: The visualized paranasal sinuses and mastoid air cells demonstrate no acute abnormality. BONES/SOFT TISSUES: The bone marrow signal intensity appears normal. The soft tissues demonstrate no acute abnormality. 1. Small acute infarct involving the left inferior ahsan. No mass effect or midline shift. 2. Trace amount of blood layering within the occipital horns of the lateral ventricles bilaterally. 3. At least partial loss of the normal signal void within the right vertebral artery, which can be seen with slow flow/occlusion. This is of uncertain chronicity. 4. Otherwise, no acute intracranial abnormality.  5. Moderate global parenchymal volume loss with mild chronic microvascular ischemic changes. These results were sent to the Results [id number] ([address] [facility]) on 11/12/2022 at 2:22 pm to be communicated to the referring/covering health care provider/office. Recent Labs     11/16/22  0503 11/17/22  0512 11/18/22  0501   WBC 8.8 12.4* 10.6   HGB 13.0 12.5 12.7    163 167       Recent Labs     11/16/22  0503 11/17/22  0512 11/18/22  0501    139 137   K 3.7 3.9 3.5    103 99   CO2 24 26 25   BUN 11 14 19   CREATININE 0.59 0.53 0.57   GLUCOSE 110* 131* 106*       No results for input(s): BILITOT, ALKPHOS, AST, ALT in the last 72 hours. Lab Results   Component Value Date/Time    PROTIME 12.8 11/12/2022 11:23 AM    PROTIME 10.7 04/23/2012 11:12 AM    INR 1.0 11/12/2022 11:23 AM     No results found for: LITHIUM, DILFRTOT, VALPROATE    ASSESSMENT AND PLAN  Left pontine stroke with hemiplegia on the right upper and lower extremity. Patient has dysphagia as well. Patient has vertebral artery narrowing stenosis and may have fibromuscular dysplasia in this area. We were contemplating heparin and had just given her for few minutes till MRI results came back with some layering of hemorrhage. Repeat CT shows some minor layering of hemorrhage though this could be calcification. Hold aspirin for few days findings discussed the daughter that it is very difficult ascertain what the outcome is going to be. Of note her symptoms started much earlier and this was a wake-up stroke. Patient has history suggestion  of dementia has been seen by other providers. 11/14/2022:  Left pontine ischemic CVA with ventricular hemorrhage repeat CT of the head done 11/13/2022 stable. Some increasing right-sided weakness today therefore we will repeat CT of the head given its location and propensity for evolution and hemorrhage.   Vertebral artery fibromuscular dysplasia  LDL 82  I have personally performed a face to face diagnostic evaluation on this patient, reviewed all data and investigations, and am the sole provider of all clinical decisions on the neurological status of this patient. as noted above, await PEG and rehab, 60% time spent       Kenn Freeman MD, [address], American Board of Psychiatry & Neurology  Board Certified in Vascular Neurology  Board Certified in Neuromuscular Medicine  Certified in Neurorehabilitation     Hemoglobin A1c 5.9  Dysphagia, remains n.p.o. palliative care is following for discussion regarding alternate means of nutrition. Hypertension, ongoing, needs ongoing attention to blood pressure control. 11/15/2022:  Repeat CT of the head negative for acute findings yesterday  Dysphagia, remains n.p.o. with plans for PEG tube placement. Plan to initiate antiplatelet therapy tomorrow once PEG tube placed. I have personally performed a face to face diagnostic evaluation on this patient, reviewed all data and investigations, and am the sole provider of all clinical decisions on the neurological status of this patient. dysphagia, Will need PEG, prognosis caanot be ascertained. 60 % time spent       11/16/22:  PEG tube placed today. Initiate aspirin 81 mg daily    I have personally performed a face to face diagnostic evaluation on this patient, reviewed all data and investigations, and am the sole provider of all clinical decisions on the neurological status of this patient.right weakness, pontine cva  Needs rehab, not [facility] 60% time spent eval     11/17/22:  Left pontine ischemic CVA resulting in right-sided weakness, dysarthria and dysphagia. Status post PEG tube placement. Aspirin 81 mg daily has been initiated. Okay to DC from neurology standpoint. Plans for discharge to skilled nursing facility. Follow-up 4 to 6 weeks. 11/18/2022:  Patient to be discharged to skilled nursing facility today. Okay from neurology standpoint. Follow-up 4 to 6 weeks.     I have personally performed a face to face diagnostic evaluation on this patient, reviewed all data and investigations, and am the sole provider of all clinical decisions on the neurological status of this patient. as noted above,no change in exam, 60 % time spent on eval       Kenn Rodriguez MD, [place] Hernandez, American Board of Psychiatry & Neurology  Board Certified in Vascular Neurology  Board Certified in Neuromuscular Medicine  Certified in . [address]

## 2022-11-18 NOTE — DISCHARGE SUMMARY
Discharge Summary    Date: 11/18/2022  Patient Name: Vilma Salamanca    YOB: 1945     Age: 76 y.o. 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 ([address]    Fibromuscular dysplasia (HCC)    Impaired mobility and activities of daily living dt CVA    Acute CVA (cerebrovascular accident) ([address]    Dysphagia    Cerebrovascular accident (CVA) due to stenosis of left vertebral artery ([address]    Palliative care encounter    Advanced care planning/counseling discussion    Goals of care, counseling/discussion    Aphasia    TIA (transient ischemic attack)    Nocturnal hypoxia    Sleep apnea    Hypertension    COPD (chronic obstructive pulmonary disease) ([address]  Resolved Problems:    * No resolved hospital problems. [facility] Stay  Narrative of Hospital Course:  Patient is a 17-year-old female admitted 11/12/2022 in setting of Left pontine ischemic CVA with ventricular hemorrhage, with subsequent right hemiparesis/hemiplegia. She had some subsequent dysphagia as well. Multiple teams were involved with her admission, including neurology, cardiology, surgical service for PEG placement in setting of persistent dysphagia, clinical nutritionist, PT/OT/ST, Palliative Care, Case management, and Social Work. Patient trended some improvement during her admission, and no evidence of progressive of hemorrhagic foci, but did have persistent dysphagia and right hemiparesis. PEG tube was placed for enteric access for food/fluid/meds. Consultants:  IP CONSULT TO NEUROLOGY  IP CONSULT TO CASE MANAGEMENT  IP CONSULT TO DIETITIAN  IP CONSULT TO SOCIAL WORK  IP CONSULT TO REHAB/TCU ADMISSION COORDINATOR  IP CONSULT TO PALLIATIVE CARE  IP CONSULT TO SOCIAL WORK  IP CONSULT TO CARDIOLOGY  IP CONSULT TO INTERVENTIONAL RADIOLOGY  IP CONSULT TO GENERAL SURGERY  IP CONSULT TO PULMONOLOGY  IP CONSULT TO DIETITIAN    Surgeries/procedures Performed:      Treatments:    Analgesia, Cardiac Medications, Steroids, Insulin, Respiratory Therapy, Surgery and Therapies    PT, OT, ST, RN, SW and Other    Discharge Plan/Disposition:  To [facility]/Incidental Findings Requiring Follow Up:    Patient Instructions:    Diet: Cardiac Diet    Activity:  For number of days (if applicable): Other Instructions:    Provider Follow-Up:   No follow-ups on file. Significant Diagnostic Studies:    Recent Labs:  Admission on 11/12/2022  No results displayed because visit has over 200 results. ------------    Radiology last 7 days:  CTA HEAD W WO CONTRAST    Result Date: 11/14/2022  1. No large vessel occlusion, significant stenosis or cerebral aneurysm identified within the brain. 2. Atherosclerosis without significant arterial stenosis identified within the neck. 3. Beaded appearance of the distal vertebral arteries suggesting fibromuscular dysplasia. CT HEAD WO CONTRAST    Result Date: 11/14/2022  Stable discrete intraventricular hemorrhage since recent studies. CT HEAD WO CONTRAST    Result Date: 11/13/2022  1. Stable minimal layering hemorrhage in the occipital horns of the right and left lateral ventricle. No change from MRI dated 11/12/2022. 2.  Findings compatible with chronic paranasal sinusitis, mild. CT HEAD WO CONTRAST    Result Date: 11/12/2022  Atrophy and chronic changes seen within the brain with no acute intracranial abnormality. CT HEAD WO CONTRAST    Result Date: 11/12/2022  1.  There is no acute intracranial abnormality. Specifically, there is no intracranial hemorrhage. 2. Atrophy and periventricular leukomalacia,     CTA NECK W WO CONTRAST    Result Date: 11/14/2022  1. No large vessel occlusion, significant stenosis or cerebral aneurysm identified within the brain. 2. Atherosclerosis without significant arterial stenosis identified within the neck. 3. Beaded appearance of the distal vertebral arteries suggesting fibromuscular dysplasia. XR CHEST PORTABLE    Result Date: 11/12/2022  Bibasilar atelectasis. MRI brain without contrast    Result Date: 11/12/2022  1. Small acute infarct involving the left inferior ahsan. No mass effect or midline shift. 2. Trace amount of blood layering within the occipital horns of the lateral ventricles bilaterally. 3. At least partial loss of the normal signal void within the right vertebral artery, which can be seen with slow flow/occlusion. This is of uncertain chronicity. 4. Otherwise, no acute intracranial abnormality. 5. Moderate global parenchymal volume loss with mild chronic microvascular ischemic changes. These results were sent to the [facility] [id number] ([facility]) on 11/12/2022 at 2:22 pm to be communicated to the referring/covering health care provider/office. [unfilled]    Discharge Medications    Current Discharge Medication List    START taking these medications    predniSONE (DELTASONE) 20 MG tablet  Take 2 tablets by mouth daily for 3 doses  Qty: 6 tablet Refills: 0    lidocaine 4 % external patch  Place 2 patches onto the skin daily as needed (For low back pain.   Patches can remain on for 12hr of each 24hr period)  Qty: 2 each Refills: 0          Current Discharge Medication List        Current Discharge Medication List    CONTINUE these medications which have NOT CHANGED    pravastatin (PRAVACHOL) 40 MG tablet  TAKE ONE-HALF TABLET BY MOUTH DAILY  Qty: 90 tablet Refills: 0  Associated Diagnoses:Hyperlipidemia, unspecified hyperlipidemia type    albuterol sulfate HFA (PROVENTIL;VENTOLIN;PROAIR) 108 (90 Base) MCG/ACT inhaler  Inhale 2 puffs into the lungs 4 times daily as needed for Wheezing  Qty: 3 each Refills: 1  Associated Diagnoses:Chronic obstructive pulmonary disease, unspecified COPD type (HCC)    sertraline (ZOLOFT) 50 MG tablet  Take 1 tablet by mouth daily  Qty: 30 tablet Refills: 5  Associated Diagnoses:Major depressive disorder, single episode, moderate (HCC)    gabapentin (NEURONTIN) 400 MG capsule  TAKE ONE CAPSULE BY MOUTH THREE TIMES A DAY FOR 90 DAYS  Qty: 90 capsule Refills: 0    Handicap Placard MISC  by Does not apply route Exp 5 years  Qty: 1 each Refills: 0  Associated Diagnoses:Lymphedema; Right leg pain    tiZANidine (ZANAFLEX) 4 MG tablet  TAKE ONE TABLET BY MOUTH THREE TIMES A DAY AS NEEDED FOR MUSCLE SPASMS / PAIN  Qty: 90 tablet Refills: 0  Associated Diagnoses:Degenerative disc disease, lumbar; Closed compression fracture of L1 lumbar vertebra with delayed healing, subsequent encounter    loratadine (CLARITIN) 10 MG tablet  Take 1 tablet by mouth daily  Qty: 30 tablet Refills: 0  Associated Diagnoses: Allergic rhinitis, unspecified seasonality, unspecified trigger    amLODIPine (NORVASC) 5 MG tablet  TAKE ONE TABLET BY MOUTH DAILY AT BEDTIME  Refills: 3    aspirin 81 MG EC tablet  Take 1 tablet by mouth daily  Qty: 30 tablet Refills: 3    melatonin 1 MG tablet  Take 1 tablet by mouth nightly as needed for Sleep  Qty: 30 tablet Refills: 2  Associated Diagnoses:Insomnia, unspecified type          Current Discharge Medication List    STOP taking these medications    naproxen (NAPROSYN) 375 MG tablet  Comments:  Reason for Stopping:    donepezil (ARICEPT) 10 MG tablet  Comments:  Reason for Stopping:    Oxygen Concentrator  Comments:  Reason for Stopping:          Time Spent on Discharge:  35 minutes were spent in patient examination, evaluation, counseling as well as medication reconciliation, prescriptions for required medications, discharge plan, and follow up.     Electronically signed by Elsa De Luna DO on 11/18/22 at 9:12 AM EST

## 2022-11-21 ENCOUNTER — OFFICE VISIT (OUTPATIENT)
Dept: GERIATRIC MEDICINE | Age: 77
End: 2022-11-21
Payer: MEDICARE

## 2022-11-21 DIAGNOSIS — I69.90 LATE EFFECTS OF CVA (CEREBROVASCULAR ACCIDENT): ICD-10-CM

## 2022-11-21 DIAGNOSIS — G93.40 ENCEPHALOPATHY: ICD-10-CM

## 2022-11-21 DIAGNOSIS — R47.01 APHASIA: ICD-10-CM

## 2022-11-21 DIAGNOSIS — L97.212 NON-PRESSURE CHRONIC ULCER OF RIGHT CALF WITH FAT LAYER EXPOSED (HCC): ICD-10-CM

## 2022-11-21 DIAGNOSIS — I63.212 CEREBROVASCULAR ACCIDENT (CVA) DUE TO STENOSIS OF LEFT VERTEBRAL ARTERY (HCC): Primary | ICD-10-CM

## 2022-11-21 PROCEDURE — 99309 SBSQ NF CARE MODERATE MDM 30: CPT | Performed by: NURSE PRACTITIONER

## 2022-11-21 PROCEDURE — 1123F ACP DISCUSS/DSCN MKR DOCD: CPT | Performed by: NURSE PRACTITIONER

## 2022-11-21 NOTE — PROGRESS NOTES
Physical Therapy  Facility/Department: [facility] MED SURG [id number]  Physical Therapy Discharge      NAME: Roscoe Kruger    : 1945 (76 y.o.)  MRN: [id number]    Account: [de-identified]  Gender: female      Patient has been discharged from acute care hospital. DC patient from current PT program.      Electronically signed by Liya Castro PT on 22 at 11:41 AM EST

## 2022-11-22 LAB
BASOPHILS ABSOLUTE: 0.2 /ΜL
BASOPHILS RELATIVE PERCENT: 1.7 %
BUN BLDV-MCNC: 47 MG/DL
CALCIUM SERPL-MCNC: 9.7 MG/DL
CHLORIDE BLD-SCNC: 98 MMOL/L
CO2: 31 MMOL/L
CREAT SERPL-MCNC: 0.9 MG/DL
EOSINOPHILS ABSOLUTE: 0.7 /ΜL
EOSINOPHILS RELATIVE PERCENT: 5.8 %
GFR CALCULATED: 60
GLUCOSE BLD-MCNC: 89 MG/DL
HCT VFR BLD CALC: 45.4 % (ref 36–46)
HEMOGLOBIN: 14.6 G/DL (ref 12–16)
LYMPHOCYTES ABSOLUTE: 2.3 /ΜL
LYMPHOCYTES RELATIVE PERCENT: 19.4 %
MCH RBC QN AUTO: 31 PG
MCHC RBC AUTO-ENTMCNC: 32 G/DL
MCV RBC AUTO: 96.8 FL
MONOCYTES ABSOLUTE: 1.2 /ΜL
MONOCYTES RELATIVE PERCENT: 10.5 %
NEUTROPHILS ABSOLUTE: 7.4 /ΜL
NEUTROPHILS RELATIVE PERCENT: 62.6 %
PLATELET # BLD: 269 K/ΜL
PMV BLD AUTO: 11.1 FL
POTASSIUM SERPL-SCNC: 4.9 MMOL/L
RBC # BLD: 4.69 10^6/ΜL
SODIUM BLD-SCNC: 139 MMOL/L
WBC # BLD: 11.9 10^3/ML

## 2022-11-22 NOTE — PROGRESS NOTES
Physician Progress Note      Junior Angelo  CSN #:                  [id number]  :                       1945  ADMIT DATE:       2022 3:30 AM  DISCH DATE:        2022 3:49 PM  RESPONDING  PROVIDER #:        Estee Gee MD          QUERY TEXT:    Pt admitted with Left pontine ischemic CVA with ventricular hemorrhage. Pt   noted to have confusion and disorientation. If possible, please document in   the progress notes and discharge summary if you are evaluating and / or   treating any of the following: The medical record reflects the following:  Risk Factors: left pontine ischemic CVA with ventricular hemorrhage, dementia  Clinical Indicators: Dr. Palma Pel: \"Positive for confusion, She has underlying   dementia. \" Nursing Flowsheets: LOC: 0(Alert) to 1(Responds to Voice), Oriented   to person, Disoriented to place, time and situation  Treatment: Neurology consult, Neuro checks, monitoring    Thank you,  Eyad Benton   Clinical Documentation Improvement Specialist  W: [phone]  Options provided:  -- Confusion due to baseline dementia  -- Encephalopathy due to left pontine ischemic CVA with ventricular hemorrhage  -- Other - I will add my own diagnosis  -- Disagree - Not applicable / Not valid  -- Disagree - Clinically unable to determine / Unknown  -- Refer to Clinical Documentation Reviewer    PROVIDER RESPONSE TEXT:    This patient has encephalopathy due to left pontine ischemic CVA with   ventricular hemorrhage.     Query created by: Chelsie Simpson on 2022 12:16 PM      Electronically signed by:  Estee Gee MD 2022 8:36 AM

## 2022-11-23 ENCOUNTER — OFFICE VISIT (OUTPATIENT)
Dept: GERIATRIC MEDICINE | Age: 77
End: 2022-11-23

## 2022-11-23 DIAGNOSIS — J43.8 OTHER EMPHYSEMA (HCC): Primary | ICD-10-CM

## 2022-11-23 DIAGNOSIS — I50.31 ACUTE DIASTOLIC HEART FAILURE (HCC): ICD-10-CM

## 2022-11-23 DIAGNOSIS — I10 PRIMARY HYPERTENSION: ICD-10-CM

## 2022-11-25 ENCOUNTER — HOSPITAL ENCOUNTER (INPATIENT)
Age: 77
LOS: 7 days | Discharge: NURSING, REHAB OR LONG-TERM CARE | End: 2022-12-02
Attending: EMERGENCY MEDICINE | Admitting: INTERNAL MEDICINE
Payer: MEDICARE

## 2022-11-25 ENCOUNTER — APPOINTMENT (OUTPATIENT)
Dept: GENERAL RADIOLOGY | Age: 77
End: 2022-11-25
Payer: MEDICARE

## 2022-11-25 ENCOUNTER — APPOINTMENT (OUTPATIENT)
Dept: CT IMAGING | Age: 77
End: 2022-11-25
Payer: MEDICARE

## 2022-11-25 DIAGNOSIS — R50.9 FEVER, UNSPECIFIED FEVER CAUSE: ICD-10-CM

## 2022-11-25 DIAGNOSIS — L03.90 CELLULITIS, UNSPECIFIED CELLULITIS SITE: ICD-10-CM

## 2022-11-25 DIAGNOSIS — N30.00 ACUTE CYSTITIS WITHOUT HEMATURIA: ICD-10-CM

## 2022-11-25 DIAGNOSIS — G93.40 ENCEPHALOPATHY: Primary | ICD-10-CM

## 2022-11-25 LAB
ADENOVIRUS BY PCR: NOT DETECTED
ALBUMIN SERPL-MCNC: 3.6 G/DL (ref 3.5–4.6)
ALP BLD-CCNC: 91 U/L (ref 40–130)
ALT SERPL-CCNC: 35 U/L (ref 0–33)
ANION GAP SERPL CALCULATED.3IONS-SCNC: 13 MEQ/L (ref 9–15)
AST SERPL-CCNC: 29 U/L (ref 0–35)
BACTERIA: NEGATIVE /HPF
BASE EXCESS ARTERIAL: 0 (ref -3–3)
BASOPHILS ABSOLUTE: 0.1 K/UL (ref 0–0.2)
BASOPHILS RELATIVE PERCENT: 0.4 %
BILIRUB SERPL-MCNC: 0.7 MG/DL (ref 0.2–0.7)
BILIRUBIN URINE: NEGATIVE
BLOOD, URINE: NEGATIVE
BORDETELLA PARAPERTUSSIS BY PCR: NOT DETECTED
BORDETELLA PERTUSSIS BY PCR: NOT DETECTED
BUN BLDV-MCNC: 34 MG/DL (ref 8–23)
CALCIUM IONIZED: 1.27 MMOL/L (ref 1.12–1.32)
CALCIUM SERPL-MCNC: 9.5 MG/DL (ref 8.5–9.9)
CHLAMYDOPHILIA PNEUMONIAE BY PCR: NOT DETECTED
CHLORIDE BLD-SCNC: 98 MEQ/L (ref 95–107)
CLARITY: CLEAR
CO2: 24 MEQ/L (ref 20–31)
COLOR: YELLOW
CORONAVIRUS 229E BY PCR: NOT DETECTED
CORONAVIRUS HKU1 BY PCR: NOT DETECTED
CORONAVIRUS NL63 BY PCR: NOT DETECTED
CORONAVIRUS OC43 BY PCR: NOT DETECTED
CREAT SERPL-MCNC: 0.66 MG/DL (ref 0.5–0.9)
EOSINOPHILS ABSOLUTE: 0.2 K/UL (ref 0–0.7)
EOSINOPHILS RELATIVE PERCENT: 0.9 %
EPITHELIAL CELLS, UA: ABNORMAL /HPF (ref 0–5)
GFR SERPL CREATININE-BSD FRML MDRD: >60 ML/MIN/{1.73_M2}
GFR SERPL CREATININE-BSD FRML MDRD: >60 ML/MIN/{1.73_M2}
GLOBULIN: 3.7 G/DL (ref 2.3–3.5)
GLUCOSE BLD-MCNC: 126 MG/DL (ref 70–99)
GLUCOSE BLD-MCNC: 130 MG/DL (ref 70–99)
GLUCOSE URINE: NEGATIVE MG/DL
HCO3 ARTERIAL: 24.6 MMOL/L (ref 21–29)
HCT VFR BLD CALC: 44.7 % (ref 37–47)
HEMOGLOBIN: 14.5 G/DL (ref 12–16)
HEMOGLOBIN: 17.5 GM/DL (ref 12–16)
HUMAN METAPNEUMOVIRUS BY PCR: NOT DETECTED
HUMAN RHINOVIRUS/ENTEROVIRUS BY PCR: NOT DETECTED
HYALINE CASTS: ABNORMAL /HPF (ref 0–5)
INFLUENZA A BY PCR: NEGATIVE
INFLUENZA A BY PCR: NOT DETECTED
INFLUENZA B BY PCR: NEGATIVE
INFLUENZA B BY PCR: NOT DETECTED
KETONES, URINE: NEGATIVE MG/DL
LACTATE: 0.85 MMOL/L (ref 0.4–2)
LACTIC ACID: 1 MMOL/L (ref 0.5–2.2)
LEUKOCYTE ESTERASE, URINE: ABNORMAL
LIPASE: 120 U/L (ref 12–95)
LYMPHOCYTES ABSOLUTE: 1.9 K/UL (ref 1–4.8)
LYMPHOCYTES RELATIVE PERCENT: 11.1 %
MAGNESIUM: 2.4 MG/DL (ref 1.7–2.4)
MCH RBC QN AUTO: 31.5 PG (ref 27–31.3)
MCHC RBC AUTO-ENTMCNC: 32.4 % (ref 33–37)
MCV RBC AUTO: 97.3 FL (ref 79.4–94.8)
MONOCYTES ABSOLUTE: 1.8 K/UL (ref 0.2–0.8)
MONOCYTES RELATIVE PERCENT: 10.4 %
MYCOPLASMA PNEUMONIAE BY PCR: NOT DETECTED
NEUTROPHILS ABSOLUTE: 13.6 K/UL (ref 1.4–6.5)
NEUTROPHILS RELATIVE PERCENT: 77.2 %
NITRITE, URINE: NEGATIVE
O2 SAT, ARTERIAL: 91 % (ref 93–100)
PARAINFLUENZA VIRUS 1 BY PCR: NOT DETECTED
PARAINFLUENZA VIRUS 2 BY PCR: NOT DETECTED
PARAINFLUENZA VIRUS 3 BY PCR: NOT DETECTED
PARAINFLUENZA VIRUS 4 BY PCR: NOT DETECTED
PCO2 ARTERIAL: 39 MM HG (ref 35–45)
PDW BLD-RTO: 13.9 % (ref 11.5–14.5)
PERFORMED ON: ABNORMAL
PH ARTERIAL: 7.41 (ref 7.35–7.45)
PH UA: 6.5 (ref 5–9)
PLATELET # BLD: 294 K/UL (ref 130–400)
PLATELET SLIDE REVIEW: NORMAL
PO2 ARTERIAL: 60 MM HG (ref 75–108)
POC CHLORIDE: 106 MEQ/L (ref 99–110)
POC CREATININE: 0.8 MG/DL (ref 0.6–1.2)
POC FIO2: 2
POC HEMATOCRIT: 51 % (ref 36–48)
POC POTASSIUM: 5 MEQ/L (ref 3.5–5.1)
POC SAMPLE TYPE: ABNORMAL
POC SODIUM: 137 MEQ/L (ref 136–145)
POTASSIUM SERPL-SCNC: 4.9 MEQ/L (ref 3.4–4.9)
PRO-BNP: 426 PG/ML
PROCALCITONIN: 0.17 NG/ML (ref 0–0.15)
PROTEIN UA: NEGATIVE MG/DL
RBC # BLD: 4.59 M/UL (ref 4.2–5.4)
RBC # BLD: NORMAL 10*6/UL
RBC UA: ABNORMAL /HPF (ref 0–5)
RENAL EPITHELIAL, UA: ABNORMAL /HPF
RESPIRATORY SYNCYTIAL VIRUS BY PCR: NOT DETECTED
SARS-COV-2, NAAT: NOT DETECTED
SARS-COV-2, PCR: NOT DETECTED
SODIUM BLD-SCNC: 135 MEQ/L (ref 135–144)
SPECIFIC GRAVITY UA: 1.02 (ref 1–1.03)
TCO2 ARTERIAL: 26 MMOL/L (ref 21–32)
TOTAL PROTEIN: 7.3 G/DL (ref 6.3–8)
TROPONIN: <0.01 NG/ML (ref 0–0.01)
UROBILINOGEN, URINE: 0.2 E.U./DL
WBC # BLD: 17.6 K/UL (ref 4.8–10.8)
WBC UA: ABNORMAL /HPF (ref 0–5)

## 2022-11-25 PROCEDURE — 93005 ELECTROCARDIOGRAM TRACING: CPT | Performed by: EMERGENCY MEDICINE

## 2022-11-25 PROCEDURE — 71045 X-RAY EXAM CHEST 1 VIEW: CPT

## 2022-11-25 PROCEDURE — 84295 ASSAY OF SERUM SODIUM: CPT

## 2022-11-25 PROCEDURE — 2580000003 HC RX 258: Performed by: EMERGENCY MEDICINE

## 2022-11-25 PROCEDURE — 84145 PROCALCITONIN (PCT): CPT

## 2022-11-25 PROCEDURE — 94761 N-INVAS EAR/PLS OXIMETRY MLT: CPT

## 2022-11-25 PROCEDURE — 6360000004 HC RX CONTRAST MEDICATION: Performed by: INTERNAL MEDICINE

## 2022-11-25 PROCEDURE — 1210000000 HC MED SURG R&B

## 2022-11-25 PROCEDURE — 83690 ASSAY OF LIPASE: CPT

## 2022-11-25 PROCEDURE — 74178 CT ABD&PLV WO CNTR FLWD CNTR: CPT

## 2022-11-25 PROCEDURE — 6370000000 HC RX 637 (ALT 250 FOR IP): Performed by: INTERNAL MEDICINE

## 2022-11-25 PROCEDURE — 94640 AIRWAY INHALATION TREATMENT: CPT

## 2022-11-25 PROCEDURE — 73110 X-RAY EXAM OF WRIST: CPT

## 2022-11-25 PROCEDURE — 36600 WITHDRAWAL OF ARTERIAL BLOOD: CPT

## 2022-11-25 PROCEDURE — 87150 DNA/RNA AMPLIFIED PROBE: CPT

## 2022-11-25 PROCEDURE — 82435 ASSAY OF BLOOD CHLORIDE: CPT

## 2022-11-25 PROCEDURE — 99285 EMERGENCY DEPT VISIT HI MDM: CPT

## 2022-11-25 PROCEDURE — 83605 ASSAY OF LACTIC ACID: CPT

## 2022-11-25 PROCEDURE — 2700000000 HC OXYGEN THERAPY PER DAY

## 2022-11-25 PROCEDURE — 6370000000 HC RX 637 (ALT 250 FOR IP): Performed by: EMERGENCY MEDICINE

## 2022-11-25 PROCEDURE — 83880 ASSAY OF NATRIURETIC PEPTIDE: CPT

## 2022-11-25 PROCEDURE — 6360000002 HC RX W HCPCS: Performed by: INTERNAL MEDICINE

## 2022-11-25 PROCEDURE — 6360000002 HC RX W HCPCS: Performed by: EMERGENCY MEDICINE

## 2022-11-25 PROCEDURE — 87086 URINE CULTURE/COLONY COUNT: CPT

## 2022-11-25 PROCEDURE — 84484 ASSAY OF TROPONIN QUANT: CPT

## 2022-11-25 PROCEDURE — 87502 INFLUENZA DNA AMP PROBE: CPT

## 2022-11-25 PROCEDURE — 94664 DEMO&/EVAL PT USE INHALER: CPT

## 2022-11-25 PROCEDURE — 0202U NFCT DS 22 TRGT SARS-COV-2: CPT

## 2022-11-25 PROCEDURE — 36415 COLL VENOUS BLD VENIPUNCTURE: CPT

## 2022-11-25 PROCEDURE — 82330 ASSAY OF CALCIUM: CPT

## 2022-11-25 PROCEDURE — 84132 ASSAY OF SERUM POTASSIUM: CPT

## 2022-11-25 PROCEDURE — 80053 COMPREHEN METABOLIC PANEL: CPT

## 2022-11-25 PROCEDURE — 86403 PARTICLE AGGLUT ANTBDY SCRN: CPT

## 2022-11-25 PROCEDURE — 85014 HEMATOCRIT: CPT

## 2022-11-25 PROCEDURE — 96374 THER/PROPH/DIAG INJ IV PUSH: CPT

## 2022-11-25 PROCEDURE — 82803 BLOOD GASES ANY COMBINATION: CPT

## 2022-11-25 PROCEDURE — 2580000003 HC RX 258: Performed by: INTERNAL MEDICINE

## 2022-11-25 PROCEDURE — 83735 ASSAY OF MAGNESIUM: CPT

## 2022-11-25 PROCEDURE — 70450 CT HEAD/BRAIN W/O DYE: CPT

## 2022-11-25 PROCEDURE — 81001 URINALYSIS AUTO W/SCOPE: CPT

## 2022-11-25 PROCEDURE — 85025 COMPLETE CBC W/AUTO DIFF WBC: CPT

## 2022-11-25 PROCEDURE — 87635 SARS-COV-2 COVID-19 AMP PRB: CPT

## 2022-11-25 PROCEDURE — 99222 1ST HOSP IP/OBS MODERATE 55: CPT | Performed by: INTERNAL MEDICINE

## 2022-11-25 PROCEDURE — 87040 BLOOD CULTURE FOR BACTERIA: CPT

## 2022-11-25 PROCEDURE — 82565 ASSAY OF CREATININE: CPT

## 2022-11-25 RX ORDER — POLYETHYLENE GLYCOL 3350 17 G/17G
17 POWDER, FOR SOLUTION ORAL DAILY PRN
Status: DISCONTINUED | OUTPATIENT
Start: 2022-11-25 | End: 2022-12-03 | Stop reason: HOSPADM

## 2022-11-25 RX ORDER — SODIUM CHLORIDE, SODIUM LACTATE, POTASSIUM CHLORIDE, CALCIUM CHLORIDE 600; 310; 30; 20 MG/100ML; MG/100ML; MG/100ML; MG/100ML
INJECTION, SOLUTION INTRAVENOUS CONTINUOUS
Status: DISCONTINUED | OUTPATIENT
Start: 2022-11-25 | End: 2022-11-27

## 2022-11-25 RX ORDER — ACETAMINOPHEN 650 MG/1
650 SUPPOSITORY RECTAL ONCE
Status: COMPLETED | OUTPATIENT
Start: 2022-11-25 | End: 2022-11-25

## 2022-11-25 RX ORDER — IPRATROPIUM BROMIDE AND ALBUTEROL SULFATE 2.5; .5 MG/3ML; MG/3ML
1 SOLUTION RESPIRATORY (INHALATION) EVERY 4 HOURS PRN
Status: DISCONTINUED | OUTPATIENT
Start: 2022-11-25 | End: 2022-12-03 | Stop reason: HOSPADM

## 2022-11-25 RX ORDER — ASPIRIN 81 MG/1
81 TABLET ORAL DAILY
Status: DISCONTINUED | OUTPATIENT
Start: 2022-11-25 | End: 2022-11-26

## 2022-11-25 RX ORDER — METOPROLOL TARTRATE 50 MG/1
100 TABLET, FILM COATED ORAL 2 TIMES DAILY
Status: DISCONTINUED | OUTPATIENT
Start: 2022-11-25 | End: 2022-11-27

## 2022-11-25 RX ORDER — GABAPENTIN 400 MG/1
400 CAPSULE ORAL 3 TIMES DAILY
Qty: 270 CAPSULE | Refills: 0 | OUTPATIENT
Start: 2022-11-25 | End: 2023-02-23

## 2022-11-25 RX ORDER — 0.9 % SODIUM CHLORIDE 0.9 %
2000 INTRAVENOUS SOLUTION INTRAVENOUS ONCE
Status: COMPLETED | OUTPATIENT
Start: 2022-11-25 | End: 2022-11-25

## 2022-11-25 RX ORDER — ACETAMINOPHEN 500 MG
1000 TABLET ORAL
Status: DISPENSED | OUTPATIENT
Start: 2022-11-25 | End: 2022-11-25

## 2022-11-25 RX ORDER — ACETAMINOPHEN 650 MG/1
650 SUPPOSITORY RECTAL EVERY 6 HOURS PRN
Status: DISCONTINUED | OUTPATIENT
Start: 2022-11-25 | End: 2022-12-03 | Stop reason: HOSPADM

## 2022-11-25 RX ORDER — ONDANSETRON 4 MG/1
4 TABLET, ORALLY DISINTEGRATING ORAL EVERY 8 HOURS PRN
Status: DISCONTINUED | OUTPATIENT
Start: 2022-11-25 | End: 2022-12-03 | Stop reason: HOSPADM

## 2022-11-25 RX ORDER — SODIUM CHLORIDE 9 MG/ML
INJECTION, SOLUTION INTRAVENOUS PRN
Status: DISCONTINUED | OUTPATIENT
Start: 2022-11-25 | End: 2022-12-03 | Stop reason: HOSPADM

## 2022-11-25 RX ORDER — IPRATROPIUM BROMIDE AND ALBUTEROL SULFATE 2.5; .5 MG/3ML; MG/3ML
1 SOLUTION RESPIRATORY (INHALATION) 3 TIMES DAILY
Status: DISCONTINUED | OUTPATIENT
Start: 2022-11-25 | End: 2022-11-25

## 2022-11-25 RX ORDER — 0.9 % SODIUM CHLORIDE 0.9 %
1000 INTRAVENOUS SOLUTION INTRAVENOUS ONCE
Status: COMPLETED | OUTPATIENT
Start: 2022-11-25 | End: 2022-11-25

## 2022-11-25 RX ORDER — SODIUM CHLORIDE 0.9 % (FLUSH) 0.9 %
10 SYRINGE (ML) INJECTION PRN
Status: DISCONTINUED | OUTPATIENT
Start: 2022-11-25 | End: 2022-12-03 | Stop reason: HOSPADM

## 2022-11-25 RX ORDER — SODIUM CHLORIDE 0.9 % (FLUSH) 0.9 %
10 SYRINGE (ML) INJECTION EVERY 12 HOURS SCHEDULED
Status: DISCONTINUED | OUTPATIENT
Start: 2022-11-25 | End: 2022-12-03 | Stop reason: HOSPADM

## 2022-11-25 RX ORDER — PRAVASTATIN SODIUM 20 MG
20 TABLET ORAL NIGHTLY
Status: DISCONTINUED | OUTPATIENT
Start: 2022-11-25 | End: 2022-12-03 | Stop reason: HOSPADM

## 2022-11-25 RX ORDER — POTASSIUM CHLORIDE 20 MEQ/1
40 TABLET, EXTENDED RELEASE ORAL PRN
Status: DISCONTINUED | OUTPATIENT
Start: 2022-11-25 | End: 2022-12-03 | Stop reason: HOSPADM

## 2022-11-25 RX ORDER — ENOXAPARIN SODIUM 100 MG/ML
40 INJECTION SUBCUTANEOUS DAILY
Status: DISCONTINUED | OUTPATIENT
Start: 2022-11-25 | End: 2022-12-03 | Stop reason: HOSPADM

## 2022-11-25 RX ORDER — ONDANSETRON 2 MG/ML
4 INJECTION INTRAMUSCULAR; INTRAVENOUS EVERY 6 HOURS PRN
Status: DISCONTINUED | OUTPATIENT
Start: 2022-11-25 | End: 2022-12-03 | Stop reason: HOSPADM

## 2022-11-25 RX ORDER — ACETAMINOPHEN 325 MG/1
650 TABLET ORAL EVERY 6 HOURS PRN
Status: DISCONTINUED | OUTPATIENT
Start: 2022-11-25 | End: 2022-12-03 | Stop reason: HOSPADM

## 2022-11-25 RX ORDER — POTASSIUM CHLORIDE 7.45 MG/ML
10 INJECTION INTRAVENOUS PRN
Status: DISCONTINUED | OUTPATIENT
Start: 2022-11-25 | End: 2022-12-03 | Stop reason: HOSPADM

## 2022-11-25 RX ADMIN — METOPROLOL TARTRATE 100 MG: 50 TABLET ORAL at 21:56

## 2022-11-25 RX ADMIN — CEFTRIAXONE SODIUM 1000 MG: 1 INJECTION, POWDER, FOR SOLUTION INTRAMUSCULAR; INTRAVENOUS at 18:32

## 2022-11-25 RX ADMIN — SODIUM CHLORIDE 2000 ML: 9 INJECTION, SOLUTION INTRAVENOUS at 12:31

## 2022-11-25 RX ADMIN — ACETAMINOPHEN 650 MG: 650 SUPPOSITORY RECTAL at 11:54

## 2022-11-25 RX ADMIN — ASPIRIN 81 MG: 81 TABLET, COATED ORAL at 18:32

## 2022-11-25 RX ADMIN — VANCOMYCIN HYDROCHLORIDE 1500 MG: 1.5 INJECTION, POWDER, LYOPHILIZED, FOR SOLUTION INTRAVENOUS at 16:35

## 2022-11-25 RX ADMIN — PIPERACILLIN AND TAZOBACTAM 3375 MG: 3; .375 INJECTION, POWDER, FOR SOLUTION INTRAVENOUS at 12:36

## 2022-11-25 RX ADMIN — PRAVASTATIN SODIUM 20 MG: 20 TABLET ORAL at 21:56

## 2022-11-25 RX ADMIN — IPRATROPIUM BROMIDE AND ALBUTEROL SULFATE 1 AMPULE: 2.5; .5 SOLUTION RESPIRATORY (INHALATION) at 22:17

## 2022-11-25 RX ADMIN — SODIUM CHLORIDE 1000 ML: 9 INJECTION, SOLUTION INTRAVENOUS at 09:28

## 2022-11-25 RX ADMIN — ACETAMINOPHEN 650 MG: 325 TABLET ORAL at 19:15

## 2022-11-25 RX ADMIN — SODIUM CHLORIDE, POTASSIUM CHLORIDE, SODIUM LACTATE AND CALCIUM CHLORIDE: 600; 310; 30; 20 INJECTION, SOLUTION INTRAVENOUS at 16:27

## 2022-11-25 RX ADMIN — IOPAMIDOL 50 ML: 612 INJECTION, SOLUTION INTRAVENOUS at 16:03

## 2022-11-25 RX ADMIN — ENOXAPARIN SODIUM 40 MG: 100 INJECTION SUBCUTANEOUS at 16:44

## 2022-11-25 ASSESSMENT — PAIN - FUNCTIONAL ASSESSMENT: PAIN_FUNCTIONAL_ASSESSMENT: NONE - DENIES PAIN

## 2022-11-25 NOTE — PROGRESS NOTES
Comprehensive Nutrition Assessment    Type and Reason for Visit:   Initial, Consult (Tube Feeding order & manage)    Nutrition Recommendations/Plan:   Start Tube Feeding (initiate standard fiber formula, Jevity 1.5 @ 25ml/hr and increase to goal of 50ml/hr after 8 hours if tolerated)     Malnutrition Assessment:  Malnutrition Status:  Insufficient data (11/25/22 1603)    Context:  Chronic Illness       Nutrition Assessment:    Pt had PEG placed 11/16 d/t inability to safely take po diet. Pt receiving standard fiber formula (Jevity1.5) @ 50ml/hr as recommended prior admission. Nutrition Related Findings:    PMH: COPD, HTN, HLD, CVA; adm for change in mental status. Labs(11/25): K-4.9 (K supplement Rx'd tid pta)-recommend hold K+ supplementation at this time (discussed with nsg). Meds reviewed. LR @ 100/hr. PEG placed 11/16. Wound: small open wound noted on buttocks per nsg. Current Nutrition Intake & Therapies:    Diet NPO  ADULT TUBE FEEDING; PEG; Standard with Fiber; Continuous; 25; Yes; 25; Q 8 hours; 50; 30; Q 6 hours  Current Tube Feeding (TF) Orders:  Feeding Route: PEG  Formula: Standard with Fiber (Jevity 1.5)  Schedule: Continuous  Feeding Regimen: 25ml/hr, increase to goal of 50ml/hr after 8 hrs  Additives/Modulars: None  Water Flushes: 30mls qid  Current TF & Flush Orders Provides: 900kcals/38gms protein/576mls H2O  Goal TF & Flush Orders Provides: 1800kcals/76gms protein/912mls H2O (recommend increase H2O flush to 180mls qid when IVF is d/c'd to provide 1630mls H2O)    Anthropometric Measures:  Height: 5' 3\" (160 cm)  Ideal Body Weight (IBW): 115 lbs (52 kg)    Admission Body Weight:  (n/a)  Current BMI (kg/m2):  28  Usual Body Weight: 158 lb (71.7 kg) (11/12; 162 lb ( 7/22); 153lb ( 6/2021))                       BMI Categories: Overweight (BMI 25.0-29. 9)    Estimated Daily Nutrient Needs:  Energy Requirements Based On: Kcal/kg  Weight Used for Energy Requirements: Usual  Energy (kcal/day): 4902-0067 kcals (23-25 kcal/kg)  Weight Used for Protein Requirements: Ideal  Protein (g/day): 68-73 (kg x 1.3-1.4)  Method Used for Fluid Requirements: 1 ml/kcal  Fluid (ml/day): ~1800    Nutrition Diagnosis:   Inadequate oral intake related to swallowing difficulty as evidenced by nutrition support - enteral nutrition    Nutrition Interventions:   Food and/or Nutrient Delivery: Start Tube Feeding  Nutrition Education/Counseling: Education not indicated  Coordination of Nutrition Care: Continue to monitor while inpatient       Goals:     Goals: Initiate nutrition support, Tolerate nutrition support at goal rate       Nutrition Monitoring and Evaluation:      Food/Nutrient Intake Outcomes: Enteral Nutrition Intake/Tolerance  Physical Signs/Symptoms Outcomes: Weight, Biochemical Data    So Johnson RD, LD

## 2022-11-25 NOTE — PLAN OF CARE
Nutrition Problem #1: Inadequate oral intake  Intervention: Food and/or Nutrient Delivery: Start Tube Feeding
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
## 2022-11-25 NOTE — CONSULTS
Spiritual Care Services     Summary of Visit:   visited with patient and family for an advanced directive consult. Patient in bed and very lethargic.  spoke mostly with patient's spouse and daughter.  explained advanced directives and also explained how to complete them while patient is in the hospital.    Encounter Summary  Encounter Overview/Reason : Initial Encounter, Advance Care Planning  Service Provided For[de-identified] Patient and family together  Referral/Consult From[de-identified] Nurse  Support System: Spouse, Children  Complexity of Encounter: Moderate  Begin Time: 1500  End Time : 1545  Total Time Calculated: 45 min  Encounter   Type: Initial Screen/Assessment                       Advance Care Planning  Type: ACP conversation    Spiritual Assessment/Intervention/Outcomes:                     Care Plan:         Provide assistance with advanced directives       Spiritual Care Services   Electronically signed by Chaplain Russell on 11/25/2022 at 3:50 PM.    To reach a  for emotional and spiritual support, place an [facility] consult request.   If a  is needed immediately, dial 0 and ask to page the on-call .

## 2022-11-25 NOTE — PROGRESS NOTES
1430: Patient incontinent of urine and stool upon arrival to floor. Cleansed PEG tube site with normal saline and gauze. Gauze drain sponge than applied to site to absorb drainage. Patient tolerated well. Pericare given, barrier cream and brief applied. Repositioned patient for comfort. HOB semi fowlers. Patient alert but confused, family states this has been patient's baseline and they have been told patient has dementia. RR 32, attending provider aware of respiratory status at this time. No distress noted. Patient remains on 2L nasal cannula. Call light within reach. 1500: Notified lab of need for second blood culture to be drawn.

## 2022-11-25 NOTE — CONSULTS
Infectious Diseases Inpatient Consult Note      Reason for Consult:   Fevers  Requesting Physician:   Dr Bia Coker  Primary Care Physician:  YULY Lewis - CNP  History Obtained From:   Pt, EPIC    Admit Date: 11/25/2022  Hospital Day: 1      HISTORY OF PRESENT ILLNESS:  This is a 76 y.o. female was admitted to [facility]  from nursing home through ER with increased obtundation and fevers of 1 day duration. Patient was discharged on November 18 after prolonged hospitalization with left pontine ischemic CVA and ventricular hemorrhage with subsequent right hemiplegia, dysphagia, PEG tube placement. On admission to the hospital patient was found to be febrile and tachypneic. Had leukocytosis and pyuria. Was noted to have a dry crusted drainage at the PEG site with some erythema. Was started on IV Rocephin, well tolerated. Patient remains to be obtunded. Starting to wake up. Was incontinent of large amount of urine per RN. Skin wounds reported to be intact.   Tube feed was not resumed yet    Past medical surgical and social history were reviewed and are as detailed below    Past Medical History:   Diagnosis Date    Anxiety     Arthritis     COPD (chronic obstructive pulmonary disease) ([facility])     Generalized osteoarthrosis, unspecified site 12/27/2002    Hyperlipidemia     Hypertension     Major depressive disorder, single episode, moderate ([address] 12/27/2002    Morbid obesity ([address] 12/27/2002    OAB (overactive bladder)     Osteoporosis     Sleep apnea 11/15/2022       Past Surgical History:   Procedure Laterality Date    CARDIAC CATHETERIZATION      CARPAL TUNNEL RELEASE      COLONOSCOPY      GASTROSTOMY TUBE PLACEMENT N/A 11/16/2022    PERCUTANEOUS ENDOSCOPIC GASTROSTOMY TUBE PLACEMENT performed by Zamzam Leigh MD at [facility], TOTAL ABDOMINAL (CERVIX REMOVED)      KNEE ARTHROPLASTY Bilateral     [place] COLON CA SCRN NOT  [address] IND N/A 6/22/2017    COLONOSCOPY performed by Stephan Vázquez MD at [address] TRANSORAL DIAGNOSTIC N/A 2017    EGD ESOPHAGOGASTRODUODENOSCOPY performed by Tiffanie Mitchell MD at [address] Bilateral     SKIN BIOPSY Right 2020    EXCISION OF LEG MASS RIGHT (PAT ON ADMIT) performed by Yola Cohen MD at [facility]       Current Medications:     acetaminophen  1,000 mg Oral NOW    vancomycin  1,500 mg IntraVENous Once    aspirin  81 mg Oral Daily    metoprolol  100 mg PEG Tube BID    pravastatin  40 mg PEG Tube Nightly    sodium chloride flush  10 mL IntraVENous 2 times per day    enoxaparin  40 mg SubCUTAneous Daily    cefTRIAXone (ROCEPHIN) IV  1,000 mg IntraVENous Q24H       Allergies:  Codeine    Social History     Socioeconomic History    Marital status:      Spouse name: Catalina Tovar     Number of children: 3    Years of education: 12    Highest education level: 12th grade   Occupational History    Occupation: [employer] store    Tobacco Use    Smoking status: Former     Packs/day: 1.00     Years: 20.00     Pack years: 20.00     Types: Cigarettes     Quit date:      Years since quittin.9    Smokeless tobacco: Never   Vaping Use    Vaping Use: Never used   Substance and Sexual Activity    Alcohol use: No    Drug use: Never    Sexual activity: Not Currently     Partners: Male   Other Topics Concern    Not on file   Social History Narrative    Lives With: Spouse Ephraim    Type of Home: [address] in [address]: Two level    Home Access: Stairs to enter with rails - Number of Steps: 2- Rails: Right    Bathroom Shower/Tub: Tub/Shower unit    Bathroom Equipment: Shower chair, Hand-held shower    Home Equipment: [employer]unk, rolling    ADL Assistance: Independent    Ambulation Assistance: Independent (with [employer])    Transfer Assistance: Independent    Active : No    Additional Comments: Pt inconsistent historian. PLOF and home set up verified by dtr.  Pt's dtr stating that pt's dementia has been worsening over the past few years. Social Determinants of Health     Financial Resource Strain: Low Risk     Difficulty of Paying Living Expenses: Not hard at all   Food Insecurity: No Food Insecurity    Worried About Running Out of Food in the Last Year: Never true    Ran Out of Food in the Last Year: Never true   Transportation Needs: Not on file   Physical Activity: Insufficiently Active    Days of Exercise per Week: 7 days    Minutes of Exercise per Session: 10 min   Stress: Not on file   Social Connections: Not on file   Intimate Partner Violence: Not on file   Housing Stability: Not on file         Family History:   Family History   Problem Relation Age of Onset    Arthritis Father     Other Father         gout    Heart Failure Father     Cancer Sister        Review of Systems  unable to provide ROS because of acute illness and CVA      Physical Exam  Vitals:    11/25/22 1201 11/25/22 1233 11/25/22 1337 11/25/22 1415   BP: 131/71  (!) 108/48 (!) 121/57   Pulse: 58  61 57   Resp: 21  28 (!) 32   Temp:  99.5 °F (37.5 °C)  98.2 °F (36.8 °C)   TempSrc:  Oral  Oral   SpO2: 95%  96% 95%     General Appearance: alert and oriented x1, well-developed and well-nourished, in no acute distress, on 2 L nasal cannula  Skin: warm and dry, no rash. Head: normocephalic and atraumatic  Supple neck  Follows few simple commands  Eyes: extraocular eye movements intact, conjunctivae normal, anicteric sclerae  ENT: Dry mucous membranes. No thrush  Lungs: normal respiratory effort, tachypneic, diminished breath sounds right lung field, no rhonchi, no crackles, no wheezes  Heart: nl S1/S2, no murmur  Abdomen: soft, no tenderness, no H-S-megaly, + BS. PEG site with mild erythema, no current drainage  NEUROLOGICAL: alert and oriented to self only, right facial droop, right hemiplegia with decreased motor power in right upper and lower extremity.    Healed bilateral shoulders incision without any swelling or erythema  No leg edema  No erythema, no warmth, no tenderness  Incontinent of urine      DATA:    Lab Results   Component Value Date    WBC 17.6 (H) 11/25/2022    HGB 17.5 (H) 11/25/2022    HCT 44.7 11/25/2022    MCV 97.3 (H) 11/25/2022     11/25/2022     Lab Results   Component Value Date    CREATININE 0.8 11/25/2022    BUN 34 (H) 11/25/2022     11/25/2022    K 4.9 11/25/2022    CL 98 11/25/2022    CO2 24 11/25/2022       Hepatic Function Panel:   Lab Results   Component Value Date/Time    ALKPHOS 91 11/25/2022 09:15 AM    ALT 35 11/25/2022 09:15 AM    AST 29 11/25/2022 09:15 AM    PROT 7.3 11/25/2022 09:15 AM    BILITOT 0.7 11/25/2022 09:15 AM    BILIDIR 0.2 04/23/2012 11:12 AM    IBILI 0.5 04/23/2012 11:12 AM    LABALBU 3.6 11/25/2022 09:15 AM    LABALBU 4.6 04/23/2012 11:12 AM       Imaging:   Chest x-ray no active disease      IMPRESSION:    Sepsis secondary to acute lower UTI  Altered mentation with obtundation on admission  Mild PEG site cellulitis  Recent hemorrhagic stroke    Patient Active Problem List   Diagnosis    Hypertension    Hyperlipidemia    COPD (chronic obstructive pulmonary disease) ([facility])    Generalized osteoarthrosis, unspecified site    Major depressive disorder, single episode, moderate ([facility])    Morbid obesity ([address]    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 (HCC)    Neuromuscular scoliosis of lumbar region    Hypotension    NSTEMI (non-ST elevated myocardial infarction) ([address]    Acute diastolic heart failure (HCC)    Hypovolemic shock ([facility])    Acute colitis    Slow transit constipation    External hemorrhoid, bleeding    Colitis    Pain and swelling due to varicose veins of both lower extremities    Venous insufficiency of left lower extremity    Asymptomatic varicose veins of bilateral lower extremities    Non-healing surgical wound    Claudication in peripheral vascular disease ([address]    Non-pressure chronic ulcer of calf, right, with fat layer exposed ([address]    Non-pressure chronic ulcer of calf with fat layer exposed ([address]    Cerebrovascular accident (CVA) due to stenosis of left middle cerebral artery (HCC)    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 ([address]    Fibromuscular dysplasia (HCC)    Impaired mobility and activities of daily living dt CVA    Acute CVA (cerebrovascular accident) ([address]    Dysphagia    Cerebrovascular accident (CVA) due to stenosis of left vertebral artery (HCC)    Palliative care encounter    Advanced care planning/counseling discussion    Goals of care, counseling/discussion    Aphasia    TIA (transient ischemic attack)    Nocturnal hypoxia    Sleep apnea    Encephalopathy       PLAN:  Continue IV Rocephin  Check blood and urine cultures and accordingly adjust antibiotic therapy  Local care to PEG site  Bladder scan rule out acute urine retention    Discussed with Dr Berenice Sanches, patient, spouse, adult child, and RN    Shekhar Park MD

## 2022-11-25 NOTE — ED PROVIDER NOTES
[address] [facility] ED  eMERGENCYdEPARTMENT eNCOUnter      Pt Name: Donny Don  MRN: [id number]  Rukhsanagflisa 1945  Date of evaluation: 11/25/2022  Latoya Mann MD    CHIEF COMPLAINT           HPI  Donny Don is a 76 y.o. female per chart review has a h/o COPD, HTN, Hpl, depression/anxiety JULIO C, CVA with R sided paralysis presents to the ED with AMS. Pt is normally axox2. NH states that she has been minimally responsive since this am.  Pt with eyes open, incomprehensible sounds, withdraws to pain. GCS 10. ROS  Review of Systems   Unable to perform ROS: Mental status change     Except as noted above the remainder of the review of systems was reviewed and negative.        PAST MEDICAL HISTORY     Past Medical History:   Diagnosis Date    Anxiety     Arthritis     COPD (chronic obstructive pulmonary disease) ([address]     Generalized osteoarthrosis, unspecified site 12/27/2002    Hyperlipidemia     Hypertension     Major depressive disorder, single episode, moderate ([address] 12/27/2002    Morbid obesity ([address] 12/27/2002    OAB (overactive bladder)     Osteoporosis     Sleep apnea 11/15/2022         SURGICAL HISTORY       Past Surgical History:   Procedure Laterality Date    CARDIAC CATHETERIZATION      CARPAL TUNNEL RELEASE      COLONOSCOPY      GASTROSTOMY TUBE PLACEMENT N/A 11/16/2022    PERCUTANEOUS ENDOSCOPIC GASTROSTOMY TUBE PLACEMENT performed by Stephie Whiting MD at [address] [facility], [facility] ([address] [place] [address])      KNEE ARTHROPLASTY Bilateral     MA COLON CA SCRN NOT HI RSK IND N/A 6/22/2017    COLONOSCOPY performed by Pino Felipe MD at [address] [facility] ESOPHAGOGASTRODUODENOSCOPY TRANSORAL DIAGNOSTIC N/A 6/22/2017    EGD ESOPHAGOGASTRODUODENOSCOPY performed by Pino Felipe MD at [address] Bilateral     SKIN BIOPSY Right 9/17/2020    EXCISION OF LEG MASS RIGHT (PAT ON ADMIT) performed by Stephie Whiting MD at [facility]         CURRENTMEDICATIONS       Previous Medications    ALBUTEROL SULFATE HFA (PROVENTIL;VENTOLIN;PROAIR) 108 (90 BASE) MCG/ACT INHALER    Inhale 2 puffs into the lungs 4 times daily as needed for Wheezing    ASPIRIN 81 MG EC TABLET    Take 1 tablet by mouth daily    FUROSEMIDE (LASIX) 20 MG TABLET    1 tablet by PEG Tube route three times a week    GABAPENTIN (NEURONTIN) 400 MG CAPSULE    TAKE ONE CAPSULE BY MOUTH THREE TIMES A DAY FOR 90 DAYS    HANDICAP PLACARD MISC    by Does not apply route Exp 5 years    HYDRALAZINE (APRESOLINE) 50 MG TABLET    1 tablet by PEG Tube route in the morning, at noon, and at bedtime    LIDOCAINE 4 % EXTERNAL PATCH    Place 2 patches onto the skin daily as needed (For low back pain. Patches can remain on for 12hr of each 24hr period)    LORATADINE (CLARITIN) 10 MG TABLET    Take 1 tablet by mouth daily    LOSARTAN (COZAAR) 25 MG TABLET    1 tablet by PEG Tube route daily    MELATONIN 1 MG TABLET    Take 1 tablet by mouth nightly as needed for Sleep    METOPROLOL TARTRATE (LOPRESSOR) 100 MG TABLET    1 tablet by PEG Tube route 2 times daily    NITROGLYCERIN (NITROSTAT) 0.4 MG SL TABLET    Place 1 tablet under the tongue every 5 minutes as needed for Chest pain up to max of 3 total doses. If no relief after 1 dose, call 911.     POTASSIUM BICARBONATE (K-LYTE) 25 MEQ DISINTEGRATING TABLET    1 tablet by PEG Tube route three times a week    PRAVASTATIN (PRAVACHOL) 40 MG TABLET    TAKE ONE-HALF TABLET BY MOUTH DAILY    SERTRALINE (ZOLOFT) 50 MG TABLET    Take 1 tablet by mouth daily    TIZANIDINE (ZANAFLEX) 4 MG TABLET    TAKE ONE TABLET BY MOUTH THREE TIMES A DAY AS NEEDED FOR MUSCLE SPASMS / PAIN       ALLERGIES     Codeine    FAMILY HISTORY       Family History   Problem Relation Age of Onset    Arthritis Father     Other Father         gout    Heart Failure Father     Cancer Sister           SOCIAL HISTORY       Social History     Socioeconomic History    Marital status:      Spouse name: Lindsay Ramos     Number of children: 3    Years of education: 12    Highest education level: 12th grade   Occupational History    Occupation: [employer]    Tobacco Use    Smoking status: Former     Packs/day: 1.00     Years: 20.00     Pack years: 20.00     Types: Cigarettes     Quit date:      Years since quittin.9    Smokeless tobacco: Never   Vaping Use    Vaping Use: Never used   Substance and Sexual Activity    Alcohol use: No    Drug use: Never    Sexual activity: Not Currently     Partners: Male   Social History Narrative    Lives With: Spouse Ephraim    Type of Home: [address] in [address]: Two level    Home Access: Stairs to enter with rails - Number of Steps: 2- Rails: Right    Bathroom Shower/Tub: Tub/Shower unit    Bathroom Equipment: Shower chair, Hand-held shower    Home Equipment: Bath [place], rolling    ADL Assistance: Independent    Ambulation Assistance: Independent (with [employer])    Transfer Assistance: Independent    Active : No    Additional Comments: Pt inconsistent historian. PLOF and home set up verified by dtr. Pt's dtr stating that pt's dementia has been worsening over the past few years. Social Determinants of Health     Financial Resource Strain: Low Risk     Difficulty of Paying Living Expenses: Not hard at all   Food Insecurity: No Food Insecurity    Worried About [facility] in the Last Year: Never true    Ran Out of Food in the Last Year: Never true   Physical Activity: Insufficiently Active    Days of Exercise per Week: 7 days    Minutes of Exercise per Session: 10 min         PHYSICAL EXAM       ED Triage Vitals [22 0909]   BP Temp Temp src Heart Rate Resp SpO2 Height Weight   126/72 (!) 100.9 °F (38.3 °C) -- 50 24 96 % -- --       Physical Exam  Vitals and nursing note reviewed. Constitutional:       Appearance: She is well-developed.       Comments: Eyes open, looking around   HENT: Head: Normocephalic. Right Ear: External ear normal.      Left Ear: External ear normal.   Eyes:      Conjunctiva/sclera: Conjunctivae normal.      Pupils: Pupils are equal, round, and reactive to light. Cardiovascular:      Rate and Rhythm: Normal rate and regular rhythm. Heart sounds: Normal heart sounds. Pulmonary:      Effort: Pulmonary effort is normal.      Breath sounds: Normal breath sounds. Abdominal:      General: Bowel sounds are normal. There is no distension. Palpations: Abdomen is soft. Tenderness: There is no abdominal tenderness. Comments: PEG tube noted. Diffuse, erythema noted around PEG tube. Musculoskeletal:         General: Normal range of motion. Cervical back: Normal range of motion and neck supple. Skin:     General: Skin is warm and dry. Neurological:      Comments: Pt with eyes open, incomprehensible sounds, withdraws to pain. GCS 10. Psychiatric:      Comments: Unable to assess         MDM  77 yo female presents to the ED with AMS. Pt noted to have a temp of 100.9 in the ED. Pt recently had a CVA with residual R sided paralysis. Pt given 1 L NS, PO tylenol in the ED. EKG shows sinus ninfa with HR 51, normal axis, normal intervals, no ST changes. ABG unremarkable. Labs remarkable for WBC 17.  UA shows UTI. CT head negative. CXR negative. Pt given another 2 L NS for presumed sepsis. Blood cultures drawn and pt given IV vanco, IV zosyn. Potential sources for infx include UTI vs cellulitis around PEG tube. Given encephalopathy, cellulitis, UTI, leukocytosis, case discussed with Dr. Jesusita Simmonds (medicine) and pt admitted to medicine in serious condition. FINAL IMPRESSION      1. Encephalopathy    2. Fever, unspecified fever cause    3. Cellulitis, unspecified cellulitis site    4.  Acute cystitis without hematuria          DISPOSITION/PLAN   DISPOSITION Admitted 11/25/2022 12:48:12 PM        DISCHARGE MEDICATIONS:  [unfilled]         Alin Juarez MD(electronically signed)  Attending Emergency Physician           Alin Juarez MD  11/25/22 4605

## 2022-11-25 NOTE — TELEPHONE ENCOUNTER
Future Appointments    Encounter Information    Provider Department Appt Notes   1/5/2023 YULY Barnard - [facility] Return in 6 months (on 1/1/2023), or check up,   7/6/2023 Jany Barnard [facility] Annual Wellness Visit in 1 year.      Past Visits    Date Provider Specialty Visit Type Primary Dx   09/23/2022 YULY Barnard - CNP Family Medicine Office Visit Major depressive disorder, single episode, moderate ([facility]

## 2022-11-26 PROBLEM — N39.0 SEPSIS SECONDARY TO UTI (HCC): Status: ACTIVE | Noted: 2022-11-26

## 2022-11-26 PROBLEM — I69.90 LATE EFFECTS OF CVA (CEREBROVASCULAR ACCIDENT): Status: ACTIVE | Noted: 2022-11-26

## 2022-11-26 PROBLEM — A41.9 SEPSIS SECONDARY TO UTI (HCC): Status: ACTIVE | Noted: 2022-11-26

## 2022-11-26 PROBLEM — R40.1 OBTUNDATION: Status: ACTIVE | Noted: 2022-11-26

## 2022-11-26 LAB
ACINETOBACTER CALCOAC BAUMANNII COMPLEX BY PCR: NOT DETECTED
ANION GAP SERPL CALCULATED.3IONS-SCNC: 12 MEQ/L (ref 9–15)
BACTEROIDES FRAGILIS BY PCR: NOT DETECTED
BASOPHILS ABSOLUTE: 0.1 K/UL (ref 0–0.2)
BASOPHILS RELATIVE PERCENT: 0.6 %
BLOOD CULTURE, ROUTINE: ABNORMAL
BUN BLDV-MCNC: 21 MG/DL (ref 8–23)
CALCIUM SERPL-MCNC: 8.6 MG/DL (ref 8.5–9.9)
CANDIDA ALBICANS BY PCR: NOT DETECTED
CANDIDA AURIS BY PCR: NOT DETECTED
CANDIDA GLABRATA BY PCR: NOT DETECTED
CANDIDA KRUSEI BY PCR: NOT DETECTED
CANDIDA PARAPSILOSIS BY PCR: NOT DETECTED
CANDIDA TROPICALIS BY PCR: NOT DETECTED
CHLORIDE BLD-SCNC: 104 MEQ/L (ref 95–107)
CO2: 21 MEQ/L (ref 20–31)
CREAT SERPL-MCNC: 0.44 MG/DL (ref 0.5–0.9)
CRYPTOCOCCUS NEOFORMANS/GATTII BY PCR: NOT DETECTED
ENTEROBACTER CLOACAE COMPLEX BY PCR: NOT DETECTED
ENTEROBACTERALES BY PCR: NOT DETECTED
ENTEROCOCCUS FAECALIS BY PCR: NOT DETECTED
ENTEROCOCCUS FAECIUM BY PCR: NOT DETECTED
EOSINOPHILS ABSOLUTE: 0.5 K/UL (ref 0–0.7)
EOSINOPHILS RELATIVE PERCENT: 4.3 %
ESCHERICHIA COLI BY PCR: NOT DETECTED
GFR SERPL CREATININE-BSD FRML MDRD: >60 ML/MIN/{1.73_M2}
GLUCOSE BLD-MCNC: 102 MG/DL (ref 70–99)
HAEMOPHILUS INFLUENZAE BY PCR: NOT DETECTED
HCT VFR BLD CALC: 37.6 % (ref 37–47)
HEMOGLOBIN: 12.2 G/DL (ref 12–16)
KLEBSIELLA AEROGENES BY PCR: NOT DETECTED
KLEBSIELLA OXYTOCA BY PCR: NOT DETECTED
KLEBSIELLA PNEUMONIAE GROUP BY PCR: NOT DETECTED
LISTERIA MONOCYTOGENES BY PCR: NOT DETECTED
LYMPHOCYTES ABSOLUTE: 1.7 K/UL (ref 1–4.8)
LYMPHOCYTES RELATIVE PERCENT: 16 %
MCH RBC QN AUTO: 31.3 PG (ref 27–31.3)
MCHC RBC AUTO-ENTMCNC: 32.4 % (ref 33–37)
MCV RBC AUTO: 96.7 FL (ref 79.4–94.8)
METHICILLIN RESISTANCE MECA/C  BY PCR: DETECTED
MONOCYTES ABSOLUTE: 0.8 K/UL (ref 0.2–0.8)
MONOCYTES RELATIVE PERCENT: 7.4 %
NEISSERIA MENINGITIDIS BY PCR: NOT DETECTED
NEUTROPHILS ABSOLUTE: 7.8 K/UL (ref 1.4–6.5)
NEUTROPHILS RELATIVE PERCENT: 71.7 %
ORGANISM: ABNORMAL
PDW BLD-RTO: 13.7 % (ref 11.5–14.5)
PLATELET # BLD: 222 K/UL (ref 130–400)
POTASSIUM REFLEX MAGNESIUM: 4.1 MEQ/L (ref 3.4–4.9)
POTASSIUM SERPL-SCNC: 4.1 MEQ/L (ref 3.4–4.9)
PROCALCITONIN: 0.15 NG/ML (ref 0–0.15)
PROTEUS SPECIES BY PCR: NOT DETECTED
PSEUDOMONAS AERUGINOSA BY PCR: NOT DETECTED
RBC # BLD: 3.89 M/UL (ref 4.2–5.4)
SALMONELLA SPECIES BY PCR: NOT DETECTED
SERRATIA MARCESCENS BY PCR: NOT DETECTED
SODIUM BLD-SCNC: 137 MEQ/L (ref 135–144)
STAPHYLOCOCCUS AUREUS BY PCR: NOT DETECTED
STAPHYLOCOCCUS EPIDERMIDIS BY PCR: DETECTED
STAPHYLOCOCCUS LUGDUNENSIS BY PCR: NOT DETECTED
STAPHYLOCOCCUS SPECIES BY PCR: DETECTED
STENOTROPHOMONAS MALTOPHILIA BY PCR: NOT DETECTED
STREPTOCOCCUS AGALACTIAE BY PCR: NOT DETECTED
STREPTOCOCCUS PNEUMONIAE BY PCR: NOT DETECTED
STREPTOCOCCUS PYOGENES  BY PCR: NOT DETECTED
STREPTOCOCCUS SPECIES BY PCR: NOT DETECTED
URINE CULTURE, ROUTINE: ABNORMAL
URINE CULTURE, ROUTINE: ABNORMAL
WBC # BLD: 10.8 K/UL (ref 4.8–10.8)

## 2022-11-26 PROCEDURE — 1210000000 HC MED SURG R&B

## 2022-11-26 PROCEDURE — 6370000000 HC RX 637 (ALT 250 FOR IP): Performed by: INTERNAL MEDICINE

## 2022-11-26 PROCEDURE — 80048 BASIC METABOLIC PNL TOTAL CA: CPT

## 2022-11-26 PROCEDURE — 99222 1ST HOSP IP/OBS MODERATE 55: CPT | Performed by: SPECIALIST

## 2022-11-26 PROCEDURE — 99232 SBSQ HOSP IP/OBS MODERATE 35: CPT | Performed by: INTERNAL MEDICINE

## 2022-11-26 PROCEDURE — 85025 COMPLETE CBC W/AUTO DIFF WBC: CPT

## 2022-11-26 PROCEDURE — 6360000002 HC RX W HCPCS: Performed by: INTERNAL MEDICINE

## 2022-11-26 PROCEDURE — 36415 COLL VENOUS BLD VENIPUNCTURE: CPT

## 2022-11-26 PROCEDURE — 2580000003 HC RX 258: Performed by: INTERNAL MEDICINE

## 2022-11-26 PROCEDURE — 84145 PROCALCITONIN (PCT): CPT

## 2022-11-26 PROCEDURE — 86403 PARTICLE AGGLUT ANTBDY SCRN: CPT

## 2022-11-26 PROCEDURE — 2700000000 HC OXYGEN THERAPY PER DAY

## 2022-11-26 RX ORDER — ASPIRIN 81 MG/1
81 TABLET, CHEWABLE ORAL DAILY
Status: DISCONTINUED | OUTPATIENT
Start: 2022-11-26 | End: 2022-12-03 | Stop reason: HOSPADM

## 2022-11-26 RX ADMIN — SODIUM CHLORIDE, POTASSIUM CHLORIDE, SODIUM LACTATE AND CALCIUM CHLORIDE: 600; 310; 30; 20 INJECTION, SOLUTION INTRAVENOUS at 02:34

## 2022-11-26 RX ADMIN — METOPROLOL TARTRATE 100 MG: 50 TABLET ORAL at 08:25

## 2022-11-26 RX ADMIN — ASPIRIN 81 MG: 81 TABLET, CHEWABLE ORAL at 10:56

## 2022-11-26 RX ADMIN — SODIUM CHLORIDE, POTASSIUM CHLORIDE, SODIUM LACTATE AND CALCIUM CHLORIDE: 600; 310; 30; 20 INJECTION, SOLUTION INTRAVENOUS at 12:49

## 2022-11-26 RX ADMIN — METOPROLOL TARTRATE 100 MG: 50 TABLET ORAL at 20:14

## 2022-11-26 RX ADMIN — Medication 10 ML: at 08:25

## 2022-11-26 RX ADMIN — SODIUM CHLORIDE, POTASSIUM CHLORIDE, SODIUM LACTATE AND CALCIUM CHLORIDE: 600; 310; 30; 20 INJECTION, SOLUTION INTRAVENOUS at 23:12

## 2022-11-26 RX ADMIN — CEFTRIAXONE SODIUM 1000 MG: 1 INJECTION, POWDER, FOR SOLUTION INTRAMUSCULAR; INTRAVENOUS at 18:26

## 2022-11-26 RX ADMIN — Medication 10 ML: at 20:14

## 2022-11-26 RX ADMIN — ENOXAPARIN SODIUM 40 MG: 100 INJECTION SUBCUTANEOUS at 08:24

## 2022-11-26 RX ADMIN — PRAVASTATIN SODIUM 20 MG: 20 TABLET ORAL at 20:14

## 2022-11-26 ASSESSMENT — PAIN SCALES - GENERAL
PAINLEVEL_OUTOF10: 0
PAINLEVEL_OUTOF10: 0

## 2022-11-26 NOTE — PROGRESS NOTES
Hospitalist Progress Note      PCP: Ralph Og, APRN - CNP    Date of Admission: 11/25/2022    Chief Complaint:    Chief Complaint   Patient presents with    Failure To Thrive       Subjective:  Patient is doing much better today; answering questions in one word answers; recognizes her       Medications:  Reviewed    Infusion Medications    sodium chloride      lactated ringers 100 mL/hr at 11/26/22 0234     Scheduled Medications    aspirin  81 mg PEG Tube Daily    metoprolol  100 mg PEG Tube BID    pravastatin  20 mg PEG Tube Nightly    sodium chloride flush  10 mL IntraVENous 2 times per day    enoxaparin  40 mg SubCUTAneous Daily    cefTRIAXone (ROCEPHIN) IV  1,000 mg IntraVENous Q24H     PRN Meds: sodium chloride flush, sodium chloride, ondansetron **OR** ondansetron, polyethylene glycol, acetaminophen **OR** acetaminophen, potassium chloride **OR** potassium alternative oral replacement **OR** potassium chloride, ipratropium-albuterol      Intake/Output Summary (Last 24 hours) at 11/26/2022 1151  Last data filed at 11/26/2022 0551  Gross per 24 hour   Intake 60 ml   Output 850 ml   Net -790 ml       Exam:    BP (!) 146/72   Pulse 66   Temp 97.9 °F (36.6 °C) (Oral)   Resp 28   Ht 5' 3\" (1.6 m)   LMP  (LMP Unknown)   SpO2 94%   BMI 28.03 kg/m²     General appearance: No apparent distress, appears stated age and cooperative. HEENT:  Conjunctivae/corneas clear. Neck: Trachea midline. Respiratory:  Normal respiratory effort. Clear to auscultation  Cardiovascular: Regular rate and rhythm  Abdomen: Soft, non-tender, non-distended with normal bowel sounds. Musculoskeletal: No clubbing, cyanosis or edema bilaterally  Neuro: right sided hemiparesis.    Capillary Refill: Brisk,< 3 seconds   Peripheral Pulses: +2 palpable, equal bilaterally       Labs:   Recent Labs     11/25/22  0915 11/25/22  0931 11/26/22  0521   WBC 17.6*  --  10.8   HGB 14.5 17.5* 12.2   HCT 44.7  --  37.6     --  222 Recent Labs     11/25/22  0915 11/25/22  0931 11/26/22  0521     --  137   K 4.9  --  4.1  4.1   CL 98  --  104   CO2 24  --  21   BUN 34*  --  21   CREATININE 0.66 0.8 0.44*   CALCIUM 9.5  --  8.6     Recent Labs     11/25/22  0915   AST 29   ALT 35*   BILITOT 0.7   ALKPHOS 91     No results for input(s): INR in the last 72 hours. Recent Labs     11/25/22  0915   TROPONINI <0.010       Urinalysis:      Lab Results   Component Value Date/Time    NITRU Negative 11/25/2022 11:30 AM    WBCUA 20-50 11/25/2022 11:30 AM    BACTERIA Negative 11/25/2022 11:30 AM    RBCUA 6-10 11/25/2022 11:30 AM    BLOODU Negative 11/25/2022 11:30 AM    SPECGRAV 1.016 11/25/2022 11:30 AM    GLUCOSEU Negative 11/25/2022 11:30 AM    GLUCOSEU NEG 10/17/2011 10:15 AM       Radiology:  XR CHEST PORTABLE   Final Result   No acute disease. No change from prior. RECOMMENDATION:   Careful clinical correlation and follow up recommended. CT ABDOMEN PELVIS W WO CONTRAST Additional Contrast? None   Final Result   1. Findings compatible with recent gastrostomy tube placement with small   amount of FREE INTRAPERITONEAL AIR and some free air in the subcutaneous   tissues. Please correlate with surgical history. 2.  No morphologic evidence of pancreatitis. 3.  Small amount of gas in the urinary bladder could be related to recent   instrumentation peer         CT Head W/O Contrast   Final Result   No acute intracranial abnormality. Chronic microvascular disease and chronic atrophic brain changes. XR WRIST RIGHT (MIN 3 VIEWS)   Final Result   Extensive degenerative changes visualized, cannot rule out underlying   fracture, if clinically indicated would recommend further evaluation with a   CT scan. XR CHEST PORTABLE   Final Result   No acute process.              Assessment/Plan:    Septic encephalopathy secondary to uti:  Much improved with IV CTX  HTN/HLD/Stroke history:  Resume home meds  Dysphagia: PEG in place; dietician consulted for TF  Functional Status: Fall precautions. Up with assistance. PT OT  Diet: TF  DVT ppx: Lovenox SCDs  Disposition: Dependent on hospital course. Will discharge once medically stable. SW on board for discharge planning. Active Hospital Problems    Diagnosis Date Noted    Sepsis secondary to UTI ([facility] [A41.9, N39.0] 11/26/2022     Priority: Medium    Obtundation [R40.1] 11/26/2022     Priority: Medium    Late effects of CVA (cerebrovascular accident) [I69.90] 11/26/2022     Priority: Medium    Encephalopathy [G93.40] 11/25/2022     Priority: Medium        Additional work up or/and treatment plan may be added today or then after based on clinical progression. I am managing a portion of pt care. Some medical issues are handled by other specialists. Additional work up and treatment should be done in out pt setting by pt PCP and other out pt providers. In addition to examining and evaluating pt, I spent additional time explaining care, normal and abnormal findings, and treatment plan. All of pt questions were answered. Counseling, diet and education were  provided. Case will be discussed with nursing staff when appropriate. Family will be updated if and when appropriate. Diet: Diet NPO  ADULT TUBE FEEDING; PEG; Standard with Fiber; Continuous; 25; Yes; 25; Q 8 hours; 50; 30;  Q 6 hours    Code Status: Full Code    Patients family is requesting a different SNF than [facility] as they feel like the patient was neglected there; will discuss with CM    Electronically signed by Shiv Escobar MD on 11/26/2022 at 11:51 AM

## 2022-11-26 NOTE — CONSULTS
Consults    Patient Name: Eydta Sanchez  Admit Date: 2022  9:10 AM  MR #: [id number]  : 1945    Attending Physician: Robert Barrera MD  Reason for consult: Mildly elevated serum lipase. History of Presenting Illness:      Edyta Sanchez is a 76 y.o. female on hospital day 1 with a history of change in mental status thought to be secondary to urosepsis. GI consult was requested because of abnormal serum lipase. .  Serum lipase was 120. ,  AST 29 ALT 36 alk phos is 91 bilirubin of 0.7, patient has no prior history of pancreatitis, she had a PEG tube placed for nutritional support, tolerating tube feeding very well, having bowel movements.   History Obtained From:  patient and electronic medical record      History:      Past Medical History:   Diagnosis Date    Anxiety     Arthritis     COPD (chronic obstructive pulmonary disease) ([facility]     Generalized osteoarthrosis, unspecified site 2002    Hyperlipidemia     Hypertension     Major depressive disorder, single episode, moderate ([facility] 2002    Morbid obesity ([facility] [facility] 2002    OAB (overactive bladder)     Osteoporosis     Sleep apnea 11/15/2022     Past Surgical History:   Procedure Laterality Date    CARDIAC CATHETERIZATION      CARPAL TUNNEL RELEASE      COLONOSCOPY      GASTROSTOMY TUBE PLACEMENT N/A 2022    PERCUTANEOUS ENDOSCOPIC GASTROSTOMY TUBE PLACEMENT performed by Shawn Pena MD at [address] [place] [address], [facility] ([address] [place] [address])      KNEE ARTHROPLASTY Bilateral     MD COLON CA SCRN NOT HI RSK IND N/A 2017    COLONOSCOPY performed by Diandra Castle MD at [address] [place] [address] TRANSORAL DIAGNOSTIC N/A 2017    EGD ESOPHAGOGASTRODUODENOSCOPY performed by Diandra Castle MD at [address] Bilateral     SKIN BIOPSY Right 2020    EXCISION OF LEG MASS RIGHT (PAT ON ADMIT) performed by Shawn Pena MD at [facility] Family History  Family History   Problem Relation Age of Onset    Arthritis Father     Other Father         gout    Heart Failure Father     Cancer Sister      [] Unable to obtain due to ventilated and/ or neurologic status    Social History     Socioeconomic History    Marital status:      Spouse name: Aniceto Thompson     Number of children: 3    Years of education: 12    Highest education level: 12th grade   Occupational History    Occupation: [employer]    Tobacco Use    Smoking status: Former     Packs/day: 1.00     Years: 20.00     Pack years: 20.00     Types: Cigarettes     Quit date:      Years since quittin.9    Smokeless tobacco: Never   Vaping Use    Vaping Use: Never used   Substance and Sexual Activity    Alcohol use: No    Drug use: Never    Sexual activity: Not Currently     Partners: Male   Other Topics Concern    Not on file   Social History Narrative    Lives With: Spouse Ephraim    Type of Home: [address]: Two level    Home Access: Stairs to enter with rails - Number of Steps: 2- Rails: Right    Bathroom Shower/Tub: Tub/Shower unit    Bathroom Equipment: Shower chair, Hand-held shower    Home Equipment: Rilla Beards, rolling    ADL Assistance: Independent    Ambulation Assistance: Independent (with [employer])    Transfer Assistance: Independent    Active : No    Additional Comments: Pt inconsistent historian. PLOF and home set up verified by dtr. Pt's dtr stating that pt's dementia has been worsening over the past few years.                       Social Determinants of Health     Financial Resource Strain: Low Risk     Difficulty of Paying Living Expenses: Not hard at all   Food Insecurity: No Food Insecurity    Worried About Running Out of Food in the Last Year: Never true    Ran Out of Food in the Last Year: Never true   Transportation Needs: Not on file   Physical Activity: Insufficiently Active    Days of Exercise per Week: 7 days    Minutes of Exercise per Session: 10 min   Stress: Not on file   Social Connections: Not on file   Intimate Partner Violence: Not on file   Housing Stability: Not on file      [] Unable to obtain due to ventilated and/ or neurologic status      Home Medications:      Medications Prior to Admission: lidocaine 4 % external patch, Place 2 patches onto the skin daily as needed (For low back pain. Patches can remain on for 12hr of each 24hr period)  hydrALAZINE (APRESOLINE) 50 MG tablet, 1 tablet by PEG Tube route in the morning, at noon, and at bedtime  losartan (COZAAR) 25 MG tablet, 1 tablet by PEG Tube route daily  metoprolol tartrate (LOPRESSOR) 100 MG tablet, 1 tablet by PEG Tube route 2 times daily  potassium bicarbonate (K-LYTE) 25 MEQ disintegrating tablet, 1 tablet by PEG Tube route three times a week  furosemide (LASIX) 20 MG tablet, 1 tablet by PEG Tube route three times a week  nitroGLYCERIN (NITROSTAT) 0.4 MG SL tablet, Place 1 tablet under the tongue every 5 minutes as needed for Chest pain up to max of 3 total doses. If no relief after 1 dose, call 911.   pravastatin (PRAVACHOL) 40 MG tablet, TAKE ONE-HALF TABLET BY MOUTH DAILY  albuterol sulfate HFA (PROVENTIL;VENTOLIN;PROAIR) 108 (90 Base) MCG/ACT inhaler, Inhale 2 puffs into the lungs 4 times daily as needed for Wheezing  sertraline (ZOLOFT) 50 MG tablet, Take 1 tablet by mouth daily  gabapentin (NEURONTIN) 400 MG capsule, TAKE ONE CAPSULE BY MOUTH THREE TIMES A DAY FOR 90 DAYS  Handicap Placard MISC, by Does not apply route Exp 5 years  tiZANidine (ZANAFLEX) 4 MG tablet, TAKE ONE TABLET BY MOUTH THREE TIMES A DAY AS NEEDED FOR MUSCLE SPASMS / PAIN  loratadine (CLARITIN) 10 MG tablet, Take 1 tablet by mouth daily  aspirin 81 MG EC tablet, Take 1 tablet by mouth daily  melatonin 1 MG tablet, Take 1 tablet by mouth nightly as needed for Sleep    Current Hospital Medications:     Scheduled Meds:   aspirin  81 mg PEG Tube Daily    metoprolol  100 mg PEG Tube BID pravastatin  20 mg PEG Tube Nightly    sodium chloride flush  10 mL IntraVENous 2 times per day    enoxaparin  40 mg SubCUTAneous Daily    cefTRIAXone (ROCEPHIN) IV  1,000 mg IntraVENous Q24H     Continuous Infusions:   sodium chloride      lactated ringers 100 mL/hr at 11/26/22 1249     PRN Meds:.sodium chloride flush, sodium chloride, ondansetron **OR** ondansetron, polyethylene glycol, acetaminophen **OR** acetaminophen, potassium chloride **OR** potassium alternative oral replacement **OR** potassium chloride, ipratropium-albuterol  .   sodium chloride      lactated ringers 100 mL/hr at 11/26/22 1249        Allergies: Allergies   Allergen Reactions    Codeine      Nausea, lightheadedness, dizzy        Review of Systems:       [] CV, Resp, Neuro, , and all other systems reviewed and negative other than listed in HPI. [x] Unable to obtain due to ventilated and/ or neurologic status      Objective Findings:     Vitals:   Vitals:    11/25/22 1918 11/25/22 2219 11/26/22 0720 11/26/22 0825   BP: (!) 149/54  (!) 146/72 (!) 146/72   Pulse: 61 56 63 66   Resp: 30  28    Temp: 97.2 °F (36.2 °C)  97.9 °F (36.6 °C)    TempSrc: Oral  Oral    SpO2: 99% 99% 94%    Height:            Physical Examination:  General: In no acute distress  HEENT: Normocephalic, scleral icterus. Obese neck  Neck: No jugular venous distention. Heart: Regular, no murmur, no rub/gallop. No right ventricular heave. Lungs: Poor respiratory effort  Abdomen: Obese soft, G-tube close to left upper quadrant area, no palpable mass  Extremities: No clubbing/cyanosis, no edema. Skin: Warm, dry, normal turgor, no rash, no bruise, no petichiae. Neuro: No myoclonus or tremor.    Psych: Normal affect    Results/ Medications reviewed 11/26/2022, 12:53 PM     Laboratory, Microbiology, Pathology, Radiology, Cardiology, Medications and Transcriptions reviewed  Scheduled Meds:   aspirin  81 mg PEG Tube Daily    metoprolol  100 mg PEG Tube BID pravastatin  20 mg PEG Tube Nightly    sodium chloride flush  10 mL IntraVENous 2 times per day    enoxaparin  40 mg SubCUTAneous Daily    cefTRIAXone (ROCEPHIN) IV  1,000 mg IntraVENous Q24H     Continuous Infusions:   sodium chloride      lactated ringers 100 mL/hr at 11/26/22 1249       Recent Labs     11/25/22  0915 11/25/22 0931 11/26/22  0521   WBC 17.6*  --  10.8   HGB 14.5 17.5* 12.2   HCT 44.7  --  37.6   MCV 97.3*  --  96.7*     --  222     Recent Labs     11/25/22  0915 11/25/22 0931 11/26/22  0521     --  137   K 4.9  --  4.1  4.1   CL 98  --  104   CO2 24  --  21   BUN 34*  --  21   CREATININE 0.66 0.8 0.44*     Recent Labs     11/25/22 0915   AST 29   ALT 35*   BILITOT 0.7   ALKPHOS 91     Recent Labs     11/25/22 0915   LIPASE 120*     Recent Labs     11/25/22  0915   PROT 7.3     CT ABDOMEN PELVIS W WO CONTRAST Additional Contrast? None    Result Date: 11/25/2022  EXAMINATION: CT OF THE ABDOMEN AND PELVIS WITH AND WITHOUT CONTRAST 11/25/2022 4:02 pm TECHNIQUE: CT of the abdomen and pelvis was performed with and without the administration of intravenous contrast.  Multiplanar reformatted images are provided for review. Automated exposure control, iterative reconstruction, and/or weight based adjustment of the mA/kV was utilized to reduce the radiation dose to as low as reasonably achievable. COMPARISON: 10/10/2019 HISTORY: ORDERING SYSTEM PROVIDED HISTORY: abdominal pain; elevated lipase; eval for pancreatitis and possible necrosis TECHNOLOGIST PROVIDED HISTORY: Reason for exam:->abdominal pain; elevated lipase; eval for pancreatitis and possible necrosis Additional Contrast?->None What reading provider will be dictating this exam?->CRC FINDINGS: Lower Chest: Minimal linear atelectasis. No pleural fluid. No evidence of pneumothorax. Organs: There is a small amount of free air in the upper abdomen. This is likely related to gastrostomy tube. Pancreas has a normal CT appearance. No morphologic evidence of pancreatitis. Pancreatic duct and common bile ducts appear normal.  Gallbladder appears normal.  The adrenal glands are within normal limits. There is right nephrolithiasis without renal obstruction. GI/Bowel: Gastrostomy tube in the stomach with free intraperitoneal air. No evidence of ileus or mechanical obstruction. Pelvis: No free fluid the pelvis. Small amount of gas in the urinary bladder. Peritoneum/Retroperitoneum: No retroperitoneal mass or adenopathy. Aorta is nonaneurysmal. Bones/Soft Tissues: There is severe left convexity thoracolumbar scoliosis. Moderate to severe facet and degenerative disc disease changes. Subcutaneous tissues are generally normal.  Small amount of subcu gas near the umbilicus. 1.  Findings compatible with recent gastrostomy tube placement with small amount of FREE INTRAPERITONEAL AIR and some free air in the subcutaneous tissues. Please correlate with surgical history. 2.  No morphologic evidence of pancreatitis. 3.  Small amount of gas in the urinary bladder could be related to recent instrumentation peer     CTA HEAD W WO CONTRAST    Result Date: 11/14/2022  EXAMINATION: CTA OF THE NECK; CTA OF THE HEAD WITHOUT AND WITH CONTRAST 11/12/2022 7:57 am TECHNIQUE: CTA of the neck was performed with the administration of intravenous contrast. Multiplanar reformatted images are provided for review. MIP images are provided for review. Stenosis of the internal carotid arteries measured using NASCET criteria. Automated exposure control, iterative reconstruction, and/or weight based adjustment of the mA/kV was utilized to reduce the radiation dose to as low as reasonably achievable.; CTA of the head/brain was performed without and with the administration of intravenous contrast. Multiplanar reformatted images are provided for review. MIP images are provided for review.  Automated exposure control, iterative reconstruction, and/or weight based adjustment of the mA/kV was utilized to reduce the radiation dose to as low as reasonably achievable. COMPARISON: CT brain earlier same day HISTORY: ORDERING SYSTEM PROVIDED HISTORY: right sided weakness TECHNOLOGIST PROVIDED HISTORY: Reason for exam:->right sided weakness What reading provider will be dictating this exam?->CRC FINDINGS: CTA NECK: AORTIC ARCH/ARCH VESSELS: Atherosclerotic calcifications are present within the aortic arch. The origins of the great vessels are normal.  There is no significant stenosis of the innominate or subclavian arteries. CAROTID ARTERIES: There is mild calcified atherosclerotic plaque within the left common carotid artery. There is no significant stenosis of the common carotid arteries identified. There is no significant stenosis of the internal carotid arteries. There is no dissection or pseudoaneurysm. VERTEBRAL ARTERIES: There is a mildly beaded appearance of the distal V2 and V3 segments of the vertebral arteries suggesting fibromuscular dysplasia. There is no dissection or pseudoaneurysm. There is no significant stenosis. SOFT TISSUES: Lung apices are clear. There is no superior mediastinal lymphadenopathy. There is no cervical lymphadenopathy or soft tissue mass identified. The parotid glands and submandibular glands are normal. BONES: No lytic or blastic osseous lesions are identified. There is moderate multilevel neural foraminal narrowing and mild spinal canal stenosis from C4-C5 to C6-C7. CTA HEAD: ANTERIOR CIRCULATION: Atherosclerotic calcifications are present within the cavernous segments of the internal carotid arteries without significant stenosis evident. The anterior and middle cerebral arteries are normal. There is no significant stenosis or aneurysm evident. POSTERIOR CIRCULATION: The distal vertebral arteries are normal in appearance. The basilar artery is normal.  No significant stenosis or aneurysm is identified.   The posterior cerebral arteries are normal in appearance. OTHER: No dural venous sinus thrombosis on this non-dedicated study. BRAIN: There is no mass effect or midline shift. There is no vascular malformation identified. 1. No large vessel occlusion, significant stenosis or cerebral aneurysm identified within the brain. 2. Atherosclerosis without significant arterial stenosis identified within the neck. 3. Beaded appearance of the distal vertebral arteries suggesting fibromuscular dysplasia. XR WRIST RIGHT (MIN 3 VIEWS)    Result Date: 11/25/2022  EXAMINATION: 3 XRAY VIEWS OF THE RIGHT WRIST 11/25/2022 9:28 am COMPARISON: None. HISTORY: ORDERING SYSTEM PROVIDED HISTORY: Fall, R wrist pain TECHNOLOGIST PROVIDED HISTORY: Reason for exam:->Fall, R wrist pain What reading provider will be dictating this exam?->CRC FINDINGS: Extensive degenerative changes visualized in the right wrist joint with anterior displacement of the carpal bones seen, increased density visualized along the articular surface. This option of the alignment of the carpal bones is seen. Increased density visualized along within the wrist joint suggestive of CPPD. Bone demineralization and degenerative changes are seen that limit evaluation. Extensive soft tissue swelling seen. Extensive degenerative changes visualized, cannot rule out underlying fracture, if clinically indicated would recommend further evaluation with a CT scan. CT Head W/O Contrast    Result Date: 11/25/2022  EXAMINATION: CT OF THE HEAD WITHOUT CONTRAST  11/25/2022 10:41 am TECHNIQUE: CT of the head was performed without the administration of intravenous contrast. Automated exposure control, iterative reconstruction, and/or weight based adjustment of the mA/kV was utilized to reduce the radiation dose to as low as reasonably achievable.  COMPARISON: 11/14/2022 HISTORY: ORDERING SYSTEM PROVIDED HISTORY: [facility] TECHNOLOGIST PROVIDED HISTORY: Reason for exam:->[facility] Has a \"code stroke\" or \"stroke alert\" been called? ->No Decision Support Exception - unselect if not a suspected or confirmed emergency medical condition->Emergency Medical Condition (MA) What reading provider will be dictating this exam?->CRC FINDINGS: BRAIN/VENTRICLES: No evidence of parenchymal hemorrhages or contusions. No evidence of intra or extra-axial fluid collection is seen. Scattered areas of low attenuation are visualized in the periventricular and subcortical white matter demonstrating no change in comparison to the prior study consistent with chronic microvascular disease. No evidence of acute territorial infarct is seen. Prominence of the ventricles and sulci is visualized demonstrating no change consistent with chronic atrophic brain changes. No evidence of intracranial mass or mass effect, no evidence of midline shift is seen. No evidence of sellar or parasellar mass is visualized. ORBITS: The visualized portion of the orbits demonstrate no acute abnormality. SINUSES: The visualized paranasal sinuses and mastoid air cells demonstrate no acute abnormality. SOFT TISSUES/SKULL:  No acute abnormality of the visualized skull or soft tissues. No acute intracranial abnormality. Chronic microvascular disease and chronic atrophic brain changes. CT HEAD WO CONTRAST    Result Date: 11/14/2022  EXAMINATION: CT OF THE HEAD WITHOUT CONTRAST  11/14/2022 12:00 pm TECHNIQUE: CT of the head was performed without the administration of intravenous contrast. Automated exposure control, iterative reconstruction, and/or weight based adjustment of the mA/kV was utilized to reduce the radiation dose to as low as reasonably achievable. COMPARISON: Reviewed the previous MRI of the brain dated November 12. Reviewed the previous CT brain back to November 12. HISTORY: ORDERING SYSTEM PROVIDED HISTORY: cva, ventricular hemorrhage TECHNOLOGIST PROVIDED HISTORY: Reason for exam:->cva, ventricular hemorrhage Has a \"code stroke\" or \"stroke alert\" been called? ->No What reading provider will be dictating this exam?->CRC FINDINGS: Peripheral CSF space ventricular system correlates with the age group. There is no focal mass effect or midline shift. There is no evidence for a sizable area of a new acute recent insult in progression to the brain parenchyma. Discrete the blood CSF fluid level is seen in the dependent portion of the right left lateral ventricles more noticeable on the left. These were seen previously. These are stable over time. There is no further increase in intraventricular hemorrhagic component. The hyperdensity of the blood component has mild diminished since the previous study indicated more late acute phase/early subacute phase. Stable discrete intraventricular hemorrhage since recent studies. CT HEAD WO CONTRAST    Result Date: 11/13/2022  EXAMINATION: CT OF THE HEAD WITHOUT CONTRAST  11/13/2022 9:25 am TECHNIQUE: CT of the head was performed without the administration of intravenous contrast. Automated exposure control, iterative reconstruction, and/or weight based adjustment of the mA/kV was utilized to reduce the radiation dose to as low as reasonably achievable. COMPARISON: None. HISTORY: ORDERING SYSTEM PROVIDED HISTORY: eval for progression of blood layering within the occipital horns of the lateral ventricles TECHNOLOGIST PROVIDED HISTORY: Reason for exam:->eval for progression of blood layering within the occipital horns of the lateral ventricles Has a \"code stroke\" or \"stroke alert\" been called? ->No What reading provider will be dictating this exam?->CRC FINDINGS: BRAIN/VENTRICLES: Cerebral atrophy is noted globally. There is posterior trophic enlargement of the lateral ventricles similar to older examinations. There is a small amount of intermediate density hemorrhage in the occipital horns of the right and left ventricle similar in severity to 11/12/2022 MRI. 4th ventricle is patent. There is no shift of midline structures.   No subarachnoid or subdural hemorrhage. ORBITS: The visualized portion of the orbits demonstrate no acute abnormality. SINUSES: Pilar bullosa of the left middle nasal turbinate. Right deviation of the nasal septum which appears chronic. Mild mucosal thickening in the sphenoid sinuses. Small air-fluid level in the inferior left frontal sinus. Mastoid air cells are clear. SOFT TISSUES/SKULL:  No acute abnormality of the visualized skull or soft tissues. 1.  Stable minimal layering hemorrhage in the occipital horns of the right and left lateral ventricle. No change from MRI dated 11/12/2022. 2.  Findings compatible with chronic paranasal sinusitis, mild. CT HEAD WO CONTRAST    Result Date: 11/12/2022  EXAMINATION: CT OF THE HEAD WITHOUT CONTRAST  11/12/2022 10:31 am TECHNIQUE: CT of the head was performed without the administration of intravenous contrast. Automated exposure control, iterative reconstruction, and/or weight based adjustment of the mA/kV was utilized to reduce the radiation dose to as low as reasonably achievable. COMPARISON: 11/12/2022 HISTORY: ORDERING SYSTEM PROVIDED HISTORY: eval for hmg conversion TECHNOLOGIST PROVIDED HISTORY: Reason for exam:->eval for hmg conversion Has a \"code stroke\" or \"stroke alert\" been called? ->No What reading provider will be dictating this exam?->CRC FINDINGS: BRAIN/VENTRICLES: Generalized atrophy identified of the brain. Some low-attenuation areas identified in the periventricular and subcortical white matter to suggest chronic small vessel ischemic change. There is no acute intracranial hemorrhage, mass effect or midline shift. No abnormal extra-axial fluid collection. The gray-white differentiation is maintained without evidence of an acute infarct. There is no evidence of hydrocephalus. ORBITS: The visualized portion of the orbits demonstrate no acute abnormality.  SINUSES: The visualized paranasal sinuses and mastoid air cells demonstrate no acute abnormality. SOFT TISSUES/SKULL:  No acute abnormality of the visualized skull or soft tissues. Atrophy and chronic changes seen within the brain with no acute intracranial abnormality. CT HEAD WO CONTRAST    Result Date: 11/12/2022  EXAMINATION: CT OF THE HEAD WITHOUT CONTRAST  11/12/2022 4:34 am TECHNIQUE: CT of the head was performed without the administration of intravenous contrast. Automated exposure control, iterative reconstruction, and/or weight based adjustment of the mA/kV was utilized to reduce the radiation dose to as low as reasonably achievable. COMPARISON: None. HISTORY: ORDERING SYSTEM PROVIDED HISTORY: fall TECHNOLOGIST PROVIDED HISTORY: Reason for exam:->fall Has a \"code stroke\" or \"stroke alert\" been called? ->No Decision Support Exception - unselect if not a suspected or confirmed emergency medical condition->Emergency Medical Condition (MA) What reading provider will be dictating this exam?->CRC FINDINGS: BRAIN/VENTRICLES: There is no acute intracranial hemorrhage, mass effect or midline shift. No abnormal extra-axial fluid collection. The gray-white differentiation is maintained without evidence of an acute infarct. There is no evidence of hydrocephalus. The ventricles, cisterns and sulci are prominent consistent with atrophy. There is decreased attenuation within the periventricular white matter consistent with periventricular leukomalacia. ORBITS: The visualized portion of the orbits demonstrate no acute abnormality. SINUSES: The visualized paranasal sinuses and mastoid air cells demonstrate no acute abnormality. SOFT TISSUES/SKULL:  No acute abnormality of the visualized skull or soft tissues. 1. There is no acute intracranial abnormality. Specifically, there is no intracranial hemorrhage.  2. Atrophy and periventricular leukomalacia,     CTA NECK W WO CONTRAST    Result Date: 11/14/2022  EXAMINATION: CTA OF THE NECK; CTA OF THE HEAD WITHOUT AND WITH CONTRAST 11/12/2022 7:57 am TECHNIQUE: CTA of the neck was performed with the administration of intravenous contrast. Multiplanar reformatted images are provided for review. MIP images are provided for review. Stenosis of the internal carotid arteries measured using NASCET criteria. Automated exposure control, iterative reconstruction, and/or weight based adjustment of the mA/kV was utilized to reduce the radiation dose to as low as reasonably achievable.; CTA of the head/brain was performed without and with the administration of intravenous contrast. Multiplanar reformatted images are provided for review. MIP images are provided for review. Automated exposure control, iterative reconstruction, and/or weight based adjustment of the mA/kV was utilized to reduce the radiation dose to as low as reasonably achievable. COMPARISON: CT brain earlier same day HISTORY: ORDERING SYSTEM PROVIDED HISTORY: right sided weakness TECHNOLOGIST PROVIDED HISTORY: Reason for exam:->right sided weakness What reading provider will be dictating this exam?->CRC FINDINGS: CTA NECK: AORTIC ARCH/ARCH VESSELS: Atherosclerotic calcifications are present within the aortic arch. The origins of the great vessels are normal.  There is no significant stenosis of the innominate or subclavian arteries. CAROTID ARTERIES: There is mild calcified atherosclerotic plaque within the left common carotid artery. There is no significant stenosis of the common carotid arteries identified. There is no significant stenosis of the internal carotid arteries. There is no dissection or pseudoaneurysm. VERTEBRAL ARTERIES: There is a mildly beaded appearance of the distal V2 and V3 segments of the vertebral arteries suggesting fibromuscular dysplasia. There is no dissection or pseudoaneurysm. There is no significant stenosis. SOFT TISSUES: Lung apices are clear. There is no superior mediastinal lymphadenopathy. There is no cervical lymphadenopathy or soft tissue mass identified.   The parotid glands and submandibular glands are normal. BONES: No lytic or blastic osseous lesions are identified. There is moderate multilevel neural foraminal narrowing and mild spinal canal stenosis from C4-C5 to C6-C7. CTA HEAD: ANTERIOR CIRCULATION: Atherosclerotic calcifications are present within the cavernous segments of the internal carotid arteries without significant stenosis evident. The anterior and middle cerebral arteries are normal. There is no significant stenosis or aneurysm evident. POSTERIOR CIRCULATION: The distal vertebral arteries are normal in appearance. The basilar artery is normal.  No significant stenosis or aneurysm is identified. The posterior cerebral arteries are normal in appearance. OTHER: No dural venous sinus thrombosis on this non-dedicated study. BRAIN: There is no mass effect or midline shift. There is no vascular malformation identified. 1. No large vessel occlusion, significant stenosis or cerebral aneurysm identified within the brain. 2. Atherosclerosis without significant arterial stenosis identified within the neck. 3. Beaded appearance of the distal vertebral arteries suggesting fibromuscular dysplasia. XR CHEST PORTABLE    Result Date: 11/26/2022  EXAMINATION: ONE XRAY VIEW OF THE CHEST 11/25/2022 9:41 pm COMPARISON: November 25, 2022 HISTORY: ORDERING SYSTEM PROVIDED HISTORY: dyspnea TECHNOLOGIST PROVIDED HISTORY: Reason for exam:->dyspnea What reading provider will be dictating this exam?->CRC FINDINGS: Unchanged mild perihilar and interstitial lung infiltrates with shallow inspiration consistent with mild pulmonary edema. No focal disease. Bilateral shoulder arthroplasty noted. No acute disease. No change from prior. RECOMMENDATION: Careful clinical correlation and follow up recommended.      XR CHEST PORTABLE    Result Date: 11/25/2022  EXAMINATION: ONE XRAY VIEW OF THE CHEST 11/25/2022 9:17 am COMPARISON: 11/12/2022 HISTORY: ORDERING SYSTEM PROVIDED HISTORY: SOB TECHNOLOGIST PROVIDED HISTORY: Reason for exam:->SOB What reading provider will be dictating this exam?->CRC FINDINGS: The lungs are without acute focal process. No lung volumes. There is no effusion or pneumothorax. The cardiomediastinal silhouette is without acute process. The osseous structures are without acute process. Bilateral shoulder arthroplasties. No acute process. XR CHEST PORTABLE    Result Date: 11/12/2022  EXAMINATION: ONE XRAY VIEW OF THE CHEST 11/12/2022 4:12 am COMPARISON: None. HISTORY: ORDERING SYSTEM PROVIDED HISTORY: incresed resp rate TECHNOLOGIST PROVIDED HISTORY: Reason for exam:->incresed resp rate What reading provider will be dictating this exam?->CRC FINDINGS: The cardiomediastinal silhouette is at the upper limits of normal for technique. There are low lung volumes with bronchovascular crowding and bibasilar atelectasis. No pneumothorax or pleural effusion. Bilateral shoulder arthroplasties. Bibasilar atelectasis. MRI brain without contrast    Result Date: 11/12/2022  EXAMINATION: MRI OF THE BRAIN WITHOUT CONTRAST  11/12/2022 1:48 pm TECHNIQUE: Multiplanar multisequence MRI of the brain was performed without the administration of intravenous contrast. COMPARISON: None. HISTORY: ORDERING SYSTEM PROVIDED HISTORY: TIA TECHNOLOGIST PROVIDED HISTORY: Reason for exam:->TIA What is the sedation requirement?->None What reading provider will be dictating this exam?->CRC Initial evaluation. FINDINGS: INTRACRANIAL STRUCTURES/VENTRICLES: There is an acute infarct within the left inferior ahsan (DWI series 3, image 8). No mass effect or midline shift. A trace amount of blood is seen layering within the occipital horns of the lateral ventricles bilaterally. Areas of T2 FLAIR hyperintensity are seen in the periventricular and subcortical white matter, which are nonspecific, but may represent chronic microvascular ischemic change.   There is prominence of the ventricles and sulci due to global parenchymal volume loss. The sellar/suprasellar regions appear unremarkable. There is at least partial loss of the normal signal void within the right vertebral artery. ORBITS: The visualized portion of the orbits demonstrate no acute abnormality. SINUSES: The visualized paranasal sinuses and mastoid air cells demonstrate no acute abnormality. BONES/SOFT TISSUES: The bone marrow signal intensity appears normal. The soft tissues demonstrate no acute abnormality. 1. Small acute infarct involving the left inferior ahsan. No mass effect or midline shift. 2. Trace amount of blood layering within the occipital horns of the lateral ventricles bilaterally. 3. At least partial loss of the normal signal void within the right vertebral artery, which can be seen with slow flow/occlusion. This is of uncertain chronicity. 4. Otherwise, no acute intracranial abnormality. 5. Moderate global parenchymal volume loss with mild chronic microvascular ischemic changes. These results were sent to the [facility] [id number] ([address] [place] [address]) on 11/12/2022 at 2:22 pm to be communicated to the referring/covering health care provider/office. EGD    Result Date: 11/16/2022  Site: [facility] Patient Name: Dorcas Pathak Procedure Date: 11/16/2022 MRN: [id number] YOB: 1945 Gender: Female Attending MD: Joon Pimentel Indications:        -  Dysphagia Medications:        -  See the Anesthesia note for documentation of the administered medications Complications:        -  No immediate complications. Estimated Blood Loss:        -  Estimated blood loss was minimal. Procedure:        - The scope was introduced through the mouth and advanced to the second part           of the duodenum.        -  The upper GI endoscopy was accomplished without difficulty. -  The patient tolerated the procedure well. Findings:        -  The patient was placed in the supine position for PEG placement. The           stomach was insufflated to appose gastric and abdominal walls. A site was           located in the body of the stomach with excellent transillumination and manual           external pressure for placement. The abdominal wall was marked and prepped in           a sterile manner. The area was anesthetized with 4 mL of 1% lidocaine. The           trocar needle was introduced through the abdominal wall and into the stomach           under direct endoscopic view. A snare was introduced through the endoscope and           opened in the gastric lumen. The guide wire was passed through the trocar and           into the open snare. The snare was closed around the guide wire. The           endoscope and snare were removed, pulling the wire out through the mouth. A           skin incision was made at the site of needle insertion. The externally           removable 20 Fr gastrostomy tube was lubricated. The G-tube was tied to the           guide wire and pulled through the mouth and into the stomach. The trocar           needle was removed, and the gastrostomy tube was pulled out from the stomach           through the skin. The external bumper was attached to the gastrostomy tube,           and the tube was cut to remove the guide wire. The final position of the           gastrostomy tube was confirmed by relook endoscopy, and skin marking noted to           be 3 cm at the external bumper. The final tension and compression of the           abdominal wall by the PEG tube and external bumper were checked and revealed           that the bumper was moderately tight and mildly deforming the skin. The           feeding tube was capped, and the tube site cleaned and dressed. Estimated           blood loss was minimal. Impression:        -  An externally removable PEG placement was successfully completed. -  No specimens collected.  Recommendation:        -  Patient has a contact number available for emergencies. The signs and           symptoms of potential delayed complications were discussed with the patient. Return to normal activities tomorrow. Written discharge instructions were           provided to the patient.        -  [facility]. Procedure Code(s):        - 24022, Esophagogastroduodenoscopy, flexible, transoral; with directed           placement of percutaneous gastrostomy tube Diagnosis Code(s):        - R13.10, Dysphagia, unspecified       CPT(R) - 2021 copyright [employer]. All Rights Reserved. The CPT codes, CCI edits and ICD codes generated are intended as suggestions       and were generated based on input data. These codes are preliminary and upon        review may be revised to meet current compliance and payer requirements. The provider is responsible for the final determination of appropriate codes,       and modifiers. Scope Withdrawal Time:       00:00:01 Signature Name: Meek Presley MD Signature Statement: This document has been electronically signed. Note Initiated On:11/16/2022 Signature Date:11/16/2022 9:43 AM       Impression:   38-year-old female admitted with change in mental status secondary to urosepsis was found to have minimally elevated serum lipase, CT of the abdomen showed no radiographic evidence of pancreatitis. Patient has been tolerating tube feeding well.,  I do not think patient had pancreatitis and this degree of lipase elevation is not suggestive of pancreatitis. Plan:   Repeat serum lipase in a.m. Comments: Thank you for allowing us to participate in the care of this patient. Will continue to follow. Please call if questions or concerns arise.     Electronically signed by Flavia Blackwell MD on 11/26/2022 at 12:53 PM

## 2022-11-26 NOTE — PROGRESS NOTES
Infectious Diseases Inpatient Progress Note          HISTORY OF PRESENT ILLNESS:  Follow up sepsis secondary to UTI, altered mentation and obtundation on admission on IV Rocephin, well tolerated. Patient had remarkable clinical improvement with decreased obtundation and tachypnea. .  Decreased fevers. Tolerating tube feeding  No evidence of urinary retention  Currently with a pure wick   urine culture with group B strep  Blood culture 1 out of 2 coag negative staph consistent with contamination  Current Medications:     aspirin  81 mg PEG Tube Daily    metoprolol  100 mg PEG Tube BID    pravastatin  20 mg PEG Tube Nightly    sodium chloride flush  10 mL IntraVENous 2 times per day    enoxaparin  40 mg SubCUTAneous Daily    cefTRIAXone (ROCEPHIN) IV  1,000 mg IntraVENous Q24H       Allergies:  Codeine      Review of Systems  Limited 14 system review is negative other than HPI/recent CVA  Was able to recognize her  today    Physical Exam  Vitals:    11/25/22 1918 11/25/22 2219 11/26/22 0720 11/26/22 0825   BP: (!) 149/54  (!) 146/72 (!) 146/72   Pulse: 61 56 63 66   Resp: 30  28    Temp: 97.2 °F (36.2 °C)  97.9 °F (36.6 °C)    TempSrc: Oral  Oral    SpO2: 99% 99% 94%    Height:         General Appearance: alert and oriented to self and family, well-developed and well-nourished, in no acute distress, on room air  Skin: warm and dry, no rash. Head: normocephalic and atraumatic  Eyes: anicteric sclerae  ENT: oropharynx clear and moist with normal mucous membranes.  No oral thrush  Lungs: normal respiratory effort, clear lungs  Heart normal S1-S2 no murmur  Abdomen: soft, no tenderness  No leg edema  No erythema, no tenderness  Right facial droop and right hemiplegia  Lauren urine in pure wick    DATA:    Lab Results   Component Value Date    WBC 10.8 11/26/2022    HGB 12.2 11/26/2022    HCT 37.6 11/26/2022    MCV 96.7 (H) 11/26/2022     11/26/2022     Lab Results   Component Value Date    CREATININE 0.44 (L) 11/26/2022    BUN 21 11/26/2022     11/26/2022    K 4.1 11/26/2022    K 4.1 11/26/2022     11/26/2022    CO2 21 11/26/2022       Hepatic Function Panel:  Lab Results   Component Value Date/Time    ALKPHOS 91 11/25/2022 09:15 AM    ALT 35 11/25/2022 09:15 AM    AST 29 11/25/2022 09:15 AM    PROT 7.3 11/25/2022 09:15 AM    BILITOT 0.7 11/25/2022 09:15 AM    BILIDIR 0.2 04/23/2012 11:12 AM    IBILI 0.5 04/23/2012 11:12 AM    LABALBU 3.6 11/25/2022 09:15 AM    LABALBU 4.6 04/23/2012 11:12 AM       Microbiology:   Recent Labs     11/25/22  0947   BC Gram stain anaerobic bottle  Gram positive cocci in clusters-resembling Staph  1 out of 2 blood cultures  Further results to follow  Further testing performed at [place] [address]  *     No results for input(s): [employer] in the last 72 hours.   Recent Labs     11/25/22  1346   LABURIN Performed at [facility] [address], [address] [place] [address]  ([phone]  *  10 to 50,000 CFU/ML       IMPRESSION:    Sepsis secondary to acute lower UTI  Altered mentation with obtundation on admission  Mild PEG site cellulitis  Recent hemorrhagic stroke    Patient Active Problem List   Diagnosis    Hypertension    Hyperlipidemia    COPD (chronic obstructive pulmonary disease) (HCC)    Generalized osteoarthrosis, unspecified site    Major depressive disorder, single episode, moderate (HCC)    Morbid obesity (HCC)    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 (HCC)    Neuromuscular scoliosis of lumbar region    Hypotension    NSTEMI (non-ST elevated myocardial infarction) ([facility] [address]    Acute diastolic heart failure (HCC)    Hypovolemic shock (HCC)    Acute colitis    Slow transit constipation    External hemorrhoid, bleeding    Colitis    Pain and swelling due to varicose veins of both lower extremities    Venous insufficiency of left lower extremity    Asymptomatic varicose veins of bilateral lower extremities    Non-healing surgical wound    Claudication in peripheral vascular disease ([address]    Non-pressure chronic ulcer of calf, right, with fat layer exposed ([address]    Non-pressure chronic ulcer of calf with fat layer exposed ([address]    Cerebrovascular accident (CVA) due to stenosis of left middle cerebral artery (HCC)    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 ([address]    Fibromuscular dysplasia (HCC)    Impaired mobility and activities of daily living dt CVA    Acute CVA (cerebrovascular accident) ([address]    Dysphagia    Cerebrovascular accident (CVA) due to stenosis of left vertebral artery ([address]    Palliative care encounter    Advanced care planning/counseling discussion    Goals of care, counseling/discussion    Aphasia    TIA (transient ischemic attack)    Nocturnal hypoxia    Sleep apnea    Encephalopathy       PLAN:  Continue IV Rocephin  Follow-up culture results  Follow-up CBC BMP and clinically  Local care to PEG site home from pressure    Discussed with patient, spouse, and Dr Trevon Stewart MD

## 2022-11-26 NOTE — PROGRESS NOTES
[facility] AT [place] Respiratory Therapy Evaluation   Current Order:  duoneb tid      Home Regimen: prn      Ordering Physician: catherine  Re-evaluation Date:  11/28     Diagnosis: encephalopathy       Patient Status: Stable / Unstable + Physician notified    The following MDI Criteria must be met in order to convert aerosol to MDI with spacer. If unable to meet, MDI will be converted to aerosol:  []  Patient able to demonstrate the ability to use MDI effectively  []  Patient alert and cooperative  []  Patient able to take deep breath with 5-10 second hold  []  Medication(s) available in this delivery method   []  Peak flow greater than or equal to 200 ml/min            Current Order Substituted To  (same drug, same frequency)   Aerosol to MDI [] Albuterol Sulfate 0.083% unit dose by aerosol Albuterol Sulfate MDI 2 puffs by inhalation with spacer    [] Levalbuterol 1.25 mg unit dose by aerosol Levalbuterol MDI 2 puffs by inhalation with spacer    [] Levalbuterol 0.63 mg unit dose by aerosol Levalbuterol MDI 2 puffs by inhalation with spacer    [] Ipratropium Bromide 0.02% unit dose by aerosol Ipratropium Bromide MDI 2 puffs by inhalation with spacer    [] Duoneb (Ipratropium + Albuterol) unit dose by aerosol Ipratropium MDI + Albuterol MDI 2 puffs by inhalation w/spacer   MDI to Aerosol [] Albuterol Sulfate MDI Albuterol Sulfate 0.083% unit dose by aerosol    [] Levalbuterol MDI 2 puffs by inhalation Levalbuterol 1.25 mg unit dose by aerosol    [] Ipratropium Bromide MDI by inhalation Ipratropium Bromide 0.02% unit dose by aerosol    [] Combivent (Ipratropium + Albuterol) MDI by inhalation Duoneb (Ipratropium + Albuterol) unit dose by aerosol       Treatment Assessment [Frequency/Schedule]:  Change frequency to: _______________________Freq Q4prn___________________________per Protocol, P&T, MEC      Points 0 1 2 3 4   Pulmonary Status  Non-Smoker  []   Smoking history   < 20 pack years  []   Smoking history  ?  20 pack years  []   Pulmonary Disorder  (acute or chronic)  [x]   Severe or Chronic w/ Exacerbation  []     Surgical Status No [x]   Surgeries     General []   Surgery Lower []   Abdominal Thoracic or []   Upper Abdominal Thoracic with  PulmonaryDisorder  []     Chest X-ray Clear/Not  Ordered     [x]  Chronic Changes  Results Pending  []  Infiltrates, atelectasis, pleural effusion, or edema  []  Infiltrates in more than one lobe []  Infiltrate + Atelectasis, &/or pleural effusion  []    Respiratory Pattern Regular,  RR = 12-20 [x]  Increased,  RR = 21-25 []  SANCHEZ, irregular,  or RR = 26-30 []  Decreased FEV1  or RR = 31-35 []  Severe SOB, use  of accessory muscles, or RR ? 35  []    Mental Status Alert, oriented,  Cooperative [x]  Confused but Follows commands []  Lethargic or unable to follow commands []  Obtunded  []  Comatose  []    Breath Sounds Clear to  auscultation  [x]  Decreased unilaterally or  in bases only []  Decreased  bilaterally  []  Crackles or intermittent wheezes []  Wheezes []    Cough Strong, Spontan., & nonproductive [x]  Strong,  spontaneous, &  productive []  Weak,  Nonproductive []  Weak, productive or  with wheezes []  No spontaneous  cough or may require suctioning []    Level of Activity Ambulatory []  Ambulatory w/ Assist  [x]  Non-ambulatory []  Paraplegic []  Quadriplegic []    Total    Score:_____4__     Triage Score:____5____      Tri       Triage:     1. (>20) Freq: Q3    2. (16-20) Freq: Q4   3. (11-15) Freq: QID & Albuterol Q2 PRN    4. (6-10) Freq: TID & Albuterol Q2 PRN    5. (0-5) Freq Q4prn
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
## 2022-11-26 NOTE — PROGRESS NOTES
Culture given hypoxia. Chest x-ray ordered rales bilaterally posterior worse on the right concern for possible aspiration given continuous tube feeding patient was supine. Being keep the patient greater than 30 degrees elevated when tube feeding monitor closely for residuals and monitor for fevers patient is already empirically on ceftriaxone we will check procalcitonin today and again tomorrow for the need for antibiotic escalation given possible aspiration.   Also check respiratory viral panel to evaluate for other respiratory pathogens
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
## 2022-11-26 NOTE — ACP (ADVANCE CARE PLANNING)
Advance Care Planning     Advance Care Planning Activator (Inpatient)  Conversation Note      Date of ACP Conversation: 11/25/2022     Conversation Conducted with: pt's  Petey Ferguson and daughter Kimberly Elizalde    ACP Activator: Wan Arriola RN  Health Care Decision Maker:     Current Designated Health Care Decision Maker:     Primary Decision Maker: Shawn Bryant - Spouse - [phone]    Secondary Decision Maker: Rachel Espinoza - Child - [phone]    Secondary Decision Maker: Michi Castillo Child - [phone]    Care Preferences    Ventilation: \"If you were in your present state of health and suddenly became very ill and were unable to breathe on your own, what would your preference be about the use of a ventilator (breathing machine) if it were available to you? \"      Would the patient desire the use of ventilator (breathing machine)?: yes    \"If your health worsens and it becomes clear that your chance of recovery is unlikely, what would your preference be about the use of a ventilator (breathing machine) if it were available to you? \"     Would the patient desire the use of ventilator (breathing machine)? : this may affect her opinion however the patient is not able to participate in this discussion at this time. The patient's family requested information on advance directives and pastoral care is consulted for this. Resuscitation  \"CPR works best to restart the heart when there is a sudden event, like a heart attack, in someone who is otherwise healthy. Unfortunately, CPR does not typically restart the heart for people who have serious health conditions or who are very sick. \"    \"In the event your heart stopped as a result of an underlying serious health condition, would you want attempts to be made to restart your heart (answer \"yes\" for attempt to resuscitate) or would you prefer a natural death (answer \"no\" for do not attempt to resuscitate)? \" yes       [x] Yes   [] No   Educated Patient / Decision Maker regarding differences between Advance Directives and portable DNR orders.     Length of ACP Conversation in minutes:  10    Conversation Outcomes:  [x] ACP discussion completed  [] Existing advance directive reviewed with patient; no changes to patient's previously recorded wishes  [] New Advance Directive completed  [] Portable Do Not Rescitate prepared for Provider review and signature  [] POLST/POST/MOLST/MOST prepared for Provider review and signature      Follow-up plan:    [x] Schedule follow-up conversation to continue planning  [] Referred individual to Provider for additional questions/concerns   [] Advised patient/agent/surrogate to review completed ACP document and update if needed with changes in condition, patient preferences or care setting    [x] This note routed to one or more involved healthcare providers

## 2022-11-26 NOTE — CARE COORDINATION
Call to pt spouse Dario Steinberg. Spouse would like pt to go to SNF for skilled therapy and then possible transition into long term. Spouse would like help applying for Medicaid for pt. SNF Choices;   [facility]. [place] [facility]. Referrals will need sent on Monday.

## 2022-11-27 PROBLEM — L97.202 NON-PRESSURE CHRONIC ULCER OF CALF WITH FAT LAYER EXPOSED (HCC): Chronic | Status: RESOLVED | Noted: 2020-10-09 | Resolved: 2022-11-27

## 2022-11-27 PROBLEM — L97.212 NON-PRESSURE CHRONIC ULCER OF CALF, RIGHT, WITH FAT LAYER EXPOSED (HCC): Status: RESOLVED | Noted: 2020-10-02 | Resolved: 2022-11-27

## 2022-11-27 LAB
ANION GAP SERPL CALCULATED.3IONS-SCNC: 9 MEQ/L (ref 9–15)
BASOPHILS ABSOLUTE: 0 K/UL (ref 0–0.2)
BASOPHILS RELATIVE PERCENT: 0.3 %
BUN BLDV-MCNC: 15 MG/DL (ref 8–23)
CALCIUM SERPL-MCNC: 8.8 MG/DL (ref 8.5–9.9)
CHLORIDE BLD-SCNC: 100 MEQ/L (ref 95–107)
CO2: 24 MEQ/L (ref 20–31)
CREAT SERPL-MCNC: 0.4 MG/DL (ref 0.5–0.9)
EOSINOPHILS ABSOLUTE: 0.4 K/UL (ref 0–0.7)
EOSINOPHILS RELATIVE PERCENT: 3.3 %
GFR SERPL CREATININE-BSD FRML MDRD: >60 ML/MIN/{1.73_M2}
GLUCOSE BLD-MCNC: 97 MG/DL (ref 70–99)
HCT VFR BLD CALC: 37.2 % (ref 37–47)
HEMOGLOBIN: 12.3 G/DL (ref 12–16)
LYMPHOCYTES ABSOLUTE: 1.7 K/UL (ref 1–4.8)
LYMPHOCYTES RELATIVE PERCENT: 14.5 %
MCH RBC QN AUTO: 31.5 PG (ref 27–31.3)
MCHC RBC AUTO-ENTMCNC: 33 % (ref 33–37)
MCV RBC AUTO: 95.4 FL (ref 79.4–94.8)
MONOCYTES ABSOLUTE: 0.7 K/UL (ref 0.2–0.8)
MONOCYTES RELATIVE PERCENT: 5.7 %
NEUTROPHILS ABSOLUTE: 9 K/UL (ref 1.4–6.5)
NEUTROPHILS RELATIVE PERCENT: 76.2 %
PDW BLD-RTO: 13.8 % (ref 11.5–14.5)
PLATELET # BLD: 255 K/UL (ref 130–400)
POTASSIUM SERPL-SCNC: 4 MEQ/L (ref 3.4–4.9)
RBC # BLD: 3.9 M/UL (ref 4.2–5.4)
SODIUM BLD-SCNC: 133 MEQ/L (ref 135–144)
WBC # BLD: 11.8 K/UL (ref 4.8–10.8)

## 2022-11-27 PROCEDURE — 1210000000 HC MED SURG R&B

## 2022-11-27 PROCEDURE — 6370000000 HC RX 637 (ALT 250 FOR IP): Performed by: INTERNAL MEDICINE

## 2022-11-27 PROCEDURE — 2580000003 HC RX 258: Performed by: INTERNAL MEDICINE

## 2022-11-27 PROCEDURE — 2700000000 HC OXYGEN THERAPY PER DAY

## 2022-11-27 PROCEDURE — 85025 COMPLETE CBC W/AUTO DIFF WBC: CPT

## 2022-11-27 PROCEDURE — 36415 COLL VENOUS BLD VENIPUNCTURE: CPT

## 2022-11-27 PROCEDURE — 6360000002 HC RX W HCPCS: Performed by: INTERNAL MEDICINE

## 2022-11-27 PROCEDURE — 80048 BASIC METABOLIC PNL TOTAL CA: CPT

## 2022-11-27 RX ORDER — METOPROLOL TARTRATE 50 MG/1
50 TABLET, FILM COATED ORAL 2 TIMES DAILY
Status: DISCONTINUED | OUTPATIENT
Start: 2022-11-27 | End: 2022-11-27

## 2022-11-27 RX ORDER — METOPROLOL TARTRATE 50 MG/1
50 TABLET, FILM COATED ORAL 2 TIMES DAILY
Status: DISCONTINUED | OUTPATIENT
Start: 2022-11-27 | End: 2022-12-03 | Stop reason: HOSPADM

## 2022-11-27 RX ADMIN — SODIUM CHLORIDE, POTASSIUM CHLORIDE, SODIUM LACTATE AND CALCIUM CHLORIDE: 600; 310; 30; 20 INJECTION, SOLUTION INTRAVENOUS at 09:48

## 2022-11-27 RX ADMIN — METOPROLOL TARTRATE 50 MG: 50 TABLET, FILM COATED ORAL at 10:36

## 2022-11-27 RX ADMIN — METOPROLOL TARTRATE 50 MG: 50 TABLET, FILM COATED ORAL at 19:58

## 2022-11-27 RX ADMIN — CEFTRIAXONE SODIUM 1000 MG: 1 INJECTION, POWDER, FOR SOLUTION INTRAMUSCULAR; INTRAVENOUS at 18:35

## 2022-11-27 RX ADMIN — PRAVASTATIN SODIUM 20 MG: 20 TABLET ORAL at 19:58

## 2022-11-27 RX ADMIN — ENOXAPARIN SODIUM 40 MG: 100 INJECTION SUBCUTANEOUS at 09:32

## 2022-11-27 RX ADMIN — Medication 10 ML: at 20:03

## 2022-11-27 RX ADMIN — Medication 10 ML: at 09:31

## 2022-11-27 RX ADMIN — ASPIRIN 81 MG: 81 TABLET, CHEWABLE ORAL at 09:32

## 2022-11-27 ASSESSMENT — PAIN SCALES - GENERAL: PAINLEVEL_OUTOF10: 0

## 2022-11-27 NOTE — FLOWSHEET NOTE
Assessment completed. VS WNL. A&OX person, place. PM meds given via PEG tube. Lung sounds diminished. Abs sounds active; Redness noted around PEG tube insertion site; PEG infusing with tube feed; No residual noted. HOB at 27. Pt denies further needs at the moment. Call light within reach. Bed in lowest position.

## 2022-11-27 NOTE — PROGRESS NOTES
Hospitalist Progress Note      PCP: Betty Swenson, APRN - CNP    Date of Admission: 11/25/2022    Chief Complaint:    Chief Complaint   Patient presents with    Failure To Thrive       Subjective:  Patient is doing much better today; answering questions in one word answers; recognizes her       Medications:  Reviewed    Infusion Medications    sodium chloride       Scheduled Medications    metoprolol  50 mg PEG Tube BID    aspirin  81 mg PEG Tube Daily    pravastatin  20 mg PEG Tube Nightly    sodium chloride flush  10 mL IntraVENous 2 times per day    enoxaparin  40 mg SubCUTAneous Daily    cefTRIAXone (ROCEPHIN) IV  1,000 mg IntraVENous Q24H     PRN Meds: sodium chloride flush, sodium chloride, ondansetron **OR** ondansetron, polyethylene glycol, acetaminophen **OR** acetaminophen, potassium chloride **OR** potassium alternative oral replacement **OR** potassium chloride, ipratropium-albuterol      Intake/Output Summary (Last 24 hours) at 11/27/2022 1133  Last data filed at 11/27/2022 3490  Gross per 24 hour   Intake 1558 ml   Output 1700 ml   Net -142 ml         Exam:    BP (!) 146/55   Pulse 64   Temp 98.4 °F (36.9 °C) (Oral)   Resp 24   Ht 5' 3\" (1.6 m)   LMP  (LMP Unknown)   SpO2 95%   BMI 28.03 kg/m²     General appearance: No apparent distress, appears stated age and cooperative. HEENT:  Conjunctivae/corneas clear. Neck: Trachea midline. Respiratory:  Normal respiratory effort. Clear to auscultation  Cardiovascular: Regular rate and rhythm  Abdomen: Soft, non-tender, non-distended with normal bowel sounds. Musculoskeletal: No clubbing, cyanosis or edema bilaterally  Neuro: right sided hemiparesis.    Capillary Refill: Brisk,< 3 seconds   Peripheral Pulses: +2 palpable, equal bilaterally       Labs:   Recent Labs     11/25/22  0915 11/25/22  0931 11/26/22  0521 11/27/22  0546   WBC 17.6*  --  10.8 11.8*   HGB 14.5 17.5* 12.2 12.3   HCT 44.7  --  37.6 37.2     --  222 255 Recent Labs     11/25/22 0915 11/25/22  0931 11/26/22  0521 11/27/22  0545     --  137 133*   K 4.9  --  4.1  4.1 4.0   CL 98  --  104 100   CO2 24  --  21 24   BUN 34*  --  21 15   CREATININE 0.66 0.8 0.44* 0.40*   CALCIUM 9.5  --  8.6 8.8       Recent Labs     11/25/22  0915   AST 29   ALT 35*   BILITOT 0.7   ALKPHOS 91       No results for input(s): INR in the last 72 hours. Recent Labs     11/25/22  0915   TROPONINI <0.010         Urinalysis:      Lab Results   Component Value Date/Time    NITRU Negative 11/25/2022 11:30 AM    WBCUA 20-50 11/25/2022 11:30 AM    BACTERIA Negative 11/25/2022 11:30 AM    RBCUA 6-10 11/25/2022 11:30 AM    BLOODU Negative 11/25/2022 11:30 AM    SPECGRAV 1.016 11/25/2022 11:30 AM    GLUCOSEU Negative 11/25/2022 11:30 AM    GLUCOSEU NEG 10/17/2011 10:15 AM       Radiology:  XR CHEST PORTABLE   Final Result   No acute disease. No change from prior. RECOMMENDATION:   Careful clinical correlation and follow up recommended. CT ABDOMEN PELVIS W WO CONTRAST Additional Contrast? None   Final Result   1. Findings compatible with recent gastrostomy tube placement with small   amount of FREE INTRAPERITONEAL AIR and some free air in the subcutaneous   tissues. Please correlate with surgical history. 2.  No morphologic evidence of pancreatitis. 3.  Small amount of gas in the urinary bladder could be related to recent   instrumentation peer         CT Head W/O Contrast   Final Result   No acute intracranial abnormality. Chronic microvascular disease and chronic atrophic brain changes. XR WRIST RIGHT (MIN 3 VIEWS)   Final Result   Extensive degenerative changes visualized, cannot rule out underlying   fracture, if clinically indicated would recommend further evaluation with a   CT scan. XR CHEST PORTABLE   Final Result   No acute process.              Assessment/Plan:    Septic encephalopathy secondary to uti:  Much improved with IV CTX  HTN/HLD/Stroke history:  Continue home meds; reducing lopressor to 50 from 100  Dysphagia:  PEG in place; continue TF  Functional Status: Fall precautions. Up with assistance. PT OT  Diet: TF  DVT ppx: Lovenox SCDs  Disposition: Dependent on hospital course. Will discharge once medically stable. SW on board for discharge planning. Active Hospital Problems    Diagnosis Date Noted    Sepsis secondary to UTI ([facility] [address] [A41.9, N39.0] 11/26/2022     Priority: Medium    Obtundation [R40.1] 11/26/2022     Priority: Medium    Late effects of CVA (cerebrovascular accident) [I69.90] 11/26/2022     Priority: Medium    Encephalopathy [G93.40] 11/25/2022     Priority: Medium        Additional work up or/and treatment plan may be added today or then after based on clinical progression. I am managing a portion of pt care. Some medical issues are handled by other specialists. Additional work up and treatment should be done in out pt setting by pt PCP and other out pt providers. In addition to examining and evaluating pt, I spent additional time explaining care, normal and abnormal findings, and treatment plan. All of pt questions were answered. Counseling, diet and education were  provided. Case will be discussed with nursing staff when appropriate. Family will be updated if and when appropriate. Diet: Diet NPO  ADULT TUBE FEEDING; PEG; Standard with Fiber; Continuous; 25; Yes; 25; Q 8 hours; 50; 30;  Q 6 hours    Code Status: Full Code    Patients family is requesting a different SNF than [facility] as they feel like the patient was neglected there; will discuss with CM    Electronically signed by Janet Posey MD on 11/27/2022 at 11:33 AM

## 2022-11-27 NOTE — PROGRESS NOTES
Shift assessment complete. VSS. A&Ox2. Unsure of time and situation. Pt calm and cooperative, but does get anxious during involved care. Lung sounds are diminished. Dyspnea present on exertion. RR elevated at 26 this morning. In 3 L NC. SR on tele. Abdomen is soft and round. Bowel sounds are active. One BM today. Pt cleaned and washed. Linen changed. Zinc and mepilex placed on pt's buttocks. Turned on rt side. RUE flaccid. RLE limited movement. Pt able to move toes RLE. Edema present BUE and BLE. Meds given per MAR. Gastrostomy tube in place. DRSG CDI. Redness present around site. TF tolerated at 50ml/hr. Pt in bed with family and call light at bedside. No needs at this time. Electronically signed by Maxime Ramos RN on 11/27/2022 at 2:46 PM    [phone] - Patient changed and cleaned up. Zinc and new mepilex placed on buttocks. Purewick replaced. Turned on left side. Oral care performed. Pt restless and anxious. Gastrostomy tube drsg changed. Redness still around site. 0 ML residual. New tube feed set and bottle utilized.      Electronically signed by Maxime Ramos RN on 11/27/2022 at 5:46 PM

## 2022-11-28 LAB
ANION GAP SERPL CALCULATED.3IONS-SCNC: 8 MEQ/L (ref 9–15)
BASOPHILS ABSOLUTE: 0.1 K/UL (ref 0–0.2)
BASOPHILS RELATIVE PERCENT: 0.6 %
BUN BLDV-MCNC: 16 MG/DL (ref 8–23)
CALCIUM SERPL-MCNC: 9.1 MG/DL (ref 8.5–9.9)
CHLORIDE BLD-SCNC: 103 MEQ/L (ref 95–107)
CO2: 27 MEQ/L (ref 20–31)
CREAT SERPL-MCNC: 0.45 MG/DL (ref 0.5–0.9)
CULTURE, BLOOD ID SENSITIVITY: ABNORMAL
CULTURE, BLOOD ID SENSITIVITY: ABNORMAL
EKG ATRIAL RATE: 51 BPM
EKG P AXIS: 60 DEGREES
EKG P-R INTERVAL: 98 MS
EKG Q-T INTERVAL: 456 MS
EKG QRS DURATION: 82 MS
EKG QTC CALCULATION (BAZETT): 420 MS
EKG R AXIS: 42 DEGREES
EKG T AXIS: 56 DEGREES
EKG VENTRICULAR RATE: 51 BPM
EOSINOPHILS ABSOLUTE: 0.4 K/UL (ref 0–0.7)
EOSINOPHILS RELATIVE PERCENT: 3.1 %
GFR SERPL CREATININE-BSD FRML MDRD: >60 ML/MIN/{1.73_M2}
GLUCOSE BLD-MCNC: 117 MG/DL (ref 70–99)
HCT VFR BLD CALC: 39.7 % (ref 37–47)
HEMOGLOBIN: 12.6 G/DL (ref 12–16)
LIPASE: 113 U/L (ref 12–95)
LYMPHOCYTES ABSOLUTE: 1.5 K/UL (ref 1–4.8)
LYMPHOCYTES RELATIVE PERCENT: 12.8 %
MCH RBC QN AUTO: 30.5 PG (ref 27–31.3)
MCHC RBC AUTO-ENTMCNC: 31.7 % (ref 33–37)
MCV RBC AUTO: 96.2 FL (ref 79.4–94.8)
MONOCYTES ABSOLUTE: 0.6 K/UL (ref 0.2–0.8)
MONOCYTES RELATIVE PERCENT: 5.6 %
NEUTROPHILS ABSOLUTE: 8.9 K/UL (ref 1.4–6.5)
NEUTROPHILS RELATIVE PERCENT: 77.9 %
ORGANISM: ABNORMAL
PDW BLD-RTO: 13.8 % (ref 11.5–14.5)
PLATELET # BLD: 300 K/UL (ref 130–400)
POTASSIUM SERPL-SCNC: 4.4 MEQ/L (ref 3.4–4.9)
RBC # BLD: 4.13 M/UL (ref 4.2–5.4)
SODIUM BLD-SCNC: 138 MEQ/L (ref 135–144)
WBC # BLD: 11.5 K/UL (ref 4.8–10.8)

## 2022-11-28 PROCEDURE — 80048 BASIC METABOLIC PNL TOTAL CA: CPT

## 2022-11-28 PROCEDURE — 93010 ELECTROCARDIOGRAM REPORT: CPT | Performed by: INTERNAL MEDICINE

## 2022-11-28 PROCEDURE — 99232 SBSQ HOSP IP/OBS MODERATE 35: CPT | Performed by: INTERNAL MEDICINE

## 2022-11-28 PROCEDURE — 6360000002 HC RX W HCPCS: Performed by: INTERNAL MEDICINE

## 2022-11-28 PROCEDURE — 99231 SBSQ HOSP IP/OBS SF/LOW 25: CPT | Performed by: NURSE PRACTITIONER

## 2022-11-28 PROCEDURE — 1210000000 HC MED SURG R&B

## 2022-11-28 PROCEDURE — 36415 COLL VENOUS BLD VENIPUNCTURE: CPT

## 2022-11-28 PROCEDURE — 85025 COMPLETE CBC W/AUTO DIFF WBC: CPT

## 2022-11-28 PROCEDURE — 2580000003 HC RX 258: Performed by: INTERNAL MEDICINE

## 2022-11-28 PROCEDURE — 6370000000 HC RX 637 (ALT 250 FOR IP): Performed by: INTERNAL MEDICINE

## 2022-11-28 PROCEDURE — 83690 ASSAY OF LIPASE: CPT

## 2022-11-28 RX ADMIN — METOPROLOL TARTRATE 50 MG: 50 TABLET, FILM COATED ORAL at 20:09

## 2022-11-28 RX ADMIN — Medication 10 ML: at 20:09

## 2022-11-28 RX ADMIN — METOPROLOL TARTRATE 50 MG: 50 TABLET, FILM COATED ORAL at 10:06

## 2022-11-28 RX ADMIN — CEFTRIAXONE SODIUM 1000 MG: 1 INJECTION, POWDER, FOR SOLUTION INTRAMUSCULAR; INTRAVENOUS at 15:29

## 2022-11-28 RX ADMIN — ASPIRIN 81 MG: 81 TABLET, CHEWABLE ORAL at 10:06

## 2022-11-28 RX ADMIN — ENOXAPARIN SODIUM 40 MG: 100 INJECTION SUBCUTANEOUS at 10:06

## 2022-11-28 RX ADMIN — PRAVASTATIN SODIUM 20 MG: 20 TABLET ORAL at 20:09

## 2022-11-28 NOTE — PROGRESS NOTES
Gastroenterology Progress Note    Glendy Nurse is a 76 y.o. female patient. Hospitalization Day:3    Chief C/O: Abnormal lipase, ? pancreatitis    SUBJECTIVE: Seen and examined on medical surgical unit. Tolerating tube feeds without issue. She denies nausea/vomiting, abdominal pain, hematochezia or melena. Per nursing, bowels moving. Repeat lipase today 113. Physical    VITALS:  /88   Pulse 72   Temp 97.3 °F (36.3 °C) (Oral)   Resp 18   Ht 5' 3\" (1.6 m)   LMP  (LMP Unknown)   SpO2 93%   BMI 28.03 kg/m²   TEMPERATURE:  Current - Temp: 97.3 °F (36.3 °C); Max - Temp  Av.8 °F (36.6 °C)  Min: 97.3 °F (36.3 °C)  Max: 98.2 °F (36.8 °C)    General - Alert, oriented to person, knows she is in the hospital, disoriented to time  Eyes - no Icterus, no pallor  Cardiovascular - RRR  Lungs - clear to auscultation bilaterally  Abdomen - non distended,  non tender, no organomegaly, Bowel sounds present, + PEG tube present  Extremities - no edema  Skin - warm/dry, without jaundice  Neuro: right sided hemiparesis, no asterixis     Data    Data Review:    Recent Labs     22  0522  0546 22  0650   WBC 10.8 11.8* 11.5*   HGB 12.2 12.3 12.6   HCT 37.6 37.2 39.7   MCV 96.7* 95.4* 96.2*    255 300     Recent Labs     22  0521 22  0545 22  0650    133* 138   K 4.1  4.1 4.0 4.4    100 103   CO2 21 24 27   BUN 21 15 16   CREATININE 0.44* 0.40* 0.45*     No results for input(s): AST, ALT, ALB, BILIDIR, BILITOT, ALKPHOS in the last 72 hours. Recent Labs     22  0650   LIPASE 113*     No results for input(s): PROTIME, INR in the last 72 hours. Radiology Review:      CT abdomen pelvis with/without IV contrast 2022: Findings compatible with recent gastrostomy tube placement with small amount of free intraperitoneal air and some free air in the subcutaneous tissues. Please correlate with surgical history. No morphologic evidence of pancreatitis. Small amount of gas in the urinary bladder could be related to recent instrumentation. ASSESSMENT:  58-year-old female admitted to the hospital with altered mental status, septic encephalopathy secondary to UTI. Found to have minimally elevated serum lipase at 120, repeat today 113. CT abdomen with no radiographic evidence of pancreatitis. Clinical picture not suggestive of pancreatitis. Tolerating tube feed without issue. PLAN :  - Medical management per primary team.   - Continue tube feeds  - GI to sign off    Thank you for allowing me to participate in the care of your patient. Please feel free to contact me with any concerns.     Scarlett Flores, YULY - CNP

## 2022-11-28 NOTE — PROGRESS NOTES
[address] [facility]. [place], [address]    11/21/2022    Ngoc Gomez  is a 76 y.o. 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 Flaccid right dominant side, fibromuscular dysplasia, impaired mobility, COPD, sleep apnea, HTN      TF Isosource 1.5  50cc/hr 1800cc H2O  They have had no recent falls with injuries. They are not complaining of any un addressed pain issues. Have had no recent choking  issues. Has dysphagia and is on TFs for now. NO agitation or unusual mental behavioral issues. Is confused. PT reports they are   Still evaluating her. Family supportive. Plans to go home after rehab.      Past Medical History:   Diagnosis Date    Anxiety     Arthritis     COPD (chronic obstructive pulmonary disease) (HCC)     Generalized osteoarthrosis, unspecified site 12/27/2002    Hyperlipidemia     Hypertension     Major depressive disorder, single episode, moderate ([address] 12/27/2002    Morbid obesity ([address] 12/27/2002    Non-pressure chronic ulcer of calf with fat layer exposed ([address] 10/9/2020    Non-pressure chronic ulcer of calf, right, with fat layer exposed ([address] 10/2/2020    OAB (overactive bladder)     Osteoporosis     Sleep apnea 11/15/2022     Past Surgical History:   Procedure Laterality Date    CARDIAC CATHETERIZATION      CARPAL TUNNEL RELEASE      COLONOSCOPY      GASTROSTOMY TUBE PLACEMENT N/A 11/16/2022    PERCUTANEOUS ENDOSCOPIC GASTROSTOMY TUBE PLACEMENT performed by Terrie Morrissey MD at [address], TOTAL ABDOMINAL ([address])      KNEE ARTHROPLASTY Bilateral     ME COLON CA SCRN NOT HI RSK IND N/A 6/22/2017    COLONOSCOPY performed by Erinn Mendoza MD at [address] TRANSORAL DIAGNOSTIC N/A 2017    EGD ESOPHAGOGASTRODUODENOSCOPY performed by Payton Julian MD at [address] Bilateral     SKIN BIOPSY Right 2020    EXCISION OF LEG MASS RIGHT (PAT ON ADMIT) performed by Gabrielle Hayes MD at [address] History   Problem Relation Age of Onset    Arthritis Father     Other Father         gout    Heart Failure Father     Cancer Sister      Social History     Socioeconomic History    Marital status:      Spouse name: Yaneli Daley     Number of children: 3    Years of education: 12    Highest education level: 12th grade   Occupational History    Occupation: [employer]    Tobacco Use    Smoking status: Former     Packs/day: 1.00     Years: 20.00     Pack years: 20.00     Types: Cigarettes     Quit date:      Years since quittin.9    Smokeless tobacco: Never   Vaping Use    Vaping Use: Never used   Substance and Sexual Activity    Alcohol use: No    Drug use: Never    Sexual activity: Not Currently     Partners: Male   Other Topics Concern    Not on file   Social History Narrative    Lives With: Spouse Ephraim    Type of Home: [address] in  [address]: Two level    Home Access: Stairs to enter with rails - Number of Steps: 2- Rails: Right    Bathroom Shower/Tub: Tub/Shower unit    Bathroom Equipment: Shower chair, Hand-held shower    Home Equipment: carli Lee    ADL Assistance: Independent    Ambulation Assistance: Independent (with [employer])    Transfer Assistance: Independent    Active : No    Additional Comments: Pt inconsistent historian. PLOF and home set up verified by dtr. Pt's dtr stating that pt's dementia has been worsening over the past few years.                       Social Determinants of Health     Financial Resource Strain: Low Risk     Difficulty of Paying Living Expenses: Not hard at all   Food Insecurity: No Food Insecurity    Worried About [address] in the Last Year: Never true Ran Out of Food in the Last Year: Never true   Transportation Needs: Not on file   Physical Activity: Insufficiently Active    Days of Exercise per Week: 7 days    Minutes of Exercise per Session: 10 min   Stress: Not on file   Social Connections: Not on file   Intimate Partner Violence: Not on file   Housing Stability: Not on file       Allergies: Codeine   [facility] NF CHART    ROS:   Constitutional: There are no reports of behavioral issues,  fever, or increased weakness. No bleeding issues. No head aches, change in vision or hearing. Respiratory: denies SOB, dyspnea, dyspnea on exertion, change in exercise capacity  Cardiovascular: denies CP, lightheadedness, palpitations, PND, orthopnea,   GI: No N/V/D. : no reports of dysuria, incontinent of urine   Extremities: No reports of pain issues, edema  Psych: at baseline flat  and confused       Physical exam:   LMP  (LMP Unknown)   160lb  34514  97.7-76-18  97%    Constitutional: Awake laying in bed . general weakness  HEENT: Normocephalic, facial asymmetry right droop, sclera non icteric, pupils are equal and round, buccal mucosa pink and moist  Speech slight dysarthria,  sparse. NECK: euthyroid, no mass visualized  Cardiovascular: Regular rate S1S2 normal, NO S3S4  Respiratory: LCTA, even unlabored respirations, no accessory muscle use noted  GI: abdomen NT, +BS  ND; PEG patent, small amount of serous around PEG  : incontinent of urine  Extremities: right gluteal fold blister, healing, right leg flaccid, right hand weak some movement. Left leg and arm strong. ,no ankle edema, PPP  SKELETAL: no redness, or swelling over joints. Limited ROM but spontaneous movement x 3   Psych: pleasantly confused, repetitive , needs re-directed,   SKIN: no gross skin lesions noted on visualized skin, warm and dry    ASSESSMENT:     Diagnosis Orders   1.  Cerebrovascular accident (CVA) due to stenosis of left vertebral artery ([address] 2. Late effects of CVA (cerebrovascular accident)        3. Non-pressure chronic ulcer of right calf with fat layer exposed ([address]        4. Encephalopathy        5. Aphasia            PLAN:  Pt/POA agrees with POC   PT  OT  No calf ulcer, healed   Right inner thigh   blister pad and protect  TF PEG patent   at bedside, questions answered. CBC  CMP Monday    No orders of the defined types were placed in this encounter. adhere to the JNC VIII guidelines for HTN management and the NCEP ATP III guidelines for LDL-C management. No orders of the defined types were placed in this encounter. Return if symptoms worsen or fail to improve. Pertinent POC, medications, labs, have been reviewed, continue same. Encourage fluids and good nutrition. Stress fall prevention strategies. Micki Patterson, APRN-CNP      Micik Patterson, DNP, MSN, RN, GNP-BC, NP-C  Adult/Igor Nurse Practitioner  [address], [facility]  Department of Geriatrics  8:30am-4:30pm   [phone]  After hours Answering service [phone]      Please note this report is partially produced by using speech recognition hardware. It may contain errors related to the system, including grammar, punctuation and spelling as well as words and phrases that may seem inaccurate.   For any questions or concerns feel free to contact me for clarification

## 2022-11-28 NOTE — FLOWSHEET NOTE
Assessment completed. VS WNL. A&O to person. PM meds given via PEG tube. Tube feeing patent; No residual noted; HOB at 30. Redness noted around PEG tube; Drsg clean, dry, intact. Call light within reach.  Bed in lowest position

## 2022-11-28 NOTE — CARE COORDINATION
This LSW met with patient and daughter at bedside this am.  Discharge plans discussed. Patient and daughter requesting that SNF referral be sent to [facility]. I contacted Crystal Horton with patient information- [facility] is not accepting [facility] at this time. Dtr. And patient will consider [facility] accepting SNF that [facility] is able to have accept. Dtr. Also requesting Medicaid application to be completed. Martin Morelosmara notified and is meeting with daughter this am.  LSW/ CM to follow.   Electronically signed by DAYDAY Anderson, LSW on 11/28/22 at 11:06 AM EST

## 2022-11-28 NOTE — CARE COORDINATION
Referral send to [facility]. Per Denny Rosenthal, \" We do not take tube feed for skilled nursing. We take pt with tube feed for long term care. \"   Notify CONRADO Copeland.     Referral send to [facility]. Will need PT/OT order. Notify CM. YARAW will follow.      Electronically signed by ANTHONY Boyd on 11/28/2022 at 2:56 PM

## 2022-11-28 NOTE — PROGRESS NOTES
I s/w Adama CRUZ this evening, I informed her this patient needs PT/OT evaluations before we can decide on patient's appropriateness for [facility].

## 2022-11-28 NOTE — PROGRESS NOTES
Department of Internal Medicine  General Internal Medicine  Attending Progress Note      SUBJECTIVE:  Pt seen and examined. No complaints. Daughter concerned about R hand swelling. XR was indeterminate for fracture. Explained that if there is a fracture, it was obviously too small to be seen on xray and that because pts R side was affected by her CVA, letting it heal is likely the management regardless. Daughter agreeable to treat conservatively with no further imaging.  PT/OT ordered for SNF placement    OBJECTIVE      Medications    Current Facility-Administered Medications: metoprolol tartrate (LOPRESSOR) tablet 50 mg, 50 mg, PEG Tube, BID  aspirin chewable tablet 81 mg, 81 mg, PEG Tube, Daily  pravastatin (PRAVACHOL) tablet 20 mg, 20 mg, PEG Tube, Nightly  sodium chloride flush 0.9 % injection 10 mL, 10 mL, IntraVENous, 2 times per day  sodium chloride flush 0.9 % injection 10 mL, 10 mL, IntraVENous, PRN  0.9 % sodium chloride infusion, , IntraVENous, PRN  enoxaparin (LOVENOX) injection 40 mg, 40 mg, SubCUTAneous, Daily  ondansetron (ZOFRAN-ODT) disintegrating tablet 4 mg, 4 mg, Oral, Q8H PRN **OR** ondansetron (ZOFRAN) injection 4 mg, 4 mg, IntraVENous, Q6H PRN  polyethylene glycol (GLYCOLAX) packet 17 g, 17 g, Oral, Daily PRN  acetaminophen (TYLENOL) tablet 650 mg, 650 mg, Oral, Q6H PRN **OR** acetaminophen (TYLENOL) suppository 650 mg, 650 mg, Rectal, Q6H PRN  potassium chloride (KLOR-CON M) extended release tablet 40 mEq, 40 mEq, Oral, PRN **OR** potassium bicarb-citric acid (EFFER-K) effervescent tablet 40 mEq, 40 mEq, Oral, PRN **OR** potassium chloride 10 mEq/100 mL IVPB (Peripheral Line), 10 mEq, IntraVENous, PRN  cefTRIAXone (ROCEPHIN) 1,000 mg in dextrose 5 % 50 mL IVPB mini-bag, 1,000 mg, IntraVENous, Q24H  ipratropium-albuterol (DUONEB) nebulizer solution 1 ampule, 1 ampule, Inhalation, Q4H PRN  Physical    VITALS:  /88   Pulse 72   Temp 97.3 °F (36.3 °C) (Oral)   Resp 18   Ht 5' 3\" (1.6 m) LMP  (LMP Unknown)   SpO2 93%   BMI 28.03 kg/m²   Constitutional: Awake and alert in no acute distress. Lying in bed comfortably  Head: Normocephalic, atraumatic  Eyes: EOMI, PERRLA  ENT: moist mucous membranes  Neck: neck supple, trachea midline  Lungs: Good inspiratory effort, no wheeze, no rhonchi, no rales  Heart: RRR, normal S1 and S2  GI: Soft, non-distended, +BS  MSK: R upper and lower extremity weakness, R hand edema  Skin: warm, dry  Psych: appropriate affect   Data    CBC:   Lab Results   Component Value Date/Time    WBC 11.5 11/28/2022 06:50 AM    RBC 4.13 11/28/2022 06:50 AM    RBC 4.22 04/23/2012 11:12 AM    HGB 12.6 11/28/2022 06:50 AM    HCT 39.7 11/28/2022 06:50 AM    MCV 96.2 11/28/2022 06:50 AM    MCH 30.5 11/28/2022 06:50 AM    MCHC 31.7 11/28/2022 06:50 AM    RDW 13.8 11/28/2022 06:50 AM     11/28/2022 06:50 AM    MPV 11.1 11/22/2022 12:00 AM     CMP:    Lab Results   Component Value Date/Time     11/28/2022 06:50 AM    K 4.4 11/28/2022 06:50 AM    K 4.1 11/26/2022 05:21 AM     11/28/2022 06:50 AM    CO2 27 11/28/2022 06:50 AM    BUN 16 11/28/2022 06:50 AM    CREATININE 0.45 11/28/2022 06:50 AM    GFRAA >60.0 07/05/2022 11:26 AM    LABGLOM >60.0 11/28/2022 06:50 AM    GLUCOSE 117 11/28/2022 06:50 AM    GLUCOSE 75 04/23/2012 11:12 AM    PROT 7.3 11/25/2022 09:15 AM    LABALBU 3.6 11/25/2022 09:15 AM    LABALBU 4.6 04/23/2012 11:12 AM    CALCIUM 9.1 11/28/2022 06:50 AM    BILITOT 0.7 11/25/2022 09:15 AM    ALKPHOS 91 11/25/2022 09:15 AM    AST 29 11/25/2022 09:15 AM    ALT 35 11/25/2022 09:15 AM       ASSESSMENT AND PLAN      # Metabolic encephalopathy 2/2 UTI  - improved with Rocephin.  Continuing  - PT/OT- SNF at discharge    # Recent CVA with residual R sided weakness and PEG  - PT/OT  - cont home meds, TF    # R hand edema  - xray indeterminate for fracture and recommended CT  - discussed with daughter who his agreeable to treat conservatively given R sided immobility due to stroke    # Positive blood cultures  - likely contaminant  - follow    # HTN/HLD  - cont home meds    DVT: lovenox ppx    Disposition: PT/OT for SNF placement. Medically stable once approved for SNF.       Hal Hong DO  Internal Medicine   Hospitalist    >35 minutes in total care time

## 2022-11-29 LAB
ANION GAP SERPL CALCULATED.3IONS-SCNC: 11 MEQ/L (ref 9–15)
BASOPHILS ABSOLUTE: 0 K/UL (ref 0–0.2)
BASOPHILS RELATIVE PERCENT: 0.3 %
BUN BLDV-MCNC: 18 MG/DL (ref 8–23)
CALCIUM SERPL-MCNC: 9.2 MG/DL (ref 8.5–9.9)
CHLORIDE BLD-SCNC: 97 MEQ/L (ref 95–107)
CO2: 26 MEQ/L (ref 20–31)
CREAT SERPL-MCNC: 0.46 MG/DL (ref 0.5–0.9)
EOSINOPHILS ABSOLUTE: 0.2 K/UL (ref 0–0.7)
EOSINOPHILS RELATIVE PERCENT: 1.5 %
GFR SERPL CREATININE-BSD FRML MDRD: >60 ML/MIN/{1.73_M2}
GLUCOSE BLD-MCNC: 124 MG/DL (ref 70–99)
HCT VFR BLD CALC: 38.5 % (ref 37–47)
HEMOGLOBIN: 12.5 G/DL (ref 12–16)
LYMPHOCYTES ABSOLUTE: 1.6 K/UL (ref 1–4.8)
LYMPHOCYTES RELATIVE PERCENT: 12.4 %
MCH RBC QN AUTO: 31.3 PG (ref 27–31.3)
MCHC RBC AUTO-ENTMCNC: 32.6 % (ref 33–37)
MCV RBC AUTO: 95.9 FL (ref 79.4–94.8)
MONOCYTES ABSOLUTE: 0.9 K/UL (ref 0.2–0.8)
MONOCYTES RELATIVE PERCENT: 6.8 %
NEUTROPHILS ABSOLUTE: 10.5 K/UL (ref 1.4–6.5)
NEUTROPHILS RELATIVE PERCENT: 79 %
PDW BLD-RTO: 13.6 % (ref 11.5–14.5)
PLATELET # BLD: 305 K/UL (ref 130–400)
POTASSIUM SERPL-SCNC: 4.3 MEQ/L (ref 3.4–4.9)
RBC # BLD: 4.01 M/UL (ref 4.2–5.4)
SODIUM BLD-SCNC: 134 MEQ/L (ref 135–144)
WBC # BLD: 13.3 K/UL (ref 4.8–10.8)

## 2022-11-29 PROCEDURE — 1210000000 HC MED SURG R&B

## 2022-11-29 PROCEDURE — 6370000000 HC RX 637 (ALT 250 FOR IP): Performed by: INTERNAL MEDICINE

## 2022-11-29 PROCEDURE — 80048 BASIC METABOLIC PNL TOTAL CA: CPT

## 2022-11-29 PROCEDURE — 6360000002 HC RX W HCPCS: Performed by: INTERNAL MEDICINE

## 2022-11-29 PROCEDURE — 99232 SBSQ HOSP IP/OBS MODERATE 35: CPT | Performed by: INTERNAL MEDICINE

## 2022-11-29 PROCEDURE — 36415 COLL VENOUS BLD VENIPUNCTURE: CPT

## 2022-11-29 PROCEDURE — 97163 PT EVAL HIGH COMPLEX 45 MIN: CPT

## 2022-11-29 PROCEDURE — 2700000000 HC OXYGEN THERAPY PER DAY

## 2022-11-29 PROCEDURE — 2580000003 HC RX 258: Performed by: INTERNAL MEDICINE

## 2022-11-29 PROCEDURE — 85025 COMPLETE CBC W/AUTO DIFF WBC: CPT

## 2022-11-29 PROCEDURE — 97167 OT EVAL HIGH COMPLEX 60 MIN: CPT

## 2022-11-29 RX ADMIN — ASPIRIN 81 MG: 81 TABLET, CHEWABLE ORAL at 09:27

## 2022-11-29 RX ADMIN — CEFTRIAXONE SODIUM 1000 MG: 1 INJECTION, POWDER, FOR SOLUTION INTRAMUSCULAR; INTRAVENOUS at 15:47

## 2022-11-29 RX ADMIN — Medication 10 ML: at 09:00

## 2022-11-29 RX ADMIN — METOPROLOL TARTRATE 50 MG: 50 TABLET, FILM COATED ORAL at 09:28

## 2022-11-29 RX ADMIN — ENOXAPARIN SODIUM 40 MG: 100 INJECTION SUBCUTANEOUS at 09:27

## 2022-11-29 NOTE — PROGRESS NOTES
Shift assessments completed and documented, see flowsheets. A&OX1, withdraws from touch and resists staff while performing care. Medications administered per MAR through PEG tube. Tube feeding tubing and bottle replaced, residual checked and was 0ml. Call light within reach. No further needs verbalized at this time by pt.

## 2022-11-29 NOTE — PROGRESS NOTES
Department of Internal Medicine  General Internal Medicine  Attending Progress Note      SUBJECTIVE:  Pt seen and examined. No new complaints  Awaiting precert for placement    OBJECTIVE      Medications    Current Facility-Administered Medications: metoprolol tartrate (LOPRESSOR) tablet 50 mg, 50 mg, PEG Tube, BID  aspirin chewable tablet 81 mg, 81 mg, PEG Tube, Daily  pravastatin (PRAVACHOL) tablet 20 mg, 20 mg, PEG Tube, Nightly  sodium chloride flush 0.9 % injection 10 mL, 10 mL, IntraVENous, 2 times per day  sodium chloride flush 0.9 % injection 10 mL, 10 mL, IntraVENous, PRN  0.9 % sodium chloride infusion, , IntraVENous, PRN  enoxaparin (LOVENOX) injection 40 mg, 40 mg, SubCUTAneous, Daily  ondansetron (ZOFRAN-ODT) disintegrating tablet 4 mg, 4 mg, Oral, Q8H PRN **OR** ondansetron (ZOFRAN) injection 4 mg, 4 mg, IntraVENous, Q6H PRN  polyethylene glycol (GLYCOLAX) packet 17 g, 17 g, Oral, Daily PRN  acetaminophen (TYLENOL) tablet 650 mg, 650 mg, Oral, Q6H PRN **OR** acetaminophen (TYLENOL) suppository 650 mg, 650 mg, Rectal, Q6H PRN  potassium chloride (KLOR-CON M) extended release tablet 40 mEq, 40 mEq, Oral, PRN **OR** potassium bicarb-citric acid (EFFER-K) effervescent tablet 40 mEq, 40 mEq, Oral, PRN **OR** potassium chloride 10 mEq/100 mL IVPB (Peripheral Line), 10 mEq, IntraVENous, PRN  cefTRIAXone (ROCEPHIN) 1,000 mg in dextrose 5 % 50 mL IVPB mini-bag, 1,000 mg, IntraVENous, Q24H  ipratropium-albuterol (DUONEB) nebulizer solution 1 ampule, 1 ampule, Inhalation, Q4H PRN  Physical    VITALS:  BP (!) 152/63   Pulse 88   Temp 98.6 °F (37 °C)   Resp 18   Ht 5' 3\" (1.6 m)   Wt 156 lb (70.8 kg)   LMP  (LMP Unknown)   SpO2 95%   BMI 27.63 kg/m²   Constitutional: Awake and alert in no acute distress.  Lying in bed comfortably  Head: Atraumatic, facial droop-chronic  Eyes: EOMI, PERRLA  ENT: moist mucous membranes  Neck: neck supple, trachea midline  Lungs: Good inspiratory effort, no wheeze, no rhonchi, no rales  Heart: RRR, normal S1 and S2  GI: Soft, non-distended, +BS  MSK: R upper and lower extremity weakness, R hand edema-improving  Skin: warm, dry  Psych: appropriate affect   Data    CBC:   Lab Results   Component Value Date/Time    WBC 13.3 11/29/2022 05:32 AM    RBC 4.01 11/29/2022 05:32 AM    RBC 4.22 04/23/2012 11:12 AM    HGB 12.5 11/29/2022 05:32 AM    HCT 38.5 11/29/2022 05:32 AM    MCV 95.9 11/29/2022 05:32 AM    MCH 31.3 11/29/2022 05:32 AM    MCHC 32.6 11/29/2022 05:32 AM    RDW 13.6 11/29/2022 05:32 AM     11/29/2022 05:32 AM    MPV 11.1 11/22/2022 12:00 AM     CMP:    Lab Results   Component Value Date/Time     11/29/2022 05:32 AM    K 4.3 11/29/2022 05:32 AM    K 4.1 11/26/2022 05:21 AM    CL 97 11/29/2022 05:32 AM    CO2 26 11/29/2022 05:32 AM    BUN 18 11/29/2022 05:32 AM    CREATININE 0.46 11/29/2022 05:32 AM    GFRAA >60.0 07/05/2022 11:26 AM    LABGLOM >60.0 11/29/2022 05:32 AM    GLUCOSE 124 11/29/2022 05:32 AM    GLUCOSE 75 04/23/2012 11:12 AM    PROT 7.3 11/25/2022 09:15 AM    LABALBU 3.6 11/25/2022 09:15 AM    LABALBU 4.6 04/23/2012 11:12 AM    CALCIUM 9.2 11/29/2022 05:32 AM    BILITOT 0.7 11/25/2022 09:15 AM    ALKPHOS 91 11/25/2022 09:15 AM    AST 29 11/25/2022 09:15 AM    ALT 35 11/25/2022 09:15 AM       ASSESSMENT AND PLAN      # Metabolic encephalopathy 2/2 UTI  - improved with Rocephin. Continuing  - PT/OT- SNF at discharge    # Recent CVA with residual R sided weakness and PEG  - PT/OT  - cont home meds, TF    # R hand edema  - xray indeterminate for fracture and recommended CT  - treat conservatively given R sided immobility due to stroke    # Positive blood cultures  - likely contaminant  - follow    # HTN/HLD  - cont home meds    DVT: lovenox ppx    Disposition: PT/OT for SNF placement. Medically stable once approved for SNF.       Chichi Das, DO      >35 minutes in total care time

## 2022-11-29 NOTE — CARE COORDINATION
This LSW met with patient and notified her that referral has been sent to [address] pending at this time. LSW/NOAH to follow.   Electronically signed by DAYDAY Castro, LSW on 11/29/22 at 10:36 AM EST

## 2022-11-29 NOTE — ACP (ADVANCE CARE PLANNING)
Advance Care Planning     Advance Care Planning Inpatient Note  [facility] Department    Today's Date: 11/29/2022  Unit: [facility]  4W MED SURG UNIT    Received request from family. Upon review of chart and communication with care team, patient's decision making abilities are not in question. . Patient and Spouse was/were present in the room during visit. Goals of ACP Conversation:  Discuss advance care planning documents    Health Care Decision Makers:       Primary Decision Maker: Jonah Son - Spouse - [phone]    Secondary Decision Maker: Marisa Haney - Child - [phone]    Secondary Decision Maker: Josefina Holstein Child - [phone]  Summary:  Completed New Documents  Patient completed both the HPOA and Living Will.  assisted in getting documents legalized and placed a copy in the patient's blue chart. Advance Care Planning Documents (Patient Wishes):  Healthcare Power of /Advance Directive Appointment of Health Care Agent  Living Will/Advance Directive     Assessment:    Interventions:  Assisted in the completion of documents according to patient's wishes at this time    Care Preferences Communicated:   No    Outcomes/Plan:  New advance directive completed.     Electronically signed by Corinna Islas, [address] [place] [address] on 11/29/2022 at 11:31 AM

## 2022-11-29 NOTE — PROGRESS NOTES
Infectious Diseases Inpatient Progress Note          HISTORY OF PRESENT ILLNESS:  Follow up sepsis secondary to UTI, altered mentation and obtundation on admission on IV Rocephin, well tolerated. Patient had remarkable clinical improvement with decreased obtundation and tachypnea. .    urine culture with group B strep  Blood culture 1 out of 2 coag negative staph consistent with contamination  Current Medications:     metoprolol  50 mg PEG Tube BID    aspirin  81 mg PEG Tube Daily    pravastatin  20 mg PEG Tube Nightly    sodium chloride flush  10 mL IntraVENous 2 times per day    enoxaparin  40 mg SubCUTAneous Daily    cefTRIAXone (ROCEPHIN) IV  1,000 mg IntraVENous Q24H       Allergies:  Codeine      Review of Systems  Limited 14 system review is negative other than HPI/recent CVA  Was able to recognize her  today    Physical Exam  Vitals:    11/27/22 1242 11/27/22 1957 11/27/22 2334 11/28/22 0703   BP:  (!) 150/91 (!) 144/72 106/88   Pulse:  74 63 72   Resp:   18 18   Temp: 97.9 °F (36.6 °C)  98.2 °F (36.8 °C) 97.3 °F (36.3 °C)   TempSrc:   Oral Oral   SpO2:  97% 96% 93%   Height:         General Appearance: alert and oriented to self and family, well-developed and well-nourished, in no acute distress, on room air  Skin: warm and dry, no rash. Head: normocephalic and atraumatic  Eyes: anicteric sclerae  ENT: oropharynx clear and moist with normal mucous membranes.  No oral thrush  Lungs: normal respiratory effort, clear lungs  Heart normal S1-S2 no murmur  Abdomen: soft, no tenderness  No leg edema  No erythema, no tenderness  Right facial droop and right hemiplegia  Lauren urine in pure wick    DATA:    Lab Results   Component Value Date    WBC 11.5 (H) 11/28/2022    HGB 12.6 11/28/2022    HCT 39.7 11/28/2022    MCV 96.2 (H) 11/28/2022     11/28/2022     Lab Results   Component Value Date    CREATININE 0.45 (L) 11/28/2022    BUN 16 11/28/2022     11/28/2022    K 4.4 11/28/2022     11/28/2022    CO2 27 11/28/2022       Hepatic Function Panel:  Lab Results   Component Value Date/Time    ALKPHOS 91 11/25/2022 09:15 AM    ALT 35 11/25/2022 09:15 AM    AST 29 11/25/2022 09:15 AM    PROT 7.3 11/25/2022 09:15 AM    BILITOT 0.7 11/25/2022 09:15 AM    BILIDIR 0.2 04/23/2012 11:12 AM    IBILI 0.5 04/23/2012 11:12 AM    LABALBU 3.6 11/25/2022 09:15 AM    LABALBU 4.6 04/23/2012 11:12 AM       Microbiology:   No results for input(s): BC in the last 72 hours. No results for input(s): Mae Dage in the last 72 hours. No results for input(s): LABURIN in the last 72 hours.       IMPRESSION:    Sepsis secondary to acute lower UTI  Altered mentation with obtundation on admission  Mild PEG site cellulitis  Recent hemorrhagic stroke    Patient Active Problem List   Diagnosis    Hypertension    Hyperlipidemia    COPD (chronic obstructive pulmonary disease) (HCC)    Generalized osteoarthrosis, unspecified site    Major depressive disorder, single episode, moderate (HCC)    Morbid obesity (HCC)    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 (HCC)    Neuromuscular scoliosis of lumbar region    Hypotension    NSTEMI (non-ST elevated myocardial infarction) ([address]    Acute diastolic heart failure (HCC)    Hypovolemic shock (HCC)    Acute colitis    Slow transit constipation    External hemorrhoid, bleeding    Colitis    Pain and swelling due to varicose veins of both lower extremities    Venous insufficiency of left lower extremity    Asymptomatic varicose veins of bilateral lower extremities    Non-healing surgical wound    Claudication in peripheral vascular disease ([address]    Cerebrovascular accident (CVA) due to stenosis of left middle cerebral artery (HCC)    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 ([address]    Fibromuscular dysplasia (HCC)    Impaired mobility and activities of daily living dt CVA    Acute CVA (cerebrovascular accident) ([address]    Dysphagia    Cerebrovascular accident (CVA) due to stenosis of left vertebral artery [facility])    Palliative care encounter    Advanced care planning/counseling discussion    Goals of care, counseling/discussion    Aphasia    TIA (transient ischemic attack)    Nocturnal hypoxia    Sleep apnea    Encephalopathy    Sepsis secondary to UTI ([facility]    Obtundation    Late effects of CVA (cerebrovascular accident)       PLAN:  Claudia Bernal MD

## 2022-11-29 NOTE — PROGRESS NOTES
Comprehensive Nutrition Assessment    Type and Reason for Visit:  Reassess    Nutrition Recommendations/Plan:   Continue with Standard with Fiber TF ( Jevity 1.5)  Change to bolus schedule of 300 ml 4 x daily via PEG  Increase water flushes to 180 ml, 4 x daily     Malnutrition Assessment:  Malnutrition Status:  Insufficient data (11/25/22 1603)    Context:  Chronic Illness     Findings of the 6 clinical characteristics of malnutrition:  Energy Intake:  No significant decrease in energy intake  Weight Loss:  Unable to assess (no wt available on admission)     Body Fat Loss:  Unable to assess     Muscle Mass Loss:  Unable to assess    Fluid Accumulation:  No significant fluid accumulation     Strength:  Not Performed    Nutrition Assessment:    Nutritional status appears adequate at this time, with pt receiving kcals and protien via TF, will transition to bolus schedule to accomodate therapies and follow up for acceptance    Nutrition Related Findings:    Admitted with metabolic encephalopathy related to UTI, PMH: COPD, HTN, HLD, CVA; . Labs noted : K now wnl,  ( K supplement d/c'd) Meds reviewed.  IVF d/c. PEG placed 11/16/22 for dysphagia, difficult to assess for weight changes due to edema, 3+BUE, 1+ BLE, TF observed to be running at goal rate, pt denies any GI complaints, BM 11/27 Wound Type: Skin Tears       Current Nutrition Intake & Therapies:     NPO  Current Tube Feeding (TF) Orders:  Feeding Route: PEG  Formula: Standard with Fiber (Jevity 1.5)  Schedule: Continuous  Feeding Regimen: 50 ml/hr  Additives/Modulars: None  Water Flushes: 30mls qid  Current TF & Flush Orders Provides: 1800 kcals, 76 g protein, 912 ml free water  Goal TF & Flush Orders Provides: with change to 300 ml, 4 x daily + 180 ml water flush 4 x daily = 1800 kcals, 76 g protein, ~ 1630 ml free water    Anthropometric Measures:  Height: 5' 3\" (160 cm)  Ideal Body Weight (IBW): 115 lbs (52 kg)    Admission Body Weight:  (n/a)  Current Body Weight: 156 lb (70.8 kg),   >100% IBW. Current BMI (kg/m2): 27.6  Usual Body Weight: 158 lb (71.7 kg) (11/12; 162 lb ( 7/22); 153lb ( 6/2021))                       BMI Categories: Overweight (BMI 25.0-29. 9)    Estimated Daily Nutrient Needs:  Energy Requirements Based On: Kcal/kg  Weight Used for Energy Requirements: Current  Energy (kcal/day): 3991-0033 kcals @ 23-25 kcal/kg  Weight Used for Protein Requirements: Ideal  Protein (g/day): 68-73 (kg x 1.3-1.4)  Method Used for Fluid Requirements: 1 ml/kcal  Fluid (ml/day): ~1800    Nutrition Diagnosis:   Inadequate oral intake related to swallowing difficulty as evidenced by nutrition support - enteral nutrition    Nutrition Interventions:   Food and/or Nutrient Delivery: Modify Tube Feeding  Nutrition Education/Counseling: Education not indicated  Coordination of Nutrition Care: Continue to monitor while inpatient       Goals:  Previous Goal Met: Goal(s) Achieved  Goals: Initiate nutrition support, Tolerate nutrition support at goal rate       Nutrition Monitoring and Evaluation:      Food/Nutrient Intake Outcomes: Enteral Nutrition Intake/Tolerance  Physical Signs/Symptoms Outcomes: Weight, Biochemical Data    Discharge Planning:    Enteral Nutrition     JARROD TIERNEY RD, LD

## 2022-11-29 NOTE — PROGRESS NOTES
Physical Therapy Med Surg Initial Assessment  Facility/Department: [facility]  [facility]       NAME: Finesse Andrea  : 1945 (76 y.o.)  MRN: [id number]  CODE STATUS: Full Code    Date of Service: 2022    Patient Diagnosis(es): Encephalopathy [G93.40]  Acute cystitis without hematuria [N30.00]  Fever, unspecified fever cause [R50.9]  Cellulitis, unspecified cellulitis site [B20.83]   Chief Complaint   Patient presents with    Failure To Thrive     Patient Active Problem List    Diagnosis Date Noted    Sepsis secondary to UTI ([address] 2022    Obtundation 2022    Late effects of CVA (cerebrovascular accident) 2022    Encephalopathy 2022    Aphasia 11/15/2022    TIA (transient ischemic attack) 11/15/2022    Nocturnal hypoxia 11/15/2022    Sleep apnea 11/15/2022    Dysphagia 2022    Cerebrovascular accident (CVA) due to stenosis of left vertebral artery ([address] 2022    Palliative care encounter 2022    Advanced care planning/counseling discussion 2022    Goals of care, counseling/discussion 2022    Cerebrovascular accident (CVA) due to stenosis of left middle cerebral artery ([address] 2022    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 ([address] 2022    Fibromuscular dysplasia ([address] 2022    Impaired mobility and activities of daily living dt CVA 2022    Acute CVA (cerebrovascular accident) ([address] 2022    Claudication in peripheral vascular disease ([address] 2020    Non-healing surgical wound 2020    Venous insufficiency of left lower extremity 2020    Asymptomatic varicose veins of bilateral lower extremities 2020    Pain and swelling due to varicose veins of both lower extremities 2020    Slow transit constipation 10/13/2019    External hemorrhoid, bleeding 10/13/2019    Colitis 10/13/2019    Acute colitis 10/10/2019    Hypovolemic shock ([address] 10/07/2019 NSTEMI (non-ST elevated myocardial infarction) ([address] 10/11/9065    Acute diastolic heart failure ([address] 10/05/2019    Hypotension 10/01/2019    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    Hypertension     Hyperlipidemia     COPD (chronic obstructive pulmonary disease) ([address]     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 ([address] 08/27/2010    Generalized osteoarthrosis, unspecified site 12/27/2002    Major depressive disorder, single episode, moderate ([address] 12/27/2002    Morbid obesity ([address] 12/27/2002        Past Medical History:   Diagnosis Date    Anxiety     Arthritis     COPD (chronic obstructive pulmonary disease) (HCC)     Generalized osteoarthrosis, unspecified site 12/27/2002    Hyperlipidemia     Hypertension     Major depressive disorder, single episode, moderate ([address] 12/27/2002    Morbid obesity ([address] 12/27/2002    Non-pressure chronic ulcer of calf with fat layer exposed ([address] 10/9/2020    Non-pressure chronic ulcer of calf, right, with fat layer exposed ([address] 10/2/2020    OAB (overactive bladder)     Osteoporosis     Sleep apnea 11/15/2022     Past Surgical History:   Procedure Laterality Date    CARDIAC CATHETERIZATION      CARPAL TUNNEL RELEASE      COLONOSCOPY      GASTROSTOMY TUBE PLACEMENT N/A 11/16/2022    PERCUTANEOUS ENDOSCOPIC GASTROSTOMY TUBE PLACEMENT [facility] [address] [facility] ([address] [place] [address])      KNEE ARTHROPLASTY Bilateral     NH COLON CA SCRN NOT HI RSK IND N/A 6/22/2017    COLONOSCOPY performed by Robert Mendoza MD at [address] TRANSORAL DIAGNOSTIC N/A 6/22/2017    EGD ESOPHAGOGASTRODUODENOSCOPY performed by Robert Mendoza MD at [address] Bilateral     SKIN BIOPSY Right 9/17/2020    EXCISION OF LEG MASS RIGHT (PAT ON ADMIT) performed by Kate Rojo MD at [facility]       Chart Reviewed: Yes  Family / Caregiver Present: No  Diagnosis: Encephalopathy  General Comment  Comments: Pt resting in bed. Restrictions:  Restrictions/Precautions: Fall Risk     SUBJECTIVE:   Subjective: Nonsensical speech throughout. Pain  Pain: Moans and yells \"ow\" during PROM R UE/LE. No other outward s/s of pain/distress. Pt does appear confused throughout. Prior Level of Function:  Social/Functional History  Lives With: Spouse  Type of Home: House  Home Layout: Two level  Home Access: Stairs to enter with rails  Entrance Stairs - Number of Steps: 2  Entrance Stairs - Rails: Right  Bathroom Shower/Tub: Tub/Shower unit  Bathroom Equipment: Shower chair, Hand-held shower  Home Equipment: Elby Dari, rolling  ADL Assistance: Independent  Homemaking Assistance: Independent  Ambulation Assistance: Independent (with [employer])  Transfer Assistance: Independent  Additional Comments: Pt unable to provide PLOF or home set up. Information above obtained from previous PT eval by this writer conducted on 11/12/22 after pt suffered a CVA. OBJECTIVE:   Vision  Vision Exceptions: Wears glasses at all times (Decreased attention to R visual field. Unable to follow for formal visual screen)  Hearing:  (responds to verbal cues)    Cognition:  Orientation Level: Unable to assess  Follows Commands: Impaired    Observation/Palpation  Observation: Pt biasing on L side. Slumped in bed. No acute distress however pt appearing confused. Unable to verbalize needs however shakes head \"yes/no\" at times. Inconsistent. PEG feed paused for assessment and resumed following. ROM:  RLE PROM: WFL  RLE General PROM: Ankle DF to neutral  LLE PROM: WFL  LLE General PROM: Ankle DF to neutral    Strength:  Strength RLE  Comment: Flaccid tone  Strength LLE  Comment: Grossly 2-/5  Strength RUE  Comment: Grossly 2+/5  Strength Other  Other: Trunk strength grossly 2-/5.  Difficulty following formal MMT    Neuro:  Balance  Sitting - Static: Poor                      Tone:  (flaccid R nidia)  Coordination: Grossly decreased, non-functional         Bed mobility  Rolling to Left: Dependent/Total  Rolling to Right: Dependent/Total  Supine to Sit: Dependent/Total  Sit to Supine: Dependent/Total  Bed Mobility Comments: Pt often resisting movement, especially to R side. +2 assist provided for safety. Pt repositioned for comfort. Transfers  Comment: NT - safety    Ambulation  Comments: NT - safety                   Activity Tolerance  Activity Tolerance: Treatment limited secondary to decreased cognition    Patient Education  Education Given To: Patient; Family  Education Provided: Role of Therapy;Plan of Care  Barriers to Learning: Cognition  Education Outcome: Continued education needed; Unable to verbalize; Unable to demonstrate understanding       ASSESSMENT:   Body Structures, Functions, Activity Limitations Requiring Skilled Therapeutic Intervention: Decreased functional mobility ; Decreased ADL status; Decreased body mechanics; Decreased strength;Decreased safe awareness;Decreased cognition;Decreased endurance;Decreased balance;Decreased coordination;Decreased vision/visual deficit  Decision Making: High Complexity  History: High  Exam: High  Clinical Presentation: High    Therapy Prognosis: Fair  Barriers to Learning: insight; cog         DISCHARGE RECOMMENDATIONS:  Discharge Recommendations: Continue to assess pending progress, Patient would benefit from continued therapy after discharge    Assessment: Continued PT indicated to progress mobility and facilitate DC at highest level of indep and safety. Rec therapy stay prior to DC home.   Requires PT Follow-Up: Yes       PLAN OF CARE:  Physcial Therapy Plan  General Plan: 1 time a day 3-6 times a week  Current Treatment Recommendations: Strengthening, Balance training, Functional mobility training, Transfer training, Wheelchair mobility training, Cognitive/Perceptual training, Endurance training, Neuromuscular re-education, Manual Therapy - Soft Tissue Mobilization, Home exercise program, Safety education & training, Equipment evaluation, education, & procurement, Patient/Caregiver education & training, Positioning    Safety Devices  Type of Devices: All fall risk precautions in place    Goals:  Long Term Goals  Long Term Goal 1: Pt to roll L<>R in supine with Emmanuelle  Long Term Goal 2: Pt to complete transitions sit<>supine with ModA  Long Term Goal 3: Pt to tolerate sitting EOB X 3min with Emmanuelle    AMPAC (6 CLICK) BASIC MOBILITY  AM-PAC Inpatient Mobility Raw Score : 6     Therapy Time:   Individual   Time In 0910   Time Out 0920   Minutes Danieljimmy Wilkins, [place], 11/29/22 at 12:32 PM         Definitions for assistance levels  Independent = pt does not require any physical supervision or assistance from another person for activity completion. Device may be needed.   Stand by assistance = pt requires verbal cues or instructions from another person, close to but not touching, to perform the activity  Minimal assistance= pt performs 75% or more of the activity; assistance is required to complete the activity  Moderate assistance= pt performs 50% of the activity; assistance is required to complete the activity  Maximal assistance = pt performs 25% of the activity; assistance is required to complete the activity  Dependent = pt requires total physical assistance to accomplish the task

## 2022-11-29 NOTE — PROGRESS NOTES
[facility] OCCUPATIONAL THERAPY EVALUATION - ACUTE     NAME: Bib Lopez  : 1945 (76 y.o.)  MRN: [id number]  CODE STATUS: Full Code  Room: X633/F366-69    Date of Service: 2022    Patient Diagnosis(es): Encephalopathy [G93.40]  Acute cystitis without hematuria [N30.00]  Fever, unspecified fever cause [R50.9]  Cellulitis, unspecified cellulitis site [L03.90]   Patient Active Problem List    Diagnosis Date Noted    Sepsis secondary to UTI ([address] 2022    Obtundation 2022    Late effects of CVA (cerebrovascular accident) 2022    Encephalopathy 2022    Aphasia 11/15/2022    TIA (transient ischemic attack) 11/15/2022    Nocturnal hypoxia 11/15/2022    Sleep apnea 11/15/2022    Dysphagia 2022    Cerebrovascular accident (CVA) due to stenosis of left vertebral artery ([address] 2022    Palliative care encounter 2022    Advanced care planning/counseling discussion 2022    Goals of care, counseling/discussion 2022    Cerebrovascular accident (CVA) due to stenosis of left middle cerebral artery ([address] 2022    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 ([address] 2022    Fibromuscular dysplasia ([address] 2022    Impaired mobility and activities of daily living dt CVA 2022    Acute CVA (cerebrovascular accident) ([address] 2022    Claudication in peripheral vascular disease ([address] 2020    Non-healing surgical wound 2020    Venous insufficiency of left lower extremity 2020    Asymptomatic varicose veins of bilateral lower extremities 2020    Pain and swelling due to varicose veins of both lower extremities 2020    Slow transit constipation 10/13/2019    External hemorrhoid, bleeding 10/13/2019    Colitis 10/13/2019    Acute colitis 10/10/2019    Hypovolemic shock ([address] 10/07/2019    [facility]STEM[facility] (non-ST elevated [facility] infarction) [facility] 15/33/2258    Acute diastolic heart failure [facility] 10/05/2019    Hypotension 10/01/2019    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    Hypertension     Hyperlipidemia     COPD (chronic obstructive pulmonary disease) (HCC)     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 ([address] 08/27/2010    Generalized osteoarthrosis, unspecified site 12/27/2002    Major depressive disorder, single episode, moderate ([address] 12/27/2002    Morbid obesity ([address] 12/27/2002        Past Medical History:   Diagnosis Date    Anxiety     Arthritis     COPD (chronic obstructive pulmonary disease) (HCC)     Generalized osteoarthrosis, unspecified site 12/27/2002    Hyperlipidemia     Hypertension     Major depressive disorder, single episode, moderate ([address] 12/27/2002    Morbid obesity ([address] 12/27/2002    Non-pressure chronic ulcer of calf with fat layer exposed ([address] 10/9/2020    Non-pressure chronic ulcer of calf, right, with fat layer exposed ([address] 10/2/2020    OAB (overactive bladder)     Osteoporosis     Sleep apnea 11/15/2022     Past Surgical History:   Procedure Laterality Date    CARDIAC CATHETERIZATION      CARPAL TUNNEL RELEASE      COLONOSCOPY      GASTROSTOMY TUBE PLACEMENT N/A 11/16/2022    PERCUTANEOUS ENDOSCOPIC GASTROSTOMY TUBE PLACEMENT performed [facility] [address] [facility] ([address] [facility])      KNEE ARTHROPLASTY Bilateral     CO COLON CA SCRN NOT HI RSK IND N/A 6/22/2017    COLONOSCOPY performed by Payton Julian MD at [address] TRANSORAL DIAGNOSTIC N/A 6/22/2017    EGD ESOPHAGOGASTRODUODENOSCOPY performed by Payton Julian MD at [address] Bilateral     SKIN BIOPSY Right 9/17/2020    EXCISION OF LEG MASS RIGHT (PAT ON ADMIT) performed by Gabrielle Haeys MD at [place] [address]  Restrictions/Precautions: Fall Risk              Safety Devices: Safety Devices  Type of Devices: All fall risk precautions in place;Call light within reach; Left in bed Patient's date of birth confirmed: Yes    General:       Subjective:Pt seen with spouse present. \"I don't trust you. \"  Therapist attempting to sit patient up. Pain at start of treatment: No    Pain at end of treatment: No    Location:   Nursing notified: No  RN:   Intervention: Repositioned    Prior Level of Function:  Social/Functional History  Lives With: Spouse  Type of Home: House  Home Layout: Two level  Home Access: Stairs to enter with rails  Entrance Stairs - Number of Steps: 2  Entrance Stairs - Rails: Right  Bathroom Shower/Tub: Walk-in shower  Bathroom Equipment: Shower chair, Hand-held shower  Home Equipment: Brooke , rolling  ADL Assistance: Independent  Homemaking Assistance: Independent  Ambulation Assistance: Independent (with [employer])  Transfer Assistance: Independent  Active : No  Additional Comments: Pt's spouse present for evaluation to confirm. Pt prior to CVA on 11/12/22 was independent. OBJECTIVE:     Orientation Status:  Orientation  Orientation Level: Oriented to person    Observation:  Observation/Palpation  Observation: Pt seated upriight in bed. Cognition Status:  Cognition  Cognition Comment: follows one step commands with repetition and verbal cues to encourage. Perception Status:  Perception  Overall Perceptual Status: WFL    Vision and Hearing Status:  Vision  Vision Exceptions: Wears glasses at all times  Hearing  Hearing: Within functional limits   Vision - Basic Assessment  Prior Vision: Wears glasses all the time  Visual History: No significant visual history  Patient Visual Report: No visual complaint reported. Visual Field Cut: No  Oculo Motor Control: WNL    GROSS ASSESSMENT AROM/PROM:  AROM: Within functional limits (left UE only.   right UE flaccid)       ROM:   LUE AROM (degrees)  LUE General AROM: limited shoulder flexion  Left Hand AROM (degrees)  Left Hand AROM: WFL  RUE PROM (degrees)  RUE General PROM: 70 degrees flexion 50 degrees abduction  Right Hand PROM (degrees)  Right Hand PROM: WFL    UE STRENGTH:  Strength:  (3+/5 left, right N/T)    UE COORDINATION:  Coordination:  (impaired coordination and FMC)    UE TONE:  Tone: Abnormal (hypotone right UE)    UE SENSATION:  Sensation: Intact    Hand Dominance:  Hand Dominance  Hand Dominance: Right    ADL Status:  ADL  Feeding: Unable to assess(comment)  Grooming: Maximum assistance;Verbal cueing; Increased time to complete  UE Bathing: Maximum assistance;Verbal cueing; Increased time to complete  LE Bathing: Dependent/Total  UE Dressing: Maximum assistance;Verbal cueing; Increased time to complete  LE Dressing: Dependent/Total  Toileting: Dependent/Total    Functional Mobility:       Bed Mobility  Bed mobility  Rolling to Left: Dependent/Total  Rolling to Right: Dependent/Total    Seated and Standing Balance:  Balance  Sitting:  (Pt declined attempts to sit)    Functional Endurance:  Activity Tolerance  Activity Tolerance: Patient limited by fatigue;Treatment limited secondary to decreased cognition;Treatment limited secondary to medical complications (free text)    D/C Recommendations:  OT D/C RECOMMENDATIONS  REQUIRES OT FOLLOW-UP: Yes    Equipment Recommendations:       OT Education:        OT Follow Up:    OT D/C RECOMMENDATIONS  REQUIRES OT FOLLOW-UP: Yes       Assessment/Discharge Disposition:     Performance deficits / Impairments: Decreased functional mobility , Decreased safe awareness, Decreased balance, Decreased coordination, Decreased posture, Decreased cognition, Decreased ADL status, Decreased ROM, Decreased endurance, Decreased high-level IADLs, Decreased strength, Decreased fine motor control  Prognosis: Fair  Discharge Recommendations: Continue to assess pending progress  Decision Making: High Complexity  History: complex  Exam: multiple  Assistance / Modification: max A/D    AMPAC (Six Click) Self care Score    How much help for putting on and taking off regular lower body clothing?: Total  How much help for Bathing?: Total  How much help for Toileting?: Total  How much help for putting on and taking off regular upper body clothing?: Total  How much help for taking care of personal grooming?: Total  How much help for eating meals?: Total  AM-PAC Inpatient Daily Activity Raw Score: 6  AM-PAC Inpatient ADL T-Scale Score : 17.07  ADL Inpatient CMS 0-100% Score: 100    Therapy key for assistance levels -   Independent/Mod I = Pt. is able to perform task with no assistance but may require a device   Stand by assistance = Pt. does not perform task at an independent level but does not need physical assistance, requires verbal cues  Minimal, Moderate, Maximal Assistance = Pt. requires physical assistance (25%, 50%, 75% assist from helper) for task but is able to actively participate in task   Dependent = Pt. requires total assistance with task and is not able to actively participate with task completion     Plan:  Occupational Therapy Plan  Times Per Week: 1-4x/wk  Current Treatment Recommendations: Strengthening, Balance training, Functional mobility training, Endurance training, ROM, Neuromuscular re-education, Cognitive reorientation, Safety education & training, Self-Care / ADL, Cognitive/Perceptual training    Goals:   Patient will:    - Improve functional endurance to tolerate/complete 8-12 mins of ADL's  - Be Mod A in UB ADLs   - Be Max A in LB ADLs  - Be Mod A in ADL transfers without LOB  - Be Mod A in toileting tasks  - Improve bilateral UE strength and endurance to Fair in order to participate in self-care activities as projected.   - Sequence self-care tasks with out verbal cues    Patient Goal: Patient goals : Not stated at this time     Discussed and agreed upon: No Comments: cognition at this time      Therapy Time:   Individual   Time In 1318   Time Out 1332   Minutes 14          Eval: 14 minutes     Electronically signed by:    CHRISTOPHER Mcmillan,   97/18/3598, 3:32 PM Electronically signed by CHRISTOPHER Mccall on 72/47/94 at 3:34 PM EST

## 2022-11-30 LAB
BASOPHILS ABSOLUTE: 0.1 K/UL (ref 0–0.2)
BASOPHILS RELATIVE PERCENT: 0.6 %
BLOOD CULTURE, ROUTINE: NORMAL
EOSINOPHILS ABSOLUTE: 0.5 K/UL (ref 0–0.7)
EOSINOPHILS RELATIVE PERCENT: 3.9 %
HCT VFR BLD CALC: 36.9 % (ref 37–47)
HEMOGLOBIN: 12.5 G/DL (ref 12–16)
LYMPHOCYTES ABSOLUTE: 1.6 K/UL (ref 1–4.8)
LYMPHOCYTES RELATIVE PERCENT: 13.3 %
MCH RBC QN AUTO: 32.2 PG (ref 27–31.3)
MCHC RBC AUTO-ENTMCNC: 33.8 % (ref 33–37)
MCV RBC AUTO: 95.3 FL (ref 79.4–94.8)
MONOCYTES ABSOLUTE: 1 K/UL (ref 0.2–0.8)
MONOCYTES RELATIVE PERCENT: 8.1 %
NEUTROPHILS ABSOLUTE: 9.1 K/UL (ref 1.4–6.5)
NEUTROPHILS RELATIVE PERCENT: 74.1 %
PDW BLD-RTO: 13.8 % (ref 11.5–14.5)
PLATELET # BLD: 315 K/UL (ref 130–400)
RBC # BLD: 3.87 M/UL (ref 4.2–5.4)
WBC # BLD: 12.3 K/UL (ref 4.8–10.8)

## 2022-11-30 PROCEDURE — 87040 BLOOD CULTURE FOR BACTERIA: CPT

## 2022-11-30 PROCEDURE — 2580000003 HC RX 258: Performed by: INTERNAL MEDICINE

## 2022-11-30 PROCEDURE — 6360000002 HC RX W HCPCS: Performed by: INTERNAL MEDICINE

## 2022-11-30 PROCEDURE — 99232 SBSQ HOSP IP/OBS MODERATE 35: CPT | Performed by: INTERNAL MEDICINE

## 2022-11-30 PROCEDURE — 1210000000 HC MED SURG R&B

## 2022-11-30 PROCEDURE — 6370000000 HC RX 637 (ALT 250 FOR IP): Performed by: INTERNAL MEDICINE

## 2022-11-30 PROCEDURE — 36415 COLL VENOUS BLD VENIPUNCTURE: CPT

## 2022-11-30 PROCEDURE — 2700000000 HC OXYGEN THERAPY PER DAY

## 2022-11-30 PROCEDURE — 85025 COMPLETE CBC W/AUTO DIFF WBC: CPT

## 2022-11-30 RX ADMIN — ASPIRIN 81 MG: 81 TABLET, CHEWABLE ORAL at 09:17

## 2022-11-30 RX ADMIN — Medication 10 ML: at 00:01

## 2022-11-30 RX ADMIN — PRAVASTATIN SODIUM 20 MG: 20 TABLET ORAL at 00:00

## 2022-11-30 RX ADMIN — ENOXAPARIN SODIUM 40 MG: 100 INJECTION SUBCUTANEOUS at 09:15

## 2022-11-30 RX ADMIN — Medication 10 ML: at 09:21

## 2022-11-30 RX ADMIN — Medication 10 ML: at 20:48

## 2022-11-30 RX ADMIN — METOPROLOL TARTRATE 50 MG: 50 TABLET, FILM COATED ORAL at 00:01

## 2022-11-30 RX ADMIN — CEFTRIAXONE SODIUM 1000 MG: 1 INJECTION, POWDER, FOR SOLUTION INTRAMUSCULAR; INTRAVENOUS at 20:52

## 2022-11-30 RX ADMIN — PRAVASTATIN SODIUM 20 MG: 20 TABLET ORAL at 20:48

## 2022-11-30 RX ADMIN — METOPROLOL TARTRATE 50 MG: 50 TABLET, FILM COATED ORAL at 20:48

## 2022-11-30 NOTE — PROGRESS NOTES
1033: Notified Dr. Kalee Astudillo that patient needs blood cultures ordered/redrawn in order to have PICC line placed. Patient needs two negative blood cultures before this can be done. 1106: Orders placed for blood cultures x 2 per Dr. Kalee Astudillo. 1800: Patient resting on cart, reports pain only with movement/care. Patient remains alert to person, is otherwise confused. Patient anxious and fearful, does not tolerate nursing care without yelling and grabbing at staff. Patient in no acute distress.

## 2022-11-30 NOTE — PROGRESS NOTES
Infectious Diseases Inpatient Progress Note          HISTORY OF PRESENT ILLNESS:  Follow up sepsis secondary to UTI, altered mentation and obtundation on admission on IV Rocephin, well tolerated. Patient had remarkable clinical improvement with decreased obtundation and tachypnea. .    urine culture with group B strep  Blood culture 1 out of 2 coag negative staph consistent with contamination  Current Medications:     metoprolol  50 mg PEG Tube BID    aspirin  81 mg PEG Tube Daily    pravastatin  20 mg PEG Tube Nightly    sodium chloride flush  10 mL IntraVENous 2 times per day    enoxaparin  40 mg SubCUTAneous Daily       Allergies:  Codeine      Review of Systems  Limited 14 system review is negative other than HPI/recent CVA  Was able to recognize her  today    Physical Exam  Vitals:    11/30/22 0957 11/30/22 1643 11/30/22 1645 11/30/22 1646   BP:   130/81    Pulse: 91  88 83   Resp:  22     Temp:       TempSrc:       SpO2:   94%    Weight:       Height:         General Appearance: alert and oriented to self and family, well-developed and well-nourished, in no acute distress, on room air  Skin: warm and dry, no rash. Head: normocephalic and atraumatic  Eyes: anicteric sclerae  ENT: oropharynx clear and moist with normal mucous membranes.  No oral thrush  Lungs: normal respiratory effort, clear lungs  Heart normal S1-S2 no murmur  Abdomen: soft, no tenderness  No leg edema  No erythema, no tenderness  Right facial droop and right hemiplegia  Lauren urine in pure wick    DATA:    Lab Results   Component Value Date    WBC 12.3 (H) 11/30/2022    HGB 12.5 11/30/2022    HCT 36.9 (L) 11/30/2022    MCV 95.3 (H) 11/30/2022     11/30/2022     Lab Results   Component Value Date    CREATININE 0.46 (L) 11/29/2022    BUN 18 11/29/2022     (L) 11/29/2022    K 4.3 11/29/2022    CL 97 11/29/2022    CO2 26 11/29/2022       Hepatic Function Panel:  Lab Results   Component Value Date/Time    ALKPHOS 91 11/25/2022 09:15 AM    ALT 35 11/25/2022 09:15 AM    AST 29 11/25/2022 09:15 AM    PROT 7.3 11/25/2022 09:15 AM    BILITOT 0.7 11/25/2022 09:15 AM    BILIDIR 0.2 04/23/2012 11:12 AM    IBILI 0.5 04/23/2012 11:12 AM    LABALBU 3.6 11/25/2022 09:15 AM    LABALBU 4.6 04/23/2012 11:12 AM       Microbiology:   No results for input(s): BC in the last 72 hours. No results for input(s): Serge Staple in the last 72 hours. No results for input(s): LABURIN in the last 72 hours.       IMPRESSION:    Sepsis secondary to acute lower UTI  Altered mentation with obtundation on admission  Mild PEG site cellulitis  Recent hemorrhagic stroke    Patient Active Problem List   Diagnosis    Hypertension    Hyperlipidemia    COPD (chronic obstructive pulmonary disease) (HCC)    Generalized osteoarthrosis, unspecified site    Major depressive disorder, single episode, moderate (HCC)    Morbid obesity (HCC)    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 (HCC)    Neuromuscular scoliosis of lumbar region    Hypotension    NSTEMI (non-ST elevated myocardial infarction) (HCC)    Acute diastolic heart failure (HCC)    Hypovolemic shock (HCC)    Acute colitis    Slow transit constipation    External hemorrhoid, bleeding    Colitis    Pain and swelling due to varicose veins of both lower extremities    Venous insufficiency of left lower extremity    Asymptomatic varicose veins of bilateral lower extremities    Non-healing surgical wound    Claudication in peripheral vascular disease ([address]    Cerebrovascular accident (CVA) due to stenosis of left middle cerebral artery (HCC)    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)    Palliative care encounter    Advanced care planning/counseling discussion    Goals of care, counseling/discussion    Aphasia    TIA (transient ischemic attack)    Nocturnal hypoxia    Sleep apnea    Encephalopathy    Sepsis secondary to UTI ([facility]    Obtundation    Late effects of CVA (cerebrovascular accident)       PLAN:  Continue IV Rocephin, at discharge would give ceftin 500 mg po bid for 7 days, no iv antibiotic recommended as outpatient at this time          Rashida Vale MD

## 2022-11-30 NOTE — PROGRESS NOTES
Department of Internal Medicine  General Internal Medicine  Attending Progress Note      SUBJECTIVE:  Pt seen and examined. No new complaints  Awaiting precert for placement    OBJECTIVE      Medications    Current Facility-Administered Medications: metoprolol tartrate (LOPRESSOR) tablet 50 mg, 50 mg, PEG Tube, BID  aspirin chewable tablet 81 mg, 81 mg, PEG Tube, Daily  pravastatin (PRAVACHOL) tablet 20 mg, 20 mg, PEG Tube, Nightly  sodium chloride flush 0.9 % injection 10 mL, 10 mL, IntraVENous, 2 times per day  sodium chloride flush 0.9 % injection 10 mL, 10 mL, IntraVENous, PRN  0.9 % sodium chloride infusion, , IntraVENous, PRN  enoxaparin (LOVENOX) injection 40 mg, 40 mg, SubCUTAneous, Daily  ondansetron (ZOFRAN-ODT) disintegrating tablet 4 mg, 4 mg, Oral, Q8H PRN **OR** ondansetron (ZOFRAN) injection 4 mg, 4 mg, IntraVENous, Q6H PRN  polyethylene glycol (GLYCOLAX) packet 17 g, 17 g, Oral, Daily PRN  acetaminophen (TYLENOL) tablet 650 mg, 650 mg, Oral, Q6H PRN **OR** acetaminophen (TYLENOL) suppository 650 mg, 650 mg, Rectal, Q6H PRN  potassium chloride (KLOR-CON M) extended release tablet 40 mEq, 40 mEq, Oral, PRN **OR** potassium bicarb-citric acid (EFFER-K) effervescent tablet 40 mEq, 40 mEq, Oral, PRN **OR** potassium chloride 10 mEq/100 mL IVPB (Peripheral Line), 10 mEq, IntraVENous, PRN  ipratropium-albuterol (DUONEB) nebulizer solution 1 ampule, 1 ampule, Inhalation, Q4H PRN  Physical    VITALS:  BP 99/79   Pulse 91   Temp 98.4 °F (36.9 °C) (Axillary)   Resp 18   Ht 5' 3\" (1.6 m)   Wt 156 lb (70.8 kg)   LMP  (LMP Unknown)   SpO2 98%   BMI 27.63 kg/m²   Constitutional: Awake and alert in no acute distress.  Lying in bed comfortably  Head: Atraumatic, facial droop-chronic  Eyes: EOMI, PERRLA  ENT: moist mucous membranes  Neck: neck supple, trachea midline  Lungs: Good inspiratory effort, no wheeze, no rhonchi, no rales  Heart: RRR, normal S1 and S2  GI: Soft, non-distended, +BS  MSK: R upper and lower extremity weakness, R hand edema-improving  Skin: warm, dry  Psych: appropriate affect   Data    CBC:   Lab Results   Component Value Date/Time    WBC 12.3 11/30/2022 05:51 AM    RBC 3.87 11/30/2022 05:51 AM    RBC 4.22 04/23/2012 11:12 AM    HGB 12.5 11/30/2022 05:51 AM    HCT 36.9 11/30/2022 05:51 AM    MCV 95.3 11/30/2022 05:51 AM    MCH 32.2 11/30/2022 05:51 AM    MCHC 33.8 11/30/2022 05:51 AM    RDW 13.8 11/30/2022 05:51 AM     11/30/2022 05:51 AM    MPV 11.1 11/22/2022 12:00 AM     CMP:    Lab Results   Component Value Date/Time     11/29/2022 05:32 AM    K 4.3 11/29/2022 05:32 AM    K 4.1 11/26/2022 05:21 AM    CL 97 11/29/2022 05:32 AM    CO2 26 11/29/2022 05:32 AM    BUN 18 11/29/2022 05:32 AM    CREATININE 0.46 11/29/2022 05:32 AM    GFRAA >60.0 07/05/2022 11:26 AM    LABGLOM >60.0 11/29/2022 05:32 AM    GLUCOSE 124 11/29/2022 05:32 AM    GLUCOSE 75 04/23/2012 11:12 AM    PROT 7.3 11/25/2022 09:15 AM    LABALBU 3.6 11/25/2022 09:15 AM    LABALBU 4.6 04/23/2012 11:12 AM    CALCIUM 9.2 11/29/2022 05:32 AM    BILITOT 0.7 11/25/2022 09:15 AM    ALKPHOS 91 11/25/2022 09:15 AM    AST 29 11/25/2022 09:15 AM    ALT 35 11/25/2022 09:15 AM       ASSESSMENT AND PLAN      # Metabolic encephalopathy 2/2 UTI  - improved with Rocephin. 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 Medically stable once approved for SNF. Completed 5 days of Rocephin. Likely does not need IV abx as blood cultures with staph epi which is likely a contaminant.       Zeus Zimmer,   Internal Medicine   Hospitalist    >35 minutes in total care time

## 2022-11-30 NOTE — CARE COORDINATION
This LSW met with patient,  and spoke with daughter  [place] this am.  I notified them that patient is not appropriate to be transferred to [facility] , per RN supervisor Rafaela Coyle. Referral sent to darling Rojas for [facility]. Awaiting acceptance at this time. Patient will require a pre-cert. LSW / CM to follow. Electronically signed by DAYDAY Oneill LSW on 11/30/22 at 11:35 AM EST   This LSW met with patient ,  and daughter [place] at bedside this pm. I notified them that patient has been accepted to [facility]. Patient, spouse and daughter are requesting that pre-cert be submitted for. darling Rojas notified -pre-cert will be started this afternoon. LSW / CM to follow.   Electronically signed by DAYDAY Oneill LSW on 11/30/22 at 2:39 PM EST

## 2022-11-30 NOTE — PROGRESS NOTES
Infectious Diseases Inpatient Progress Note          HISTORY OF PRESENT ILLNESS:  Follow up sepsis secondary to UTI, altered mentation and obtundation on admission on IV Rocephin, well tolerated. Patient had remarkable clinical improvement with decreased obtundation and tachypnea. .    urine culture with group B strep  Blood culture 1 out of 2 coag negative staph consistent with contamination  Current Medications:     metoprolol  50 mg PEG Tube BID    aspirin  81 mg PEG Tube Daily    pravastatin  20 mg PEG Tube Nightly    sodium chloride flush  10 mL IntraVENous 2 times per day    enoxaparin  40 mg SubCUTAneous Daily       Allergies:  Codeine      Review of Systems  Limited 14 system review is negative other than HPI/recent CVA  Was able to recognize her  today    Physical Exam  Vitals:    11/29/22 0019 11/29/22 0715 11/29/22 1135 11/29/22 1901   BP: (!) 148/67 (!) 152/63  (!) 139/48   Pulse: 76 88  62   Resp:       Temp: 99.5 °F (37.5 °C) 98.6 °F (37 °C)  99.5 °F (37.5 °C)   TempSrc:       SpO2: 100% 95%  94%   Weight:   156 lb (70.8 kg)    Height:         General Appearance: alert and oriented to self and family, well-developed and well-nourished, in no acute distress, on room air  Skin: warm and dry, no rash. Head: normocephalic and atraumatic  Eyes: anicteric sclerae  ENT: oropharynx clear and moist with normal mucous membranes.  No oral thrush  Lungs: normal respiratory effort, clear lungs  Heart normal S1-S2 no murmur  Abdomen: soft, no tenderness  No leg edema  No erythema, no tenderness  Right facial droop and right hemiplegia  Lauren urine in pure wick    DATA:    Lab Results   Component Value Date    WBC 13.3 (H) 11/29/2022    HGB 12.5 11/29/2022    HCT 38.5 11/29/2022    MCV 95.9 (H) 11/29/2022     11/29/2022     Lab Results   Component Value Date    CREATININE 0.46 (L) 11/29/2022    BUN 18 11/29/2022     (L) 11/29/2022    K 4.3 11/29/2022    CL 97 11/29/2022    CO2 26 11/29/2022 Hepatic Function Panel:  Lab Results   Component Value Date/Time    ALKPHOS 91 11/25/2022 09:15 AM    ALT 35 11/25/2022 09:15 AM    AST 29 11/25/2022 09:15 AM    PROT 7.3 11/25/2022 09:15 AM    BILITOT 0.7 11/25/2022 09:15 AM    BILIDIR 0.2 04/23/2012 11:12 AM    IBILI 0.5 04/23/2012 11:12 AM    LABALBU 3.6 11/25/2022 09:15 AM    LABALBU 4.6 04/23/2012 11:12 AM       Microbiology:   No results for input(s): BC in the last 72 hours. No results for input(s): Bharat Amaya in the last 72 hours. No results for input(s): LABURIN in the last 72 hours.       IMPRESSION:    Sepsis secondary to acute lower UTI  Altered mentation with obtundation on admission  Mild PEG site cellulitis  Recent hemorrhagic stroke    Patient Active Problem List   Diagnosis    Hypertension    Hyperlipidemia    COPD (chronic obstructive pulmonary disease) (HCC)    Generalized osteoarthrosis, unspecified site    Major depressive disorder, single episode, moderate (HCC)    Morbid obesity (HCC)    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 (HCC)    Neuromuscular scoliosis of lumbar region    Hypotension    NSTEMI (non-ST elevated myocardial infarction) (HCC)    Acute diastolic heart failure (HCC)    Hypovolemic shock (HCC)    Acute colitis    Slow transit constipation    External hemorrhoid, bleeding    Colitis    Pain and swelling due to varicose veins of both lower extremities    Venous insufficiency of left lower extremity    Asymptomatic varicose veins of bilateral lower extremities    Non-healing surgical wound    Claudication in peripheral vascular disease ([address]    Cerebrovascular accident (CVA) due to stenosis of left middle cerebral artery (HCC)    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 ([address]    Fibromuscular dysplasia (HCC)    Impaired mobility and activities of daily living dt CVA    Acute CVA (cerebrovascular accident) ([address]    Dysphagia    Cerebrovascular accident (CVA) due to stenosis of left vertebral artery [facility])    Palliative care encounter    Advanced care planning/counseling discussion    Goals of care, counseling/discussion    Aphasia    TIA (transient ischemic attack)    Nocturnal hypoxia    Sleep apnea    Encephalopathy    Sepsis secondary to UTI ([facility] [address]    Obtundation    Late effects of CVA (cerebrovascular accident)       PLAN:  Flo Pimentel MD

## 2022-12-01 LAB
BASOPHILS ABSOLUTE: 0.1 K/UL (ref 0–0.2)
BASOPHILS RELATIVE PERCENT: 0.5 %
BLOOD CULTURE, ROUTINE: NORMAL
CULTURE, BLOOD 2: NORMAL
EOSINOPHILS ABSOLUTE: 0.4 K/UL (ref 0–0.7)
EOSINOPHILS RELATIVE PERCENT: 4.4 %
HCT VFR BLD CALC: 36.1 % (ref 37–47)
HEMOGLOBIN: 11.9 G/DL (ref 12–16)
LYMPHOCYTES ABSOLUTE: 1.4 K/UL (ref 1–4.8)
LYMPHOCYTES RELATIVE PERCENT: 14.1 %
MCH RBC QN AUTO: 31.6 PG (ref 27–31.3)
MCHC RBC AUTO-ENTMCNC: 33 % (ref 33–37)
MCV RBC AUTO: 95.8 FL (ref 79.4–94.8)
MONOCYTES ABSOLUTE: 0.8 K/UL (ref 0.2–0.8)
MONOCYTES RELATIVE PERCENT: 7.9 %
NEUTROPHILS ABSOLUTE: 7.4 K/UL (ref 1.4–6.5)
NEUTROPHILS RELATIVE PERCENT: 73.1 %
PDW BLD-RTO: 13.7 % (ref 11.5–14.5)
PLATELET # BLD: 302 K/UL (ref 130–400)
RBC # BLD: 3.77 M/UL (ref 4.2–5.4)
WBC # BLD: 10.1 K/UL (ref 4.8–10.8)

## 2022-12-01 PROCEDURE — 6370000000 HC RX 637 (ALT 250 FOR IP): Performed by: INTERNAL MEDICINE

## 2022-12-01 PROCEDURE — 2700000000 HC OXYGEN THERAPY PER DAY

## 2022-12-01 PROCEDURE — 6360000002 HC RX W HCPCS: Performed by: INTERNAL MEDICINE

## 2022-12-01 PROCEDURE — 2580000003 HC RX 258: Performed by: INTERNAL MEDICINE

## 2022-12-01 PROCEDURE — 36415 COLL VENOUS BLD VENIPUNCTURE: CPT

## 2022-12-01 PROCEDURE — 1210000000 HC MED SURG R&B

## 2022-12-01 PROCEDURE — 85025 COMPLETE CBC W/AUTO DIFF WBC: CPT

## 2022-12-01 PROCEDURE — 99232 SBSQ HOSP IP/OBS MODERATE 35: CPT | Performed by: INTERNAL MEDICINE

## 2022-12-01 RX ADMIN — PRAVASTATIN SODIUM 20 MG: 20 TABLET ORAL at 21:36

## 2022-12-01 RX ADMIN — ASPIRIN 81 MG: 81 TABLET, CHEWABLE ORAL at 09:27

## 2022-12-01 RX ADMIN — Medication 10 ML: at 09:27

## 2022-12-01 RX ADMIN — METOPROLOL TARTRATE 50 MG: 50 TABLET, FILM COATED ORAL at 09:27

## 2022-12-01 RX ADMIN — CEFTRIAXONE SODIUM 1000 MG: 1 INJECTION, POWDER, FOR SOLUTION INTRAMUSCULAR; INTRAVENOUS at 21:38

## 2022-12-01 RX ADMIN — Medication 10 ML: at 21:36

## 2022-12-01 RX ADMIN — METOPROLOL TARTRATE 50 MG: 50 TABLET, FILM COATED ORAL at 21:35

## 2022-12-01 NOTE — CARE COORDINATION
This LSW met with patient at bedside this am. Patient to be transferred to [facility] when authorization is given and patient is medically cleared. YARAW / CM to follow.   Electronically signed by DAYDAY Rueda, ANTHONY on 12/1/22 at 8:43 AM EST

## 2022-12-01 NOTE — PROGRESS NOTES
Physical Therapy Missed Treatment   Facility/Department: [facility] MED SURG [id number]/[id number]    NAME: Timi Titus    : 1945 (76 y.o.)  MRN: [id number]    Account: [de-identified]  Gender: female    Chart reviewed, attempted PT at 36. Patient unavailable 2° to:    [] Hold per [facility] request    [x] Pt declined pt initially willing to participate but once mobility initiated by adamantly declines us assisting her in any way or touching her. She screams out with any light touch or near touch. Max Encouragement and education provided on reason for assistance, pt non receptive. [] Pt. . off floor for test/procedure. [] Pt. Unavailable       Will attempt PT treatment again at earliest convenience.       Electronically signed by Dhara Choi PTA on 22 at 11:58 AM EST

## 2022-12-01 NOTE — PROGRESS NOTES
Infectious Diseases Inpatient Progress Note          HISTORY OF PRESENT ILLNESS:  Follow up sepsis secondary to UTI, altered mentation and obtundation on admission on IV Rocephin, well tolerated. Patient had remarkable clinical improvement with decreased obtundation and tachypnea. .    urine culture with group B strep  Blood culture 1 out of 2 coag negative staph consistent with contamination  Current Medications:     cefTRIAXone (ROCEPHIN) IV  1,000 mg IntraVENous Q24H    metoprolol  50 mg PEG Tube BID    aspirin  81 mg PEG Tube Daily    pravastatin  20 mg PEG Tube Nightly    sodium chloride flush  10 mL IntraVENous 2 times per day    enoxaparin  40 mg SubCUTAneous Daily       Allergies:  Codeine      Review of Systems  Limited 14 system review is negative other than HPI/recent CVA  Was able to recognize her  today    Physical Exam  Vitals:    12/01/22 0104 12/01/22 0105 12/01/22 0712 12/01/22 0930   BP:   90/64 (!) 136/108   Pulse: 64      Resp:  20     Temp:  98.8 °F (37.1 °C) 98.6 °F (37 °C)    TempSrc:  Axillary     SpO2: 98%      Weight:       Height:         General Appearance: alert and oriented to self and family, well-developed and well-nourished, in no acute distress, on room air  Skin: warm and dry, no rash. Head: normocephalic and atraumatic  Eyes: anicteric sclerae  ENT: oropharynx clear and moist with normal mucous membranes.  No oral thrush  Lungs: normal respiratory effort, clear lungs  Heart normal S1-S2 no murmur  Abdomen: soft, no tenderness  No leg edema  No erythema, no tenderness  Right facial droop and right hemiplegia  Lauren urine in pure wick    DATA:    Lab Results   Component Value Date    WBC 10.1 12/01/2022    HGB 11.9 (L) 12/01/2022    HCT 36.1 (L) 12/01/2022    MCV 95.8 (H) 12/01/2022     12/01/2022     Lab Results   Component Value Date    CREATININE 0.46 (L) 11/29/2022    BUN 18 11/29/2022     (L) 11/29/2022    K 4.3 11/29/2022    CL 97 11/29/2022    CO2 26 11/29/2022       Hepatic Function Panel:  Lab Results   Component Value Date/Time    ALKPHOS 91 11/25/2022 09:15 AM    ALT 35 11/25/2022 09:15 AM    AST 29 11/25/2022 09:15 AM    PROT 7.3 11/25/2022 09:15 AM    BILITOT 0.7 11/25/2022 09:15 AM    BILIDIR 0.2 04/23/2012 11:12 AM    IBILI 0.5 04/23/2012 11:12 AM    LABALBU 3.6 11/25/2022 09:15 AM    LABALBU 4.6 04/23/2012 11:12 AM       Microbiology:   Recent Labs     11/30/22  1155   BC No Growth to date. Any change in status will be called. Recent Labs     11/30/22  1155   BLOODCULT2 No Growth to date. Any change in status will be called. No results for input(s): LABURIN in the last 72 hours.       IMPRESSION:    Sepsis secondary to acute lower UTI  Altered mentation with obtundation on admission  Mild PEG site cellulitis  Recent hemorrhagic stroke    Patient Active Problem List   Diagnosis    Hypertension    Hyperlipidemia    COPD (chronic obstructive pulmonary disease) (HCC)    Generalized osteoarthrosis, unspecified site    Major depressive disorder, single episode, moderate (HCC)    Morbid obesity (HCC)    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 (HCC)    Neuromuscular scoliosis of lumbar region    Hypotension    NSTEMI (non-ST elevated myocardial infarction) (HCC)    Acute diastolic heart failure (HCC)    Hypovolemic shock (HCC)    Acute colitis    Slow transit constipation    External hemorrhoid, bleeding    Colitis    Pain and swelling due to varicose veins of both lower extremities    Venous insufficiency of left lower extremity    Asymptomatic varicose veins of bilateral lower extremities    Non-healing surgical wound    Claudication in peripheral vascular disease ([address]    Cerebrovascular accident (CVA) due to stenosis of left middle cerebral artery (HCC)    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 ([address]    Fibromuscular dysplasia (HCC)    Impaired mobility and activities of daily living dt CVA    Acute CVA (cerebrovascular accident) [facility])    Dysphagia    Cerebrovascular accident (CVA) due to stenosis of left vertebral artery ([facility]    Palliative care encounter    Advanced care planning/counseling discussion    Goals of care, counseling/discussion    Aphasia    TIA (transient ischemic attack)    Nocturnal hypoxia    Sleep apnea    Encephalopathy    Sepsis secondary to UTI ([facility] [address] [facility]    Obtundation    Late effects of CVA (cerebrovascular accident)       PLAN:  Continue IV Rocephin, at discharge would give ceftin 500 mg po bid for 7 days, no iv antibiotic recommended as outpatient at this time  Pt can follow up with is in a couple weeks          Annmarie Neff MD

## 2022-12-01 NOTE — PROGRESS NOTES
Department of Internal Medicine  General Internal Medicine  Attending Progress Note      SUBJECTIVE:  Pt seen and examined. No new complaints  Awaiting precert for placement    OBJECTIVE      Medications    Current Facility-Administered Medications: cefTRIAXone (ROCEPHIN) 1,000 mg in dextrose 5 % 50 mL IVPB mini-bag, 1,000 mg, IntraVENous, Q24H  metoprolol tartrate (LOPRESSOR) tablet 50 mg, 50 mg, PEG Tube, BID  aspirin chewable tablet 81 mg, 81 mg, PEG Tube, Daily  pravastatin (PRAVACHOL) tablet 20 mg, 20 mg, PEG Tube, Nightly  sodium chloride flush 0.9 % injection 10 mL, 10 mL, IntraVENous, 2 times per day  sodium chloride flush 0.9 % injection 10 mL, 10 mL, IntraVENous, PRN  0.9 % sodium chloride infusion, , IntraVENous, PRN  enoxaparin (LOVENOX) injection 40 mg, 40 mg, SubCUTAneous, Daily  ondansetron (ZOFRAN-ODT) disintegrating tablet 4 mg, 4 mg, Oral, Q8H PRN **OR** ondansetron (ZOFRAN) injection 4 mg, 4 mg, IntraVENous, Q6H PRN  polyethylene glycol (GLYCOLAX) packet 17 g, 17 g, Oral, Daily PRN  acetaminophen (TYLENOL) tablet 650 mg, 650 mg, Oral, Q6H PRN **OR** acetaminophen (TYLENOL) suppository 650 mg, 650 mg, Rectal, Q6H PRN  potassium chloride (KLOR-CON M) extended release tablet 40 mEq, 40 mEq, Oral, PRN **OR** potassium bicarb-citric acid (EFFER-K) effervescent tablet 40 mEq, 40 mEq, Oral, PRN **OR** potassium chloride 10 mEq/100 mL IVPB (Peripheral Line), 10 mEq, IntraVENous, PRN  ipratropium-albuterol (DUONEB) nebulizer solution 1 ampule, 1 ampule, Inhalation, Q4H PRN  Physical    VITALS:  BP (!) 136/108   Pulse 64   Temp 98.6 °F (37 °C)   Resp 20   Ht 5' 3\" (1.6 m)   Wt 156 lb (70.8 kg)   LMP  (LMP Unknown)   SpO2 98%   BMI 27.63 kg/m²   Constitutional: Awake and alert in no acute distress.  Lying in bed comfortably  Head: Atraumatic, facial droop-chronic  Eyes: EOMI, PERRLA  ENT: moist mucous membranes  Neck: neck supple, trachea midline  Lungs: Good inspiratory effort, no wheeze, no rhonchi, no rales  Heart: RRR, normal S1 and S2  GI: Soft, non-distended, +BS  MSK: R upper and lower extremity weakness, R hand edema-improving  Skin: warm, dry  Psych: appropriate affect   Data    CBC:   Lab Results   Component Value Date/Time    WBC 10.1 12/01/2022 05:15 AM    RBC 3.77 12/01/2022 05:15 AM    RBC 4.22 04/23/2012 11:12 AM    HGB 11.9 12/01/2022 05:15 AM    HCT 36.1 12/01/2022 05:15 AM    MCV 95.8 12/01/2022 05:15 AM    MCH 31.6 12/01/2022 05:15 AM    MCHC 33.0 12/01/2022 05:15 AM    RDW 13.7 12/01/2022 05:15 AM     12/01/2022 05:15 AM    MPV 11.1 11/22/2022 12:00 AM     CMP:    Lab Results   Component Value Date/Time     11/29/2022 05:32 AM    K 4.3 11/29/2022 05:32 AM    K 4.1 11/26/2022 05:21 AM    CL 97 11/29/2022 05:32 AM    CO2 26 11/29/2022 05:32 AM    BUN 18 11/29/2022 05:32 AM    CREATININE 0.46 11/29/2022 05:32 AM    GFRAA >60.0 07/05/2022 11:26 AM    LABGLOM >60.0 11/29/2022 05:32 AM    GLUCOSE 124 11/29/2022 05:32 AM    GLUCOSE 75 04/23/2012 11:12 AM    PROT 7.3 11/25/2022 09:15 AM    LABALBU 3.6 11/25/2022 09:15 AM    LABALBU 4.6 04/23/2012 11:12 AM    CALCIUM 9.2 11/29/2022 05:32 AM    BILITOT 0.7 11/25/2022 09:15 AM    ALKPHOS 91 11/25/2022 09:15 AM    AST 29 11/25/2022 09:15 AM    ALT 35 11/25/2022 09:15 AM       ASSESSMENT AND PLAN      # Metabolic encephalopathy 2/2 UTI  - improved with Rocephin. 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 Medically stable once approved for SNF. Completed 5 days of Rocephin.  Likely does not need IV abx as blood cultures with staph epi which is likely a contaminant.  Will defer final recs to [address]al, DO  Internal Medicine   Hospitalist    >35 minutes in total care time

## 2022-12-01 NOTE — PROGRESS NOTES
Pt assessment and vitals complete and stable. Pt alert to self. Declined lovenox, required much encouragement to take meds through peg tube. Denies needs otherwise.

## 2022-12-02 VITALS
SYSTOLIC BLOOD PRESSURE: 115 MMHG | OXYGEN SATURATION: 97 % | RESPIRATION RATE: 28 BRPM | WEIGHT: 156 LBS | DIASTOLIC BLOOD PRESSURE: 46 MMHG | HEART RATE: 63 BPM | TEMPERATURE: 97.2 F | BODY MASS INDEX: 27.64 KG/M2 | HEIGHT: 63 IN

## 2022-12-02 LAB
BASOPHILS ABSOLUTE: 0.1 K/UL (ref 0–0.2)
BASOPHILS RELATIVE PERCENT: 1.1 %
EOSINOPHILS ABSOLUTE: 0.5 K/UL (ref 0–0.7)
EOSINOPHILS RELATIVE PERCENT: 6.6 %
HCT VFR BLD CALC: 39.8 % (ref 37–47)
HEMOGLOBIN: 12.5 G/DL (ref 12–16)
LYMPHOCYTES ABSOLUTE: 1.5 K/UL (ref 1–4.8)
LYMPHOCYTES RELATIVE PERCENT: 20.1 %
MCH RBC QN AUTO: 31.5 PG (ref 27–31.3)
MCHC RBC AUTO-ENTMCNC: 31.4 % (ref 33–37)
MCV RBC AUTO: 100.3 FL (ref 79.4–94.8)
MONOCYTES ABSOLUTE: 0.8 K/UL (ref 0.2–0.8)
MONOCYTES RELATIVE PERCENT: 10.9 %
NEUTROPHILS ABSOLUTE: 4.6 K/UL (ref 1.4–6.5)
NEUTROPHILS RELATIVE PERCENT: 61.3 %
PDW BLD-RTO: 14.1 % (ref 11.5–14.5)
PLATELET # BLD: 294 K/UL (ref 130–400)
RBC # BLD: 3.97 M/UL (ref 4.2–5.4)
SARS-COV-2, NAAT: NOT DETECTED
WBC # BLD: 7.5 K/UL (ref 4.8–10.8)

## 2022-12-02 PROCEDURE — 94640 AIRWAY INHALATION TREATMENT: CPT

## 2022-12-02 PROCEDURE — 36415 COLL VENOUS BLD VENIPUNCTURE: CPT

## 2022-12-02 PROCEDURE — 2700000000 HC OXYGEN THERAPY PER DAY

## 2022-12-02 PROCEDURE — 6370000000 HC RX 637 (ALT 250 FOR IP): Performed by: INTERNAL MEDICINE

## 2022-12-02 PROCEDURE — 6360000002 HC RX W HCPCS: Performed by: INTERNAL MEDICINE

## 2022-12-02 PROCEDURE — 2580000003 HC RX 258: Performed by: INTERNAL MEDICINE

## 2022-12-02 PROCEDURE — 97112 NEUROMUSCULAR REEDUCATION: CPT

## 2022-12-02 PROCEDURE — 87635 SARS-COV-2 COVID-19 AMP PRB: CPT

## 2022-12-02 PROCEDURE — 94761 N-INVAS EAR/PLS OXIMETRY MLT: CPT

## 2022-12-02 PROCEDURE — 85025 COMPLETE CBC W/AUTO DIFF WBC: CPT

## 2022-12-02 RX ORDER — LIDOCAINE 4 G/G
2 PATCH TOPICAL DAILY PRN
Status: DISCONTINUED | OUTPATIENT
Start: 2022-12-02 | End: 2022-12-03 | Stop reason: HOSPADM

## 2022-12-02 RX ORDER — CEFUROXIME AXETIL 500 MG/1
500 TABLET ORAL 2 TIMES DAILY
Qty: 14 TABLET | Refills: 0 | Status: SHIPPED | OUTPATIENT
Start: 2022-12-02 | End: 2022-12-09

## 2022-12-02 RX ADMIN — ASPIRIN 81 MG: 81 TABLET, CHEWABLE ORAL at 07:44

## 2022-12-02 RX ADMIN — METOPROLOL TARTRATE 50 MG: 50 TABLET, FILM COATED ORAL at 07:44

## 2022-12-02 RX ADMIN — IPRATROPIUM BROMIDE AND ALBUTEROL SULFATE 1 AMPULE: 2.5; .5 SOLUTION RESPIRATORY (INHALATION) at 11:41

## 2022-12-02 RX ADMIN — ENOXAPARIN SODIUM 40 MG: 100 INJECTION SUBCUTANEOUS at 07:44

## 2022-12-02 RX ADMIN — Medication 10 ML: at 07:44

## 2022-12-02 NOTE — PROGRESS NOTES
Comprehensive Nutrition Assessment    Type and Reason for Visit:  Reassess    Nutrition Recommendations/Plan:   Continue current plan of care  Monitor hyponatremia for adjustments to free water flushes     Malnutrition Assessment:  Malnutrition Status:  No malnutrition (12/02/22 0849)    Context:  Chronic Illness     Findings of the 6 clinical characteristics of malnutrition:  Energy Intake:  No significant decrease in energy intake  Weight Loss:  Unable to assess (no wt available on admission)     Body Fat Loss:  No significant body fat loss     Muscle Mass Loss:  No significant muscle mass loss    Fluid Accumulation:  No significant fluid accumulation     Strength:  Not Performed    Nutrition Assessment:    Nutritional status remains adequate, pt tolerated transition to bolus schedule and is receiving calories and protein within range of estimated needs via TF, no new recommendaions at this time. Will continue to monitor Na levels, and may need to decrease free water flush, if levels continue to decrease    Nutrition Related Findings:    Admitted with metabolic encephalopathy related to UTI, PMH: COPD, HTN, HLD, CVA; . Labs noted : Na = 134, Meds reviewed. IVF d/c. PEG placed 11/16/22 for dysphagia, difficult to assess for weight changes due to edema,  1+ generalized per nursing, BM 12/1, pt remains confused, nursing reports tolerating TF well Wound Type: Skin Tears       Current Nutrition Intake & Therapies:    Average Meal Intake: NPO     Current Tube Feeding (TF) Orders:  Feeding Route: PEG  Formula: Standard with Fiber (Jevity 1.5)  Schedule:  Bolus  Feeding Regimen: 300 ml, 4 x daily = 1200 ml  Additives/Modulars: None  Water Flushes: 180 ml 4 x daily ( 720 ml  Current TF & Flush Orders Provides: 1800 kcals, 76 g protein, ~ 1630 ml free water  Goal TF & Flush Orders Provides: 1800 kcals, 76 g protein, ~ 1630 ml free water    Anthropometric Measures:  Height: 5' 3\" (160 cm)  Ideal Body Weight (IBW): 115 lbs (52 kg)    Admission Body Weight:  (n/a)  Current Body Weight:  (* edema present), 156# ( 11/29)   > 100% IBW. Current BMI (kg/m2): 27.6  Usual Body Weight: 158 lb (71.7 kg) (11/12; 162 lb ( 7/22); 153lb ( 6/2021))                       BMI Categories: Overweight (BMI 25.0-29. 9)    Estimated Daily Nutrient Needs:  Energy Requirements Based On: Kcal/kg  Weight Used for Energy Requirements: Current  Energy (kcal/day): 0769-5567 kcals @ 23-25 kcal/kg  Weight Used for Protein Requirements: Ideal  Protein (g/day): 68-73 (kg x 1.3-1.4)  Method Used for Fluid Requirements: 1 ml/kcal  Fluid (ml/day): ~1800    Nutrition Diagnosis:   Inadequate oral intake related to swallowing difficulty as evidenced by nutrition support - enteral nutrition    Nutrition Interventions:   Food and/or Nutrient Delivery: Continue Current Tube Feeding  Nutrition Education/Counseling: Education not indicated  Coordination of Nutrition Care: Continue to monitor while inpatient       Goals:  Previous Goal Met: Goal(s) Achieved  Goals: Initiate nutrition support, Tolerate nutrition support at goal rate       Nutrition Monitoring and Evaluation:      Food/Nutrient Intake Outcomes: Enteral Nutrition Intake/Tolerance  Physical Signs/Symptoms Outcomes: Weight, Biochemical Data    Discharge Planning:    Enteral Nutrition     JARROD TIERNEY RD, LD

## 2022-12-02 NOTE — PROGRESS NOTES
Physical Therapy Med Surg Daily Treatment Note  Facility/Department: [facility] MED SURG UNIT  [facility]       NAME: Ari Street  : 1945 (76 y.o.)  MRN: [id number]  CODE STATUS: Full Code    Date of Service: 2022    Patient Diagnosis(es): Encephalopathy [G93.40]  Acute cystitis without hematuria [N30.00]  Fever, unspecified fever cause [R50.9]  Cellulitis, unspecified cellulitis site [B98.81]   Chief Complaint   Patient presents with    Failure To Thrive     Patient Active Problem List    Diagnosis Date Noted    Sepsis secondary to UTI ([address] 2022    Obtundation 2022    Late effects of CVA (cerebrovascular accident) 2022    Encephalopathy 2022    Aphasia 11/15/2022    TIA (transient ischemic attack) 11/15/2022    Nocturnal hypoxia 11/15/2022    Sleep apnea 11/15/2022    Dysphagia 2022    Cerebrovascular accident (CVA) due to stenosis of left vertebral artery ([address] 2022    Palliative care encounter 2022    Advanced care planning/counseling discussion 2022    Goals of care, counseling/discussion 2022    Cerebrovascular accident (CVA) due to stenosis of left middle cerebral artery ([address] 2022    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 ([address] 2022    Fibromuscular dysplasia ([address] 2022    Impaired mobility and activities of daily living dt CVA 2022    Acute CVA (cerebrovascular accident) ([address] 2022    Claudication in peripheral vascular disease ([address] 2020    Non-healing surgical wound 2020    Venous insufficiency of left lower extremity 2020    Asymptomatic varicose veins of bilateral lower extremities 2020    Pain and swelling due to varicose veins of both lower extremities 2020    Slow transit constipation 10/13/2019    External hemorrhoid, bleeding 10/13/2019    Colitis 10/13/2019    Acute colitis 10/10/2019    Hypovolemic shock ([address] 10/07/2019 NSTEMI (non-ST elevated myocardial infarction) ([address] 17/54/5330    Acute diastolic heart failure ([address] 10/05/2019    Hypotension 10/01/2019    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    Hypertension     Hyperlipidemia     COPD (chronic obstructive pulmonary disease) ([address]     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 ([address] 08/27/2010    Generalized osteoarthrosis, unspecified site 12/27/2002    Major depressive disorder, single episode, moderate ([address] 12/27/2002    Morbid obesity ([address] 12/27/2002        Past Medical History:   Diagnosis Date    Anxiety     Arthritis     COPD (chronic obstructive pulmonary disease) (HCC)     Generalized osteoarthrosis, unspecified site 12/27/2002    Hyperlipidemia     Hypertension     Major depressive disorder, single episode, moderate ([address] 12/27/2002    Morbid obesity ([address] 12/27/2002    Non-pressure chronic ulcer of calf with fat layer exposed ([address] 10/9/2020    Non-pressure chronic ulcer of calf, right, with fat layer exposed ([address] 10/2/2020    OAB (overactive bladder)     Osteoporosis     Sleep apnea 11/15/2022     Past Surgical History:   Procedure Laterality Date    CARDIAC CATHETERIZATION      CARPAL TUNNEL RELEASE      COLONOSCOPY      GASTROSTOMY TUBE PLACEMENT N/A 11/16/2022    PERCUTANEOUS ENDOSCOPIC GASTROSTOMY TUBE PLACEMENT performed by Dina Gabriel MD at [address] [facility], [address] [facility] ([address] [place] [address])      KNEE ARTHROPLASTY Bilateral     MT COLON CA SCRN NOT HI RSK IND N/A 6/22/2017    COLONOSCOPY performed by Tereza Barnes MD at [address] TRANSORAL DIAGNOSTIC N/A 6/22/2017    EGD ESOPHAGOGASTRODUODENOSCOPY performed by Tereza Barnes MD at [address] Bilateral     SKIN BIOPSY Right 9/17/2020    EXCISION OF LEG MASS RIGHT (PAT ON ADMIT) performed by Dina Gabriel MD at [facility]       Chart Reviewed: Yes  Family / Caregiver Present: Yes    Restrictions:  Restrictions/Precautions: Fall Risk    SUBJECTIVE:   Subjective: minimal verbalizations, does not respond to our questions or commands but responds to  and tells him to \"shut up. \"    Pain  Pain: Pt does appear confused throughout. moans in pain sporadically during tx. OBJECTIVE:        Bed mobility  Bed Mobility Comments: Pt actively resisting movement,  Pt repositioned for comfort. Max cues for initiation of task with minimal follow through. Activity Tolerance  Activity Tolerance: Treatment limited secondary to decreased cognition          ASSESSMENT   Assessment: pt with decreased pariticipation, actively resisiting movement and demos poor follow through of commands/cues. Co-treatment with AL completed d/t pt's need for +2 assistance for mobility and pt's poor tolerance to activity and anticipated difficulty participating in multiple therapy sessions. Discharge Recommendations:  Continue to assess pending progress, Patient would benefit from continued therapy after discharge         Goals  Long Term Goals  Long Term Goal 1: Pt to roll L<>R in supine with Emmanuelle  Long Term Goal 2: Pt to complete transitions sit<>supine with ModA  Long Term Goal 3: Pt to tolerate sitting EOB X 3min with Emmanuelle    PLAN    General Plan: 1 time a day 3-6 times a week  Safety Devices  Type of Devices: All fall risk precautions in place, Call light within reach, Left in bed (unable to set bed alarm d/t bed not being zeroed. error message displaying on bed RN notified.)     [facility] (6 CLICK) [address] Inpatient Mobility Raw Score : 6      Therapy Time   Co-treatment   Time In  1303   Time Out  1325   Minutes  22      BM: 22 (no unit billed)        Leland Lopez PTA, 12/02/22 at 1:55 PM         Definitions for assistance levels  Independent = pt does not require any physical supervision or assistance from another person for activity completion. Device may be needed.   Stand by assistance = pt requires verbal cues or instructions from another person, close to but not touching, to perform the activity  Minimal assistance= pt performs 75% or more of the activity; assistance is required to complete the activity  Moderate assistance= pt performs 50% of the activity; assistance is required to complete the activity  Maximal assistance = pt performs 25% of the activity; assistance is required to complete the activity  Dependent = pt requires total physical assistance to accomplish the task

## 2022-12-02 NOTE — PROGRESS NOTES
1640: Call to [facility] again, per Ksenia Rojo at [facility] 'i know this patient is coming, but I am not going to be taking care of her so I can't take report.  But i'll take you name and number over to them and they will call you' [facility] Ksenia Rojo as well writer name, aware of [phone] .       [phone]: No call from [facility] at this time

## 2022-12-02 NOTE — PROGRESS NOTES
2130-HS assessment completed. Vitals WNL. Pt confused. Oriented to self only. Pt denies pain or SOB at this time. Doesn't appear to be in any distress or discomfort. Bolus tube feed infusing. No other needs identified. Falls precautions in place. Call light in reach. Team to continue to monitor.  Electronically signed by Cailin Willrad RN on 12/1/2022 at 10:17 PM
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
## 2022-12-02 NOTE — PROGRESS NOTES
[facility] OCCUPATIONAL THERAPY MED SURG TREATMENT NOTE     Date: 2022  Patient Name: Elpidio Kaur        MRN: [id number]  Account: [de-identified]   : 1945  (76 y.o.)  [facility]    Chart Review:    Restrictions  Restrictions/Precautions  Restrictions/Precautions: Fall Risk, NPO  Position Activity Restriction  Other position/activity restrictions: PEG     Safety:  Safety Devices  Type of Devices: All fall risk precautions in place;Call light within reach; Bed alarm in place; Left in bed    Patient's birthday verified: Pt verbally unable, verified via wrist band    Subjective:    Pain  Pt does not verbally respond when asked about pain or fatigue. Pt able to maintain eye contact during questions, however, does not respond. Pt noted to inconsistently yell out it pain during movement. Objective: Pt in bed sleeping upon arrival. Pt's spouse present at bedside. Per spouse, pt's breathing has been relatively normal except for occasional deep belly breaths. Pt wakes to verbal introduction, physical cues, and bright room light being turned on. Pt does not make any verbalizations at start of session. Provided Max verbal, visual, tactile, and physical cues for rolling to the right in bed. Instructed pt to use her left hand in order to reach across her body and grab the bed rail to her right. Pt initially does not respond to cues. After Max cues, pt becomes slightly agitated. Pt willing to grab therapist's hand in an attempt to initiate movement. Pt able to follow commands in order to squeeze therapist's hand. Pt minimally attempts to reach for bed rail and comes ~2 inches from bed rail before \"flopping\" hand back down (x 2 trials). Pt unable to report if pain or fatigue is limiting her. Pt unwilling to attempt again following Max encouragement. PTA and therapist repositioned pt in bed for comfort as well as to prevent shoulder subluxation and bed sores.  Educated pt and spouse in importance of proper positioning. Pt resistant to positioning at first, however, appears to be relieved following positioning. Towel roll was placed behind neck and pt rests into towel appearing more comfortable. Pillow was placed under right elbow. HOB 30 degrees per PEG protocol. Pt minimally verbal throughout session. Pt does not respond to questions, however, occasionally yells out to her spouse to \"shut up\" when he is providing encouragement. Cognition Status:  Overall Cognitive Status: Exceptions  Arousal/Alertness: Inconsistent responses to stimuli  Following Commands: Inconsistently follows commands  Attention Span: Difficulty attending to directions; Difficulty dividing attention  Insights: Not aware of deficits  Initiation: Requires cues for all      Therapy key for assistance levels -   Independent/Mod I = Pt. is able to perform task with no assistance but may require a device   Stand by assistance = Pt. does not perform task at an independent level but does not need physical assistance, requires verbal cues  Minimal, Moderate, Maximal Assistance = Pt. requires physical assistance (25%, 50%, 75% assist from helper) for task but is able to actively participate in task   Dependent = Pt. requires total assistance with task and is not able to actively participate with task completion    Functional Endurance:  Activity Tolerance  Activity Tolerance: Patient limited by fatigue;Treatment limited secondary to decreased cognition;Treatment limited secondary to agitation    Treatment consisted of:  ADL training    Assessment/Discharge Disposition: Pt appears more fatigued today. Pt not as alert and active today compared to yesterday's PT session per chart review. Pt would benefit from continued Occupational Therapy to increase strength, activity tolerance, [place] with functional transfers, and [place] with ADL/IADL completion.         [employer]  How much help for putting on and taking off regular lower body clothing?: Total  How much help for Bathing?: Total  How much help for Toileting?: Total  How much help for putting on and taking off regular upper body clothing?: Total  How much help for taking care of personal grooming?: A Lot  How much help for eating meals?: Total  AM-PAC Inpatient Daily Activity Raw Score: 7  AM-PAC Inpatient ADL T-Scale Score : 20.13  ADL Inpatient CMS 0-100% Score: 92.44  ADL Inpatient CMS G-Code Modifier : CM    Plan:  Continue OT per POC    Patient Education:  Education Given To: Patient  Education Provided: Role of Therapy;Transfer Training  Education Method: Verbal (Visual cues)  Barriers to Learning: Cognition (slight agitation)  Education Outcome: Continued education needed; Unable to verbalize    Equipment recommendations:  Continue to assess pending progress. Goals/Plan of care addressed during this session:  Patient Goal: Patient goals : Not stated at this time    Improve Lares with ADLs    Therapy Time:   Individual Group Co-Treat   Time In    1303   Time Out    1325     Minutes    22   Co-treatment completed with PTA d/t pt's inability to tolerate multiple therapy sessions.     Neuromuscular reeducation: 22 minutes     Electronically signed by AL Dangelo on 12/2/2022 at 2:16 PM

## 2022-12-02 NOTE — CARE COORDINATION
Patient has been given auth to transfer to [facility]. Authorization is good through midnight 12/3/2022 at midnight. Patient will be transferred when medically cleared. IMM completed on 12/1/22. Electronically signed by DAYDAY Baumann, ANTHONY on 12/2/22 at 11:45 AM EST   Osman Campers[address]. BETHANIE FISHER HANNAH, RN, PATIENT AND DTR. MILLER ALL AWARE.  7000 COMPLETED.   Electronically signed by DAYDAY Baumann, YARAW on 12/2/22 at 2:54 PM EST

## 2022-12-02 NOTE — DISCHARGE INSTR - COC
Continuity of Care Form    Patient Name: Roscoe Kruger   :  1945  MRN:  [id number]    Admit date:  2022  Discharge date:  2022    Code Status Order: Full Code   Advance Directives:     Admitting Physician:  Isidro Fung MD  PCP: YULY Waggoner CNP    Discharging Nurse: [facility] Unit/Room#: C467/V772-36  Discharging Unit Phone Number: [phone]    Emergency Contact:   Extended Emergency Contact Information  Primary Emergency Contact: Nilay Cummings  Address: One [address] of [address] Phone: [phone]  Relation: Spouse  Secondary Emergency Contact: [address] Phone: [phone]  Relation: Child   needed?  No    Past Surgical History:  Past Surgical History:   Procedure Laterality Date    CARDIAC CATHETERIZATION      CARPAL TUNNEL RELEASE      COLONOSCOPY      GASTROSTOMY TUBE PLACEMENT N/A 2022    PERCUTANEOUS ENDOSCOPIC GASTROSTOMY TUBE PLACEMENT performed [facility], TOTAL ABDOMINAL (CERVIX REMOVED)      KNEE ARTHROPLASTY Bilateral     VA COLON CA SCRN NOT  [address] IND N/A 2017    COLONOSCOPY performed by Nikki Saucedo MD at [address] TRANSORAL DIAGNOSTIC N/A 2017    EGD ESOPHAGOGASTRODUODENOSCOPY performed by Nikki Saucedo MD at [address] Bilateral     SKIN BIOPSY Right 2020    EXCISION OF LEG MASS RIGHT (PAT ON ADMIT) performed by Ronn Hdz MD at [facility]       Immunization History:   Immunization History   Administered Date(s) Administered    Influenza, FLUAD, (age 72 y+), Adjuvanted, 0.5mL 2020, 2022    Influenza, High Dose (Fluzone 65 yrs and older) 10/03/2017, 2018    Pneumococcal Conjugate 13-valent (Vhbswgq45) 10/03/2017    Pneumococcal Polysaccharide (Nxadfvhev96) 10/03/2018    Zoster Recombinant (Greggrix) 06/24/2019       Active Problems:  Patient Active Problem List   Diagnosis Code    Hypertension I10    Hyperlipidemia E78.5    COPD (chronic obstructive pulmonary disease) ([facility]) J44.9    Generalized osteoarthrosis, unspecified site M15.9    Major depressive disorder, single episode, moderate ([facility]) F32.1    Morbid obesity ([facility]) E66.01    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 ([facility]) M87.00    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    Hypotension I95.9    NSTEMI (non-ST elevated myocardial infarction) ([facility]) A85.6    Acute diastolic heart failure ([facility]) I50.31    Hypovolemic shock ([facility]) R57.1    Acute colitis K52.9    Slow transit constipation K59.01    External hemorrhoid, bleeding K64.4    Colitis K52.9    Pain and swelling due to varicose veins of both lower extremities I83.813    Venous insufficiency of left lower extremity I87.2    Asymptomatic varicose veins of bilateral lower extremities I83.93    Non-healing surgical wound T81.89XA    Claudication in peripheral vascular disease ([facility]) I73.9    Cerebrovascular accident (CVA) due to stenosis of left middle cerebral artery ([facility]) I63.512    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 [facility]) I69.351    Fibromuscular dysplasia ([facility]) I77.3    Impaired mobility and activities of daily living dt CVA Z74.09, Z78.9    Acute CVA (cerebrovascular accident) ([facility] I63.9    Dysphagia R13.10    Cerebrovascular accident (CVA) due to stenosis of left vertebral artery ([facility]) M39.931    Palliative care encounter Z51.5    Advanced care planning/counseling discussion Z71.89    Goals of care, counseling/discussion Z71.89    Aphasia R47.01    TIA (transient ischemic attack) G45.9    Nocturnal hypoxia G47.34    Sleep apnea G47.30    Encephalopathy G93.40    Sepsis secondary to UTI ([facility]) A41.9, N39.0    Obtundation R40.1    Late effects of CVA (cerebrovascular accident) I69.90       Isolation/Infection:   Isolation            No Isolation Patient Infection Status       Infection Onset Added Last Indicated Last Indicated By Review Planned Expiration Resolved Resolved By    None active    Resolved    COVID-19 (Rule Out) 11/25/22 11/25/22 11/25/22 Respiratory Panel, Molecular, with COVID-19 (Restricted: peds pts or suitable admitted adults) (Ordered)   11/25/22 Rule-Out Test Resulted    COVID-19 (Rule Out) 11/25/22 11/25/22 11/25/22 COVID-19, Rapid (Ordered)   11/25/22 Rule-Out Test Resulted    COVID-19 (Rule Out) 11/12/22 11/12/22 11/12/22 COVID-19, Rapid (Ordered)   11/12/22 Rule-Out Test Resulted    COVID-19 (Rule Out) 09/17/20 09/17/20 09/17/20 COVID-19 (Ordered)   09/17/20 Rule-Out Test Resulted            Nurse Assessment:  Last Vital Signs: /66   Pulse 66   Temp 97.2 °F (36.2 °C) (Oral)   Resp 17   Ht 5' 3\" (1.6 m)   Wt 156 lb (70.8 kg)   LMP  (LMP Unknown)   SpO2 100%   BMI 27.63 kg/m²     Last documented pain score (0-10 scale): Pain Level: 0  Last Weight:   Wt Readings from Last 1 Encounters:   11/29/22 156 lb (70.8 kg)     Mental Status:  disoriented and alert    IV Access:  - None    Nursing Mobility/ADLs:  Walking   Dependent  Transfer  Dependent  Bathing  Dependent  Dressing  Dependent  Toileting  Dependent  Feeding  Dependent  Med Admin  Assisted  Med Delivery   whole    Wound Care Documentation and Therapy:  Wound 11/25/22 Buttocks Left small open reddened area on left buttock (Active)   Dressing/Treatment Zinc paste 12/02/22 1013   Wound Assessment Pink/red 12/02/22 1013   Drainage Amount None 12/02/22 1013   Number of days: 6       Wound 11/25/22 Thigh Proximal;Right;Posterior (Active)   Number of days: 6        Elimination:  Continence:    Bowel: No  Bladder: No  Urinary Catheter: None   Colostomy/Ileostomy/Ileal Conduit: No       Date of Last BM: 12/2/2022    Intake/Output Summary (Last 24 hours) at 12/2/2022 1047  Last data filed at 12/2/2022 0747  Gross per 24 hour   Intake 1920 ml   Output 700 ml   Net 1220 ml I/O last 3 completed shifts: In: 2450 [NG/GT:2400; IV Piggyback:50]  Out: 1300 [Urine:1300]    Safety Concerns: At Risk for Falls and TIA 11/15/202    Impairments/Disabilities:      Speech and Paralysis - r ARM, POSSIBLE FX    Nutrition Therapy:  Current Nutrition Therapy:   - Tube Feedings:  Standard with fiber BOLUS FEED Q6 HOURS 300 ML TUBE FEED 180 ML FREE WATER FLUSH Q 6    Routes of Feeding: Jejunal Tube  Liquids: No Liquids  Daily Fluid Restriction: no  Last Modified Barium Swallow with Video (Video Swallowing Test): not done    Treatments at the Time of Hospital Discharge:   Respiratory Treatments: DUONEB Q4  Oxygen Therapy:  is on oxygen at 3 L/min per nasal cannula.   Ventilator:    - No ventilator support    Rehab Therapies: Physical Therapy and Occupational Therapy SPEECH  Weight Bearing Status/Restrictions: No weight bearing restrictions  Other Medical Equipment (for information only, NOT a DME order):  hospital bed  Other Treatments: NA    Patient's personal belongings (please select all that are sent with patient):  None    RN SIGNATURE:  Electronically signed by Gloria Preciado RN on 12/2/22 at 3:32 PM EST    CASE MANAGEMENT/SOCIAL WORK SECTION    Inpatient Status Date: ***    Readmission Risk Assessment Score:  Readmission Risk              Risk of Unplanned Readmission:  16           Discharging to Facility/ Agency   Name: Jaime [facility]ar  Address:  Phone: [phone]  Fax:    Dialysis Facility (if applicable)   Name:  Address:  Dialysis Schedule:  Phone:  Fax:    / signature: Electronically signed by DAYDAY Dumont LSW on 12/2/22 at 10:47 AM EST    PHYSICIAN SECTION    Prognosis: {Prognosis:0616815283}    Condition at Discharge: [facility] Patient Condition:[id number]}    Rehab Potential (if transferring to Rehab): {Prognosis:8250770866}    Recommended Labs or Other Treatments After Discharge: ***    Physician Certification: I certify the above information and transfer Manju Andrea  is necessary for the continuing treatment of the diagnosis listed and that she requires {Admit to Appropriate Level of Care:88895} for {GREATER/LESS:333845151} 30 days.      Update Admission H&P: No change in H&P    PHYSICIAN SIGNATURE:  Electronically signed by Ambar Michel DO on 12/2/22 at 2:18 PM EST

## 2022-12-02 NOTE — PROGRESS NOTES
Pt assessment and vitals complete. Patient noted to be tachypneic and not talking to staff as much as yesterday. Vitals stable otherwise. Pt slightly clammy, ekg obtained and dr. Marjorie Conklin perfect served results as well as concerns. Awaiting reply. Pt denies needs at this time.

## 2022-12-02 NOTE — PROGRESS NOTES
Dr. Anjelica Ghosh again made aware of concerns for this patient. Made aware that pt appears restless and short of breath despite vitals being wdl and that I am concerned. Per Dr. Anjelica Ghosh, give pt breathing tx and I will see her when I roundMary Cueva from respiratory called and in to give pt bretahing tx.

## 2022-12-02 NOTE — DISCHARGE INSTR - DIET

## 2022-12-04 NOTE — PROGRESS NOTES
Physical Therapy  Facility/Department: [facility] N745/J207-23  Physical Therapy Discharge      NAME: Halle Alvarado    : 1945 (76 y.o.)  MRN: [id number]    Account: [de-identified]  Gender: female      Patient has been discharged from acute care hospital. DC patient from current PT program.      Electronically signed by Conchis Kellogg PT on 22 at 12:26 PM EST

## 2022-12-05 LAB
BLOOD CULTURE, ROUTINE: NORMAL
CULTURE, BLOOD 2: NORMAL

## 2022-12-07 LAB
EKG ATRIAL RATE: 58 BPM
EKG P AXIS: -80 DEGREES
EKG P-R INTERVAL: 104 MS
EKG Q-T INTERVAL: 428 MS
EKG QRS DURATION: 86 MS
EKG QTC CALCULATION (BAZETT): 420 MS
EKG R AXIS: 43 DEGREES
EKG T AXIS: 63 DEGREES
EKG VENTRICULAR RATE: 58 BPM

## 2022-12-17 NOTE — PROGRESS NOTES
Patient Name: Chris Quintero    YOB: 1945  Medical Record Number: [id number]      History of Present Illness:  [de-identified] 3year-old woman admitted here after recent hospitalization patient did evidence of acute change mental status weakness patient did have evidence of weakness patient brought to ER did undergo imaging which showed evidence of a left pontine ischemic CVA. Patient has a ventricular hemorrhage. Patient had right-sided weakness patient dysphagia patient was seen by physical therapy outpatient every speech therapy. Patient did make gains globally. Patient did have PEG placed patient has been ongoing weakness. Transferred here for ongoing crepitus by neurology. Review of Systems   Unable to perform ROS: Mental status change   All other systems reviewed and are negative.     Review of Systems: All 14 review of systems negative other than as stated above    Social History     Tobacco Use    Smoking status: Former     Packs/day: 1.00     Years: 20.00     Pack years: 20.00     Types: Cigarettes     Quit date:      Years since quittin.9    Smokeless tobacco: Never   Vaping Use    Vaping Use: Never used   Substance Use Topics    Alcohol use: No    Drug use: Never         Past Medical History:   Diagnosis Date    Anxiety     Arthritis     COPD (chronic obstructive pulmonary disease) ([address]     Generalized osteoarthrosis, unspecified site 2002    Hyperlipidemia     Hypertension     Major depressive disorder, single episode, moderate ([address] 2002    Morbid obesity ([address] 2002    Non-pressure chronic ulcer of calf with fat layer exposed ([address] 10/9/2020    Non-pressure chronic ulcer of calf, right, with fat layer exposed ([address] 10/2/2020    OAB (overactive bladder)     Osteoporosis     Sleep apnea 11/15/2022           Past Surgical History:   Procedure Laterality Date    CARDIAC CATHETERIZATION      CARPAL TUNNEL RELEASE      COLONOSCOPY      GASTROSTOMY TUBE PLACEMENT N/A 11/16/2022    PERCUTANEOUS ENDOSCOPIC GASTROSTOMY TUBE PLACEMENT performed by Shawn Pena MD at [facility], TOTAL ABDOMINAL (CERVIX REMOVED)      KNEE ARTHROPLASTY Bilateral     VA COLON CA SCRN NOT  [address] [facility] N/A 6/22/2017    COLONOSCOPY performed by Diandra Castle MD at [address] [facility] TRANSORAL DIAGNOSTIC N/A 6/22/2017    EGD ESOPHAGOGASTRODUODENOSCOPY performed by Diandra Castle MD at [address] Bilateral     SKIN BIOPSY Right 9/17/2020    EXCISION OF LEG MASS RIGHT (PAT ON ADMIT) performed by Shawn Pena MD at [facility]         Current Outpatient Medications on File Prior to Visit   Medication Sig Dispense Refill    lidocaine 4 % external patch Place 2 patches onto the skin daily as needed (For low back pain. Patches can remain on for 12hr of each 24hr period) 2 each 0    metoprolol tartrate (LOPRESSOR) 100 MG tablet 1 tablet by PEG Tube route 2 times daily 60 tablet 3    potassium bicarbonate (K-LYTE) 25 MEQ disintegrating tablet 1 tablet by PEG Tube route three times a week 60 tablet 3    furosemide (LASIX) 20 MG tablet 1 tablet by PEG Tube route three times a week 60 tablet 3    nitroGLYCERIN (NITROSTAT) 0.4 MG SL tablet Place 1 tablet under the tongue every 5 minutes as needed for Chest pain up to max of 3 total doses.  If no relief after 1 dose, call 911. 25 tablet 3    pravastatin (PRAVACHOL) 40 MG tablet TAKE ONE-HALF TABLET BY MOUTH DAILY 90 tablet 0    albuterol sulfate HFA (PROVENTIL;VENTOLIN;PROAIR) 108 (90 Base) MCG/ACT inhaler Inhale 2 puffs into the lungs 4 times daily as needed for Wheezing 3 each 1    sertraline (ZOLOFT) 50 MG tablet Take 1 tablet by mouth daily 30 tablet 5    gabapentin (NEURONTIN) 400 MG capsule TAKE ONE CAPSULE BY MOUTH THREE TIMES A DAY FOR 90 DAYS 90 capsule 0    Handicap Placard MISC by Does not apply route Exp 5 years 1 each 0    loratadine (CLARITIN) 10 MG tablet Take 1 tablet by mouth daily 30 tablet 0    aspirin 81 MG EC tablet Take 1 tablet by mouth daily 30 tablet 3    melatonin 1 MG tablet Take 1 tablet by mouth nightly as needed for Sleep 30 tablet 2     No current facility-administered medications on file prior to visit. Allergies   Allergen Reactions    Codeine      Nausea, lightheadedness, dizzy         Family History   Problem Relation Age of Onset    Arthritis Father     Other Father         gout    Heart Failure Father     Cancer Sister          Physical Exam:      Physical Exam  Vitals and nursing note reviewed. Constitutional:       Appearance: Normal appearance. HENT:      Head: Normocephalic and atraumatic. Nose: Nose normal.      Mouth/Throat:      Mouth: Mucous membranes are dry. Eyes:      Extraocular Movements: Extraocular movements intact. Cardiovascular:      Rate and Rhythm: Normal rate and regular rhythm. Pulses: Normal pulses. Heart sounds: No murmur heard. Pulmonary:      Effort: Pulmonary effort is normal.      Breath sounds: Wheezing present. No rhonchi. Abdominal:      General: Bowel sounds are normal.      Palpations: Abdomen is soft. Comments: PEG placed   Musculoskeletal:         General: Swelling present. Cervical back: Normal range of motion. Skin:     General: Skin is warm. Findings: Bruising present. Neurological:      Mental Status: Mental status is at baseline. Motor: Weakness present. Gait: Gait abnormal.      Comments: Right-sided weakness   Psychiatric:         Mood and Affect: Mood normal.       not currently breastfeeding.       .   Lab Results   Component Value Date    WBC 7.5 12/02/2022    HGB 12.5 12/02/2022    HCT 39.8 12/02/2022    .3 (H) 12/02/2022     12/02/2022     Lab Results   Component Value Date/Time     11/29/2022 05:32 AM    K 4.3 11/29/2022 05:32 AM    K 4.1 11/26/2022 05:21 AM    CL 97 11/29/2022 05:32 AM    CO2 26 11/29/2022 05:32 AM BUN 18 11/29/2022 05:32 AM    CREATININE 0.46 11/29/2022 05:32 AM    GLUCOSE 124 11/29/2022 05:32 AM    GLUCOSE 75 04/23/2012 11:12 AM    CALCIUM 9.2 11/29/2022 05:32 AM                ASSESSMENT:  Patient Active Problem List   Diagnosis    Hypertension    Hyperlipidemia    COPD (chronic obstructive pulmonary disease) ([facility])    Generalized osteoarthrosis, unspecified site    Major depressive disorder, single episode, moderate ([facility])    Morbid obesity ([facility])    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 ([facility])    Neuromuscular scoliosis of lumbar region    Hypotension    NSTEMI (non-ST elevated myocardial infarction) ([facility])    Acute diastolic heart failure ([facility])    Hypovolemic shock ([facility])    Acute colitis    Slow transit constipation    External hemorrhoid, bleeding    Colitis    Pain and swelling due to varicose veins of both lower extremities    Venous insufficiency of left lower extremity    Asymptomatic varicose veins of bilateral lower extremities    Non-healing surgical wound    Claudication in peripheral vascular disease ([address]    Cerebrovascular accident (CVA) due to stenosis of left middle cerebral artery ([facility])    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 ([address]    Fibromuscular dysplasia ([facility])    Impaired mobility and activities of daily living dt CVA    Acute CVA (cerebrovascular accident) ([address]    Dysphagia    Cerebrovascular accident (CVA) due to stenosis of left vertebral artery ([facility])    Palliative care encounter    Advanced care planning/counseling discussion    Goals of care, counseling/discussion    Aphasia    TIA (transient ischemic attack)    Nocturnal hypoxia    Sleep apnea    Encephalopathy    Sepsis secondary to UTI ([address]    Obtundation    Late effects of CVA (cerebrovascular accident)         PLAN:   Diagnosis Orders   1.  Cerebrovascular accident (CVA) due to stenosis of left vertebral artery ([address]        2. Claudication in peripheral vascular disease ([address]        3. Venous insufficiency of left lower extremity        4. Other emphysema ([address]        5. Primary hypertension          Continue with supportive care PEG management. Course physical therapy outpatient follow-up with neurology as needed.,  Monitor respiratory r status. Skin surveillance. Patient ivon at risk for further functional decline. Repeat CBC BMP pending. Nutritional support. Monitor for aspiration. Please note orders entered on site at facility after discussion with appropriate facility nursing/therapy/ / nutritional staff. Current longstanding medical problems and acute medical issues addressed with staff. Available data and data elements in on site paper chart reviewed and analyzed. Current external consultant notes reviewed in on site chart. Ordered laboratory testing and imaging will be reviewed when available. This patient's need for psychiatric medication has been reviewed. Will consider further adjustment and possible further evaluations by mental health services.

## 2022-12-23 NOTE — PROGRESS NOTES
SUBJECTIVE:  These 75-year-old woman seen follow-up visit for her shortness of breath hypertension heart failure patient is globally weak patient is functionally weak coughing currently. Patient any chest palpitation change in her bowel bladder habits globally will baseline. ROS: Denied chest pain palpitations has been coughing intermittently  The rest of the 14 point ROS negative    PHYSICAL EXAM: VSS per facility record  Alert to self pupils reactive oral mucosa is moist chest showed no crackles no wheezing cardiovascular showed a regular rate and few ectopic beats abdomen soft tender extremity trace edema    ASSESSMENT & PLAN:   Diagnosis Orders   1. Other emphysema ([address]        2. Primary hypertension        3. Acute diastolic heart failure ([address]          Monitoring weights diurese intermittently. Monitor renal function closely. Follow electrolytes.             Past Medical History:   Diagnosis Date    Anxiety     Arthritis     COPD (chronic obstructive pulmonary disease) (HCC)     Generalized osteoarthrosis, unspecified site 12/27/2002    Hyperlipidemia     Hypertension     Major depressive disorder, single episode, moderate ([address] 12/27/2002    Morbid obesity ([address] 12/27/2002    Non-pressure chronic ulcer of calf with fat layer exposed ([address] 10/9/2020    Non-pressure chronic ulcer of calf, right, with fat layer exposed ([address] 10/2/2020    OAB (overactive bladder)     Osteoporosis     Sleep apnea 11/15/2022         Past Surgical History:   Procedure Laterality Date    CARDIAC CATHETERIZATION      CARPAL TUNNEL RELEASE      COLONOSCOPY      GASTROSTOMY TUBE PLACEMENT N/A 11/16/2022    PERCUTANEOUS ENDOSCOPIC GASTROSTOMY TUBE PLACEMENT performed by Emily Elena MD at [address] [facility], TOTAL ABDOMINAL ([address])      KNEE ARTHROPLASTY Bilateral     OR COLON CA SCRN NOT HI RSK IND N/A 6/22/2017    COLONOSCOPY performed by Cornel Martell MD at [address] [facility] ESOPHAGOGASTRODUODENOSCOPY TRANSORAL DIAGNOSTIC N/A 6/22/2017    EGD ESOPHAGOGASTRODUODENOSCOPY performed by Chad Rose MD at [address] Bilateral     SKIN BIOPSY Right 9/17/2020    EXCISION OF LEG MASS RIGHT (PAT ON ADMIT) performed by Valentin Lentz MD at [facility]         Current Outpatient Medications on File Prior to Visit   Medication Sig Dispense Refill    lidocaine 4 % external patch Place 2 patches onto the skin daily as needed (For low back pain. Patches can remain on for 12hr of each 24hr period) 2 each 0    metoprolol tartrate (LOPRESSOR) 100 MG tablet 1 tablet by PEG Tube route 2 times daily 60 tablet 3    potassium bicarbonate (K-LYTE) 25 MEQ disintegrating tablet 1 tablet by PEG Tube route three times a week 60 tablet 3    furosemide (LASIX) 20 MG tablet 1 tablet by PEG Tube route three times a week 60 tablet 3    nitroGLYCERIN (NITROSTAT) 0.4 MG SL tablet Place 1 tablet under the tongue every 5 minutes as needed for Chest pain up to max of 3 total doses. If no relief after 1 dose, call 911. 25 tablet 3    pravastatin (PRAVACHOL) 40 MG tablet TAKE ONE-HALF TABLET BY MOUTH DAILY 90 tablet 0    albuterol sulfate HFA (PROVENTIL;VENTOLIN;PROAIR) 108 (90 Base) MCG/ACT inhaler Inhale 2 puffs into the lungs 4 times daily as needed for Wheezing 3 each 1    sertraline (ZOLOFT) 50 MG tablet Take 1 tablet by mouth daily 30 tablet 5    gabapentin (NEURONTIN) 400 MG capsule TAKE ONE CAPSULE BY MOUTH THREE TIMES A DAY FOR 90 DAYS 90 capsule 0    Handicap Placard MISC by Does not apply route Exp 5 years 1 each 0    loratadine (CLARITIN) 10 MG tablet Take 1 tablet by mouth daily 30 tablet 0    aspirin 81 MG EC tablet Take 1 tablet by mouth daily 30 tablet 3    melatonin 1 MG tablet Take 1 tablet by mouth nightly as needed for Sleep 30 tablet 2     No current facility-administered medications on file prior to visit.          Family History   Problem Relation Age of Onset Arthritis Father     Other Father         gout    Heart Failure Father     Cancer Sister        Social History     Socioeconomic History    Marital status:      Spouse name: Marquis Palomino     Number of children: 3    Years of education: 12    Highest education level: 12th grade   Occupational History    Occupation: [employer] store    Tobacco Use    Smoking status: Former     Packs/day: 1.00     Years: 20.00     Pack years: 20.00     Types: Cigarettes     Quit date:      Years since quittin.0    Smokeless tobacco: Never   Vaping Use    Vaping Use: Never used   Substance and Sexual Activity    Alcohol use: No    Drug use: Never    Sexual activity: Not Currently     Partners: Male   Other Topics Concern    Not on file   Social History Narrative    Lives With: Spouse Ephraim    Type of Home: [address] in [address]: Two level    Home Access: Stairs to enter with rails - Number of Steps: 2- Rails: Right    Bathroom Shower/Tub: Tub/Shower unit    Bathroom Equipment: Shower chair, Hand-held shower    Home Equipment: Pura Ramon, rolling    ADL Assistance: Independent    Ambulation Assistance: Independent (with [employer])    Transfer Assistance: Independent    Active : No    Additional Comments: Pt inconsistent historian. PLOF and home set up verified by dtr. Pt's dtr stating that pt's dementia has been worsening over the past few years.                       Social Determinants of Health     Financial Resource Strain: Low Risk     Difficulty of Paying Living Expenses: Not hard at all   Food Insecurity: No Food Insecurity    Worried About Running Out of Food in the Last Year: Never true    Ran Out of Food in the Last Year: Never true   Transportation Needs: Not on file   Physical Activity: Insufficiently Active    Days of Exercise per Week: 7 days    Minutes of Exercise per Session: 10 min   Stress: Not on file   Social Connections: Not on file   Intimate Partner Violence: Not on file Housing Stability: Not on file         Lab Results   Component Value Date    LABA1C 5.9 11/13/2022     No results found for: EAG    Lab Results   Component Value Date/Time     11/29/2022 05:32 AM    K 4.3 11/29/2022 05:32 AM    K 4.1 11/26/2022 05:21 AM    CL 97 11/29/2022 05:32 AM    CO2 26 11/29/2022 05:32 AM    BUN 18 11/29/2022 05:32 AM    CREATININE 0.46 11/29/2022 05:32 AM    GLUCOSE 124 11/29/2022 05:32 AM    GLUCOSE 75 04/23/2012 11:12 AM    CALCIUM 9.2 11/29/2022 05:32 AM        Lab Results   Component Value Date    CHOL 172 11/13/2022    CHOL 180 07/05/2022    CHOL 196 01/18/2021     Lab Results   Component Value Date    TRIG 90 11/13/2022    TRIG 103 07/05/2022    TRIG 65 01/18/2021     Lab Results   Component Value Date    HDL 72 (H) 11/13/2022    HDL 69 (H) 07/05/2022    HDL 96 (H) 01/18/2021     Lab Results   Component Value Date    LDLCALC 82 11/13/2022    LDLCALC 90 07/05/2022    LDLCALC 87 01/18/2021     No results found for: LABVLDL, VLDL  Lab Results   Component Value Date    CHOLHDLRATIO 2.9 10/12/2012    CHOLHDLRATIO 3.1 01/23/2012       Lab Results   Component Value Date    TSH 4.230 (H) 10/10/2019       Lab Results   Component Value Date    WBC 7.5 12/02/2022    HGB 12.5 12/02/2022    HCT 39.8 12/02/2022    .3 (H) 12/02/2022     12/02/2022       Please note orders entered on site at facility after discussion with appropriate facility nursing/therapy/ / nutritional staff. Current longstanding medical problems and acute medical issues addressed with staff. Available data and data elements in on site paper chart reviewed and analyzed. Current external consultant notes reviewed in on site chart. Ordered laboratory testing and imaging will be reviewed when available.

## 2023-03-06 NOTE — PROGRESS NOTES
Subjective:      Patient Id: Seen Irina Flower at  [facility] , for initial palliative medicine consult. She was accompanied to the appointment by: Jennifer Jhaveri, . Nursing home visit necessary due to impaired mobility, debility, multiple comorbidities, and high burden of office visit. Chief Complaint   Patient presents with    Pain          HPI       Kyaw Garcia is a 76 y.o. female referred to palliative care  for symptom management, advance care planning, and goals of care conversationwhile hospitalized in November 2022. Irina Flower has complex medical history that includes anxiety, arthritis, COPD, CHF, RLS, HTB, HLD,, depression, morbid obesity, overactive bladder, osteoporosis, sleep apnea, TIA. Patient complains of pain. Patient follows with nursing home PCP. General: Upon entering the room I find the patient alert and oriented to person only. She believes she is at her sister in laws house. She does not know the date. She is calm, cooperative and in NAD. Functional status: Patient ahd a recent CVA in November 2022. Her right side is paralyzed. She is at a SNF working with physical therapy. She is a cipriano assist to get up to the chair. Per , the goal is to get patient stronger to get her home. Until then, her  is hoping to move her to a facility in [place] closer to home. COPD: She is on 3 L NC. She does not have it in her nose this visit. I checked oxygen and it was at 87%. I encouraged patient to put oxygen in her nose and it came up to 94%. She denies any SOB r  chest pain. She has not had any coughing or wheezing per . She does not follow with pulmonology. CHF: Per  she has not had any issues since she was hospitalized. Patient is on Lasix daily. She does not have any BLE edema. Legs are slightly elevated in her wheelchair. Pain: Patient has right foot pain, left leg, lower back and right arm pain. Pain is worse with activity and pressure.  She uses a Lidocaine patch on right arm that helps.  states the tramadol helps the pain. When patient is asked if pain medications help, she states she doesn't know. She reports a new complaint of pain in her left hip and leg even while sitting in the chair. Patient has a history of neuropathy and is on Gabapentin. Appetite: After stroke, patient had a peg tube that became displaced and had an abscess. Patient no longer has a peg tube. She is on a minced diet.  denies any weight loss. He states she is eating better than she was. Mood:  states some days she is \"perky\" and other days she is more tired. Patient is on Celexa 5 mg daily and Buspar. She was on Zoloft instead of Celexa until her last admission to [facility]. Sleep:No issues with sleeping. She was previously on Melatonin but has not needed that since being in the SNF. Skin: Patient denies any rashes or wounds. GI/: Patient has brief on due to incontinence of bowels and bladder. Per nurse, patient does not have constipation or diarrhea. I have personally reviewed the patient's most recent lab work and imaging.       Past Medical History:   Diagnosis Date    Anxiety     Arthritis     COPD (chronic obstructive pulmonary disease) ([facility])     Generalized osteoarthrosis, unspecified site 12/27/2002    Hyperlipidemia     Hypertension     Major depressive disorder, single episode, moderate ([address] 12/27/2002    Morbid obesity ([address] 12/27/2002    Non-pressure chronic ulcer of calf with fat layer exposed ([address] 10/09/2020    Non-pressure chronic ulcer of calf, right, with fat layer exposed ([address] 10/02/2020    OAB (overactive bladder)     Osteoporosis     Sleep apnea 11/15/2022    TIA (transient ischemic attack)      Past Surgical History:   Procedure Laterality Date    CARDIAC CATHETERIZATION      CARPAL TUNNEL RELEASE      COLONOSCOPY      GASTROSTOMY TUBE PLACEMENT N/A 11/16/2022    PERCUTANEOUS ENDOSCOPIC GASTROSTOMY TUBE PLACEMENT performed by Hollie Ball MD at [address], TOTAL ABDOMINAL (CERVIX REMOVED)      KNEE ARTHROPLASTY Bilateral     [place] COLON CA SCRN NOT  [address] IND N/A 2017    COLONOSCOPY performed by Azalia Halsted, MD at [address] TRANSORAL DIAGNOSTIC N/A 2017    EGD ESOPHAGOGASTRODUODENOSCOPY performed by Azalia Halsted, MD at 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    Tobacco Use    Smoking status: Former     Packs/day: 1.00     Years: 20.00     Pack years: 20.00     Types: Cigarettes     Quit date:      Years since quittin.2    Smokeless tobacco: Never   Vaping Use    Vaping Use: Never used   Substance and Sexual Activity    Alcohol use: No    Drug use: Never    Sexual activity: Not Currently     Partners: Male   Other Topics Concern    Not on file   Social History Narrative    Lives With: Spouse Ephraim    Type of Home: [address] in [address]: Two level    Home Access: Stairs to enter with rails - Number of Steps: 2- Rails: Right    Bathroom Shower/Tub: Tub/Shower unit    Bathroom Equipment: Shower chair, Hand-held shower    Home Equipment: Sisto Sanches, rolling    ADL Assistance: Independent    Ambulation Assistance: Independent (with [employer])    Transfer Assistance: Independent    Active : No    Additional Comments: Pt inconsistent historian. PLOF and home set up verified by dtr. Pt's dtr stating that pt's dementia has been worsening over the past few years.                       Social Determinants of Health     Financial Resource Strain: Low Risk Difficulty of Paying Living Expenses: Not hard at all   Food Insecurity: No Food Insecurity    Worried About Running Out of Food in the Last Year: Never true    Ran Out of Food in the Last Year: Never true   Transportation Needs: Not on file   Physical Activity: Insufficiently Active    Days of Exercise per Week: 7 days    Minutes of Exercise per Session: 10 min   Stress: Not on file   Social Connections: Not on file   Intimate Partner Violence: Not on file   Housing Stability: Not on file     Family History   Problem Relation Age of Onset    Arthritis Father     Other Father         gout    Heart Failure Father     Cancer Sister      Allergies   Allergen Reactions    Codeine      Nausea, lightheadedness, dizzy     Current Outpatient Medications on File Prior to Visit   Medication Sig Dispense Refill    busPIRone (BUSPAR) 5 MG tablet Take 5 mg by mouth 2 times daily      citalopram (CELEXA) 10 MG tablet Take 5 mg by mouth daily      ipratropium-albuterol (DUONEB) 0.5-2.5 (3) MG/3ML SOLN nebulizer solution Inhale 1 vial into the lungs every 4 hours as needed for Shortness of Breath      Lactobacillus (PROBIOTIC ACIDOPHILUS) CAPS Take 1 capsule by mouth in the morning and at bedtime      cholestyramine (QUESTRAN) 4 g packet Take 1 packet by mouth daily      traMADol (ULTRAM) 50 MG tablet Take 50 mg by mouth 3 times daily as needed for Pain. acetaminophen (TYLENOL) 500 MG tablet Take by mouth every 8 hours as needed for Pain      lidocaine 4 % external patch Place 2 patches onto the skin daily as needed (For low back pain.   Patches can remain on for 12hr of each 24hr period) (Patient taking differently: Place 1 patch onto the skin daily as needed (right arm)) 2 each 0    metoprolol tartrate (LOPRESSOR) 100 MG tablet 1 tablet by PEG Tube route 2 times daily (Patient taking differently: 75 mg by PEG Tube route 2 times daily) 60 tablet 3    potassium bicarbonate (K-LYTE) 25 MEQ disintegrating tablet 1 tablet by PEG Tube route three times a week 60 tablet 3    furosemide (LASIX) 20 MG tablet 1 tablet by PEG Tube route three times a week (Patient taking differently: 40 mg by PEG Tube route daily) 60 tablet 3    pravastatin (PRAVACHOL) 40 MG tablet TAKE ONE-HALF TABLET BY MOUTH DAILY (Patient taking differently: Take 20 mg by mouth daily) 90 tablet 0    albuterol sulfate HFA (PROVENTIL;VENTOLIN;PROAIR) 108 (90 Base) MCG/ACT inhaler Inhale 2 puffs into the lungs 4 times daily as needed for Wheezing 3 each 1    gabapentin (NEURONTIN) 400 MG capsule TAKE ONE CAPSULE BY MOUTH THREE TIMES A DAY FOR 90 DAYS 90 capsule 0    aspirin 81 MG EC tablet Take 1 tablet by mouth daily 30 tablet 3    nitroGLYCERIN (NITROSTAT) 0.4 MG SL tablet Place 1 tablet under the tongue every 5 minutes as needed for Chest pain up to max of 3 total doses. If no relief after 1 dose, call 911. (Patient not taking: Reported on 3/7/2023) 25 tablet 3    sertraline (ZOLOFT) 50 MG tablet Take 1 tablet by mouth daily (Patient not taking: Reported on 3/7/2023) 30 tablet 5    Handicap Placard MISC by Does not apply route Exp 5 years 1 each 0    loratadine (CLARITIN) 10 MG tablet Take 1 tablet by mouth daily (Patient not taking: Reported on 3/7/2023) 30 tablet 0    melatonin 1 MG tablet Take 1 tablet by mouth nightly as needed for Sleep (Patient not taking: Reported on 3/7/2023) 30 tablet 2     No current facility-administered medications on file prior to visit. Review of Systems   Unable to perform ROS: Dementia   Constitutional:  Positive for activity change, appetite change and fatigue. Negative for unexpected weight change. HENT:  Positive for trouble swallowing. Negative for congestion, sinus pressure, sinus pain and voice change. Eyes:  Positive for visual disturbance (Glasses). Respiratory:  Negative for cough and shortness of breath. Gastrointestinal:  Negative for constipation, diarrhea, nausea and vomiting.    Genitourinary:  Negative for difficulty urinating.   Musculoskeletal:  Positive for arthralgias, back pain, joint swelling and myalgias.   Skin:  Negative for rash and wound.   Allergic/Immunologic: Negative for food allergies.   Neurological:  Positive for weakness.   Psychiatric/Behavioral:  Positive for dysphoric mood. Negative for suicidal ideas. The patient is nervous/anxious.          Objective:   BP (!) 102/57   Pulse 66   Temp 98 °F (36.7 °C)   LMP  (LMP Unknown)   SpO2 94%    Wt Readings from Last 3 Encounters:   11/29/22 156 lb (70.8 kg)   11/12/22 160 lb (72.6 kg)   11/12/22 158 lb 4 oz (71.8 kg)     Physical Exam  Constitutional:       General: She is not in acute distress.     Appearance: Normal appearance. She is well-developed. She is not ill-appearing.   HENT:      Head: Normocephalic and atraumatic.      Right Ear: External ear normal. Decreased hearing noted.      Left Ear: External ear normal. Decreased hearing noted.      Nose: Nose normal. No nasal deformity, laceration or congestion.      Mouth/Throat:      Lips: Pink.      Mouth: Mucous membranes are moist.   Eyes:      General: Lids are normal. Vision grossly intact. Gaze aligned appropriately.      Extraocular Movements: Extraocular movements intact.      Pupils: Pupils are equal, round, and reactive to light.   Cardiovascular:      Rate and Rhythm: Normal rate and regular rhythm.      Pulses:           Dorsalis pedis pulses are 1+ on the right side and 1+ on the left side.      Heart sounds: Normal heart sounds.   Pulmonary:      Effort: Pulmonary effort is normal. No respiratory distress.      Breath sounds: Normal breath sounds. No wheezing.   Abdominal:      General: Abdomen is flat. Bowel sounds are normal.      Palpations: Abdomen is soft.      Tenderness: There is generalized abdominal tenderness. There is no guarding.   Musculoskeletal:      Cervical back: Neck supple. No edema. Normal range of motion.      Right lower leg: No edema.      Left lower leg: No  edema.   Skin:     General: Skin is warm and dry. Findings: No bruising, lesion, rash or wound. Neurological:      General: No focal deficit present. Mental Status: She is alert. Mental status is at baseline. She is confused. Motor: Weakness present. No tremor. Comments: Right side is flaccid   Psychiatric:         Attention and Perception: She is inattentive. Mood and Affect: Mood is depressed. Affect is flat. Speech: Speech is delayed. Behavior: Behavior normal. Behavior is cooperative. Cognition and Memory: Cognition is impaired. Memory is impaired. Assessment and Plan:      1. Left hip pain  2. Right arm pain  3. Neuropathy  - lidocaine 4 % external patch; Place 1 patch onto the skin daily  Dispense: 30 each; Refill: 0 Place on left lower extremity 12 hours on and 12 hours off  -Continue Tramadol  -Continue Gabapentin    4. Cerebrovascular accident (CVA) due to stenosis of left middle cerebral artery ([facility]  5. Impaired mobility and activities of daily living dt CVA  6. 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 Acute diastolic heart failure (HCC)  - Stable and managed with Lasix daily  - Monitor weight daily, call if you gain 3 lbs in one day or 5 lbs. in one week or for increased edema, sob, cough, orthopnea, or chest pain. Elevate BLE's. Wear compression socks during the day and take off at night. Keep salt intake under 2 gms daily. 7. Other emphysema ([address]  8. Hypoxia  - Stable on 3 L NC   - Encouraged patient to wear her oxygen continuously  - Continue COPD directed therapies. Call for increased SOB, cough, sputum production, excessive fatigue, fever, chills, or myalgias. Activity as tolerated.  DO NOT SMOKE WITH HOME OXYGEN as it can cause an explosion. Rinse out mouth after inhaler use. 9. Major depressive disorder, single episode, moderate ([address]  - Patient on Celexa and Buspar  - Will continue to monitor, may change back to Zoloft    10. Palliative care encounter  Explained the role of palliative care in treating patient. Discussed symptom management related to chronic disease/condition. Provided emotional support and active listening. Patient   understands and is agreeable to current plan. 11. Advance Care Planning   We discussed Living Will (LW), and [facility]) as well as their activation. Patient choice discussed. LW/HCPOA in place Yes; Tasked  for assistance with completing paperwork No; Code status discussed Yes; signed No. Code DNR/CCA. All related questions were answered completely. 12. Goals of Care Discussion:  Disease process and goals of treatment were discussed in basic terms. Fide's goal is to optimize available comfort care measures and aggressive treatment until arrest. We discussed the palliative care philosophy in light of those goals. We discussed all care options contingent on treatment response and QOL. Much active listening, presence, and emotional support were given. Discussed with patient/surrogate: POC, Treatment risks/benefits, and alternatives. All questions were answered. Additionally, a printed copy of the CDC medications/opioid safety informational sheet was reviewed and given to patient for reference. Opioid contract signed: No    Due to acuity, symptomatology and high-risk medication management, I advised patient to Return in about 5 weeks (around 4/14/2023). Thanks for the opportunity you have allowed us to provide palliative care to Alysia. We will be in touch as care progresses. Please feel free to reach out to us should you have any questions or requests.     Total Time 55 mins          Total time includes reviewing labs and tests, reviewing history, medications, and allergies, counseling patient, performing medically appropriate evaluation and examination, ordering medications, and documenting in the medical record. YULY Kam - CNP    Collaborating physician: Dr. Charlie Stark     Please note this report is partially produced by using speech recognition hardware. It may contain errors related to the system, including grammar, punctuation and spelling as well as words and phrases that may seem inaccurate. For any questions or concerns feel free to contact me for clarification.

## 2023-03-07 ENCOUNTER — OFFICE VISIT (OUTPATIENT)
Dept: PALLATIVE CARE | Age: 78
End: 2023-03-07
Payer: MEDICARE

## 2023-03-07 VITALS
TEMPERATURE: 98 F | HEART RATE: 66 BPM | SYSTOLIC BLOOD PRESSURE: 102 MMHG | OXYGEN SATURATION: 94 % | DIASTOLIC BLOOD PRESSURE: 57 MMHG

## 2023-03-07 DIAGNOSIS — J43.8 OTHER EMPHYSEMA (HCC): ICD-10-CM

## 2023-03-07 DIAGNOSIS — Z71.89 GOALS OF CARE, COUNSELING/DISCUSSION: ICD-10-CM

## 2023-03-07 DIAGNOSIS — M25.552 LEFT HIP PAIN: Primary | ICD-10-CM

## 2023-03-07 DIAGNOSIS — I50.31 ACUTE DIASTOLIC HEART FAILURE (HCC): ICD-10-CM

## 2023-03-07 DIAGNOSIS — Z78.9 IMPAIRED MOBILITY AND ACTIVITIES OF DAILY LIVING: ICD-10-CM

## 2023-03-07 DIAGNOSIS — I69.351 FLACCID HEMIPLEGIA OF RIGHT DOMINANT SIDE AS LATE EFFECT OF CEREBRAL INFARCTION (HCC): ICD-10-CM

## 2023-03-07 DIAGNOSIS — Z51.5 PALLIATIVE CARE ENCOUNTER: ICD-10-CM

## 2023-03-07 DIAGNOSIS — M79.601 RIGHT ARM PAIN: ICD-10-CM

## 2023-03-07 DIAGNOSIS — I63.512 CEREBROVASCULAR ACCIDENT (CVA) DUE TO STENOSIS OF LEFT MIDDLE CEREBRAL ARTERY (HCC): ICD-10-CM

## 2023-03-07 DIAGNOSIS — R09.02 HYPOXIA: ICD-10-CM

## 2023-03-07 DIAGNOSIS — Z74.09 IMPAIRED MOBILITY AND ACTIVITIES OF DAILY LIVING: ICD-10-CM

## 2023-03-07 DIAGNOSIS — F32.1 MAJOR DEPRESSIVE DISORDER, SINGLE EPISODE, MODERATE (HCC): ICD-10-CM

## 2023-03-07 DIAGNOSIS — G62.9 NEUROPATHY: ICD-10-CM

## 2023-03-07 DIAGNOSIS — Z71.89 ADVANCED CARE PLANNING/COUNSELING DISCUSSION: ICD-10-CM

## 2023-03-07 PROCEDURE — 1123F ACP DISCUSS/DSCN MKR DOCD: CPT | Performed by: NURSE PRACTITIONER

## 2023-03-07 PROCEDURE — 99310 SBSQ NF CARE HIGH MDM 45: CPT | Performed by: NURSE PRACTITIONER

## 2023-03-07 RX ORDER — TRAMADOL HYDROCHLORIDE 50 MG/1
50 TABLET ORAL 3 TIMES DAILY PRN
COMMUNITY

## 2023-03-07 RX ORDER — CHOLESTYRAMINE 4 G/9G
1 POWDER, FOR SUSPENSION ORAL DAILY
COMMUNITY

## 2023-03-07 RX ORDER — BUSPIRONE HYDROCHLORIDE 5 MG/1
5 TABLET ORAL 2 TIMES DAILY
COMMUNITY

## 2023-03-07 RX ORDER — ACETAMINOPHEN 500 MG
TABLET ORAL EVERY 8 HOURS PRN
COMMUNITY

## 2023-03-07 RX ORDER — CHOLECALCIFEROL (VITAMIN D3) 125 MCG
1 CAPSULE ORAL 2 TIMES DAILY
COMMUNITY

## 2023-03-07 RX ORDER — IPRATROPIUM BROMIDE AND ALBUTEROL SULFATE 2.5; .5 MG/3ML; MG/3ML
1 SOLUTION RESPIRATORY (INHALATION) EVERY 4 HOURS PRN
COMMUNITY

## 2023-03-07 RX ORDER — CITALOPRAM 10 MG/1
5 TABLET ORAL DAILY
COMMUNITY

## 2023-03-07 RX ORDER — LIDOCAINE 4 G/G
1 PATCH TOPICAL DAILY
Qty: 30 EACH | Refills: 0 | COMMUNITY
Start: 2023-03-07 | End: 2023-04-06

## 2023-03-07 ASSESSMENT — VISUAL ACUITY: OU: 1

## 2023-03-07 ASSESSMENT — ENCOUNTER SYMPTOMS
COUGH: 0
SINUS PRESSURE: 0
DIARRHEA: 0
SHORTNESS OF BREATH: 0
NAUSEA: 0
CONSTIPATION: 0
TROUBLE SWALLOWING: 1
SINUS PAIN: 0
VOICE CHANGE: 0
VOMITING: 0
BACK PAIN: 1

## 2023-04-25 LAB
BASOPHILS # BLD: 0.1 K/UL (ref 0–0.2)
BASOPHILS NFR BLD: 0.9 %
EOSINOPHIL # BLD: 0.5 K/UL (ref 0–0.7)
EOSINOPHIL NFR BLD: 4.1 %
ERYTHROCYTE [DISTWIDTH] IN BLOOD BY AUTOMATED COUNT: 16.9 % (ref 11.5–14.5)
HCT VFR BLD AUTO: 42.6 % (ref 37–47)
HGB BLD-MCNC: 13.8 G/DL (ref 12–16)
LYMPHOCYTES # BLD: 3 K/UL (ref 1–4.8)
LYMPHOCYTES NFR BLD: 26.4 %
MCH RBC QN AUTO: 30.2 PG (ref 27–31.3)
MCHC RBC AUTO-ENTMCNC: 32.4 % (ref 33–37)
MCV RBC AUTO: 93 FL (ref 79.4–94.8)
MONOCYTES # BLD: 0.8 K/UL (ref 0.2–0.8)
MONOCYTES NFR BLD: 6.9 %
NEUTROPHILS # BLD: 7 K/UL (ref 1.4–6.5)
NEUTS SEG NFR BLD: 61.7 %
PLATELET # BLD AUTO: 408 K/UL (ref 130–400)
RBC # BLD AUTO: 4.59 M/UL (ref 4.2–5.4)
WBC # BLD AUTO: 11.3 K/UL (ref 4.8–10.8)

## 2023-05-16 ASSESSMENT — VISUAL ACUITY: OU: 1

## 2023-05-16 NOTE — PROGRESS NOTES
An Urgent  Referral on 10/01/2021 and 10/11/2021 by Dr Gannon.    Patient  Currently sees [facility]. Today, she asked for patient to be placed on the Wait List.    Patient has been on the Wait List with a start date of 10/14/2021 due to the Urgent 10/11/2021 Referral by Dr Gannon.    Please review and if patient meets urgent status to schedule prior to the wait list.  Thank you!  
Can be on wait list.   
Noted.   
times daily.) 90 capsule 0    aspirin 81 MG EC tablet Take 1 tablet by mouth daily 30 tablet 3    Handicap Placard MISC by Does not apply route Exp 5 years 1 each 0     No current facility-administered medications on file prior to visit. Review of Systems   Unable to perform ROS: Dementia   Respiratory:  Negative for shortness of breath. Cardiovascular:  Negative for chest pain. Objective:   /66   Pulse 51   Temp 97.9 °F (36.6 °C)   LMP  (LMP Unknown)   SpO2 97%    Wt Readings from Last 3 Encounters:   11/29/22 156 lb (70.8 kg)   11/12/22 160 lb (72.6 kg)   11/12/22 158 lb 4 oz (71.8 kg)     Physical Exam  Constitutional:       General: She is not in acute distress. Appearance: Normal appearance. She is well-developed. She is not ill-appearing. Interventions: Nasal cannula in place. HENT:      Head: Normocephalic and atraumatic. Right Ear: External ear normal. Decreased hearing noted. Left Ear: External ear normal. Decreased hearing noted. Nose: Nose normal. No nasal deformity, laceration or congestion. Mouth/Throat:      Lips: Pink. Mouth: Mucous membranes are moist.   Eyes:      General: Lids are normal. Vision grossly intact. Gaze aligned appropriately. Extraocular Movements: Extraocular movements intact. Pupils: Pupils are equal, round, and reactive to light. Cardiovascular:      Rate and Rhythm: Normal rate and regular rhythm. Pulses:           Dorsalis pedis pulses are 1+ on the right side and 1+ on the left side. Heart sounds: Normal heart sounds. Pulmonary:      Effort: Pulmonary effort is normal. No respiratory distress. Breath sounds: Decreased breath sounds present. No wheezing, rhonchi or rales. Abdominal:      General: Abdomen is flat. Bowel sounds are normal.      Palpations: Abdomen is soft. Tenderness: There is no abdominal tenderness. There is no guarding.    Musculoskeletal:      Cervical back: Neck

## 2023-05-17 ENCOUNTER — OFFICE VISIT (OUTPATIENT)
Dept: PALLATIVE CARE | Age: 78
End: 2023-05-17
Payer: MEDICARE

## 2023-05-17 VITALS
OXYGEN SATURATION: 97 % | TEMPERATURE: 97.9 F | HEART RATE: 51 BPM | DIASTOLIC BLOOD PRESSURE: 66 MMHG | SYSTOLIC BLOOD PRESSURE: 114 MMHG

## 2023-05-17 DIAGNOSIS — I69.351 FLACCID HEMIPLEGIA OF RIGHT DOMINANT SIDE AS LATE EFFECT OF CEREBRAL INFARCTION (HCC): ICD-10-CM

## 2023-05-17 DIAGNOSIS — I50.9 CONGESTIVE HEART FAILURE, UNSPECIFIED HF CHRONICITY, UNSPECIFIED HEART FAILURE TYPE (HCC): ICD-10-CM

## 2023-05-17 DIAGNOSIS — I63.512 CEREBROVASCULAR ACCIDENT (CVA) DUE TO STENOSIS OF LEFT MIDDLE CEREBRAL ARTERY (HCC): ICD-10-CM

## 2023-05-17 DIAGNOSIS — Z74.09 IMPAIRED MOBILITY AND ACTIVITIES OF DAILY LIVING: ICD-10-CM

## 2023-05-17 DIAGNOSIS — F33.9 EPISODE OF RECURRENT MAJOR DEPRESSIVE DISORDER, UNSPECIFIED DEPRESSION EPISODE SEVERITY (HCC): ICD-10-CM

## 2023-05-17 DIAGNOSIS — Z51.5 PALLIATIVE CARE ENCOUNTER: ICD-10-CM

## 2023-05-17 DIAGNOSIS — Z78.9 IMPAIRED MOBILITY AND ACTIVITIES OF DAILY LIVING: ICD-10-CM

## 2023-05-17 DIAGNOSIS — G89.29 OTHER CHRONIC PAIN: Primary | ICD-10-CM

## 2023-05-17 PROCEDURE — 99309 SBSQ NF CARE MODERATE MDM 30: CPT | Performed by: NURSE PRACTITIONER

## 2023-05-17 PROCEDURE — 1123F ACP DISCUSS/DSCN MKR DOCD: CPT | Performed by: NURSE PRACTITIONER

## 2023-05-17 RX ORDER — ATORVASTATIN CALCIUM 10 MG/1
10 TABLET, FILM COATED ORAL DAILY
COMMUNITY

## 2023-05-17 ASSESSMENT — ENCOUNTER SYMPTOMS: SHORTNESS OF BREATH: 0

## 2023-07-25 ENCOUNTER — PATIENT MESSAGE (OUTPATIENT)
Dept: PALLATIVE CARE | Age: 78
End: 2023-07-25

## 2023-07-26 ENCOUNTER — OFFICE VISIT (OUTPATIENT)
Dept: PALLATIVE CARE | Age: 78
End: 2023-07-26
Payer: MEDICARE

## 2023-07-26 VITALS — SYSTOLIC BLOOD PRESSURE: 114 MMHG | HEART RATE: 50 BPM | OXYGEN SATURATION: 89 % | DIASTOLIC BLOOD PRESSURE: 65 MMHG

## 2023-07-26 DIAGNOSIS — F33.9 EPISODE OF RECURRENT MAJOR DEPRESSIVE DISORDER, UNSPECIFIED DEPRESSION EPISODE SEVERITY (HCC): Primary | ICD-10-CM

## 2023-07-26 DIAGNOSIS — F41.9 ANXIETY: ICD-10-CM

## 2023-07-26 PROCEDURE — 1123F ACP DISCUSS/DSCN MKR DOCD: CPT | Performed by: NURSE PRACTITIONER

## 2023-07-26 PROCEDURE — 99309 SBSQ NF CARE MODERATE MDM 30: CPT | Performed by: NURSE PRACTITIONER

## 2023-07-26 RX ORDER — HYDROXYZINE PAMOATE 25 MG/1
25 CAPSULE ORAL DAILY PRN
COMMUNITY

## 2023-07-26 RX ORDER — BUSPIRONE HYDROCHLORIDE 10 MG/1
10 TABLET ORAL 2 TIMES DAILY
Qty: 60 TABLET | Refills: 1 | Status: SHIPPED | OUTPATIENT
Start: 2023-07-26

## 2023-07-26 RX ORDER — CITALOPRAM HYDROBROMIDE 10 MG/1
20 TABLET ORAL DAILY
Qty: 30 TABLET | Refills: 1 | Status: SHIPPED | OUTPATIENT
Start: 2023-07-26

## 2023-07-26 ASSESSMENT — VISUAL ACUITY: OU: 1

## 2023-07-26 ASSESSMENT — ENCOUNTER SYMPTOMS
COUGH: 1
SHORTNESS OF BREATH: 0
ABDOMINAL PAIN: 0

## 2023-07-26 NOTE — TELEPHONE ENCOUNTER
From: Munira Mock  To: Leona Damien, APRN - CNP  Sent: 7/25/2023 5:34 PM EDT  Subject: Kelly Rack- areas of concern    Leny Camacho am Simin, one of Fide's daughters and wanted to share some info that my parents might not mention. Emotionally, Mom's mood seems sadder and her roommate has shared that she has been displaying nightime fear and anxiety. We have let nursing home staff know and she has been prescribed Vistaril. We have got her a brenda bear that plays her favorite when she squeezes its paw, we are working to help her remember how to operate it. Please let us know if you have any suggestions, wondering if a weighted blanket is safe given her health issues. Physically, mom experiences frequent right leg pain. She becomes very uncomfortable and agitated after dinner when she has been in the wheelchair all day and has to use the bathroom. Working with staff and mom to get her in bed and on bedpan earlier remains a challenge. Finally we frequently arrive to find her slumped down in her wheelchair, leaning to the right with her right arm pinned between her body and the wheelchair, her right leg rotating out and her left leg wedged between the foot rests and hanging unsupported. It looks very uncomfortable but she seems unaware of it. Is there anything that can or should be done to improve/maintain better positioning?     Thanks for your help, my family and I appreciate it,  Ok Knapp

## 2023-07-26 NOTE — PROGRESS NOTES
Subjective:      Patient Id: Mathis Blizzard at  [facility] , for initial palliative medicine consult. She was accompanied to the appointment by: Sona Chandler, . Nursing home visit necessary due to impaired mobility, debility, multiple comorbidities, and high burden of office visit. Chief Complaint   Patient presents with    Anxiety     Per family patient depressed and anxious at night          HPI    Sandra Farrar is a 76 y.o. female referred to palliative care  for symptom management, advance care planning, and goals of care conversationwhile hospitalized in November 2022. Lainey Mendoza has complex medical history that includes anxiety, arthritis, COPD, CHF, RLS, HTN, HLD, depression, morbid obesity, overactive bladder, osteoporosis, sleep apnea, TIA. Patient reports having anxiety when her  leaves. Patient follows with nursing home PCP. General: Upon entering the room I find the patient alert and oriented to person and place. he is calm, cooperative and in NAD. Functional status: Patient had a CVA in November 2022. Her right side is paralyzed and is contracted. She is bed/ wheelchair bound. She is transferred using a lift. Patient having issues with positioning in her wheelchair. Family has concerns with her being up in wheelchair all day. Per staff, patient moves herself down in the chair. She does have multiple pillows and brace to help her positioning. COPD/ CHF: She denies any SOB or chest pain. She has not had any coughing or wheezing per . She has not needed her oxygen lately. Lungs are clear. She takes lasix daily and denies having any BLE edema. Her spo2 was 89% during visit. Per nurse, patient is always around 93-94%. Her fingers were slightly cold. Pain: Patient denies any pain at this time. She has Tylenol and Tramadol PRN. She also has lidocaine patches on her leg and right shoulder. Her pain occurs when her right arm or leg is moved.      Appetite: Patient is on a pureed diet

## 2023-08-03 ENCOUNTER — PATIENT MESSAGE (OUTPATIENT)
Dept: PALLATIVE CARE | Age: 78
End: 2023-08-03

## 2023-08-04 NOTE — TELEPHONE ENCOUNTER
I called and spoke to the nurse for [facility] M[facility]za and gave order to decrease Celxa back to 10 mg daily and Buspar 5 mg BID.

## 2023-08-04 NOTE — TELEPHONE ENCOUNTER
From: Leroy Mock  To: YULY Patrick - CNP  Sent: 8/3/2023 10:09 PM EDT  Subject: Earnstine Ebbing- side effects from recent medication changes    Hi Oscar Mary Jo,    Unfortunately Mom has been extremely drowsy since the increase of her Celexa and Buspar to help with depression and anxiety. It is interfering with her ability to work with physical therapist and to eat enough to maintain her weight.      Thanks,  Leslie

## 2023-08-24 ENCOUNTER — OFFICE VISIT (OUTPATIENT)
Dept: PALLATIVE CARE | Age: 78
End: 2023-08-24

## 2023-08-24 VITALS
OXYGEN SATURATION: 93 % | HEART RATE: 74 BPM | SYSTOLIC BLOOD PRESSURE: 118 MMHG | DIASTOLIC BLOOD PRESSURE: 65 MMHG | TEMPERATURE: 97 F

## 2023-08-24 DIAGNOSIS — F41.9 ANXIETY: Primary | ICD-10-CM

## 2023-08-24 DIAGNOSIS — F32.1 MAJOR DEPRESSIVE DISORDER, SINGLE EPISODE, MODERATE (HCC): ICD-10-CM

## 2023-08-24 DIAGNOSIS — Z51.5 PALLIATIVE CARE ENCOUNTER: ICD-10-CM

## 2023-08-24 RX ORDER — ONDANSETRON HYDROCHLORIDE 8 MG/1
4 TABLET, FILM COATED ORAL EVERY 8 HOURS PRN
COMMUNITY

## 2023-08-24 ASSESSMENT — ENCOUNTER SYMPTOMS
ABDOMINAL PAIN: 0
SHORTNESS OF BREATH: 0
COUGH: 0

## 2023-08-24 ASSESSMENT — VISUAL ACUITY: OU: 1

## 2023-08-24 NOTE — PROGRESS NOTES
Musculoskeletal:      Cervical back: Neck supple. No edema. Normal range of motion. Right lower leg: No edema. Left lower leg: No edema. Skin:     General: Skin is warm and dry. Findings: No bruising, lesion, rash or wound. Neurological:      Mental Status: She is alert. Mental status is at baseline. She is confused. Motor: Weakness present. No tremor. Comments: Right side is flaccid   Psychiatric:         Attention and Perception: Attention normal. She is attentive. Mood and Affect: Mood is not depressed. Affect is flat. Speech: Speech is delayed. Behavior: Behavior normal. Behavior is cooperative. Cognition and Memory: Cognition is impaired. Memory is impaired. Assessment and Plan:       1. Major depressive disorder, recurrent  2. Anxiety  Patient too lethargic on Buspar 5 BID and Celexa 20 mg daily. Continue Buspar 10 mg daily and Celexa 10 mg   Patient appears in good spirits this visit. Patient to have psych consult. 3. Palliative care encounter  Explained the role of palliative care in treating patient. Discussed symptom management related to chronic disease/condition. Provided emotional support and active listening. Patient   understands and is agreeable to current plan. Due to acuity, symptomatology and high-risk medication management, I advised patient to Return in about 6 weeks (around 10/5/2023). Thanks for the opportunity you have allowed us to provide palliative care to [place]. We will be in touch as care progresses. Please feel free to reach out to us should you have any questions or requests. Total Time 40 mins          Total time includes reviewing labs and tests, reviewing history, medications, and allergies, counseling patient, performing medically appropriate evaluation and examination, ordering medications, and documenting in the medical record.      YULY Alex - CNP    Collaborating physician:

## 2023-10-06 ENCOUNTER — OFFICE VISIT (OUTPATIENT)
Dept: PALLATIVE CARE | Age: 78
End: 2023-10-06

## 2023-10-06 VITALS
DIASTOLIC BLOOD PRESSURE: 72 MMHG | HEART RATE: 58 BPM | TEMPERATURE: 98.2 F | SYSTOLIC BLOOD PRESSURE: 119 MMHG | OXYGEN SATURATION: 94 %

## 2023-10-06 DIAGNOSIS — F41.9 ANXIETY: ICD-10-CM

## 2023-10-06 DIAGNOSIS — F32.1 MAJOR DEPRESSIVE DISORDER, SINGLE EPISODE, MODERATE (HCC): Primary | ICD-10-CM

## 2023-10-06 ASSESSMENT — ENCOUNTER SYMPTOMS
SHORTNESS OF BREATH: 0
ABDOMINAL PAIN: 0
COUGH: 0

## 2023-10-06 ASSESSMENT — VISUAL ACUITY: OU: 1

## 2023-11-20 ENCOUNTER — OFFICE VISIT (OUTPATIENT)
Dept: PALLATIVE CARE | Age: 78
End: 2023-11-20
Payer: MEDICARE

## 2023-11-20 VITALS
TEMPERATURE: 98.1 F | DIASTOLIC BLOOD PRESSURE: 80 MMHG | OXYGEN SATURATION: 93 % | HEART RATE: 56 BPM | SYSTOLIC BLOOD PRESSURE: 129 MMHG

## 2023-11-20 DIAGNOSIS — F32.1 MAJOR DEPRESSIVE DISORDER, SINGLE EPISODE, MODERATE (HCC): Primary | ICD-10-CM

## 2023-11-20 DIAGNOSIS — F41.9 ANXIETY: ICD-10-CM

## 2023-11-20 DIAGNOSIS — R13.10 DYSPHAGIA, UNSPECIFIED TYPE: ICD-10-CM

## 2023-11-20 DIAGNOSIS — Z51.5 PALLIATIVE CARE ENCOUNTER: ICD-10-CM

## 2023-11-20 PROCEDURE — 1123F ACP DISCUSS/DSCN MKR DOCD: CPT | Performed by: NURSE PRACTITIONER

## 2023-11-20 PROCEDURE — 99309 SBSQ NF CARE MODERATE MDM 30: CPT | Performed by: NURSE PRACTITIONER

## 2023-11-20 PROCEDURE — G8484 FLU IMMUNIZE NO ADMIN: HCPCS | Performed by: NURSE PRACTITIONER

## 2023-11-20 RX ORDER — HYDROXYZINE PAMOATE 25 MG/1
25 CAPSULE ORAL
COMMUNITY

## 2023-11-20 ASSESSMENT — ENCOUNTER SYMPTOMS
SHORTNESS OF BREATH: 0
ABDOMINAL PAIN: 0
COUGH: 0

## 2023-11-20 ASSESSMENT — VISUAL ACUITY: OU: 1

## 2023-11-20 NOTE — PROGRESS NOTES
Two level    Home Access: Stairs to enter with rails - Number of Steps: 2- Rails: Right    Bathroom Shower/Tub: Tub/Shower unit    Bathroom Equipment: Shower chair, Hand-held shower    Home Equipment: carli Salas    ADL Assistance: Independent    Ambulation Assistance: Independent (with [employer])    Transfer Assistance: Independent    Active : No    Additional Comments: Pt inconsistent historian. PLOF and home set up verified by dtr. Pt's dtr stating that pt's dementia has been worsening over the past few years. Social Determinants of Health     Financial Resource Strain: Low Risk  (7/1/2022)    Overall Financial Resource Strain (CARDIA)     Difficulty of Paying Living Expenses: Not hard at all   Food Insecurity: No Food Insecurity (7/1/2022)    Hunger Vital Sign     Worried About Running Out of Food in the Last Year: Never true     Ran Out of Food in the Last Year: Never true   Transportation Needs: No Transportation Needs (11/6/2019)    PRAPARE - Transportation     Lack of Transportation (Medical): No     Lack of Transportation (Non-Medical): No   Physical Activity: Insufficiently Active (7/1/2022)    Exercise Vital Sign     Days of Exercise per Week: 7 days     Minutes of Exercise per Session: 10 min   Stress: Stress Concern Present (11/6/2019)    [facility]     Feeling of Stress : To some extent   Social Connections:  Moderately Integrated (11/6/2019)    Social Connection and Isolation Panel [NHANES]     Frequency of Communication with Friends and Family: Twice a week     Frequency of Social Gatherings with Friends and Family: Twice a week     Attends Religion Services: More than 4 times per year     Active Member of [employer] or Organizations: No     Attends Club or Organization Meetings: Never     Marital Status:    Intimate Partner Violence: Not on file   Housing Stability: Not on file     Family History

## 2024-01-08 ENCOUNTER — OFFICE VISIT (OUTPATIENT)
Dept: PALLATIVE CARE | Age: 79
End: 2024-01-08
Payer: MEDICARE

## 2024-01-08 VITALS
HEART RATE: 58 BPM | OXYGEN SATURATION: 90 % | DIASTOLIC BLOOD PRESSURE: 79 MMHG | SYSTOLIC BLOOD PRESSURE: 119 MMHG | TEMPERATURE: 98.6 F

## 2024-01-08 DIAGNOSIS — F41.9 ANXIETY: ICD-10-CM

## 2024-01-08 DIAGNOSIS — F32.1 MAJOR DEPRESSIVE DISORDER, SINGLE EPISODE, MODERATE (HCC): Primary | ICD-10-CM

## 2024-01-08 DIAGNOSIS — F03.94 DEMENTIA WITH ANXIETY, UNSPECIFIED DEMENTIA SEVERITY, UNSPECIFIED DEMENTIA TYPE (HCC): ICD-10-CM

## 2024-01-08 PROCEDURE — G8484 FLU IMMUNIZE NO ADMIN: HCPCS | Performed by: NURSE PRACTITIONER

## 2024-01-08 PROCEDURE — 99308 SBSQ NF CARE LOW MDM 20: CPT | Performed by: NURSE PRACTITIONER

## 2024-01-08 PROCEDURE — 1123F ACP DISCUSS/DSCN MKR DOCD: CPT | Performed by: NURSE PRACTITIONER

## 2024-01-08 ASSESSMENT — ENCOUNTER SYMPTOMS
ABDOMINAL PAIN: 0
COUGH: 0
SHORTNESS OF BREATH: 0

## 2024-01-08 ASSESSMENT — VISUAL ACUITY: OU: 1

## 2024-01-08 NOTE — PROGRESS NOTES
Subjective:      Patient Id: Seen Fide at  [facility] , for  palliative medicine visit.  She was accompanied to the appointment by: Ephraim, .      Nursing home visit necessary due to impaired mobility, debility, multiple comorbidities, and high burden of office visit.    Chief Complaint   Patient presents with    Depression    Anxiety          HPI    Fide Mock is a 78 y.o. female referred to palliative care  for symptom management, advance care planning, and goals of care conversationwhile hospitalized in November 2022. Fide has complex medical history that includes anxiety, arthritis, COPD, CHF, RLS, HTN, HLD, depression, morbid obesity, overactive bladder, osteoporosis, sleep apnea, TIA.    Patient has no complaints today.  Patient follows with nursing home PCP. Patient treated for possible aspiration pneumonia beginning of December.     General: Patient is alert and oriented x2. She is up in wheelchair.      COPD/ CHF: Patient on room air. Patient denies any fever, chills, sob, cough or fatigue. Patient has no edema.     Pain: Patient denies any pain at this time.     Appetite:  reports patients appetite is ok. At times she has trouble swallowing. She is on pureed diet.     Mood: Patient mood is stable. She still has some anxiety but is on Buspar and Hydroxyzine.     Sleep: Patient reports she is sleeping well.     Skin: Patient has coccyx wound. Nursing staff has wound care orders.     GI/: No issues.     I have personally reviewed the patient's most recent lab work and imaging.      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

## 2024-02-19 ENCOUNTER — OFFICE VISIT (OUTPATIENT)
Dept: PALLATIVE CARE | Age: 79
End: 2024-02-19
Payer: MEDICARE

## 2024-02-19 VITALS
HEART RATE: 70 BPM | OXYGEN SATURATION: 91 % | SYSTOLIC BLOOD PRESSURE: 122 MMHG | DIASTOLIC BLOOD PRESSURE: 83 MMHG | TEMPERATURE: 99.1 F

## 2024-02-19 DIAGNOSIS — F33.9 EPISODE OF RECURRENT MAJOR DEPRESSIVE DISORDER, UNSPECIFIED DEPRESSION EPISODE SEVERITY (HCC): Primary | ICD-10-CM

## 2024-02-19 DIAGNOSIS — F03.94 DEMENTIA WITH ANXIETY, UNSPECIFIED DEMENTIA SEVERITY, UNSPECIFIED DEMENTIA TYPE (HCC): ICD-10-CM

## 2024-02-19 DIAGNOSIS — F41.9 ANXIETY: ICD-10-CM

## 2024-02-19 PROCEDURE — G8484 FLU IMMUNIZE NO ADMIN: HCPCS | Performed by: NURSE PRACTITIONER

## 2024-02-19 PROCEDURE — 99309 SBSQ NF CARE MODERATE MDM 30: CPT | Performed by: NURSE PRACTITIONER

## 2024-02-19 PROCEDURE — 1123F ACP DISCUSS/DSCN MKR DOCD: CPT | Performed by: NURSE PRACTITIONER

## 2024-02-19 ASSESSMENT — ENCOUNTER SYMPTOMS
SHORTNESS OF BREATH: 0
COUGH: 0
ABDOMINAL PAIN: 0

## 2024-02-19 ASSESSMENT — VISUAL ACUITY: OU: 1

## 2024-02-19 NOTE — PROGRESS NOTES
Dorsalis pedis pulses are 1+ on the right side and 1+ on the left side.      Heart sounds: Normal heart sounds.   Pulmonary:      Effort: Pulmonary effort is normal. No respiratory distress.      Breath sounds: No decreased breath sounds, wheezing, rhonchi or rales.   Abdominal:      General: Abdomen is flat. Bowel sounds are normal.      Palpations: Abdomen is soft.      Tenderness: There is no abdominal tenderness. There is no guarding.   Musculoskeletal:      Cervical back: Neck supple. No edema. Normal range of motion.      Right lower leg: No edema.      Left lower leg: No edema.   Skin:     General: Skin is warm and dry.      Findings: No bruising, lesion, rash or wound.   Neurological:      Mental Status: She is alert. Mental status is at baseline. She is confused.      Motor: Weakness present. No tremor.      Comments: Right side is flaccid   Psychiatric:         Attention and Perception: Attention normal. She is attentive.         Mood and Affect: Mood is not depressed. Affect is flat.         Speech: Speech is delayed.         Behavior: Behavior is withdrawn. Behavior is cooperative.         Cognition and Memory: Memory is impaired.         Assessment and Plan:       1. Major depressive disorder, recurrent  2. Anxiety  3. Dementia  Patient continues to be withdrawn and upset with being in nursing facility. She has had psych consults. She is on Celexa, Buspar and Hydroxyzine. Per notes patient is at times not cooperative with staff.     Due to acuity, symptomatology and high-risk medication management, I advised patient to Return in about 8 weeks (around 4/15/2024).     Thanks for the opportunity you have allowed us to provide palliative care to Fide. We will be in touch as care progresses. Please feel free to reach out to us should you have any questions or requests.    Total Time 30 mins          Total time includes reviewing labs and tests, reviewing history, medications, and allergies,

## 2024-03-13 ENCOUNTER — TELEPHONE (OUTPATIENT)
Dept: FAMILY MEDICINE CLINIC | Age: 79
End: 2024-03-13

## 2024-03-22 ENCOUNTER — HOSPITAL ENCOUNTER (INPATIENT)
Age: 79
LOS: 3 days | Discharge: HOME | DRG: 956 | End: 2024-03-26
Attending: INTERNAL MEDICINE | Admitting: INTERNAL MEDICINE
Payer: MEDICARE

## 2024-03-22 DIAGNOSIS — S72.422A CLOSED BICONDYLAR FRACTURE OF DISTAL FEMUR, LEFT, INITIAL ENCOUNTER (HCC): Primary | ICD-10-CM

## 2024-03-22 DIAGNOSIS — S72.432A CLOSED BICONDYLAR FRACTURE OF DISTAL FEMUR, LEFT, INITIAL ENCOUNTER (HCC): Primary | ICD-10-CM

## 2024-03-22 DIAGNOSIS — S32.591A CLOSED FRACTURE OF SINGLE RAMUS OF RIGHT PUBIS, INITIAL ENCOUNTER (HCC): ICD-10-CM

## 2024-03-22 DIAGNOSIS — T07.XXXA MULTIPLE FRACTURES: ICD-10-CM

## 2024-03-22 PROCEDURE — 96374 THER/PROPH/DIAG INJ IV PUSH: CPT

## 2024-03-22 PROCEDURE — 6830039000 HC L3 TRAUMA ALERT

## 2024-03-22 PROCEDURE — 99285 EMERGENCY DEPT VISIT HI MDM: CPT

## 2024-03-22 ASSESSMENT — LIFESTYLE VARIABLES
HOW MANY STANDARD DRINKS CONTAINING ALCOHOL DO YOU HAVE ON A TYPICAL DAY: PATIENT DOES NOT DRINK
HOW OFTEN DO YOU HAVE A DRINK CONTAINING ALCOHOL: NEVER
HOW MANY STANDARD DRINKS CONTAINING ALCOHOL DO YOU HAVE ON A TYPICAL DAY: PATIENT DOES NOT DRINK
HOW OFTEN DO YOU HAVE A DRINK CONTAINING ALCOHOL: NEVER

## 2024-03-23 ENCOUNTER — APPOINTMENT (OUTPATIENT)
Dept: GENERAL RADIOLOGY | Age: 79
DRG: 956 | End: 2024-03-23
Payer: MEDICARE

## 2024-03-23 PROBLEM — T07.XXXA MULTIPLE FRACTURES: Status: ACTIVE | Noted: 2024-03-23

## 2024-03-23 LAB
ALBUMIN SERPL-MCNC: 3.8 G/DL (ref 3.5–4.6)
ALBUMIN SERPL-MCNC: 3.9 G/DL (ref 3.5–4.6)
ALP SERPL-CCNC: 73 U/L (ref 40–130)
ALP SERPL-CCNC: 78 U/L (ref 40–130)
ALT SERPL-CCNC: 15 U/L (ref 0–33)
ALT SERPL-CCNC: 16 U/L (ref 0–33)
ANION GAP SERPL CALCULATED.3IONS-SCNC: 9 MEQ/L (ref 9–15)
ANION GAP SERPL CALCULATED.3IONS-SCNC: 9 MEQ/L (ref 9–15)
APTT PPP: 25.6 SEC (ref 24.4–36.8)
AST SERPL-CCNC: 19 U/L (ref 0–35)
AST SERPL-CCNC: 20 U/L (ref 0–35)
BASOPHILS # BLD: 0.1 K/UL (ref 0–0.2)
BASOPHILS NFR BLD: 0.3 %
BILIRUB DIRECT SERPL-MCNC: <0.2 MG/DL (ref 0–0.4)
BILIRUB INDIRECT SERPL-MCNC: NORMAL MG/DL (ref 0–0.6)
BILIRUB SERPL-MCNC: 0.3 MG/DL (ref 0.2–0.7)
BILIRUB SERPL-MCNC: 0.4 MG/DL (ref 0.2–0.7)
BUN SERPL-MCNC: 15 MG/DL (ref 8–23)
BUN SERPL-MCNC: 18 MG/DL (ref 8–23)
CALCIUM SERPL-MCNC: 9.2 MG/DL (ref 8.5–9.9)
CALCIUM SERPL-MCNC: 9.7 MG/DL (ref 8.5–9.9)
CHLORIDE SERPL-SCNC: 103 MEQ/L (ref 95–107)
CHLORIDE SERPL-SCNC: 108 MEQ/L (ref 95–107)
CO2 SERPL-SCNC: 25 MEQ/L (ref 20–31)
CO2 SERPL-SCNC: 28 MEQ/L (ref 20–31)
CREAT SERPL-MCNC: 0.48 MG/DL (ref 0.5–0.9)
CREAT SERPL-MCNC: 0.56 MG/DL (ref 0.5–0.9)
EOSINOPHIL # BLD: 0.2 K/UL (ref 0–0.7)
EOSINOPHIL NFR BLD: 1.1 %
ERYTHROCYTE [DISTWIDTH] IN BLOOD BY AUTOMATED COUNT: 13.2 % (ref 11.5–14.5)
GLOBULIN SER CALC-MCNC: 2.9 G/DL (ref 2.3–3.5)
GLUCOSE SERPL-MCNC: 115 MG/DL (ref 70–99)
GLUCOSE SERPL-MCNC: 133 MG/DL (ref 70–99)
HCT VFR BLD AUTO: 41.4 % (ref 37–47)
HGB BLD-MCNC: 13.3 G/DL (ref 12–16)
INR PPP: 1
INR PPP: 1
LYMPHOCYTES # BLD: 2.4 K/UL (ref 1–4.8)
LYMPHOCYTES NFR BLD: 15.1 %
MAGNESIUM SERPL-MCNC: 1.9 MG/DL (ref 1.7–2.4)
MCH RBC QN AUTO: 31.8 PG (ref 27–31.3)
MCHC RBC AUTO-ENTMCNC: 32.1 % (ref 33–37)
MCV RBC AUTO: 99 FL (ref 79.4–94.8)
MONOCYTES # BLD: 0.7 K/UL (ref 0.2–0.8)
MONOCYTES NFR BLD: 4.3 %
NEUTROPHILS # BLD: 12.7 K/UL (ref 1.4–6.5)
NEUTS SEG NFR BLD: 78.7 %
PLATELET # BLD AUTO: 264 K/UL (ref 130–400)
POTASSIUM SERPL-SCNC: 4.1 MEQ/L (ref 3.4–4.9)
POTASSIUM SERPL-SCNC: 4.3 MEQ/L (ref 3.4–4.9)
PROT SERPL-MCNC: 6.5 G/DL (ref 6.3–8)
PROT SERPL-MCNC: 6.8 G/DL (ref 6.3–8)
PROTHROMBIN TIME: 13.2 SEC (ref 12.3–14.9)
PROTHROMBIN TIME: 13.3 SEC (ref 12.3–14.9)
RBC # BLD AUTO: 4.18 M/UL (ref 4.2–5.4)
SODIUM SERPL-SCNC: 140 MEQ/L (ref 135–144)
SODIUM SERPL-SCNC: 142 MEQ/L (ref 135–144)
WBC # BLD AUTO: 16.1 K/UL (ref 4.8–10.8)

## 2024-03-23 PROCEDURE — 83735 ASSAY OF MAGNESIUM: CPT

## 2024-03-23 PROCEDURE — 99221 1ST HOSP IP/OBS SF/LOW 40: CPT | Performed by: ORTHOPAEDIC SURGERY

## 2024-03-23 PROCEDURE — 73552 X-RAY EXAM OF FEMUR 2/>: CPT

## 2024-03-23 PROCEDURE — 85610 PROTHROMBIN TIME: CPT

## 2024-03-23 PROCEDURE — 92610 EVALUATE SWALLOWING FUNCTION: CPT

## 2024-03-23 PROCEDURE — 2580000003 HC RX 258

## 2024-03-23 PROCEDURE — 73502 X-RAY EXAM HIP UNI 2-3 VIEWS: CPT

## 2024-03-23 PROCEDURE — 1210000000 HC MED SURG R&B

## 2024-03-23 PROCEDURE — 93005 ELECTROCARDIOGRAM TRACING: CPT

## 2024-03-23 PROCEDURE — 85025 COMPLETE CBC W/AUTO DIFF WBC: CPT

## 2024-03-23 PROCEDURE — 36415 COLL VENOUS BLD VENIPUNCTURE: CPT

## 2024-03-23 PROCEDURE — 85730 THROMBOPLASTIN TIME PARTIAL: CPT

## 2024-03-23 PROCEDURE — 6370000000 HC RX 637 (ALT 250 FOR IP): Performed by: INTERNAL MEDICINE

## 2024-03-23 PROCEDURE — 6370000000 HC RX 637 (ALT 250 FOR IP): Performed by: ORTHOPAEDIC SURGERY

## 2024-03-23 PROCEDURE — 73560 X-RAY EXAM OF KNEE 1 OR 2: CPT

## 2024-03-23 PROCEDURE — 80053 COMPREHEN METABOLIC PANEL: CPT

## 2024-03-23 PROCEDURE — 6360000002 HC RX W HCPCS

## 2024-03-23 PROCEDURE — 2580000003 HC RX 258: Performed by: INTERNAL MEDICINE

## 2024-03-23 RX ORDER — SODIUM CHLORIDE, SODIUM LACTATE, POTASSIUM CHLORIDE, CALCIUM CHLORIDE 600; 310; 30; 20 MG/100ML; MG/100ML; MG/100ML; MG/100ML
INJECTION, SOLUTION INTRAVENOUS CONTINUOUS
Status: DISPENSED | OUTPATIENT
Start: 2024-03-23 | End: 2024-03-24

## 2024-03-23 RX ORDER — METHOCARBAMOL 500 MG/1
500 TABLET, FILM COATED ORAL 4 TIMES DAILY PRN
Status: DISCONTINUED | OUTPATIENT
Start: 2024-03-23 | End: 2024-03-26 | Stop reason: HOSPADM

## 2024-03-23 RX ORDER — ONDANSETRON 2 MG/ML
4 INJECTION INTRAMUSCULAR; INTRAVENOUS EVERY 6 HOURS PRN
Status: DISCONTINUED | OUTPATIENT
Start: 2024-03-23 | End: 2024-03-26 | Stop reason: HOSPADM

## 2024-03-23 RX ORDER — POTASSIUM CHLORIDE 29.8 MG/ML
20 INJECTION INTRAVENOUS PRN
Status: DISCONTINUED | OUTPATIENT
Start: 2024-03-23 | End: 2024-03-26 | Stop reason: HOSPADM

## 2024-03-23 RX ORDER — POLYETHYLENE GLYCOL 3350 17 G/17G
17 POWDER, FOR SOLUTION ORAL DAILY
Status: DISCONTINUED | OUTPATIENT
Start: 2024-03-23 | End: 2024-03-26 | Stop reason: HOSPADM

## 2024-03-23 RX ORDER — ATORVASTATIN CALCIUM 10 MG/1
10 TABLET, FILM COATED ORAL DAILY
Status: DISCONTINUED | OUTPATIENT
Start: 2024-03-23 | End: 2024-03-26 | Stop reason: HOSPADM

## 2024-03-23 RX ORDER — FENTANYL CITRATE 0.05 MG/ML
25 INJECTION, SOLUTION INTRAMUSCULAR; INTRAVENOUS ONCE
Status: COMPLETED | OUTPATIENT
Start: 2024-03-23 | End: 2024-03-23

## 2024-03-23 RX ORDER — 0.9 % SODIUM CHLORIDE 0.9 %
1000 INTRAVENOUS SOLUTION INTRAVENOUS ONCE
Status: COMPLETED | OUTPATIENT
Start: 2024-03-23 | End: 2024-03-23

## 2024-03-23 RX ORDER — MAGNESIUM SULFATE IN WATER 40 MG/ML
2000 INJECTION, SOLUTION INTRAVENOUS PRN
Status: DISCONTINUED | OUTPATIENT
Start: 2024-03-23 | End: 2024-03-26 | Stop reason: HOSPADM

## 2024-03-23 RX ORDER — ONDANSETRON 4 MG/1
4 TABLET, ORALLY DISINTEGRATING ORAL EVERY 8 HOURS PRN
Status: DISCONTINUED | OUTPATIENT
Start: 2024-03-23 | End: 2024-03-26 | Stop reason: HOSPADM

## 2024-03-23 RX ORDER — POTASSIUM CHLORIDE 7.45 MG/ML
10 INJECTION INTRAVENOUS PRN
Status: DISCONTINUED | OUTPATIENT
Start: 2024-03-23 | End: 2024-03-26 | Stop reason: HOSPADM

## 2024-03-23 RX ORDER — SODIUM CHLORIDE 0.9 % (FLUSH) 0.9 %
5-40 SYRINGE (ML) INJECTION PRN
Status: DISCONTINUED | OUTPATIENT
Start: 2024-03-23 | End: 2024-03-26 | Stop reason: HOSPADM

## 2024-03-23 RX ORDER — LIDOCAINE 4 G/G
2 PATCH TOPICAL DAILY PRN
Status: DISCONTINUED | OUTPATIENT
Start: 2024-03-23 | End: 2024-03-26 | Stop reason: HOSPADM

## 2024-03-23 RX ORDER — BUSPIRONE HYDROCHLORIDE 7.5 MG/1
7.5 TABLET ORAL 2 TIMES DAILY
Status: DISCONTINUED | OUTPATIENT
Start: 2024-03-23 | End: 2024-03-26 | Stop reason: HOSPADM

## 2024-03-23 RX ORDER — CITALOPRAM HYDROBROMIDE 10 MG/1
10 TABLET ORAL DAILY
Status: DISCONTINUED | OUTPATIENT
Start: 2024-03-23 | End: 2024-03-26 | Stop reason: HOSPADM

## 2024-03-23 RX ORDER — IPRATROPIUM BROMIDE AND ALBUTEROL SULFATE 2.5; .5 MG/3ML; MG/3ML
1 SOLUTION RESPIRATORY (INHALATION) EVERY 4 HOURS PRN
Status: DISCONTINUED | OUTPATIENT
Start: 2024-03-23 | End: 2024-03-26 | Stop reason: HOSPADM

## 2024-03-23 RX ORDER — HYDROXYZINE PAMOATE 25 MG/1
25 CAPSULE ORAL
Status: DISCONTINUED | OUTPATIENT
Start: 2024-03-23 | End: 2024-03-26 | Stop reason: HOSPADM

## 2024-03-23 RX ORDER — SODIUM CHLORIDE 9 MG/ML
INJECTION, SOLUTION INTRAVENOUS PRN
Status: DISCONTINUED | OUTPATIENT
Start: 2024-03-23 | End: 2024-03-26 | Stop reason: HOSPADM

## 2024-03-23 RX ORDER — GABAPENTIN 400 MG/1
400 CAPSULE ORAL 3 TIMES DAILY
Status: DISCONTINUED | OUTPATIENT
Start: 2024-03-23 | End: 2024-03-26 | Stop reason: HOSPADM

## 2024-03-23 RX ORDER — SODIUM CHLORIDE 0.9 % (FLUSH) 0.9 %
5-40 SYRINGE (ML) INJECTION EVERY 12 HOURS SCHEDULED
Status: DISCONTINUED | OUTPATIENT
Start: 2024-03-23 | End: 2024-03-26 | Stop reason: HOSPADM

## 2024-03-23 RX ORDER — CHOLESTYRAMINE LIGHT 4 G/5.7G
1 POWDER, FOR SUSPENSION ORAL DAILY
Status: DISCONTINUED | OUTPATIENT
Start: 2024-03-23 | End: 2024-03-26 | Stop reason: HOSPADM

## 2024-03-23 RX ORDER — ACETAMINOPHEN 160 MG/5ML
650 LIQUID ORAL EVERY 4 HOURS PRN
Status: DISCONTINUED | OUTPATIENT
Start: 2024-03-23 | End: 2024-03-26 | Stop reason: HOSPADM

## 2024-03-23 RX ORDER — TRAMADOL HYDROCHLORIDE 50 MG/1
50 TABLET ORAL EVERY 6 HOURS PRN
Status: DISCONTINUED | OUTPATIENT
Start: 2024-03-23 | End: 2024-03-26 | Stop reason: HOSPADM

## 2024-03-23 RX ADMIN — ACETAMINOPHEN 650 MG: 325 SOLUTION ORAL at 16:38

## 2024-03-23 RX ADMIN — Medication 10 ML: at 20:17

## 2024-03-23 RX ADMIN — TRAMADOL HYDROCHLORIDE 50 MG: 50 TABLET ORAL at 15:02

## 2024-03-23 RX ADMIN — CHOLESTYRAMINE 4 G: 4 POWDER, FOR SUSPENSION ORAL at 11:09

## 2024-03-23 RX ADMIN — METOPROLOL TARTRATE 75 MG: 50 TABLET, FILM COATED ORAL at 20:17

## 2024-03-23 RX ADMIN — SODIUM CHLORIDE, POTASSIUM CHLORIDE, SODIUM LACTATE AND CALCIUM CHLORIDE: 600; 310; 30; 20 INJECTION, SOLUTION INTRAVENOUS at 05:48

## 2024-03-23 RX ADMIN — BUSPIRONE HYDROCHLORIDE 7.5 MG: 7.5 TABLET ORAL at 11:10

## 2024-03-23 RX ADMIN — GABAPENTIN 400 MG: 400 CAPSULE ORAL at 20:17

## 2024-03-23 RX ADMIN — BUSPIRONE HYDROCHLORIDE 7.5 MG: 7.5 TABLET ORAL at 20:17

## 2024-03-23 RX ADMIN — TRAMADOL HYDROCHLORIDE 50 MG: 50 TABLET ORAL at 08:52

## 2024-03-23 RX ADMIN — GABAPENTIN 400 MG: 400 CAPSULE ORAL at 13:05

## 2024-03-23 RX ADMIN — GABAPENTIN 400 MG: 400 CAPSULE ORAL at 11:09

## 2024-03-23 RX ADMIN — ATORVASTATIN CALCIUM 10 MG: 10 TABLET, FILM COATED ORAL at 20:17

## 2024-03-23 RX ADMIN — METHOCARBAMOL 500 MG: 500 TABLET ORAL at 11:10

## 2024-03-23 RX ADMIN — FENTANYL CITRATE 25 MCG: 0.05 INJECTION, SOLUTION INTRAMUSCULAR; INTRAVENOUS at 01:13

## 2024-03-23 RX ADMIN — SODIUM CHLORIDE 1000 ML: 9 INJECTION, SOLUTION INTRAVENOUS at 01:13

## 2024-03-23 RX ADMIN — METOPROLOL TARTRATE 75 MG: 50 TABLET, FILM COATED ORAL at 11:09

## 2024-03-23 RX ADMIN — METHOCARBAMOL 500 MG: 500 TABLET ORAL at 15:02

## 2024-03-23 RX ADMIN — SODIUM CHLORIDE, POTASSIUM CHLORIDE, SODIUM LACTATE AND CALCIUM CHLORIDE: 600; 310; 30; 20 INJECTION, SOLUTION INTRAVENOUS at 15:06

## 2024-03-23 RX ADMIN — HYDROXYZINE PAMOATE 25 MG: 25 CAPSULE ORAL at 20:17

## 2024-03-23 RX ADMIN — CITALOPRAM HYDROBROMIDE 10 MG: 10 TABLET ORAL at 11:10

## 2024-03-23 ASSESSMENT — PAIN SCALES - PAIN ASSESSMENT IN ADVANCED DEMENTIA (PAINAD)
BREATHING: NORMAL
TOTALSCORE: 4
BODYLANGUAGE: TENSE, DISTRESSED PACING, FIDGETING
CONSOLABILITY: NO NEED TO CONSOLE
NEGVOCALIZATION: OCCASIONAL MOAN/GROAN, LOW SPEECH, NEGATIVE/DISAPPROVING QUALITY
FACIALEXPRESSION: FACIAL GRIMACING

## 2024-03-23 ASSESSMENT — PAIN SCALES - GENERAL: PAINLEVEL_OUTOF10: 10

## 2024-03-23 NOTE — ED NOTES
Daughter Teresa would like to be contacted with any surgery updates especially surgery time. [phone]

## 2024-03-23 NOTE — ED PROVIDER NOTES
[facility] ED  EMERGENCY DEPARTMENT ENCOUNTER      Pt Name: Fide Mock  MRN: [id number]  Birthdate 1945  Date of evaluation: 3/22/2024  Provider: Tiffany Merritt PA-C  11:12 PM EDT      CHIEF COMPLAINT       Chief Complaint   Patient presents with    Fall     Unwitness fall out of bed, Left hip and left knee pain         HISTORY OF PRESENT ILLNESS   (Location/Symptom, Timing/Onset, Context/Setting, Quality, Duration, Modifying Factors, Severity)  Note limiting factors.   Fide Mock is a 78 y.o. female with PMHx COPD on 5 L via nasal cannula at baseline, right-sided hemiplegia and hemiparesis following CVA, CAD, JULIO C, hyperlipidemia, hypertension, depression, anxiety, and peripheral vascular disease who presents to the emergency department from nursing home for evaluation of fall out of bed with pain to left hip and left knee.  Patient does not remember the fall and is confused at baseline, so history was obtained from her daughter and ER staff.  Nursing home staff reported that she had unwitnessed fall out of bed and encounter laying on the ground this evening.  Nursing staff reports that she was complaining of a lot of pain in her left hip and her left knee so they called the ambulance to bring her to the emergency department for further evaluation.  Patient received 50 mcg fentanyl and 4 mg Zofran in the ambulance on the way to the emergency department.  Patient is still complaining of left knee pain on arrival.  Patient is on 5 L of oxygen via nasal cannula at baseline and is at baseline weak on her right side from a prior stroke.  Patient denies any headache, neck pain, vision changes, cough, congestion, shortness of breath, chest pain, abdominal pain, nausea, vomiting, diarrhea.     HPI    Nursing Notes were reviewed.    REVIEW OF SYSTEMS    (2-9 systems for level 4, 10 or more for level 5)     Review of Systems   Eyes:  Negative for visual disturbance.   Musculoskeletal:  Positive for

## 2024-03-23 NOTE — ED TRIAGE NOTES
Pt arrived via EMS from [facility].  Pt c/o unwitnessed fall out of bed.  Pt was found on the floor next to her bed.  Pt denies LOC.  Pt denies blood thinners.  Pt c/o left hip and left knee pain.  Pt has a 20g IV in L forearm by EMS.  Pt was given 50 mcg Fentanyl and 4 mg Zofran.   Pt is on 5 lpm of oxygen via NC at baseline for COPD.

## 2024-03-23 NOTE — CONSULTS
right, with fat layer exposed (HCC) 10/02/2020    OAB (overactive bladder)     Osteoporosis     Sleep apnea 11/15/2022    TIA (transient ischemic attack)        PSHx:  Past Surgical History:   Procedure Laterality Date    CARDIAC CATHETERIZATION      CARPAL TUNNEL RELEASE      COLONOSCOPY      GASTROSTOMY TUBE PLACEMENT N/A 2022    PERCUTANEOUS ENDOSCOPIC GASTROSTOMY TUBE PLACEMENT performed by Hari Anguiano MD at [facility]    HYSTERECTOMY, TOTAL ABDOMINAL (CERVIX REMOVED)      KNEE ARTHROPLASTY Bilateral     MO COLON CA SCRN NOT HI RSK IND N/A 2017    COLONOSCOPY performed by Santos Smith MD at [facility]    MO ESOPHAGOGASTRODUODENOSCOPY TRANSORAL DIAGNOSTIC N/A 2017    EGD ESOPHAGOGASTRODUODENOSCOPY performed by Santos Smith MD at [facility]    SHOULDER ARTHROPLASTY Bilateral     SKIN BIOPSY Right 2020    EXCISION OF LEG MASS RIGHT (PAT ON ADMIT) performed by Hari Anguiano MD at [facility] OR       Social Hx:  Social History     Socioeconomic History    Marital status:      Spouse name: Ephraim     Number of children: 3    Years of education: 12    Highest education level: 12th grade   Occupational History    Occupation: [employer]    Tobacco Use    Smoking status: Former     Current packs/day: 0.00     Average packs/day: 1 pack/day for 20.0 years (20.0 ttl pk-yrs)     Types: Cigarettes     Start date:      Quit date:      Years since quittin.2    Smokeless tobacco: Never   Vaping Use    Vaping Use: Never used   Substance and Sexual Activity    Alcohol use: No    Drug use: Never    Sexual activity: Not Currently     Partners: Male   Social History Narrative    Lives With: Spouse Ephraim    Type of Home: House-[address] [place] [address]  in [place]    Home Layout: Two level    Home Access: Stairs to enter with rails - Number of Steps: 2- Rails: Right    Bathroom Shower/Tub: Tub/Shower unit    Bathroom Equipment: Shower chair, Hand-held shower    Home

## 2024-03-24 ENCOUNTER — APPOINTMENT (OUTPATIENT)
Dept: GENERAL RADIOLOGY | Age: 79
DRG: 956 | End: 2024-03-24
Payer: MEDICARE

## 2024-03-24 ENCOUNTER — ANESTHESIA EVENT (OUTPATIENT)
Dept: OPERATING ROOM | Age: 79
End: 2024-03-24
Payer: MEDICARE

## 2024-03-24 ENCOUNTER — ANESTHESIA (OUTPATIENT)
Dept: OPERATING ROOM | Age: 79
End: 2024-03-24
Payer: MEDICARE

## 2024-03-24 LAB
ANION GAP SERPL CALCULATED.3IONS-SCNC: 11 MEQ/L (ref 9–15)
BASOPHILS # BLD: 0.1 K/UL (ref 0–0.2)
BASOPHILS NFR BLD: 0.6 %
BUN SERPL-MCNC: 10 MG/DL (ref 8–23)
CALCIUM SERPL-MCNC: 8.6 MG/DL (ref 8.5–9.9)
CHLORIDE SERPL-SCNC: 105 MEQ/L (ref 95–107)
CO2 SERPL-SCNC: 26 MEQ/L (ref 20–31)
CREAT SERPL-MCNC: 0.44 MG/DL (ref 0.5–0.9)
EOSINOPHIL # BLD: 0.5 K/UL (ref 0–0.7)
EOSINOPHIL NFR BLD: 4.3 %
ERYTHROCYTE [DISTWIDTH] IN BLOOD BY AUTOMATED COUNT: 13.2 % (ref 11.5–14.5)
GLUCOSE BLD-MCNC: 160 MG/DL (ref 70–99)
GLUCOSE SERPL-MCNC: 100 MG/DL (ref 70–99)
HCT VFR BLD AUTO: 31.6 % (ref 37–47)
HGB BLD-MCNC: 10.2 G/DL (ref 12–16)
LYMPHOCYTES # BLD: 2.8 K/UL (ref 1–4.8)
LYMPHOCYTES NFR BLD: 24.3 %
MCH RBC QN AUTO: 31.8 PG (ref 27–31.3)
MCHC RBC AUTO-ENTMCNC: 32.3 % (ref 33–37)
MCV RBC AUTO: 98.4 FL (ref 79.4–94.8)
MONOCYTES # BLD: 1 K/UL (ref 0.2–0.8)
MONOCYTES NFR BLD: 8.6 %
NEUTROPHILS # BLD: 7 K/UL (ref 1.4–6.5)
NEUTS SEG NFR BLD: 61.9 %
PERFORMED ON: ABNORMAL
PLATELET # BLD AUTO: 195 K/UL (ref 130–400)
POTASSIUM SERPL-SCNC: 4.3 MEQ/L (ref 3.4–4.9)
RBC # BLD AUTO: 3.21 M/UL (ref 4.2–5.4)
SODIUM SERPL-SCNC: 142 MEQ/L (ref 135–144)
WBC # BLD AUTO: 11.4 K/UL (ref 4.8–10.8)

## 2024-03-24 PROCEDURE — 6360000002 HC RX W HCPCS: Performed by: NURSE PRACTITIONER

## 2024-03-24 PROCEDURE — 2700000000 HC OXYGEN THERAPY PER DAY

## 2024-03-24 PROCEDURE — 64447 NJX AA&/STRD FEMORAL NRV IMG: CPT | Performed by: STUDENT IN AN ORGANIZED HEALTH CARE EDUCATION/TRAINING PROGRAM

## 2024-03-24 PROCEDURE — 3700000001 HC ADD 15 MINUTES (ANESTHESIA): Performed by: ORTHOPAEDIC SURGERY

## 2024-03-24 PROCEDURE — 3600000004 HC SURGERY LEVEL 4 BASE: Performed by: ORTHOPAEDIC SURGERY

## 2024-03-24 PROCEDURE — 85025 COMPLETE CBC W/AUTO DIFF WBC: CPT

## 2024-03-24 PROCEDURE — 27511 TREATMENT OF THIGH FRACTURE: CPT | Performed by: PHYSICIAN ASSISTANT

## 2024-03-24 PROCEDURE — 2580000003 HC RX 258: Performed by: INTERNAL MEDICINE

## 2024-03-24 PROCEDURE — C1769 GUIDE WIRE: HCPCS | Performed by: ORTHOPAEDIC SURGERY

## 2024-03-24 PROCEDURE — 3700000000 HC ANESTHESIA ATTENDED CARE: Performed by: ORTHOPAEDIC SURGERY

## 2024-03-24 PROCEDURE — 1210000000 HC MED SURG R&B

## 2024-03-24 PROCEDURE — C1889 IMPLANT/INSERT DEVICE, NOC: HCPCS | Performed by: ORTHOPAEDIC SURGERY

## 2024-03-24 PROCEDURE — 7100000001 HC PACU RECOVERY - ADDTL 15 MIN: Performed by: ORTHOPAEDIC SURGERY

## 2024-03-24 PROCEDURE — 3600000014 HC SURGERY LEVEL 4 ADDTL 15MIN: Performed by: ORTHOPAEDIC SURGERY

## 2024-03-24 PROCEDURE — 6370000000 HC RX 637 (ALT 250 FOR IP): Performed by: INTERNAL MEDICINE

## 2024-03-24 PROCEDURE — 36415 COLL VENOUS BLD VENIPUNCTURE: CPT

## 2024-03-24 PROCEDURE — 6370000000 HC RX 637 (ALT 250 FOR IP): Performed by: NURSE PRACTITIONER

## 2024-03-24 PROCEDURE — 80048 BASIC METABOLIC PNL TOTAL CA: CPT

## 2024-03-24 PROCEDURE — A4217 STERILE WATER/SALINE, 500 ML: HCPCS | Performed by: ORTHOPAEDIC SURGERY

## 2024-03-24 PROCEDURE — 0QHC46Z INSERTION OF INTRAMEDULLARY INTERNAL FIXATION DEVICE INTO LEFT LOWER FEMUR, PERCUTANEOUS ENDOSCOPIC APPROACH: ICD-10-PCS | Performed by: ORTHOPAEDIC SURGERY

## 2024-03-24 PROCEDURE — 2580000003 HC RX 258: Performed by: ORTHOPAEDIC SURGERY

## 2024-03-24 PROCEDURE — 2580000003 HC RX 258: Performed by: NURSE PRACTITIONER

## 2024-03-24 PROCEDURE — 2709999900 HC NON-CHARGEABLE SUPPLY: Performed by: ORTHOPAEDIC SURGERY

## 2024-03-24 PROCEDURE — 6360000002 HC RX W HCPCS: Performed by: ORTHOPAEDIC SURGERY

## 2024-03-24 PROCEDURE — 2580000003 HC RX 258: Performed by: STUDENT IN AN ORGANIZED HEALTH CARE EDUCATION/TRAINING PROGRAM

## 2024-03-24 PROCEDURE — 6360000002 HC RX W HCPCS: Performed by: STUDENT IN AN ORGANIZED HEALTH CARE EDUCATION/TRAINING PROGRAM

## 2024-03-24 PROCEDURE — 7100000000 HC PACU RECOVERY - FIRST 15 MIN: Performed by: ORTHOPAEDIC SURGERY

## 2024-03-24 PROCEDURE — 2720000010 HC SURG SUPPLY STERILE: Performed by: ORTHOPAEDIC SURGERY

## 2024-03-24 PROCEDURE — 27511 TREATMENT OF THIGH FRACTURE: CPT | Performed by: ORTHOPAEDIC SURGERY

## 2024-03-24 PROCEDURE — C1713 ANCHOR/SCREW BN/BN,TIS/BN: HCPCS | Performed by: ORTHOPAEDIC SURGERY

## 2024-03-24 DEVICE — SCREW LK F/IM NAIL 5X38MM XL25 SILE: Type: IMPLANTABLE DEVICE | Site: FEMUR | Status: FUNCTIONAL

## 2024-03-24 DEVICE — IMPLANTABLE DEVICE: Type: IMPLANTABLE DEVICE | Site: FEMUR | Status: FUNCTIONAL

## 2024-03-24 DEVICE — SCREW BNE LCK 5X48 MM LP XL25 RETROGRADE STRL RFNADVANCED: Type: IMPLANTABLE DEVICE | Site: FEMUR | Status: FUNCTIONAL

## 2024-03-24 DEVICE — CAP END F/RFNA 0MM STERILE: Type: IMPLANTABLE DEVICE | Site: FEMUR | Status: FUNCTIONAL

## 2024-03-24 DEVICE — SCREW LK F/IM NAIL 5X36MM XL25 ST: Type: IMPLANTABLE DEVICE | Site: FEMUR | Status: FUNCTIONAL

## 2024-03-24 DEVICE — SCREW BNE LCK 5X76 MM LP TI STRL RFNADVANCED 04045376S: Type: IMPLANTABLE DEVICE | Site: FEMUR | Status: FUNCTIONAL

## 2024-03-24 RX ORDER — OXYCODONE HYDROCHLORIDE 5 MG/1
2.5 TABLET ORAL EVERY 4 HOURS PRN
Status: DISCONTINUED | OUTPATIENT
Start: 2024-03-24 | End: 2024-03-26 | Stop reason: HOSPADM

## 2024-03-24 RX ORDER — SODIUM CHLORIDE 9 MG/ML
INJECTION, SOLUTION INTRAVENOUS PRN
Status: DISCONTINUED | OUTPATIENT
Start: 2024-03-24 | End: 2024-03-24 | Stop reason: HOSPADM

## 2024-03-24 RX ORDER — SODIUM CHLORIDE 0.9 % (FLUSH) 0.9 %
5-40 SYRINGE (ML) INJECTION EVERY 12 HOURS SCHEDULED
Status: DISCONTINUED | OUTPATIENT
Start: 2024-03-24 | End: 2024-03-24 | Stop reason: HOSPADM

## 2024-03-24 RX ORDER — MIDAZOLAM HYDROCHLORIDE 1 MG/ML
INJECTION INTRAMUSCULAR; INTRAVENOUS
Status: COMPLETED | OUTPATIENT
Start: 2024-03-24 | End: 2024-03-24

## 2024-03-24 RX ORDER — MAGNESIUM HYDROXIDE 1200 MG/15ML
LIQUID ORAL CONTINUOUS PRN
Status: DISCONTINUED | OUTPATIENT
Start: 2024-03-24 | End: 2024-03-24 | Stop reason: HOSPADM

## 2024-03-24 RX ORDER — ENOXAPARIN SODIUM 100 MG/ML
40 INJECTION SUBCUTANEOUS DAILY
Status: DISCONTINUED | OUTPATIENT
Start: 2024-03-25 | End: 2024-03-26 | Stop reason: HOSPADM

## 2024-03-24 RX ORDER — MAGNESIUM HYDROXIDE 1200 MG/15ML
LIQUID ORAL PRN
Status: DISCONTINUED | OUTPATIENT
Start: 2024-03-24 | End: 2024-03-24 | Stop reason: HOSPADM

## 2024-03-24 RX ORDER — FENTANYL CITRATE 50 UG/ML
INJECTION, SOLUTION INTRAMUSCULAR; INTRAVENOUS PRN
Status: DISCONTINUED | OUTPATIENT
Start: 2024-03-24 | End: 2024-03-24 | Stop reason: SDUPTHER

## 2024-03-24 RX ORDER — NALOXONE HYDROCHLORIDE 0.4 MG/ML
INJECTION, SOLUTION INTRAMUSCULAR; INTRAVENOUS; SUBCUTANEOUS PRN
Status: DISCONTINUED | OUTPATIENT
Start: 2024-03-24 | End: 2024-03-24 | Stop reason: HOSPADM

## 2024-03-24 RX ORDER — METOCLOPRAMIDE HYDROCHLORIDE 5 MG/ML
10 INJECTION INTRAMUSCULAR; INTRAVENOUS
Status: DISCONTINUED | OUTPATIENT
Start: 2024-03-24 | End: 2024-03-24 | Stop reason: HOSPADM

## 2024-03-24 RX ORDER — SODIUM CHLORIDE 0.9 % (FLUSH) 0.9 %
5-40 SYRINGE (ML) INJECTION PRN
Status: DISCONTINUED | OUTPATIENT
Start: 2024-03-24 | End: 2024-03-24 | Stop reason: HOSPADM

## 2024-03-24 RX ORDER — FUROSEMIDE 40 MG/1
40 TABLET ORAL DAILY
Status: DISCONTINUED | OUTPATIENT
Start: 2024-03-24 | End: 2024-03-26

## 2024-03-24 RX ORDER — SODIUM CHLORIDE 9 MG/ML
INJECTION, SOLUTION INTRAVENOUS CONTINUOUS
Status: DISCONTINUED | OUTPATIENT
Start: 2024-03-24 | End: 2024-03-25

## 2024-03-24 RX ORDER — KETOROLAC TROMETHAMINE 15 MG/ML
7.5 INJECTION, SOLUTION INTRAMUSCULAR; INTRAVENOUS EVERY 6 HOURS
Status: COMPLETED | OUTPATIENT
Start: 2024-03-24 | End: 2024-03-25

## 2024-03-24 RX ORDER — ASPIRIN 81 MG/1
81 TABLET ORAL DAILY
Status: DISCONTINUED | OUTPATIENT
Start: 2024-03-24 | End: 2024-03-26 | Stop reason: HOSPADM

## 2024-03-24 RX ORDER — FENTANYL CITRATE 0.05 MG/ML
50 INJECTION, SOLUTION INTRAMUSCULAR; INTRAVENOUS EVERY 10 MIN PRN
Status: DISCONTINUED | OUTPATIENT
Start: 2024-03-24 | End: 2024-03-24 | Stop reason: HOSPADM

## 2024-03-24 RX ORDER — HYDROXYZINE HYDROCHLORIDE 10 MG/1
10 TABLET, FILM COATED ORAL EVERY 8 HOURS PRN
Status: DISCONTINUED | OUTPATIENT
Start: 2024-03-24 | End: 2024-03-26 | Stop reason: HOSPADM

## 2024-03-24 RX ORDER — LIDOCAINE HYDROCHLORIDE 10 MG/ML
1 INJECTION, SOLUTION EPIDURAL; INFILTRATION; INTRACAUDAL; PERINEURAL
Status: DISCONTINUED | OUTPATIENT
Start: 2024-03-24 | End: 2024-03-24 | Stop reason: HOSPADM

## 2024-03-24 RX ORDER — ROPIVACAINE HYDROCHLORIDE 5 MG/ML
INJECTION, SOLUTION EPIDURAL; INFILTRATION; PERINEURAL
Status: COMPLETED | OUTPATIENT
Start: 2024-03-24 | End: 2024-03-24

## 2024-03-24 RX ORDER — ONDANSETRON 2 MG/ML
4 INJECTION INTRAMUSCULAR; INTRAVENOUS
Status: DISCONTINUED | OUTPATIENT
Start: 2024-03-24 | End: 2024-03-24 | Stop reason: HOSPADM

## 2024-03-24 RX ORDER — SODIUM CHLORIDE, SODIUM LACTATE, POTASSIUM CHLORIDE, CALCIUM CHLORIDE 600; 310; 30; 20 MG/100ML; MG/100ML; MG/100ML; MG/100ML
INJECTION, SOLUTION INTRAVENOUS CONTINUOUS PRN
Status: DISCONTINUED | OUTPATIENT
Start: 2024-03-24 | End: 2024-03-24 | Stop reason: SDUPTHER

## 2024-03-24 RX ORDER — DIPHENHYDRAMINE HYDROCHLORIDE 50 MG/ML
12.5 INJECTION INTRAMUSCULAR; INTRAVENOUS
Status: DISCONTINUED | OUTPATIENT
Start: 2024-03-24 | End: 2024-03-24 | Stop reason: HOSPADM

## 2024-03-24 RX ORDER — PROPOFOL 10 MG/ML
INJECTION, EMULSION INTRAVENOUS PRN
Status: DISCONTINUED | OUTPATIENT
Start: 2024-03-24 | End: 2024-03-24 | Stop reason: SDUPTHER

## 2024-03-24 RX ORDER — MEPERIDINE HYDROCHLORIDE 25 MG/ML
12.5 INJECTION INTRAMUSCULAR; INTRAVENOUS; SUBCUTANEOUS
Status: DISCONTINUED | OUTPATIENT
Start: 2024-03-24 | End: 2024-03-24 | Stop reason: HOSPADM

## 2024-03-24 RX ORDER — OXYCODONE HYDROCHLORIDE 5 MG/1
5 TABLET ORAL
Status: DISCONTINUED | OUTPATIENT
Start: 2024-03-24 | End: 2024-03-24 | Stop reason: HOSPADM

## 2024-03-24 RX ADMIN — PHENYLEPHRINE HYDROCHLORIDE 100 MCG: 10 INJECTION INTRAVENOUS at 08:31

## 2024-03-24 RX ADMIN — METOPROLOL TARTRATE 75 MG: 50 TABLET, FILM COATED ORAL at 13:02

## 2024-03-24 RX ADMIN — POLYETHYLENE GLYCOL 3350 17 G: 17 POWDER, FOR SOLUTION ORAL at 13:03

## 2024-03-24 RX ADMIN — FENTANYL CITRATE 25 MCG: 50 INJECTION, SOLUTION INTRAMUSCULAR; INTRAVENOUS at 08:47

## 2024-03-24 RX ADMIN — SODIUM CHLORIDE: 9 INJECTION, SOLUTION INTRAVENOUS at 13:00

## 2024-03-24 RX ADMIN — GABAPENTIN 400 MG: 400 CAPSULE ORAL at 13:03

## 2024-03-24 RX ADMIN — CEFAZOLIN 2000 MG: 2 INJECTION, POWDER, FOR SOLUTION INTRAMUSCULAR; INTRAVENOUS at 16:52

## 2024-03-24 RX ADMIN — CEFAZOLIN 2000 MG: 2 INJECTION, POWDER, FOR SOLUTION INTRAMUSCULAR; INTRAVENOUS at 08:15

## 2024-03-24 RX ADMIN — ASPIRIN 81 MG: 81 TABLET, COATED ORAL at 13:02

## 2024-03-24 RX ADMIN — BUSPIRONE HYDROCHLORIDE 7.5 MG: 7.5 TABLET ORAL at 13:02

## 2024-03-24 RX ADMIN — FENTANYL CITRATE 25 MCG: 50 INJECTION, SOLUTION INTRAMUSCULAR; INTRAVENOUS at 08:19

## 2024-03-24 RX ADMIN — CITALOPRAM HYDROBROMIDE 10 MG: 10 TABLET ORAL at 13:02

## 2024-03-24 RX ADMIN — CHOLESTYRAMINE 4 G: 4 POWDER, FOR SUSPENSION ORAL at 13:03

## 2024-03-24 RX ADMIN — MIDAZOLAM HYDROCHLORIDE 2 MG: 1 INJECTION, SOLUTION INTRAMUSCULAR; INTRAVENOUS at 07:51

## 2024-03-24 RX ADMIN — FENTANYL CITRATE 25 MCG: 50 INJECTION, SOLUTION INTRAMUSCULAR; INTRAVENOUS at 08:38

## 2024-03-24 RX ADMIN — KETOROLAC TROMETHAMINE 7.5 MG: 15 INJECTION, SOLUTION INTRAMUSCULAR; INTRAVENOUS at 23:14

## 2024-03-24 RX ADMIN — KETOROLAC TROMETHAMINE 7.5 MG: 15 INJECTION, SOLUTION INTRAMUSCULAR; INTRAVENOUS at 16:48

## 2024-03-24 RX ADMIN — FUROSEMIDE 40 MG: 40 TABLET ORAL at 13:02

## 2024-03-24 RX ADMIN — SODIUM CHLORIDE, POTASSIUM CHLORIDE, SODIUM LACTATE AND CALCIUM CHLORIDE: 600; 310; 30; 20 INJECTION, SOLUTION INTRAVENOUS at 01:05

## 2024-03-24 RX ADMIN — PROPOFOL 120 MG: 10 INJECTION, EMULSION INTRAVENOUS at 08:11

## 2024-03-24 RX ADMIN — KETOROLAC TROMETHAMINE 7.5 MG: 15 INJECTION, SOLUTION INTRAMUSCULAR; INTRAVENOUS at 13:00

## 2024-03-24 RX ADMIN — FENTANYL CITRATE 25 MCG: 50 INJECTION, SOLUTION INTRAMUSCULAR; INTRAVENOUS at 09:09

## 2024-03-24 RX ADMIN — ROPIVACAINE HYDROCHLORIDE 30 ML: 5 INJECTION, SOLUTION EPIDURAL; INFILTRATION; PERINEURAL at 07:51

## 2024-03-24 RX ADMIN — SODIUM CHLORIDE, POTASSIUM CHLORIDE, SODIUM LACTATE AND CALCIUM CHLORIDE: 600; 310; 30; 20 INJECTION, SOLUTION INTRAVENOUS at 08:04

## 2024-03-24 ASSESSMENT — PAIN SCALES - PAIN ASSESSMENT IN ADVANCED DEMENTIA (PAINAD)
BREATHING: NORMAL
BODYLANGUAGE: RELAXED
TOTALSCORE: 0
BREATHING: NORMAL
TOTALSCORE: 0
CONSOLABILITY: NO NEED TO CONSOLE
FACIALEXPRESSION: SMILING OR INEXPRESSIVE
CONSOLABILITY: NO NEED TO CONSOLE
FACIALEXPRESSION: SMILING OR INEXPRESSIVE
BODYLANGUAGE: RELAXED

## 2024-03-24 ASSESSMENT — PAIN SCALES - GENERAL
PAINLEVEL_OUTOF10: 0

## 2024-03-24 NOTE — OP NOTE
(Removed)       Findings: fx        Detailed Description of Procedure:   Preoperative diagnosis: Left supracondylar femur fracture periprosthetic  Postoperative diagnosis: Same  Procedure: Retrograde IM nail left femur using Synthes    Primary Surgeon Guillaume Raygoza MD  Assisting KRISTEL Lobo    Anesthesia nerve block with general    PA template  KRISTEL Lobo was present through the entire procedure.  His skills were necessary to assist with retraction, leg manipulation, reduction.  The surgical scrub tech was on the back table handing insurance while he was assisting during the procedure.    Prior to taking the patient to the operating room surgical site was marked H&P was reviewed updated and signed and the patient received appropriate preoperative antibiotics.    78-year-old female suffered a fall injuring her left leg found to have displaced supracondylar periprosthetic femur fracture recommended that she undergo surgical stabilization.  Previous procedures risk benefits treatment alternatives and postop course were discussed with patient and family    Procedure: Prior to taking patient the operative surgical site was marked H&P was reviewed obtained and signed patient received appropriate preoperative antibiotics.    PA template  KRISTEL Lobo was present through the entire procedure.  His skills were necessary to assist with retraction, leg manipulation, reduction.  The surgical scrub tech was on the back table handing insurance while he was assisting during the procedure.    Patient placed on table supine position whereupon adequate general anesthesia was obtained.  Left lower extremities prepped and draped in usual sterile manner.  Surgical timeout was performed.  Patient received preoperative antibiotics.  Linear incision was made from the midportion of the patella to the tibial tubercle through the skin and subcutaneous tissue.  Tao tendon was split in line with

## 2024-03-24 NOTE — CARE COORDINATION
Case Management Assessment  Initial Evaluation    Date/Time of Evaluation: 3/24/2024 12:09 PM  Assessment Completed by: ANTHONY Parada    If patient is discharged prior to next notation, then this note serves as note for discharge by case management.    Patient Name: Fide Mock                   YOB: 1945  Diagnosis: Multiple fractures [T07.XXXA]  Closed bicondylar fracture of distal femur, left, initial encounter ([facility]) [S72.422A, S72.432A]  Closed fracture of single ramus of right pubis, initial encounter ([facility]) [S32.591A]                   Date / Time: 3/22/2024 10:49 PM    Patient Admission Status: Inpatient   Readmission Risk (Low < 19, Mod (19-27), High > 27): Readmission Risk Score: 14.7    Current PCP: Shannan Abdi APRN - CNP  PCP verified by CM? Yes    Chart Reviewed: Yes      History Provided by: Child/Family, Spouse  Patient Orientation: Other (see comment) (SLEEPING)    Patient Cognition: Other (see comment) (UNABLE TO ACCESSS;)    Hospitalization in the last 30 days (Readmission):  No    If yes, Readmission Assessment in CM Navigator will be completed.    Advance Directives:      Code Status: DNR-CCA   Patient's Primary Decision Maker is: Named in Scanned ACP Document    Primary Decision Maker: Ephraim Mock - Spouse - [phone]    Secondary Decision Maker: Teresa Mock - Child - [phone]    Secondary Decision Maker: Hannah Howard - Child - [phone]    Discharge Planning:    Patient lives with: Other (Comment) (SNF) Type of Home: Long-Term Care  Primary Care Giver: Other (Comment) ([facility])  Patient Support Systems include: Children   Current Financial resources: Medicare  Current community resources: Other (Comment) ([facility] LT)  Current services prior to admission: Skilled Nursing Facility            Current DME:              Type of Home Care services:  None    ADLS  Prior functional level: Assistance with the following:, Mobility  Current functional level:

## 2024-03-24 NOTE — H&P
History and physical is reviewed, patient re evaluated, no changes.  Procedure, risks, benefits, and postoperative course were discussed with the patient and consent is reviewed.  Guillaume Raygoza MD  
MCV 99.0 03/23/2024 01:16 AM    MCH 31.8 03/23/2024 01:16 AM    MCHC 32.1 03/23/2024 01:16 AM    RDW 13.2 03/23/2024 01:16 AM    NRBC 1 10/15/2019 12:39 PM    BANDSPCT 13 10/15/2019 12:39 PM    METASPCT 1 10/15/2019 12:39 PM    LYMPHOPCT 15.1 03/23/2024 01:16 AM    MONOPCT 4.3 03/23/2024 01:16 AM    MYELOPCT 1 10/15/2019 12:39 PM    EOSPCT 5.8 11/22/2022 12:00 AM    BASOPCT 0.3 03/23/2024 01:16 AM    MONOSABS 0.7 03/23/2024 01:16 AM    LYMPHSABS 2.4 03/23/2024 01:16 AM    EOSABS 0.2 03/23/2024 01:16 AM    BASOSABS 0.1 03/23/2024 01:16 AM     CMP:    Lab Results   Component Value Date/Time     03/23/2024 01:16 AM    K 4.1 03/23/2024 01:16 AM    K 4.1 11/26/2022 05:21 AM     03/23/2024 01:16 AM    CO2 28 03/23/2024 01:16 AM    BUN 18 03/23/2024 01:16 AM    CREATININE 0.56 03/23/2024 01:16 AM    GFRAA >60.0 07/05/2022 11:26 AM    LABGLOM >60.0 03/23/2024 01:16 AM    GLUCOSE 133 03/23/2024 01:16 AM    GLUCOSE 75 04/23/2012 11:12 AM    PROT 6.8 03/23/2024 01:16 AM    LABALBU 3.9 03/23/2024 01:16 AM    LABALBU 4.6 04/23/2012 11:12 AM    CALCIUM 9.7 03/23/2024 01:16 AM    BILITOT 0.3 03/23/2024 01:16 AM    ALKPHOS 78 03/23/2024 01:16 AM    AST 20 03/23/2024 01:16 AM    ALT 15 03/23/2024 01:16 AM     BMP:    Lab Results   Component Value Date/Time     03/23/2024 01:16 AM    K 4.1 03/23/2024 01:16 AM    K 4.1 11/26/2022 05:21 AM     03/23/2024 01:16 AM    CO2 28 03/23/2024 01:16 AM    BUN 18 03/23/2024 01:16 AM    LABALBU 3.9 03/23/2024 01:16 AM    LABALBU 4.6 04/23/2012 11:12 AM    CREATININE 0.56 03/23/2024 01:16 AM    CALCIUM 9.7 03/23/2024 01:16 AM    GFRAA >60.0 07/05/2022 11:26 AM    LABGLOM >60.0 03/23/2024 01:16 AM    GLUCOSE 133 03/23/2024 01:16 AM    GLUCOSE 75 04/23/2012 11:12 AM     Magnesium:  Lab Results   Component Value Date/Time    MG 1.9 03/23/2024 01:16 AM     Troponin:    Lab Results   Component Value Date/Time    TROPONINI <0.010 11/25/2022 09:15 AM     INR:    Recent Labs     
FEMUR: Osseous mineralization is decreased.  There is a comminuted and displaced fracture of the distal left femur.  This does not appear to extend to the knee arthroplasty component. Soft tissue swelling about the area of fracture. Mild left hip osteoarthritis noted. LEFT KNEE: Patient has undergone previous left knee arthroplasty. Distal left femur fracture is displaced and slightly angulated.  Proximal fracture fragment is anterior by approximately 1.8 cm.  The fracture does not appear to extend to the femoral component of the knee arthroplasty.     1.  Distal left femur fracture.  This is angulated and displaced.  The fracture on these images does not appear to extend to the knee arthroplasty component. 2.  Osseous mineralization is decreased. 3.  Previous left knee arthroplasty. 4.  Mild bilateral hip osteoarthritis. 5.  On the limited pelvis views there appears to be osseous irregularity to the right superior and inferior pubic rami and the right aspect of the pubic symphysis.       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

## 2024-03-24 NOTE — ACP (ADVANCE CARE PLANNING)
Advance Care Planning   Healthcare Decision Maker:    Primary Decision Maker: Ephraim Mock - Spouse - [phone]    Secondary Decision Maker: Teresa Mock - Child - [phone]    Secondary Decision Maker: Hannah Howard - Child - [phone]    Click here to complete Healthcare Decision Makers including selection of the Healthcare Decision Maker Relationship (ie \"Primary\").  Today we documented Decision Maker(s) consistent with ACP documents on file.       If the relationship to the patient does NOT follow our state's Next of Kin hierarchy, the patient MUST complete an ACP Document to allow him/her to act on the patient's behalf. :      Electronically signed by ANTHONY Parada on 3/24/2024 at 12:09 PM

## 2024-03-25 ENCOUNTER — APPOINTMENT (OUTPATIENT)
Dept: GENERAL RADIOLOGY | Age: 79
DRG: 956 | End: 2024-03-25
Payer: MEDICARE

## 2024-03-25 LAB
ANION GAP SERPL CALCULATED.3IONS-SCNC: 12 MEQ/L (ref 9–15)
BASOPHILS NFR BLD: 0.3 %
BUN SERPL-MCNC: 21 MG/DL (ref 8–23)
CHLORIDE SERPL-SCNC: 108 MEQ/L (ref 95–107)
CO2 SERPL-SCNC: 24 MEQ/L (ref 20–31)
CREAT SERPL-MCNC: 0.65 MG/DL (ref 0.5–0.9)
EOSINOPHIL # BLD: 0.5 K/UL (ref 0–0.7)
EOSINOPHIL NFR BLD: 3.5 %
ERYTHROCYTE [DISTWIDTH] IN BLOOD BY AUTOMATED COUNT: 13.1 % (ref 11.5–14.5)
GLUCOSE SERPL-MCNC: 106 MG/DL (ref 70–99)
HCT VFR BLD AUTO: 24.6 % (ref 37–47)
HGB BLD-MCNC: 7.9 G/DL (ref 12–16)
LYMPHOCYTES # BLD: 2.3 K/UL (ref 1–4.8)
MCH RBC QN AUTO: 32.1 PG (ref 27–31.3)
MCHC RBC AUTO-ENTMCNC: 32.1 % (ref 33–37)
MCV RBC AUTO: 100 FL (ref 79.4–94.8)
MONOCYTES # BLD: 0.9 K/UL (ref 0.2–0.8)
MONOCYTES NFR BLD: 6.8 %
NEUTROPHILS # BLD: 10.1 K/UL (ref 1.4–6.5)
PLATELET # BLD AUTO: 144 K/UL (ref 130–400)
POTASSIUM SERPL-SCNC: 3.9 MEQ/L (ref 3.4–4.9)
SODIUM SERPL-SCNC: 144 MEQ/L (ref 135–144)
WBC # BLD AUTO: 13.9 K/UL (ref 4.8–10.8)

## 2024-03-25 PROCEDURE — 97165 OT EVAL LOW COMPLEX 30 MIN: CPT

## 2024-03-25 PROCEDURE — 2580000003 HC RX 258: Performed by: NURSE PRACTITIONER

## 2024-03-25 PROCEDURE — 99212 OFFICE O/P EST SF 10 MIN: CPT

## 2024-03-25 PROCEDURE — 71045 X-RAY EXAM CHEST 1 VIEW: CPT

## 2024-03-25 PROCEDURE — 6370000000 HC RX 637 (ALT 250 FOR IP): Performed by: NURSE PRACTITIONER

## 2024-03-25 PROCEDURE — 6370000000 HC RX 637 (ALT 250 FOR IP): Performed by: INTERNAL MEDICINE

## 2024-03-25 PROCEDURE — 74230 X-RAY XM SWLNG FUNCJ C+: CPT

## 2024-03-25 PROCEDURE — 1210000000 HC MED SURG R&B

## 2024-03-25 PROCEDURE — 92526 ORAL FUNCTION THERAPY: CPT

## 2024-03-25 PROCEDURE — 36415 COLL VENOUS BLD VENIPUNCTURE: CPT

## 2024-03-25 PROCEDURE — 92611 MOTION FLUOROSCOPY/SWALLOW: CPT

## 2024-03-25 PROCEDURE — 80048 BASIC METABOLIC PNL TOTAL CA: CPT

## 2024-03-25 PROCEDURE — 85025 COMPLETE CBC W/AUTO DIFF WBC: CPT

## 2024-03-25 PROCEDURE — 6360000002 HC RX W HCPCS: Performed by: NURSE PRACTITIONER

## 2024-03-25 PROCEDURE — 2700000000 HC OXYGEN THERAPY PER DAY

## 2024-03-25 PROCEDURE — 6360000002 HC RX W HCPCS: Performed by: INTERNAL MEDICINE

## 2024-03-25 PROCEDURE — 97163 PT EVAL HIGH COMPLEX 45 MIN: CPT

## 2024-03-25 PROCEDURE — 2500000003 HC RX 250 WO HCPCS: Performed by: INTERNAL MEDICINE

## 2024-03-25 RX ORDER — MORPHINE SULFATE 2 MG/ML
2 INJECTION, SOLUTION INTRAMUSCULAR; INTRAVENOUS EVERY 4 HOURS PRN
Status: DISCONTINUED | OUTPATIENT
Start: 2024-03-25 | End: 2024-03-26 | Stop reason: HOSPADM

## 2024-03-25 RX ORDER — FUROSEMIDE 10 MG/ML
20 INJECTION INTRAMUSCULAR; INTRAVENOUS ONCE
Status: COMPLETED | OUTPATIENT
Start: 2024-03-25 | End: 2024-03-25

## 2024-03-25 RX ADMIN — MORPHINE SULFATE 2 MG: 2 INJECTION, SOLUTION INTRAMUSCULAR; INTRAVENOUS at 11:41

## 2024-03-25 RX ADMIN — POLYETHYLENE GLYCOL 3350 17 G: 17 POWDER, FOR SOLUTION ORAL at 14:30

## 2024-03-25 RX ADMIN — Medication 5 ML: at 21:00

## 2024-03-25 RX ADMIN — GABAPENTIN 400 MG: 400 CAPSULE ORAL at 14:19

## 2024-03-25 RX ADMIN — CEFAZOLIN 2000 MG: 2 INJECTION, POWDER, FOR SOLUTION INTRAMUSCULAR; INTRAVENOUS at 00:53

## 2024-03-25 RX ADMIN — ENOXAPARIN SODIUM 40 MG: 100 INJECTION SUBCUTANEOUS at 09:52

## 2024-03-25 RX ADMIN — BARIUM SULFATE 50 ML: 0.81 POWDER, FOR SUSPENSION ORAL at 13:16

## 2024-03-25 RX ADMIN — BUSPIRONE HYDROCHLORIDE 7.5 MG: 7.5 TABLET ORAL at 14:19

## 2024-03-25 RX ADMIN — METHOCARBAMOL 500 MG: 500 TABLET ORAL at 14:19

## 2024-03-25 RX ADMIN — BUSPIRONE HYDROCHLORIDE 7.5 MG: 7.5 TABLET ORAL at 21:29

## 2024-03-25 RX ADMIN — HYDROXYZINE PAMOATE 25 MG: 25 CAPSULE ORAL at 21:29

## 2024-03-25 RX ADMIN — CHOLESTYRAMINE 4 G: 4 POWDER, FOR SUSPENSION ORAL at 14:30

## 2024-03-25 RX ADMIN — TRAMADOL HYDROCHLORIDE 50 MG: 50 TABLET ORAL at 21:29

## 2024-03-25 RX ADMIN — SODIUM CHLORIDE: 9 INJECTION, SOLUTION INTRAVENOUS at 09:58

## 2024-03-25 RX ADMIN — GABAPENTIN 400 MG: 400 CAPSULE ORAL at 21:29

## 2024-03-25 RX ADMIN — KETOROLAC TROMETHAMINE 7.5 MG: 15 INJECTION, SOLUTION INTRAMUSCULAR; INTRAVENOUS at 05:14

## 2024-03-25 RX ADMIN — BARIUM SULFATE 80 ML: 400 SUSPENSION ORAL at 13:16

## 2024-03-25 RX ADMIN — ATORVASTATIN CALCIUM 10 MG: 10 TABLET, FILM COATED ORAL at 21:29

## 2024-03-25 RX ADMIN — FUROSEMIDE 20 MG: 10 INJECTION, SOLUTION INTRAMUSCULAR; INTRAVENOUS at 11:41

## 2024-03-25 RX ADMIN — CITALOPRAM HYDROBROMIDE 10 MG: 10 TABLET ORAL at 14:19

## 2024-03-25 RX ADMIN — METOPROLOL TARTRATE 75 MG: 50 TABLET, FILM COATED ORAL at 14:19

## 2024-03-25 RX ADMIN — ASPIRIN 81 MG: 81 TABLET, COATED ORAL at 14:19

## 2024-03-25 ASSESSMENT — PAIN SCALES - PAIN ASSESSMENT IN ADVANCED DEMENTIA (PAINAD)
BREATHING: NORMAL
BODYLANGUAGE: TENSE, DISTRESSED PACING, FIDGETING
CONSOLABILITY: DISTRACTED OR REASSURED BY VOICE/TOUCH
FACIALEXPRESSION: SMILING OR INEXPRESSIVE
TOTALSCORE: 2
BREATHING: OCCASIONAL LABORED BREATHING, SHORT PERIOD OF HYPERVENTILATION
CONSOLABILITY: NO NEED TO CONSOLE
NEGVOCALIZATION: OCCASIONAL MOAN/GROAN, LOW SPEECH, NEGATIVE/DISAPPROVING QUALITY
BODYLANGUAGE: TENSE, DISTRESSED PACING, FIDGETING
TOTALSCORE: 5
NEGVOCALIZATION: OCCASIONAL MOAN/GROAN, LOW SPEECH, NEGATIVE/DISAPPROVING QUALITY
FACIALEXPRESSION: SAD, FRIGHTENED, FROWN

## 2024-03-25 ASSESSMENT — ENCOUNTER SYMPTOMS
VOMITING: 0
NAUSEA: 0
SHORTNESS OF BREATH: 0
DIARRHEA: 0
COUGH: 0

## 2024-03-25 NOTE — DISCHARGE INSTRUCTIONS
Hip Replacement  Discharge Instructions    To prevent Clot formation, you have been placed on the following medication:  Lovenox for 30 days  Surgical Site Care:  Dressing change every days and PRN (as needed) with 4 x 4 and porous tape. No betadine. If glue is present, leave open to air   Take off ace on 4/1/24 and follow below  If Aquacel Ag (rubber) dressing is present, do not remove dressing for 7 days, unless heavily saturated. If heavily saturated, remove dressing and start daily dressing changes as described above   if Staples  will be removed on post-operative day 14 and steri-strips applied  Showering is permitted starting POD1 if waterproof aquacell dressing is present or when incision is covered with waterproof dressing, such as 4 x 4 and tegaderm  Physical Therapy:  Weight Bearing Status:  NWB to the left- pt was already NWB to the right - d/t hx of stoke   Status post - distal femur fracture- treated with retrograde nail- pt is NWB and no ROM of the left leg- please leave knee immobilizer on at all times until follow up with ortho    Pain Medications  You were given   Wean off pain medications as you deem appropriate as long as pain is under control  Cold packs/Ice packs/Machine  May be used 3 times daily for 15-30 minutes as necessary  Be sure to have a barrier (cloth, clothing, towel) between the site and the ice pack to prevent frostbite  Contact [facility] office if  Increased redness, swelling, drainage of any kind, and/or pain to surgery site.  As well as new onset fevers and or chills.  These could signify an infection.  Calf or thigh tenderness to touch as well as increased swelling or redness.  This could signify a clot formation.  Numbness or tingling to an area around the incision site or below the incision site (toes).  Any rash appears, increased  or new onset nausea/vomiting occur.  This may indicate a reaction to a medication.   Phone # [phone].  Follow up with Surgeon,

## 2024-03-25 NOTE — CARE COORDINATION
Sonw spoke with Fawn at [facility] and she will start the precert today for the pt to transfer to them when medically cleared for transfer.  Pt is a long term bed hold at [facility] and can return.

## 2024-03-25 NOTE — PLAN OF CARE
See OT evaluation for all goals and OT POC. Electronically signed by Sary Vazquez OTR/L on 3/25/2024 at 12:17 PM

## 2024-03-25 NOTE — DISCHARGE INSTR - COC
{Prognosis:7507839875}    Recommended Labs or Other Treatments After Discharge: ***    Physician Certification: I certify the above information and transfer of Fide Mock  is necessary for the continuing treatment of the diagnosis listed and that she requires Skilled Nursing Facility for greater 30 days.     Update Admission H&P: {CHP DME Changes in HandP:740898035}    PHYSICIAN SIGNATURE:  {Esignature:[id number]}

## 2024-03-25 NOTE — PROCEDURES
RECOMMENDATIONS:   Therapy is recommended to:  Assess tolerance of recommended diet  Improve oral motor strength and range of motion  Improve laryngeal strength and range of motion      Nursing notified: Claudy  Therapy at discretion of facility/treating Speech-Language Pathologist      PLAN OF CARE:   Long Term Goals:    Patient will tolerate least restrictive diet with no overt s/s of difficulty or aspiration.      Short Term Goals:   Pt to be seen 2-3x/week during LOS or until goals met    1.  Pt will complete oral motor ROM and strengthening exercises (labial/buccal) with 90% accuracy, in order to strengthen lingual/labial/buccal musculature to promote safety and efficiency of oral phase of swallow and decrease risk for pocketing.   2.  Pt will complete lingual ROM and strengthening exercises (lingual press, lingual pull backs) with 90% accuracy, in order to promote anterior to posterior propulsion of bolus and improve tongue base retraction.   3.   Pt will improve hyolaryngeal elevation by performing laryngeal exercises (effortful swallow, Mendelsohn maneuver, falsetto & effortful pitch glide) with 90% accuracy in order to strengthen and establish a more effective swallow.  4.  Pt will tolerate thermal gustatory stimulation in 100% of opportunities to stimulate the swallow and improve swallow onset time.   5.  Pt will tolerate therapeutic trials of minced/moist with adequate mastication and oral clearance of bolus with no overt s/s of aspiration on 100% trials.  6.  Pt will demonstrate safe swallowing strategies (ie., small bites/sips, slow rate, upright positioning) for safe and efficient swallow of all consistencies in all given opportunities.  7.  Pt will tolerate the recommended diet level with no s/s of aspiration.        Prognosis for improvements is: Good, Age and Participation Level      Pain Assessment:  Patient c/o pain: Patient able to proceed with session.    Pain Re-assessment:  Patient c/o pain:

## 2024-03-26 VITALS
TEMPERATURE: 97.3 F | DIASTOLIC BLOOD PRESSURE: 81 MMHG | HEART RATE: 85 BPM | SYSTOLIC BLOOD PRESSURE: 145 MMHG | RESPIRATION RATE: 16 BRPM | OXYGEN SATURATION: 100 % | BODY MASS INDEX: 21.91 KG/M2 | WEIGHT: 128.3 LBS | HEIGHT: 64 IN

## 2024-03-26 LAB
ANION GAP SERPL CALCULATED.3IONS-SCNC: 14 MEQ/L (ref 9–15)
BASOPHILS # BLD: 0.1 K/UL (ref 0–0.2)
BASOPHILS NFR BLD: 0.4 %
BUN SERPL-MCNC: 18 MG/DL (ref 8–23)
CALCIUM SERPL-MCNC: 8.3 MG/DL (ref 8.5–9.9)
CHLORIDE SERPL-SCNC: 108 MEQ/L (ref 95–107)
CO2 SERPL-SCNC: 25 MEQ/L (ref 20–31)
CREAT SERPL-MCNC: 0.42 MG/DL (ref 0.5–0.9)
EKG ATRIAL RATE: 60 BPM
EKG P AXIS: 51 DEGREES
EKG P-R INTERVAL: 126 MS
EKG Q-T INTERVAL: 442 MS
EKG QRS DURATION: 84 MS
EKG QTC CALCULATION (BAZETT): 442 MS
EKG R AXIS: 44 DEGREES
EKG T AXIS: 69 DEGREES
EKG VENTRICULAR RATE: 60 BPM
EOSINOPHIL # BLD: 0.5 K/UL (ref 0–0.7)
EOSINOPHIL NFR BLD: 3.9 %
ERYTHROCYTE [DISTWIDTH] IN BLOOD BY AUTOMATED COUNT: 13.2 % (ref 11.5–14.5)
GLUCOSE SERPL-MCNC: 106 MG/DL (ref 70–99)
HCT VFR BLD AUTO: 24.2 % (ref 37–47)
HGB BLD-MCNC: 7.6 G/DL (ref 12–16)
LYMPHOCYTES # BLD: 1.8 K/UL (ref 1–4.8)
LYMPHOCYTES NFR BLD: 13.7 %
MAGNESIUM SERPL-MCNC: 1.8 MG/DL (ref 1.7–2.4)
MCH RBC QN AUTO: 31.8 PG (ref 27–31.3)
MCHC RBC AUTO-ENTMCNC: 31.4 % (ref 33–37)
MCV RBC AUTO: 101.3 FL (ref 79.4–94.8)
MONOCYTES # BLD: 1 K/UL (ref 0.2–0.8)
MONOCYTES NFR BLD: 7.1 %
NEUTROPHILS # BLD: 10 K/UL (ref 1.4–6.5)
NEUTS SEG NFR BLD: 74.4 %
PLATELET # BLD AUTO: 150 K/UL (ref 130–400)
POTASSIUM SERPL-SCNC: 3.5 MEQ/L (ref 3.4–4.9)
RBC # BLD AUTO: 2.39 M/UL (ref 4.2–5.4)
SODIUM SERPL-SCNC: 147 MEQ/L (ref 135–144)
WBC # BLD AUTO: 13.5 K/UL (ref 4.8–10.8)

## 2024-03-26 PROCEDURE — 6370000000 HC RX 637 (ALT 250 FOR IP): Performed by: INTERNAL MEDICINE

## 2024-03-26 PROCEDURE — 83735 ASSAY OF MAGNESIUM: CPT

## 2024-03-26 PROCEDURE — 80048 BASIC METABOLIC PNL TOTAL CA: CPT

## 2024-03-26 PROCEDURE — 36415 COLL VENOUS BLD VENIPUNCTURE: CPT

## 2024-03-26 PROCEDURE — 97110 THERAPEUTIC EXERCISES: CPT

## 2024-03-26 PROCEDURE — 6370000000 HC RX 637 (ALT 250 FOR IP): Performed by: NURSE PRACTITIONER

## 2024-03-26 PROCEDURE — 92526 ORAL FUNCTION THERAPY: CPT

## 2024-03-26 PROCEDURE — 2580000003 HC RX 258: Performed by: NURSE PRACTITIONER

## 2024-03-26 PROCEDURE — 6360000002 HC RX W HCPCS: Performed by: NURSE PRACTITIONER

## 2024-03-26 PROCEDURE — 97535 SELF CARE MNGMENT TRAINING: CPT

## 2024-03-26 PROCEDURE — 2700000000 HC OXYGEN THERAPY PER DAY

## 2024-03-26 PROCEDURE — 85025 COMPLETE CBC W/AUTO DIFF WBC: CPT

## 2024-03-26 RX ORDER — FUROSEMIDE 20 MG/1
40 TABLET ORAL DAILY
Qty: 30 TABLET | Refills: 0 | Status: SHIPPED | OUTPATIENT
Start: 2024-03-26

## 2024-03-26 RX ORDER — FUROSEMIDE 40 MG/1
40 TABLET ORAL DAILY
Status: DISCONTINUED | OUTPATIENT
Start: 2024-03-26 | End: 2024-03-26 | Stop reason: HOSPADM

## 2024-03-26 RX ORDER — METHOCARBAMOL 500 MG/1
500 TABLET, FILM COATED ORAL 4 TIMES DAILY PRN
Qty: 20 TABLET | Refills: 0
Start: 2024-03-26 | End: 2024-04-05

## 2024-03-26 RX ORDER — TRAMADOL HYDROCHLORIDE 50 MG/1
50 TABLET ORAL EVERY 6 HOURS PRN
Qty: 28 TABLET | Refills: 0 | Status: SHIPPED | OUTPATIENT
Start: 2024-03-26 | End: 2024-04-02

## 2024-03-26 RX ORDER — ENOXAPARIN SODIUM 100 MG/ML
40 INJECTION SUBCUTANEOUS DAILY
Qty: 12 ML | Refills: 0
Start: 2024-03-27 | End: 2024-04-26

## 2024-03-26 RX ADMIN — Medication 10 ML: at 09:00

## 2024-03-26 RX ADMIN — GABAPENTIN 400 MG: 400 CAPSULE ORAL at 08:59

## 2024-03-26 RX ADMIN — BUSPIRONE HYDROCHLORIDE 7.5 MG: 7.5 TABLET ORAL at 08:59

## 2024-03-26 RX ADMIN — TRAMADOL HYDROCHLORIDE 50 MG: 50 TABLET ORAL at 06:18

## 2024-03-26 RX ADMIN — FUROSEMIDE 40 MG: 40 TABLET ORAL at 10:11

## 2024-03-26 RX ADMIN — ENOXAPARIN SODIUM 40 MG: 100 INJECTION SUBCUTANEOUS at 08:59

## 2024-03-26 RX ADMIN — POLYETHYLENE GLYCOL 3350 17 G: 17 POWDER, FOR SOLUTION ORAL at 08:59

## 2024-03-26 RX ADMIN — METOPROLOL TARTRATE 75 MG: 50 TABLET, FILM COATED ORAL at 08:59

## 2024-03-26 RX ADMIN — ASPIRIN 81 MG: 81 TABLET, COATED ORAL at 08:59

## 2024-03-26 RX ADMIN — CHOLESTYRAMINE 4 G: 4 POWDER, FOR SUSPENSION ORAL at 08:59

## 2024-03-26 RX ADMIN — CITALOPRAM HYDROBROMIDE 10 MG: 10 TABLET ORAL at 08:59

## 2024-03-26 NOTE — PROGRESS NOTES
DAILY PROGRESS NOTE      POD: 1    Patient doing fairly well  Vitals:    24 0233   BP: 110/75   Pulse: 95   Resp: 18   Temp: 99 °F (37.2 °C)   SpO2: 100%      Temp (24hrs), Av.2 °F (36.8 °C), Min:97.5 °F (36.4 °C), Max:99.1 °F (37.3 °C)       Pain Control Good  No chest pain or shortness of breath.  No calf pain    Exam:   Incision(s): covered by surgical dressing/ and knee immobilizer  Dressing clean, dry, and intact  Operative extremity shows neuro vascular status intact. Flexion and extension intact on operative extremity  Calves soft and non-tender without evidence of DVT.  .      Labs reviewed:  CBC:   Recent Labs     24  0116 24  0454 24  0528   WBC 16.1* 11.4* 13.9*   HGB 13.3 10.2* 7.9*    195 144     BMP:    Recent Labs     24  0116 24  0606 24  0454    142 142   K 4.1 4.3 4.3    108* 105   CO2 28 25 26   BUN 18 15 10   CREATININE 0.56 0.48* 0.44*   GLUCOSE 133* 115* 100*       I&O  I/O last 3 completed shifts:  In: 1247.1 [I.V.:1050.4; IV Piggyback:196.8]  Out: 550 [Urine:550]      Assessment:  Good Post Operative Course. 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 
    DAILY PROGRESS NOTE      POD: 2    Patient doing fairly well  Vitals:    24 0749   BP: (!) 145/81   Pulse: 85   Resp: 16   Temp: 97.3 °F (36.3 °C)   SpO2: 100%      Temp (24hrs), Av.9 °F (36.6 °C), Min:97.3 °F (36.3 °C), Max:98.6 °F (37 °C)       Pain Control Good  No chest pain or shortness of breath.  No calf pain    Exam:   Incision(s): surgical dressing under the  knee immobilizer  Dressing clean, dry, and intact  Operative extremity shows neuro vascular status intact. Flexion and extension intact on operative extremity  Calves soft and non-tender without evidence of DVT.  .      Labs reviewed:  CBC:   Recent Labs     24  0528 24  0443   WBC 11.4* 13.9* 13.5*   HGB 10.2* 7.9* 7.6*    144 150     BMP:    Recent Labs     24  0527 24  0443    144 147*   K 4.3 3.9 3.5    108* 108*   CO2    BUN 10 21 18   CREATININE 0.44* 0.65 0.42*   GLUCOSE 100* 106* 106*       I&O  I/O last 3 completed shifts:  In: 1238.5 [P.O.:300; I.V.:838.5; IV Piggyback:100]  Out: 1600 [Urine:1600]      Assessment:  Good Post Operative Course.   Plan is to leave the surgical dressing on so that the knee immobilizer does not rub    Dressing instructions in chart as well as pain rx   Follow up with Dr Raygoza in 2 weeks   Pt's hgb is stabilizing- again not unexpected d/t type of fracture- no additional active bleeding noted- and dressing is clean        Plan:   as above    YULY Almanzar - CNP   3/26/2024 11:47 AM    
   03/23/24 1600   RT Protocol   History Pulmonary Disease 2   Respiratory pattern 0   Breath sounds 0   Cough 0   Indications for Bronchodilator Therapy On home bronchodilators   Bronchodilator Assessment Score 2       
  Physical Therapy Med Surg Initial Assessment  Facility/Department: [address] ORTHO TELE  Room: [facility]       NAME: Fide Mock  : 1945 (78 y.o.)  MRN: [id number]  CODE STATUS: DNR-CCA    Date of Service: 3/25/2024    Patient Diagnosis(es): Multiple fractures [T07.XXXA]  Closed bicondylar fracture of distal femur, left, initial encounter ([facility]) [S72.422A, S72.432A]  Closed fracture of single ramus of right pubis, initial encounter ([facility]) [S32.591A]   Chief Complaint   Patient presents with    Fall     Unwitness fall out of bed, Left hip and left knee pain     Patient Active Problem List    Diagnosis Date Noted    Sepsis secondary to UTI ([facility]) 2022    Obtundation 2022    Late effects of CVA (cerebrovascular accident) 2022    Encephalopathy 2022    Aphasia 11/15/2022    TIA (transient ischemic attack) 11/15/2022    Nocturnal hypoxia 11/15/2022    Sleep apnea 11/15/2022    Dysphagia 2022    Cerebrovascular accident (CVA) due to stenosis of left vertebral artery ([facility]) 2022    Palliative care encounter 2022    Advanced care planning/counseling discussion 2022    Goals of care, counseling/discussion 2022    Cerebrovascular accident (CVA) due to stenosis of left middle cerebral artery (HCC) 2022    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    
  Physician Progress Note      PATIENT:               MARTINEZ POON  CSN #:                  [id number]  :                       1945  ADMIT DATE:       3/22/2024 10:49 PM  DISCH DATE:  RESPONDING  PROVIDER #:        Praneeth Copeland MD          QUERY TEXT:    Patient admitted with left distal femur fracture. Patient noted to have \"quite   a bit of blood loss\" per Alberta Saldaña APRN ortho documentation 24;  HGB    13.3, 10.2, 7.9, 7.6. Dr Copeland discharge summary states: \"Patient had drop   in hemoglobin but not bleeding.\"  If possible, please document in the   progress notes and discharge summary if you are evaluating and/or treating any   of the following:    The medical record reflects the following:  Risk Factors: 78 yof, s/p surgery for left distal femur fracture  Clinical Indicators: OP note 24: left femur intramedullary nail  EBL   <100ml.  HGB/HCT 13.3/41.4,  10.2/31.6,  24.6/7.9   24.2/7.6.  Alberta Charles APRN note 24\" She does have quite a bit of blood loss- not   unexpected for the type of fracture.\"  Dr Copeland discharge summary 24:   Patient had drop in hemoglobin but not bleeding.  Treatment:  left femur intramedullary nail, Ortho consult, labs    Thank you  Edgar COPELANDN RN CDS   M-F 6am-2pm  Options provided:  -- Postoperative acute blood loss anemia  -- Precipitous drop in Hemoglobin and Hematocrit only without acute blood loss  -- Other - I will add my own diagnosis  -- Disagree - Not applicable / Not valid  -- Disagree - Clinically unable to determine / Unknown  -- Refer to Clinical Documentation Reviewer    PROVIDER RESPONSE TEXT:    This patient has postoperative acute blood loss anemia.    Query created by: Edgar Roth on 3/26/2024 12:28 PM      Electronically signed by:  Praneeth Copeland MD 3/26/2024 12:30 PM          
[facility] OCCUPATIONAL THERAPY EVALUATION - ACUTE     NAME: Fide Mock  : 1945 (78 y.o.)  MRN: [id number]  CODE STATUS: DNR-CCA  Room: [place] [address]    Date of Service: 3/25/2024    Patient Diagnosis(es): Multiple fractures [T07.XXXA]  Closed bicondylar fracture of distal femur, left, initial encounter ([facility]) [S72.422A, S72.432A]  Closed fracture of single ramus of right pubis, initial encounter ([facility]) [S32.591A]   Patient Active Problem List    Diagnosis Date Noted    Sepsis secondary to UTI ([facility]) 2022    Obtundation 2022    Late effects of CVA (cerebrovascular accident) 2022    Encephalopathy 2022    Aphasia 11/15/2022    TIA (transient ischemic attack) 11/15/2022    Nocturnal hypoxia 11/15/2022    Sleep apnea 11/15/2022    Dysphagia 2022    Cerebrovascular accident (CVA) due to stenosis of left vertebral artery ([facility]) 2022    Palliative care encounter 2022    Advanced care planning/counseling discussion 2022    Goals of care, counseling/discussion 2022    Cerebrovascular accident (CVA) due to stenosis of left middle cerebral artery ([facility]) 2022    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 ([facility]) 2022    Fibromuscular dysplasia ([facility]) 2022    Impaired mobility and activities of daily living dt CVA 2022    Acute CVA (cerebrovascular accident) ([facility]) 2022    Multiple fractures 2024    Claudication in peripheral vascular disease ([facility]) 2020    Non-healing surgical wound 2020    Venous insufficiency of left lower extremity 2020    Asymptomatic varicose veins of bilateral lower extremities 2020    Pain and swelling due to varicose veins of both lower extremities 2020    Slow transit constipation 10/13/2019    External hemorrhoid, bleeding 10/13/2019    Colitis 10/13/2019    Acute colitis 10/10/2019    Hypovolemic shock (HCC) 10/07/2019    NSTEMI (non-ST 
[facility]  [place] [facility]  Speech Language Pathology   Treatment Note      Fide Mock  1945  W264/W264-01  [x]   confirmed      Date: 3/25/2024    Multiple fractures [T07.XXXA]  Closed bicondylar fracture of distal femur, left, initial encounter ([facility]) [S72.422A, S72.432A]  Closed fracture of single ramus of right pubis, initial encounter ([facility]) [S32.656G]    Restrictions/Precautions: Fall Risk  Lower Extremity Weight Bearing Restrictions  Left Lower Extremity Weight Bearing: Non Weight Bearing  Position Activity Restriction  Other position/activity restrictions: No ROM L knee - immobilizer donned at all times    Diet NPO     Respiratory Status:O2 Flow Rate (L/min): 5 L/min (24 0233)   No active isolations      Subjective:  Cooperative and Confused        Interventions used this date:  Dysphagia Treatment      Objective/Assessment:  New orders received for bed side swallow evaluation. Initial BSE completed 3/22/24 recommending soft and thin liquids. Per chart review, pt had surgery 3/24/24 and remained NPO. Patient currently alert and confused. Disoriented to person. Patient had difficulty [facility]. The following trials were performed:     Patient was given trials of water via cup x1. Patient exhibited x1 immediate cough. Suspect premature spillage with thin via cup. Patient was next given trials of mildly thick water via cup. Patient exhibited difficulties with bolus acceptance, bolus holding, tongue pumping, and suspect delayed swallow. Patient exhibited x1 delayed cough. Patient given trials of mildly thick liquids via spoon x2 with no overt s/s of aspiration. Patient was given trials of puree (applesauce) in which patient tolerated with no overt s/s of aspiration. However, patient exhibited bolus holding, tongue pumping, and suspect delayed swallow. Patient needed frequent reminders to swallow due to consistent oral holding, suspect 
[facility] Department  Change in Status Communication Form      To the referring physician:    Due to an acute change in this patient's medical status, new Occupational Therapy Eval and Treatment orders are required. Pt has had new surgery and will require post op orders with updated activity status/weight bearing as applicable. Please reorder when pt. is medically stable to resume OOB activity, as appropriate.    We thank you for your referral.     Regards,   Loretta Batista OT Department.     Electronically signed by PAPI Castillo/L on 3/24/24 at 7:59 AM EDT  
No additional traumatic injuries on tertiary exam  Trauma no longer following.  Medicine Primary. Ortho following as consult.  
[facility]  Occupational Therapy        NAME:  Fide Mock  ROOM: W264/W264-01  :  1945  DATE: 3/23/2024    Attempted to see Fide Mock on this date for:   [x]  Initial Evaluation   []  Treatment       Patient was unable to be seen due to:   [] Off unit for testing/procedure    [] Patient refused, stating \"    [] Therapy on hold due to     [] Nursing deferred due to    [x] Other: Per ortho note on 3/23/24, pt scheduled for Left femur surgical fixation tomorrow. Hold OT eval until after surgery and new OT orders placed.           Electronically signed by PAPI Peguero/L on 3/23/24 at 2:15 PM EDT  
Patient was seen evaluated.  Resume home meds except aspirin for now after surgery will resume  Care per Ortho.  Continue current care.  Spoke to nurse about the care.  Tomorrow pt is going for surgery, c/w DVT ppz  
Patient's music bear and eye glasses on patient and bed to return up to room  
Physical Therapy Med Surg Daily Treatment Note  Facility/Department: [address] ORTHO TELE  [facility]       NAME: Fide Mock  : 1945 (78 y.o.)  MRN: [id number]  CODE STATUS: DNR-CCA    Date of Service: 3/26/2024    Patient Diagnosis(es): Multiple fractures [T07.XXXA]  Closed bicondylar fracture of distal femur, left, initial encounter ([facility]) [S72.422A, S72.432A]  Closed fracture of single ramus of right pubis, initial encounter ([facility]) [S32.591A]   Chief Complaint   Patient presents with    Fall     Unwitness fall out of bed, Left hip and left knee pain     Patient Active Problem List    Diagnosis Date Noted    Sepsis secondary to UTI ([facility]) 2022    Obtundation 2022    Late effects of CVA (cerebrovascular accident) 2022    Encephalopathy 2022    Aphasia 11/15/2022    TIA (transient ischemic attack) 11/15/2022    Nocturnal hypoxia 11/15/2022    Sleep apnea 11/15/2022    Dysphagia 2022    Cerebrovascular accident (CVA) due to stenosis of left vertebral artery ([facility]) 2022    Palliative care encounter 2022    Advanced care planning/counseling discussion 2022    Goals of care, counseling/discussion 2022    Cerebrovascular accident (CVA) due to stenosis of left middle cerebral artery (HCC) 2022    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 ([facility]) 2022    Fibromuscular dysplasia ([facility]) 2022    Impaired mobility and activities of daily living dt CVA 2022    Acute CVA (cerebrovascular accident) ([facility]) 2022    Multiple fractures 2024    Claudication in peripheral vascular disease ([facility]) 2020    Non-healing surgical wound 2020    Venous insufficiency of left lower extremity 2020    Asymptomatic varicose veins of bilateral lower extremities 2020    Pain and swelling due to varicose veins of both lower extremities 2020    Slow transit constipation 10/13/2019    
Physical Therapy Missed Treatment   Facility/Department: [facility] MED SURG W264/W264-01    NAME: Fide Mock    : 1945 (78 y.o.)  MRN: [id number]    Account: [id number]  Gender: female    Chart reviewed, attempted PT at 13:46. Patient unavailable 2° to:    [] Hold per nsg request    [] Pt declined     [] Nsg notified   [] Other notified    [] Pt.. off floor for test/procedure.     [x] Pt. Initially agreeable to tx. however once starting to transfer pt is screaming out. \"No. No. No.\" She is resistive. Pt just returned from [facility] and per daughter she scared and tired. Will resume tomorrow.        Will attempt PT treatment again at earliest convenience.      Electronically signed by Carly Duval PTA on 3/25/24 at 2:12 PM EDT    
Progress Note  Date:3/25/2024       Room:W264/W264-01  Patient Name:Fide Mock     YOB: 1945     Age:78 y.o.        Subjective    Subjective:  Symptoms:  She reports malaise and weakness.  No shortness of breath, cough, chest pain, headache, chest pressure, anorexia, diarrhea or anxiety.    Diet:  No nausea or vomiting.       Review of Systems   Respiratory:  Negative for cough and shortness of breath.    Cardiovascular:  Negative for chest pain.   Gastrointestinal:  Negative for anorexia, diarrhea, nausea and vomiting.   Neurological:  Positive for weakness.     Objective         Vitals Last 24 Hours:  TEMPERATURE:  Temp  Av.2 °F (36.8 °C)  Min: 97.5 °F (36.4 °C)  Max: 99 °F (37.2 °C)  RESPIRATIONS RANGE: Resp  Av.2  Min: 17  Max: 20  PULSE OXIMETRY RANGE: SpO2  Av.7 %  Min: 94 %  Max: 100 %  PULSE RANGE: Pulse  Av.6  Min: 88  Max: 107  BLOOD PRESSURE RANGE: Systolic (24hrs), Av , Min:104 , Max:126   ; Diastolic (24hrs), Av, Min:42, Max:84    I/O (24Hr):    Intake/Output Summary (Last 24 hours) at 3/25/2024 1036  Last data filed at 3/24/2024 2317  Gross per 24 hour   Intake 347.13 ml   Output 550 ml   Net -202.87 ml       Objective:  General Appearance:  In no acute distress.    Vital signs: (most recent): Blood pressure 119/84, pulse 93, temperature 97.5 °F (36.4 °C), temperature source Axillary, resp. rate 18, height 1.626 m (5' 4\"), weight 58.2 kg (128 lb 4.8 oz), SpO2 99 %, not currently breastfeeding.    HEENT: Normal HEENT exam.    Lungs:  There are decreased breath sounds.    Heart: S1 normal and S2 normal.    Abdomen: Abdomen is soft.  Bowel sounds are normal.     Extremities: Normal range of motion.    Pulses: Distal pulses are intact.    Neurological: Patient is alert.    Pupils:  Pupils are equal, round, and reactive to light.      Labs/Imaging/Diagnostics    Labs:  CBC:  Recent Labs     24  0116 24  0454 24  0528   WBC 16.1* 11.4* 13.9* 
Report received from TATIANA Turcios, in PACU  
Sharee Farrell, SLP on 3/26/2024 at 1:33 PM           
None  Pharyngeal Phase Characteristics: No impairment, issues, or problems      PO Trials 3  Consistency Presented: Soft & Bite Sized  How Presented: SLP-fed/Presented  How much presented: 3  Bolus Acceptance: No impairment  Bolus Formation/Control: Impaired  Type of Impairment: Mastication;Oral holding  Propulsion: Tongue pumping  Oral Residue: 10-50% of bolus;Lingual (lingual residue in 1/3 trials that required verbal cueing to clear)  Initiation of Swallow: No impairment  Aspiration Signs/Symptoms: None  Pharyngeal Phase Characteristics: Multiple swallows  Effective Modifications: Alternate liquids/solids;Small sips and bites                    Dysphagia Diagnosis  Dysphagia Diagnosis: Mild oral stage dysphagia;Mild pharyngeal stage dysphagia  Dysphagia Impression : Pt p/w mild oral dysphagia characterized by decreased labial ROM/coordination and lingual strength leading to lingual residue in 1/3 trials of soft solids, lingual pumping and mild oral holding with all trials. Pt had hyolaryngeal elevation present and no overt s/s of aspiration. Pt has h/o dysphagia s/p CVA in 2022 with PEG at that time. Report from nursing home is patient has baseline dental soft diet with thin liquids. Recommend modified PO diet with use of compensatory swallow strategies and assistance with PO intake.  Dysphagia Outcome Severity Scale: Level 5: Mild dysphagia- Distant supervision. May need one diet consistency restricted     Recommendations  Requires SLP Intervention: Yes  Recommendations: Assistance with meals;Dysphagia treatment  Referral To: Dietician  D/C Recommendations: Ongoing speech therapy is recommended during this hospitalization  Diet Solids Recommendation: Soft & Bite Sized  Liquid Consistency Recommendation: Thin  Compensatory Swallowing Strategies : Alternate solids and liquids;Eat/Feed slowly;No straws;Upright as possible for all oral intake;Remain upright for 30-45 minutes after meals;Assist feed;Small 
appear preserved. Femoral head/neck trabeculations appear maintained. There does appear to be some osseous irregularity to the right superior and inferior pubic rami.  Pubic symphysis appears congruent. Proximal left femur appears intact. LEFT FEMUR: Osseous mineralization is decreased.  There is a comminuted and displaced fracture of the distal left femur.  This does not appear to extend to the knee arthroplasty component. Soft tissue swelling about the area of fracture. Mild left hip osteoarthritis noted. LEFT KNEE: Patient has undergone previous left knee arthroplasty. Distal left femur fracture is displaced and slightly angulated.  Proximal fracture fragment is anterior by approximately 1.8 cm.  The fracture does not appear to extend to the femoral component of the knee arthroplasty.     1.  Distal left femur fracture.  This is angulated and displaced.  The fracture on these images does not appear to extend to the knee arthroplasty component. 2.  Osseous mineralization is decreased. 3.  Previous left knee arthroplasty. 4.  Mild bilateral hip osteoarthritis. 5.  On the limited pelvis views there appears to be osseous irregularity to the right superior and inferior pubic rami and the right aspect of the pubic symphysis.     XR KNEE LEFT (1-2 VIEWS)    Result Date: 3/23/2024  EXAMINATION: ONE XRAY VIEW OF THE PELVIS AND TWO XRAY VIEWS LEFT HIP; 6 XRAY VIEWS OF THE LEFT FEMUR; TWO XRAY VIEWS OF THE LEFT KNEE 3/23/2024 12:26 am COMPARISON: None used HISTORY: ORDERING SYSTEM PROVIDED HISTORY: fall, pain TECHNOLOGIST PROVIDED HISTORY: Reason for exam:->fall, pain What reading provider will be dictating this exam?->CRC FINDINGS: PELVIS/LEFT HIP: Patient is rotated for this exam.  Osseous mineralization is decreased. Mild bilateral hip osteoarthritis.  Femoral head contours appear preserved. Femoral head/neck trabeculations appear maintained. There does appear to be some osseous irregularity to the right superior and 
vertebral artery (HCC)    Palliative care encounter    Advanced care planning/counseling discussion    Goals of care, counseling/discussion    Aphasia    TIA (transient ischemic attack)    Nocturnal hypoxia    Sleep apnea    Encephalopathy    Sepsis secondary to UTI (HCC)    Obtundation    Late effects of CVA (cerebrovascular accident)    Multiple fractures       Measurements:    Incision 03/24/24 Knee Left;Anterior (Active)   Dressing Status Clean;Dry;Intact 03/24/24 1046   Dressing/Treatment Other (comment) 03/24/24 0840   Closure Sutures;Staples 03/24/24 0840   Margins Approximated 03/24/24 0840   Drainage Amount None (dry) 03/24/24 1046   Odor None 03/24/24 1046   Number of days: 1     Assessment:    Head to toe assessment completed, including assessment of al bony prominences. No open wounds present at this time. Patient remains high risk for pressure injuries.    Plan   Plan of Care:  see above    Specialty Bed Required : Yes   [x] Low Air Loss   [] Pressure Redistribution  [] Fluid Immersion  [] Bariatric  [] Other:     Current Diet: Diet NPO  Dietician consult:  Yes    Discharge Plan:  Placement for patient upon discharge: skilled nursing    Patient appropriate for Outpatient Wound Care Center: N/A    Referrals:  []   [] Home Health Care  [] Supplies  [] Other    Patient/Caregiver Teaching:  Level of patient/caregiver understanding able to:   [] Indicates understanding       [] Needs reinforcement  [] Unsuccessful      [] Verbal Understanding  [] Demonstrated understanding       [] No evidence of learning  [] Refused teaching         [x] N/A       Electronically signed by DINORAH OrtizN, RN, CWOCN on 3/25/2024 at 11:05 AM

## 2024-03-26 NOTE — DISCHARGE SUMMARY
findings and recommendations.     XR CHEST PORTABLE    Result Date: 3/25/2024  Mild increased markings with blunting of both costophrenic angles. This may represent mild pulmonary edema.     FLUORO FOR SURGICAL PROCEDURES    Result Date: 3/24/2024  Intraprocedural fluoroscopic spot images as above.  See separate procedure report for more information.     XR FEMUR RIGHT (MIN 2 VIEWS)    Result Date: 3/23/2024  1. No evidence of acute fracture or dislocation. 2. Old right superior and inferior pubic rami fractures. 3. Mild right hip osteoarthritis. 4. Status post total right knee arthroplasty. 5. Diffuse osteopenia.     XR HIP RIGHT (2-3 VIEWS)    Result Date: 3/23/2024  1. No evidence of acute fracture or dislocation. 2. Old right superior and inferior pubic rami fractures. 3. Diffuse osteopenia. 4. Osteo-degenerative changes, as described above.     XR HIP 2-3 VW W PELVIS LEFT    Result Date: 3/23/2024  1.  Distal left femur fracture.  This is angulated and displaced.  The fracture on these images does not appear to extend to the knee arthroplasty component. 2.  Osseous mineralization is decreased. 3.  Previous left knee arthroplasty. 4.  Mild bilateral hip osteoarthritis. 5.  On the limited pelvis views there appears to be osseous irregularity to the right superior and inferior pubic rami and the right aspect of the pubic symphysis.     XR FEMUR LEFT (MIN 2 VIEWS)    Result Date: 3/23/2024  1.  Distal left femur fracture.  This is angulated and displaced.  The fracture on these images does not appear to extend to the knee arthroplasty component. 2.  Osseous mineralization is decreased. 3.  Previous left knee arthroplasty. 4.  Mild bilateral hip osteoarthritis. 5.  On the limited pelvis views there appears to be osseous irregularity to the right superior and inferior pubic rami and the right aspect of the pubic symphysis.     XR KNEE LEFT (1-2 VIEWS)    Result Date: 3/23/2024  1.  Distal left femur fracture.  This

## 2024-03-30 LAB
ANISOCYTOSIS BLD QL SMEAR: ABNORMAL
BASOPHILS # BLD: 0 K/UL (ref 0–0.2)
BASOPHILS NFR BLD: 0.2 %
EOSINOPHIL # BLD: 0 K/UL (ref 0–0.7)
EOSINOPHIL NFR BLD: 0.1 %
ERYTHROCYTE [DISTWIDTH] IN BLOOD BY AUTOMATED COUNT: 15 % (ref 11.5–14.5)
HCT VFR BLD AUTO: 24.3 % (ref 37–47)
HGB BLD-MCNC: 7.2 G/DL (ref 12–16)
HYPOCHROMIA BLD QL SMEAR: ABNORMAL
LYMPHOCYTES # BLD: 1.5 K/UL (ref 1–4.8)
LYMPHOCYTES NFR BLD: 12 %
MACROCYTES BLD QL SMEAR: ABNORMAL
MCH RBC QN AUTO: 31.9 PG (ref 27–31.3)
MCHC RBC AUTO-ENTMCNC: 29.6 % (ref 33–37)
MCV RBC AUTO: 107.5 FL (ref 79.4–94.8)
MONOCYTES # BLD: 0.1 K/UL (ref 0.2–0.8)
MONOCYTES NFR BLD: 1 %
NEUTROPHILS # BLD: 10.6 K/UL (ref 1.4–6.5)
NEUTS SEG NFR BLD: 87 %
NRBC BLD-RTO: 3 /100 WBC
OVALOCYTES BLD QL SMEAR: ABNORMAL
PLATELET # BLD AUTO: 286 K/UL (ref 130–400)
PLATELET BLD QL SMEAR: ADEQUATE
POIKILOCYTOSIS BLD QL SMEAR: ABNORMAL
POLYCHROMASIA BLD QL SMEAR: ABNORMAL
RBC # BLD AUTO: 2.26 M/UL (ref 4.2–5.4)
SLIDE REVIEW: ABNORMAL
SMUDGE CELLS BLD QL SMEAR: 7.8
STOMATOCYTES BLD QL SMEAR: ABNORMAL
WBC # BLD AUTO: 12.2 K/UL (ref 4.8–10.8)

## 2024-04-15 ENCOUNTER — OFFICE VISIT (OUTPATIENT)
Dept: PALLATIVE CARE | Age: 79
End: 2024-04-15
Payer: MEDICARE

## 2024-04-15 VITALS — DIASTOLIC BLOOD PRESSURE: 76 MMHG | HEART RATE: 69 BPM | SYSTOLIC BLOOD PRESSURE: 122 MMHG | TEMPERATURE: 98.1 F

## 2024-04-15 DIAGNOSIS — F32.1 MAJOR DEPRESSIVE DISORDER, SINGLE EPISODE, MODERATE (HCC): Primary | ICD-10-CM

## 2024-04-15 DIAGNOSIS — F03.94 DEMENTIA WITH ANXIETY, UNSPECIFIED DEMENTIA SEVERITY, UNSPECIFIED DEMENTIA TYPE (HCC): ICD-10-CM

## 2024-04-15 DIAGNOSIS — Z71.89 GOALS OF CARE, COUNSELING/DISCUSSION: ICD-10-CM

## 2024-04-15 DIAGNOSIS — J43.8 OTHER EMPHYSEMA (HCC): ICD-10-CM

## 2024-04-15 PROCEDURE — 1123F ACP DISCUSS/DSCN MKR DOCD: CPT | Performed by: NURSE PRACTITIONER

## 2024-04-15 PROCEDURE — 99309 SBSQ NF CARE MODERATE MDM 30: CPT | Performed by: NURSE PRACTITIONER

## 2024-04-15 RX ORDER — METHOCARBAMOL 500 MG/1
500 TABLET, FILM COATED ORAL EVERY 6 HOURS
COMMUNITY

## 2024-04-15 ASSESSMENT — ENCOUNTER SYMPTOMS
COUGH: 0
ABDOMINAL PAIN: 0
SHORTNESS OF BREATH: 0

## 2024-04-15 ASSESSMENT — VISUAL ACUITY: OU: 1

## 2024-04-15 NOTE — PROGRESS NOTES
obstructive pulmonary disease) (HCC)     Generalized osteoarthrosis, unspecified site 2002    Hyperlipidemia     Hypertension     Major depressive disorder, single episode, moderate (HCC) 2002    Morbid obesity (HCC) 2002    Non-pressure chronic ulcer of calf with fat layer exposed (HCC) 10/09/2020    Non-pressure chronic ulcer of calf, right, with fat layer exposed (HCC) 10/02/2020    OAB (overactive bladder)     Osteoporosis     Sleep apnea 11/15/2022    TIA (transient ischemic attack)      Past Surgical History:   Procedure Laterality Date    CARDIAC CATHETERIZATION      CARPAL TUNNEL RELEASE      COLONOSCOPY      FERTILITY SURGERY Left 3/24/2024    LEFT FEMUR INTRAMEDULLARY NAIL RETROGRADE [place] [facility] [place]    GASTROSTOMY TUBE PLACEMENT N/A 2022    PERCUTANEOUS ENDOSCOPIC GASTROSTOMY TUBE PLACEMENT performed by [place]ari Anguiano [place] [facility] [place]    HYSTERECTOMY, TOTAL ABDOMINAL (CERVIX REMOVED)      KNEE ARTHROPLASTY Bilateral     CT COLON CA SCRN NOT HI RSK IND N/A 2017    COLONOSCOPY performed by Santos Smith MD at [facility]    CT ESOPHAGOGASTRODUODENOSCOPY TRANSORAL DIAGNOSTIC N/A 2017    EGD ESOPHAGOGASTRODUODENOSCOPY performed by Santos Smith MD at [facility]    SHOULDER ARTHROPLASTY Bilateral     SKIN BIOPSY Right 2020    EXCISION OF LEG MASS RIGHT (PAT ON ADMIT) performed by Hari Anguiano [place] [facility] [place]     Social History     Socioeconomic History    Marital status:      Spouse name: Ephraim     Number of children: 3    Years of education: 12    Highest education level: 12th grade   Occupational History    Occupation: [employer]    Tobacco Use    Smoking status: Former     Current packs/day: 0.00     Average packs/day: 1 pack/day for 20.0 years (20.0 ttl pk-yrs)     Types: Cigarettes     Start date:      Quit date: 1980     Years since quittin.3    Smokeless tobacco: Never   Vaping

## 2024-04-19 ENCOUNTER — HOSPITAL ENCOUNTER (OUTPATIENT)
Dept: RADIOLOGY | Facility: CLINIC | Age: 79
Discharge: HOME | End: 2024-04-19
Payer: MEDICARE

## 2024-04-19 ENCOUNTER — OFFICE VISIT (OUTPATIENT)
Dept: ORTHOPEDIC SURGERY | Facility: CLINIC | Age: 79
End: 2024-04-19
Payer: MEDICARE

## 2024-04-19 ENCOUNTER — TELEPHONE (OUTPATIENT)
Dept: ORTHOPEDIC SURGERY | Facility: CLINIC | Age: 79
End: 2024-04-19

## 2024-04-19 DIAGNOSIS — S72.92XA CLOSED FRACTURE OF LEFT FEMUR, UNSPECIFIED FRACTURE MORPHOLOGY, UNSPECIFIED PORTION OF FEMUR, INITIAL ENCOUNTER (MULTI): Primary | ICD-10-CM

## 2024-04-19 DIAGNOSIS — S72.92XA CLOSED FRACTURE OF LEFT FEMUR, UNSPECIFIED FRACTURE MORPHOLOGY, UNSPECIFIED PORTION OF FEMUR, INITIAL ENCOUNTER (MULTI): ICD-10-CM

## 2024-04-19 PROCEDURE — 73552 X-RAY EXAM OF FEMUR 2/>: CPT | Mod: LT

## 2024-04-19 PROCEDURE — 73552 X-RAY EXAM OF FEMUR 2/>: CPT | Mod: LEFT SIDE | Performed by: ORTHOPAEDIC SURGERY

## 2024-04-19 PROCEDURE — 99024 POSTOP FOLLOW-UP VISIT: CPT | Performed by: ORTHOPAEDIC SURGERY

## 2024-04-19 NOTE — PROGRESS NOTES
Subjective    Patient ID: Charlotte    Chief Complaint:   Chief Complaint   Patient presents with    Left Hip - Hospital Follow-up, Post-op Visit     Femur IM nailing 3/24/24     History of present illness    78-year-old female presented to clinic today for first postop follow-up visit status post IM nailing left femur.  Surgery was performed on 3/24/2024.  She has been nonweightbearing left lower extremity.  She is in an immobilizer.  She was nonambulatory preop.  She is currently at [facility] for rehabilitation.  No numbness tingling no fevers chills reported.      Past medical , Surgical, Family and social history reviewed.      Physical exam  General: No acute distress and breathing comfortably.  Patient is pleasant and cooperative with the examination.    Extremity  Left lower extremity is neurovascular intact.  She has good sensation distally.  Minimal discomfort around the fracture site.  Compartment soft.  Capillary refill within normal limits distally.  Incisions are well-approximated at this time.  No drainage.  No infection.  No sign of DVT.    Diagnostics  Please see dictated x-ray report  No results found.     Procedure  [ none]    Assessment  Status post IM nailing left femur    Treatment plan  1.  At this time she will remain nonweightbearing left lower extremity.  2.  Orders were written for staples to be removed at the facility just due to transfer regulations.  Will continue with the immobilizer.  3.  Continue with current pain medication as needed.  Follow-up with us in 3 weeks for reevaluation with new x-rays left femur at that time.  We will go ahead and also get prior authorization for a T scope knee brace for her next appointment.  4.  All of the patient's questions were answered.    Orders Placed This Encounter    XR femur left 2+ views       This note was prepared using voice recognition software.  The details of this note are correct and have been reviewed, and corrected to the best of my  ability.  Some grammatical areas may persist related to the Dragon software    Darvin Gomez PA-C, MPAS  [facility]    [phone]

## 2024-05-15 ENCOUNTER — HOSPITAL ENCOUNTER (OUTPATIENT)
Dept: RADIOLOGY | Facility: CLINIC | Age: 79
Discharge: HOME | End: 2024-05-15
Payer: MEDICARE

## 2024-05-15 ENCOUNTER — OFFICE VISIT (OUTPATIENT)
Dept: PALLATIVE CARE | Age: 79
End: 2024-05-15

## 2024-05-15 ENCOUNTER — OFFICE VISIT (OUTPATIENT)
Dept: ORTHOPEDIC SURGERY | Facility: CLINIC | Age: 79
End: 2024-05-15
Payer: MEDICARE

## 2024-05-15 VITALS
TEMPERATURE: 97.6 F | HEART RATE: 50 BPM | OXYGEN SATURATION: 95 % | DIASTOLIC BLOOD PRESSURE: 61 MMHG | SYSTOLIC BLOOD PRESSURE: 103 MMHG

## 2024-05-15 DIAGNOSIS — J44.9 CHRONIC OBSTRUCTIVE PULMONARY DISEASE, UNSPECIFIED COPD TYPE (HCC): ICD-10-CM

## 2024-05-15 DIAGNOSIS — F03.94 DEMENTIA WITH ANXIETY, UNSPECIFIED DEMENTIA SEVERITY, UNSPECIFIED DEMENTIA TYPE (HCC): ICD-10-CM

## 2024-05-15 DIAGNOSIS — S72.92XA CLOSED FRACTURE OF LEFT FEMUR, UNSPECIFIED FRACTURE MORPHOLOGY, UNSPECIFIED PORTION OF FEMUR, INITIAL ENCOUNTER (MULTI): ICD-10-CM

## 2024-05-15 DIAGNOSIS — F32.1 MAJOR DEPRESSIVE DISORDER, SINGLE EPISODE, MODERATE (HCC): Primary | ICD-10-CM

## 2024-05-15 DIAGNOSIS — S72.92XA CLOSED FRACTURE OF LEFT FEMUR, UNSPECIFIED FRACTURE MORPHOLOGY, UNSPECIFIED PORTION OF FEMUR, INITIAL ENCOUNTER (MULTI): Primary | ICD-10-CM

## 2024-05-15 PROCEDURE — 1159F MED LIST DOCD IN RCRD: CPT | Performed by: PHYSICIAN ASSISTANT

## 2024-05-15 PROCEDURE — 73552 X-RAY EXAM OF FEMUR 2/>: CPT | Mod: LEFT SIDE | Performed by: ORTHOPAEDIC SURGERY

## 2024-05-15 PROCEDURE — 99024 POSTOP FOLLOW-UP VISIT: CPT | Performed by: PHYSICIAN ASSISTANT

## 2024-05-15 PROCEDURE — 73552 X-RAY EXAM OF FEMUR 2/>: CPT | Mod: LT

## 2024-05-15 RX ORDER — FERROUS SULFATE 325(65) MG
325 TABLET ORAL
COMMUNITY

## 2024-05-15 ASSESSMENT — VISUAL ACUITY: OU: 1

## 2024-05-15 ASSESSMENT — ENCOUNTER SYMPTOMS
COUGH: 0
ABDOMINAL PAIN: 0
SHORTNESS OF BREATH: 0

## 2024-05-15 NOTE — PROGRESS NOTES
contain errors related to the system, including grammar, punctuation and spelling as well as words and phrases that may seem inaccurate.  For any questions or concerns feel free to contact me for clarification.

## 2024-05-16 ENCOUNTER — TELEPHONE (OUTPATIENT)
Dept: FAMILY MEDICINE CLINIC | Age: 79
End: 2024-05-16

## 2024-05-16 NOTE — PROGRESS NOTES
Subjective    Patient ID: Charlotte    Chief Complaint:   Chief Complaint   Patient presents with    Left Hip - Hospital Follow-up, Post-op Visit     Femur IM nailing 3/24/24  Xrays today      History of present illness    78-year-old female presented to clinic today for her 7-week postop visit status post IM nailing left femur.  She states overall she has been very comfortable.  She has been in the immobilizer since surgery.  She is currently inpatient at [facility] in [place].  Still nonweightbearing left lower extremity.  They have been doing isometrics and leg raises with the immobilizer on and physical therapy.  She states that she is really had no issues with physical therapy.  She is usually nonambulatory due to her neurologic deficit on the right side from a previous stroke.      Past medical , Surgical, Family and social history reviewed.      Physical exam  General: No acute distress and breathing comfortably.  Patient is pleasant and cooperative with the examination.    Extremity  Left lower extremity is neurovascular intact.  No signs of infection.  Incision well-healed.  Compartments soft.  Capillary refill within normal is distally.  Mild tenderness palpation midshaft left femur/mid thigh, improved.  No DVT.  Mild edema.    Diagnostics  [ none]  XR femur left 2+ views    Result Date: 5/15/2024  Interpreted By:  Keith Blue, STUDY: XR FEMUR LEFT 2+ VIEWS; 5/15/2024 10:39 am   INDICATION: Signs/Symptoms:pain.   ACCESSION NUMBER(S): [id number]   ORDERING CLINICIAN: KEITH BLUE   FINDINGS: Left femur AP and lateral views. Status post retrograde IM nail supracondylar periprosthetic distal femur fracture hardware in good position fracture in good alignment without displacement     Signed by: Keith Blue 5/15/2024 12:06 PM Dictation workstation:   [id number]    XR femur left 2+ views    Result Date: 4/19/2024  Interpreted By:  Keith Blue, STUDY: XR FEMUR LEFT 2+ VIEWS; 4/19/2024  9:33 am   INDICATION: Signs/Symptoms:pain.   ACCESSION NUMBER(S): [id number]   ORDERING CLINICIAN: KEITH BLUE   FINDINGS: Left femur AP and lateral views. Status post retrograde IM nail supracondylar femur fracture hardware in good position fracture alignment satisfactory without displacement staples in the soft tissue no other bony abnormality     Signed by: Keith Blue 4/19/2024 11:09 AM Dictation workstation:   [id number]       Procedure  [ none]    Assessment  Status post IM nailing left periprosthetic femur fracture    Treatment plan  1.  At this time we a long discussion regards to continued conservative treatment options.  2.  She will begin toe-touch weightbearing left lower extremity in T scope knee brace ranging 0 to 60 degrees.  3.  She will continue with inpatient physical therapy at [facility].  Follow-up with us in approximately 1 month with new x-rays left femur at that time we will most likely open her brace to full range of motion.  4.  All of the patient's questions were answered.    Orders Placed This Encounter    XR femur left 2+ views       This note was prepared using voice recognition software.  The details of this note are correct and have been reviewed, and corrected to the best of my ability.  Some grammatical areas may persist related to the Dragon software    Darvin Gomez PA-C, [facility]  [facility]  Orthopedic [place]    [phone]

## 2024-05-27 NOTE — CARE COORDINATION
PLAN REMAINS ANCHOR LODGE AND AWAITING PRECERT [FreeTextEntry1] : ALLAN YOUNG is a 66 year old female with right shoulder pain.    I discussed with the patient that their symptoms, signs, and imaging are most consistent with osteoarthritis. We reviewed the natural history of this condition and treatment options. We agreed on the following plan:  Encouraged to continue home exercises per handout. Restart physical therapy when cleared by Ortho Surgery. Medication:    prescription provided. Imaging: c/w Diclofenac 75mg daily prn, Acetaminophen 1g QID, Diclofenac gel prn. Provided with sling for comfort today to allow rest and elevation of hand to manage swelling. Discussed US guided CSI. Deferred until r/o sutures and cleared by Ortho Surgery Follow up in 6-8 weeks.

## 2024-06-18 ENCOUNTER — APPOINTMENT (OUTPATIENT)
Dept: ORTHOPEDIC SURGERY | Facility: CLINIC | Age: 79
End: 2024-06-18
Payer: MEDICARE

## 2024-06-19 ENCOUNTER — HOSPITAL ENCOUNTER (OUTPATIENT)
Dept: RADIOLOGY | Facility: CLINIC | Age: 79
Discharge: HOME | End: 2024-06-19
Payer: MEDICARE

## 2024-06-19 ENCOUNTER — OFFICE VISIT (OUTPATIENT)
Dept: ORTHOPEDIC SURGERY | Facility: CLINIC | Age: 79
End: 2024-06-19
Payer: MEDICARE

## 2024-06-19 DIAGNOSIS — S72.92XA CLOSED FRACTURE OF LEFT FEMUR, UNSPECIFIED FRACTURE MORPHOLOGY, UNSPECIFIED PORTION OF FEMUR, INITIAL ENCOUNTER (MULTI): ICD-10-CM

## 2024-06-19 DIAGNOSIS — S72.92XA CLOSED FRACTURE OF LEFT FEMUR, UNSPECIFIED FRACTURE MORPHOLOGY, UNSPECIFIED PORTION OF FEMUR, INITIAL ENCOUNTER (MULTI): Primary | ICD-10-CM

## 2024-06-19 PROCEDURE — 99024 POSTOP FOLLOW-UP VISIT: CPT | Performed by: ORTHOPAEDIC SURGERY

## 2024-06-19 PROCEDURE — 73552 X-RAY EXAM OF FEMUR 2/>: CPT | Mod: LT

## 2024-06-19 PROCEDURE — 1159F MED LIST DOCD IN RCRD: CPT | Performed by: ORTHOPAEDIC SURGERY

## 2024-06-19 PROCEDURE — 73552 X-RAY EXAM OF FEMUR 2/>: CPT | Mod: LEFT SIDE | Performed by: ORTHOPAEDIC SURGERY

## 2024-06-19 NOTE — PROGRESS NOTES
History of present illness    78-year-old female here for follow-up of her left femur fracture she has been in a T scope knee brace no complaints at this time she is here with her family she is a nonambulator prior to this injury.      Past medical , Surgical, Family and social history reviewed.      Physical exam  General: No acute distress and breathing comfortably.  Patient is pleasant and cooperative with the examination.    Extremity  Incisions are well-healed without infection good clinical alignment brisk capillary fill compartments are soft    Diagnostics      XR femur left 2+ views    Result Date: 6/19/2024  Interpreted By:  Keith Blue, STUDY: XR FEMUR LEFT 2+ VIEWS; 6/19/2024 10:33 am   INDICATION: Signs/Symptoms:pain.   ACCESSION NUMBER(S): [id number]   ORDERING CLINICIAN: KEITH BLUE   FINDINGS: Left femur AP and lateral views. Status post retrograde IM nail supracondylar femur fracture there is early callus formation hardware in good position without displacement total knee replacement in good alignment without bony abnormality no other fracture dislocation or other bony abnormality     Signed by: Keith Blue 6/19/2024 11:41 AM Dictation workstation:   [id number]       Procedure  [ none]    Assessment  Healing retrograde IM nail femur left    Treatment plan  1.  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  2. [   ]  3. [   ]  4.  All of the patient's questions were answered.    Orders Placed This Encounter    XR femur left 2+ views       This note was prepared using voice recognition software.  The details of this note are correct and have been reviewed,  and corrected to the best of my ability.  Some grammatical areas may persist related to the Dragon software    Yamil Blue MD  Senior Attending Physician  [facility]  Orthopedic [place]    [phone]

## 2024-07-01 ENCOUNTER — OFFICE VISIT (OUTPATIENT)
Dept: PALLATIVE CARE | Age: 79
End: 2024-07-01
Payer: MEDICARE

## 2024-07-01 VITALS
DIASTOLIC BLOOD PRESSURE: 67 MMHG | HEART RATE: 58 BPM | SYSTOLIC BLOOD PRESSURE: 115 MMHG | OXYGEN SATURATION: 93 % | TEMPERATURE: 98.9 F

## 2024-07-01 DIAGNOSIS — F33.9 EPISODE OF RECURRENT MAJOR DEPRESSIVE DISORDER, UNSPECIFIED DEPRESSION EPISODE SEVERITY (HCC): Primary | ICD-10-CM

## 2024-07-01 DIAGNOSIS — F03.94 DEMENTIA WITH ANXIETY, UNSPECIFIED DEMENTIA SEVERITY, UNSPECIFIED DEMENTIA TYPE (HCC): ICD-10-CM

## 2024-07-01 PROCEDURE — 99308 SBSQ NF CARE LOW MDM 20: CPT | Performed by: NURSE PRACTITIONER

## 2024-07-01 PROCEDURE — 1123F ACP DISCUSS/DSCN MKR DOCD: CPT | Performed by: NURSE PRACTITIONER

## 2024-07-01 ASSESSMENT — VISUAL ACUITY: OU: 1

## 2024-07-01 ASSESSMENT — ENCOUNTER SYMPTOMS
ABDOMINAL PAIN: 0
SHORTNESS OF BREATH: 0
COUGH: 0

## 2024-07-01 NOTE — PROGRESS NOTES
Subjective:      Patient Id: Seen Fide at  [facility] , for  palliative medicine visit.  She was accompanied to the appointment by: Self.      Nursing home visit necessary due to impaired mobility, debility, multiple comorbidities, and high burden of office visit.    Chief Complaint   Patient presents with    Follow-up    Dementia          HPI    Fide Mock is a 78 y.o. female referred to palliative care  for symptom management, advance care planning, and goals of care conversationwhile hospitalized in November 2022. Fide has complex medical history that includes anxiety, arthritis, COPD, CHF, RLS, HTN, HLD, depression, morbid obesity, overactive bladder, osteoporosis, sleep apnea, TIA.    Patient has no complaints.     General: Patient baseline dementia. Alert and oriented to person only.    COPD/ CHF: Patient on room air. Lungs clear. No fever or chills.     Pain: Patient denies any pain at this time. She is on Tramadol 50 mg qid.     Appetite: Patients  feels patient is eating better.     Mood: No new issues.     Sleep: No issues.     Skin: No issues.     GI/: No issues.     I have personally reviewed the patient's most recent lab work and imaging.      Past Medical History:   Diagnosis Date    Anxiety     Arthritis     COPD (chronic obstructive pulmonary disease) (HCC)     Generalized osteoarthrosis, unspecified site 12/27/2002    Hyperlipidemia     Hypertension     Major depressive disorder, single episode, moderate ([facility]) 12/27/2002    Morbid obesity ([facility]) 12/27/2002    Non-pressure chronic ulcer of calf with fat layer exposed ([facility]) 10/09/2020    Non-pressure chronic ulcer of calf, right, with fat layer exposed ([facility]) 10/02/2020    OAB (overactive bladder)     Osteoporosis     Sleep apnea 11/15/2022    TIA (transient ischemic attack)      Past Surgical History:   Procedure Laterality Date    CARDIAC CATHETERIZATION      CARPAL TUNNEL RELEASE      COLONOSCOPY      FERTILITY SURGERY Left 3/24/2024

## 2024-08-05 ENCOUNTER — OFFICE VISIT (OUTPATIENT)
Dept: PALLATIVE CARE | Age: 79
End: 2024-08-05
Payer: MEDICARE

## 2024-08-05 VITALS — OXYGEN SATURATION: 93 % | DIASTOLIC BLOOD PRESSURE: 67 MMHG | HEART RATE: 52 BPM | SYSTOLIC BLOOD PRESSURE: 106 MMHG

## 2024-08-05 DIAGNOSIS — F33.9 EPISODE OF RECURRENT MAJOR DEPRESSIVE DISORDER, UNSPECIFIED DEPRESSION EPISODE SEVERITY (HCC): Primary | ICD-10-CM

## 2024-08-05 DIAGNOSIS — F03.94 DEMENTIA WITH ANXIETY, UNSPECIFIED DEMENTIA SEVERITY, UNSPECIFIED DEMENTIA TYPE (HCC): ICD-10-CM

## 2024-08-05 PROCEDURE — 99308 SBSQ NF CARE LOW MDM 20: CPT | Performed by: NURSE PRACTITIONER

## 2024-08-05 PROCEDURE — 1123F ACP DISCUSS/DSCN MKR DOCD: CPT | Performed by: NURSE PRACTITIONER

## 2024-08-05 ASSESSMENT — ENCOUNTER SYMPTOMS
ABDOMINAL PAIN: 0
COUGH: 0
SHORTNESS OF BREATH: 0

## 2024-08-05 ASSESSMENT — VISUAL ACUITY: OU: 1

## 2024-08-05 NOTE — PROGRESS NOTES
Subjective:      Patient Id: Seen Fide at  [facility] , for  palliative medicine visit.  She was accompanied to the appointment by: Self.      Nursing home visit necessary due to impaired mobility, debility, multiple comorbidities, and high burden of office visit.    Chief Complaint   Patient presents with    Follow-up          HPI    Fide Mock is a 79 y.o. female referred to palliative care  for symptom management, advance care planning, and goals of care conversationwhile hospitalized in November 2022. Fide has complex medical history that includes anxiety, arthritis, COPD, CHF, RLS, HTN, HLD, depression, morbid obesity, overactive bladder, osteoporosis, sleep apnea, TIA.    Patient has no complaints. Patient seen in therapy room with .     General: Patient baseline dementia. Alert and oriented to person only. Patient re-certified for therapy.     COPD/ CHF: Patient on room air. Lungs clear. No fever or chills. No swelling in legs.     Pain: Patient denies any pain at this time. She is on Tramadol 50 mg qid.     Appetite: No new issues per patient and .     Mood: No issues. Patient reports she is tired.     Sleep: No issues.    GI/: No issues.     I have personally reviewed the patient's most recent lab work and imaging.      Past Medical History:   Diagnosis Date    Anxiety     Arthritis     COPD (chronic obstructive pulmonary disease) (formerly [facility])     Generalized osteoarthrosis, unspecified site 12/27/2002    Hyperlipidemia     Hypertension     Major depressive disorder, single episode, moderate (formerly [facility]) 12/27/2002    Morbid obesity (formerly [facility]) 12/27/2002    Non-pressure chronic ulcer of calf with fat layer exposed (formerly [facility]) 10/09/2020    Non-pressure chronic ulcer of calf, right, with fat layer exposed (formerly [facility]) 10/02/2020    OAB (overactive bladder)     Osteoporosis     Sleep apnea 11/15/2022    TIA (transient ischemic attack)      Past Surgical History:   Procedure Laterality Date    CARDIAC CATHETERIZATION

## 2024-08-06 ENCOUNTER — OFFICE VISIT (OUTPATIENT)
Dept: ORTHOPEDIC SURGERY | Facility: CLINIC | Age: 79
End: 2024-08-06
Payer: MEDICARE

## 2024-08-06 DIAGNOSIS — S72.92XA CLOSED FRACTURE OF LEFT FEMUR, UNSPECIFIED FRACTURE MORPHOLOGY, UNSPECIFIED PORTION OF FEMUR, INITIAL ENCOUNTER (MULTI): Primary | ICD-10-CM

## 2024-08-06 PROCEDURE — 99213 OFFICE O/P EST LOW 20 MIN: CPT | Performed by: ORTHOPAEDIC SURGERY

## 2024-08-06 PROCEDURE — 1160F RVW MEDS BY RX/DR IN RCRD: CPT | Performed by: ORTHOPAEDIC SURGERY

## 2024-08-06 PROCEDURE — 99211 OFF/OP EST MAY X REQ PHY/QHP: CPT | Performed by: ORTHOPAEDIC SURGERY

## 2024-08-06 PROCEDURE — 1159F MED LIST DOCD IN RCRD: CPT | Performed by: ORTHOPAEDIC SURGERY

## 2024-08-06 NOTE — PROGRESS NOTES
History of present illness    79-year-old female follow-up left femoral shaft fracture status post retrograde IM nail.  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      Past medical , Surgical, Family and social history reviewed.      Physical exam  General: No acute distress and breathing comfortably.  Patient is pleasant and cooperative with the examination.    Extremity  No obvious deformity incisions are well-healed without sign of infection minimal tenderness to palpation brisk cap refill calves are soft calf is nontender    Diagnostics      No results found.     Procedure  [ none]    Assessment  Healed IM nail femur    Treatment plan  1.  Resume activities tolerated follow-up as needed.  Patient is nonambulatory  2. [   ]  3. [   ]  4.  All of the patient's questions were answered.    No orders of the defined types were placed in this encounter.      This note was prepared using voice recognition software.  The details of this note are correct and have been reviewed, and corrected to the best of my ability.  Some grammatical areas may persist related to the Dragon software    Yamil Blue MD  Senior Attending Physician  [facility]  Orthopedic [place]    [phone]

## 2024-09-30 ENCOUNTER — OFFICE VISIT (OUTPATIENT)
Dept: PALLATIVE CARE | Age: 79
End: 2024-09-30
Payer: MEDICARE

## 2024-09-30 VITALS
SYSTOLIC BLOOD PRESSURE: 105 MMHG | TEMPERATURE: 98.1 F | OXYGEN SATURATION: 94 % | DIASTOLIC BLOOD PRESSURE: 58 MMHG | HEART RATE: 49 BPM

## 2024-09-30 DIAGNOSIS — F32.1 MAJOR DEPRESSIVE DISORDER, SINGLE EPISODE, MODERATE (HCC): Primary | ICD-10-CM

## 2024-09-30 DIAGNOSIS — F03.94 DEMENTIA WITH ANXIETY, UNSPECIFIED DEMENTIA SEVERITY, UNSPECIFIED DEMENTIA TYPE (HCC): ICD-10-CM

## 2024-09-30 PROCEDURE — 1123F ACP DISCUSS/DSCN MKR DOCD: CPT | Performed by: NURSE PRACTITIONER

## 2024-09-30 PROCEDURE — 99308 SBSQ NF CARE LOW MDM 20: CPT | Performed by: NURSE PRACTITIONER

## 2024-09-30 ASSESSMENT — VISUAL ACUITY: OU: 1

## 2024-09-30 ASSESSMENT — ENCOUNTER SYMPTOMS
ABDOMINAL PAIN: 0
SHORTNESS OF BREATH: 0
COUGH: 0

## 2024-09-30 NOTE — PROGRESS NOTES
(Patient taking differently: Take 1 tablet by mouth 2 times daily) 60 tablet 3    Handicap Placard MISC by Does not apply route Exp 5 years 1 each 0    aspirin 81 MG EC tablet Take 1 tablet by mouth daily 30 tablet 3     No current facility-administered medications on file prior to visit.       Review of Systems   Unable to perform ROS: Dementia   Respiratory:  Negative for cough and shortness of breath.    Cardiovascular:  Negative for chest pain and leg swelling.   Gastrointestinal:  Negative for abdominal pain.   Musculoskeletal:  Arthralgias: right hand.           Objective:   LMP  (LMP Unknown)    Wt Readings from Last 3 Encounters:   03/25/24 58.2 kg (128 lb 4.8 oz)   11/29/22 70.8 kg (156 lb)   11/12/22 72.6 kg (160 lb)     Physical Exam  Constitutional:       General: She is not in acute distress.     Appearance: Normal appearance. She is well-developed. She is not ill-appearing.   HENT:      Head: Normocephalic and atraumatic.      Right Ear: External ear normal. Decreased hearing noted.      Left Ear: External ear normal. Decreased hearing noted.      Nose: Nose normal. No nasal deformity, laceration or congestion.      Mouth/Throat:      Lips: Pink.      Mouth: Mucous membranes are moist.   Eyes:      General: Lids are normal. Vision grossly intact. Gaze aligned appropriately.      Extraocular Movements: Extraocular movements intact.      Pupils: Pupils are equal, round, and reactive to light.   Cardiovascular:      Rate and Rhythm: Regular rhythm. Bradycardia present.      Pulses:           Dorsalis pedis pulses are 1+ on the right side and 1+ on the left side.      Heart sounds: Normal heart sounds.   Pulmonary:      Effort: Pulmonary effort is normal. No respiratory distress.      Breath sounds: No decreased breath sounds, wheezing, rhonchi or rales.   Abdominal:      General: Abdomen is flat. Bowel sounds are normal.      Palpations: Abdomen is soft.      Tenderness: There is no abdominal tenderness.

## 2024-11-25 ENCOUNTER — OFFICE VISIT (OUTPATIENT)
Dept: PALLATIVE CARE | Age: 79
End: 2024-11-25
Payer: MEDICARE

## 2024-11-25 VITALS
TEMPERATURE: 98.7 F | DIASTOLIC BLOOD PRESSURE: 63 MMHG | OXYGEN SATURATION: 92 % | HEART RATE: 50 BPM | SYSTOLIC BLOOD PRESSURE: 107 MMHG

## 2024-11-25 DIAGNOSIS — R52 PAIN AFTER CEREBROVASCULAR ACCIDENT (CVA): ICD-10-CM

## 2024-11-25 DIAGNOSIS — M79.601 RIGHT ARM PAIN: ICD-10-CM

## 2024-11-25 DIAGNOSIS — I69.398 PAIN AFTER CEREBROVASCULAR ACCIDENT (CVA): ICD-10-CM

## 2024-11-25 DIAGNOSIS — I63.512 CEREBROVASCULAR ACCIDENT (CVA) DUE TO STENOSIS OF LEFT MIDDLE CEREBRAL ARTERY (HCC): Primary | ICD-10-CM

## 2024-11-25 PROCEDURE — 1123F ACP DISCUSS/DSCN MKR DOCD: CPT | Performed by: NURSE PRACTITIONER

## 2024-11-25 PROCEDURE — G8484 FLU IMMUNIZE NO ADMIN: HCPCS | Performed by: NURSE PRACTITIONER

## 2024-11-25 PROCEDURE — 99309 SBSQ NF CARE MODERATE MDM 30: CPT | Performed by: NURSE PRACTITIONER

## 2024-11-25 RX ORDER — METHOCARBAMOL 500 MG/1
500 TABLET, FILM COATED ORAL 2 TIMES DAILY
Qty: 60 TABLET | Refills: 0 | Status: SHIPPED | OUTPATIENT
Start: 2024-11-25

## 2024-11-25 ASSESSMENT — ENCOUNTER SYMPTOMS
COUGH: 0
ABDOMINAL PAIN: 0
SHORTNESS OF BREATH: 0

## 2024-11-25 ASSESSMENT — VISUAL ACUITY: OU: 1

## 2024-11-25 NOTE — PROGRESS NOTES
Tub/Shower unit    Bathroom Equipment: Shower chair, Hand-held shower    Home Equipment: Cane, Walker, rolling    ADL Assistance: Independent    Ambulation Assistance: Independent (with WW)    Transfer Assistance: Independent    Active : No    Additional Comments: Pt inconsistent historian. PLOF and home set up verified by dtr. Pt's dtr stating that pt's dementia has been worsening over the past few years.                      Social Determinants of Health     Financial Resource Strain: Low Risk  (7/1/2022)    Overall Financial Resource Strain (CARDIA)     Difficulty of Paying Living Expenses: Not hard at all   Food Insecurity: Patient Unable To Answer (3/23/2024)    Hunger Vital Sign     Worried About Running Out of Food in the Last Year: Patient unable to answer     Ran Out of Food in the Last Year: Patient unable to answer   Transportation Needs: Patient Unable To Answer (3/23/2024)    PRAPARE - Transportation     Lack of Transportation (Medical): Patient unable to answer     Lack of Transportation (Non-Medical): Patient unable to answer   Physical Activity: Insufficiently Active (7/1/2022)    Exercise Vital Sign     Days of Exercise per Week: 7 days     Minutes of Exercise per Session: 10 min   Stress: Stress Concern Present (11/6/2019)    [employer] - Occupational Stress Questionnaire     Feeling of Stress : To some extent   Social Connections: Moderately Integrated (11/6/2019)    Social Connection and Isolation Panel [NHANES]     Frequency of Communication with Friends and Family: Twice a week     Frequency of Social Gatherings with Friends and Family: Twice a week     Attends Hoahaoism Services: More than 4 times per year     Active Member of Clubs or Organizations: No     Attends Club or Organization Meetings: Never     Marital Status:    Intimate Partner Violence: Not on file   Housing Stability: Patient Unable To Answer (3/23/2024)    Housing Stability Vital Sign

## 2025-01-06 ENCOUNTER — OFFICE VISIT (OUTPATIENT)
Dept: PALLATIVE CARE | Age: 80
End: 2025-01-06

## 2025-01-06 VITALS
SYSTOLIC BLOOD PRESSURE: 101 MMHG | TEMPERATURE: 98.3 F | HEART RATE: 60 BPM | OXYGEN SATURATION: 93 % | DIASTOLIC BLOOD PRESSURE: 54 MMHG

## 2025-01-06 DIAGNOSIS — I69.398 PAIN AFTER CEREBROVASCULAR ACCIDENT (CVA): ICD-10-CM

## 2025-01-06 DIAGNOSIS — M79.601 RIGHT ARM PAIN: ICD-10-CM

## 2025-01-06 DIAGNOSIS — Z51.5 PALLIATIVE CARE ENCOUNTER: ICD-10-CM

## 2025-01-06 DIAGNOSIS — I63.512 CEREBROVASCULAR ACCIDENT (CVA) DUE TO STENOSIS OF LEFT MIDDLE CEREBRAL ARTERY (HCC): Primary | ICD-10-CM

## 2025-01-06 DIAGNOSIS — R52 PAIN AFTER CEREBROVASCULAR ACCIDENT (CVA): ICD-10-CM

## 2025-01-06 RX ORDER — LOPERAMIDE HYDROCHLORIDE 2 MG/1
2 CAPSULE ORAL EVERY 12 HOURS PRN
COMMUNITY

## 2025-01-06 ASSESSMENT — VISUAL ACUITY: OU: 1

## 2025-01-06 ASSESSMENT — ENCOUNTER SYMPTOMS
SHORTNESS OF BREATH: 0
ABDOMINAL PAIN: 0
COUGH: 0

## 2025-01-06 NOTE — PROGRESS NOTES
Dorsalis pedis pulses are 1+ on the right side and 1+ on the left side.      Heart sounds: Normal heart sounds.   Pulmonary:      Effort: Pulmonary effort is normal. No respiratory distress.      Breath sounds: No decreased breath sounds, wheezing, rhonchi or rales.   Abdominal:      General: Abdomen is flat. Bowel sounds are normal.      Palpations: Abdomen is soft.      Tenderness: There is no abdominal tenderness. There is no guarding.   Musculoskeletal:      Cervical back: Neck supple. No edema. Normal range of motion.      Right lower leg: No edema.      Left lower leg: No edema.   Skin:     General: Skin is warm and dry.      Findings: No bruising, lesion, rash or wound.   Neurological:      Mental Status: She is alert. Mental status is at baseline. She is confused.      Motor: Weakness present. No tremor.      Comments: Right side is flaccid   Psychiatric:         Attention and Perception: Attention normal. She is attentive.         Mood and Affect: Mood is not depressed. Affect is flat.         Speech: Speech is delayed.         Behavior: Behavior is withdrawn. Behavior is cooperative.         Cognition and Memory: Memory is impaired.         Assessment and Plan:       1. CVA  2. Right arm pain  3. Pain after CVA  Continue Tylenol scheduled tid and scheduled tramadol.   Continue scheduled Robaxin 500 mg BID. Per daughter and staff no side effects from medications.     3. Palliative care encounter  Updated daughter over the phone. Only concern was patients pain not controlled when working with therapy. Patient does not ask for pain medication.     Due to acuity, symptomatology and high-risk medication management, I advised patient to Return in about 4 months (around 5/6/2025).     Thanks for the opportunity you have allowed us to provide palliative care to Fide. We will be in touch as care progresses. Please feel free to reach out to us should you have any questions or requests.    Total Time 25 mins

## 2025-05-20 ENCOUNTER — OFFICE VISIT (OUTPATIENT)
Age: 80
End: 2025-05-20

## 2025-05-20 VITALS
SYSTOLIC BLOOD PRESSURE: 105 MMHG | DIASTOLIC BLOOD PRESSURE: 65 MMHG | TEMPERATURE: 98.1 F | HEART RATE: 53 BPM | OXYGEN SATURATION: 93 %

## 2025-05-20 DIAGNOSIS — I69.351 FLACCID HEMIPLEGIA OF RIGHT DOMINANT SIDE AS LATE EFFECT OF CEREBRAL INFARCTION (HCC): ICD-10-CM

## 2025-05-20 DIAGNOSIS — R52 PAIN AFTER CEREBROVASCULAR ACCIDENT (CVA): Primary | ICD-10-CM

## 2025-05-20 DIAGNOSIS — Z51.5 PALLIATIVE CARE ENCOUNTER: ICD-10-CM

## 2025-05-20 DIAGNOSIS — I69.398 PAIN AFTER CEREBROVASCULAR ACCIDENT (CVA): Primary | ICD-10-CM

## 2025-05-20 RX ORDER — DONEPEZIL HYDROCHLORIDE 5 MG/1
5 TABLET, FILM COATED ORAL NIGHTLY
COMMUNITY

## 2025-05-20 ASSESSMENT — ENCOUNTER SYMPTOMS
ABDOMINAL PAIN: 0
COUGH: 0
SHORTNESS OF BREATH: 0

## 2025-05-20 ASSESSMENT — VISUAL ACUITY: OU: 1

## 2025-05-20 NOTE — PROGRESS NOTES
Subjective:      Patient Id: Seen Fide at  [facility] , for  palliative medicine visit.  She was accompanied to the appointment by: Self.      Nursing home visit necessary due to impaired mobility, debility, multiple comorbidities, and high burden of office visit.    Chief Complaint   Patient presents with    Follow-up    Pain     Right  hip/ leg       HPI    Fide Mock is a 79 y.o. female referred to palliative care  for symptom management, advance care planning, and goals of care conversationwhile hospitalized in November 2022. Fide has complex medical history that includes anxiety, arthritis, COPD, CHF, RLS, HTN, HLD, depression, morbid obesity, overactive bladder, osteoporosis, sleep apnea, TIA.    Patient has no complaints. Per daughter patient often complains of pain in her right hip/ leg.  Patient follows with nursing home provider, psych, podiatry.     General: Patient baseline dementia. Alert and oriented to person only. Patient seen in bed. Patient is a cipriano lift to transfer. Right side is flaccid.     COPD/ CHF: Patient on room air. Lungs clear. No fever or chills. No swelling in legs. Patient denies any breathing issues.     Pain: Patient denies any pain at this time. Patient has complaints of pain in her right leg/ hip when laying onit. Daughter has mentioned it to staff and they have made noted to try to keep patient off that side. Patient on scheuled robaxin, tramadol and tylenol.     Appetite: No new issues per nurse.     Mood: No issues per staff or daughter.     Sleep: No issues.     GI/: No issues with diarrhea or constipation.     I have personally reviewed the patient's most recent lab work and imaging.      Past Medical History:   Diagnosis Date    Anxiety     Arthritis     COPD (chronic obstructive pulmonary disease) ([facility])     Generalized osteoarthrosis, unspecified site 12/27/2002    Hyperlipidemia     Hypertension     Major depressive disorder, single episode, moderate ([facility]) 12/27/2002

## 2025-06-30 ENCOUNTER — OFFICE VISIT (OUTPATIENT)
Age: 80
End: 2025-06-30

## 2025-06-30 VITALS
OXYGEN SATURATION: 92 % | DIASTOLIC BLOOD PRESSURE: 53 MMHG | TEMPERATURE: 97.2 F | HEART RATE: 59 BPM | SYSTOLIC BLOOD PRESSURE: 100 MMHG

## 2025-06-30 DIAGNOSIS — Z51.5 PALLIATIVE CARE ENCOUNTER: ICD-10-CM

## 2025-06-30 DIAGNOSIS — R52 PAIN AFTER CEREBROVASCULAR ACCIDENT (CVA): Primary | ICD-10-CM

## 2025-06-30 DIAGNOSIS — F32.1 MAJOR DEPRESSIVE DISORDER, SINGLE EPISODE, MODERATE (HCC): ICD-10-CM

## 2025-06-30 DIAGNOSIS — I69.351 FLACCID HEMIPLEGIA OF RIGHT DOMINANT SIDE AS LATE EFFECT OF CEREBRAL INFARCTION (HCC): ICD-10-CM

## 2025-06-30 DIAGNOSIS — I69.398 PAIN AFTER CEREBROVASCULAR ACCIDENT (CVA): Primary | ICD-10-CM

## 2025-06-30 DIAGNOSIS — F03.94 DEMENTIA WITH ANXIETY, UNSPECIFIED DEMENTIA SEVERITY, UNSPECIFIED DEMENTIA TYPE (HCC): ICD-10-CM

## 2025-06-30 ASSESSMENT — VISUAL ACUITY: OU: 1

## 2025-06-30 ASSESSMENT — ENCOUNTER SYMPTOMS
ABDOMINAL PAIN: 0
COUGH: 0
SHORTNESS OF BREATH: 0

## 2025-06-30 NOTE — PROGRESS NOTES
Subjective:      Patient Id: Seen Fide at [facility], for  palliative medicine visit.  She was accompanied to the appointment by: Self.      Nursing home visit necessary due to impaired mobility, debility, multiple comorbidities, and high burden of office visit.    Chief Complaint   Patient presents with    Follow-up    Pain    Depression    Dementia       HPI    Fide Mock is a 79 y.o. female referred to palliative care  for symptom management, advance care planning, and goals of care conversationwhile hospitalized in November 2022. Fide has complex medical history that includes anxiety, arthritis, COPD, CHF, RLS, HTN, HLD, depression, morbid obesity, overactive bladder, osteoporosis, sleep apnea, TIA.    Patient has no complaints. Per daughter patient often complains of pain in her right hip/ leg.  Patient follows with nursing home provider, psych, podiatry.     General: Patient baseline dementia. Alert and oriented to person only. Patient seen in bed. Patient is a cipriano lift to transfer. Right side is flaccid. Patient had redness to left eye. Eye drops started for one week. Still has slight redness.     COPD/ CHF: Patient on room air. Lungs clear. No fever or chills. No swelling in legs. Patient denies any breathing issues.     Pain: Patient denies any pain at this time. Patient has complaints of pain in her right leg/ hip when laying onit. Daughter has mentioned it to staff and they have made noted to try to keep patient off that side. Patient on scheuled robaxin, tramadol and tylenol. No issues per staff.     Appetite: No new issues per nurse. Does not like to eat lunch but eats breakfast and dinner. No weight loss.     Mood: No issues per staff or daughter. Patient can be angry at times er staff but only when they do something she thinks she can do on her own. Patient admits to having depression. She is on celexa.    Sleep: No issues.     GI/: No issues with diarrhea or constipation.     I have personally

## 2025-08-20 ENCOUNTER — OFFICE VISIT (OUTPATIENT)
Age: 80
End: 2025-08-20

## 2025-08-20 VITALS
HEART RATE: 52 BPM | DIASTOLIC BLOOD PRESSURE: 76 MMHG | TEMPERATURE: 96.8 F | SYSTOLIC BLOOD PRESSURE: 107 MMHG | OXYGEN SATURATION: 93 %

## 2025-08-20 DIAGNOSIS — F03.94 DEMENTIA WITH ANXIETY, UNSPECIFIED DEMENTIA SEVERITY, UNSPECIFIED DEMENTIA TYPE (HCC): ICD-10-CM

## 2025-08-20 DIAGNOSIS — I69.398 PAIN AFTER CEREBROVASCULAR ACCIDENT (CVA): Primary | ICD-10-CM

## 2025-08-20 DIAGNOSIS — R52 PAIN AFTER CEREBROVASCULAR ACCIDENT (CVA): Primary | ICD-10-CM

## 2025-08-20 DIAGNOSIS — Z51.5 PALLIATIVE CARE ENCOUNTER: ICD-10-CM

## 2025-08-20 DIAGNOSIS — I69.351 FLACCID HEMIPLEGIA OF RIGHT DOMINANT SIDE AS LATE EFFECT OF CEREBRAL INFARCTION (HCC): ICD-10-CM

## 2025-08-20 DIAGNOSIS — F32.1 MAJOR DEPRESSIVE DISORDER, SINGLE EPISODE, MODERATE (HCC): ICD-10-CM

## 2025-08-20 ASSESSMENT — ENCOUNTER SYMPTOMS
COUGH: 0
ABDOMINAL PAIN: 0
SHORTNESS OF BREATH: 0

## 2025-08-20 ASSESSMENT — VISUAL ACUITY: OU: 1

## (undated) DEVICE — RESTRAINT EXTREMITY CONTACT CLOSURE

## (undated) DEVICE — PEG KIT STD 20 FR PUSH W/ ENFIT ENDOVIVE

## (undated) DEVICE — BLADE,CARBON-STEEL,10,STRL,DISPOSABLE,TB: Brand: MEDLINE

## (undated) DEVICE — SPONGE GZ W4XL4IN COT 12 PLY TYP VII WVN C FLD DSGN STERILE

## (undated) DEVICE — TUBE SET 96 MM 64 MM H2O PERISTALTIC STD AUX CHANNEL

## (undated) DEVICE — GOWN,AURORA,NONREINFORCED,LARGE: Brand: MEDLINE

## (undated) DEVICE — 4-PORT MANIFOLD: Brand: NEPTUNE 2

## (undated) DEVICE — NEPTUNE E-SEP SMOKE EVACUATION PENCIL, COATED, 70MM BLADE, PUSH BUTTON SWITCH: Brand: NEPTUNE E-SEP

## (undated) DEVICE — Device: Brand: ENDO SMARTCAP

## (undated) DEVICE — SUTURE VCRL SZ 0 L36IN ABSRB UD L36MM CT-1 1/2 CIR J946H

## (undated) DEVICE — GOWN,AURORA,NONRNF,XL,30/CS: Brand: MEDLINE

## (undated) DEVICE — COVER,TABLE,44X90,STERILE: Brand: MEDLINE

## (undated) DEVICE — PACK,ARTHROSCOPY II,SIRUS: Brand: MEDLINE

## (undated) DEVICE — TOWEL,OR,DSP,ST,BLUE,STD,4/PK,20PK/CS: Brand: MEDLINE

## (undated) DEVICE — BRUSH ENDO CLN L90.5IN SHTH DIA1.7MM BRIST DIA5-7MM 2-6MM

## (undated) DEVICE — GLOVE ORANGE PI 7 1/2   MSG9075

## (undated) DEVICE — SYRINGE IRRIG 60ML SFT PLIABLE BLB EZ TO GRP 1 HND USE W/

## (undated) DEVICE — SPONGE,LAP,18"X18",DLX,XR,ST,5/PK,40/PK: Brand: MEDLINE

## (undated) DEVICE — BLADE,CARBON-STEEL,15,STRL,DISPOSABLE,TB: Brand: MEDLINE

## (undated) DEVICE — TUBING, SUCTION, 1/4" X 10', STRAIGHT: Brand: MEDLINE

## (undated) DEVICE — TUBING, SUCTION, 9/32" X 12', STRAIGHT: Brand: MEDLINE INDUSTRIES, INC.

## (undated) DEVICE — 3M™ STERI-DRAPE™ INSTRUMENT POUCH 1018: Brand: STERI-DRAPE™

## (undated) DEVICE — SINGLE PORT MANIFOLD: Brand: NEPTUNE 2

## (undated) DEVICE — BANDAGE COMPR M W6INXL10YD WHT BGE VELC E MTRX HK AND LOOP

## (undated) DEVICE — CONMED SCOPE SAVER BITE BLOCK, 20X27 MM: Brand: SCOPE SAVER

## (undated) DEVICE — APPLICATOR MEDICATED 26 CC SOLUTION HI LT ORNG CHLORAPREP

## (undated) DEVICE — ROD RMR L950MM DIA2.5MM W/ EXTN BALL TIP

## (undated) DEVICE — ELECTRODE PT RET AD L9FT HI MOIST COND ADH HYDRGEL CORDED

## (undated) DEVICE — PACK,LAPAROTOMY,NO GOWNS: Brand: MEDLINE

## (undated) DEVICE — 3M™ STERI-DRAPE™ U-DRAPE 1015: Brand: STERI-DRAPE™

## (undated) DEVICE — PADDING CAST W6INXL4YD POLY POR SPUN DACRON NON STERILE

## (undated) DEVICE — SUTURE VCRL SZ 3-0 L27IN ABSRB UD L26MM SH 1/2 CIR J416H

## (undated) DEVICE — ADAPTER FLSH PMP FLD MGMT GI IRRIG OFP 2 DISPOSABLE

## (undated) DEVICE — STERILE POLYISOPRENE POWDER-FREE SURGICAL GLOVES: Brand: PROTEXIS

## (undated) DEVICE — PENCIL SMOKE EVAC PUSH BUTTON COATED

## (undated) DEVICE — STAPLER SKIN H3.9MM WIRE DIA0.58MM CRWN 6.9MM 35 STPL FIX

## (undated) DEVICE — Device

## (undated) DEVICE — PAD,ABDOMINAL,8"X10",ST,LF: Brand: MEDLINE

## (undated) DEVICE — COVER LT HNDL BLU PLAS

## (undated) DEVICE — SHEET,DRAPE,53X77,STERILE: Brand: MEDLINE

## (undated) DEVICE — GUIDEWIRE ORTH L400MM DIA3.2MM FOR TFN

## (undated) DEVICE — BIT DRILL CALIBRATED 4.2 EX-LONG

## (undated) DEVICE — ENDO CARRY-ON PROCEDURE KIT: Brand: ENDO CARRY-ON PROCEDURE KIT

## (undated) DEVICE — KIT ARMOR C DRP COLLAPSIBLE AND SELF EXP TOP CVR FOR FLUOROSCOPIC

## (undated) DEVICE — APPLICATOR MEDICATED 10.5 CC SOLUTION HI LT ORNG CHLORAPREP

## (undated) DEVICE — GLOVE ORANGE PI 8   MSG9080

## (undated) DEVICE — MARKER SURG SKIN GENTIAN VLT REG TIP W/ 6IN RUL DYNJSM01

## (undated) DEVICE — YANKAUER,SMOOTH HANDLE,HIGH CAPACITY: Brand: MEDLINE INDUSTRIES, INC.

## (undated) DEVICE — LABEL MED MINI W/ MARKER

## (undated) DEVICE — COUNTER NDL 40 COUNT HLD 70 FOAM BLK ADH W/ MAG

## (undated) DEVICE — 4.2MM RADIOLUCENT DRILL BIT

## (undated) DEVICE — DRESSING PETRO W3XL8IN OIL EMUL N ADH GZ KNIT IMPREG CELOS

## (undated) DEVICE — SUTURE MCRYL + SZ 3-0 L36IN ABSRB UD CT-1 L36MM 1/2 CIR MCP944H

## (undated) DEVICE — YANKAUER,BULB TIP,W/O VENT,RIGID,STERILE: Brand: MEDLINE

## (undated) DEVICE — GOWN,SIRUS,POLYRNF,BRTHSLV,XLN/XL,20/CS: Brand: MEDLINE

## (undated) DEVICE — MARKER SURG SKIN GENTIAN VLT REG TIP W/ 6IN RUL